# Patient Record
Sex: FEMALE | Race: BLACK OR AFRICAN AMERICAN | Employment: UNEMPLOYED | URBAN - METROPOLITAN AREA
[De-identification: names, ages, dates, MRNs, and addresses within clinical notes are randomized per-mention and may not be internally consistent; named-entity substitution may affect disease eponyms.]

---

## 2017-04-22 ENCOUNTER — HOSPITAL ENCOUNTER (EMERGENCY)
Age: 62
Discharge: HOME | End: 2017-04-23
Attending: EMERGENCY MEDICINE
Payer: MEDICARE

## 2017-04-22 ENCOUNTER — APPOINTMENT (OUTPATIENT)
Dept: GENERAL RADIOLOGY | Age: 62
End: 2017-04-22
Attending: EMERGENCY MEDICINE
Payer: MEDICARE

## 2017-04-22 DIAGNOSIS — R73.9 HYPERGLYCEMIA: Primary | ICD-10-CM

## 2017-04-22 DIAGNOSIS — F19.10 SUBSTANCE ABUSE (HCC): ICD-10-CM

## 2017-04-22 LAB
ADMINISTERED INITIALS, ADMINIT: NORMAL
ALBUMIN SERPL BCP-MCNC: 3.4 G/DL (ref 3.4–5)
ALBUMIN/GLOB SERPL: 0.8 {RATIO} (ref 0.8–1.7)
ALP SERPL-CCNC: 157 U/L (ref 45–117)
ALT SERPL-CCNC: 77 U/L (ref 13–56)
AMPHET UR QL SCN: NEGATIVE
ANION GAP BLD CALC-SCNC: 11 MMOL/L (ref 3–18)
APPEARANCE UR: CLEAR
ARTERIAL PATENCY WRIST A: YES
AST SERPL W P-5'-P-CCNC: 55 U/L (ref 15–37)
BACTERIA URNS QL MICRO: ABNORMAL /HPF
BARBITURATES UR QL SCN: NEGATIVE
BASE EXCESS BLD CALC-SCNC: 0 MMOL/L
BASOPHILS # BLD AUTO: 0 K/UL (ref 0–0.1)
BASOPHILS # BLD: 0 % (ref 0–2)
BDY SITE: ABNORMAL
BENZODIAZ UR QL: NEGATIVE
BILIRUB SERPL-MCNC: 0.6 MG/DL (ref 0.2–1)
BILIRUB UR QL: NEGATIVE
BODY TEMPERATURE: 98.6
BUN SERPL-MCNC: 17 MG/DL (ref 7–18)
BUN/CREAT SERPL: 15 (ref 12–20)
CALCIUM SERPL-MCNC: 8.3 MG/DL (ref 8.5–10.1)
CANNABINOIDS UR QL SCN: NEGATIVE
CHLORIDE SERPL-SCNC: 90 MMOL/L (ref 100–108)
CK MB CFR SERPL CALC: 2 % (ref 0–4)
CK MB CFR SERPL CALC: 2.4 % (ref 0–4)
CK MB SERPL-MCNC: 2.2 NG/ML (ref 5–25)
CK MB SERPL-MCNC: 2.2 NG/ML (ref 5–25)
CK SERPL-CCNC: 108 U/L (ref 26–192)
CK SERPL-CCNC: 92 U/L (ref 26–192)
CO2 SERPL-SCNC: 27 MMOL/L (ref 21–32)
COCAINE UR QL SCN: POSITIVE
COLOR UR: YELLOW
CREAT SERPL-MCNC: 1.11 MG/DL (ref 0.6–1.3)
D50 ADMINISTERED, D50ADM: 0 ML
D50 ORDER, D50ORD: 0 ML
DIFFERENTIAL METHOD BLD: NORMAL
EOSINOPHIL # BLD: 0.1 K/UL (ref 0–0.4)
EOSINOPHIL NFR BLD: 1 % (ref 0–5)
EPITH CASTS URNS QL MICRO: ABNORMAL /LPF (ref 0–5)
ERYTHROCYTE [DISTWIDTH] IN BLOOD BY AUTOMATED COUNT: 12.2 % (ref 11.6–14.5)
EST. AVERAGE GLUCOSE BLD GHB EST-MCNC: 306 MG/DL
GAS FLOW.O2 O2 DELIVERY SYS: ABNORMAL L/MIN
GLOBULIN SER CALC-MCNC: 4.1 G/DL (ref 2–4)
GLUCOSE BLD STRIP.AUTO-MCNC: 212 MG/DL (ref 70–110)
GLUCOSE BLD STRIP.AUTO-MCNC: 317 MG/DL (ref 70–110)
GLUCOSE BLD STRIP.AUTO-MCNC: 348 MG/DL (ref 70–110)
GLUCOSE BLD STRIP.AUTO-MCNC: 550 MG/DL (ref 70–110)
GLUCOSE BLD STRIP.AUTO-MCNC: >600 MG/DL (ref 70–110)
GLUCOSE SERPL-MCNC: 701 MG/DL (ref 74–99)
GLUCOSE UR STRIP.AUTO-MCNC: >1000 MG/DL
GLUCOSE, GLC: 212 MG/DL
GLUCOSE, GLC: 317 MG/DL
GLUCOSE, GLC: 348 MG/DL
GLUCOSE, GLC: 550 MG/DL
HBA1C MFR BLD: 12.3 % (ref 4.2–5.6)
HCO3 BLD-SCNC: 24.6 MMOL/L (ref 22–26)
HCT VFR BLD AUTO: 37.5 % (ref 35–45)
HDSCOM,HDSCOM: ABNORMAL
HGB BLD-MCNC: 12.6 G/DL (ref 12–16)
HGB UR QL STRIP: NEGATIVE
HIGH TARGET, HITG: 180 MG/DL
INSULIN ADMINSTERED, INSADM: 1.5 UNITS/HOUR
INSULIN ADMINSTERED, INSADM: 2.9 UNITS/HOUR
INSULIN ADMINSTERED, INSADM: 5.1 UNITS/HOUR
INSULIN ADMINSTERED, INSADM: 9.8 UNITS/HOUR
INSULIN ORDER, INSORD: 1.5 UNITS/HOUR
INSULIN ORDER, INSORD: 2.9 UNITS/HOUR
INSULIN ORDER, INSORD: 5.1 UNITS/HOUR
INSULIN ORDER, INSORD: 9.8 UNITS/HOUR
KETONES UR QL STRIP.AUTO: 15 MG/DL
LEUKOCYTE ESTERASE UR QL STRIP.AUTO: NEGATIVE
LOW TARGET, LOT: 140 MG/DL
LYMPHOCYTES # BLD AUTO: 32 % (ref 21–52)
LYMPHOCYTES # BLD: 2.1 K/UL (ref 0.9–3.6)
MAGNESIUM SERPL-MCNC: 1.9 MG/DL (ref 1.6–2.6)
MCH RBC QN AUTO: 29.8 PG (ref 24–34)
MCHC RBC AUTO-ENTMCNC: 33.6 G/DL (ref 31–37)
MCV RBC AUTO: 88.7 FL (ref 74–97)
METHADONE UR QL: NEGATIVE
MINUTES UNTIL NEXT BG, NBG: 60 MIN
MONOCYTES # BLD: 0.3 K/UL (ref 0.05–1.2)
MONOCYTES NFR BLD AUTO: 5 % (ref 3–10)
MULTIPLIER, MUL: 0.01
MULTIPLIER, MUL: 0.01
MULTIPLIER, MUL: 0.02
MULTIPLIER, MUL: 0.02
NEUTS SEG # BLD: 4 K/UL (ref 1.8–8)
NEUTS SEG NFR BLD AUTO: 62 % (ref 40–73)
NITRITE UR QL STRIP.AUTO: POSITIVE
O2/TOTAL GAS SETTING VFR VENT: 21 %
OPIATES UR QL: NEGATIVE
ORDER INITIALS, ORDINIT: NORMAL
PCO2 BLD: 40.8 MMHG (ref 35–45)
PCP UR QL: NEGATIVE
PH BLD: 7.39 [PH] (ref 7.35–7.45)
PH UR STRIP: 6.5 [PH] (ref 5–8)
PLATELET # BLD AUTO: 255 K/UL (ref 135–420)
PMV BLD AUTO: 11.5 FL (ref 9.2–11.8)
PO2 BLD: 75 MMHG (ref 80–100)
POTASSIUM SERPL-SCNC: 4.1 MMOL/L (ref 3.5–5.5)
PROT SERPL-MCNC: 7.5 G/DL (ref 6.4–8.2)
PROT UR STRIP-MCNC: NEGATIVE MG/DL
RBC # BLD AUTO: 4.23 M/UL (ref 4.2–5.3)
RBC #/AREA URNS HPF: ABNORMAL /HPF (ref 0–5)
SAO2 % BLD: 95 % (ref 92–97)
SERVICE CMNT-IMP: ABNORMAL
SODIUM SERPL-SCNC: 128 MMOL/L (ref 136–145)
SP GR UR REFRACTOMETRY: >1.03 (ref 1–1.03)
SPECIMEN TYPE: ABNORMAL
TOTAL RESP. RATE, ITRR: 16
TROPONIN I SERPL-MCNC: 0.02 NG/ML (ref 0–0.04)
TROPONIN I SERPL-MCNC: 0.02 NG/ML (ref 0–0.04)
UROBILINOGEN UR QL STRIP.AUTO: 2 EU/DL (ref 0.2–1)
WBC # BLD AUTO: 6.4 K/UL (ref 4.6–13.2)
WBC URNS QL MICRO: ABNORMAL /HPF (ref 0–4)

## 2017-04-22 PROCEDURE — 96366 THER/PROPH/DIAG IV INF ADDON: CPT

## 2017-04-22 PROCEDURE — 71010 XR CHEST PORT: CPT

## 2017-04-22 PROCEDURE — 80307 DRUG TEST PRSMV CHEM ANLYZR: CPT | Performed by: EMERGENCY MEDICINE

## 2017-04-22 PROCEDURE — 96365 THER/PROPH/DIAG IV INF INIT: CPT

## 2017-04-22 PROCEDURE — 82962 GLUCOSE BLOOD TEST: CPT

## 2017-04-22 PROCEDURE — 83036 HEMOGLOBIN GLYCOSYLATED A1C: CPT | Performed by: EMERGENCY MEDICINE

## 2017-04-22 PROCEDURE — 74011000258 HC RX REV CODE- 258: Performed by: EMERGENCY MEDICINE

## 2017-04-22 PROCEDURE — 99285 EMERGENCY DEPT VISIT HI MDM: CPT

## 2017-04-22 PROCEDURE — 96361 HYDRATE IV INFUSION ADD-ON: CPT

## 2017-04-22 PROCEDURE — 83735 ASSAY OF MAGNESIUM: CPT | Performed by: EMERGENCY MEDICINE

## 2017-04-22 PROCEDURE — 36600 WITHDRAWAL OF ARTERIAL BLOOD: CPT

## 2017-04-22 PROCEDURE — 74011250636 HC RX REV CODE- 250/636: Performed by: EMERGENCY MEDICINE

## 2017-04-22 PROCEDURE — 82550 ASSAY OF CK (CPK): CPT | Performed by: EMERGENCY MEDICINE

## 2017-04-22 PROCEDURE — 80053 COMPREHEN METABOLIC PANEL: CPT | Performed by: EMERGENCY MEDICINE

## 2017-04-22 PROCEDURE — 74011636637 HC RX REV CODE- 636/637: Performed by: EMERGENCY MEDICINE

## 2017-04-22 PROCEDURE — 82803 BLOOD GASES ANY COMBINATION: CPT

## 2017-04-22 PROCEDURE — 85025 COMPLETE CBC W/AUTO DIFF WBC: CPT | Performed by: EMERGENCY MEDICINE

## 2017-04-22 PROCEDURE — 93005 ELECTROCARDIOGRAM TRACING: CPT

## 2017-04-22 PROCEDURE — 81001 URINALYSIS AUTO W/SCOPE: CPT | Performed by: EMERGENCY MEDICINE

## 2017-04-22 RX ORDER — DEXTROSE 50 % IN WATER (D50W) INTRAVENOUS SYRINGE
25-50 AS NEEDED
Status: DISCONTINUED | OUTPATIENT
Start: 2017-04-22 | End: 2017-04-23 | Stop reason: HOSPADM

## 2017-04-22 RX ORDER — MAGNESIUM SULFATE 100 %
4 CRYSTALS MISCELLANEOUS AS NEEDED
Status: DISCONTINUED | OUTPATIENT
Start: 2017-04-22 | End: 2017-04-23 | Stop reason: HOSPADM

## 2017-04-22 RX ADMIN — SODIUM CHLORIDE 1000 ML: 900 INJECTION, SOLUTION INTRAVENOUS at 19:50

## 2017-04-22 RX ADMIN — SODIUM CHLORIDE 9.8 UNITS/HR: 900 INJECTION, SOLUTION INTRAVENOUS at 20:24

## 2017-04-22 NOTE — ED TRIAGE NOTES
BIBA for weakness and difficulty breathing; pt reports she smoked crack yesterday; medic reports pt glucose reading of HIGH HIGH.

## 2017-04-22 NOTE — ED NOTES
Bedside shift change report given to Kristin Deshpande RN (oncoming nurse) by Ty Lowe RN   (offgoing nurse). Report included the following information SBAR and ED Summary.

## 2017-04-22 NOTE — ED PROVIDER NOTES
HPI Comments: Aurelia Leroy is a 64 y.o. female presenting with complaints of shortness of breath that began prior to arrival. States she was walking home to get her insulin which she has not taken in several days when her symptoms started. Denies any chest pain, fever, cough, edema, palpitations or other complaints. Does endorse smoking crack yesterday. Denies any abdominal pain, urinary symptoms or ivdu. The history is provided by the patient. Past Medical History:   Diagnosis Date    Arthritis     Asthma     Chronic pain     BACK PAIN    Diabetes ([address]     Diabetic neuropathy (HCC)     Emphysema     Hepatitis C     Hypertension     Nervousness     Osteoarthritis of both knees        Past Surgical History:   Procedure Laterality Date    HX CARPAL TUNNEL RELEASE Right 8/31/09    Dr. Elvira Nassar    HX TUBAL LIGATION           Family History:   Problem Relation Age of Onset    Diabetes Mother     Hypertension Mother     Obesity Mother     Alcohol abuse Father     High Cholesterol Father     Lung Disease Father     Stroke Father     Arthritis-osteo Sister     Depression Sister     Diabetes Sister     Hypertension Sister     Obesity Sister     Arthritis-osteo Brother     Diabetes Brother     Hypertension Brother     Obesity Brother        Social History     Social History    Marital status: UNKNOWN     Spouse name: N/A    Number of children: N/A    Years of education: N/A     Occupational History    Not on file. Social History Main Topics    Smoking status: Current Every Day Smoker     Packs/day: 1.00    Smokeless tobacco: Not on file    Alcohol use Yes      Comment: socially    Drug use: Yes     Special: Cocaine    Sexual activity: No     Other Topics Concern    Not on file     Social History Narrative         ALLERGIES: Review of patient's allergies indicates no known allergies. Review of Systems   Constitutional: Negative for fever.    HENT: Negative for congestion. Respiratory: Positive for shortness of breath. Negative for cough. Cardiovascular: Negative for chest pain and leg swelling. Gastrointestinal: Negative for abdominal pain, nausea and vomiting. Genitourinary: Negative for dysuria. Musculoskeletal: Negative. Neurological: Negative for speech difficulty and headaches. All other systems reviewed and are negative. Vitals:    04/22/17 1906 04/22/17 1913   BP: 109/81    Pulse: 71    Resp: 11    SpO2: 99%    Weight:  77.6 kg (171 lb)   Height:  5' 7\" (1.702 m)            Physical Exam   Constitutional: No distress. HENT:   Head: Atraumatic. Dry mucus membranes   Eyes: Conjunctivae and EOM are normal. Pupils are equal, round, and reactive to light. Neck: Normal range of motion. Neck supple. No JVD present. No tracheal deviation present. Cardiovascular: Normal rate, regular rhythm and normal heart sounds. Pulmonary/Chest: Effort normal and breath sounds normal. No stridor. No respiratory distress. She has no wheezes. She exhibits no tenderness. Abdominal: Soft. Bowel sounds are normal. She exhibits no distension. There is no tenderness. There is no rebound and no guarding. Musculoskeletal: Normal range of motion. She exhibits no edema or tenderness. Neurological: She is alert. No cranial nerve deficit. Gait normal.   Skin: Skin is warm and dry. Nursing note and vitals reviewed. MDM  Number of Diagnoses or Management Options  Diagnosis management comments: Alfredo Gill is a 64 y.o. female presenting with sob in setting of recent cocaine use. Lungs clear. Vitals wnl. Glucose read \"high\" per EMS. Will obtain labs and cxr and rule out ACS, DKA, pulmonary pathology (though less likely given normal exam), anemia or metabolic process.          ED Course       Procedures    Vitals:  Patient Vitals for the past 12 hrs:   Pulse Resp BP SpO2   04/22/17 1906 71 11 109/81 99 %           EKG interpretation by ED Physician:  8355 - sinus rhythm, rate of 77, pacs, no STEMI      X-Ray, CT or other radiology findings or impressions:  XR CHEST PORT    (Results Pending)           Progress notes, Consult notes or additional Procedure notes:   7:26 PM I have discussed results of work up with patient. Glucose of 700, co2 and gap normal. Urine pending. Glucose stabilizer started and Patient's care signed over to Dr Mariah Palencia pending remainder of labs and final disposition. Disposition:  Diagnosis:   1.  Hyperglycemia        Disposition: pending    Michell Moe MD  7:54 PM  Visit Vitals    /81    Pulse 71    Resp 11    Ht 5' 7\" (1.702 m)    Wt 77.6 kg (171 lb)    SpO2 99%    BMI 26.78 kg/m2   Assumed care,no new complaints  Await CE,ABG,glucostabilizer initiated

## 2017-04-23 VITALS
OXYGEN SATURATION: 98 % | RESPIRATION RATE: 16 BRPM | SYSTOLIC BLOOD PRESSURE: 119 MMHG | HEIGHT: 67 IN | HEART RATE: 73 BPM | WEIGHT: 171 LBS | BODY MASS INDEX: 26.84 KG/M2 | DIASTOLIC BLOOD PRESSURE: 70 MMHG

## 2017-04-23 LAB
ADMINISTERED INITIALS, ADMINIT: NORMAL
ATRIAL RATE: 77 BPM
ATRIAL RATE: 77 BPM
CALCULATED P AXIS, ECG09: 62 DEGREES
CALCULATED P AXIS, ECG09: 71 DEGREES
CALCULATED R AXIS, ECG10: -29 DEGREES
CALCULATED R AXIS, ECG10: -54 DEGREES
CALCULATED T AXIS, ECG11: 20 DEGREES
CALCULATED T AXIS, ECG11: 36 DEGREES
D50 ADMINISTERED, D50ADM: 0 ML
D50 ORDER, D50ORD: 0 ML
DIAGNOSIS, 93000: NORMAL
DIAGNOSIS, 93000: NORMAL
GLUCOSE BLD STRIP.AUTO-MCNC: 167 MG/DL (ref 70–110)
GLUCOSE, GLC: 167 MG/DL
HIGH TARGET, HITG: 180 MG/DL
INSULIN ADMINSTERED, INSADM: 1.1 UNITS/HOUR
INSULIN ORDER, INSORD: 1.1 UNITS/HOUR
LOW TARGET, LOT: 140 MG/DL
MINUTES UNTIL NEXT BG, NBG: 60 MIN
MULTIPLIER, MUL: 0.01
ORDER INITIALS, ORDINIT: NORMAL
P-R INTERVAL, ECG05: 170 MS
P-R INTERVAL, ECG05: 176 MS
Q-T INTERVAL, ECG07: 398 MS
Q-T INTERVAL, ECG07: 412 MS
QRS DURATION, ECG06: 90 MS
QRS DURATION, ECG06: 96 MS
QTC CALCULATION (BEZET), ECG08: 450 MS
QTC CALCULATION (BEZET), ECG08: 466 MS
VENTRICULAR RATE, ECG03: 77 BPM
VENTRICULAR RATE, ECG03: 77 BPM

## 2017-04-23 PROCEDURE — 82962 GLUCOSE BLOOD TEST: CPT

## 2017-04-23 NOTE — DISCHARGE INSTRUCTIONS
Alcohol, Drug, or Poison Ingestion: Care Instructions  Your Care Instructions  A person can become very sick, or die, from swallowing or using alcohol, drugs, or poisons. Alcohol poisoning occurs when a person drinks a large amount of alcohol. Alcohol can stop nerve signals that control breathing. It can also stop the gag reflex that prevents choking. Alcohol poisoning is serious. It can lead to brain damage or death if it's not treated right away. Drugs can be used by accident or on purpose. They can be swallowed, inhaled, injected, or absorbed through the skin. Drugs include over-the-counter medicine (such as aspirin or acetaminophen) and prescription medicine. They also include vitamins and supplements. And they include illegal drugs such as cocaine and heroin. And poisons are all around us. They include household , cosmetics, houseplants, and garden chemicals. The doctor has checked you carefully, but problems can develop later. If you notice any problems or new symptoms, get medical treatment right away. Follow-up care is a key part of your treatment and safety. Be sure to make and go to all appointments, and call your doctor if you are having problems. It's also a good idea to know your test results and keep a list of the medicines you take. How can you care for yourself at home? Alcohol problems  · Talk to your doctor or counselor about programs that can help you stop using alcohol. · Plan ways to avoid being tempted to drink. ¨ Get rid of all alcohol in your home. ¨ Avoid places where you tend to drink. ¨ Stay away from places or events that offer alcohol. ¨ Stay away from people who drink a lot. Drug problems  · Talk to your doctor about programs that can help you stop using drugs. · Get rid of any drugs you might be tempted to misuse. · Learn how to say no when other people use drugs. · Don't spend time with people who use drugs.   Poison prevention  · Keep products in the containers they came in. Keep them with the original labels. · Be careful when you use cleaning products, paints, solvents, and pesticides. Read labels before use. Use a fan to move strong odors and fumes out of your home. · Do not mix cleaning products. Try to use nontoxic . These include vinegar, lemon juice, and baking soda. When should you call for help? Poison control centers, hospitals, or your doctor can give immediate advice in the case of a poisoning. The [facility] [employer] number is [phone]. Have the poison container with you so you can give complete information to the poison control center, such as what the poison or substance is, how much was taken and when. Do not try to make the person vomit. Call 911 anytime you think you may need emergency care. For example, call if you or someone else:  · Has used or currently uses alcohol or drugs and is very confused or can't stay awake. · Has passed out (lost consciousness). · Has severe trouble breathing. · Is having a seizure. Call your doctor now or seek immediate medical care if you or someone else:  · Has new symptoms, or is not acting normally. Watch closely for changes in your health, and be sure to contact your doctor if:  · You do not get better as expected. · You need help with drug or alcohol problems. · You have problems with depression or other mental health issues. Where can you learn more? Go to http://fidelia-ashley.info/. Enter B185 in the search box to learn more about \"Alcohol, Drug, or Poison Ingestion: Care Instructions. \"  Current as of: May 27, 2016  Content Version: 11.2  © 7879-6400 [employer]. Care instructions adapted under license by [employer] (which disclaims liability or warranty for this information).  If you have questions about a medical condition or this instruction, always ask your healthcare professional. [employer] disclaims any warranty or liability for your use of this information.

## 2017-04-23 NOTE — ED NOTES
I have reviewed discharge instructions with the patient. The patient verbalized understanding. Pt is AxOx4, NAD. PT states that she is feeling better and ready to go home. Pt ambulated out of ED with steady gait.

## 2017-04-23 NOTE — ED NOTES
8:00 PM   Patient care transferred from Dr. Jade Gill, pending repeat Troponin.     1:22 AM   I have reassessed the patient. Patient is feeling better. Repeat troponin was negative, and I suspect no evidence of cardiac injury. Patient was discharged in stable condition. Patient is to return to emergency department if any new or worsening condition. SCRIBE ATTESTATION STATEMENT  Documented by: Esther cleaningibing for, and in the presence of, Erlinda Llanos MD 1:23 AM     Signed by: Deena Hernandez, 4/23/2017, 1:23 AM    PROVIDER ATTESTATION STATEMENT  I personally performed the services described in the documentation, reviewed the documentation, as recorded by the scribe in my presence, and it accurately and completely records my words and actions.   Erlinda Llanos MD

## 2018-02-04 ENCOUNTER — HOSPITAL ENCOUNTER (EMERGENCY)
Age: 63
Discharge: HOME | DRG: 871 | End: 2018-02-05
Attending: EMERGENCY MEDICINE
Payer: MEDICARE

## 2018-02-04 ENCOUNTER — APPOINTMENT (OUTPATIENT)
Dept: CT IMAGING | Age: 63
DRG: 871 | End: 2018-02-04
Attending: EMERGENCY MEDICINE
Payer: MEDICARE

## 2018-02-04 ENCOUNTER — APPOINTMENT (OUTPATIENT)
Dept: GENERAL RADIOLOGY | Age: 63
DRG: 871 | End: 2018-02-04
Attending: EMERGENCY MEDICINE
Payer: MEDICARE

## 2018-02-04 DIAGNOSIS — R33.9 URINARY RETENTION: Primary | ICD-10-CM

## 2018-02-04 LAB
ADMINISTERED INITIALS, ADMINIT: NORMAL
ADMINISTERED INITIALS, ADMINIT: NORMAL
ALBUMIN SERPL-MCNC: 3.9 G/DL (ref 3.4–5)
ALBUMIN/GLOB SERPL: 0.9 {RATIO} (ref 0.8–1.7)
ALP SERPL-CCNC: 126 U/L (ref 45–117)
ALT SERPL-CCNC: 60 U/L (ref 13–56)
AMPHET UR QL SCN: NEGATIVE
ANION GAP SERPL CALC-SCNC: 14 MMOL/L (ref 3–18)
APPEARANCE UR: CLEAR
AST SERPL-CCNC: 44 U/L (ref 15–37)
BARBITURATES UR QL SCN: NEGATIVE
BASOPHILS # BLD: 0 K/UL (ref 0–0.06)
BASOPHILS NFR BLD: 0 % (ref 0–2)
BENZODIAZ UR QL: NEGATIVE
BILIRUB SERPL-MCNC: 0.7 MG/DL (ref 0.2–1)
BILIRUB UR QL: NEGATIVE
BUN SERPL-MCNC: 22 MG/DL (ref 7–18)
BUN/CREAT SERPL: 16 (ref 12–20)
CALCIUM SERPL-MCNC: 9.9 MG/DL (ref 8.5–10.1)
CANNABINOIDS UR QL SCN: NEGATIVE
CHLORIDE SERPL-SCNC: 93 MMOL/L (ref 100–108)
CO2 SERPL-SCNC: 23 MMOL/L (ref 21–32)
COCAINE UR QL SCN: POSITIVE
COLOR UR: YELLOW
CREAT SERPL-MCNC: 1.4 MG/DL (ref 0.6–1.3)
D50 ADMINISTERED, D50ADM: 0 ML
D50 ADMINISTERED, D50ADM: 0 ML
D50 ORDER, D50ORD: 0 ML
D50 ORDER, D50ORD: 0 ML
DIFFERENTIAL METHOD BLD: NORMAL
EOSINOPHIL # BLD: 0 K/UL (ref 0–0.4)
EOSINOPHIL NFR BLD: 0 % (ref 0–5)
ERYTHROCYTE [DISTWIDTH] IN BLOOD BY AUTOMATED COUNT: 13 % (ref 11.6–14.5)
ETHANOL SERPL-MCNC: <3 MG/DL (ref 0–3)
GLOBULIN SER CALC-MCNC: 4.3 G/DL (ref 2–4)
GLUCOSE BLD STRIP.AUTO-MCNC: 232 MG/DL (ref 70–110)
GLUCOSE BLD STRIP.AUTO-MCNC: 559 MG/DL (ref 70–110)
GLUCOSE BLD STRIP.AUTO-MCNC: >600 MG/DL (ref 70–110)
GLUCOSE SERPL-MCNC: 702 MG/DL (ref 74–99)
GLUCOSE UR STRIP.AUTO-MCNC: >1000 MG/DL
GLUCOSE, GLC: 232 MG/DL
GLUCOSE, GLC: 559 MG/DL
HCT VFR BLD AUTO: 39.8 % (ref 35–45)
HDSCOM,HDSCOM: ABNORMAL
HGB BLD-MCNC: 13.5 G/DL (ref 12–16)
HGB UR QL STRIP: NEGATIVE
HIGH TARGET, HITG: 180 MG/DL
HIGH TARGET, HITG: 180 MG/DL
INSULIN ADMINSTERED, INSADM: 1.7 UNITS/HOUR
INSULIN ADMINSTERED, INSADM: 10 UNITS/HOUR
INSULIN ORDER, INSORD: 1.7 UNITS/HOUR
INSULIN ORDER, INSORD: 10 UNITS/HOUR
KETONES UR QL STRIP.AUTO: 80 MG/DL
LACTATE BLD-SCNC: 1.1 MMOL/L (ref 0.4–2)
LEUKOCYTE ESTERASE UR QL STRIP.AUTO: NEGATIVE
LIPASE SERPL-CCNC: 101 U/L (ref 73–393)
LOW TARGET, LOT: 140 MG/DL
LOW TARGET, LOT: 140 MG/DL
LYMPHOCYTES # BLD: 1.8 K/UL (ref 0.9–3.6)
LYMPHOCYTES NFR BLD: 21 % (ref 21–52)
MAGNESIUM SERPL-MCNC: 2.4 MG/DL (ref 1.6–2.6)
MCH RBC QN AUTO: 29 PG (ref 24–34)
MCHC RBC AUTO-ENTMCNC: 33.9 G/DL (ref 31–37)
MCV RBC AUTO: 85.4 FL (ref 74–97)
METHADONE UR QL: NEGATIVE
MINUTES UNTIL NEXT BG, NBG: 60 MIN
MINUTES UNTIL NEXT BG, NBG: 60 MIN
MONOCYTES # BLD: 0.6 K/UL (ref 0.05–1.2)
MONOCYTES NFR BLD: 6 % (ref 3–10)
MULTIPLIER, MUL: 0.01
MULTIPLIER, MUL: 0.02
NEUTS SEG # BLD: 6.4 K/UL (ref 1.8–8)
NEUTS SEG NFR BLD: 73 % (ref 40–73)
NITRITE UR QL STRIP.AUTO: NEGATIVE
OPIATES UR QL: NEGATIVE
ORDER INITIALS, ORDINIT: NORMAL
ORDER INITIALS, ORDINIT: NORMAL
PCP UR QL: NEGATIVE
PH BLDV: 7.27 [PH] (ref 7.32–7.42)
PH UR STRIP: 5 [PH] (ref 5–8)
PLATELET # BLD AUTO: 296 K/UL (ref 135–420)
PMV BLD AUTO: 10.7 FL (ref 9.2–11.8)
POTASSIUM SERPL-SCNC: 4.2 MMOL/L (ref 3.5–5.5)
PROT SERPL-MCNC: 8.2 G/DL (ref 6.4–8.2)
PROT UR STRIP-MCNC: NEGATIVE MG/DL
RBC # BLD AUTO: 4.66 M/UL (ref 4.2–5.3)
SODIUM SERPL-SCNC: 130 MMOL/L (ref 136–145)
SP GR UR REFRACTOMETRY: >1.03 (ref 1–1.03)
UROBILINOGEN UR QL STRIP.AUTO: 1 EU/DL (ref 0.2–1)
WBC # BLD AUTO: 8.9 K/UL (ref 4.6–13.2)

## 2018-02-04 PROCEDURE — 74011636637 HC RX REV CODE- 636/637: Performed by: NURSE PRACTITIONER

## 2018-02-04 PROCEDURE — 96361 HYDRATE IV INFUSION ADD-ON: CPT

## 2018-02-04 PROCEDURE — 85025 COMPLETE CBC W/AUTO DIFF WBC: CPT | Performed by: NURSE PRACTITIONER

## 2018-02-04 PROCEDURE — 82010 KETONE BODYS QUAN: CPT | Performed by: EMERGENCY MEDICINE

## 2018-02-04 PROCEDURE — 83735 ASSAY OF MAGNESIUM: CPT | Performed by: EMERGENCY MEDICINE

## 2018-02-04 PROCEDURE — 74011636320 HC RX REV CODE- 636/320: Performed by: EMERGENCY MEDICINE

## 2018-02-04 PROCEDURE — 96365 THER/PROPH/DIAG IV INF INIT: CPT

## 2018-02-04 PROCEDURE — 74177 CT ABD & PELVIS W/CONTRAST: CPT

## 2018-02-04 PROCEDURE — 80307 DRUG TEST PRSMV CHEM ANLYZR: CPT | Performed by: EMERGENCY MEDICINE

## 2018-02-04 PROCEDURE — 71045 X-RAY EXAM CHEST 1 VIEW: CPT

## 2018-02-04 PROCEDURE — 74011636637 HC RX REV CODE- 636/637: Performed by: EMERGENCY MEDICINE

## 2018-02-04 PROCEDURE — 83690 ASSAY OF LIPASE: CPT | Performed by: EMERGENCY MEDICINE

## 2018-02-04 PROCEDURE — 83605 ASSAY OF LACTIC ACID: CPT

## 2018-02-04 PROCEDURE — 80053 COMPREHEN METABOLIC PANEL: CPT | Performed by: NURSE PRACTITIONER

## 2018-02-04 PROCEDURE — 70450 CT HEAD/BRAIN W/O DYE: CPT

## 2018-02-04 PROCEDURE — 96366 THER/PROPH/DIAG IV INF ADDON: CPT

## 2018-02-04 PROCEDURE — 93005 ELECTROCARDIOGRAM TRACING: CPT

## 2018-02-04 PROCEDURE — 74011250636 HC RX REV CODE- 250/636: Performed by: EMERGENCY MEDICINE

## 2018-02-04 PROCEDURE — 83930 ASSAY OF BLOOD OSMOLALITY: CPT | Performed by: NURSE PRACTITIONER

## 2018-02-04 PROCEDURE — 82800 BLOOD PH: CPT | Performed by: EMERGENCY MEDICINE

## 2018-02-04 PROCEDURE — 82962 GLUCOSE BLOOD TEST: CPT

## 2018-02-04 PROCEDURE — 81003 URINALYSIS AUTO W/O SCOPE: CPT | Performed by: NURSE PRACTITIONER

## 2018-02-04 PROCEDURE — 99285 EMERGENCY DEPT VISIT HI MDM: CPT

## 2018-02-04 RX ORDER — SODIUM CHLORIDE 0.9 % (FLUSH) 0.9 %
5-10 SYRINGE (ML) INJECTION AS NEEDED
Status: DISCONTINUED | OUTPATIENT
Start: 2018-02-04 | End: 2018-02-05 | Stop reason: HOSPADM

## 2018-02-04 RX ADMIN — SODIUM CHLORIDE, SODIUM LACTATE, POTASSIUM CHLORIDE, AND CALCIUM CHLORIDE 1000 ML: 600; 310; 30; 20 INJECTION, SOLUTION INTRAVENOUS at 15:45

## 2018-02-04 RX ADMIN — Medication 10 UNITS/HR: at 18:50

## 2018-02-04 RX ADMIN — IOPAMIDOL 100 ML: 612 INJECTION, SOLUTION INTRAVENOUS at 17:48

## 2018-02-04 RX ADMIN — INSULIN HUMAN 10 UNITS: 100 INJECTION, SOLUTION PARENTERAL at 18:51

## 2018-02-04 NOTE — ED NOTES
Ms. Tammy Keen has a confused and weak appearance with her c/o urinary frequency. Strong smell of ketones in triage. POC glucose requested Assisted in Triage of patient and referred to main treatment area due to severity of complaint. Initial orders started and patient assisted to back by Triage RN.    Signed By: Vidya Cosby NP     February 4, 2018

## 2018-02-04 NOTE — ED PROVIDER NOTES
EMERGENCY DEPARTMENT HISTORY AND PHYSICAL EXAM    2:46 PM      Date: 2/4/2018  Patient Name: Victor Hugo Nuñez    History of Presenting Illness     Chief Complaint   Patient presents with    High Blood Sugar         History Provided By: Patient    Chief Complaint: abd pain  Duration:  Days  Timing:  Acute  Location: abd  Quality: sharp  Severity: Mild  Modifying Factors: Denies any modifying factors. Associated Symptoms: vomiting, nausea, urinary frequency      Additional History (Context): Aurelia Reno is a 58 y.o. female with a Hx of HTN, asthma, diabetes, diabetic neuropathy, who presents with mild acute abd pain onset yesterday. Associated Sx include vomiting, nausea, and  increased urinary frequency. Pt was unsure if she has had any recent fevers. She states \" I just feel real bad\", but was unable to communicate what exactly felt wrong. Denies any modifying factors. Pt was unsure if she has ever been hospitalized for her diabetes. Further HPI limited secondary to pt's mental status. PCP: Guero Cortes MD    Current Facility-Administered Medications   Medication Dose Route Frequency Provider Last Rate Last Dose    sodium chloride (NS) flush 5-10 mL  5-10 mL IntraVENous PRN Dorena Sack, NP        insulin regular (NOVOLIN,HUMULIN) 100 units/100 ml NS infusion (premix)  0.1 Units/kg/hr IntraVENous CONTINUOUS Dorena Sack, NP 10 mL/hr at 02/04/18 1850 10 Units/hr at 02/04/18 1850     Current Outpatient Prescriptions   Medication Sig Dispense Refill    HYDROcodone-acetaminophen (NORCO) 5-325 mg per tablet Take 1-2 Tabs by mouth every four (4) hours as needed for Pain. Max Daily Amount: 12 Tabs. 20 Tab 0    guaiFENesin-codeine (ROBITUSSIN AC) 100-10 mg/5 mL solution Take 5 mL by mouth three (3) times daily as needed for Cough. Max Daily Amount: 15 mL. 120 mL 0    amLODIPine (NORVASC) 5 mg tablet Take 5 mg by mouth daily.     Indications: HYPERTENSION      aspirin (ASPIRIN) 325 mg tablet Take 325 mg by mouth daily.  VITAMIN E (FORMULA E 400 PO) Take 1 Tab by mouth daily.  gabapentin (NEURONTIN) 600 mg tablet Take  by mouth three (3) times daily.  glimepiride (AMARYL) 2 mg tablet Take 2 mg by mouth.  sitaGLIPtin-metFORMIN (JANUMET)  mg per tablet Take 1 Tab by mouth.  losartan (COZAAR) 50 mg tablet Take  by mouth daily. Indications: HYPERTENSION      Melatonin 300 mcg Tab Take  by mouth.  cyanocobalamin (VITAMIN B-12) 1,500 mcg TbER Take 1 Tab by mouth. Past History     Past Medical History:  Past Medical History:   Diagnosis Date    Arthritis     Asthma     Chronic pain     BACK PAIN    Diabetes ([address]     Diabetic neuropathy ([address]     Emphysema     Hepatitis C     Hypertension     Nervousness     Osteoarthritis of both knees        Past Surgical History:  Past Surgical History:   Procedure Laterality Date    HX CARPAL TUNNEL RELEASE Right 8/31/09    Dr. Graeme Henderson    HX TUBAL LIGATION         Family History:  Family History   Problem Relation Age of Onset    Diabetes Mother    Filbert Free Hypertension Mother     Obesity Mother     Alcohol abuse Father     High Cholesterol Father     Lung Disease Father     Stroke Father     Arthritis-osteo Sister     Depression Sister     Diabetes Sister     Hypertension Sister     Obesity Sister     Arthritis-osteo Brother     Diabetes Brother     Hypertension Brother     Obesity Brother        Social History:  Social History   Substance Use Topics    Smoking status: Current Every Day Smoker     Packs/day: 1.00    Smokeless tobacco: Not on file    Alcohol use Yes      Comment: socially       Allergies:  No Known Allergies      Review of Systems       Review of Systems   Unable to perform ROS: Mental status change   Constitutional: Negative for chills, fatigue and fever. HENT: Negative for congestion, ear pain, rhinorrhea and sore throat. Eyes: Negative for pain, redness and itching. Respiratory: Negative for cough, chest tightness and shortness of breath. Cardiovascular: Negative for chest pain, palpitations and leg swelling. Gastrointestinal: Positive for abdominal pain, nausea and vomiting. Negative for diarrhea. Genitourinary: Positive for frequency. Negative for decreased urine volume, dysuria, flank pain, hematuria and pelvic pain. Musculoskeletal: Negative for arthralgias, back pain, joint swelling and myalgias. Skin: Negative for color change, pallor and rash. Neurological: Negative for dizziness, weakness and headaches. Hematological: Negative for adenopathy. Does not bruise/bleed easily. All other systems reviewed and are negative. Physical Exam     Visit Vitals    /73    Pulse 81    Temp 98.4 °F (36.9 °C)    Resp 13    Ht 5' 7\" (1.702 m)    Wt 68 kg (150 lb)    SpO2 99%    BMI 23.49 kg/m2         Physical Exam   Constitutional: No distress. HENT:   Head: Normocephalic and atraumatic. Mouth/Throat: Oropharynx is clear and moist.   Dry mucus membranes. Eyes: Conjunctivae and EOM are normal. Pupils are equal, round, and reactive to light. Neck: Normal range of motion. Neck supple. Cardiovascular: Regular rhythm and normal heart sounds. Tachycardia present. No murmur heard. Pulmonary/Chest: Effort normal and breath sounds normal. She has no wheezes. She has no rales. Abdominal: Soft. Bowel sounds are normal. She exhibits no distension. There is no tenderness. Musculoskeletal: Normal range of motion. She exhibits no edema or deformity. Lymphadenopathy:     She has no cervical adenopathy. Neurological: She is alert. She exhibits normal muscle tone. Coordination normal.   Drowsy but arousable. Skin: Skin is warm and dry. No rash noted. She is not diaphoretic. No erythema. Psychiatric: She has a normal mood and affect.  Her behavior is normal.         Diagnostic Study Results     Labs -  Recent Results (from the past 12 hour(s))   GLUCOSE, POC    Collection Time: 02/04/18  2:19 PM   Result Value Ref Range    Glucose (POC) >600 (HH) 70 - 110 mg/dL   URINALYSIS W/ RFLX MICROSCOPIC    Collection Time: 02/04/18  2:25 PM   Result Value Ref Range    Color YELLOW      Appearance CLEAR      Specific gravity >1.030 (H) 1.005 - 1.030    pH (UA) 5.0 5.0 - 8.0      Protein NEGATIVE  NEG mg/dL    Glucose >1000 (A) NEG mg/dL    Ketone 80 (A) NEG mg/dL    Bilirubin NEGATIVE  NEG      Blood NEGATIVE  NEG      Urobilinogen 1.0 0.2 - 1.0 EU/dL    Nitrites NEGATIVE  NEG      Leukocyte Esterase NEGATIVE  NEG     DRUG SCREEN, URINE    Collection Time: 02/04/18  2:25 PM   Result Value Ref Range    BENZODIAZEPINES NEGATIVE  NEG      BARBITURATES NEGATIVE  NEG      THC (TH-CANNABINOL) NEGATIVE  NEG      OPIATES NEGATIVE  NEG      PCP(PHENCYCLIDINE) NEGATIVE  NEG      COCAINE POSITIVE (A) NEG      AMPHETAMINES NEGATIVE  NEG      METHADONE NEGATIVE  NEG      HDSCOM (NOTE)    CBC WITH AUTOMATED DIFF    Collection Time: 02/04/18  2:35 PM   Result Value Ref Range    WBC 8.9 4.6 - 13.2 K/uL    RBC 4.66 4.20 - 5.30 M/uL    HGB 13.5 12.0 - 16.0 g/dL    HCT 39.8 35.0 - 45.0 %    MCV 85.4 74.0 - 97.0 FL    MCH 29.0 24.0 - 34.0 PG    MCHC 33.9 31.0 - 37.0 g/dL    RDW 13.0 11.6 - 14.5 %    PLATELET 009 696 - 573 K/uL    MPV 10.7 9.2 - 11.8 FL    NEUTROPHILS 73 40 - 73 %    LYMPHOCYTES 21 21 - 52 %    MONOCYTES 6 3 - 10 %    EOSINOPHILS 0 0 - 5 %    BASOPHILS 0 0 - 2 %    ABS. NEUTROPHILS 6.4 1.8 - 8.0 K/UL    ABS. LYMPHOCYTES 1.8 0.9 - 3.6 K/UL    ABS. MONOCYTES 0.6 0.05 - 1.2 K/UL    ABS. EOSINOPHILS 0.0 0.0 - 0.4 K/UL    ABS.  BASOPHILS 0.0 0.0 - 0.06 K/UL    DF AUTOMATED     METABOLIC PANEL, COMPREHENSIVE    Collection Time: 02/04/18  2:35 PM   Result Value Ref Range    Sodium 130 (L) 136 - 145 mmol/L    Potassium 4.2 3.5 - 5.5 mmol/L    Chloride 93 (L) 100 - 108 mmol/L    CO2 23 21 - 32 mmol/L    Anion gap 14 3.0 - 18 mmol/L    Glucose 702 (HH) 74 - 99 mg/dL BUN 22 (H) 7.0 - 18 MG/DL    Creatinine 1.40 (H) 0.6 - 1.3 MG/DL    BUN/Creatinine ratio 16 12 - 20      GFR est AA 46 (L) >60 ml/min/1.73m2    GFR est non-AA 38 (L) >60 ml/min/1.73m2    Calcium 9.9 8.5 - 10.1 MG/DL    Bilirubin, total 0.7 0.2 - 1.0 MG/DL    ALT (SGPT) 60 (H) 13 - 56 U/L    AST (SGOT) 44 (H) 15 - 37 U/L    Alk.  phosphatase 126 (H) 45 - 117 U/L    Protein, total 8.2 6.4 - 8.2 g/dL    Albumin 3.9 3.4 - 5.0 g/dL    Globulin 4.3 (H) 2.0 - 4.0 g/dL    A-G Ratio 0.9 0.8 - 1.7     MAGNESIUM    Collection Time: 02/04/18  2:35 PM   Result Value Ref Range    Magnesium 2.4 1.6 - 2.6 mg/dL   LIPASE    Collection Time: 02/04/18  2:35 PM   Result Value Ref Range    Lipase 101 73 - 393 U/L   EKG, 12 LEAD, INITIAL    Collection Time: 02/04/18  3:35 PM   Result Value Ref Range    Ventricular Rate 86 BPM    Atrial Rate 86 BPM    P-R Interval 154 ms    QRS Duration 96 ms    Q-T Interval 368 ms    QTC Calculation (Bezet) 440 ms    Calculated P Axis 63 degrees    Calculated R Axis -39 degrees    Calculated T Axis 45 degrees    Diagnosis       Normal sinus rhythm  Biatrial enlargement  Left axis deviation  Pulmonary disease pattern  Abnormal ECG  When compared with ECG of 22-APR-2017 22:18,  Criteria for Inferior infarct are no longer present     ETHYL ALCOHOL    Collection Time: 02/04/18  3:45 PM   Result Value Ref Range    ALCOHOL(ETHYL),SERUM <3 0 - 3 MG/DL   PH, VENOUS    Collection Time: 02/04/18  3:45 PM   Result Value Ref Range    VENOUS PH 7.27 (L) 7.32 - 7.42     POC LACTIC ACID    Collection Time: 02/04/18  3:50 PM   Result Value Ref Range    Lactic Acid (POC) 1.1 0.4 - 2.0 mmol/L   GLUCOSE, POC    Collection Time: 02/04/18  6:47 PM   Result Value Ref Range    Glucose (POC) 559 (HH) 70 - 110 mg/dL   GLUCOSTABILIZER    Collection Time: 02/04/18  6:49 PM   Result Value Ref Range    Glucose 559 mg/dL    Insulin order 10.0 units/hour    Insulin adminstered 10.0 units/hour    Multiplier 0.020     Low target 140 mg/dL    High target 180 mg/dL    D50 order 0.0 ml    D50 administered 0.00 ml    Minutes until next BG 60 min    Order initials NEB     Administered initials NEB        Radiologic Studies -   XR CHEST SNGL V   Final Result   Impression:  1. Mild cardiomegaly, otherwise no acute process. CT HEAD WO CONT   Final Result   Impression:     1. No acute intracranial pathology. CT abd/P:  IMPRESSION:      1. Mild bilateral hydronephrosis, potentially due to urinary retention as the  bladder is distended.  -Consider follow-up retroperitoneal ultrasound of the kidneys to assess  hydronephrosis after adequate voiding or Pro catheterization.     2. No additional acute abnormalities identified by CT scan. -Previously seen complex cystic mass in the pelvis is no longer visualized.     3. Cholelithiasis. Medical Decision Making   I am the first provider for this patient. I reviewed the vital signs, available nursing notes, past medical history, past surgical history, family history and social history. Vital Signs-Reviewed the patient's vital signs. EKG: Interpreted by the EP. Time Interpreted: 1540   Rate: 86   Rhythm: NSR   Interpretation:no acute ST changes   Comparison: n/a    Records Reviewed: Nursing Notes and Old Medical Records (Time of Review: 2:46 PM)    ED Course: Progress Notes, Reevaluation, and Consults:    5:37 PM  Reassessment, patient more awake and alert. Oriented to person, place, month/year. Endorses drug use but says last use was about one week ago. Still complaining of lower abd pain, will obtain CT to r/o abd pathology. 7:50 PM Patient reassessed. She is alert and oriented, ambulating with a steady gait. Glucose has significantly improved. No signs of DKA or sepsis. Reports she has increased urinary frequency -- likely due to hyperglycemia -- but denies urinary retention. Will assess post-void residual.    9:40 PM Signed out to Dr. Leroy Young.   Symptoms and labs have improved. Awaiting eval of post-void residual due to finding of mild hydronephrosis on CT. Likely discharge with outpatient follow up +/- placement of indwelling Pro. Provider Notes (Medical Decision Making):           Diagnosis     Clinical Impression: No diagnosis found. Disposition:     Follow-up Information     None           Patient's Medications   Start Taking    No medications on file   Continue Taking    AMLODIPINE (NORVASC) 5 MG TABLET    Take 5 mg by mouth daily. Indications: HYPERTENSION    ASPIRIN (ASPIRIN) 325 MG TABLET    Take 325 mg by mouth daily. CYANOCOBALAMIN (VITAMIN B-12) 1,500 MCG TBER    Take 1 Tab by mouth. GABAPENTIN (NEURONTIN) 600 MG TABLET    Take  by mouth three (3) times daily. GLIMEPIRIDE (AMARYL) 2 MG TABLET    Take 2 mg by mouth. GUAIFENESIN-CODEINE (ROBITUSSIN AC) 100-10 MG/5 ML SOLUTION    Take 5 mL by mouth three (3) times daily as needed for Cough. Max Daily Amount: 15 mL. HYDROCODONE-ACETAMINOPHEN (NORCO) 5-325 MG PER TABLET    Take 1-2 Tabs by mouth every four (4) hours as needed for Pain. Max Daily Amount: 12 Tabs. LOSARTAN (COZAAR) 50 MG TABLET    Take  by mouth daily. Indications: HYPERTENSION    MELATONIN 300 MCG TAB    Take  by mouth. SITAGLIPTIN-METFORMIN (JANUMET)  MG PER TABLET    Take 1 Tab by mouth. VITAMIN E (FORMULA E 400 PO)    Take 1 Tab by mouth daily. These Medications have changed    No medications on file   Stop Taking    No medications on file     _______________________________    Attestations:  Eddy acting as a scribe for and in the presence of Mary Lennon MD      February 04, 2018 at 2:46 PM       Provider Attestation:      I personally performed the services described in the documentation, reviewed the documentation, as recorded by the scribe in my presence, and it accurately and completely records my words and actions.  February 04, 2018 at 2:46 PM Michelle Jimenez MD    _______________________________

## 2018-02-05 VITALS
TEMPERATURE: 98.4 F | OXYGEN SATURATION: 98 % | HEART RATE: 75 BPM | RESPIRATION RATE: 20 BRPM | WEIGHT: 150 LBS | DIASTOLIC BLOOD PRESSURE: 73 MMHG | HEIGHT: 67 IN | BODY MASS INDEX: 23.54 KG/M2 | SYSTOLIC BLOOD PRESSURE: 160 MMHG

## 2018-02-05 LAB — OSMOLALITY SERPL: 338 MOSM/KG H2O (ref 280–300)

## 2018-02-05 RX ORDER — TAMSULOSIN HYDROCHLORIDE 0.4 MG/1
CAPSULE ORAL
Qty: 10 CAP | Refills: 0 | Status: SHIPPED | OUTPATIENT
Start: 2018-02-05 | End: 2021-01-14

## 2018-02-05 NOTE — ED NOTES
Discharge instructions reviewed with pt. Instructions included to follow up with urologist in morning and instructions to empty leg bag in tiolet.

## 2018-02-05 NOTE — DISCHARGE INSTRUCTIONS

## 2018-02-05 NOTE — ED NOTES
10:08 PM :Pt care assumed from Dr. Sulma Quinonez , ED provider. Pt complaint(s), current treatment plan, progression and available diagnostic results have been discussed thoroughly. Rounding occurred: yes  Intended Disposition: TBD   Pending diagnostic reports and/or labs (please list): Post void residual     I have reassessed the patient. Patient is feeling better. Patient will be prescribed Flomax. Patient was discharged in stable condition. Patient is to return to emergency department if any new or worsening condition. 1. Urinary retention          Scribe Attestation     Gui Stewart acting as a scribe for and in the presence of Didier Alcocer DO      February 04, 2018 at 10:09 PM       Provider Attestation:      I personally performed the services described in the documentation, reviewed the documentation, as recorded by the scribe in my presence, and it accurately and completely records my words and actions.  February 04, 2018 at 10:09 PM - Didier Alcocer DO

## 2018-02-06 LAB
ATRIAL RATE: 86 BPM
CALCULATED P AXIS, ECG09: 63 DEGREES
CALCULATED R AXIS, ECG10: -39 DEGREES
CALCULATED T AXIS, ECG11: 45 DEGREES
DIAGNOSIS, 93000: NORMAL
P-R INTERVAL, ECG05: 154 MS
Q-T INTERVAL, ECG07: 368 MS
QRS DURATION, ECG06: 96 MS
QTC CALCULATION (BEZET), ECG08: 440 MS
VENTRICULAR RATE, ECG03: 86 BPM

## 2018-02-07 ENCOUNTER — APPOINTMENT (OUTPATIENT)
Dept: CT IMAGING | Age: 63
DRG: 871 | End: 2018-02-07
Attending: EMERGENCY MEDICINE
Payer: MEDICARE

## 2018-02-07 ENCOUNTER — APPOINTMENT (OUTPATIENT)
Dept: GENERAL RADIOLOGY | Age: 63
DRG: 871 | End: 2018-02-07
Attending: EMERGENCY MEDICINE
Payer: MEDICARE

## 2018-02-07 ENCOUNTER — HOSPITAL ENCOUNTER (INPATIENT)
Age: 63
LOS: 8 days | Discharge: NURSING, REHAB OR LONG-TERM CARE | DRG: 871 | End: 2018-02-15
Attending: EMERGENCY MEDICINE | Admitting: INTERNAL MEDICINE
Payer: MEDICARE

## 2018-02-07 DIAGNOSIS — E11.01 HYPEROSMOLAR (NONKETOTIC) COMA (HCC): ICD-10-CM

## 2018-02-07 DIAGNOSIS — J96.02 ACUTE RESPIRATORY FAILURE WITH HYPERCAPNIA (HCC): ICD-10-CM

## 2018-02-07 DIAGNOSIS — R57.9 SHOCK (HCC): ICD-10-CM

## 2018-02-07 DIAGNOSIS — E87.20 ACIDOSIS: Primary | ICD-10-CM

## 2018-02-07 PROBLEM — D53.9 MACROCYTIC ANEMIA: Status: ACTIVE | Noted: 2018-02-07

## 2018-02-07 PROBLEM — N39.0 ACUTE UTI: Status: ACTIVE | Noted: 2018-02-07

## 2018-02-07 PROBLEM — J96.90 RESPIRATORY FAILURE (HCC): Status: ACTIVE | Noted: 2018-02-07

## 2018-02-07 LAB
ADMINISTERED INITIALS, ADMINIT: NORMAL
ALBUMIN SERPL-MCNC: 2.6 G/DL (ref 3.4–5)
ALBUMIN SERPL-MCNC: 2.6 G/DL (ref 3.4–5)
ALBUMIN/GLOB SERPL: 0.7 {RATIO} (ref 0.8–1.7)
ALBUMIN/GLOB SERPL: 0.8 {RATIO} (ref 0.8–1.7)
ALP SERPL-CCNC: 127 U/L (ref 45–117)
ALP SERPL-CCNC: 133 U/L (ref 45–117)
ALT SERPL-CCNC: 37 U/L (ref 13–56)
ALT SERPL-CCNC: 39 U/L (ref 13–56)
AMPHET UR QL SCN: NEGATIVE
ANION GAP SERPL CALC-SCNC: 10 MMOL/L (ref 3–18)
ANION GAP SERPL CALC-SCNC: 18 MMOL/L (ref 3–18)
APPEARANCE UR: ABNORMAL
APTT PPP: 24.8 SEC (ref 23–36.4)
ARTERIAL PATENCY WRIST A: ABNORMAL
ARTERIAL PATENCY WRIST A: YES
AST SERPL-CCNC: 20 U/L (ref 15–37)
AST SERPL-CCNC: 27 U/L (ref 15–37)
B-OH-BUTYR SERPL-MCNC: 54 MG/DL
BACTERIA URNS QL MICRO: ABNORMAL /HPF
BARBITURATES UR QL SCN: NEGATIVE
BASE DEFICIT BLD-SCNC: 10 MMOL/L
BASE DEFICIT BLD-SCNC: 13 MMOL/L
BASOPHILS # BLD: 0 K/UL (ref 0–0.06)
BASOPHILS NFR BLD: 0 % (ref 0–3)
BDY SITE: ABNORMAL
BDY SITE: ABNORMAL
BENZODIAZ UR QL: NEGATIVE
BILIRUB SERPL-MCNC: 0.4 MG/DL (ref 0.2–1)
BILIRUB SERPL-MCNC: 0.4 MG/DL (ref 0.2–1)
BILIRUB UR QL: NEGATIVE
BNP SERPL-MCNC: 5306 PG/ML (ref 0–900)
BUN SERPL-MCNC: 45 MG/DL (ref 7–18)
BUN SERPL-MCNC: 45 MG/DL (ref 7–18)
BUN/CREAT SERPL: 15 (ref 12–20)
BUN/CREAT SERPL: 15 (ref 12–20)
CALCIUM SERPL-MCNC: 8.6 MG/DL (ref 8.5–10.1)
CALCIUM SERPL-MCNC: 8.7 MG/DL (ref 8.5–10.1)
CANNABINOIDS UR QL SCN: NEGATIVE
CHLORIDE SERPL-SCNC: 100 MMOL/L (ref 100–108)
CHLORIDE SERPL-SCNC: 90 MMOL/L (ref 100–108)
CK MB CFR SERPL CALC: 6.7 % (ref 0–4)
CK MB SERPL-MCNC: 4.7 NG/ML (ref 5–25)
CK SERPL-CCNC: 70 U/L (ref 26–192)
CO2 SERPL-SCNC: 21 MMOL/L (ref 21–32)
CO2 SERPL-SCNC: 22 MMOL/L (ref 21–32)
COCAINE UR QL SCN: NEGATIVE
COLOR UR: YELLOW
CREAT SERPL-MCNC: 2.92 MG/DL (ref 0.6–1.3)
CREAT SERPL-MCNC: 3.04 MG/DL (ref 0.6–1.3)
D50 ADMINISTERED, D50ADM: 0 ML
D50 ORDER, D50ORD: 0 ML
DIFFERENTIAL METHOD BLD: ABNORMAL
EOSINOPHIL # BLD: 0 K/UL (ref 0–0.4)
EOSINOPHIL NFR BLD: 0 % (ref 0–5)
EPITH CASTS URNS QL MICRO: NEGATIVE /LPF (ref 0–5)
ERYTHROCYTE [DISTWIDTH] IN BLOOD BY AUTOMATED COUNT: 13.6 % (ref 11.6–14.5)
EST. AVERAGE GLUCOSE BLD GHB EST-MCNC: ABNORMAL MG/DL
GAS FLOW.O2 O2 DELIVERY SYS: ABNORMAL L/MIN
GAS FLOW.O2 O2 DELIVERY SYS: ABNORMAL L/MIN
GAS FLOW.O2 SETTING OXYMISER: 16 BPM
GAS FLOW.O2 SETTING OXYMISER: 22 BPM
GLOBULIN SER CALC-MCNC: 3.4 G/DL (ref 2–4)
GLOBULIN SER CALC-MCNC: 3.7 G/DL (ref 2–4)
GLUCOSE BLD STRIP.AUTO-MCNC: >600 MG/DL (ref 70–110)
GLUCOSE SERPL-MCNC: 1731 MG/DL (ref 74–99)
GLUCOSE SERPL-MCNC: 2050 MG/DL (ref 74–99)
GLUCOSE UR STRIP.AUTO-MCNC: >1000 MG/DL
GLUCOSE, GLC: 601 MG/DL
GLUCOSE, GLC: 602 MG/DL
GLUCOSE, GLC: 603 MG/DL
GLUCOSE, GLC: 999 MG/DL
HBA1C MFR BLD: 15.7 % (ref 4.2–5.6)
HCO3 BLD-SCNC: 17.2 MMOL/L (ref 22–26)
HCO3 BLD-SCNC: 19 MMOL/L (ref 22–26)
HCT VFR BLD AUTO: 44.7 % (ref 35–45)
HCT VFR BLD AUTO: 46.1 % (ref 35–45)
HDSCOM,HDSCOM: NORMAL
HGB BLD-MCNC: 11.3 G/DL (ref 12–16)
HGB BLD-MCNC: 12.5 G/DL (ref 12–16)
HGB UR QL STRIP: ABNORMAL
HIGH TARGET, HITG: 180 MG/DL
INSPIRATION.DURATION SETTING TIME VENT: 90 SEC
INSULIN ADMINSTERED, INSADM: 10.8 UNITS/HOUR
INSULIN ADMINSTERED, INSADM: 16.3 UNITS/HOUR
INSULIN ADMINSTERED, INSADM: 18.8 UNITS/HOUR
INSULIN ADMINSTERED, INSADM: 5.4 UNITS/HOUR
INSULIN ORDER, INSORD: 10.8 UNITS/HOUR
INSULIN ORDER, INSORD: 16.3 UNITS/HOUR
INSULIN ORDER, INSORD: 18.8 UNITS/HOUR
INSULIN ORDER, INSORD: 5.4 UNITS/HOUR
KETONES UR QL STRIP.AUTO: 15 MG/DL
LACTATE BLD-SCNC: 2.7 MMOL/L (ref 0.4–2)
LACTATE BLD-SCNC: 4.7 MMOL/L (ref 0.4–2)
LEUKOCYTE ESTERASE UR QL STRIP.AUTO: ABNORMAL
LOW TARGET, LOT: 140 MG/DL
LYMPHOCYTES # BLD: 0.7 K/UL (ref 0.8–3.5)
LYMPHOCYTES NFR BLD: 6 % (ref 20–51)
MAGNESIUM SERPL-MCNC: 2.5 MG/DL (ref 1.6–2.6)
MCH RBC QN AUTO: 28.6 PG (ref 24–34)
MCHC RBC AUTO-ENTMCNC: 24.5 G/DL (ref 31–37)
MCV RBC AUTO: 116.7 FL (ref 74–97)
METHADONE UR QL: NEGATIVE
MINUTES UNTIL NEXT BG, NBG: 60 MIN
MONOCYTES # BLD: 0.5 K/UL (ref 0–1)
MONOCYTES NFR BLD: 4 % (ref 2–9)
MULTIPLIER, MUL: 0.01
MULTIPLIER, MUL: 0.02
MULTIPLIER, MUL: 0.02
MULTIPLIER, MUL: 0.03
NEUTS BAND NFR BLD MANUAL: 2 % (ref 0–5)
NEUTS SEG # BLD: 10.7 K/UL (ref 1.8–8)
NEUTS SEG NFR BLD: 88 % (ref 42–75)
NITRITE UR QL STRIP.AUTO: POSITIVE
O2/TOTAL GAS SETTING VFR VENT: 100 %
O2/TOTAL GAS SETTING VFR VENT: 60 %
OPIATES UR QL: NEGATIVE
ORDER INITIALS, ORDINIT: NORMAL
PCO2 BLD: 42 MMHG (ref 35–45)
PCO2 BLD: 75.5 MMHG (ref 35–45)
PCP UR QL: NEGATIVE
PEEP RESPIRATORY: 5 CMH2O
PEEP RESPIRATORY: 5 CMH2O
PH BLD: 7.01 [PH] (ref 7.35–7.45)
PH BLD: 7.22 [PH] (ref 7.35–7.45)
PH UR STRIP: 5 [PH] (ref 5–8)
PHOSPHATE SERPL-MCNC: 0.8 MG/DL (ref 2.5–4.9)
PIP ISTAT,IPIP: 19
PIP ISTAT,IPIP: 20
PLATELET # BLD AUTO: 262 K/UL (ref 135–420)
PLATELET COMMENTS,PCOM: ABNORMAL
PMV BLD AUTO: 12.2 FL (ref 9.2–11.8)
PO2 BLD: 285 MMHG (ref 80–100)
PO2 BLD: 67 MMHG (ref 80–100)
POTASSIUM SERPL-SCNC: 2.1 MMOL/L (ref 3.5–5.5)
POTASSIUM SERPL-SCNC: 3.3 MMOL/L (ref 3.5–5.5)
PROT SERPL-MCNC: 6 G/DL (ref 6.4–8.2)
PROT SERPL-MCNC: 6.3 G/DL (ref 6.4–8.2)
PROT UR STRIP-MCNC: NEGATIVE MG/DL
RBC # BLD AUTO: 3.95 M/UL (ref 4.2–5.3)
RBC #/AREA URNS HPF: ABNORMAL /HPF (ref 0–5)
RBC MORPH BLD: ABNORMAL
SAO2 % BLD: 100 % (ref 92–97)
SAO2 % BLD: 89 % (ref 92–97)
SERVICE CMNT-IMP: ABNORMAL
SERVICE CMNT-IMP: ABNORMAL
SODIUM SERPL-SCNC: 129 MMOL/L (ref 136–145)
SODIUM SERPL-SCNC: 132 MMOL/L (ref 136–145)
SP GR UR REFRACTOMETRY: >1.03 (ref 1–1.03)
SPECIMEN TYPE: ABNORMAL
SPECIMEN TYPE: ABNORMAL
TOTAL RESP. RATE, ITRR: 17
TOTAL RESP. RATE, ITRR: 22
TROPONIN I SERPL-MCNC: 0.23 NG/ML (ref 0–0.04)
UROBILINOGEN UR QL STRIP.AUTO: 0.2 EU/DL (ref 0.2–1)
VENTILATION MODE VENT: ABNORMAL
VENTILATION MODE VENT: ABNORMAL
VOLUME CONTROL PLUS IVLCP: YES
VOLUME CONTROL PLUS IVLCP: YES
VT SETTING VENT: 450 ML
VT SETTING VENT: 450 ML
WBC # BLD AUTO: 11.9 K/UL (ref 4.6–13.2)
WBC URNS QL MICRO: ABNORMAL /HPF (ref 0–4)
YEAST URNS QL MICRO: ABNORMAL

## 2018-02-07 PROCEDURE — 74011000250 HC RX REV CODE- 250: Performed by: INTERNAL MEDICINE

## 2018-02-07 PROCEDURE — 74011000258 HC RX REV CODE- 258: Performed by: EMERGENCY MEDICINE

## 2018-02-07 PROCEDURE — 71045 X-RAY EXAM CHEST 1 VIEW: CPT

## 2018-02-07 PROCEDURE — C1751 CATH, INF, PER/CENT/MIDLINE: HCPCS

## 2018-02-07 PROCEDURE — 83605 ASSAY OF LACTIC ACID: CPT

## 2018-02-07 PROCEDURE — 74011250636 HC RX REV CODE- 250/636: Performed by: EMERGENCY MEDICINE

## 2018-02-07 PROCEDURE — 65610000006 HC RM INTENSIVE CARE

## 2018-02-07 PROCEDURE — 83880 ASSAY OF NATRIURETIC PEPTIDE: CPT | Performed by: INTERNAL MEDICINE

## 2018-02-07 PROCEDURE — 82803 BLOOD GASES ANY COMBINATION: CPT

## 2018-02-07 PROCEDURE — 74011250636 HC RX REV CODE- 250/636

## 2018-02-07 PROCEDURE — 51702 INSERT TEMP BLADDER CATH: CPT

## 2018-02-07 PROCEDURE — C9113 INJ PANTOPRAZOLE SODIUM, VIA: HCPCS | Performed by: PHYSICIAN ASSISTANT

## 2018-02-07 PROCEDURE — 80053 COMPREHEN METABOLIC PANEL: CPT | Performed by: INTERNAL MEDICINE

## 2018-02-07 PROCEDURE — 82962 GLUCOSE BLOOD TEST: CPT

## 2018-02-07 PROCEDURE — 85730 THROMBOPLASTIN TIME PARTIAL: CPT | Performed by: PHYSICIAN ASSISTANT

## 2018-02-07 PROCEDURE — 87077 CULTURE AEROBIC IDENTIFY: CPT | Performed by: EMERGENCY MEDICINE

## 2018-02-07 PROCEDURE — 31500 INSERT EMERGENCY AIRWAY: CPT

## 2018-02-07 PROCEDURE — 74011000250 HC RX REV CODE- 250: Performed by: EMERGENCY MEDICINE

## 2018-02-07 PROCEDURE — 96375 TX/PRO/DX INJ NEW DRUG ADDON: CPT

## 2018-02-07 PROCEDURE — 77030008768 HC TU NG VYGC -A

## 2018-02-07 PROCEDURE — 82550 ASSAY OF CK (CPK): CPT | Performed by: EMERGENCY MEDICINE

## 2018-02-07 PROCEDURE — 02HV33Z INSERTION OF INFUSION DEVICE INTO SUPERIOR VENA CAVA, PERCUTANEOUS APPROACH: ICD-10-PCS | Performed by: EMERGENCY MEDICINE

## 2018-02-07 PROCEDURE — 96368 THER/DIAG CONCURRENT INF: CPT

## 2018-02-07 PROCEDURE — 96366 THER/PROPH/DIAG IV INF ADDON: CPT

## 2018-02-07 PROCEDURE — 85018 HEMOGLOBIN: CPT | Performed by: PHYSICIAN ASSISTANT

## 2018-02-07 PROCEDURE — 74011250636 HC RX REV CODE- 250/636: Performed by: PHYSICIAN ASSISTANT

## 2018-02-07 PROCEDURE — 70450 CT HEAD/BRAIN W/O DYE: CPT

## 2018-02-07 PROCEDURE — 77030005514 HC CATH URETH FOL14 BARD -A

## 2018-02-07 PROCEDURE — 84100 ASSAY OF PHOSPHORUS: CPT | Performed by: INTERNAL MEDICINE

## 2018-02-07 PROCEDURE — 83735 ASSAY OF MAGNESIUM: CPT | Performed by: INTERNAL MEDICINE

## 2018-02-07 PROCEDURE — 77030008683 HC TU ET CUF COVD -A

## 2018-02-07 PROCEDURE — 74011000250 HC RX REV CODE- 250: Performed by: PHYSICIAN ASSISTANT

## 2018-02-07 PROCEDURE — 87040 BLOOD CULTURE FOR BACTERIA: CPT | Performed by: EMERGENCY MEDICINE

## 2018-02-07 PROCEDURE — 83036 HEMOGLOBIN GLYCOSYLATED A1C: CPT | Performed by: EMERGENCY MEDICINE

## 2018-02-07 PROCEDURE — 74011000250 HC RX REV CODE- 250

## 2018-02-07 PROCEDURE — 87186 SC STD MICRODIL/AGAR DIL: CPT | Performed by: EMERGENCY MEDICINE

## 2018-02-07 PROCEDURE — 81001 URINALYSIS AUTO W/SCOPE: CPT | Performed by: EMERGENCY MEDICINE

## 2018-02-07 PROCEDURE — 99285 EMERGENCY DEPT VISIT HI MDM: CPT

## 2018-02-07 PROCEDURE — 93005 ELECTROCARDIOGRAM TRACING: CPT

## 2018-02-07 PROCEDURE — 74011250636 HC RX REV CODE- 250/636: Performed by: INTERNAL MEDICINE

## 2018-02-07 PROCEDURE — 5A1945Z RESPIRATORY VENTILATION, 24-96 CONSECUTIVE HOURS: ICD-10-PCS | Performed by: INTERNAL MEDICINE

## 2018-02-07 PROCEDURE — 80047 BASIC METABLC PNL IONIZED CA: CPT

## 2018-02-07 PROCEDURE — 80053 COMPREHEN METABOLIC PANEL: CPT | Performed by: EMERGENCY MEDICINE

## 2018-02-07 PROCEDURE — 74011636637 HC RX REV CODE- 636/637: Performed by: EMERGENCY MEDICINE

## 2018-02-07 PROCEDURE — 0BH17EZ INSERTION OF ENDOTRACHEAL AIRWAY INTO TRACHEA, VIA NATURAL OR ARTIFICIAL OPENING: ICD-10-PCS | Performed by: EMERGENCY MEDICINE

## 2018-02-07 PROCEDURE — 85025 COMPLETE CBC W/AUTO DIFF WBC: CPT | Performed by: EMERGENCY MEDICINE

## 2018-02-07 PROCEDURE — 80307 DRUG TEST PRSMV CHEM ANLYZR: CPT | Performed by: PHYSICIAN ASSISTANT

## 2018-02-07 PROCEDURE — 96365 THER/PROPH/DIAG IV INF INIT: CPT

## 2018-02-07 PROCEDURE — 36600 WITHDRAWAL OF ARTERIAL BLOOD: CPT

## 2018-02-07 PROCEDURE — 75810000137 HC PLCMT CENT VENOUS CATH

## 2018-02-07 RX ORDER — MORPHINE SULFATE 2 MG/ML
1 INJECTION, SOLUTION INTRAMUSCULAR; INTRAVENOUS
Status: DISCONTINUED | OUTPATIENT
Start: 2018-02-07 | End: 2018-02-07

## 2018-02-07 RX ORDER — SODIUM CHLORIDE 0.9 % (FLUSH) 0.9 %
5-10 SYRINGE (ML) INJECTION EVERY 8 HOURS
Status: DISCONTINUED | OUTPATIENT
Start: 2018-02-07 | End: 2018-02-15 | Stop reason: HOSPADM

## 2018-02-07 RX ORDER — LEVETIRACETAM 5 MG/ML
1000 INJECTION INTRAVASCULAR EVERY 12 HOURS
Status: DISCONTINUED | OUTPATIENT
Start: 2018-02-07 | End: 2018-02-12 | Stop reason: SDUPTHER

## 2018-02-07 RX ORDER — ACETAMINOPHEN 650 MG/1
650 SUPPOSITORY RECTAL
Status: DISCONTINUED | OUTPATIENT
Start: 2018-02-07 | End: 2018-02-15 | Stop reason: HOSPADM

## 2018-02-07 RX ORDER — MAGNESIUM SULFATE 100 %
4 CRYSTALS MISCELLANEOUS AS NEEDED
Status: DISCONTINUED | OUTPATIENT
Start: 2018-02-07 | End: 2018-02-09

## 2018-02-07 RX ORDER — HEPARIN SODIUM 5000 [USP'U]/ML
5000 INJECTION, SOLUTION INTRAVENOUS; SUBCUTANEOUS EVERY 8 HOURS
Status: DISCONTINUED | OUTPATIENT
Start: 2018-02-07 | End: 2018-02-07

## 2018-02-07 RX ORDER — MIDAZOLAM HYDROCHLORIDE 1 MG/ML
1-2 INJECTION, SOLUTION INTRAMUSCULAR; INTRAVENOUS
Status: DISCONTINUED | OUTPATIENT
Start: 2018-02-07 | End: 2018-02-07

## 2018-02-07 RX ORDER — LEVOFLOXACIN 5 MG/ML
500 INJECTION, SOLUTION INTRAVENOUS
Status: DISCONTINUED | OUTPATIENT
Start: 2018-02-09 | End: 2018-02-08

## 2018-02-07 RX ORDER — POTASSIUM CHLORIDE 14.9 MG/ML
20 INJECTION INTRAVENOUS ONCE
Status: DISCONTINUED | OUTPATIENT
Start: 2018-02-07 | End: 2018-02-07

## 2018-02-07 RX ORDER — MIDAZOLAM HYDROCHLORIDE 1 MG/ML
1-2 INJECTION, SOLUTION INTRAMUSCULAR; INTRAVENOUS
Status: DISCONTINUED | OUTPATIENT
Start: 2018-02-07 | End: 2018-02-08

## 2018-02-07 RX ORDER — NOREPINEPHRINE BITARTRATE 1 MG/ML
INJECTION, SOLUTION INTRAVENOUS
Status: DISPENSED
Start: 2018-02-07 | End: 2018-02-08

## 2018-02-07 RX ORDER — MIDAZOLAM HYDROCHLORIDE 1 MG/ML
INJECTION, SOLUTION INTRAMUSCULAR; INTRAVENOUS
Status: COMPLETED
Start: 2018-02-07 | End: 2018-02-07

## 2018-02-07 RX ORDER — LEVOFLOXACIN 5 MG/ML
750 INJECTION, SOLUTION INTRAVENOUS EVERY 24 HOURS
Status: DISCONTINUED | OUTPATIENT
Start: 2018-02-07 | End: 2018-02-07

## 2018-02-07 RX ORDER — LANOLIN ALCOHOL/MO/W.PET/CERES
100 CREAM (GRAM) TOPICAL DAILY
Status: DISCONTINUED | OUTPATIENT
Start: 2018-02-08 | End: 2018-02-08

## 2018-02-07 RX ORDER — DEXTROSE 50 % IN WATER (D50W) INTRAVENOUS SYRINGE
25-50 AS NEEDED
Status: DISCONTINUED | OUTPATIENT
Start: 2018-02-07 | End: 2018-02-09

## 2018-02-07 RX ORDER — LEVOFLOXACIN 5 MG/ML
750 INJECTION, SOLUTION INTRAVENOUS ONCE
Status: COMPLETED | OUTPATIENT
Start: 2018-02-07 | End: 2018-02-07

## 2018-02-07 RX ORDER — FENTANYL CITRATE 50 UG/ML
50-100 INJECTION, SOLUTION INTRAMUSCULAR; INTRAVENOUS
Status: DISCONTINUED | OUTPATIENT
Start: 2018-02-07 | End: 2018-02-08

## 2018-02-07 RX ORDER — IPRATROPIUM BROMIDE AND ALBUTEROL SULFATE 2.5; .5 MG/3ML; MG/3ML
3 SOLUTION RESPIRATORY (INHALATION)
Status: DISCONTINUED | OUTPATIENT
Start: 2018-02-07 | End: 2018-02-11 | Stop reason: SDUPTHER

## 2018-02-07 RX ORDER — POTASSIUM CHLORIDE 1.5 G/1.77G
40 POWDER, FOR SOLUTION ORAL
Status: DISPENSED | OUTPATIENT
Start: 2018-02-07 | End: 2018-02-08

## 2018-02-07 RX ORDER — SODIUM CHLORIDE 9 MG/ML
500 INJECTION, SOLUTION INTRAVENOUS ONCE
Status: COMPLETED | OUTPATIENT
Start: 2018-02-07 | End: 2018-02-08

## 2018-02-07 RX ORDER — KETAMINE HYDROCHLORIDE 50 MG/ML
INJECTION, SOLUTION INTRAMUSCULAR; INTRAVENOUS
Status: COMPLETED
Start: 2018-02-07 | End: 2018-02-07

## 2018-02-07 RX ORDER — SODIUM CHLORIDE 9 MG/ML
150 INJECTION, SOLUTION INTRAVENOUS CONTINUOUS
Status: DISCONTINUED | OUTPATIENT
Start: 2018-02-07 | End: 2018-02-07

## 2018-02-07 RX ORDER — FOLIC ACID 1 MG/1
1 TABLET ORAL DAILY
Status: DISCONTINUED | OUTPATIENT
Start: 2018-02-08 | End: 2018-02-15 | Stop reason: HOSPADM

## 2018-02-07 RX ORDER — SODIUM CHLORIDE 0.9 % (FLUSH) 0.9 %
5-10 SYRINGE (ML) INJECTION AS NEEDED
Status: DISCONTINUED | OUTPATIENT
Start: 2018-02-07 | End: 2018-02-15 | Stop reason: HOSPADM

## 2018-02-07 RX ORDER — NOREPINEPHRINE BITARTRATE/D5W 8 MG/250ML
2-30 PLASTIC BAG, INJECTION (ML) INTRAVENOUS
Status: DISCONTINUED | OUTPATIENT
Start: 2018-02-07 | End: 2018-02-09

## 2018-02-07 RX ORDER — POTASSIUM CHLORIDE AND SODIUM CHLORIDE 450; 150 MG/100ML; MG/100ML
INJECTION, SOLUTION INTRAVENOUS CONTINUOUS
Status: DISCONTINUED | OUTPATIENT
Start: 2018-02-07 | End: 2018-02-08

## 2018-02-07 RX ORDER — CHLORHEXIDINE GLUCONATE 1.2 MG/ML
10 RINSE ORAL EVERY 12 HOURS
Status: DISCONTINUED | OUTPATIENT
Start: 2018-02-07 | End: 2018-02-13 | Stop reason: HOSPADM

## 2018-02-07 RX ORDER — POTASSIUM CHLORIDE AND SODIUM CHLORIDE 450; 150 MG/100ML; MG/100ML
INJECTION, SOLUTION INTRAVENOUS CONTINUOUS
Status: CANCELLED | OUTPATIENT
Start: 2018-02-07

## 2018-02-07 RX ADMIN — Medication 50 MCG/HR: at 22:23

## 2018-02-07 RX ADMIN — SODIUM CHLORIDE 1000 MG: 900 INJECTION, SOLUTION INTRAVENOUS at 20:39

## 2018-02-07 RX ADMIN — SODIUM CHLORIDE, SODIUM LACTATE, POTASSIUM CHLORIDE, AND CALCIUM CHLORIDE 2000 ML: 600; 310; 30; 20 INJECTION, SOLUTION INTRAVENOUS at 18:40

## 2018-02-07 RX ADMIN — SODIUM CHLORIDE 18.8 UNITS/HR: 900 INJECTION, SOLUTION INTRAVENOUS at 18:50

## 2018-02-07 RX ADMIN — SODIUM CHLORIDE 40 MG: 9 INJECTION, SOLUTION INTRAMUSCULAR; INTRAVENOUS; SUBCUTANEOUS at 23:39

## 2018-02-07 RX ADMIN — Medication 10 MCG/MIN: at 18:15

## 2018-02-07 RX ADMIN — CEFEPIME HYDROCHLORIDE 2 G: 2 INJECTION, POWDER, FOR SOLUTION INTRAVENOUS at 19:27

## 2018-02-07 RX ADMIN — SODIUM CHLORIDE 2028 ML: 900 INJECTION, SOLUTION INTRAVENOUS at 18:00

## 2018-02-07 RX ADMIN — FOLIC ACID: 5 INJECTION, SOLUTION INTRAMUSCULAR; INTRAVENOUS; SUBCUTANEOUS at 22:23

## 2018-02-07 RX ADMIN — LIDOCAINE HYDROCHLORIDE: 10 INJECTION, SOLUTION EPIDURAL; INFILTRATION; INTRACAUDAL; PERINEURAL at 23:59

## 2018-02-07 RX ADMIN — Medication 10 ML: at 23:09

## 2018-02-07 RX ADMIN — SODIUM CHLORIDE AND POTASSIUM CHLORIDE: 4.5; 1.49 INJECTION, SOLUTION INTRAVENOUS at 23:17

## 2018-02-07 RX ADMIN — LIDOCAINE HYDROCHLORIDE: 10 INJECTION, SOLUTION EPIDURAL; INFILTRATION; INTRACAUDAL; PERINEURAL at 22:48

## 2018-02-07 RX ADMIN — LIDOCAINE HYDROCHLORIDE: 10 INJECTION, SOLUTION EPIDURAL; INFILTRATION; INTRACAUDAL; PERINEURAL at 21:55

## 2018-02-07 RX ADMIN — LEVETIRACETAM 1000 MG: 5 INJECTION INTRAVENOUS at 21:04

## 2018-02-07 RX ADMIN — Medication 10 MCG/MIN: at 18:50

## 2018-02-07 RX ADMIN — KETAMINE HYDROCHLORIDE 500 MG: 50 INJECTION, SOLUTION INTRAMUSCULAR; INTRAVENOUS at 18:11

## 2018-02-07 RX ADMIN — LEVOFLOXACIN 750 MG: 5 INJECTION, SOLUTION INTRAVENOUS at 19:28

## 2018-02-07 RX ADMIN — MIDAZOLAM HYDROCHLORIDE 4 MG: 1 INJECTION, SOLUTION INTRAMUSCULAR; INTRAVENOUS at 19:05

## 2018-02-07 NOTE — IP AVS SNAPSHOT
[address] Patient: Alfredo Gill MRN: [id number] A check lauri indicates which time of day the medication should be taken. My Medications START taking these medications Instructions Each Dose to Equal  
 Morning Noon Evening Bedtime  
 folic acid 1 mg tablet Commonly known as:  Google Your last dose was: Your next dose is:    
   
   
 1 Tab by Per NG tube route daily. 1 mg  
    
   
   
   
  
 insulin glargine 100 unit/mL injection Commonly known as:  LANTUS Your last dose was: Your next dose is:    
   
   
 lantus 22 units subq daily  
     
   
   
   
  
 magnesium oxide 400 mg tablet Commonly known as:  MAG-OX Your last dose was: Your next dose is: Take 1 Tab by mouth two (2) times a day. 400 mg  
    
   
   
   
  
 potassium chloride 20 mEq tablet Commonly known as:  K-DUR, KLOR-CON Your last dose was: Your next dose is: Take 1 Tab by mouth daily for 5 days. 20 mEq CONTINUE taking these medications Instructions Each Dose to Equal  
 Morning Noon Evening Bedtime  
 amLODIPine 5 mg tablet Commonly known as:  Kelley Driver Your last dose was: Your next dose is: Take 5 mg by mouth daily. Indications: HYPERTENSION  
 5 mg  
    
   
   
   
  
 aspirin 325 mg tablet Commonly known as:  ASPIRIN Your last dose was: Your next dose is: Take 325 mg by mouth daily. 325 mg FORMULA E 400 PO Your last dose was: Your next dose is: Take 1 Tab by mouth daily. 1 Tab  
    
   
   
   
  
 losartan 50 mg tablet Commonly known as:  COZAAR Your last dose was: Your next dose is: Take  by mouth daily.     Indications: HYPERTENSION  
 Melatonin 300 mcg Tab Your last dose was: Your next dose is: Take  by mouth. tamsulosin 0.4 mg capsule Commonly known as:  FLOMAX Your last dose was: Your next dose is: Take 1 tab by mouth daily until kidney stone passes. VITAMIN B-12 1,500 mcg Tber Generic drug:  cyanocobalamin Your last dose was: Your next dose is: Take 1 Tab by mouth. 1 Tab STOP taking these medications   
 gabapentin 600 mg tablet Commonly known as:  NEURONTIN  
   
  
 glimepiride 2 mg tablet Commonly known as:  AMARYL  
   
  
 guaiFENesin-codeine 100-10 mg/5 mL solution Commonly known as:  ROBITUSSIN AC  
   
  
 HYDROcodone-acetaminophen 5-325 mg per tablet Commonly known as:  NORCO  
   
  
 JANUMET  mg per tablet Generic drug:  SITagliptin-metFORMIN Where to Get Your Medications Information on where to get these meds will be given to you by the nurse or doctor. ! Ask your nurse or doctor about these medications  
  folic acid 1 mg tablet  
 insulin glargine 100 unit/mL injection  
 magnesium oxide 400 mg tablet  
 potassium chloride 20 mEq tablet

## 2018-02-07 NOTE — IP AVS SNAPSHOT
[address] Patient: Julio Yarbrough MRN: [id number] VAK:[id number] About your hospitalization You were admitted on:  February 7, 2018 You last received care in the:  [facility] [id number] [facility] [place] on:  February 15, 2018 Why you were hospitalized Your primary diagnosis was:  Respiratory Failure (Hcc) Your diagnoses also included:  Acidosis, Shock (Hcc), Hyperosmolar (Nonketotic) Coma (Hcc), Acute Uti, Macrocytic Anemia Follow-up Information Follow up With Details Comments Contact Info [facility]  Patient will be going to a [facility] upon discharge. [address] [place] [address] 
[phone] Discharge Orders None A check lauri indicates which time of day the medication should be taken. My Medications START taking these medications Instructions Each Dose to Equal  
 Morning Noon Evening Bedtime  
 folic acid 1 mg tablet Commonly known as:  Google Your last dose was: Your next dose is:    
   
   
 1 Tab by Per NG tube route daily. 1 mg  
    
   
   
   
  
 insulin glargine 100 unit/mL injection Commonly known as:  LANTUS Your last dose was: Your next dose is:    
   
   
 lantus 22 units subq daily  
     
   
   
   
  
 magnesium oxide 400 mg tablet Commonly known as:  MAG-OX Your last dose was: Your next dose is: Take 1 Tab by mouth two (2) times a day. 400 mg  
    
   
   
   
  
 potassium chloride 20 mEq tablet Commonly known as:  K-DUR, KLOR-CON Your last dose was: Your next dose is: Take 1 Tab by mouth daily for 5 days. 20 mEq CONTINUE taking these medications Instructions Each Dose to Equal  
 Morning Noon Evening Bedtime  
 amLODIPine 5 mg tablet Commonly known as:  Domonique Robertson Your last dose was: Your next dose is: Take 5 mg by mouth daily. Indications: HYPERTENSION  
 5 mg  
    
   
   
   
  
 aspirin 325 mg tablet Commonly known as:  ASPIRIN Your last dose was: Your next dose is: Take 325 mg by mouth daily. 325 mg FORMULA E 400 PO Your last dose was: Your next dose is: Take 1 Tab by mouth daily. 1 Tab  
    
   
   
   
  
 losartan 50 mg tablet Commonly known as:  COZAAR Your last dose was: Your next dose is: Take  by mouth daily. Indications: HYPERTENSION Melatonin 300 mcg Tab Your last dose was: Your next dose is: Take  by mouth. tamsulosin 0.4 mg capsule Commonly known as:  FLOMAX Your last dose was: Your next dose is: Take 1 tab by mouth daily until kidney stone passes. VITAMIN B-12 1,500 mcg Tber Generic drug:  cyanocobalamin Your last dose was: Your next dose is: Take 1 Tab by mouth. 1 Tab STOP taking these medications   
 gabapentin 600 mg tablet Commonly known as:  NEURONTIN  
   
  
 glimepiride 2 mg tablet Commonly known as:  AMARYL  
   
  
 guaiFENesin-codeine 100-10 mg/5 mL solution Commonly known as:  ROBITUSSIN AC  
   
  
 HYDROcodone-acetaminophen 5-325 mg per tablet Commonly known as:  NORCO  
   
  
 JANUMET  mg per tablet Generic drug:  SITagliptin-metFORMIN Where to Get Your Medications Information on where to get these meds will be given to you by the nurse or doctor. ! Ask your nurse or doctor about these medications  
  folic acid 1 mg tablet  
 insulin glargine 100 unit/mL injection  
 magnesium oxide 400 mg tablet  
 potassium chloride 20 mEq tablet Discharge Instructions Learning About Type 2 Diabetes What is type 2 diabetes? Insulin is a hormone that helps your body use sugar from your food as energy. Type 2 diabetes happens when your body can't use insulin the right way. Over time, the pancreas can't make enough insulin. If you don't have enough insulin, too much sugar stays in your blood. If you are overweight, get little or no exercise, or have type 2 diabetes in your family, you are more likely to have problems with the way insulin works in your body.  Americans, Hispanics, Native Americans,  Americans, and Pacific Islanders have a higher risk for type 2 diabetes. Type 2 diabetes can be prevented or delayed with a healthy lifestyle, which includes staying at a healthy weight, making smart food choices, and getting regular exercise. What can you expect with type 2 diabetes? Shu Karimi keep hearing about how important it is to keep your blood sugar within a target range. That's because over time, high blood sugar can lead to serious problems. It can: 
· Harm your eyes, nerves, and kidneys. · Damage your blood vessels, leading to heart disease and stroke. · Reduce blood flow and cause nerve damage to parts of your body, especially your feet. This can cause slow healing and pain when you walk. · Make your immune system weak and less able to fight infections. When people hear the word \"diabetes,\" they often think of problems like these. But daily care and treatment can help prevent or delay these problems. The goal is to keep your blood sugar in a target range. That's the best way to reduce your chance of having more problems from diabetes. What are the symptoms? Some people who have type 2 diabetes may not have any symptoms early on. Many people with the disease don't even know they have it at first. But with time, diabetes starts to cause symptoms.  You experience most symptoms of type 2 diabetes when your blood sugar is either too high or too low. The most common symptoms of high blood sugar include: · Thirst. 
· Frequent urination. · Weight loss. · Blurry vision. The symptoms of low blood sugar include: · Sweating. · Shakiness. · Weakness. · Hunger. · Confusion. How can you prevent type 2 diabetes? The best way to prevent or delay type 2 diabetes is to adopt healthy habits, which include: 
· Staying at a healthy weight. · Exercising regularly. · Eating healthy foods. How is type 2 diabetes treated? If you have type 2 diabetes, here are the most important things you can do. · Take your diabetes medicines. · Check your blood sugar as often as your doctor recommends. Also, get a hemoglobin A1c test at least every 6 months. · Try to eat a variety of foods and to spread carbohydrate throughout the day. Carbohydrate raises blood sugar higher and more quickly than any other nutrient does. Carbohydrate is found in sugar, breads and cereals, fruit, starchy vegetables such as potatoes and corn, and milk and yogurt. · Get at least 30 minutes of exercise on most days of the week. Walking is a good choice. You also may want to do other activities, such as running, swimming, cycling, or playing tennis or team sports. If your doctor says it's okay, do muscle-strengthening exercises at least 2 times a week. · See your doctor for checkups and tests on a regular schedule. · If you have high blood pressure or high cholesterol, take the medicines as prescribed by your doctor. · Do not smoke. Smoking can make health problems worse. This includes problems you might have with type 2 diabetes. If you need help quitting, talk to your doctor about stop-smoking programs and medicines. These can increase your chances of quitting for good. Follow-up care is a key part of your treatment and safety.  Be sure to make and go to all appointments, and call your doctor if you are having problems. It's also a good idea to know your test results and keep a list of the medicines you take. Where can you learn more? Go to http://fidelia-ashley.info/. Enter S620 in the search box to learn more about \"Learning About Type 2 Diabetes. \" Current as of: March 13, 2017 Content Version: 11.4 © 0162-6822 [employer]. Care instructions adapted under license by [employer] (which disclaims liability or warranty for this information). If you have questions about a medical condition or this instruction, always ask your healthcare professional. [address] any warranty or liability for your use of this information. Diabetes Blood Sugar Emergencies: Your Action Plan How can you prevent a blood sugar emergency? An important part of living with diabetes is keeping your blood sugar in your target range. You'll need to know what to do if it's too high or too low. Managing your blood sugar levels helps you avoid emergencies. This care sheet will teach you about the signs of high and low blood sugar. It will help you make an action plan with your doctor for when these signs occur. Low blood sugar is more likely to happen if you take certain medicines for diabetes. It can also happen if you skip a meal, drink alcohol, or exercise more than usual. 
You may get high blood sugar if you eat differently than you normally do. One example is eating more carbohydrate than usual. Having a cold, the flu, or other sudden illness can also cause high blood sugar levels. Levels can also rise if you miss a dose of medicine. Any change in how you take your medicine may affect your blood sugar level. So it's important to work with your doctor before you make any changes. Check your blood sugar Work with your doctor to fill in the blank spaces below that apply to you.  
Track your levels, know your target range, and write down ways you can get your blood sugar back in your target range. A log book can help you track your levels. Take the book to all of your medical appointments. · Check your blood sugar _____ times a day, at these times:________________________________________________. (For example: Before meals, at bedtime, before exercise, during exercise, other.) · Your blood sugar target range before a meal is ___________________. Your blood sugar target range after a meal is _______________________. · Do xqsf-___________________________________________________-jm get your blood sugar back within your safe range if your blood sugar results are _________________________________________. (For example: Less than 70 or above 250 mg/dL.) Call your doctor when your blood sugar results are ___________________________________. (For example: Less than 70 or above 250 mg/dL.) What are the symptoms of low and high blood sugar? Common symptoms of low blood sugar are sweating and feeling shaky, weak, hungry, or confused. Symptoms can start quickly. Common symptoms of high blood sugar are feeling very thirsty or very hungry. You may also pass urine more often than usual. You may have blurry vision and may lose weight without trying. But some people may have high or low blood sugar without having any symptoms. That's a good reason to check your blood sugar on a regular schedule. What should you do if you have symptoms? Work with your doctor to fill in the blank spaces below that apply to you. Low blood sugar If you have symptoms of low blood sugar, check your blood sugar. If it's below _____ ( for example, below 70), eat or drink a quick-sugar food that has about 15 grams of carbohydrate. Your goal is to get your level back to your safe range. Check your blood sugar again 15 minutes later. If it's still not in your target range, take another 15 grams of carbohydrate and check your blood sugar again in 15 minutes.  Repeat this until you reach your target. Then go back to your regular testing schedule. When you have low blood sugar, it's best to stop or reduce any physical activity until your blood sugar is back in your target range and is stable. If you must stay active, eat or drink 30 grams of carbohydrate. Then check your blood sugar again in 15 minutes. If it's not in your target range, take another 30 grams of carbohydrates. Check your blood sugar again in 15 minutes. Keep doing this until you reach your target. You can then go back to your regular testing schedule. If your symptoms or blood sugar levels are getting worse or have not improved after 15 minutes, seek medical care right away. Here are some examples of quick-sugar foods with 15 grams of carbohydrate: · 3 or 4 glucose tablets · 1 tube of glucose gel · Hard candy (such as 3 Jolly Ranchers or 5 to [employer]) · ½ cup to ¾ cup (4 to 6 ounces) of fruit juice or regular (not diet) soda High blood sugar If you have symptoms of high blood sugar, check your blood sugar. Your goal is to get your level back to your target range. If it's above ______ ( for example, above 250), follow these steps: · If you missed a dose of your diabetes medicine, take it now. Take only the amount of medicine that you have been prescribed. Do not take more or less medicine. · Give yourself insulin if your doctor has prescribed it for high blood sugar. · Test for ketones, if the doctor told you to do so. If the results of the ketone test show a moderate-to-large amount of ketones, call the doctor for advice. · Wait 30 minutes after you take the extra insulin or the missed medicine. Check your blood sugar again. If your symptoms or blood sugar levels are getting worse or have not improved after taking these steps, seek medical care right away. Follow-up care is a key part of your treatment and safety.  Be sure to make and go to all appointments, and call your doctor if you are having problems. It's also a good idea to know your test results and keep a list of the medicines you take. Where can you learn more? Go to http://fidelia-ashley.info/. Enter Z436 in the search box to learn more about \"Diabetes Blood Sugar Emergencies: Your Action Plan. \" Current as of: March 13, 2017 Content Version: 11.4 © 2651-2854 "[employer]. Care instructions adapted under license by [employer] (which disclaims liability or warranty for this information). If you have questions about a medical condition or this instruction, always ask your healthcare professional. [place] [address] any warranty or liability for your use of this information. Learning About Diabetes Food Guidelines Your Care Instructions Meal planning is important to manage diabetes. It helps keep your blood sugar at a target level (which you set with your doctor). You don't have to eat special foods. You can eat what your family eats, including sweets once in a while. But you do have to pay attention to how often you eat and how much you eat of certain foods. You may want to work with a dietitian or a certified diabetes educator (CDE) to help you plan meals and snacks. A dietitian or CDE can also help you lose weight if that is one of your goals. What should you know about eating carbs? Managing the amount of carbohydrate (carbs) you eat is an important part of healthy meals when you have diabetes. Carbohydrate is found in many foods. · Learn which foods have carbs. And learn the amounts of carbs in different foods. ¨ Bread, cereal, pasta, and rice have about 15 grams of carbs in a serving. A serving is 1 slice of bread (1 ounce), ½ cup of cooked cereal, or 1/3 cup of cooked pasta or rice. ¨ Fruits have 15 grams of carbs in a serving.  A serving is 1 small fresh fruit, such as an apple or orange; ½ of a banana; ½ cup of cooked or canned fruit; ½ cup of fruit juice; 1 cup of melon or raspberries; or 2 tablespoons of dried fruit. ¨ Milk and no-sugar-added yogurt have 15 grams of carbs in a serving. A serving is 1 cup of milk or 2/3 cup of no-sugar-added yogurt. ¨ Starchy vegetables have 15 grams of carbs in a serving. A serving is ½ cup of mashed potatoes or sweet potato; 1 cup winter squash; ½ of a small baked potato; ½ cup of cooked beans; or ½ cup cooked corn or green peas. · Learn how much carbs to eat each day and at each meal. A dietitian or CDE can teach you how to keep track of the amount of carbs you eat. This is called carbohydrate counting. · If you are not sure how to count carbohydrate grams, use the Plate Method to plan meals. It is a good, quick way to make sure that you have a balanced meal. It also helps you spread carbs throughout the day. ¨ Divide your plate by types of foods. Put non-starchy vegetables on half the plate, meat or other protein food on one-quarter of the plate, and a grain or starchy vegetable in the final quarter of the plate. To this you can add a small piece of fruit and 1 cup of milk or yogurt, depending on how many carbs you are supposed to eat at a meal. 
· Try to eat about the same amount of carbs at each meal. Do not \"save up\" your daily allowance of carbs to eat at one meal. 
· Proteins have very little or no carbs per serving. Examples of proteins are beef, chicken, turkey, fish, eggs, tofu, cheese, cottage cheese, and peanut butter. A serving size of meat is 3 ounces, which is about the size of a deck of cards. Examples of meat substitute serving sizes (equal to 1 ounce of meat) are 1/4 cup of cottage cheese, 1 egg, 1 tablespoon of peanut butter, and ½ cup of tofu. How can you eat out and still eat healthy? · Learn to estimate the serving sizes of foods that have carbohydrate. If you measure food at home, it will be easier to estimate the amount in a serving of restaurant food. · If the meal you order has too much carbohydrate (such as potatoes, corn, or baked beans), ask to have a low-carbohydrate food instead. Ask for a salad or green vegetables. · If you use insulin, check your blood sugar before and after eating out to help you plan how much to eat in the future. · If you eat more carbohydrate at a meal than you had planned, take a walk or do other exercise. This will help lower your blood sugar. What else should you know? · Limit saturated fat, such as the fat from meat and dairy products. This is a healthy choice because people who have diabetes are at higher risk of heart disease. So choose lean cuts of meat and nonfat or low-fat dairy products. Use olive or canola oil instead of butter or shortening when cooking. · Don't skip meals. Your blood sugar may drop too low if you skip meals and take insulin or certain medicines for diabetes. · Check with your doctor before you drink alcohol. Alcohol can cause your blood sugar to drop too low. Alcohol can also cause a bad reaction if you take certain diabetes medicines. Follow-up care is a key part of your treatment and safety. Be sure to make and go to all appointments, and call your doctor if you are having problems. It's also a good idea to know your test results and keep a list of the medicines you take. Where can you learn more? Go to http://fidelia-ashley.info/. Enter F986 in the search box to learn more about \"Learning About Diabetes Food Guidelines. \" Current as of: March 13, 2017 Content Version: 11.4 © 0178-5279 Education [employer]. Care instructions adapted under license by [employer] (which disclaims liability or warranty for this information). If you have questions about a medical condition or this instruction, always ask your healthcare professional. [address] any warranty or liability for your use of this information. Learning About Meal Planning for Diabetes Why plan your meals? Meal planning can be a key part of managing diabetes. Planning meals and snacks with the right balance of carbohydrate, protein, and fat can help you keep your blood sugar at the target level you set with your doctor. You don't have to eat special foods. You can eat what your family eats, including sweets once in a while. But you do have to pay attention to how often you eat and how much you eat of certain foods. You may want to work with a dietitian or a certified diabetes educator. He or she can give you tips and meal ideas and can answer your questions about meal planning. This health professional can also help you reach a healthy weight if that is one of your goals. What plan is right for you? Your dietitian or diabetes educator may suggest that you start with the plate format or carbohydrate counting. The plate format The plate format is a simple way to help you manage how you eat. You plan meals by learning how much space each food should take on a plate. Using the plate format helps you spread carbohydrate throughout the day. It can make it easier to keep your blood sugar level within your target range. It also helps you see if you're eating healthy portion sizes. To use the plate format, you put non-starchy vegetables on half your plate. Add meat or meat substitutes on one-quarter of the plate. Put a grain or starchy vegetable (such as brown rice or a potato) on the final quarter of the plate. You can add a small piece of fruit and some low-fat or fat-free milk or yogurt, depending on your carbohydrate goal for each meal. 
Here are some tips for using the plate format: · Make sure that you are not using an oversized plate. A 9-inch plate is best. Many restaurants use larger plates. · Get used to using the plate format at home. Then you can use it when you eat out. · Write down your questions about using the plate format.  Talk to your doctor, a dietitian, or a diabetes educator about your concerns. Carbohydrate counting With carbohydrate counting, you plan meals based on the amount of carbohydrate in each food. Carbohydrate raises blood sugar higher and more quickly than any other nutrient. It is found in desserts, breads and cereals, and fruit. It's also found in starchy vegetables such as potatoes and corn, grains such as rice and pasta, and milk and yogurt. Spreading carbohydrate throughout the day helps keep your blood sugar levels within your target range. Your daily amount depends on several things, including your weight, how active you are, which diabetes medicines you take, and what your goals are for your blood sugar levels. A registered dietitian or diabetes educator can help you plan how much carbohydrate to include in each meal and snack. A guideline for your daily amount of carbohydrate is: · 45 to 60 grams at each meal. That's about the same as 3 to 4 carbohydrate servings. · 15 to 20 grams at each snack. That's about the same as 1 carbohydrate serving. The Nutrition Facts label on packaged foods tells you how much carbohydrate is in a serving of the food. First, look at the serving size on the food label. Is that the amount you eat in a serving? All of the nutrition information on a food label is based on that serving size. So if you eat more or less than that, you'll need to adjust the other numbers. Total carbohydrate is the next thing you need to look for on the label. If you count carbohydrate servings, one serving of carbohydrate is 15 grams. For foods that don't come with labels, such as fresh fruits and vegetables, you'll need a guide that lists carbohydrate in these foods. Ask your doctor, dietitian, or diabetes educator about books or other nutrition guides you can use.  
If you take insulin, you need to know how many grams of carbohydrate are in a meal. This lets you know how much rapid-acting insulin to take before you eat. If you use an insulin pump, you get a constant rate of insulin during the day. So the pump must be programmed at meals to give you extra insulin to cover the rise in blood sugar after meals. When you know how much carbohydrate you will eat, you can take the right amount of insulin. Or, if you always use the same amount of insulin, you need to make sure that you eat the same amount of carbohydrate at meals. If you need more help to understand carbohydrate counting and food labels, ask your doctor, dietitian, or diabetes educator. How do you get started with meal planning? Here are some tips to get started: 
· Plan your meals a week at a time. Don't forget to include snacks too. · Use cookbooks or online recipes to plan several main meals. Plan some quick meals for busy nights. You also can double some recipes that freeze well. Then you can save half for other busy nights when you don't have time to cook. · Make sure you have the ingredients you need for your recipes. If you're running low on basic items, put these items on your shopping list too. · List foods that you use to make breakfasts, lunches, and snacks. List plenty of fruits and vegetables. · Post this list on the refrigerator. Add to it as you think of more things you need. · Take the list to the store to do your weekly shopping. Follow-up care is a key part of your treatment and safety. Be sure to make and go to all appointments, and call your doctor if you are having problems. It's also a good idea to know your test results and keep a list of the medicines you take. Where can you learn more? Go to http://fidelia-ashley.info/. Julian Kaye in the search box to learn more about \"Learning About Meal Planning for Diabetes. \" Current as of: March 13, 2017 Content Version: 11.4 © 5011-1009 [employer], [employer].  Care instructions adapted under license by [address] (which disclaims liability or warranty for this information). If you have questions about a medical condition or this instruction, always ask your healthcare professional. [place] [address] any warranty or liability for your use of this information. Urinary Tract Infection in Women: Care Instructions Your Care Instructions A urinary tract infection, or UTI, is a general term for an infection anywhere between the kidneys and the urethra (where urine comes out). Most UTIs are bladder infections. They often cause pain or burning when you urinate. UTIs are caused by bacteria and can be cured with antibiotics. Be sure to complete your treatment so that the infection goes away. Follow-up care is a key part of your treatment and safety. Be sure to make and go to all appointments, and call your doctor if you are having problems. It's also a good idea to know your test results and keep a list of the medicines you take. How can you care for yourself at home? · Take your antibiotics as directed. Do not stop taking them just because you feel better. You need to take the full course of antibiotics. · Drink extra water and other fluids for the next day or two. This may help wash out the bacteria that are causing the infection. (If you have kidney, heart, or liver disease and have to limit fluids, talk with your doctor before you increase your fluid intake.) · Avoid drinks that are carbonated or have caffeine. They can irritate the bladder. · Urinate often. Try to empty your bladder each time. · To relieve pain, take a hot bath or lay a heating pad set on low over your lower belly or genital area. Never go to sleep with a heating pad in place. To prevent UTIs · Drink plenty of water each day. This helps you urinate often, which clears bacteria from your system.  (If you have kidney, heart, or liver disease and have to limit fluids, talk with your doctor before you increase your fluid intake.) · Urinate when you need to. · Urinate right after you have sex. · Change sanitary pads often. · Avoid douches, bubble baths, feminine hygiene sprays, and other feminine hygiene products that have deodorants. · After going to the bathroom, wipe from front to back. When should you call for help? Call your doctor now or seek immediate medical care if: 
? · Symptoms such as fever, chills, nausea, or vomiting get worse or appear for the first time. ? · You have new pain in your back just below your rib cage. This is called flank pain. ? · There is new blood or pus in your urine. ? · You have any problems with your antibiotic medicine. ? Watch closely for changes in your health, and be sure to contact your doctor if: 
? · You are not getting better after taking an antibiotic for 2 days. ? · Your symptoms go away but then come back. Where can you learn more? Go to http://fidelia-ashley.info/. Enter D991 in the search box to learn more about \"Urinary Tract Infection in Women: Care Instructions. \" Current as of: May 12, 2017 Content Version: 11.4 © 0498-8053 [employer]. Care instructions adapted under license by [employer] (which disclaims liability or warranty for this information). If you have questions about a medical condition or this instruction, always ask your healthcare professional. [place] [address] any warranty or liability for your use of this information. Patient armband removed and shredded DISCHARGE SUMMARY from Nurse PATIENT INSTRUCTIONS: 
 
 
F-face looks uneven A-arms unable to move or move unevenly S-speech slurred or non-existent T-time-call 911 as soon as signs and symptoms begin-DO NOT go Back to bed or wait to see if you get better-TIME IS BRAIN. Warning Signs of HEART ATTACK Call 911 if you have these symptoms: 
? Chest discomfort. Most heart attacks involve discomfort in the center of the chest that lasts more than a few minutes, or that goes away and comes back. It can feel like uncomfortable pressure, squeezing, fullness, or pain. ? Discomfort in other areas of the upper body. Symptoms can include pain or discomfort in one or both arms, the back, neck, jaw, or stomach. ? Shortness of breath with or without chest discomfort. ? Other signs may include breaking out in a cold sweat, nausea, or lightheadedness. Don't wait more than five minutes to call [address]! Fast action can save your life. Calling 911 is almost always the fastest way to get lifesaving treatment. Emergency Medical Services staff can begin treatment when they arrive  up to an hour sooner than if someone gets to the hospital by car. The discharge information has been reviewed with the patient and caregiver. The patient and caregiver verbalized understanding. Discharge medications reviewed with the patient and caregiver and appropriate educational materials and side effects teaching were provided. ___________________________________________________________________________________________________________________________________ MyChart Announcement We are excited to announce that we are making your provider's discharge notes available to you in [email or web address]. You will see these notes when they are completed and signed by the physician that discharged you from your recent hospital stay.   If you have any questions or concerns about any information you see in [employer], please call the Health Information Department where you were seen or reach out to your Primary Care Provider for more information about your plan of care. Introducing [facility]! [address] introduces [employer] patient portal. Now you can access parts of your medical record, email your doctor's office, and request medication refills online. 1. In your internet browser, go to [email or web address] 2. Click on the First Time User? Click Here link in the Sign In box. You will see the New Member Sign Up page. 3. Enter your [employer] Access Code exactly as it appears below. You will not need to use this code after youve completed the sign-up process. If you do not sign up before the expiration date, you must request a new code. · [employer] Access Code: [email or web address] Expires: 5/6/2018 12:37 AM 
 
4. Enter the last four digits of your Social Security Number (xxxx) and Date of Birth (mm/dd/yyyy) as indicated and click Submit. You will be taken to the next sign-up page. 5. Create a [employer] ID. This will be your [employer] login ID and cannot be changed, so think of one that is secure and easy to remember. 6. Create a [employer] password. You can change your password at any time. 7. Enter your Password Reset Question and Answer. This can be used at a later time if you forget your password. 8. Enter your e-mail address. You will receive e-mail notification when new information is available in [address] 9. Click Sign Up. You can now view and download portions of your medical record. 10. Click the Download Summary menu link to download a portable copy of your medical information. If you have questions, please visit the Frequently Asked Questions section of the [employer] website. Remember, [employer] is NOT to be used for urgent needs. For medical emergencies, dial 911. Now available from your iPhone and Android! Unresulted Labs-Please follow up with your PCP about these lab tests Order Current Status MAGNESIUM In process METABOLIC PANEL, BASIC In process PHOSPHORUS In process ALDOSTERONE/RENIN ACTIVITY Preliminary result Providers Seen During Your Hospitalization Provider Specialty Primary office phone Mellisa Arreola MD Emergency Medicine [phone] Keyur Velasco MD Hospitalist [phone] Kaleigh Bruce MD Internal Medicine [phone] Kemi Rodríguez MD Internal Medicine [phone] Immunizations Administered for This Admission Name Date Influenza Vaccine (Quad) PF 2/14/2018 Your Primary Care Physician (PCP) Primary Care Physician Office Phone Office Fax ANDREA, [address] [phone] You are allergic to the following No active allergies Recent Documentation Weight Breastfeeding? BMI OB Status Smoking Status 75.1 kg No 25.92 kg/m2 Postmenopausal Current Every Day Smoker Emergency Contacts Name Discharge Info Relation Home Work Mobile Jayla Mark DISCHARGE CAREGIVER [3] Other Relative [6] [phone] Roseann Laughlin DISCHARGE CAREGIVER [3] Sister [23] [phone] Patient Belongings The following personal items are in your possession at time of discharge: 
  Dental Appliances: None  Visual Aid: Glasses      Home Medications: None   Jewelry: None  Clothing: None    Other Valuables: None Discharge Instructions Attachments/References FOLIC ACID (BY MOUTH) (ENGLISH) INSULIN GLARGINE (BY INJECTION) (ENGLISH) MAGNESIUM OXIDE (BY MOUTH) (ENGLISH) POTASSIUM CHLORIDE (BY MOUTH) (ENGLISH) Patient Handouts Folic Acid (By mouth) Folic Acid (FOE-lik AS-id) Treats certain types of anemia (not enough red blood cells). Is also given as a supplement to women who are pregnant or planning on getting pregnant. Folic acid is a B vitamin. Brand Name(s): FA-8, Falessa, Folacin-800, Methylfolate, Nature's Blend Folic Acid, Optimum Folic Acid, PharmAssure Folic Acid, [employer] Folic Acid There may be other brand names for this medicine. When This Medicine Should Not Be Used: You should not use this medicine if you have ever had an allergic reaction to folic acid. How to Use This Medicine:  
Capsule, Tablet · Your doctor will tell you how much to take and how often. · Keep taking this medicine for as long as your doctor tells you to, even if you feel better. If a dose is missed: · Take the missed dose as soon as possible. · If it is almost time for your next regular dose, wait until then to take your medicine and skip the missed dose. · You should not use two doses at the same time. How to Store and Dispose of This Medicine: · Store at room temperature. Protect from heat, direct light, and moisture. · Ask your pharmacist, doctor, or health caregiver about the best way to dispose of any outdated medicine or medicine no longer needed. · Keep all medicine out of the reach of children. Drugs and Foods to Avoid: Ask your doctor or pharmacist before using any other medicine, including over-the-counter medicines, vitamins, and herbal products. · Make sure your doctor knows if you are taking Dilantin®. · If you are taking Questran® or Colestid®, take your folic acid dose 1 hour before or at least 4 hours after the other medicine. · Your doctor may ask you to eat more foods that contain folic acid. Good sources of folic acid are fruits, green leafy vegetables, liver, kidney, and breads made with dried yeast. 
Warnings While Using This Medicine: · If you are pregnant or breastfeeding, tell your doctor. It is very important during this time to take the correct vitamins in the right amounts. Possible Side Effects While Using This Medicine:  
Call your doctor right away if you notice any of these side effects: · Skin rash, itching, and redness If you notice these less serious side effects, talk with your doctor: · Nausea, bloating, gas · Bitter taste in the mouth · Irritability · Trouble sleeping If you notice other side effects that you think are caused by this medicine, tell your doctor. Call your doctor for medical advice about side effects. You may report side effects to FDA at 6-896-FDA-5634 © 2017 [address] Information is for End User's use only and may not be sold, redistributed or otherwise used for commercial purposes. The above information is an  only. It is not intended as medical advice for individual conditions or treatments. Talk to your doctor, nurse or pharmacist before following any medical regimen to see if it is safe and effective for you. Insulin Glargine (By injection) Insulin Glargine, Recombinant (IN-crowe-alexa GLAMARGARITA-jennifer, ree-KOM-bi-preeti) Treats diabetes. Brand Name(s): Basaglar KwikPen, Lantus, Lantus Novaplus, Lantus SoloStar, Lantus SoloStar Novaplus, Toujeo There may be other brand names for this medicine. When This Medicine Should Not Be Used: This medicine is not right for everyone. Do not use it if you had an allergic reaction to insulin glargine. How to Use This Medicine:  
Injectable · Your healthcare provider will work with you to personalize your dose and treatment based on your insulin needs and lifestyle. You will be taught how to give yourself the injections. Make sure you understand all instructions. Ask the doctor, nurse, or pharmacist if you have questions. · If you use insulin once a day, it is best to use it at about the same time every day. · Always double-check both the concentration (strength) of your insulin and your dose. Concentration and dose are not the same. The dose is how many units of insulin you will use.  The concentration tells how many units of insulin are in each milliliter (mL), such as 100 units/mL (U-100), but this does not mean you will use 100 units at a time. · Read and follow the patient instructions that come with this medicine. Talk to your doctor or pharmacist if you have any questions. · This medicine should look clear before you use it. Do not shake the vial. Do not mix this medicine with any other insulin or with water. · You will be shown the body areas where this shot can be given. Use a different body area each time you give yourself a shot. Keep track of where you give each shot to make sure you rotate body areas. · Use a new needle and syringe each time you inject your medicine. If you use a syringe, use only the kind that is made for insulin injections. Some insulin must be given with a specific type of syringe or needle. Ask your pharmacist if you are not sure which one to use. · Always check the label before use, to make sure you have the correct type of insulin. Do not change the brand, type, or concentration unless your doctor tells you to. If you use a pump or other device, make sure the insulin is made for that device. · Unopened medicine: Store the vials, cartridges, and SoloStar® pens in the refrigerator. Protect from light. Do not freeze. · Opened medicine: ¨ Vials: Store in the refrigerator or at room temperature in a cool place, away from sunlight and heat. Use within 28 days. ¨ Cartridge or [employer]: Store at room temperature, away from direct heat and light. Do not refrigerate. Throw away any opened cartridge or Lantus® SoloStar® pen after 28 days. Throw away any opened [employer] after 42 days. · Throw away used needles in a hard, closed container that the needles cannot poke through. Keep this container away from children and pets. Drugs and Foods to Avoid: Ask your doctor or pharmacist before using any other medicine, including over-the-counter medicines, vitamins, and herbal products. · Some medicines can change the amount of insulin you need to use and make it harder for you to control your diabetes. Tell your doctor about all other medicines that you are using. · Do not drink alcohol while you are using this medicine. Warnings While Using This Medicine: · Tell your doctor if you are pregnant or breastfeeding, or if you have kidney disease, liver disease, heart disease, or heart failure. · This medicine may cause the following problems: 
¨ Low blood sugar or low potassium levels in the blood ¨ Fluid retention or heart failure (when used with thiazolidinedione [TZD] medicine) · Never share insulin pens, needles, or cartridges with anyone. Sharing these can pass hepatitis viruses, HIV, or other illnesses from one person to another. · Keep all medicine out of the reach of children. Never share your medicine with anyone. Possible Side Effects While Using This Medicine:  
Call your doctor right away if you notice any of these side effects: · Allergic reaction: Itching or hives, swelling in your face or hands, swelling or tingling in your mouth or throat, chest tightness, trouble breathing · Dry mouth, increased thirst, muscle cramps, nausea or vomiting, uneven heartbeat · Rapid weight gain, swelling in your hands, ankles, or feet, trouble breathing, tiredness · Shaking, trembling, sweating, fast or pounding heartbeat, lightheadedness, hunger, confusion If you notice these less serious side effects, talk with your doctor: · Redness, pain, itching, swelling, or any skin changes where the shot was given If you notice other side effects that you think are caused by this medicine, tell your doctor. Call your doctor for medical advice about side effects. You may report side effects to FDA at 5-461-FDA-2512 © 2017 [address] Information is for End User's use only and may not be sold, redistributed or otherwise used for commercial purposes. The above information is an  only. It is not intended as medical advice for individual conditions or treatments. Talk to your doctor, nurse or pharmacist before following any medical regimen to see if it is safe and effective for you. Magnesium Oxide (By mouth) Magnesium Oxide (mag-NEE-zee-um OX-caridad) Treats acid indigestion and upset stomach. Also used as a mineral supplement to add to the magnesium oxide in your daily diet. Brand Name(s): [employer]aturals Magnesium, Uro-Mag There may be other brand names for this medicine. When This Medicine Should Not Be Used:  
Unless your doctor tells you otherwise, there is no reason for you to not use this medicine. How to Use This Medicine:  
· Your doctor will tell you how much medicine to use. Do not use more than directed. · Follow the instructions on the medicine label if you are using this medicine without a prescription. · If you are using this medicine as an antacid, do not use the medicine for longer than 2 weeks in a row. · Drink at least six to eight (8 ounce) cups of liquid each day, unless your doctor tells you otherwise. If a dose is missed: · Take a dose as soon as you remember. If it is almost time for your next dose, wait until then and take a regular dose. Do not take extra medicine to make up for a missed dose. How to Store and Dispose of This Medicine: · Store the medicine in a closed container at room temperature, away from heat, moisture, and direct light. · Keep all medicine out of the reach of children. Never share your medicine with anyone. · Ask your pharmacist, doctor, or health caregiver about the best way to dispose of any outdated medicine or medicine no longer needed. Drugs and Foods to Avoid: Ask your doctor or pharmacist before using any other medicine, including over-the-counter medicines, vitamins, and herbal products. · There are many other medicines that may not work properly if you use them together with magnesium oxide. Make sure your doctor knows about all other medicines you are using. Warnings While Using This Medicine: · Make sure your doctor knows if you are pregnant or breast feeding, or if you have kidney disease. · Tell your doctor if your acid indigestion does not improve after using the medicine for 1 to 2 weeks. Possible Side Effects While Using This Medicine: If you notice these less serious side effects, talk with your doctor: · Diarrhea. If you notice other side effects that you think are caused by this medicine, tell your doctor. Call your doctor for medical advice about side effects. You may report side effects to FDA at 2-638-GYR-4664 © 2017 [facility] Information is for End User's use only and may not be sold, redistributed or otherwise used for commercial purposes. The above information is an  only. It is not intended as medical advice for individual conditions or treatments. Talk to your doctor, nurse or pharmacist before following any medical regimen to see if it is safe and effective for you. Potassium Chloride (By mouth) Potassium Chloride (zla-GNQ-bn-um KLOR-caridad) Prevents and treats low potassium levels in the blood. Brand Name(s): K-Tab, [address] [place] [address] Mur, Klor-Con, Klor-Con 10, Klor-Con 8, Klor-Con M10, Klor-Con M15, Klor-Con M20, Klor-Con Sprinkle There may be other brand names for this medicine. When This Medicine Should Not Be Used: This medicine is not right for everyone. Do not use if you had an allergic reaction to potassium. How to Use This Medicine:  
Tablet, Long Acting Capsule, Powder, Liquid, Long Acting Tablet, Granule · Your doctor will tell you how much medicine to use. Do not use more than directed. · Take this medicine with food or right after eating, to avoid stomach upset. · Powder or oral liquid:  You must mix with at least one-half cup (4 ounces) of water or juice. You could damage your stomach if you take the medicine without mixing it with water or juice. · Tablet or capsule: Do not chew, crush, or break. Swallow it whole with full glass of water. · Extended-release capsule: Swallow whole with a full glass of water. If you cannot swallow the extended-release capsule, you may open it and pour the medicine into a small amount of soft food such as pudding, yogurt, or applesauce. Stir this mixture well and swallow it without chewing. · Extended-release tablet:  Swallow whole with a full glass of water. If you have trouble swallowing, ask your doctor or pharmacist if you may crush the tablet. Some brands of this medicine must be swallowed whole, but other brands may be crushed. · If you mix the medicine in water or soft food, do not mix until you are ready to take your dose. Do not save mixed medicine for later use. · Carefully follow your doctor's instructions about any special diet. · Missed dose: Take a dose as soon as you remember. If it is almost time for your next dose, wait until then and take a regular dose. Do not take extra medicine to make up for a missed dose. · Store the medicine in a closed container at room temperature, away from heat, moisture, and direct light. Drugs and Foods to Avoid: Ask your doctor or pharmacist before using any other medicine, including over-the-counter medicines, vitamins, and herbal products. · Some foods and medicines can affect how potassium chloride works. Tell you doctor if you are using any of the following: ¨ Potassium-sparing diuretic (water pill) ¨ ACE inhibitor blood pressure medicine ¨ Salt substitute ¨ Digoxin Warnings While Using This Medicine: · Tell your doctor if you are pregnant or breastfeeding or if you have kidney disease, heart disease, or problems with your digestive system. · This medicine may cause the following problems: ¨ Bleeding or ulcers in the digestive system ¨ Potassium levels that are too high · Your doctor will do lab tests at regular visits to check on the effects of this medicine. Keep all appointments. · Keep all medicine out of the reach of children. Never share your medicine with anyone. Possible Side Effects While Using This Medicine:  
Call your doctor right away if you notice any of these side effects: · Allergic reaction: Itching or hives, swelling in your face or hands, swelling or tingling in your mouth or throat, chest tightness, trouble breathing · Bloody or black, tarry stools · Confusion, weakness, uneven heartbeat, trouble breathing, numbness in your hands, feet, or lips · Severe stomach pain or vomiting · Throat pain, feeling as if pill is stuck in the throat If you notice these less serious side effects, talk with your doctor: · Mild nausea, diarrhea, gas If you notice other side effects that you think are caused by this medicine, tell your doctor. Call your doctor for medical advice about side effects. You may report side effects to FDA at 7-216-FDA-6386 © 2017 [address] Information is for End User's use only and may not be sold, redistributed or otherwise used for commercial purposes. The above information is an  only. It is not intended as medical advice for individual conditions or treatments. Talk to your doctor, nurse or pharmacist before following any medical regimen to see if it is safe and effective for you. Please provide this summary of care documentation to your next provider. Signatures-by signing, you are acknowledging that this After Visit Summary has been reviewed with you and you have received a copy. Patient Signature:  ____________________________________________________________ Date:  ____________________________________________________________  
  
Coty Phenes Provider Signature:  ____________________________________________________________ Date:  ____________________________________________________________

## 2018-02-07 NOTE — ED PROVIDER NOTES
EMERGENCY DEPARTMENT HISTORY AND PHYSICAL EXAM    6:23 PM      Date: 2/7/2018  Patient Name: Alexa Freeman    History of Presenting Illness     Chief Complaint   Patient presents with    Respiratory Distress         History Provided By: EMS    Additional History (Context): Alexa Freeman is a 58 y.o. female with diabetes, hypertension and asthma who presents with acute onset altered mental status 30 minutes ago with a EMS finger glucose reading of High (600+). Per EMS the pt was last seen normal at noon, pt was talking, when EMS arrived pt was only responsive to painful stimulus. HPI limited due to altered mental status. PCP: Mely Nuñez MD    Chief Complaint: altered mental status  Duration: 30 Minutes  Timing:  Acute  Location: n/a  Quality: n/a  Severity: N/A  Modifying Factors: EMS finger glucose reading was High(600+)  Associated Symptoms: unknown      Current Facility-Administered Medications   Medication Dose Route Frequency Provider Last Rate Last Dose    NOREPINephrine (LEVOPHED) 1 mg/mL injection             ketamine (KETALAR) 50 mg/mL injection              Current Outpatient Prescriptions   Medication Sig Dispense Refill    tamsulosin (FLOMAX) 0.4 mg capsule Take 1 tab by mouth daily until kidney stone passes. 10 Cap 0    HYDROcodone-acetaminophen (NORCO) 5-325 mg per tablet Take 1-2 Tabs by mouth every four (4) hours as needed for Pain. Max Daily Amount: 12 Tabs. 20 Tab 0    guaiFENesin-codeine (ROBITUSSIN AC) 100-10 mg/5 mL solution Take 5 mL by mouth three (3) times daily as needed for Cough. Max Daily Amount: 15 mL. 120 mL 0    amLODIPine (NORVASC) 5 mg tablet Take 5 mg by mouth daily. Indications: HYPERTENSION      aspirin (ASPIRIN) 325 mg tablet Take 325 mg by mouth daily.  VITAMIN E (FORMULA E 400 PO) Take 1 Tab by mouth daily.  gabapentin (NEURONTIN) 600 mg tablet Take  by mouth three (3) times daily.         glimepiride (AMARYL) 2 mg tablet Take 2 mg by mouth.        sitaGLIPtin-metFORMIN (JANUMET)  mg per tablet Take 1 Tab by mouth.  losartan (COZAAR) 50 mg tablet Take  by mouth daily. Indications: HYPERTENSION      Melatonin 300 mcg Tab Take  by mouth.  cyanocobalamin (VITAMIN B-12) 1,500 mcg TbER Take 1 Tab by mouth. Past History     Past Medical History:  Past Medical History:   Diagnosis Date    Arthritis     Asthma     Chronic pain     BACK PAIN    Diabetes ([address]     Diabetic neuropathy ([address]     Emphysema     Hepatitis C     Hypertension     Nervousness     Osteoarthritis of both knees        Past Surgical History:  Past Surgical History:   Procedure Laterality Date    HX CARPAL TUNNEL RELEASE Right 8/31/09    Dr. Radha Pizano    HX TUBAL LIGATION         Family History:  Family History   Problem Relation Age of Onset   Ottoniel Solis Diabetes Mother    Ottoniel Solis Hypertension Mother    Ottoniel Solis Obesity Mother     Alcohol abuse Father     High Cholesterol Father     Lung Disease Father     Stroke Father     Arthritis-osteo Sister     Depression Sister     Diabetes Sister     Hypertension Sister     Obesity Sister     Arthritis-osteo Brother     Diabetes Brother     Hypertension Brother     Obesity Brother        Social History:  Social History   Substance Use Topics    Smoking status: Current Every Day Smoker     Packs/day: 1.00    Smokeless tobacco: Not on file    Alcohol use Yes      Comment: socially       Allergies:  No Known Allergies      Review of Systems     Review of Systems   Unable to perform ROS: Mental status change         Physical Exam     Visit Vitals    BP (!) 67/36 (BP 1 Location: Left arm, BP Patient Position: At rest)    Pulse (!) 111    Wt 67.6 kg (149 lb 1.6 oz)    BMI 23.35 kg/m2       Physical Exam   Constitutional:   Ill appearing. Apneic,    HENT:   Head: Normocephalic and atraumatic. Eyes: Conjunctivae are normal. Left eye exhibits no discharge. Neck: Normal range of motion.  No thyromegaly present. Cardiovascular: Normal rate and regular rhythm. Pulmonary/Chest:   Apneic; lungs clear   Abdominal: She exhibits no distension. There is no tenderness. Musculoskeletal:   No gross deformity   Neurological:   Responds minimally to pain   Skin: Skin is warm and dry. Diagnostic Study Results      cxr TTT in place. ngt in place. IJ in placde no ptx  EKG nsr at 83; nl axis. Nl int. No raz/d no hypertrophy   Temp pending. Medical Decision Making       CRITICAL CARE:  8:03 PM  I have spent 60 minutes of critical care time involved in lab review, consultations with specialist, family decision-making, and documentation. During this entire length of time I was immediately available to the patient. Critical Care: The reason for providing this level of medical care for this critically ill patient was due a critical illness that impaired one or more vital organ systems such that there was a high probability of imminent or life threatening deterioration in the patients condition. This care involved high complexity decision making to assess, manipulate, and support vital system functions, to treat this degreee vital organ system failure and to prevent further life threatening deterioration of the patients condition. intubation note   the patientarrived in respiratory failure intubated as soon as we could,  Secure IV access for medications. She had some trismus and is unable to intubate her medications. she was medicated labile blood pressure and mild tachycardia in addition we used vec   Due to the presumed hyperkalemia as we anticipated she was in severe dka     she was intubated on first attempt using video laryngoscopy without complication breath sounds were confirmed bilaterally with auscultation as well as waveform capnography     central line :    I placed an ultrasound-guided right ij central line using the seldinger technique without complication.   All ports return blood and flushed easily. D/w ICU dr Chuckie Payton for admission. Dw/ dr Carmelina Spencer ; will admit  Dw/ Tele neuro for sz here in dept. Will cover 1 g keppra    Trop noted; likey due to high output. No heparin at this time. Trend trop   Ct head pending     Repeat K noted. IV K had been ordered but not receive. IVF held. Pt on second run of K now. Diagnosis     No diagnosis found. _______________________________    Attestations:  Scribe [address] [place] [address] acting as a scribe for and in the presence of Julien Conway MD      February 07, 2018 at 6:23 PM       Provider Attestation:      I personally performed the services described in the documentation, reviewed the documentation, as recorded by the scribe in my presence, and it accurately and completely records my words and actions.  February 07, 2018 at 6:23 PM - Julien Conway MD    _______________________________

## 2018-02-07 NOTE — ED NOTES
Patient continues to have assisted ventilations using Bag valve mask. Dr. Larry Sher and Respiratory currently at bedside preparing for intubation.

## 2018-02-07 NOTE — ED TRIAGE NOTES
Being bagged on arrival, dr Gonzalo Alonzo with pt on arrival. In rooom4. 20 r ac, 22 left hand,. Unresponsive ems call. BG HIGH, by ems.

## 2018-02-08 ENCOUNTER — APPOINTMENT (OUTPATIENT)
Dept: GENERAL RADIOLOGY | Age: 63
DRG: 871 | End: 2018-02-08
Attending: PHYSICIAN ASSISTANT
Payer: MEDICARE

## 2018-02-08 ENCOUNTER — APPOINTMENT (OUTPATIENT)
Dept: CT IMAGING | Age: 63
DRG: 871 | End: 2018-02-08
Attending: PHYSICIAN ASSISTANT
Payer: MEDICARE

## 2018-02-08 LAB
ADMINISTERED INITIALS, ADMINIT: NORMAL
ANION GAP SERPL CALC-SCNC: 6 MMOL/L (ref 3–18)
ANION GAP SERPL CALC-SCNC: 6 MMOL/L (ref 3–18)
ANION GAP SERPL CALC-SCNC: 9 MMOL/L (ref 3–18)
ARTERIAL PATENCY WRIST A: YES
BASE DEFICIT BLD-SCNC: 11 MMOL/L
BASOPHILS # BLD: 0 K/UL (ref 0–0.06)
BASOPHILS NFR BLD: 0 % (ref 0–3)
BDY SITE: ABNORMAL
BODY TEMPERATURE: 37
BUN BLD-MCNC: 47 MG/DL (ref 7–18)
BUN SERPL-MCNC: 29 MG/DL (ref 7–18)
BUN SERPL-MCNC: 31 MG/DL (ref 7–18)
BUN SERPL-MCNC: 40 MG/DL (ref 7–18)
BUN/CREAT SERPL: 17 (ref 12–20)
BUN/CREAT SERPL: 19 (ref 12–20)
BUN/CREAT SERPL: 19 (ref 12–20)
CA-I BLD-MCNC: 1.43 MMOL/L (ref 1.12–1.32)
CALCIUM SERPL-MCNC: 8.2 MG/DL (ref 8.5–10.1)
CALCIUM SERPL-MCNC: 8.2 MG/DL (ref 8.5–10.1)
CALCIUM SERPL-MCNC: 8.7 MG/DL (ref 8.5–10.1)
CHLORIDE SERPL-SCNC: 114 MMOL/L (ref 100–108)
CHLORIDE SERPL-SCNC: 119 MMOL/L (ref 100–108)
CHLORIDE SERPL-SCNC: 120 MMOL/L (ref 100–108)
CK MB CFR SERPL CALC: 6.9 % (ref 0–4)
CK MB CFR SERPL CALC: 7.7 % (ref 0–4)
CK MB SERPL-MCNC: 10.6 NG/ML (ref 5–25)
CK MB SERPL-MCNC: 9.9 NG/ML (ref 5–25)
CK SERPL-CCNC: 137 U/L (ref 26–192)
CK SERPL-CCNC: 143 U/L (ref 26–192)
CO2 BLD-SCNC: 19 MMOL/L (ref 19–24)
CO2 SERPL-SCNC: 22 MMOL/L (ref 21–32)
CO2 SERPL-SCNC: 24 MMOL/L (ref 21–32)
CO2 SERPL-SCNC: 25 MMOL/L (ref 21–32)
CREAT SERPL-MCNC: 1.53 MG/DL (ref 0.6–1.3)
CREAT SERPL-MCNC: 1.64 MG/DL (ref 0.6–1.3)
CREAT SERPL-MCNC: 2.37 MG/DL (ref 0.6–1.3)
CREAT UR-MCNC: 2.1 MG/DL (ref 0.6–1.3)
D50 ADMINISTERED, D50ADM: 0 ML
D50 ORDER, D50ORD: 0 ML
DIFFERENTIAL METHOD BLD: ABNORMAL
EOSINOPHIL # BLD: 0 K/UL (ref 0–0.4)
EOSINOPHIL NFR BLD: 0 % (ref 0–5)
ERYTHROCYTE [DISTWIDTH] IN BLOOD BY AUTOMATED COUNT: 12.8 % (ref 11.6–14.5)
FLUAV AG NPH QL IA: NEGATIVE
FLUBV AG NOSE QL IA: NEGATIVE
GAS FLOW.O2 O2 DELIVERY SYS: ABNORMAL L/MIN
GAS FLOW.O2 SETTING OXYMISER: 22 BPM
GASTROCULT GAST QL: POSITIVE
GLUCOSE BLD STRIP.AUTO-MCNC: 132 MG/DL (ref 70–110)
GLUCOSE BLD STRIP.AUTO-MCNC: 150 MG/DL (ref 70–110)
GLUCOSE BLD STRIP.AUTO-MCNC: 155 MG/DL (ref 70–110)
GLUCOSE BLD STRIP.AUTO-MCNC: 159 MG/DL (ref 70–110)
GLUCOSE BLD STRIP.AUTO-MCNC: 170 MG/DL (ref 70–110)
GLUCOSE BLD STRIP.AUTO-MCNC: 219 MG/DL (ref 70–110)
GLUCOSE BLD STRIP.AUTO-MCNC: 223 MG/DL (ref 70–110)
GLUCOSE BLD STRIP.AUTO-MCNC: 272 MG/DL (ref 70–110)
GLUCOSE BLD STRIP.AUTO-MCNC: 356 MG/DL (ref 70–110)
GLUCOSE BLD STRIP.AUTO-MCNC: 464 MG/DL (ref 70–110)
GLUCOSE BLD STRIP.AUTO-MCNC: 583 MG/DL (ref 70–110)
GLUCOSE BLD STRIP.AUTO-MCNC: >600 MG/DL (ref 70–110)
GLUCOSE SERPL-MCNC: 1084 MG/DL (ref 74–99)
GLUCOSE SERPL-MCNC: 230 MG/DL (ref 74–99)
GLUCOSE SERPL-MCNC: 345 MG/DL (ref 74–99)
GLUCOSE, GLC: 132 MG/DL
GLUCOSE, GLC: 150 MG/DL
GLUCOSE, GLC: 155 MG/DL
GLUCOSE, GLC: 159 MG/DL
GLUCOSE, GLC: 165 MG/DL
GLUCOSE, GLC: 170 MG/DL
GLUCOSE, GLC: 219 MG/DL
GLUCOSE, GLC: 223 MG/DL
GLUCOSE, GLC: 272 MG/DL
GLUCOSE, GLC: 356 MG/DL
GLUCOSE, GLC: 464 MG/DL
GLUCOSE, GLC: 583 MG/DL
GLUCOSE, GLC: 600 MG/DL
HCO3 BLD-SCNC: 15.6 MMOL/L (ref 22–26)
HCT VFR BLD AUTO: 31 % (ref 35–45)
HCT VFR BLD CALC: 38 % (ref 36–49)
HGB BLD-MCNC: 10.6 G/DL (ref 12–16)
HGB BLD-MCNC: 12.9 G/DL (ref 12–16)
HIGH TARGET, HITG: 180 MG/DL
INR PPP: 1.3 (ref 0.8–1.2)
INSULIN ADMINSTERED, INSADM: 1.4 UNITS/HOUR
INSULIN ADMINSTERED, INSADM: 1.8 UNITS/HOUR
INSULIN ADMINSTERED, INSADM: 1.9 UNITS/HOUR
INSULIN ADMINSTERED, INSADM: 10 UNITS/HOUR
INSULIN ADMINSTERED, INSADM: 15.7 UNITS/HOUR
INSULIN ADMINSTERED, INSADM: 2.1 UNITS/HOUR
INSULIN ADMINSTERED, INSADM: 2.1 UNITS/HOUR
INSULIN ADMINSTERED, INSADM: 21.6 UNITS/HOUR
INSULIN ADMINSTERED, INSADM: 3 UNITS/HOUR
INSULIN ADMINSTERED, INSADM: 3.3 UNITS/HOUR
INSULIN ADMINSTERED, INSADM: 3.3 UNITS/HOUR
INSULIN ADMINSTERED, INSADM: 4.8 UNITS/HOUR
INSULIN ADMINSTERED, INSADM: 5.9 UNITS/HOUR
INSULIN ORDER, INSORD: 1.4 UNITS/HOUR
INSULIN ORDER, INSORD: 1.8 UNITS/HOUR
INSULIN ORDER, INSORD: 1.9 UNITS/HOUR
INSULIN ORDER, INSORD: 12.1 UNITS/HOUR
INSULIN ORDER, INSORD: 15.7 UNITS/HOUR
INSULIN ORDER, INSORD: 2.1 UNITS/HOUR
INSULIN ORDER, INSORD: 2.1 UNITS/HOUR
INSULIN ORDER, INSORD: 21.6 UNITS/HOUR
INSULIN ORDER, INSORD: 3 UNITS/HOUR
INSULIN ORDER, INSORD: 3.3 UNITS/HOUR
INSULIN ORDER, INSORD: 3.3 UNITS/HOUR
INSULIN ORDER, INSORD: 4.8 UNITS/HOUR
INSULIN ORDER, INSORD: 5.9 UNITS/HOUR
LACTATE BLD-SCNC: 5 MMOL/L (ref 0.4–2)
LACTATE SERPL-SCNC: 1.4 MMOL/L (ref 0.4–2)
LACTATE SERPL-SCNC: 3 MMOL/L (ref 0.4–2)
LACTATE SERPL-SCNC: 6.1 MMOL/L (ref 0.4–2)
LOW TARGET, LOT: 140 MG/DL
LYMPHOCYTES # BLD: 1 K/UL (ref 0.8–3.5)
LYMPHOCYTES NFR BLD: 12 % (ref 20–51)
MAGNESIUM SERPL-MCNC: 1.9 MG/DL (ref 1.6–2.6)
MAGNESIUM SERPL-MCNC: 1.9 MG/DL (ref 1.6–2.6)
MAGNESIUM SERPL-MCNC: 2.4 MG/DL (ref 1.6–2.6)
MCH RBC QN AUTO: 28.3 PG (ref 24–34)
MCHC RBC AUTO-ENTMCNC: 34.2 G/DL (ref 31–37)
MCV RBC AUTO: 82.7 FL (ref 74–97)
MINUTES UNTIL NEXT BG, NBG: 60 MIN
MONOCYTES # BLD: 0.3 K/UL (ref 0–1)
MONOCYTES NFR BLD: 4 % (ref 2–9)
MULTIPLIER, MUL: 0.01
MULTIPLIER, MUL: 0.02
MULTIPLIER, MUL: 0.03
MULTIPLIER, MUL: 0.04
NEUTS BAND NFR BLD MANUAL: 14 % (ref 0–5)
NEUTS SEG # BLD: 6.7 K/UL (ref 1.8–8)
NEUTS SEG NFR BLD: 70 % (ref 42–75)
O2/TOTAL GAS SETTING VFR VENT: 70 %
ORDER INITIALS, ORDINIT: NORMAL
PCO2 BLD: 38.3 MMHG (ref 35–45)
PEEP RESPIRATORY: 5 CMH2O
PH BLD: 7.22 [PH] (ref 7.35–7.45)
PH GAST: 3 [PH] (ref 1.5–3.5)
PHOSPHATE SERPL-MCNC: 1 MG/DL (ref 2.5–4.9)
PHOSPHATE SERPL-MCNC: 1.4 MG/DL (ref 2.5–4.9)
PHOSPHATE SERPL-MCNC: 1.5 MG/DL (ref 2.5–4.9)
PLATELET # BLD AUTO: 127 K/UL (ref 135–420)
PLATELET COMMENTS,PCOM: ABNORMAL
PMV BLD AUTO: 10.8 FL (ref 9.2–11.8)
PO2 BLD: 115 MMHG (ref 80–100)
POTASSIUM BLD-SCNC: 3.3 MMOL/L (ref 3.5–5.5)
POTASSIUM SERPL-SCNC: 2.5 MMOL/L (ref 3.5–5.5)
POTASSIUM SERPL-SCNC: 4.3 MMOL/L (ref 3.5–5.5)
POTASSIUM SERPL-SCNC: 4.4 MMOL/L (ref 3.5–5.5)
PROTHROMBIN TIME: 15.8 SEC (ref 11.5–15.2)
RBC # BLD AUTO: 3.75 M/UL (ref 4.2–5.3)
RBC MORPH BLD: ABNORMAL
SAO2 % BLD: 98 % (ref 92–97)
SERVICE CMNT-IMP: ABNORMAL
SODIUM BLD-SCNC: 131 MMOL/L (ref 136–145)
SODIUM SERPL-SCNC: 145 MMOL/L (ref 136–145)
SODIUM SERPL-SCNC: 149 MMOL/L (ref 136–145)
SODIUM SERPL-SCNC: 151 MMOL/L (ref 136–145)
SPECIMEN TYPE: ABNORMAL
TOTAL RESP. RATE, ITRR: 22
TROPONIN I SERPL-MCNC: 0.28 NG/ML (ref 0–0.04)
TROPONIN I SERPL-MCNC: 0.29 NG/ML (ref 0–0.04)
TSH SERPL DL<=0.05 MIU/L-ACNC: 1.06 UIU/ML (ref 0.36–3.74)
VENTILATION MODE VENT: ABNORMAL
VOLUME CONTROL PLUS IVLCP: YES
VT SETTING VENT: 450 ML
WBC # BLD AUTO: 8 K/UL (ref 4.6–13.2)

## 2018-02-08 PROCEDURE — 65610000006 HC RM INTENSIVE CARE

## 2018-02-08 PROCEDURE — 74011250637 HC RX REV CODE- 250/637: Performed by: INTERNAL MEDICINE

## 2018-02-08 PROCEDURE — 74011000250 HC RX REV CODE- 250: Performed by: PHYSICIAN ASSISTANT

## 2018-02-08 PROCEDURE — 82803 BLOOD GASES ANY COMBINATION: CPT

## 2018-02-08 PROCEDURE — 87077 CULTURE AEROBIC IDENTIFY: CPT | Performed by: PHYSICIAN ASSISTANT

## 2018-02-08 PROCEDURE — 70450 CT HEAD/BRAIN W/O DYE: CPT

## 2018-02-08 PROCEDURE — 83605 ASSAY OF LACTIC ACID: CPT

## 2018-02-08 PROCEDURE — 83605 ASSAY OF LACTIC ACID: CPT | Performed by: PHYSICIAN ASSISTANT

## 2018-02-08 PROCEDURE — 82962 GLUCOSE BLOOD TEST: CPT

## 2018-02-08 PROCEDURE — 87106 FUNGI IDENTIFICATION YEAST: CPT | Performed by: PHYSICIAN ASSISTANT

## 2018-02-08 PROCEDURE — 82271 OCCULT BLOOD OTHER SOURCES: CPT | Performed by: PHYSICIAN ASSISTANT

## 2018-02-08 PROCEDURE — 74011250637 HC RX REV CODE- 250/637: Performed by: PHYSICIAN ASSISTANT

## 2018-02-08 PROCEDURE — 74011250636 HC RX REV CODE- 250/636: Performed by: PHYSICIAN ASSISTANT

## 2018-02-08 PROCEDURE — 85025 COMPLETE CBC W/AUTO DIFF WBC: CPT | Performed by: INTERNAL MEDICINE

## 2018-02-08 PROCEDURE — 36600 WITHDRAWAL OF ARTERIAL BLOOD: CPT

## 2018-02-08 PROCEDURE — 74011000250 HC RX REV CODE- 250: Performed by: INTERNAL MEDICINE

## 2018-02-08 PROCEDURE — 87070 CULTURE OTHR SPECIMN AEROBIC: CPT | Performed by: PHYSICIAN ASSISTANT

## 2018-02-08 PROCEDURE — 84100 ASSAY OF PHOSPHORUS: CPT | Performed by: PHYSICIAN ASSISTANT

## 2018-02-08 PROCEDURE — 95822 EEG COMA OR SLEEP ONLY: CPT

## 2018-02-08 PROCEDURE — 87804 INFLUENZA ASSAY W/OPTIC: CPT | Performed by: PHYSICIAN ASSISTANT

## 2018-02-08 PROCEDURE — 74011250636 HC RX REV CODE- 250/636: Performed by: EMERGENCY MEDICINE

## 2018-02-08 PROCEDURE — 87086 URINE CULTURE/COLONY COUNT: CPT | Performed by: PHYSICIAN ASSISTANT

## 2018-02-08 PROCEDURE — 36592 COLLECT BLOOD FROM PICC: CPT

## 2018-02-08 PROCEDURE — 80048 BASIC METABOLIC PNL TOTAL CA: CPT | Performed by: PHYSICIAN ASSISTANT

## 2018-02-08 PROCEDURE — 94003 VENT MGMT INPAT SUBQ DAY: CPT

## 2018-02-08 PROCEDURE — 83735 ASSAY OF MAGNESIUM: CPT | Performed by: PHYSICIAN ASSISTANT

## 2018-02-08 PROCEDURE — 74011000258 HC RX REV CODE- 258: Performed by: PHYSICIAN ASSISTANT

## 2018-02-08 PROCEDURE — 95816 EEG AWAKE AND DROWSY: CPT | Performed by: PHYSICIAN ASSISTANT

## 2018-02-08 PROCEDURE — 74011250636 HC RX REV CODE- 250/636: Performed by: INTERNAL MEDICINE

## 2018-02-08 PROCEDURE — 74011000250 HC RX REV CODE- 250: Performed by: EMERGENCY MEDICINE

## 2018-02-08 PROCEDURE — C9113 INJ PANTOPRAZOLE SODIUM, VIA: HCPCS | Performed by: INTERNAL MEDICINE

## 2018-02-08 PROCEDURE — 74011000258 HC RX REV CODE- 258: Performed by: INTERNAL MEDICINE

## 2018-02-08 PROCEDURE — 94640 AIRWAY INHALATION TREATMENT: CPT

## 2018-02-08 PROCEDURE — 84484 ASSAY OF TROPONIN QUANT: CPT | Performed by: PHYSICIAN ASSISTANT

## 2018-02-08 PROCEDURE — 71045 X-RAY EXAM CHEST 1 VIEW: CPT

## 2018-02-08 PROCEDURE — 74011000258 HC RX REV CODE- 258: Performed by: EMERGENCY MEDICINE

## 2018-02-08 PROCEDURE — 85610 PROTHROMBIN TIME: CPT | Performed by: INTERNAL MEDICINE

## 2018-02-08 PROCEDURE — 84443 ASSAY THYROID STIM HORMONE: CPT | Performed by: PHYSICIAN ASSISTANT

## 2018-02-08 PROCEDURE — 87186 SC STD MICRODIL/AGAR DIL: CPT | Performed by: PHYSICIAN ASSISTANT

## 2018-02-08 PROCEDURE — 74011250636 HC RX REV CODE- 250/636

## 2018-02-08 RX ORDER — METRONIDAZOLE 500 MG/100ML
500 INJECTION, SOLUTION INTRAVENOUS EVERY 12 HOURS
Status: COMPLETED | OUTPATIENT
Start: 2018-02-08 | End: 2018-02-13

## 2018-02-08 RX ORDER — POTASSIUM CHLORIDE 1.5 G/1.77G
40 POWDER, FOR SOLUTION ORAL
Status: COMPLETED | OUTPATIENT
Start: 2018-02-08 | End: 2018-02-08

## 2018-02-08 RX ORDER — POTASSIUM CHLORIDE 1.5 G/1.77G
40 POWDER, FOR SOLUTION ORAL 2 TIMES DAILY WITH MEALS
Status: DISCONTINUED | OUTPATIENT
Start: 2018-02-08 | End: 2018-02-08

## 2018-02-08 RX ORDER — IPRATROPIUM BROMIDE AND ALBUTEROL SULFATE 2.5; .5 MG/3ML; MG/3ML
3 SOLUTION RESPIRATORY (INHALATION)
Status: DISCONTINUED | OUTPATIENT
Start: 2018-02-08 | End: 2018-02-11

## 2018-02-08 RX ORDER — PANTOPRAZOLE SODIUM 40 MG/10ML
40 INJECTION, POWDER, LYOPHILIZED, FOR SOLUTION INTRAVENOUS EVERY 24 HOURS
Status: DISCONTINUED | OUTPATIENT
Start: 2018-02-08 | End: 2018-02-08

## 2018-02-08 RX ORDER — THIAMINE HYDROCHLORIDE 100 MG/ML
100 INJECTION, SOLUTION INTRAMUSCULAR; INTRAVENOUS DAILY
Status: DISCONTINUED | OUTPATIENT
Start: 2018-02-08 | End: 2018-02-15 | Stop reason: HOSPADM

## 2018-02-08 RX ORDER — PANTOPRAZOLE SODIUM 40 MG/1
40 GRANULE, DELAYED RELEASE ORAL DAILY
Status: DISCONTINUED | OUTPATIENT
Start: 2018-02-10 | End: 2018-02-15 | Stop reason: HOSPADM

## 2018-02-08 RX ORDER — POTASSIUM CHLORIDE 7.45 MG/ML
10 INJECTION INTRAVENOUS
Status: COMPLETED | OUTPATIENT
Start: 2018-02-08 | End: 2018-02-08

## 2018-02-08 RX ORDER — MIDAZOLAM HYDROCHLORIDE 1 MG/ML
1-2 INJECTION, SOLUTION INTRAMUSCULAR; INTRAVENOUS
Status: DISCONTINUED | OUTPATIENT
Start: 2018-02-08 | End: 2018-02-15 | Stop reason: HOSPADM

## 2018-02-08 RX ORDER — DEXTROSE MONOHYDRATE AND SODIUM CHLORIDE 5; .45 G/100ML; G/100ML
75 INJECTION, SOLUTION INTRAVENOUS CONTINUOUS
Status: DISCONTINUED | OUTPATIENT
Start: 2018-02-08 | End: 2018-02-09

## 2018-02-08 RX ORDER — FENTANYL CITRATE 50 UG/ML
25-50 INJECTION, SOLUTION INTRAMUSCULAR; INTRAVENOUS
Status: DISCONTINUED | OUTPATIENT
Start: 2018-02-08 | End: 2018-02-15 | Stop reason: HOSPADM

## 2018-02-08 RX ORDER — PANTOPRAZOLE SODIUM 40 MG/1
40 GRANULE, DELAYED RELEASE ORAL 2 TIMES DAILY
Status: DISCONTINUED | OUTPATIENT
Start: 2018-02-08 | End: 2018-02-08

## 2018-02-08 RX ORDER — HEPARIN SODIUM 5000 [USP'U]/ML
5000 INJECTION, SOLUTION INTRAVENOUS; SUBCUTANEOUS EVERY 8 HOURS
Status: DISCONTINUED | OUTPATIENT
Start: 2018-02-08 | End: 2018-02-10 | Stop reason: ALTCHOICE

## 2018-02-08 RX ORDER — POTASSIUM CHLORIDE 1.5 G/1.77G
40 POWDER, FOR SOLUTION ORAL
Status: DISPENSED | OUTPATIENT
Start: 2018-02-08 | End: 2018-02-08

## 2018-02-08 RX ORDER — POTASSIUM CHLORIDE 7.45 MG/ML
INJECTION INTRAVENOUS
Status: COMPLETED
Start: 2018-02-08 | End: 2018-02-08

## 2018-02-08 RX ADMIN — METRONIDAZOLE 500 MG: 500 INJECTION, SOLUTION INTRAVENOUS at 11:32

## 2018-02-08 RX ADMIN — IPRATROPIUM BROMIDE AND ALBUTEROL SULFATE 3 ML: .5; 3 SOLUTION RESPIRATORY (INHALATION) at 21:42

## 2018-02-08 RX ADMIN — SODIUM CHLORIDE, SODIUM LACTATE, POTASSIUM CHLORIDE, AND CALCIUM CHLORIDE 1000 ML: 600; 310; 30; 20 INJECTION, SOLUTION INTRAVENOUS at 10:49

## 2018-02-08 RX ADMIN — DEXTROSE MONOHYDRATE AND SODIUM CHLORIDE 75 ML/HR: 5; .45 INJECTION, SOLUTION INTRAVENOUS at 15:43

## 2018-02-08 RX ADMIN — SODIUM CHLORIDE, SODIUM ACETATE ANHYDROUS, SODIUM GLUCONATE, POTASSIUM CHLORIDE, AND MAGNESIUM CHLORIDE: 526; 222; 502; 37; 30 INJECTION, SOLUTION INTRAVENOUS at 16:24

## 2018-02-08 RX ADMIN — POTASSIUM CHLORIDE 40 MEQ: 1.5 POWDER, FOR SOLUTION ORAL at 06:31

## 2018-02-08 RX ADMIN — CEFEPIME HYDROCHLORIDE 2 G: 2 INJECTION, POWDER, FOR SOLUTION INTRAVENOUS at 16:01

## 2018-02-08 RX ADMIN — MIDAZOLAM HYDROCHLORIDE 2 MG: 1 INJECTION, SOLUTION INTRAMUSCULAR; INTRAVENOUS at 23:00

## 2018-02-08 RX ADMIN — POTASSIUM CHLORIDE 10 MEQ: 10 INJECTION, SOLUTION INTRAVENOUS at 09:43

## 2018-02-08 RX ADMIN — HEPARIN SODIUM 5000 UNITS: 5000 INJECTION, SOLUTION INTRAVENOUS; SUBCUTANEOUS at 06:31

## 2018-02-08 RX ADMIN — FENTANYL CITRATE 50 MCG: 50 INJECTION INTRAMUSCULAR; INTRAVENOUS at 20:27

## 2018-02-08 RX ADMIN — CHLORHEXIDINE GLUCONATE 10 ML: 1.2 RINSE ORAL at 09:09

## 2018-02-08 RX ADMIN — SODIUM CHLORIDE 500 ML: 900 INJECTION, SOLUTION INTRAVENOUS at 06:33

## 2018-02-08 RX ADMIN — FAMOTIDINE 20 MG: 10 INJECTION, SOLUTION INTRAVENOUS at 12:00

## 2018-02-08 RX ADMIN — POTASSIUM CHLORIDE 10 MEQ: 10 INJECTION, SOLUTION INTRAVENOUS at 08:57

## 2018-02-08 RX ADMIN — LEVETIRACETAM 4000 MG: 100 INJECTION, SOLUTION INTRAVENOUS at 14:17

## 2018-02-08 RX ADMIN — SODIUM CHLORIDE, SODIUM ACETATE ANHYDROUS, SODIUM GLUCONATE, POTASSIUM CHLORIDE, AND MAGNESIUM CHLORIDE: 526; 222; 502; 37; 30 INJECTION, SOLUTION INTRAVENOUS at 17:38

## 2018-02-08 RX ADMIN — SODIUM PHOSPHATE, MONOBASIC, MONOHYDRATE: 276; 142 INJECTION, SOLUTION INTRAVENOUS at 18:23

## 2018-02-08 RX ADMIN — CHLORHEXIDINE GLUCONATE 10 ML: 1.2 RINSE ORAL at 20:15

## 2018-02-08 RX ADMIN — SODIUM CHLORIDE, SODIUM ACETATE ANHYDROUS, SODIUM GLUCONATE, POTASSIUM CHLORIDE, AND MAGNESIUM CHLORIDE: 526; 222; 502; 37; 30 INJECTION, SOLUTION INTRAVENOUS at 18:18

## 2018-02-08 RX ADMIN — SODIUM CHLORIDE AND POTASSIUM CHLORIDE: 4.5; 1.49 INJECTION, SOLUTION INTRAVENOUS at 09:00

## 2018-02-08 RX ADMIN — SODIUM CHLORIDE, SODIUM LACTATE, POTASSIUM CHLORIDE, AND CALCIUM CHLORIDE 1000 ML: 600; 310; 30; 20 INJECTION, SOLUTION INTRAVENOUS at 11:27

## 2018-02-08 RX ADMIN — POTASSIUM PHOSPHATE, MONOBASIC AND POTASSIUM PHOSPHATE, DIBASIC: 224; 236 INJECTION, SOLUTION INTRAVENOUS at 06:32

## 2018-02-08 RX ADMIN — POTASSIUM CHLORIDE 10 MEQ: 10 INJECTION, SOLUTION INTRAVENOUS at 10:50

## 2018-02-08 RX ADMIN — LEVETIRACETAM 1000 MG: 5 INJECTION INTRAVENOUS at 20:16

## 2018-02-08 RX ADMIN — PANTOPRAZOLE SODIUM 40 MG: 40 INJECTION, POWDER, FOR SOLUTION INTRAVENOUS at 09:07

## 2018-02-08 RX ADMIN — Medication 10 ML: at 21:23

## 2018-02-08 RX ADMIN — Medication 10 ML: at 13:28

## 2018-02-08 RX ADMIN — HEPARIN SODIUM 5000 UNITS: 5000 INJECTION, SOLUTION INTRAVENOUS; SUBCUTANEOUS at 21:23

## 2018-02-08 RX ADMIN — Medication 10 ML: at 07:16

## 2018-02-08 RX ADMIN — MIDAZOLAM HYDROCHLORIDE 2 MG: 1 INJECTION, SOLUTION INTRAMUSCULAR; INTRAVENOUS at 17:54

## 2018-02-08 RX ADMIN — LEVETIRACETAM 1000 MG: 5 INJECTION INTRAVENOUS at 09:21

## 2018-02-08 RX ADMIN — FOLIC ACID 1 MG: 1 TABLET ORAL at 09:21

## 2018-02-08 RX ADMIN — POTASSIUM CHLORIDE 10 MEQ: 10 INJECTION, SOLUTION INTRAVENOUS at 06:32

## 2018-02-08 RX ADMIN — SODIUM CHLORIDE 1750 MG: 9 INJECTION, SOLUTION INTRAVENOUS at 09:38

## 2018-02-08 RX ADMIN — CHLORHEXIDINE GLUCONATE 10 ML: 1.2 RINSE ORAL at 06:31

## 2018-02-08 RX ADMIN — POTASSIUM CHLORIDE 10 MEQ: 10 INJECTION, SOLUTION INTRAVENOUS at 06:33

## 2018-02-08 RX ADMIN — SODIUM CHLORIDE, SODIUM LACTATE, POTASSIUM CHLORIDE, AND CALCIUM CHLORIDE 1000 ML: 600; 310; 30; 20 INJECTION, SOLUTION INTRAVENOUS at 09:10

## 2018-02-08 RX ADMIN — SODIUM PHOSPHATE, MONOBASIC, MONOHYDRATE AND SODIUM PHOSPHATE, DIBASIC ANHYDROUS 18 MMOL: 276; 142 INJECTION, SOLUTION INTRAVENOUS at 00:21

## 2018-02-08 RX ADMIN — SODIUM CHLORIDE, SODIUM ACETATE ANHYDROUS, SODIUM GLUCONATE, POTASSIUM CHLORIDE, AND MAGNESIUM CHLORIDE: 526; 222; 502; 37; 30 INJECTION, SOLUTION INTRAVENOUS at 15:33

## 2018-02-08 RX ADMIN — THIAMINE HYDROCHLORIDE 100 MG: 100 INJECTION, SOLUTION INTRAMUSCULAR; INTRAVENOUS at 09:10

## 2018-02-08 RX ADMIN — POTASSIUM CHLORIDE 10 MEQ: 10 INJECTION, SOLUTION INTRAVENOUS at 11:27

## 2018-02-08 RX ADMIN — PANTOPRAZOLE SODIUM 40 MG: 40 GRANULE, DELAYED RELEASE ORAL at 20:17

## 2018-02-08 RX ADMIN — LIDOCAINE HYDROCHLORIDE: 10 INJECTION, SOLUTION EPIDURAL; INFILTRATION; INTRACAUDAL; PERINEURAL at 01:00

## 2018-02-08 RX ADMIN — SODIUM CHLORIDE, SODIUM LACTATE, POTASSIUM CHLORIDE, AND CALCIUM CHLORIDE 1000 ML: 600; 310; 30; 20 INJECTION, SOLUTION INTRAVENOUS at 12:02

## 2018-02-08 NOTE — PROGRESS NOTES
[facility]  Progress Note    Patient: Joann Davila Age: 58 y.o. : 1955 MR#: [id number] SSN: [id number]  Date/Time: 2018     Subjective: pt intubated and sedated. Pt moves with stimuli      Assessment/Plan:   1. Acute hypoxic and hypercapnic resp failure: cont vent per ICU  2. DKA vs HHS: cont insulin drip and IVF, monitor BS  3. Acute met encephalopathy: due to # //5  4. Hypotension: septic vs hypovolemic: pressors as needed   5. New onset Sz: cont keppra, neuro consulted   6. Sepsis: ? Related to # 2 vs # 5  7. Possible aspiration PNA: cont Abx  8. Met and resp acidosis: will monitor   9. Lactic acidosis: will monitor   10. UTI: follow up Cx  11. Elevated trop: demand ischemia   12. ARF: cont IVF and monitor   13. ? GI bleed with coffee ground: monitor H/H  14. Hx Hep C and drug use:  15. D/w ICU team       I spent 30 minutes with the patient in face-to-face consultation, of which greater than 50% was spent in counseling and coordination of care as described above. Case discussed with:  []Patient  []Family  [x]Nursing  []Case Management  DVT Prophylaxis:  []Lovenox  []Hep SQ  [x]SCDs  []Coumadin   []On Heparin gtt    Objective:   VS:   Visit Vitals    /61    Pulse 94    Temp 98.6 °F (37 °C)    Resp (!) 0    Wt 68.1 kg (150 lb 2.1 oz)    SpO2 100%    Breastfeeding No    BMI 23.51 kg/m2      Tmax/24hrs: Temp (24hrs), Av.8 °F (37.1 °C), Min:97.8 °F (36.6 °C), Max:99.7 °F (37.6 °C)  IOBRIEF  Intake/Output Summary (Last 24 hours) at 18 1732  Last data filed at 18 1200   Gross per 24 hour   Intake          2431.79 ml   Output             4225 ml   Net         -1793.21 ml       General:  Alert, cooperative, no acute distress    Pulmonary:  CTA Bilaterally. RR 20  Cardiovascular: Regular rate and Rhythm. GI:  Soft, Non distended, Non tender. + Bowel sounds. Extremities:  No edema, cyanosis, clubbing.    Neurologic: sedated.       Medications:   Current Facility-Administered Medications   Medication Dose Route Frequency    heparin (porcine) injection 5,000 Units  5,000 Units SubCUTAneous Q8H    thiamine (B-1) injection 100 mg  100 mg IntraMUSCular DAILY    potassium chloride (KLOR-CON) packet 40 mEq  40 mEq Oral NOW    fentaNYL citrate (PF) injection 25-50 mcg  25-50 mcg IntraVENous Q4H PRN    midazolam (VERSED) injection 1-2 mg  1-2 mg IntraVENous Q4H PRN    metroNIDAZOLE (FLAGYL) IVPB premix 500 mg  500 mg IntraVENous Q12H    famotidine (PF) (PEPCID) 20 mg in sodium chloride 0.9 % 10 mL injection  20 mg IntraVENous Q24H    electrolyte-r (NORMOSOL R) infusion   IntraVENous Q1H    influenza vaccine 2017-18 (3 yrs+)(PF) (FLUZONE QUAD/FLUARIX QUAD) injection 0.5 mL  0.5 mL IntraMUSCular PRIOR TO DISCHARGE    dextrose 5 % - 0.45% NaCl infusion  75 mL/hr IntraVENous CONTINUOUS    cefepime (MAXIPIME) 2 g in sterile water (preservative free) 10 mL IV syringe  2 g IntraVENous Q12H    sodium phosphate 9 mmol in dextrose 5% 250 mL infusion   IntraVENous ONCE    NOREPINephrine (LEVOPHED) 8 mg in 5% dextrose 250mL infusion  2-30 mcg/min IntraVENous TITRATE    sodium chloride (NS) flush 5-10 mL  5-10 mL IntraVENous PRN    glucose chewable tablet 16 g  4 Tab Oral PRN    glucagon (GLUCAGEN) injection 1 mg  1 mg IntraMUSCular PRN    dextrose (D50W) injection syrg 12.5-25 g  25-50 mL IntraVENous PRN    insulin regular (NOVOLIN,HUMULIN) 100 units/100 ml NS infusion (premix)  0-50 Units/hr IntraVENous TITRATE    VANCOMYCIN INFORMATION NOTE   Other CONTINUOUS    levETIRAcetam (KEPPRA) 500 mg in saline (iso-osm) 100 ml IVPB  1,000 mg IntraVENous Q12H    chlorhexidine (PERIDEX) 0.12 % mouthwash 10 mL  10 mL Oral Q12H    albuterol-ipratropium (DUO-NEB) 2.5 MG-0.5 MG/3 ML  3 mL Nebulization Q6H PRN    sodium chloride (NS) flush 5-10 mL  5-10 mL IntraVENous Q8H    sodium chloride (NS) flush 5-10 mL  5-10 mL IntraVENous PRN    acetaminophen (TYLENOL) suppository 650 mg  650 mg Rectal V1L PRN    folic acid (FOLVITE) tablet 1 mg  1 mg Per NG tube DAILY       Labs:    Recent Results (from the past 24 hour(s))   CBC WITH AUTOMATED DIFF    Collection Time: 02/07/18  6:03 PM   Result Value Ref Range    WBC 11.9 4.6 - 13.2 K/uL    RBC 3.95 (L) 4.20 - 5.30 M/uL    HGB 11.3 (L) 12.0 - 16.0 g/dL    HCT 46.1 (H) 35.0 - 45.0 %    .7 (H) 74.0 - 97.0 FL    MCH 28.6 24.0 - 34.0 PG    MCHC 24.5 (L) 31.0 - 37.0 g/dL    RDW 13.6 11.6 - 14.5 %    PLATELET 452 226 - 644 K/uL    MPV 12.2 (H) 9.2 - 11.8 FL    NEUTROPHILS 88 (H) 42 - 75 %    BAND NEUTROPHILS 2 0 - 5 %    LYMPHOCYTES 6 (L) 20 - 51 %    MONOCYTES 4 2 - 9 %    EOSINOPHILS 0 0 - 5 %    BASOPHILS 0 0 - 3 %    ABS. NEUTROPHILS 10.7 (H) 1.8 - 8.0 K/UL    ABS. LYMPHOCYTES 0.7 (L) 0.8 - 3.5 K/UL    ABS. MONOCYTES 0.5 0 - 1.0 K/UL    ABS. EOSINOPHILS 0.0 0.0 - 0.4 K/UL    ABS. BASOPHILS 0.0 0.0 - 0.06 K/UL    PLATELET COMMENTS ADEQUATE PLATELETS      RBC COMMENTS NORMOCYTIC, NORMOCHROMIC      DF MANUAL     METABOLIC PANEL, COMPREHENSIVE    Collection Time: 02/07/18  6:03 PM   Result Value Ref Range    Sodium 129 (L) 136 - 145 mmol/L    Potassium 3.3 (L) 3.5 - 5.5 mmol/L    Chloride 90 (L) 100 - 108 mmol/L    CO2 21 21 - 32 mmol/L    Anion gap 18 3.0 - 18 mmol/L    Glucose 2050 (HH) 74 - 99 mg/dL    BUN 45 (H) 7.0 - 18 MG/DL    Creatinine 3.04 (H) 0.6 - 1.3 MG/DL    BUN/Creatinine ratio 15 12 - 20      GFR est AA 19 (L) >60 ml/min/1.73m2    GFR est non-AA 16 (L) >60 ml/min/1.73m2    Calcium 8.7 8.5 - 10.1 MG/DL    Bilirubin, total 0.4 0.2 - 1.0 MG/DL    ALT (SGPT) 37 13 - 56 U/L    AST (SGOT) 20 15 - 37 U/L    Alk.  phosphatase 127 (H) 45 - 117 U/L    Protein, total 6.0 (L) 6.4 - 8.2 g/dL    Albumin 2.6 (L) 3.4 - 5.0 g/dL    Globulin 3.4 2.0 - 4.0 g/dL    A-G Ratio 0.8 0.8 - 1.7     CARDIAC PANEL,(CK, CKMB & TROPONIN)    Collection Time: 02/07/18  6:03 PM   Result Value Ref Range    CK 70 26 - 192 U/L    CK - MB 4.7 (H) <3.6 ng/ml    CK-MB Index 6.7 (H) 0.0 - 4.0 %    Troponin-I, Qt. 0.23 (H) 0.0 - 0.045 NG/ML   POC CHEM8    Collection Time: 02/07/18  6:04 PM   Result Value Ref Range    CO2, POC 19 19 - 24 MMOL/L    BUN, POC 47 (H) 7 - 18 MG/DL    Creatinine, POC 2.1 (H) 0.6 - 1.3 MG/DL    GFRAA, POC 29 (L) >60 ml/min/1.73m2    GFRNA, POC 24 (L) >60 ml/min/1.73m2    Sodium,  (L) 136 - 145 MMOL/L    Potassium, POC 3.3 (L) 3.5 - 5.5 MMOL/L    Calcium, ionized (POC) 1.43 (H) 1.12 - 1.32 MMOL/L    Hematocrit, POC 38 36 - 49 %    Hemoglobin, POC 12.9 12 - 16 G/DL   POC G3    Collection Time: 02/07/18  6:24 PM   Result Value Ref Range    Device: VENT      FIO2 (POC) 100 %    pH (POC) 7.009 (LL) 7.35 - 7.45      pCO2 (POC) 75.5 (H) 35.0 - 45.0 MMHG    pO2 (POC) 285 (H) 80 - 100 MMHG    HCO3 (POC) 19.0 (L) 22 - 26 MMOL/L    sO2 (POC) 100 (H) 92 - 97 %    Base deficit (POC) 13 mmol/L    Mode ASSIST CONTROL      Tidal volume 450 ml    Set Rate 16 bpm    PEEP/CPAP (POC) 5 cmH2O    PIP (POC) 20      Allens test (POC) N/A      Inspiratory Time 90 sec    Total resp.  rate 17      Site RIGHT RADIAL      Specimen type (POC) ARTERIAL      Performed by Ang Taylor     Volume control plus YES     HEMOGLOBIN A1C WITH EAG    Collection Time: 02/07/18  6:24 PM   Result Value Ref Range    Hemoglobin A1c 15.7 (H) 4.2 - 5.6 %    Est. average glucose Cannot be calculated mg/dL   CULTURE, BLOOD    Collection Time: 02/07/18  6:30 PM   Result Value Ref Range    Special Requests: NO SPECIAL REQUESTS      Culture result: NO GROWTH AFTER 12 HOURS     URINALYSIS W/ RFLX MICROSCOPIC    Collection Time: 02/07/18  6:45 PM   Result Value Ref Range    Color YELLOW      Appearance TURBID      Specific gravity >1.030 (H) 1.005 - 1.030    pH (UA) 5.0 5.0 - 8.0      Protein NEGATIVE  NEG mg/dL    Glucose >1000 (A) NEG mg/dL    Ketone 15 (A) NEG mg/dL    Bilirubin NEGATIVE  NEG      Blood MODERATE (A) NEG      Urobilinogen 0.2 0.2 - 1.0 EU/dL    Nitrites POSITIVE (A) NEG      Leukocyte Esterase MODERATE (A) NEG     URINE MICROSCOPIC ONLY    Collection Time: 02/07/18  6:45 PM   Result Value Ref Range    WBC TOO NUMEROUS TO COUNT 0 - 4 /hpf    RBC 20 to 40 0 - 5 /hpf    Epithelial cells NEGATIVE  0 - 5 /lpf    Bacteria 1+ (A) NEG /hpf    Yeast FEW (A) NEG     DRUG SCREEN, URINE    Collection Time: 02/07/18  6:45 PM   Result Value Ref Range    BENZODIAZEPINES NEGATIVE  NEG      BARBITURATES NEGATIVE  NEG      THC (TH-CANNABINOL) NEGATIVE  NEG      OPIATES NEGATIVE  NEG      PCP(PHENCYCLIDINE) NEGATIVE  NEG      COCAINE NEGATIVE  NEG      AMPHETAMINES NEGATIVE  NEG      METHADONE NEGATIVE  NEG      HDSCOM (NOTE)    POC LACTIC ACID    Collection Time: 02/07/18  6:55 PM   Result Value Ref Range    Lactic Acid (POC) 4.7 (HH) 0.4 - 2.0 mmol/L   CULTURE, BLOOD    Collection Time: 02/07/18  6:56 PM   Result Value Ref Range    Special Requests: NO SPECIAL REQUESTS      GRAM STAIN AEROBIC BOTTLE GRAM POSITIVE COCCI IN GROUPS      GRAM STAIN        SMEAR CALLED TO AND CORRECTLY REPEATED BY: Milka Bailon RN 1520PM 2/8/18 TO B    Culture result: CULTURE IN PROGRESS,FURTHER UPDATES TO FOLLOW     GLUCOSTABILIZER    Collection Time: 02/07/18  7:08 PM   Result Value Ref Range    Glucose 999 mg/dL    Insulin order 18.8 units/hour    Insulin adminstered 18.8 units/hour    Multiplier 0.020     Low target 140 mg/dL    High target 180 mg/dL    D50 order 0.0 ml    D50 administered 0.00 ml    Minutes until next BG 60 min    Order initials mfd     Administered initials mfd    GLUCOSE, POC    Collection Time: 02/07/18  8:13 PM   Result Value Ref Range    Glucose (POC) >600 (HH) 70 - 110 mg/dL   GLUCOSE, POC    Collection Time: 02/07/18  8:24 PM   Result Value Ref Range    Glucose (POC) >600 (HH) 70 - 110 mg/dL   GLUCOSTABILIZER    Collection Time: 02/07/18  8:25 PM   Result Value Ref Range    Glucose 601 mg/dL    Insulin order 5.4 units/hour    Insulin adminstered 5.4 units/hour    Multiplier 0.010     Low target 140 mg/dL    High target 180 mg/dL    D50 order 0.0 ml    D50 administered 0.00 ml    Minutes until next BG 60 min    Order initials mfd     Administered initials mfd    EKG, 12 LEAD, INITIAL    Collection Time: 02/07/18  9:18 PM   Result Value Ref Range    Ventricular Rate 83 BPM    Atrial Rate 83 BPM    P-R Interval 168 ms    QRS Duration 104 ms    Q-T Interval 508 ms    QTC Calculation (Bezet) 596 ms    Calculated P Axis 70 degrees    Calculated R Axis -38 degrees    Calculated T Axis 6 degrees    Diagnosis       Normal sinus rhythm  Possible Left atrial enlargement  Left axis deviation  Inferior infarct , age undetermined  Prolonged QT  Abnormal ECG  When compared with ECG of 04-FEB-2018 15:35,  Non-specific change in ST segment in Inferior leads  ST now depressed in Anterolateral leads  T wave inversion now evident in Inferior leads  QT has lengthened     GLUCOSE, POC    Collection Time: 02/07/18  9:33 PM   Result Value Ref Range    Glucose (POC) >600 (HH) 70 - 110 mg/dL   GLUCOSE, POC    Collection Time: 02/07/18  9:35 PM   Result Value Ref Range    Glucose (POC) >600 (HH) 70 - 110 mg/dL   GLUCOSTABILIZER    Collection Time: 02/07/18  9:37 PM   Result Value Ref Range    Glucose 602 mg/dL    Insulin order 10.8 units/hour    Insulin adminstered 10.8 units/hour    Multiplier 0.020     Low target 140 mg/dL    High target 180 mg/dL    D50 order 0.0 ml    D50 administered 0.00 ml    Minutes until next BG 60 min    Order initials mfd     Administered initials mfd    HGB & HCT    Collection Time: 02/07/18  9:47 PM   Result Value Ref Range    HGB 12.5 12.0 - 16.0 g/dL    HCT 44.7 35.0 - 45.0 %   PTT    Collection Time: 02/07/18  9:47 PM   Result Value Ref Range    aPTT 24.8 23.0 - 36.4 SEC   PHOSPHORUS    Collection Time: 02/07/18  9:47 PM   Result Value Ref Range    Phosphorus 0.8 (L) 2.5 - 4.9 MG/DL   NT-PRO BNP    Collection Time: 02/07/18  9:47 PM   Result Value Ref Range    NT pro-BNP 5306 (H) 0 - 687 PG/ML   METABOLIC PANEL, COMPREHENSIVE    Collection Time: 02/07/18  9:47 PM   Result Value Ref Range    Sodium 132 (L) 136 - 145 mmol/L    Potassium 2.1 (LL) 3.5 - 5.5 mmol/L    Chloride 100 100 - 108 mmol/L    CO2 22 21 - 32 mmol/L    Anion gap 10 3.0 - 18 mmol/L    Glucose 1731 (HH) 74 - 99 mg/dL    BUN 45 (H) 7.0 - 18 MG/DL    Creatinine 2.92 (H) 0.6 - 1.3 MG/DL    BUN/Creatinine ratio 15 12 - 20      GFR est AA 20 (L) >60 ml/min/1.73m2    GFR est non-AA 16 (L) >60 ml/min/1.73m2    Calcium 8.6 8.5 - 10.1 MG/DL    Bilirubin, total 0.4 0.2 - 1.0 MG/DL    ALT (SGPT) 39 13 - 56 U/L    AST (SGOT) 27 15 - 37 U/L    Alk. phosphatase 133 (H) 45 - 117 U/L    Protein, total 6.3 (L) 6.4 - 8.2 g/dL    Albumin 2.6 (L) 3.4 - 5.0 g/dL    Globulin 3.7 2.0 - 4.0 g/dL    A-G Ratio 0.7 (L) 0.8 - 1.7     MAGNESIUM    Collection Time: 02/07/18  9:47 PM   Result Value Ref Range    Magnesium 2.5 1.6 - 2.6 mg/dL   POC LACTIC ACID    Collection Time: 02/07/18  9:55 PM   Result Value Ref Range    Lactic Acid (POC) 2.7 (HH) 0.4 - 2.0 mmol/L   POC G3    Collection Time: 02/07/18 10:27 PM   Result Value Ref Range    Device: VENT      FIO2 (POC) 60 %    pH (POC) 7.220 (LL) 7.35 - 7.45      pCO2 (POC) 42.0 35.0 - 45.0 MMHG    pO2 (POC) 67 (L) 80 - 100 MMHG    HCO3 (POC) 17.2 (L) 22 - 26 MMOL/L    sO2 (POC) 89 (L) 92 - 97 %    Base deficit (POC) 10 mmol/L    Mode ASSIST CONTROL      Tidal volume 450 ml    Set Rate 22 bpm    PEEP/CPAP (POC) 5 cmH2O    PIP (POC) 19      Allens test (POC) YES      Total resp.  rate 22      Site RIGHT RADIAL      Specimen type (POC) ARTERIAL      Performed by Beatriz Kahn     Volume control plus YES     GLUCOSE, POC    Collection Time: 02/07/18 10:56 PM   Result Value Ref Range    Glucose (POC) >600 (HH) 70 - 110 mg/dL   GLUCOSE, POC    Collection Time: 02/07/18 10:57 PM   Result Value Ref Range    Glucose (POC) >600 (HH) 70 - 110 mg/dL   GLUCOSTABILIZER    Collection Time: 02/07/18 11:06 PM   Result Value Ref Range    Glucose 603 mg/dL    Insulin order 16.3 units/hour    Insulin adminstered 16.3 units/hour    Multiplier 0.030     Low target 140 mg/dL    High target 180 mg/dL    D50 order 0.0 ml    D50 administered 0.00 ml    Minutes until next BG 60 min    Order initials mfd     Administered initials mfd    POC LACTIC ACID    Collection Time: 02/08/18 12:21 AM   Result Value Ref Range    Lactic Acid (POC) 3.9 (HH) 0.4 - 2.0 mmol/L   GLUCOSE, POC    Collection Time: 02/08/18 12:21 AM   Result Value Ref Range    Glucose (POC) >600 (HH) 70 - 110 mg/dL   CARDIAC PANEL,(CK, CKMB & TROPONIN)    Collection Time: 02/08/18 12:40 AM   Result Value Ref Range     26 - 192 U/L    CK - MB 9.9 (H) <3.6 ng/ml    CK-MB Index 6.9 (H) 0.0 - 4.0 %    Troponin-I, Qt. 0.28 (H) 0.0 - 0.045 NG/ML   POC LACTIC ACID    Collection Time: 02/08/18 12:42 AM   Result Value Ref Range    Lactic Acid (POC) 5.0 (HH) 0.4 - 2.0 mmol/L   GLUCOSE, POC    Collection Time: 02/08/18  1:31 AM   Result Value Ref Range    Glucose (POC) >600 (HH) 70 - 110 mg/dL   GLUCOSE, POC    Collection Time: 02/08/18  4:01 AM   Result Value Ref Range    Glucose (POC) >600 (HH) 70 - 110 mg/dL   GLUCOSE, POC    Collection Time: 02/08/18  4:53 AM   Result Value Ref Range    Glucose (POC) >600 (HH) 70 - 110 mg/dL   PROTHROMBIN TIME + INR    Collection Time: 02/08/18  5:00 AM   Result Value Ref Range    Prothrombin time 15.8 (H) 11.5 - 15.2 sec    INR 1.3 (H) 0.8 - 1.2     METABOLIC PANEL, BASIC    Collection Time: 02/08/18  5:00 AM   Result Value Ref Range    Sodium 145 136 - 145 mmol/L    Potassium 2.5 (LL) 3.5 - 5.5 mmol/L    Chloride 114 (H) 100 - 108 mmol/L    CO2 22 21 - 32 mmol/L    Anion gap 9 3.0 - 18 mmol/L    Glucose 1084 (HH) 74 - 99 mg/dL    BUN 40 (H) 7.0 - 18 MG/DL    Creatinine 2.37 (H) 0.6 - 1.3 MG/DL    BUN/Creatinine ratio 17 12 - 20      GFR est AA 25 (L) >60 ml/min/1.73m2    GFR est non-AA 21 (L) >60 ml/min/1.73m2 Calcium 8.7 8.5 - 10.1 MG/DL   PHOSPHORUS    Collection Time: 02/08/18  5:00 AM   Result Value Ref Range    Phosphorus 1.0 (L) 2.5 - 4.9 MG/DL   LACTIC ACID    Collection Time: 02/08/18  5:00 AM   Result Value Ref Range    Lactic acid 6.1 (HH) 0.4 - 2.0 MMOL/L   MAGNESIUM    Collection Time: 02/08/18  5:00 AM   Result Value Ref Range    Magnesium 2.4 1.6 - 2.6 mg/dL   TSH 3RD GENERATION    Collection Time: 02/08/18  5:00 AM   Result Value Ref Range    TSH 1.06 0.36 - 3.74 uIU/mL   CARDIAC PANEL,(CK, CKMB & TROPONIN)    Collection Time: 02/08/18  5:00 AM   Result Value Ref Range     26 - 192 U/L    CK - MB 10.6 (H) <3.6 ng/ml    CK-MB Index 7.7 (H) 0.0 - 4.0 %    Troponin-I, Qt. 0.29 (H) 0.0 - 0.045 NG/ML   POC G3    Collection Time: 02/08/18  5:16 AM   Result Value Ref Range    Device: VENT      FIO2 (POC) 70 %    pH (POC) 7.218 (LL) 7.35 - 7.45      pCO2 (POC) 38.3 35.0 - 45.0 MMHG    pO2 (POC) 115 (H) 80 - 100 MMHG    HCO3 (POC) 15.6 (L) 22 - 26 MMOL/L    sO2 (POC) 98 (H) 92 - 97 %    Base deficit (POC) 11 mmol/L    Mode ASSIST CONTROL      Tidal volume 450 ml    Set Rate 22 bpm    PEEP/CPAP (POC) 5 cmH2O    Allens test (POC) YES      Total resp. rate 22      Site LEFT RADIAL      Patient temp.  37.0      Specimen type (POC) ARTERIAL      Performed by Yoanna Dominguez     Volume control plus YES     GLUCOSE, POC    Collection Time: 02/08/18  7:23 AM   Result Value Ref Range    Glucose (POC) >600 (HH) 70 - 110 mg/dL   GLUCOSE, POC    Collection Time: 02/08/18  8:06 AM   Result Value Ref Range    Glucose (POC) >600 (HH) 70 - 110 mg/dL   GLUCOSE, POC    Collection Time: 02/08/18  9:13 AM   Result Value Ref Range    Glucose (POC) 583 (HH) 70 - 110 mg/dL   GLUCOSTABILIZER    Collection Time: 02/08/18  9:15 AM   Result Value Ref Range    Glucose 583 mg/dL    Insulin order 15.7 units/hour    Insulin adminstered 15.7 units/hour    Multiplier 0.030     Low target 140 mg/dL    High target 180 mg/dL    D50 order 0.0 ml D50 administered 0.00 ml    Minutes until next BG 60 min    Order initials bm     Administered initials bm    GLUCOSE, POC    Collection Time: 02/08/18 10:47 AM   Result Value Ref Range    Glucose (POC) >600 (HH) 70 - 110 mg/dL   GLUCOSTABILIZER    Collection Time: 02/08/18 10:51 AM   Result Value Ref Range    Glucose 600 mg/dL    Insulin order 21.6 units/hour    Insulin adminstered 21.6 units/hour    Multiplier 0.040     Low target 140 mg/dL    High target 180 mg/dL    D50 order 0.0 ml    D50 administered 0.00 ml    Minutes until next BG 60 min    Order initials bm     Administered initials bm    CULTURE, RESPIRATORY/SPUTUM/BRONCH W GRAM STAIN    Collection Time: 02/08/18 11:30 AM   Result Value Ref Range    Special Requests: NO SPECIAL REQUESTS      GRAM STAIN MODERATE WBC'S      GRAM STAIN FEW GRAM POSITIVE COCCI      GRAM STAIN MODERATE YEAST      Culture result: PENDING    INFLUENZA A & B AG (RAPID TEST)    Collection Time: 02/08/18 11:38 AM   Result Value Ref Range    Influenza A Antigen NEGATIVE  NEG      Influenza B Antigen NEGATIVE  NEG     OCCULT BLOOD, GASTRIC    Collection Time: 02/08/18 11:38 AM   Result Value Ref Range    OCCULT BLOOD,GASTRIC POSITIVE (A) NEG      pH,GASTRIC 3 1.5 - 3.5     GLUCOSE, POC    Collection Time: 02/08/18 12:03 PM   Result Value Ref Range    Glucose (POC) 464 (HH) 70 - 110 mg/dL   GLUCOSTABILIZER    Collection Time: 02/08/18 12:04 PM   Result Value Ref Range    Glucose 464 mg/dL    Insulin order 12.1 units/hour    Insulin adminstered 10.0 units/hour    Multiplier 0.030     Low target 140 mg/dL    High target 180 mg/dL    D50 order 0.0 ml    D50 administered 0.00 ml    Minutes until next BG 60 min    Order initials bm     Administered initials bm    METABOLIC PANEL, BASIC    Collection Time: 02/08/18  1:13 PM   Result Value Ref Range    Sodium 149 (H) 136 - 145 mmol/L    Potassium 4.3 3.5 - 5.5 mmol/L    Chloride 119 (H) 100 - 108 mmol/L    CO2 24 21 - 32 mmol/L    Anion gap 6 3.0 - 18 mmol/L    Glucose 345 (H) 74 - 99 mg/dL    BUN 31 (H) 7.0 - 18 MG/DL    Creatinine 1.64 (H) 0.6 - 1.3 MG/DL    BUN/Creatinine ratio 19 12 - 20      GFR est AA 38 (L) >60 ml/min/1.73m2    GFR est non-AA 32 (L) >60 ml/min/1.73m2    Calcium 8.2 (L) 8.5 - 10.1 MG/DL   PHOSPHORUS    Collection Time: 02/08/18  1:13 PM   Result Value Ref Range    Phosphorus 1.5 (L) 2.5 - 4.9 MG/DL   MAGNESIUM    Collection Time: 02/08/18  1:13 PM   Result Value Ref Range    Magnesium 1.9 1.6 - 2.6 mg/dL   GLUCOSE, POC    Collection Time: 02/08/18  1:21 PM   Result Value Ref Range    Glucose (POC) 356 (H) 70 - 110 mg/dL   GLUCOSTABILIZER    Collection Time: 02/08/18  1:22 PM   Result Value Ref Range    Glucose 356 mg/dL    Insulin order 5.9 units/hour    Insulin adminstered 5.9 units/hour    Multiplier 0.020     Low target 140 mg/dL    High target 180 mg/dL    D50 order 0.0 ml    D50 administered 0.00 ml    Minutes until next BG 60 min    Order initials bm     Administered initials bm    GLUCOSE, POC    Collection Time: 02/08/18  2:27 PM   Result Value Ref Range    Glucose (POC) 272 (H) 70 - 110 mg/dL   GLUCOSTABILIZER    Collection Time: 02/08/18  2:28 PM   Result Value Ref Range    Glucose 272 mg/dL    Insulin order 2.1 units/hour    Insulin adminstered 2.1 units/hour    Multiplier 0.010     Low target 140 mg/dL    High target 180 mg/dL    D50 order 0.0 ml    D50 administered 0.00 ml    Minutes until next BG 60 min    Order initials bm     Administered initials bm    GLUCOSE, POC    Collection Time: 02/08/18  3:35 PM   Result Value Ref Range    Glucose (POC) 223 (H) 70 - 110 mg/dL   GLUCOSTABILIZER    Collection Time: 02/08/18  3:37 PM   Result Value Ref Range    Glucose 223 mg/dL    Insulin order 3.3 units/hour    Insulin adminstered 3.3 units/hour    Multiplier 0.020     Low target 140 mg/dL    High target 180 mg/dL    D50 order 0.0 ml    D50 administered 0.00 ml    Minutes until next BG 60 min    Order initials bm Administered initials bm    LACTIC ACID    Collection Time: 02/08/18  3:52 PM   Result Value Ref Range    Lactic acid 3.0 (HH) 0.4 - 2.0 MMOL/L   METABOLIC PANEL, BASIC    Collection Time: 02/08/18  3:52 PM   Result Value Ref Range    Sodium 151 (H) 136 - 145 mmol/L    Potassium 4.4 3.5 - 5.5 mmol/L    Chloride 120 (H) 100 - 108 mmol/L    CO2 25 21 - 32 mmol/L    Anion gap 6 3.0 - 18 mmol/L    Glucose 230 (H) 74 - 99 mg/dL    BUN 29 (H) 7.0 - 18 MG/DL    Creatinine 1.53 (H) 0.6 - 1.3 MG/DL    BUN/Creatinine ratio 19 12 - 20      GFR est AA 42 (L) >60 ml/min/1.73m2    GFR est non-AA 34 (L) >60 ml/min/1.73m2    Calcium 8.2 (L) 8.5 - 10.1 MG/DL   PHOSPHORUS    Collection Time: 02/08/18  3:52 PM   Result Value Ref Range    Phosphorus 1.4 (L) 2.5 - 4.9 MG/DL   MAGNESIUM    Collection Time: 02/08/18  3:52 PM   Result Value Ref Range    Magnesium 1.9 1.6 - 2.6 mg/dL   GLUCOSE, POC    Collection Time: 02/08/18  4:51 PM   Result Value Ref Range    Glucose (POC) 219 (H) 70 - 110 mg/dL   GLUCOSTABILIZER    Collection Time: 02/08/18  4:52 PM   Result Value Ref Range    Glucose 219 mg/dL    Insulin order 4.8 units/hour    Insulin adminstered 4.8 units/hour    Multiplier 0.030     Low target 140 mg/dL    High target 180 mg/dL    D50 order 0.0 ml    D50 administered 0.00 ml    Minutes until next BG 60 min    Order initials bm     Administered initials bm        Signed By: Kaleigh Bruce MD     February 8, 2018

## 2018-02-08 NOTE — DIABETES MGMT
GLYCEMIC CONTROL PLAN OF CARE     Assessment/Recommendations:  Pt discussed in interdisciplinary rounds. Pt is a 58year old female with a past medical history significant for type 2 diabetes, neuropathy, emphysema, hypertension, and Hep C, who presented with AMS and initial glucose of 2050 mg/dL. Pt remains on insulin drip at this time with hourly glucose monitoring. Currently NPO. Pt is receiving 1/2NS with KCl 20 mEq/L at 125 mL/hr. Most recent blood glucose values:  2/8/2018 08:06 2/8/2018 09:13 2/8/2018 10:47 2/8/2018 12:03 2/8/2018 13:21   >600 (HH) 583 (HH) >600 (HH) 464 (HH) 356 (H)     Current A1C of 15.7% is equivalent to average blood glucose of 404 mg/dl over the past 2-3 months. Current hospital diabetes medications:   GlucoStabilizer insulin drip, current rate is 5.9 units/hour    Home diabetes medications:  Glimepiride 2 mg   Janumet   Insulin?     Diet:  NPO    Education:  ____Refer to Diabetes Education Record             __x__Education not indicated at this time, will follow up when appropriate       Derek Bliss RD, CDE

## 2018-02-08 NOTE — CONSULTS
Consult Note  Consult requested by: Dr. aTlley Found is a 58 y.o. female [address] who is being seen on consult for LUIS ANTONIO, hypernatremia   Chief Complaint   Patient presents with    Respiratory Distress   59y F admitted for altered mental status, seizure, uncontrolled blood sugar, consulted for LUIS ANTONIO, hypernatremia   Impression & Plan:   IMPRESSION:   Acute kidney injury ; pre renal, multiple risk factors including, hypotension, severe hyperglycemia, , improving creatinine with hydration   Hypernatremia, dehydration ,   Metabolic acidosis on blood gas, elevated lactate as well renal failure, She was also on metformin per chart  Respiratory failure, intubated, no difficulty in ventiation   Hypotension, on vasopressure  Seizure  Hypokalemia, better     PLAN:    Her hypernatremia and renal failure is seems improving once we correct hyperglycemia . Agree with Dr. Ranjith Elizabeth, not good candidate for D5, continue half NS  with current rate , monitor sodium and urine output. Keep MAP > 60  Monitor lactate closely as patient was on metfromin. Adjust all meds per current renal function status. Thank you very much for allowing me to participate in the care of this patient. We will continue to monitor with you and make recommendations as needed. Discussed with ICU team.        Admission diagnosis: Respiratory failure ([address]   Intubated unable to provide history , major part obtained by chart review and discussion with primary team.   HPI:  59y F with PMH uncontrolled DM, brought to ER by EMS for altered mentals status. She had severe hyperglycemia, pseudo hyponatremia , LUIS ANTONIO, hypotension. She was intubated, start on pressure and admitted to ICU for further care. Nephrology service consulted today for hypernatremia and LUIS ANTONIO. During my evaluation She remain intubated, decent urine output in means. Requiring low dose vasopressure.          Past Medical History:   Diagnosis Date    Arthritis     Asthma     Chronic pain     BACK PAIN    Diabetes ([facility] [address]     Diabetic neuropathy (HCC)     Emphysema     Hepatitis C     Hypertension     Nervousness     Osteoarthritis of both knees       Past Surgical History:   Procedure Laterality Date    HX CARPAL TUNNEL RELEASE Right 8/31/09    Dr. Keshawn Currie    HX TUBAL LIGATION         Social History     Social History    Marital status: UNKNOWN     Spouse name: N/A    Number of children: N/A    Years of education: N/A     Occupational History    Not on file. Social History Main Topics    Smoking status: Current Every Day Smoker     Packs/day: 1.00    Smokeless tobacco: Not on file    Alcohol use Yes      Comment: socially    Drug use: Yes     Special: Cocaine    Sexual activity: No     Other Topics Concern    Not on file     Social History Narrative       Family History   Problem Relation Age of Onset    Diabetes Mother     Hypertension Mother     Obesity Mother     Alcohol abuse Father     High Cholesterol Father     Lung Disease Father     Stroke Father     Arthritis-osteo Sister     Depression Sister     Diabetes Sister     Hypertension Sister     Obesity Sister     Arthritis-osteo Brother     Diabetes Brother     Hypertension Brother     Obesity Brother      No Known Allergies     Home Medications:     Prior to Admission Medications   Prescriptions Last Dose Informant Patient Reported? Taking? HYDROcodone-acetaminophen (NORCO) 5-325 mg per tablet   No No   Sig: Take 1-2 Tabs by mouth every four (4) hours as needed for Pain. Max Daily Amount: 12 Tabs. Melatonin 300 mcg Tab   Yes No   Sig: Take  by mouth. VITAMIN E (FORMULA E 400 PO)   Yes No   Sig: Take 1 Tab by mouth daily. amLODIPine (NORVASC) 5 mg tablet   Yes No   Sig: Take 5 mg by mouth daily. Indications: HYPERTENSION   aspirin (ASPIRIN) 325 mg tablet   Yes No   Sig: Take 325 mg by mouth daily.      cyanocobalamin (VITAMIN B-12) 1,500 mcg TbER   Yes No Sig: Take 1 Tab by mouth.     gabapentin (NEURONTIN) 600 mg tablet   Yes No   Sig: Take  by mouth three (3) times daily. glimepiride (AMARYL) 2 mg tablet   Yes No   Sig: Take 2 mg by mouth.     guaiFENesin-codeine (ROBITUSSIN AC) 100-10 mg/5 mL solution   No No   Sig: Take 5 mL by mouth three (3) times daily as needed for Cough. Max Daily Amount: 15 mL. losartan (COZAAR) 50 mg tablet   Yes No   Sig: Take  by mouth daily. Indications: HYPERTENSION   sitaGLIPtin-metFORMIN (JANUMET)  mg per tablet   Yes No   Sig: Take 1 Tab by mouth. tamsulosin (FLOMAX) 0.4 mg capsule   No No   Sig: Take 1 tab by mouth daily until kidney stone passes.       Facility-Administered Medications: None       Current Facility-Administered Medications   Medication Dose Route Frequency    heparin (porcine) injection 5,000 Units  5,000 Units SubCUTAneous Q8H    thiamine (B-1) injection 100 mg  100 mg IntraMUSCular DAILY    potassium chloride (KLOR-CON) packet 40 mEq  40 mEq Oral NOW    fentaNYL citrate (PF) injection 25-50 mcg  25-50 mcg IntraVENous Q4H PRN    midazolam (VERSED) injection 1-2 mg  1-2 mg IntraVENous Q4H PRN    metroNIDAZOLE (FLAGYL) IVPB premix 500 mg  500 mg IntraVENous Q12H    famotidine (PF) (PEPCID) 20 mg in sodium chloride 0.9 % 10 mL injection  20 mg IntraVENous Q24H    levETIRAcetam (KEPPRA) 4,000 mg in 0.9% sodium chloride 250 mL IVPB  4,000 mg IntraVENous ONCE    electrolyte-r (NORMOSOL R) infusion   IntraVENous Q1H    influenza vaccine 2017-18 (3 yrs+)(PF) (FLUZONE QUAD/FLUARIX QUAD) injection 0.5 mL  0.5 mL IntraMUSCular PRIOR TO DISCHARGE    NOREPINephrine (LEVOPHED) 8 mg in 5% dextrose 250mL infusion  2-30 mcg/min IntraVENous TITRATE    sodium chloride (NS) flush 5-10 mL  5-10 mL IntraVENous PRN    glucose chewable tablet 16 g  4 Tab Oral PRN    glucagon (GLUCAGEN) injection 1 mg  1 mg IntraMUSCular PRN    dextrose (D50W) injection syrg 12.5-25 g  25-50 mL IntraVENous PRN    insulin regular (NOVOLIN,HUMULIN) 100 units/100 ml NS infusion (premix)  0-50 Units/hr IntraVENous TITRATE    VANCOMYCIN INFORMATION NOTE   Other CONTINUOUS    cefepime (MAXIPIME) 2 g in sterile water (preservative free) 10 mL IV syringe  2 g IntraVENous Q24H    levETIRAcetam (KEPPRA) 500 mg in saline (iso-osm) 100 ml IVPB  1,000 mg IntraVENous Q12H    chlorhexidine (PERIDEX) 0.12 % mouthwash 10 mL  10 mL Oral Q12H    albuterol-ipratropium (DUO-NEB) 2.5 MG-0.5 MG/3 ML  3 mL Nebulization Q6H PRN    sodium chloride (NS) flush 5-10 mL  5-10 mL IntraVENous Q8H    sodium chloride (NS) flush 5-10 mL  5-10 mL IntraVENous PRN    acetaminophen (TYLENOL) suppository 650 mg  650 mg Rectal Q6H PRN    0.45% sodium chloride with KCl 20 mEq/L infusion   IntraVENous CONTINUOUS    folic acid (FOLVITE) tablet 1 mg  1 mg Per NG tube DAILY       Review of Systems:     Not able to provide   Data Review:    Labs: Results:       Chemistry Recent Labs      02/08/18   1313  02/08/18   0500  02/07/18 2147  02/07/18   1803   GLU  345*  1084*  1731*  2050*   NA  149*  145  132*  129*   K  4.3  2.5*  2.1*  3.3*   CL  119*  114*  100  90*   CO2  24  22  22  21   BUN  31*  40*  45*  45*   CREA  1.64*  2.37*  2.92*  3.04*   CA  8.2*  8.7  8.6  8.7   AGAP  6  9  10  18   BUCR  19  17  15  15   AP   --    --   133*  127*   TP   --    --   6.3*  6.0*   ALB   --    --   2.6*  2.6*   GLOB   --    --   3.7  3.4   AGRAT   --    --   0.7*  0.8      CBC w/Diff Recent Labs      02/07/18   2147  02/07/18   1803   WBC   --   11.9   RBC   --   3.95*   HGB  12.5  11.3*   HCT  44.7  46.1*   PLT   --   262   GRANS   --   88*   LYMPH   --   6*   EOS   --   0      Coagulation Recent Labs      02/08/18   0500  02/07/18   2147   PTP  15.8*   --    INR  1.3*   --    APTT   --   24.8       Iron/Ferritin No results for input(s): IRON in the last 72 hours. No lab exists for component: TIBCCALC   BNP No results for input(s): BNPP in the last 72 hours. Cardiac Enzymes Recent Labs      02/08/18   0500  02/08/18   0040   CPK  137  143   CKND1  7.7*  6.9*      Liver Enzymes Recent Labs      02/07/18   2147   TP  6.3*   ALB  2.6*   AP  133*   SGOT  27      Thyroid Studies Lab Results   Component Value Date/Time    TSH 1.06 02/08/2018 05:00 AM         EKG: sinus .     Physical Assessment:     Visit Vitals    /61    Pulse 97    Temp 98.8 °F (37.1 °C)    Resp 22    Wt 68.1 kg (150 lb 2.1 oz)    SpO2 100%    Breastfeeding No    BMI 23.51 kg/m2     Weight change:     Intake/Output Summary (Last 24 hours) at 02/08/18 1513  Last data filed at 02/08/18 1200   Gross per 24 hour   Intake          2431.79 ml   Output             4225 ml   Net         -1793.21 ml     Physical Exam:   General: intubated   HEENT no thyromegaly  CVS: S1S2 heard,  no rub  RS: + air entry b/l,   Abd: Soft, Non tender,   Neuro: intubated  Extrm: no edema,   Skin: no visible  Rash  Musculoskeletal: No gross joints or bone deformities     Procedures/imaging: see electronic medical records for all procedures, Xrays and details which were not copied into this note but were reviewed prior to creation of Mary López MD  February 8, 2018  [facility]  Office [phone]

## 2018-02-08 NOTE — CDMP QUERY
Please clarify if this patient is being treated/managed for:    =>Metabolic Encephalopathy in the setting of unresponsiveness, hypotensive, hyperglycemic  =>Other Explanation of clinical findings  =>Unable to Determine (no explanation of clinical findings)    The medical record reflects the following:    Risk:Older patient with chronic illness, hx of drug abuse    Clinical Indicators:ED provider noted \" presents with acute onset altered mental status 30 minutes ago with a EMS finger glucose reading of High (600+). Per EMS the pt was last seen normal at noon, pt was talking, when EMS arrived pt was only responsive to painful stimulus\"  Blood glucose=2050  UDS 2/4(+)cocaine    Treatment: ICU care, intubated due to Acute respiratory failure    Please clarify and document your clinical opinion in the progress notes and discharge summary including the definitive and/or presumptive diagnosis, (suspected or probable), related to the above clinical findings. Please include clinical findings supporting your diagnosis. If you DECLINE this query or would like to communicate with [facility], please utilize the \"[facility] message box\" at the TOP of the Progress Note on the right.       Thank you,  Renuka Santiago RN [facility]  [phone]

## 2018-02-08 NOTE — PROGRESS NOTES
[facility]  ICU Progress Note      Name: Santi Hathaway   : 1955   MRN: [id number]   Date: 2018      [x]I have reviewed the flowsheet and previous days notes. Events overnight reviewed and discussed with nursing staff. Vital signs and records reviewed. Subjective:    Aurelia Kulkarni is a 58 y.o. female with a history of poorly controlled DM, hep C, HTN, asthma, and emphysema who presented to the ED  for evaluation of AMS. En route by EMS, patient deteriorated into respiratory arrest and required ventilation via BVM. In the ED patient began vomiting dark emesis and was subsequently intubated. Concern for aspiration of emesis. 2 witnessed seizures were reported in the ED. Labs revealed an elevated glucose at 2050, hypokalemia, hypernatremia and a respiratory acidosis. Patient was started on glucostablizer and aggressive replacement of electrolytes initiated. 18  Pt is on mechanical ventilator support. ETT secretions appear dark brown similar to the NGT output. She was sedated on intubation and was on fentanyl infusion until this morning when it was discontinued because she remained unresponsive with a GCS of 6 (E1, V1, M4). RN had reported myoclonic jerking but has since then resolved without occurrence of seizure activity. Mildly intermittently tachycardic overnight however has improved. Also started on levophed on admission but also improved after multiple IVF boluses. Pt received 4L NS in ED; and in ICU, fluid resuscitation continued with 4L LR and 4L Normosol. Afebrile overnight, Tmax of 99.7  Has elevated urine output (345 mL/hr). [x]The patient is unable to give any meaningful history or review of systems because the patient is:  [x]Intubated []Sedated   [x]Unresponsive      [x]The patient is critically ill on      [x]Mechanical ventilation []Pressors   []BiPAP []                 ROS:Review of systems not obtained due to patient factors. Medication Review:  · Pressors - levophed  · Sedation - none  · Antibiotics - vancomycin, cefepime and flagyl  · Pain - fentanyl  · GI/ DVT - prevacid, heparin/SCDs  · Others (other gtts)    Safety Bundles: VAP Bundle/ CAUTI/ Severe Sepsis Protocol/ Electrolyte Replacement Protocol    Vital Signs:    Visit Vitals    /61    Pulse 94    Temp 98.6 °F (37 °C)    Resp (!) 0    Wt 68.1 kg (150 lb 2.1 oz)    SpO2 100%    Breastfeeding No    BMI 23.51 kg/m2       O2 Device: Ventilator, Endotracheal tube       Temp (24hrs), Av.8 °F (37.1 °C), Min:97.8 °F (36.6 °C), Max:99.7 °F (37.6 °C)       Intake/Output:   Last shift:      701 - 1900  In: -   Out: 425 [Urine:425]  Last 3 shifts:  190 -  0700  In: 2431.8 [I.V.:2431.8]  Out: 3800 [Urine:1600]    Intake/Output Summary (Last 24 hours) at 18 1729  Last data filed at 18 1200   Gross per 24 hour   Intake          2431.79 ml   Output             4225 ml   Net         -1793.21 ml       Ventilator Settings:  Ventilator Mode: Assist control, VC+  Respiratory Rate  Back-Up Rate: 22  Insp Time (sec): 1 sec  I:E Ratio: 1:2.0  Ventilator Volumes  Vt Set (ml): 450 ml  Vt Exhaled (Machine Breath) (ml): 473 ml  Ve Observed (l/min): 9.73 l/min  Ventilator Pressures  PIP Observed (cm H2O): 18 cm H2O  Plateau Pressure (cm H2O): 12 cm H2O  MAP (cm H2O): 13  PEEP/VENT (cm H2O): 5 cm H20      Physical Exam:    General: Intubated, responsive to painful stimulation  HEENT:  Anicteric sclerae; pink palpebral conjunctivae; oral mucosa moist; neck supple; trachea midline  Resp:  Symmetrical chest expansion, no accessory muscle use; coarse rhonchi bilaterally  CV:  S1, S2 present; regular rate and rhythm  GI:  Abdomen soft, + active bowel sounds  Extremities:  +2 pulses on all extremities; trace pitting edema bilateral lower extremities  Skin:  Warm; no rashes/ lesions noted  Neurologic:  Moves all four extremities spontaneously.  Withdraws from pain. Does not follow commands.   Devices:    2/7/18: RIJ CVC, ETT, OGT, means cath       DATA:     Current Facility-Administered Medications   Medication Dose Route Frequency    heparin (porcine) injection 5,000 Units  5,000 Units SubCUTAneous Q8H    thiamine (B-1) injection 100 mg  100 mg IntraMUSCular DAILY    potassium chloride (KLOR-CON) packet 40 mEq  40 mEq Oral NOW    fentaNYL citrate (PF) injection 25-50 mcg  25-50 mcg IntraVENous Q4H PRN    midazolam (VERSED) injection 1-2 mg  1-2 mg IntraVENous Q4H PRN    metroNIDAZOLE (FLAGYL) IVPB premix 500 mg  500 mg IntraVENous Q12H    famotidine (PF) (PEPCID) 20 mg in sodium chloride 0.9 % 10 mL injection  20 mg IntraVENous Q24H    electrolyte-r (NORMOSOL R) infusion   IntraVENous Q1H    influenza vaccine 2017-18 (3 yrs+)(PF) (FLUZONE QUAD/FLUARIX QUAD) injection 0.5 mL  0.5 mL IntraMUSCular PRIOR TO DISCHARGE    dextrose 5 % - 0.45% NaCl infusion  75 mL/hr IntraVENous CONTINUOUS    cefepime (MAXIPIME) 2 g in sterile water (preservative free) 10 mL IV syringe  2 g IntraVENous Q12H    sodium phosphate 9 mmol in dextrose 5% 250 mL infusion   IntraVENous ONCE    NOREPINephrine (LEVOPHED) 8 mg in 5% dextrose 250mL infusion  2-30 mcg/min IntraVENous TITRATE    sodium chloride (NS) flush 5-10 mL  5-10 mL IntraVENous PRN    glucose chewable tablet 16 g  4 Tab Oral PRN    glucagon (GLUCAGEN) injection 1 mg  1 mg IntraMUSCular PRN    dextrose (D50W) injection syrg 12.5-25 g  25-50 mL IntraVENous PRN    insulin regular (NOVOLIN,HUMULIN) 100 units/100 ml NS infusion (premix)  0-50 Units/hr IntraVENous TITRATE    VANCOMYCIN INFORMATION NOTE   Other CONTINUOUS    levETIRAcetam (KEPPRA) 500 mg in saline (iso-osm) 100 ml IVPB  1,000 mg IntraVENous Q12H    chlorhexidine (PERIDEX) 0.12 % mouthwash 10 mL  10 mL Oral Q12H    albuterol-ipratropium (DUO-NEB) 2.5 MG-0.5 MG/3 ML  3 mL Nebulization Q6H PRN    sodium chloride (NS) flush 5-10 mL  5-10 mL IntraVENous Q8H  sodium chloride (NS) flush 5-10 mL  5-10 mL IntraVENous PRN    acetaminophen (TYLENOL) suppository 650 mg  650 mg Rectal P6U PRN    folic acid (FOLVITE) tablet 1 mg  1 mg Per NG tube DAILY         Labs: Results:       Chemistry Recent Labs      02/08/18   1552  02/08/18   1313  02/08/18   0500  02/07/18 2147 02/07/18   1803   GLU  230*  345*  1084*  1731*  2050*   NA  151*  149*  145  132*  129*   K  4.4  4.3  2.5*  2.1*  3.3*   CL  120*  119*  114*  100  90*   CO2  25  24  22  22  21   BUN  29*  31*  40*  45*  45*   CREA  1.53*  1.64*  2.37*  2.92*  3.04*   CA  8.2*  8.2*  8.7  8.6  8.7   AGAP  6  6  9  10  18   BUCR  19  19  17  15  15   AP   --    --    --   133*  127*   TP   --    --    --   6.3*  6.0*   ALB   --    --    --   2.6*  2.6*   GLOB   --    --    --   3.7  3.4   AGRAT   --    --    --   0.7*  0.8      CBC w/Diff Recent Labs      02/07/18 2147 02/07/18   1803   WBC   --   11.9   RBC   --   3.95*   HGB  12.5  11.3*   HCT  44.7  46.1*   PLT   --   262   GRANS   --   88*   LYMPH   --   6*   EOS   --   0      Coagulation Recent Labs      02/08/18   0500  02/07/18 2147   PTP  15.8*   --    INR  1.3*   --    APTT   --   24.8       Liver Enzymes Recent Labs      02/07/18 2147   TP  6.3*   ALB  2.6*   AP  133*   SGOT  27      ABG Lab Results   Component Value Date/Time    PHI 7.218 (LL) 02/08/2018 05:16 AM    PCO2I 38.3 02/08/2018 05:16 AM    PO2I 115 (H) 02/08/2018 05:16 AM    HCO3I 15.6 (L) 02/08/2018 05:16 AM    FIO2I 70 02/08/2018 05:16 AM      Microbiology Recent Labs      02/08/18   1130  02/07/18   1856  02/07/18   1830   CULT  PENDING  CULTURE IN PROGRESS,FURTHER UPDATES TO FOLLOW  NO GROWTH AFTER 12 HOURS          Telemetry: [x]Sinus []A-flutter []Paced    []A-fib []Multiple PVCs                    Imaging:  CXR Results  (Last 48 hours)               02/08/18 0040  XR CHEST PORT Final result    Impression:  Impression:    1.  Slight worsening of aeration of the upper lung zones and persistent   retrocardiac opacity. Patchy retrocardiac opacity may represent atelectasis,   pneumonia, pleural effusion, or a combination of these in the appropriate   clinical setting. Follow-up PA and lateral chest radiograph may be helpful. 2. Lines and tubes as detailed       Narrative:  Chest       Indication: Tube Placement, advanced tube        Comparison: Yesterday       Findings: Single view. Instrumentation includes endotracheal tube with tip 40 mm   superior to the chace, enteric tube with tip overlying the stomach and sidehole   marker inferior to the gastroesophageal junction, right jugular central line   with tip at the mid SVC. No pneumothorax. No pleural effusion. Bilateral lower   lung zone atelectasis with retrocardiac opacity and bilateral upper lung zone   hazy infiltrates. Cardiomediastinal silhouette and osseous structures grossly   unchanged. 02/07/18 2013  XR CHEST PORT Final result    Impression:  IMPRESSION:        1. Interval placement of right IJ central venous catheter, distal tip at the   upper SVC. -No evidence for pneumothorax. 2. ETT tip approximately 4.8 cm above the chace. 3. NG tube tip projects at the proximal stomach, sidehole is slightly above the   GE junction. Advancement recommended. 4. Mildly enlarged cardiac silhouette. Mild diffuse prominence of the   bronchovascular markings, possibly a nonspecific bronchitis or mild pulmonary   vascular congestion. 5. Interval development of hazy opacity at the left lung base, possibly   atelectasis. Edema or infiltrate/aspiration not excluded                                   Narrative:  AP CHEST, PORTABLE       INDICATION: Central line placement       COMPARISON: Prior chest x-rays, most recent earlier same day. FINDINGS:  EKG leads overlie the patient. Endotracheal tube tip projects   approximately 4.8 cm above the chace.  NG tube tip projects at the proximal   stomach, sidehole appears to be above the level of the GE junction. Right IJ   central venous catheter tip projects at the upper SVC. Stable mildly enlarged cardiac silhouette. Mild prominence of the   bronchovascular markings. Interval development of hazy opacity at the left lung   base. Costophrenic sulci appear sharp. No evidence for pneumothorax. No acute   osseous abnormalities are identified. 02/07/18 1837  XR CHEST PORT Final result    Impression:  IMPRESSION:        1. Endotracheal tube in satisfactory position. 2. NG tube terminates just below the GE junction. Advancement recommended. 3. No acute pulmonary process. Narrative:  AP CHEST, PORTABLE       INDICATION: Sepsis       COMPARISON: Prior chest x-rays, most recent 2/4/2018       FINDINGS:  EKG leads overlie the patient. Endotracheal tube tip projects   approximately 3.8 cm above the chace. Nasogastric tube terminates just below   the GE junction. Cardiac silhouette is stable, top normal in size. Atherosclerosis noted. The   pulmonary vasculature is unremarkable. No focal consolidation, pleural effusion,   or pneumothorax. No acute osseous abnormalities are identified. Spondylosis and   degenerative changes of the shoulders are noted. CT Results  (Last 48 hours)               02/08/18 0109  CT HEAD WO CONT Final result    Impression:  IMPRESSION:       1. No acute intracranial findings. Of note, MRI is more sensitive in the   detection of acute infarct. 2. Mild periventricular white matter low-attenuation, likely chronic   microvascular ischemic change. -Mild cerebral cortical atrophy. 3. Expanded mottled appearance of the left sphenoid bone, nonspecific, possibly   fibrous dysplasia. Is there any suspicion for a marrow infiltrative process such   as myeloma or metastases?        Narrative:  EXAMINATION: CT head without contrast       INDICATION: Altered level of consciousness COMPARISON: 2/4/2018       TECHNIQUE: CT of the head performed without contrast, with multiplanar   reformations. All CT scans at this facility are performed using dose   optimization technique as appropriate to a performed exam, to include automated   exposure control, adjustment of the mA and/or kV according to patient size   (including appropriate matching first site specific examinations), or use of   iterative reconstruction technique. FINDINGS: No focal mass effect or shift of midline structures. No abnormal   extra-axial collection. Mild cerebral cortical atrophy. Ventricles are normal in   size and configuration. No evidence of acute intracranial hemorrhage. Mild   periventricular white matter low-attenuation. Calvarium intact. Imaged paranasal   sinuses and mastoid air cells well-aerated. Expanded mottled appearance of the   left sphenoid, nonspecific, possibly fibrous dysplasia. Extracranial soft   tissues unremarkable. IMPRESSION:   · Hyperglycemia, likely combined HHNS and DKA in setting of poorly controlled DM  · Acute hypoxic, hypercapnic respiratory failure on mechanical ventilatory support, intubated for airway protection, likely secondary to metabolic encephalopathy and hypercapnea/ CO2 narcosis  · Shock, possibly combination of hypovolemia from hyperosmolar diuresis +/- sepsis from aspiration   · Acute encephalopathy, metabolic + hypercapnea  · Mixed respiratory and metabolic/lactic acidosis  · LUIS ANTONIO, likely prerenal secondary to profound dehydration  · Severe sepsis, suspicion for UTI with abnormal UA + aspiration   · Pseudohyponatremia, corrected Na 168 on admission -slowly improving  · Hypokalemia on admission -improving  · Mildly elevated troponin with ST depression on EKG, possibly demand ischemia  · Other electrolyte derangements: hypophosphatemia, hypomagnesemia  · Reported witnessed seizures, possibly from underlying HHNS.  CT 2/7 no acute intercranial process   · Hx of HTN  · Hx of hepatitis C  · Hx of emphysema  · Hx of cocaine abuse: UDS 2/7 negative. PLAN:   · Resp - VAP bundle. Titrate FiO2 to keep SpO2 > 90%. Aggressive pulmonary hygiene; bronchodilator. Strict aspiration precautions  · ID - Order urine and resp culture. D/C levaquin and begin flagyl. Continue cefepime and vanc. Follow cultures and de-escalate once cultures finalize. Daily CBCs, monitor for development of leukocytosis/worsening bandemia  · CVS - monitor hemodynamic status closely. Repeat EKG, trend troponins. Wean vasopressor, target goal MAP > 65 mmHg. Continue IVF hydration   · Heme/onc - stable hgb/hct; monitor for active bleeding  · Metabolic - close monitoring of electrolyte status, q6 BMP, Phos and Mg. IVF boluses: LR, Normosol. Replace electrolytes aggressively. · Renal - means cath; aggressive fluid resuscitation; strict I/O. Monitor BUN and Cr for improvement of LUIS ANTONIO. · Endo - continue glucostabilizer and adjust insulin gtt accordingly. · Neuro/ Pain/ Sedation - consult neuro for seizures; EEG. D/c continuous infusion of fentanyl -prn for now until mental status improves and if persistent agitation noted, may consider precedex gtt   · GI - NPO.  gastroccult ordered to rule out GIB  · Prophylaxis - DVT, GI prevacid and heparin/SCDs  · Discussed in interdisciplinary rounds          The patient is: [x] acutely ill Risk of deterioration: [] moderate    [] critically ill  [x] high       January-Joann HERNÁNDEZ PA-C

## 2018-02-08 NOTE — CDMP QUERY
H&P notes that patient has Diagnosis of \"Shock\". If possible, please document type of shock patient has. =>Hypovolemia Shock/Cardiogenic Shock/Septic shock  =>Other Explanation of clinical findings  =>Unable to Determine (no explanation of clinical findings)    The medical record reflects the following:    Risk:DM, Hx of Drug abuse    Clinical Indicators:Pt arrived with BP=67/36, pulse 111, blood glucose-2050    Treatment: Levophed drip, ICU care    Please clarify and document your clinical opinion in the progress notes and discharge summary including the definitive and/or presumptive diagnosis, (suspected or probable), related to the above clinical findings. Please include clinical findings supporting your diagnosis. If you DECLINE this query or would like to communicate with [facility], please utilize the \"[facility] message box\" at the TOP of the Progress Note on the right.       Thank you,  Rin Robin RN   [phone]

## 2018-02-08 NOTE — ED NOTES
TRANSFER - OUT REPORT:    Verbal report given to Shilpa Lin RN (name) on Eboni Patterson  being transferred to ICU (unit) for routine progression of care       Report consisted of patients Situation, Background, Assessment and   Recommendations(SBAR). Information from the following report(s) SBAR, ED Summary and MAR was reviewed with the receiving nurse. Lines:   Triple Lumen 02/07/18 Right Subclavian (Active)   Central Line Being Utilized Yes 2/7/2018  8:53 PM   Site Assessment Clean, dry, & intact 2/7/2018  8:53 PM   Dressing Status Clean, dry, & intact 2/7/2018  8:53 PM   Proximal Hub Color/Line Status White 2/7/2018  8:53 PM   Medial Hub Color/Line Status Blue 2/7/2018  8:53 PM   Distal Hub Color/Line Status Brown 2/7/2018  8:53 PM       Peripheral IV 02/07/18 Right Hand (Active)   Site Assessment Clean, dry, & intact 2/7/2018 10:47 PM   Phlebitis Assessment 0 2/7/2018 10:47 PM   Infiltration Assessment 0 2/7/2018 10:47 PM   Dressing Type 4 X 4 2/7/2018 10:47 PM   Hub Color/Line Status Pink 2/7/2018 10:47 PM       Peripheral IV 02/07/18 Antecubital (Active)   Site Assessment Clean, dry, & intact 2/7/2018  5:41 PM   Phlebitis Assessment 0 2/7/2018  5:41 PM   Infiltration Assessment 0 2/7/2018  5:41 PM   Dressing Status Clean, dry, & intact 2/7/2018  5:41 PM   Hub Color/Line Status Green 2/7/2018  5:41 PM        Opportunity for questions and clarification was provided.       Patient transported with:   Registered Nurse

## 2018-02-08 NOTE — PROGRESS NOTES
*ATTENTION: This note has been created by a medical student for educational purposes only. Please do not refer to the content of this note for clinical decision-making, billing, or other purposes. Please see attending physicians note to obtain clinical information on this patient. [id number] [facility]  ICU Progress Note      Name: Linda Valladares   : 1955   MRN: [id number]   Date: 2018 8:19 AM     [x]I have reviewed the flowsheet and previous days notes. Events overnight reviewed and discussed with nursing staff. Vital signs and records reviewed. Subjective:    Aurelia Farooq is a 58 y.o. female with a history of DM, hep C, HTN, asthma, and emphysema who presented to the ED  for evaluation of AMS. En route by EMS, patient deteriorated into respiratory arrest and required ventilation via BVM. In the ED patient began vomiting dark emesis and was subsequently intubated. Questionable aspiration of emesis. 2 witnessed seizures were reported in the ED. Labs revealed an elevated glucose at 2050, AG, hypokalemia, hypernatremia and a respiratory acidosis. Patient was seen in the ED  for N/V and AMS and was diagnosed with DKA, improved and was discharged. 18  Patient remains unresponsive with a GCS of 6, intubated  Intermittently tachycardic overnight, however hemodynamically stable  Afebrile overnight, Tmax of 99.7  Received approximately 2.5 L of fluid overnight, continues to have high urine output (345 mL/hr)  Light brown material through the ET tube          [x]The patient is unable to give any meaningful history or review of systems because the patient is:  [x]Intubated []Sedated   [x]Unresponsive      [x]The patient is critically ill on      [x]Mechanical ventilation []Pressors   []BiPAP []                 ROS:Review of systems not obtained due to patient factors.      Medication Review:  · Pressors - levophed  · Sedation - none  · Antibiotics - vancomycin, cefepime and flagyl  · Pain - fentanyl  · GI/ DVT - prevacid, heparin/SCDs  · Others (other gtts)    Safety Bundles: VAP Bundle/ CAUTI/ Severe Sepsis Protocol/ Electrolyte Replacement Protocol    Vital Signs:    Visit Vitals    BP 98/55    Pulse (!) 122    Temp 99.7 °F (37.6 °C)    Resp 27    Wt 68.1 kg (150 lb 2.1 oz)    SpO2 100%    BMI 23.51 kg/m2       O2 Device: Ventilator       Temp (24hrs), Av.7 °F (37.1 °C), Min:97.8 °F (36.6 °C), Max:99.7 °F (37.6 °C)       Intake/Output:   Last shift:         Last 3 shifts:  1901 -  0700  In: 2431.8 [I.V.:2431.8]  Out: 3800 [Urine:1600]    Intake/Output Summary (Last 24 hours) at 18 0819  Last data filed at 18 0600   Gross per 24 hour   Intake          2431.79 ml   Output             3800 ml   Net         -1368.21 ml       Ventilator Settings:  Ventilator Mode: Assist control, VC+  Respiratory Rate  Back-Up Rate: 22  Insp Time (sec): 1 sec  I:E Ratio: 1:2.0  Ventilator Volumes  Vt Set (ml): 450 ml  Vt Exhaled (Machine Breath) (ml): 473 ml  Ve Observed (l/min): 9.73 l/min  Ventilator Pressures  PIP Observed (cm H2O): 18 cm H2O  Plateau Pressure (cm H2O): 12 cm H2O  MAP (cm H2O): 13  PEEP/VENT (cm H2O): 5 cm H20    Physical Exam:    General: Intubated, unresponsive. In no acute distress. HEENT:  Anicteric sclerae; pink palpebral conjunctivae; mucosa moist  Resp:  Symmetrical chest expansion, no accessory muscle use; coarse rhonchi bilaterally  CV:  S1, S2 present; regular rate and rhythm  GI:  Abdomen soft, non-tender; (+) active bowel sounds  Extremities:  +2 pulses on all extremities; trace pitting edema bilateral lower extremities  Skin:  Warm; no rashes/ lesions noted  Neurologic:  Moves all four extremities spontaneously. Withdraws from pain. Does not follow commands. Devices:  Central line R subclavian (2/7), ETT.  No NGT/OGT, ETT/tracheostomy, chest tube      DATA:     Current Facility-Administered Medications   Medication Dose Route Frequency  heparin (porcine) injection 5,000 Units  5,000 Units SubCUTAneous Q8H    thiamine (B-1) injection 100 mg  100 mg IntraMUSCular DAILY    pantoprazole (PROTONIX) injection 40 mg  40 mg IntraVENous Q24H    potassium chloride (KLOR-CON) packet 40 mEq  40 mEq Oral NOW    potassium chloride 10 mEq, lidocaine (PF) (XYLOCAINE) 10 mg/mL (1 %) 1 mL in 0.9% sodium chloride 100 mL IVPB   IntraVENous Q1H    vancomycin (VANCOCIN) 1,750 mg in 0.9% sodium chloride 500 mL IVPB  1,750 mg IntraVENous ONCE    NOREPINephrine (LEVOPHED) 8 mg in 5% dextrose 250mL infusion  2-30 mcg/min IntraVENous TITRATE    sodium chloride (NS) flush 5-10 mL  5-10 mL IntraVENous PRN    glucose chewable tablet 16 g  4 Tab Oral PRN    glucagon (GLUCAGEN) injection 1 mg  1 mg IntraMUSCular PRN    dextrose (D50W) injection syrg 12.5-25 g  25-50 mL IntraVENous PRN    insulin regular (NOVOLIN,HUMULIN) 100 units/100 ml NS infusion (premix)  0-50 Units/hr IntraVENous TITRATE    VANCOMYCIN INFORMATION NOTE   Other CONTINUOUS    [START ON 2/9/2018] levoFLOXacin (LEVAQUIN) 500 mg in D5W IVPB  500 mg IntraVENous Q48H    cefepime (MAXIPIME) 2 g in sterile water (preservative free) 10 mL IV syringe  2 g IntraVENous Q24H    levETIRAcetam (KEPPRA) 500 mg in saline (iso-osm) 100 ml IVPB  1,000 mg IntraVENous Q12H    chlorhexidine (PERIDEX) 0.12 % mouthwash 10 mL  10 mL Oral Q12H    albuterol-ipratropium (DUO-NEB) 2.5 MG-0.5 MG/3 ML  3 mL Nebulization Q6H PRN    fentaNYL citrate (PF) injection  mcg   mcg IntraVENous Q30MIN PRN    fentaNYL (PF) 10 mcg/mL infusion  0-200 mcg/hr IntraVENous TITRATE    sodium chloride (NS) flush 5-10 mL  5-10 mL IntraVENous Q8H    sodium chloride (NS) flush 5-10 mL  5-10 mL IntraVENous PRN    acetaminophen (TYLENOL) suppository 650 mg  650 mg Rectal Q6H PRN    0.45% sodium chloride with KCl 20 mEq/L infusion   IntraVENous CONTINUOUS    folic acid (FOLVITE) tablet 1 mg  1 mg Per NG tube DAILY    potassium chloride (KLOR-CON) packet 40 mEq  40 mEq Oral NOW    potassium phosphate 18 mmol in 0.9% sodium chloride 250 mL infusion   IntraVENous ONCE    midazolam (VERSED) injection 1-2 mg  1-2 mg IntraVENous Q30MIN PRN         Labs: Results:       Chemistry Recent Labs      02/08/18   0500  02/07/18 2147 02/07/18   1803   GLU  1084*  1731*  2050*   NA  145  132*  129*   K  2.5*  2.1*  3.3*   CL  114*  100  90*   CO2  22  22  21   BUN  40*  45*  45*   CREA  2.37*  2.92*  3.04*   CA  8.7  8.6  8.7   AGAP  9  10  18   BUCR  17  15  15   AP   --   133*  127*   TP   --   6.3*  6.0*   ALB   --   2.6*  2.6*   GLOB   --   3.7  3.4   AGRAT   --   0.7*  0.8      CBC w/Diff Recent Labs      02/07/18 2147 02/07/18   1803   WBC   --   11.9   RBC   --   3.95*   HGB  12.5  11.3*   HCT  44.7  46.1*   PLT   --   262   GRANS   --   88*   LYMPH   --   6*   EOS   --   0      Coagulation Recent Labs      02/08/18   0500  02/07/18 2147   PTP  15.8*   --    INR  1.3*   --    APTT   --   24.8       Liver Enzymes Recent Labs      02/07/18 2147   TP  6.3*   ALB  2.6*   AP  133*   SGOT  27      ABG Lab Results   Component Value Date/Time    PHI 7.218 (LL) 02/08/2018 05:16 AM    PCO2I 38.3 02/08/2018 05:16 AM    PO2I 115 (H) 02/08/2018 05:16 AM    HCO3I 15.6 (L) 02/08/2018 05:16 AM    FIO2I 70 02/08/2018 05:16 AM      Microbiology Recent Labs      02/07/18   1856  02/07/18   1830   CULT  NO GROWTH AFTER 12 HOURS  NO GROWTH AFTER 12 HOURS          Telemetry: [x]Sinus []A-flutter []Paced    []A-fib []Multiple PVCs                    Imaging:  CXR Results  (Last 48 hours)               02/07/18 2013  XR CHEST PORT Final result    Impression:  IMPRESSION:        1. Interval placement of right IJ central venous catheter, distal tip at the   upper SVC. -No evidence for pneumothorax. 2. ETT tip approximately 4.8 cm above the chace.        3. NG tube tip projects at the proximal stomach, sidehole is slightly above the   GE junction. Advancement recommended. 4. Mildly enlarged cardiac silhouette. Mild diffuse prominence of the   bronchovascular markings, possibly a nonspecific bronchitis or mild pulmonary   vascular congestion. 5. Interval development of hazy opacity at the left lung base, possibly   atelectasis. Edema or infiltrate/aspiration not excluded                                   Narrative:  AP CHEST, PORTABLE       INDICATION: Central line placement       COMPARISON: Prior chest x-rays, most recent earlier same day. FINDINGS:  EKG leads overlie the patient. Endotracheal tube tip projects   approximately 4.8 cm above the chace. NG tube tip projects at the proximal   stomach, sidehole appears to be above the level of the GE junction. Right IJ   central venous catheter tip projects at the upper SVC. Stable mildly enlarged cardiac silhouette. Mild prominence of the   bronchovascular markings. Interval development of hazy opacity at the left lung   base. Costophrenic sulci appear sharp. No evidence for pneumothorax. No acute   osseous abnormalities are identified. 02/07/18 1837  XR CHEST PORT Final result    Impression:  IMPRESSION:        1. Endotracheal tube in satisfactory position. 2. NG tube terminates just below the GE junction. Advancement recommended. 3. No acute pulmonary process. Narrative:  AP CHEST, PORTABLE       INDICATION: Sepsis       COMPARISON: Prior chest x-rays, most recent 2/4/2018       FINDINGS:  EKG leads overlie the patient. Endotracheal tube tip projects   approximately 3.8 cm above the chace. Nasogastric tube terminates just below   the GE junction. Cardiac silhouette is stable, top normal in size. Atherosclerosis noted. The   pulmonary vasculature is unremarkable. No focal consolidation, pleural effusion,   or pneumothorax. No acute osseous abnormalities are identified.  Spondylosis and   degenerative changes of the shoulders are noted. CT Results  (Last 48 hours)               02/08/18 0109  CT HEAD WO CONT Final result    Impression:  IMPRESSION:       1. No acute intracranial findings. Of note, MRI is more sensitive in the   detection of acute infarct. 2. Mild periventricular white matter low-attenuation, likely chronic   microvascular ischemic change. -Mild cerebral cortical atrophy. 3. Expanded mottled appearance of the left sphenoid bone, nonspecific, possibly   fibrous dysplasia. Is there any suspicion for a marrow infiltrative process such   as myeloma or metastases? Narrative:  EXAMINATION: CT head without contrast       INDICATION: Altered level of consciousness       COMPARISON: 2/4/2018       TECHNIQUE: CT of the head performed without contrast, with multiplanar   reformations. All CT scans at this facility are performed using dose   optimization technique as appropriate to a performed exam, to include automated   exposure control, adjustment of the mA and/or kV according to patient size   (including appropriate matching first site specific examinations), or use of   iterative reconstruction technique. FINDINGS: No focal mass effect or shift of midline structures. No abnormal   extra-axial collection. Mild cerebral cortical atrophy. Ventricles are normal in   size and configuration. No evidence of acute intracranial hemorrhage. Mild   periventricular white matter low-attenuation. Calvarium intact. Imaged paranasal   sinuses and mastoid air cells well-aerated. Expanded mottled appearance of the   left sphenoid, nonspecific, possibly fibrous dysplasia. Extracranial soft   tissues unremarkable. IMPRESSION:   · Acute hypoxic, hypercapnic respiratory failure on mechanical ventilatory support, likely secondary to metabolic encephalopathy  · Shock: hypoxic, hypotensive and tachycardic on arrival to the ED.  Likely secondary to hypovolemia but cannot rule out sepsis vs cardiogenic at this time. · HHNS: Hx of DM2 with markedly elevated blood glucose on admission (2050), lack of high AG, hypernatremia and elevated Cr provide a clinical picture more consistent with HHNS vs DKA. · Metabolic encephalopathy: HHNS vs sepsis. GCS 6  · Mixed respiratory and metabolic acidosis: pH on admission 7.009, CO2 75.5 and bicarb of 21. Lactate of 6.1  · LIUS ANTONIO: Cr 3.04 on admission, likely prerenal/secondary to profound dehydration  · Presumed UTI: UA 2/7 +nitrites and leukocyte esterase, 1+ bacteria. CBC does not reveal elevated WBC but does show elevated neutrophils (88) with bands of 2. BCx with NGTD. Afebrile. · Hypernatremia: corrected Na 168 on admission, 154.8 today  · Hypokalemia: K 3.3 on admission, 2.5 today  · Elevated troponin: trending up, was 0.23 on admission, most recent trop 0.29. +ST segment changes on EKG indicative of ischemia. Most likely secondary to demand ischemia, however cannot rule out ischemia secondary to coronary artery disease. · Other electrolyte derangements: hypophosphatemia, hypomagnesemia  · Possible aspiration of emesis: material suctioned from ET tube similar in appearance to patient's emesis  · New onset seizures: hyperosmolar state vs electrolyte abnormalities vs neurologic origin. CT 2/7 no acute intercranial process noted. · Hx of HTN  · Hx of hepatitis C  · Hx of emphysema  · Hx of cocaine abuse: UDS 2/7 negative. PLAN:   · Resp -  VAP bundle. Serial CXR. · ID - Order urine culture. D/C levaquin and begin flagyl. Continue cefepime and vanc. Follow micro and de-escalate when appropriate. Daily CBCs, monitor for development of leukocytosis/worsening bandemia  · CVS - monitor hemodynamic status closely. Repeat EKG, trend troponins. On pressors  · Heme/onc -  Monitor for active bleeding  · Metabolic - close monitoring of electrolyte status, q4 BMP, phos and mag.  NS D/Andres, normosol at 1L per hour and LR at 2 L per hour x4 hours initiated for better management of electrolyte derangements, and patient was developing hyperchloremia. K being replaced per protocol and LR. Consider ordering serum osmolarity. · Renal - Aggressive fluid resuscitation. Monitor BUN and Cr for improvement of LUIS ANTONIO. Pro, strict I/O  · Endocrine - insulin gtt. Reduce insulin drip to 10 units per our to avoid osmotic demyelination and cerebral edema. · Neuro/ Pain/ Sedation - consult neuro for seizures, patient needs a stat 24 hour EEG to rule out any neurologic origin of seizure. Evaluate for changes in neurologic status. D/C fentanyl  · GI - NPO.  gastroccult ordered to rule out GIB  · Prophylaxis - DVT, GI prevacid and heparin/SCDs  · Discussed in interdisciplinary rounds          The patient is: [x] acutely ill Risk of deterioration: [] moderate    [] critically ill  [x] high       Aisha IRELAND

## 2018-02-08 NOTE — H&P
History & Physical    Patient: Genevieve Neely MRN: [id number]  CSN: [id number]    YOB: 1955  Age: 58 y.o. Sex: female      DOA: 2/7/2018    Chief Complaint:   Chief Complaint   Patient presents with    Respiratory Distress          HPI:     Aurelia Johnson is a 58 y.o.  female who has PMH of asthma, emphysema, DMII, diabetic neuropathy, Hep C, HTN, Cocaine abuse and non-compliance   Pt presented with AMS and acute respiratory failure with hypoxia and hypercapnia that required immediate Intubation in ER. Pt was found to have FS of >2000, Na of 129 and corrected more than 160. Pt also have elevated troponin and has +ve UA. As per RN, Pt was found unresponsive preceded by episodes of N/V and had 3 episodes of seizures and stopped breathing but continued to have pulse. . Pt was then intubated and had another seizure. Pt was also Hypotensive 72'F systolic and was started on levophed which was d/cd afterwards as BP became stable.     Pt will be admitted to ICU for Acute Respiratory failure and severe hyperosmolar status and coma         Past Medical History:   Diagnosis Date    Arthritis     Asthma     Chronic pain     BACK PAIN    Diabetes ([address]     Diabetic neuropathy ([address]     Emphysema     Hepatitis C     Hypertension     Nervousness     Osteoarthritis of both knees        Past Surgical History:   Procedure Laterality Date    HX CARPAL TUNNEL RELEASE Right 8/31/09    Dr. Rodríguez Sheets    HX TUBAL LIGATION         Family History   Problem Relation Age of Onset    Diabetes Mother     Hypertension Mother     Obesity Mother     Alcohol abuse Father     High Cholesterol Father     Lung Disease Father     Stroke Father     Arthritis-osteo Sister     Depression Sister     Diabetes Sister     Hypertension Sister     Obesity Sister     Arthritis-osteo Brother     Diabetes Brother     Hypertension Brother     Obesity Brother        Social History     Social History  Marital status: UNKNOWN     Spouse name: N/A    Number of children: N/A    Years of education: N/A     Social History Main Topics    Smoking status: Current Every Day Smoker     Packs/day: 1.00    Smokeless tobacco: None    Alcohol use Yes      Comment: socially    Drug use: Yes     Special: Cocaine    Sexual activity: No     Other Topics Concern    None     Social History Narrative       Prior to Admission medications    Medication Sig Start Date End Date Taking? Authorizing Provider   tamsulosin (FLOMAX) 0.4 mg capsule Take 1 tab by mouth daily until kidney stone passes. 2/5/18   Misty Villa DO   HYDROcodone-acetaminophen (NORCO) 5-325 mg per tablet Take 1-2 Tabs by mouth every four (4) hours as needed for Pain. Max Daily Amount: 12 Tabs. 6/24/16   Felipa Beyer MD   guaiFENesin-codeine (ROBITUSSIN AC) 100-10 mg/5 mL solution Take 5 mL by mouth three (3) times daily as needed for Cough. Max Daily Amount: 15 mL. 4/7/16   Paul Mcdonough MD   amLODIPine (NORVASC) 5 mg tablet Take 5 mg by mouth daily. Indications: HYPERTENSION    Historical Provider   aspirin (ASPIRIN) 325 mg tablet Take 325 mg by mouth daily. Historical Provider   VITAMIN E (FORMULA E 400 PO) Take 1 Tab by mouth daily. Historical Provider   gabapentin (NEURONTIN) 600 mg tablet Take  by mouth three (3) times daily. Historical Provider   glimepiride (AMARYL) 2 mg tablet Take 2 mg by mouth. Historical Provider   sitaGLIPtin-metFORMIN (JANUMET)  mg per tablet Take 1 Tab by mouth. Historical Provider   losartan (COZAAR) 50 mg tablet Take  by mouth daily. Indications: HYPERTENSION    Historical Provider   Melatonin 300 mcg Tab Take  by mouth. Historical Provider   cyanocobalamin (VITAMIN B-12) 1,500 mcg TbER Take 1 Tab by mouth. Historical Provider       No Known Allergies      Review of Systems  Unable to perform ROS: Mental status change.       Physical Exam:     Physical Exam:  Visit Vitals    /71 (BP 1 Location: Left arm, BP Patient Position: At rest)    Pulse (P) 75    Temp 98.7 °F (37.1 °C)    Resp (P) 22    Wt 67.6 kg (149 lb 1.6 oz)    SpO2 (P) 98%    BMI 23.35 kg/m2      O2 Device: Ventilator    Temp (24hrs), Av.7 °F (37.1 °C), Min:98.7 °F (37.1 °C), Max:98.7 °F (37.1 °C)     1901 -  0700  In: -   Out: 2200         General:  Unresponsive except to painful stimuli, intubated              Head: Normocephalic, without obvious abnormality, atraumatic. Eyes:  Conjunctivae/corneas clear. PERRL, EOMs intact. Nose: Nares normal. No drainage or sinus tenderness. Neck: Supple, symmetrical, trachea midline, no adenopathy, thyroid: no enlargement, no carotid bruit and no JVD. Lungs:   Intubated    Heart:  Regular rate and rhythm, S1, S2 tachy     Abdomen: Soft, non-tender. Bowel sounds normal.    Extremities: Extremities normal, atraumatic, no cyanosis or edema. Pulses: 2+ and symmetric all extremities. Skin:  No rashes or lesions   Neurologic: AAOx3, No focal motor or sensory deficit. Labs Reviewed: All lab results for the last 24 hours reviewed. CXR and EKG    Procedures/imaging: see electronic medical records for all procedures/Xrays and details which were not copied into this note but were reviewed prior to creation of Plan      Assessment/Plan     Principal Problem:    Respiratory failure ([address] (2018)    Active Problems:    Acidosis (2018)      Shock ([address] (2018)      Hyperosmolar (nonketotic) coma ([address] (2018)      Acute UTI (2018)      Macrocytic anemia (2018)       Pt will be admitted to ICU for Acute Respiratory failure and severe hyperosmolar status and coma     IVF >> 1/2 NS till glucose is lower and Corrected Na is less than 145. Replace electrolytes as needed. Insulin drip  Respiratory support  Broad spectrum Abx awaiting Cx  Thiamine, folic acid and S60 supplements.   F/U BMP and VBG as needed      DVT/GI Prophylaxis: Hep SQ, PP    Fariha Shell MD  2/8/2018 9:26 PM

## 2018-02-08 NOTE — CONSULTS
Cherelle Brown is a 58 y.o., unknown handed female, with an established history of diabetes as well as nonadherence to her diabetic medication, hypertension, cocaine abuse, diabetic neuropathy, who presented to the hospital with acute respiratory failure hypercapnia requiring intubation in the emergency room. She was noted to be unresponsive and had some seizure activity both in the emergency room and up here in the intensive care unit setting. She was found to have a blood glucose of greater than 2000, it was unreadable by the glucometer. She was also noted to be hypotensive during her stay. Her seizures were generalized. She has no known history of stroke to my knowledge. Social History; unknown with the patient's living arrangement this. She does smoke and apparently has been abusing cocaine. She also drinks alcohol occasionally. Family History; mother has diabetes hypertension and obesity. Father has alcohol abuse hypercholesterolemia lung disease as well as stroke. She has sister with osteoarthritis depression diabetes hypertension. Brother with osteoarthritis diabetes hypertension.     Current Facility-Administered Medications   Medication Dose Route Frequency Provider Last Rate Last Dose    heparin (porcine) injection 5,000 Units  5,000 Units SubCUTAneous Q8H Keyur Velasco MD   5,000 Units at 02/08/18 0631    thiamine (B-1) injection 100 mg  100 mg IntraMUSCular DAILY Keyur Velasco MD   100 mg at 02/08/18 0910    potassium chloride (KLOR-CON) packet 40 mEq  40 mEq Oral NOW Tilden Nissen, PA-C   Stopped at 02/08/18 0700    fentaNYL citrate (PF) injection 25-50 mcg  25-50 mcg IntraVENous Q4H PRN January-Joann HERNÁNDEZ PA-C        midazolam (VERSED) injection 1-2 mg  1-2 mg IntraVENous Q4H PRN January-Joann HERNÁNDEZ PA-C        metroNIDAZOLE (FLAGYL) IVPB premix 500 mg  500 mg IntraVENous Q12H Jania Breen  mL/hr at 02/08/18 1132 500 mg at 02/08/18 1132    electrolyte-r (NORMOSOL R) infusion   IntraVENous Q1H January-Jill Waqar HERNÁNDEZ PA-C        famotidine (PF) (PEPCID) 20 mg in sodium chloride 0.9 % 10 mL injection  20 mg IntraVENous Q24H January-Jill Waqar HERNÁNDEZ PA-C        levETIRAcetam (KEPPRA) 4,000 mg in 0.9% sodium chloride 250 mL IVPB  4,000 mg IntraVENous ONCE January-Jill Waqar HERNÁNDEZ PA-C        NOREPINephrine (LEVOPHED) 8 mg in 5% dextrose 250mL infusion  2-30 mcg/min IntraVENous TITRATE Gibson Mayen PA-C 18.8 mL/hr at 02/08/18 1204 10 mcg/min at 02/08/18 1204    sodium chloride (NS) flush 5-10 mL  5-10 mL IntraVENous PRN Chaparrita Reyes MD        glucose chewable tablet 16 g  4 Tab Oral PRN Chaparrita Reyes MD        glucagon [facility]) injection 1 mg  1 mg IntraMUSCular PRN Chaparrita Reyes MD        dextrose (D50W) injection syrg 12.5-25 g  25-50 mL IntraVENous PRN Chaparrita Reyes MD        insulin regular (NOVOLIN,HUMULIN) 100 units/100 ml NS infusion (premix)  0-50 Units/hr IntraVENous TITRATE Chaparrita Reyes MD 10 mL/hr at 02/08/18 1129 10 Units/hr at 02/08/18 7800 [address]  INFORMATION NOTE   Other CONTINUOUS Chaparrita Reyes MD        cefepime (MAXIPIME) 2 g in sterile water (preservative free) 10 mL IV syringe  2 g IntraVENous Q24H Chaparrita Reyes MD        levETIRAcetam (KEPPRA) 500 mg in saline (iso-osm) 100 ml IVPB  1,000 mg IntraVENous Q12H Chaparrita Reyes  mL/hr at 02/08/18 0921 1,000 mg at 02/08/18 0921    chlorhexidine (PERIDEX) 0.12 % mouthwash 10 mL  10 mL Oral Q12H Patricia Zuñiga PA-C   10 mL at 02/08/18 0909    albuterol-ipratropium (DUO-NEB) 2.5 MG-0.5 MG/3 ML  3 mL Nebulization Q6H PRN Patricia Zuñiga PA-C        sodium chloride (NS) flush 5-10 mL  5-10 mL IntraVENous Q8H Shae Caruso, MD   10 mL at 02/08/18 0716    sodium chloride (NS) flush 5-10 mL  5-10 mL IntraVENous PRN Shae Caruso MD        acetaminophen (TYLENOL) suppository 650 mg  650 mg Rectal Q6H PRN Shae Caruso MD        0.45% sodium chloride with KCl 20 mEq/L infusion   IntraVENous CONTINUOUS January-Joann Waqar HERNÁNDEZ PA-C 125 mL/hr at 89/83/34 1524      folic acid (FOLVITE) tablet 1 mg  1 mg Per NG tube DAILY Dani Thacker PA-C   1 mg at 02/08/18 1218       Past Medical History:   Diagnosis Date    Arthritis     Asthma     Chronic pain     BACK PAIN    Diabetes ([address]     Diabetic neuropathy (HCC)     Emphysema     Hepatitis C     Hypertension     Nervousness     Osteoarthritis of both knees        Past Surgical History:   Procedure Laterality Date    HX CARPAL TUNNEL RELEASE Right 8/31/09    Dr. Shana Navarro    HX TUBAL LIGATION         No Known Allergies    Patient Active Problem List   Diagnosis Code    Diverticula of colon K57.30    Sepsis ([address] A41.9    Sepsis secondary to UTI ([address] A41.9, N39.0    DM hyperosmolarity type II, uncontrolled ([address] E11.00, E11.65    HTN (hypertension) I10    Febrile illness, acute R50.9    Gram-negative bacteremia R78.81    Diabetic neuropathy ([address] E11.40    Osteoarthritis of both knees M17.0    Acidosis E87.2    Respiratory failure ([address] J96.90    Shock ([address] R57.9    Hyperosmolar (nonketotic) coma ([address] E11.01    Acute UTI N39.0    Macrocytic anemia D53.9         Review of Systems:   Could not be obtained from the patient because of the medical status. PHYSICAL EXAMINATION:      VITAL SIGNS:    Visit Vitals    /66    Pulse 93    Temp 98.8 °F (37.1 °C)    Resp 22    Wt 68.1 kg (150 lb 2.1 oz)    SpO2 100%    BMI 23.51 kg/m2       GENERAL: The patient is well developed, well nourished, and intubated on the ventilator. EXTREMITIES: No clubbing, cyanosis, or edema is identified. Pulses 2+ and symmetrical.  Muscle tone is normal.  HEAD:   Ear, nose, and throat appear to be without trauma. The patient is normocephalic. NEUROLOGIC EXAMINATION    MENTAL STATUS: The patient is comatose, with some spontaneous movements of all 4 limbs. She seems to be thrashing around symmetrically. CRANIAL NERVES: II  Visual fields testing can't be preformed because of condition. Pupils are both 2 mm and sluggishly reactive to light. III, IV, VI  Extraocular movements are intact in the horizontal plane. V  Facial sensation is intact to pinprick. corneals are present. VII  Face is symmetrical.   IX, X, XII Gag is present. MOTOR:  The patient is noted to withdraw feebly but symmetrically from pain. Tone is normal.  Sensory examination is intact to pinprick. Reflexes are 2+ and symmetrical. Plantars are down going. Cerebellar examination and gait testing can't be performed. Final result (Exam End: 2/8/2018  1:09 AM) Open    Study Result   EXAMINATION: CT head without contrast     INDICATION: Altered level of consciousness     COMPARISON: 2/4/2018     TECHNIQUE: CT of the head performed without contrast, with multiplanar  reformations. All CT scans at this facility are performed using dose  optimization technique as appropriate to a performed exam, to include automated  exposure control, adjustment of the mA and/or kV according to patient size  (including appropriate matching first site specific examinations), or use of  iterative reconstruction technique.     FINDINGS: No focal mass effect or shift of midline structures. No abnormal  extra-axial collection. Mild cerebral cortical atrophy. Ventricles are normal in  size and configuration. No evidence of acute intracranial hemorrhage. Mild  periventricular white matter low-attenuation. Calvarium intact. Imaged paranasal  sinuses and mastoid air cells well-aerated. Expanded mottled appearance of the  left sphenoid, nonspecific, possibly fibrous dysplasia. Extracranial soft  tissues unremarkable.     IMPRESSION  IMPRESSION:     1. No acute intracranial findings. Of note, MRI is more sensitive in the  detection of acute infarct.     2. Mild periventricular white matter low-attenuation, likely chronic  microvascular ischemic change.   -Mild cerebral cortical atrophy.     3. Expanded mottled appearance of the left sphenoid bone, nonspecific, possibly  fibrous dysplasia. Is there any suspicion for a marrow infiltrative process such  as myeloma or metastases? I have reviewed the above imagines myself. CBC:   Lab Results   Component Value Date/Time    WBC 11.9 02/07/2018 06:03 PM    RBC 3.95 (L) 02/07/2018 06:03 PM    HGB 12.5 02/07/2018 09:47 PM    HCT 44.7 02/07/2018 09:47 PM    PLATELET 065 17/57/1417 06:03 PM     BMP:   Lab Results   Component Value Date/Time    Glucose 1084 (HH) 02/08/2018 05:00 AM    Sodium 145 02/08/2018 05:00 AM    Potassium 2.5 (LL) 02/08/2018 05:00 AM    Chloride 114 (H) 02/08/2018 05:00 AM    CO2 22 02/08/2018 05:00 AM    BUN 40 (H) 02/08/2018 05:00 AM    Creatinine 2.37 (H) 02/08/2018 05:00 AM    Calcium 8.7 02/08/2018 05:00 AM     CMP:   Lab Results   Component Value Date/Time    Glucose 1084 (HH) 02/08/2018 05:00 AM    Sodium 145 02/08/2018 05:00 AM    Potassium 2.5 (LL) 02/08/2018 05:00 AM    Chloride 114 (H) 02/08/2018 05:00 AM    CO2 22 02/08/2018 05:00 AM    BUN 40 (H) 02/08/2018 05:00 AM    Creatinine 2.37 (H) 02/08/2018 05:00 AM    Calcium 8.7 02/08/2018 05:00 AM    Anion gap 9 02/08/2018 05:00 AM    BUN/Creatinine ratio 17 02/08/2018 05:00 AM    Alk.  phosphatase 133 (H) 02/07/2018 09:47 PM    Protein, total 6.3 (L) 02/07/2018 09:47 PM    Albumin 2.6 (L) 02/07/2018 09:47 PM    Globulin 3.7 02/07/2018 09:47 PM    A-G Ratio 0.7 (L) 02/07/2018 09:47 PM     Coagulation:   Lab Results   Component Value Date/Time    Prothrombin time 15.8 (H) 02/08/2018 05:00 AM    INR 1.3 (H) 02/08/2018 05:00 AM    aPTT 24.8 02/07/2018 09:47 PM     Cardiac markers:   Lab Results   Component Value Date/Time     02/08/2018 05:00 AM    CK-MB Index 7.7 (H) 02/08/2018 05:00 AM      2/7/2018  7:20 PM - [facility] [address] [facility]   Component Results   Component Value Flag Ref Range Units Status   Hemoglobin A1c 15.7 (H) 4.2 - 5.6 % Final   Comment:   (NOTE)   HbA1C Interpretive Ranges   <5.7              Normal   5.7 - 6.4         Consider Prediabetes   >6.5              Consider Diabetes      Est. average glucose Cannot be calculated   mg/dL Final   Comment:   Estimated average glucose results are not reported when the hemoglobin A1c is <5% or >15% since it has not been validated for values in these ranges. Impression: Comatose state in this patient with some evidence of seizure activity earlier but seems to be resolved who has risk factors including severe hyperglycemia with glucoses of greater than 2000 and evidence that she has been high in her glucose with hemoglobin A1c of greater than 15. It seems likely that this woman is suffering from a metabolic derangement causing the seizures and this is not a primary neurologic problem. Plan: Correction of her metabolic derangement is necessary. Will continue on Keppra 500 twice a day for now. An EEG will be reviewed. PLEASE NOTE:   This document has been produced using voice recognition software. Unrecognized errors in transcription may be present.

## 2018-02-08 NOTE — CONSULTS
[facility]  Pulmonary, Critical Care, and Sleep Medicine      Name: Luz Smith MRN: [id number]   : 1955 Hospital: [facility]   Date: 2018          Critical Care Initial Patient Consult    Requesting MD:  Michelle Romero                                                Reason for CC Consult: respiratory failure req. Mechanical ventilation, DKA  Subjective/History:   Patient is a 58 y.o. female with a PMH of asthma, emphysema, DMII, diabetic neuropathy, Hep C, and HTN presenting to ICU for care of her respiratory failure requiring mechanical ventilation, DKA, and possible sepsis. Patient arrived to ED via EMS, who was called for AMS and lethargy. En route, patient sustained a respiratory arrest, required bagging and was intubated in the ED. Patient began vomiting coffee ground emesis prior to intubation. Following arrival, patient had seizures x 2. First seizure lasted 2 minutes, and another seizure 5 minutes. Teleneuro consulted- possible hyperglycemic coma. Initial BS 2050, gap 18, K 3.3, corrected Na 168, CO2 21, Cr 3.04, Trop 0.23. Initial gas following intubation [id number]. Total fluid intake per ED nurse: 3L LR and 2L NS. Patient continues with coffee ground emesis in OGT. Responding to pain. Briefly spoke with son who did not add to history. Patient has not been feeling well for a few days. Was in ED on the 4th for n/v, urinary urgency, AMS, and DKA. CT scan showed mild bilateral hydronephrosis, potentially due to urinary retention as the bladder was distended. CT head normal. Patient's symptoms improved and was d/c'd. Continued to become progressively sicker following d/c. The patient is critically ill and can not provide additional history due to Ventilated.      Past Medical History:   Diagnosis Date    Arthritis     Asthma     Chronic pain     BACK PAIN    Diabetes ([facility] [address]     Diabetic neuropathy (HCC)     Emphysema     Hepatitis C     Hypertension     Nervousness     Osteoarthritis of both knees       Past Surgical History:   Procedure Laterality Date    HX CARPAL TUNNEL RELEASE Right 8/31/09    Dr. Freddy Mccormack        Prior to Admission medications    Medication Sig Start Date End Date Taking? Authorizing Provider   tamsulosin (FLOMAX) 0.4 mg capsule Take 1 tab by mouth daily until kidney stone passes. 2/5/18   Terrance Villa DO   HYDROcodone-acetaminophen (NORCO) 5-325 mg per tablet Take 1-2 Tabs by mouth every four (4) hours as needed for Pain. Max Daily Amount: 12 Tabs. 6/24/16   Gera Lyn MD   guaiFENesin-codeine (ROBITUSSIN AC) 100-10 mg/5 mL solution Take 5 mL by mouth three (3) times daily as needed for Cough. Max Daily Amount: 15 mL. 4/7/16   Kathleen Soliz MD   amLODIPine (NORVASC) 5 mg tablet Take 5 mg by mouth daily. Indications: HYPERTENSION    Historical Provider   aspirin (ASPIRIN) 325 mg tablet Take 325 mg by mouth daily. Historical Provider   VITAMIN E (FORMULA E 400 PO) Take 1 Tab by mouth daily. Historical Provider   gabapentin (NEURONTIN) 600 mg tablet Take  by mouth three (3) times daily. Historical Provider   glimepiride (AMARYL) 2 mg tablet Take 2 mg by mouth. Historical Provider   sitaGLIPtin-metFORMIN (JANUMET)  mg per tablet Take 1 Tab by mouth. Historical Provider   losartan (COZAAR) 50 mg tablet Take  by mouth daily. Indications: HYPERTENSION    Historical Provider   Melatonin 300 mcg Tab Take  by mouth. Historical Provider   cyanocobalamin (VITAMIN B-12) 1,500 mcg TbER Take 1 Tab by mouth.       Historical Provider     Current Facility-Administered Medications   Medication Dose Route Frequency    NOREPINephrine (LEVOPHED) 1 mg/mL injection        NOREPINephrine (LEVOPHED) 8 mg in 5% dextrose 250mL infusion  2-16 mcg/min IntraVENous TITRATE    vancomycin (VANCOCIN) 1,000 mg in 0.9% sodium chloride (MBP/ADV) 250 mL adv  1,000 mg IntraVENous ONCE    insulin regular (NOVOLIN,HUMULIN) 100 units/100 ml NS infusion (premix)  0-50 Units/hr IntraVENous TITRATE    VANCOMYCIN INFORMATION NOTE   Other CONTINUOUS    [START ON 2/9/2018] levoFLOXacin (LEVAQUIN) 500 mg in D5W IVPB  500 mg IntraVENous Q48H    [START ON 2/8/2018] cefepime (MAXIPIME) 2 g in sterile water (preservative free) 10 mL IV syringe  2 g IntraVENous Q24H    potassium chloride 10 mEq, lidocaine (PF) (XYLOCAINE) 10 mg/mL (1 %) 1 mL in 0.9% sodium chloride 100 mL IVPB   IntraVENous Q1H    levETIRAcetam (KEPPRA) 500 mg in saline (iso-osm) 100 ml IVPB  1,000 mg IntraVENous Q12H    chlorhexidine (PERIDEX) 0.12 % mouthwash 10 mL  10 mL Oral Q12H    fentaNYL (PF) 10 mcg/mL infusion  0-200 mcg/hr IntraVENous TITRATE    0.9% sodium chloride 1,000 mL with mvi, adult no. 4 with vit K 10 mL, thiamine 819 mg, folic acid 1 mg infusion   IntraVENous Q24H    0.9% sodium chloride infusion  150 mL/hr IntraVENous CONTINUOUS    sodium chloride (NS) flush 5-10 mL  5-10 mL IntraVENous Q8H    [START ON 2/8/2018] pantoprazole (PROTONIX) 40 mg in sodium chloride 0.9 % 10 mL injection  40 mg IntraVENous DAILY    heparin (porcine) injection 5,000 Units  5,000 Units SubCUTAneous Q8H     No Known Allergies   Social History   Substance Use Topics    Smoking status: Current Every Day Smoker     Packs/day: 1.00    Smokeless tobacco: Not on file    Alcohol use Yes      Comment: socially      Family History   Problem Relation Age of Onset    Diabetes Mother     Hypertension Mother     Obesity Mother     Alcohol abuse Father     High Cholesterol Father     Lung Disease Father     Stroke Father     Arthritis-osteo Sister     Depression Sister     Diabetes Sister     Hypertension Sister     Obesity Sister     Arthritis-osteo Brother     Diabetes Brother     Hypertension Brother     Obesity Brother         Review of Systems:  Review of systems not obtained due to patient factors.     Objective:   Vital Signs: Visit Vitals    BP (!) 67/36 (BP 1 Location: Left arm, BP Patient Position: At rest)    Pulse (!) 104    Temp 98.7 °F (37.1 °C)    Resp 16    Wt 67.6 kg (149 lb 1.6 oz)    SpO2 100%    BMI 23.35 kg/m2       O2 Device: Ventilator       Temp (24hrs), Av.7 °F (37.1 °C), Min:98.7 °F (37.1 °C), Max:98.7 °F (37.1 °C)       Intake/Output:   Last shift:         Last 3 shifts:    No intake or output data in the 24 hours ending 183    Ventilator Settings:  Mode Rate Tidal Volume Pressure FiO2 PEEP   VC+   450 ml    100 % 5 cm H20     Peak airway pressure: 19 cm H2O    Minute ventilation: 6.84 l/min        Physical Exam:    General:  Intubated. Responds to pain. Head:  Normocephalic, without obvious abnormality, atraumatic. Eyes:  Conjunctivae/corneas clear. PERRL, EOMs intact. Nose: Nares normal. Septum midline. Mucosa normal. No drainage or sinus tenderness. Throat: Lips, mucosa, and tongue normal. Teeth and gums normal.   Neck: Supple, symmetrical, trachea midline, no adenopathy. Lungs:   Clear to auscultation bilaterally. ?cuff leak vs high riding ETT. No wheezes, rales, or rhonchi. Heart:  Regular rate and rhythm, HR bounding, S1, S2 normal, no murmur, click, rub or gallop. Abdomen:   Soft, non-tender. Bowel sounds normal. No masses,  No organomegaly. Extremities: Trace edema, extremities normal, atraumatic, no cyanosis. Cap refill 3 s. Pulses: 2+ and symmetric all extremities. Skin: Skin color, texture, turgor normal. No rashes or lesions   Neurologic: Patient responds to pain. Spontaneously moves limbs.         Data:     Recent Results (from the past 24 hour(s))   CBC WITH AUTOMATED DIFF    Collection Time: 18  6:03 PM   Result Value Ref Range    WBC 11.9 4.6 - 13.2 K/uL    RBC 3.95 (L) 4.20 - 5.30 M/uL    HGB 11.3 (L) 12.0 - 16.0 g/dL    HCT 46.1 (H) 35.0 - 45.0 %    .7 (H) 74.0 - 97.0 FL    MCH 28.6 24.0 - 34.0 PG    MCHC 24.5 (L) 31.0 - 37.0 g/dL    RDW 13.6 11.6 - 14.5 %    PLATELET 616 987 - 206 K/uL    MPV 12.2 (H) 9.2 - 11.8 FL    NEUTROPHILS 88 (H) 42 - 75 %    BAND NEUTROPHILS 2 0 - 5 %    LYMPHOCYTES 6 (L) 20 - 51 %    MONOCYTES 4 2 - 9 %    EOSINOPHILS 0 0 - 5 %    BASOPHILS 0 0 - 3 %    ABS. NEUTROPHILS 10.7 (H) 1.8 - 8.0 K/UL    ABS. LYMPHOCYTES 0.7 (L) 0.8 - 3.5 K/UL    ABS. MONOCYTES 0.5 0 - 1.0 K/UL    ABS. EOSINOPHILS 0.0 0.0 - 0.4 K/UL    ABS. BASOPHILS 0.0 0.0 - 0.06 K/UL    PLATELET COMMENTS ADEQUATE PLATELETS      RBC COMMENTS NORMOCYTIC, NORMOCHROMIC      DF MANUAL     METABOLIC PANEL, COMPREHENSIVE    Collection Time: 02/07/18  6:03 PM   Result Value Ref Range    Sodium 129 (L) 136 - 145 mmol/L    Potassium 3.3 (L) 3.5 - 5.5 mmol/L    Chloride 90 (L) 100 - 108 mmol/L    CO2 21 21 - 32 mmol/L    Anion gap 18 3.0 - 18 mmol/L    Glucose 2050 (HH) 74 - 99 mg/dL    BUN 45 (H) 7.0 - 18 MG/DL    Creatinine 3.04 (H) 0.6 - 1.3 MG/DL    BUN/Creatinine ratio 15 12 - 20      GFR est AA 19 (L) >60 ml/min/1.73m2    GFR est non-AA 16 (L) >60 ml/min/1.73m2    Calcium 8.7 8.5 - 10.1 MG/DL    Bilirubin, total 0.4 0.2 - 1.0 MG/DL    ALT (SGPT) 37 13 - 56 U/L    AST (SGOT) 20 15 - 37 U/L    Alk.  phosphatase 127 (H) 45 - 117 U/L    Protein, total 6.0 (L) 6.4 - 8.2 g/dL    Albumin 2.6 (L) 3.4 - 5.0 g/dL    Globulin 3.4 2.0 - 4.0 g/dL    A-G Ratio 0.8 0.8 - 1.7     CARDIAC PANEL,(CK, CKMB & TROPONIN)    Collection Time: 02/07/18  6:03 PM   Result Value Ref Range    CK 70 26 - 192 U/L    CK - MB 4.7 (H) <3.6 ng/ml    CK-MB Index 6.7 (H) 0.0 - 4.0 %    Troponin-I, Qt. 0.23 (H) 0.0 - 0.045 NG/ML   POC G3    Collection Time: 02/07/18  6:24 PM   Result Value Ref Range    Device: VENT      FIO2 (POC) 100 %    pH (POC) 7.009 (LL) 7.35 - 7.45      pCO2 (POC) 75.5 (H) 35.0 - 45.0 MMHG    pO2 (POC) 285 (H) 80 - 100 MMHG    HCO3 (POC) 19.0 (L) 22 - 26 MMOL/L    sO2 (POC) 100 (H) 92 - 97 %    Base deficit (POC) 13 mmol/L    Mode ASSIST CONTROL      Tidal volume 450 ml    Set Rate 16 bpm    PEEP/CPAP (POC) 5 cmH2O    PIP (POC) 20      Allens test (POC) N/A      Inspiratory Time 90 sec    Total resp.  rate 17      Site RIGHT RADIAL      Specimen type (POC) ARTERIAL      Performed by Becca Harper     Volume control plus YES     HEMOGLOBIN A1C WITH EAG    Collection Time: 02/07/18  6:24 PM   Result Value Ref Range    Hemoglobin A1c 15.7 (H) 4.2 - 5.6 %    Est. average glucose Cannot be calculated mg/dL   URINALYSIS W/ RFLX MICROSCOPIC    Collection Time: 02/07/18  6:45 PM   Result Value Ref Range    Color YELLOW      Appearance TURBID      Specific gravity >1.030 (H) 1.005 - 1.030    pH (UA) 5.0 5.0 - 8.0      Protein NEGATIVE  NEG mg/dL    Glucose >1000 (A) NEG mg/dL    Ketone 15 (A) NEG mg/dL    Bilirubin NEGATIVE  NEG      Blood MODERATE (A) NEG      Urobilinogen 0.2 0.2 - 1.0 EU/dL    Nitrites POSITIVE (A) NEG      Leukocyte Esterase MODERATE (A) NEG     URINE MICROSCOPIC ONLY    Collection Time: 02/07/18  6:45 PM   Result Value Ref Range    WBC TOO NUMEROUS TO COUNT 0 - 4 /hpf    RBC 20 to 40 0 - 5 /hpf    Epithelial cells NEGATIVE  0 - 5 /lpf    Bacteria 1+ (A) NEG /hpf    Yeast FEW (A) NEG     POC LACTIC ACID    Collection Time: 02/07/18  6:55 PM   Result Value Ref Range    Lactic Acid (POC) 4.7 (HH) 0.4 - 2.0 mmol/L   GLUCOSTABILIZER    Collection Time: 02/07/18  7:08 PM   Result Value Ref Range    Glucose 999 mg/dL    Insulin order 18.8 units/hour    Insulin adminstered 18.8 units/hour    Multiplier 0.020     Low target 140 mg/dL    High target 180 mg/dL    D50 order 0.0 ml    D50 administered 0.00 ml    Minutes until next BG 60 min    Order initials mfd     Administered initials mfd    GLUCOSE, POC    Collection Time: 02/07/18  8:13 PM   Result Value Ref Range    Glucose (POC) >600 (HH) 70 - 110 mg/dL   GLUCOSE, POC    Collection Time: 02/07/18  8:24 PM   Result Value Ref Range    Glucose (POC) >600 (HH) 70 - 110 mg/dL   GLUCOSTABILIZER    Collection Time: 02/07/18  8:25 PM Result Value Ref Range    Glucose 601 mg/dL    Insulin order 5.4 units/hour    Insulin adminstered 5.4 units/hour    Multiplier 0.010     Low target 140 mg/dL    High target 180 mg/dL    D50 order 0.0 ml    D50 administered 0.00 ml    Minutes until next BG 60 min    Order initials mfd     Administered initials mfd    GLUCOSE, POC    Collection Time: 02/07/18  9:33 PM   Result Value Ref Range    Glucose (POC) >600 (HH) 70 - 110 mg/dL   GLUCOSE, POC    Collection Time: 02/07/18  9:35 PM   Result Value Ref Range    Glucose (POC) >600 (HH) 70 - 110 mg/dL   GLUCOSTABILIZER    Collection Time: 02/07/18  9:37 PM   Result Value Ref Range    Glucose 602 mg/dL    Insulin order 10.8 units/hour    Insulin adminstered 10.8 units/hour    Multiplier 0.020     Low target 140 mg/dL    High target 180 mg/dL    D50 order 0.0 ml    D50 administered 0.00 ml    Minutes until next BG 60 min    Order initials mfd     Administered initials mfd    POC LACTIC ACID    Collection Time: 02/07/18  9:55 PM   Result Value Ref Range    Lactic Acid (POC) 2.7 (HH) 0.4 - 2.0 mmol/L             Telemetry:sinus tachycardia    Imaging:  I have personally reviewed the patients radiographs and have reviewed the reports:  CXR Results  (Last 48 hours)               02/07/18 2013  XR CHEST PORT Final result    Impression:  IMPRESSION:        1. Interval placement of right IJ central venous catheter, distal tip at the   upper SVC. -No evidence for pneumothorax. 2. ETT tip approximately 4.8 cm above the chace. 3. NG tube tip projects at the proximal stomach, sidehole is slightly above the   GE junction. Advancement recommended. 4. Mildly enlarged cardiac silhouette. Mild diffuse prominence of the   bronchovascular markings, possibly a nonspecific bronchitis or mild pulmonary   vascular congestion. 5. Interval development of hazy opacity at the left lung base, possibly   atelectasis.  Edema or infiltrate/aspiration not excluded Narrative:  AP CHEST, PORTABLE       INDICATION: Central line placement       COMPARISON: Prior chest x-rays, most recent earlier same day. FINDINGS:  EKG leads overlie the patient. Endotracheal tube tip projects   approximately 4.8 cm above the chace. NG tube tip projects at the proximal   stomach, sidehole appears to be above the level of the GE junction. Right IJ   central venous catheter tip projects at the upper SVC. Stable mildly enlarged cardiac silhouette. Mild prominence of the   bronchovascular markings. Interval development of hazy opacity at the left lung   base. Costophrenic sulci appear sharp. No evidence for pneumothorax. No acute   osseous abnormalities are identified. 02/07/18 1837  XR CHEST PORT Final result    Impression:  IMPRESSION:        1. Endotracheal tube in satisfactory position. 2. NG tube terminates just below the GE junction. Advancement recommended. 3. No acute pulmonary process. Narrative:  AP CHEST, PORTABLE       INDICATION: Sepsis       COMPARISON: Prior chest x-rays, most recent 2/4/2018       FINDINGS:  EKG leads overlie the patient. Endotracheal tube tip projects   approximately 3.8 cm above the chace. Nasogastric tube terminates just below   the GE junction. Cardiac silhouette is stable, top normal in size. Atherosclerosis noted. The   pulmonary vasculature is unremarkable. No focal consolidation, pleural effusion,   or pneumothorax. No acute osseous abnormalities are identified. Spondylosis and   degenerative changes of the shoulders are noted. IMPRESSION:   · Acute hypoxic, hypercapnic respiratory failure with respiratory arrest- requiring mechanical ventilation.    · Metabolic and respiratory acidosis- [id number]; ?chronic CO2 retainer 2/2 emphysema  · Likely DKA over HHS, type II diabetes (A1C 15)- initital BS 2050, pH 7.0, ketonuria, anion gap 18  · New onset seizures; 2/2 ?hyperosmolar state/metabolic derangements/hyperglycemic coma vs infectious cause vs. Other; loading dose of keppra given in ED. Teleneuro consulted in ED  · Metabolic derangements 2/2 DKA- hypokalemia, hypernatremia (corrected 168), hypophosphatemia   · Possible sepsis? - hypotension, tachycardia, lactic 2.1, bump in WBC from 8.9 (2/4) to 11.9.   · Hypotension 2/2 above  · LUIS ANTONIO 2/2 above  · Elevated troponins, likely secondary to demand/stress  · GI bleed-cough ground emesis   · Hx Emphysema, asthma   · Hx HTN  · Hx Hep C  · Hx diabetic neuropathy   · Hx drug abuse, UDS 2/4 (+) cocaine, smokes crack per notes in April, UDS today (-),       RECOMMENDATIONS:   Resp -  Ventilator-associated Pneumonia bundle, daily weaning protocol. PRN duonebs. Titrate FiO2 for goal >92%. Increased resp at 22 to aid in metabolic acidosis. Oral care per protocol. Daily CXR, daily ABG. ID - Severe sepsis bundle. Rapid flu, BCx2, UC, and resp cx pending. Trend lactics. Trend WBC. Afebrile, aleukocytosis. Cefepime, levaquin, vanco.   CVS - levophed for hypotension. Monitor hemodynamics. Tachycardic, EKG pending. Trend troponins. Heme/Onc-  Trend h/h q4 (with CMPs). H/H, platelets stable. Monitor for active bleeding. Will decrease h/h labs if remains stable. Daily CBC. Metabolic -  Trend lytes and replace generously. 4 runs potassium IV and 40 meq down tube given. 1/2NS with 20 meq potassium 250 mL/hr. 18mmol Kphos given. BMP, phos, mag q4. Renal - trend renal indices, means in place. Strict Is/Os. Possible nephro consult tomorrow. Endocrine - A1C 15, TSH pending. Insulin gtt with glucose stabilizer. Neuro/ Pain/ Sedation - fentanyl gtt, titrate for RASS 0 to -1. PRN fentanyl and versed. Keppra for seizures. Neuro consult tomorrow? PRN versed for seizures  GI - protonix for GI bleed. NPO. Possible gastro consult tomorrow. Prophylaxis - DVT-SCDs, held heparin in setting of GI bleed, GI- protonix.          Total critical care time exclusive of procedures: 60 minutes  Jeff Pérez PA-C  02/07/18

## 2018-02-09 ENCOUNTER — APPOINTMENT (OUTPATIENT)
Dept: GENERAL RADIOLOGY | Age: 63
DRG: 871 | End: 2018-02-09
Attending: PHYSICIAN ASSISTANT
Payer: MEDICARE

## 2018-02-09 LAB
ADMINISTERED INITIALS, ADMINIT: NORMAL
ANION GAP SERPL CALC-SCNC: 5 MMOL/L (ref 3–18)
ANION GAP SERPL CALC-SCNC: 7 MMOL/L (ref 3–18)
ANION GAP SERPL CALC-SCNC: 8 MMOL/L (ref 3–18)
ARTERIAL PATENCY WRIST A: YES
ATRIAL RATE: 83 BPM
BASE DEFICIT BLD-SCNC: 5 MMOL/L
BASOPHILS # BLD: 0 K/UL (ref 0–0.06)
BASOPHILS NFR BLD: 0 % (ref 0–3)
BDY SITE: ABNORMAL
BUN SERPL-MCNC: 20 MG/DL (ref 7–18)
BUN SERPL-MCNC: 21 MG/DL (ref 7–18)
BUN SERPL-MCNC: 23 MG/DL (ref 7–18)
BUN/CREAT SERPL: 14 (ref 12–20)
BUN/CREAT SERPL: 16 (ref 12–20)
BUN/CREAT SERPL: 18 (ref 12–20)
CALCIUM SERPL-MCNC: 7.6 MG/DL (ref 8.5–10.1)
CALCIUM SERPL-MCNC: 7.8 MG/DL (ref 8.5–10.1)
CALCIUM SERPL-MCNC: 7.9 MG/DL (ref 8.5–10.1)
CALCULATED P AXIS, ECG09: 70 DEGREES
CALCULATED R AXIS, ECG10: -38 DEGREES
CALCULATED T AXIS, ECG11: 6 DEGREES
CHLORIDE SERPL-SCNC: 117 MMOL/L (ref 100–108)
CHLORIDE SERPL-SCNC: 118 MMOL/L (ref 100–108)
CHLORIDE SERPL-SCNC: 118 MMOL/L (ref 100–108)
CO2 SERPL-SCNC: 23 MMOL/L (ref 21–32)
CO2 SERPL-SCNC: 25 MMOL/L (ref 21–32)
CO2 SERPL-SCNC: 25 MMOL/L (ref 21–32)
CREAT SERPL-MCNC: 1.28 MG/DL (ref 0.6–1.3)
CREAT SERPL-MCNC: 1.3 MG/DL (ref 0.6–1.3)
CREAT SERPL-MCNC: 1.38 MG/DL (ref 0.6–1.3)
D50 ADMINISTERED, D50ADM: 0 ML
D50 ORDER, D50ORD: 0 ML
DIAGNOSIS, 93000: NORMAL
DIFFERENTIAL METHOD BLD: ABNORMAL
EOSINOPHIL # BLD: 0 K/UL (ref 0–0.4)
EOSINOPHIL NFR BLD: 0 % (ref 0–5)
ERYTHROCYTE [DISTWIDTH] IN BLOOD BY AUTOMATED COUNT: 13 % (ref 11.6–14.5)
GAS FLOW.O2 O2 DELIVERY SYS: ABNORMAL L/MIN
GAS FLOW.O2 SETTING OXYMISER: 22 BPM
GLUCOSE BLD STRIP.AUTO-MCNC: 155 MG/DL (ref 70–110)
GLUCOSE BLD STRIP.AUTO-MCNC: 165 MG/DL (ref 70–110)
GLUCOSE BLD STRIP.AUTO-MCNC: 178 MG/DL (ref 70–110)
GLUCOSE BLD STRIP.AUTO-MCNC: 217 MG/DL (ref 70–110)
GLUCOSE BLD STRIP.AUTO-MCNC: 218 MG/DL (ref 70–110)
GLUCOSE BLD STRIP.AUTO-MCNC: 241 MG/DL (ref 70–110)
GLUCOSE BLD STRIP.AUTO-MCNC: 252 MG/DL (ref 70–110)
GLUCOSE BLD STRIP.AUTO-MCNC: 272 MG/DL (ref 70–110)
GLUCOSE BLD STRIP.AUTO-MCNC: 275 MG/DL (ref 70–110)
GLUCOSE BLD STRIP.AUTO-MCNC: 312 MG/DL (ref 70–110)
GLUCOSE BLD STRIP.AUTO-MCNC: 348 MG/DL (ref 70–110)
GLUCOSE SERPL-MCNC: 166 MG/DL (ref 74–99)
GLUCOSE SERPL-MCNC: 191 MG/DL (ref 74–99)
GLUCOSE SERPL-MCNC: 333 MG/DL (ref 74–99)
GLUCOSE, GLC: 155 MG/DL
GLUCOSE, GLC: 178 MG/DL
GLUCOSE, GLC: 217 MG/DL
GLUCOSE, GLC: 218 MG/DL
GLUCOSE, GLC: 241 MG/DL
GLUCOSE, GLC: 252 MG/DL
HCO3 BLD-SCNC: 20.1 MMOL/L (ref 22–26)
HCT VFR BLD AUTO: 32.9 % (ref 35–45)
HGB BLD-MCNC: 11.2 G/DL (ref 12–16)
HIGH TARGET, HITG: 180 MG/DL
INSULIN ADMINSTERED, INSADM: 1.9 UNITS/HOUR
INSULIN ADMINSTERED, INSADM: 2.9 UNITS/HOUR
INSULIN ADMINSTERED, INSADM: 3.5 UNITS/HOUR
INSULIN ADMINSTERED, INSADM: 4.7 UNITS/HOUR
INSULIN ADMINSTERED, INSADM: 7.2 UNITS/HOUR
INSULIN ADMINSTERED, INSADM: 9.6 UNITS/HOUR
INSULIN ORDER, INSORD: 2.9 UNITS/HOUR
INSULIN ORDER, INSORD: 3.5 UNITS/HOUR
INSULIN ORDER, INSORD: 4.7 UNITS/HOUR
INSULIN ORDER, INSORD: 7.2 UNITS/HOUR
INSULIN ORDER, INSORD: 9.5 UNITS/HOUR
INSULIN ORDER, INSORD: 9.6 UNITS/HOUR
LACTATE SERPL-SCNC: 1.2 MMOL/L (ref 0.4–2)
LOW TARGET, LOT: 140 MG/DL
LYMPHOCYTES # BLD: 2.1 K/UL (ref 0.8–3.5)
LYMPHOCYTES NFR BLD: 19 % (ref 20–51)
MAGNESIUM SERPL-MCNC: 1.8 MG/DL (ref 1.6–2.6)
MAGNESIUM SERPL-MCNC: 2 MG/DL (ref 1.6–2.6)
MAGNESIUM SERPL-MCNC: 2 MG/DL (ref 1.6–2.6)
MCH RBC QN AUTO: 28.3 PG (ref 24–34)
MCHC RBC AUTO-ENTMCNC: 34 G/DL (ref 31–37)
MCV RBC AUTO: 83.1 FL (ref 74–97)
MINUTES UNTIL NEXT BG, NBG: 60 MIN
MONOCYTES # BLD: 0.5 K/UL (ref 0–1)
MONOCYTES NFR BLD: 5 % (ref 2–9)
MULTIPLIER, MUL: 0.03
MULTIPLIER, MUL: 0.04
MULTIPLIER, MUL: 0.05
MULTIPLIER, MUL: 0.06
NEUTS BAND NFR BLD MANUAL: 25 % (ref 0–5)
NEUTS SEG # BLD: 8.2 K/UL (ref 1.8–8)
NEUTS SEG NFR BLD: 51 % (ref 42–75)
O2/TOTAL GAS SETTING VFR VENT: 35 %
ORDER INITIALS, ORDINIT: NORMAL
P-R INTERVAL, ECG05: 168 MS
PCO2 BLD: 32.2 MMHG (ref 35–45)
PEEP RESPIRATORY: 5 CMH2O
PH BLD: 7.4 [PH] (ref 7.35–7.45)
PHOSPHATE SERPL-MCNC: 1.3 MG/DL (ref 2.5–4.9)
PHOSPHATE SERPL-MCNC: 1.7 MG/DL (ref 2.5–4.9)
PHOSPHATE SERPL-MCNC: 2.1 MG/DL (ref 2.5–4.9)
PHOSPHATE SERPL-MCNC: 2.3 MG/DL (ref 2.5–4.9)
PIP ISTAT,IPIP: 19
PLATELET # BLD AUTO: 132 K/UL (ref 135–420)
PLATELET COMMENTS,PCOM: ABNORMAL
PMV BLD AUTO: 11.8 FL (ref 9.2–11.8)
PO2 BLD: 70 MMHG (ref 80–100)
POTASSIUM SERPL-SCNC: 2.9 MMOL/L (ref 3.5–5.5)
POTASSIUM SERPL-SCNC: 3.5 MMOL/L (ref 3.5–5.5)
POTASSIUM SERPL-SCNC: 3.6 MMOL/L (ref 3.5–5.5)
POTASSIUM SERPL-SCNC: 4 MMOL/L (ref 3.5–5.5)
Q-T INTERVAL, ECG07: 508 MS
QRS DURATION, ECG06: 104 MS
QTC CALCULATION (BEZET), ECG08: 596 MS
RBC # BLD AUTO: 3.96 M/UL (ref 4.2–5.3)
RBC MORPH BLD: ABNORMAL
SAO2 % BLD: 94 % (ref 92–97)
SERVICE CMNT-IMP: ABNORMAL
SODIUM SERPL-SCNC: 147 MMOL/L (ref 136–145)
SODIUM SERPL-SCNC: 149 MMOL/L (ref 136–145)
SODIUM SERPL-SCNC: 150 MMOL/L (ref 136–145)
SPECIMEN TYPE: ABNORMAL
TOTAL RESP. RATE, ITRR: 22
VANCOMYCIN SERPL-MCNC: 17.2 UG/ML (ref 5–40)
VENTILATION MODE VENT: ABNORMAL
VENTRICULAR RATE, ECG03: 83 BPM
VOLUME CONTROL PLUS IVLCP: YES
VT SETTING VENT: 450 ML
WBC # BLD AUTO: 10.8 K/UL (ref 4.6–13.2)

## 2018-02-09 PROCEDURE — 71045 X-RAY EXAM CHEST 1 VIEW: CPT

## 2018-02-09 PROCEDURE — 74011250636 HC RX REV CODE- 250/636: Performed by: PHYSICIAN ASSISTANT

## 2018-02-09 PROCEDURE — 83735 ASSAY OF MAGNESIUM: CPT | Performed by: PHYSICIAN ASSISTANT

## 2018-02-09 PROCEDURE — 83605 ASSAY OF LACTIC ACID: CPT | Performed by: PHYSICIAN ASSISTANT

## 2018-02-09 PROCEDURE — 74011636637 HC RX REV CODE- 636/637: Performed by: INTERNAL MEDICINE

## 2018-02-09 PROCEDURE — 74011250637 HC RX REV CODE- 250/637: Performed by: PHYSICIAN ASSISTANT

## 2018-02-09 PROCEDURE — 74011250636 HC RX REV CODE- 250/636: Performed by: INTERNAL MEDICINE

## 2018-02-09 PROCEDURE — 85025 COMPLETE CBC W/AUTO DIFF WBC: CPT | Performed by: PHYSICIAN ASSISTANT

## 2018-02-09 PROCEDURE — 36600 WITHDRAWAL OF ARTERIAL BLOOD: CPT

## 2018-02-09 PROCEDURE — 87040 BLOOD CULTURE FOR BACTERIA: CPT | Performed by: PHYSICIAN ASSISTANT

## 2018-02-09 PROCEDURE — 82803 BLOOD GASES ANY COMBINATION: CPT

## 2018-02-09 PROCEDURE — 74011000250 HC RX REV CODE- 250: Performed by: INTERNAL MEDICINE

## 2018-02-09 PROCEDURE — 80048 BASIC METABOLIC PNL TOTAL CA: CPT | Performed by: PHYSICIAN ASSISTANT

## 2018-02-09 PROCEDURE — 94640 AIRWAY INHALATION TREATMENT: CPT

## 2018-02-09 PROCEDURE — 74011250636 HC RX REV CODE- 250/636: Performed by: EMERGENCY MEDICINE

## 2018-02-09 PROCEDURE — 74011000258 HC RX REV CODE- 258: Performed by: PHYSICIAN ASSISTANT

## 2018-02-09 PROCEDURE — 74011636637 HC RX REV CODE- 636/637: Performed by: PHYSICIAN ASSISTANT

## 2018-02-09 PROCEDURE — 74011000250 HC RX REV CODE- 250: Performed by: PHYSICIAN ASSISTANT

## 2018-02-09 PROCEDURE — 65610000006 HC RM INTENSIVE CARE

## 2018-02-09 PROCEDURE — 74011000258 HC RX REV CODE- 258: Performed by: INTERNAL MEDICINE

## 2018-02-09 PROCEDURE — 84100 ASSAY OF PHOSPHORUS: CPT | Performed by: INTERNAL MEDICINE

## 2018-02-09 PROCEDURE — 94003 VENT MGMT INPAT SUBQ DAY: CPT

## 2018-02-09 PROCEDURE — 80202 ASSAY OF VANCOMYCIN: CPT | Performed by: EMERGENCY MEDICINE

## 2018-02-09 PROCEDURE — 36415 COLL VENOUS BLD VENIPUNCTURE: CPT | Performed by: PHYSICIAN ASSISTANT

## 2018-02-09 PROCEDURE — 82962 GLUCOSE BLOOD TEST: CPT

## 2018-02-09 PROCEDURE — 36592 COLLECT BLOOD FROM PICC: CPT

## 2018-02-09 PROCEDURE — 84100 ASSAY OF PHOSPHORUS: CPT | Performed by: PHYSICIAN ASSISTANT

## 2018-02-09 RX ORDER — MAGNESIUM SULFATE 100 %
4 CRYSTALS MISCELLANEOUS AS NEEDED
Status: DISCONTINUED | OUTPATIENT
Start: 2018-02-09 | End: 2018-02-09

## 2018-02-09 RX ORDER — POTASSIUM CHLORIDE 7.45 MG/ML
10 INJECTION INTRAVENOUS
Status: COMPLETED | OUTPATIENT
Start: 2018-02-10 | End: 2018-02-10

## 2018-02-09 RX ORDER — POLYETHYLENE GLYCOL 3350 17 G/17G
17 POWDER, FOR SOLUTION ORAL DAILY
Status: DISCONTINUED | OUTPATIENT
Start: 2018-02-09 | End: 2018-02-15 | Stop reason: HOSPADM

## 2018-02-09 RX ORDER — DEXTROSE MONOHYDRATE AND SODIUM CHLORIDE 5; .225 G/100ML; G/100ML
75 INJECTION, SOLUTION INTRAVENOUS CONTINUOUS
Status: DISCONTINUED | OUTPATIENT
Start: 2018-02-09 | End: 2018-02-09

## 2018-02-09 RX ORDER — POTASSIUM CHLORIDE 7.45 MG/ML
10 INJECTION INTRAVENOUS
Status: COMPLETED | OUTPATIENT
Start: 2018-02-09 | End: 2018-02-10

## 2018-02-09 RX ORDER — MAGNESIUM SULFATE 1 G/100ML
1 INJECTION INTRAVENOUS ONCE
Status: COMPLETED | OUTPATIENT
Start: 2018-02-09 | End: 2018-02-10

## 2018-02-09 RX ORDER — POTASSIUM CHLORIDE 7.45 MG/ML
10 INJECTION INTRAVENOUS
Status: DISCONTINUED | OUTPATIENT
Start: 2018-02-09 | End: 2018-02-09

## 2018-02-09 RX ORDER — INSULIN LISPRO 100 [IU]/ML
INJECTION, SOLUTION INTRAVENOUS; SUBCUTANEOUS EVERY 6 HOURS
Status: DISCONTINUED | OUTPATIENT
Start: 2018-02-09 | End: 2018-02-12

## 2018-02-09 RX ORDER — INSULIN GLARGINE 100 [IU]/ML
10 INJECTION, SOLUTION SUBCUTANEOUS DAILY
Status: DISCONTINUED | OUTPATIENT
Start: 2018-02-09 | End: 2018-02-09

## 2018-02-09 RX ORDER — DEXTROSE, SODIUM CHLORIDE, AND POTASSIUM CHLORIDE 5; .45; .3 G/100ML; G/100ML; G/100ML
INJECTION INTRAVENOUS CONTINUOUS
Status: DISCONTINUED | OUTPATIENT
Start: 2018-02-09 | End: 2018-02-09

## 2018-02-09 RX ORDER — DEXTROSE MONOHYDRATE AND SODIUM CHLORIDE 5; .45 G/100ML; G/100ML
75 INJECTION, SOLUTION INTRAVENOUS CONTINUOUS
Status: DISCONTINUED | OUTPATIENT
Start: 2018-02-09 | End: 2018-02-09

## 2018-02-09 RX ORDER — DEXTROSE 50 % IN WATER (D50W) INTRAVENOUS SYRINGE
25-50 AS NEEDED
Status: DISCONTINUED | OUTPATIENT
Start: 2018-02-09 | End: 2018-02-15 | Stop reason: HOSPADM

## 2018-02-09 RX ORDER — MAGNESIUM SULFATE 1 G/100ML
1 INJECTION INTRAVENOUS ONCE
Status: COMPLETED | OUTPATIENT
Start: 2018-02-10 | End: 2018-02-10

## 2018-02-09 RX ORDER — AMOXICILLIN 250 MG
1 CAPSULE ORAL DAILY
Status: DISCONTINUED | OUTPATIENT
Start: 2018-02-09 | End: 2018-02-11

## 2018-02-09 RX ORDER — POTASSIUM CHLORIDE 1.5 G/1.77G
40 POWDER, FOR SOLUTION ORAL
Status: COMPLETED | OUTPATIENT
Start: 2018-02-09 | End: 2018-02-09

## 2018-02-09 RX ORDER — SODIUM CHLORIDE 450 MG/100ML
50 INJECTION, SOLUTION INTRAVENOUS CONTINUOUS
Status: DISCONTINUED | OUTPATIENT
Start: 2018-02-09 | End: 2018-02-11

## 2018-02-09 RX ORDER — INSULIN GLARGINE 100 [IU]/ML
15 INJECTION, SOLUTION SUBCUTANEOUS DAILY
Status: DISCONTINUED | OUTPATIENT
Start: 2018-02-10 | End: 2018-02-11

## 2018-02-09 RX ORDER — MAGNESIUM SULFATE 100 %
4 CRYSTALS MISCELLANEOUS AS NEEDED
Status: DISCONTINUED | OUTPATIENT
Start: 2018-02-09 | End: 2018-02-15 | Stop reason: HOSPADM

## 2018-02-09 RX ORDER — DEXTROSE 50 % IN WATER (D50W) INTRAVENOUS SYRINGE
25-50 AS NEEDED
Status: DISCONTINUED | OUTPATIENT
Start: 2018-02-09 | End: 2018-02-09

## 2018-02-09 RX ADMIN — IPRATROPIUM BROMIDE AND ALBUTEROL SULFATE 3 ML: .5; 3 SOLUTION RESPIRATORY (INHALATION) at 03:34

## 2018-02-09 RX ADMIN — POTASSIUM CHLORIDE 40 MEQ: 1.5 POWDER, FOR SOLUTION ORAL at 07:53

## 2018-02-09 RX ADMIN — LEVETIRACETAM 500 MG: 5 INJECTION INTRAVENOUS at 22:32

## 2018-02-09 RX ADMIN — LEVETIRACETAM 1000 MG: 5 INJECTION INTRAVENOUS at 07:54

## 2018-02-09 RX ADMIN — POTASSIUM CHLORIDE 10 MEQ: 10 INJECTION, SOLUTION INTRAVENOUS at 06:15

## 2018-02-09 RX ADMIN — DEXMEDETOMIDINE HYDROCHLORIDE 0.7 MCG/KG/HR: 100 INJECTION, SOLUTION INTRAVENOUS at 13:37

## 2018-02-09 RX ADMIN — FENTANYL CITRATE 50 MCG: 50 INJECTION INTRAMUSCULAR; INTRAVENOUS at 08:04

## 2018-02-09 RX ADMIN — IPRATROPIUM BROMIDE AND ALBUTEROL SULFATE 3 ML: .5; 3 SOLUTION RESPIRATORY (INHALATION) at 14:05

## 2018-02-09 RX ADMIN — DEXMEDETOMIDINE HYDROCHLORIDE 0.7 MCG/KG/HR: 100 INJECTION, SOLUTION INTRAVENOUS at 10:06

## 2018-02-09 RX ADMIN — FENTANYL CITRATE 50 MCG: 50 INJECTION INTRAMUSCULAR; INTRAVENOUS at 00:41

## 2018-02-09 RX ADMIN — METRONIDAZOLE 500 MG: 500 INJECTION, SOLUTION INTRAVENOUS at 00:44

## 2018-02-09 RX ADMIN — IPRATROPIUM BROMIDE AND ALBUTEROL SULFATE 3 ML: .5; 3 SOLUTION RESPIRATORY (INHALATION) at 00:10

## 2018-02-09 RX ADMIN — POTASSIUM CHLORIDE 10 MEQ: 10 INJECTION, SOLUTION INTRAVENOUS at 05:11

## 2018-02-09 RX ADMIN — INSULIN GLARGINE 10 UNITS: 100 INJECTION, SOLUTION SUBCUTANEOUS at 08:04

## 2018-02-09 RX ADMIN — MIDAZOLAM HYDROCHLORIDE 2 MG: 1 INJECTION, SOLUTION INTRAMUSCULAR; INTRAVENOUS at 03:00

## 2018-02-09 RX ADMIN — MAGNESIUM SULFATE IN DEXTROSE 1 G: 10 INJECTION, SOLUTION INTRAVENOUS at 07:53

## 2018-02-09 RX ADMIN — INSULIN LISPRO 10 UNITS: 100 INJECTION, SOLUTION INTRAVENOUS; SUBCUTANEOUS at 11:51

## 2018-02-09 RX ADMIN — MAGNESIUM SULFATE IN DEXTROSE 1 G: 10 INJECTION, SOLUTION INTRAVENOUS at 23:57

## 2018-02-09 RX ADMIN — MIDAZOLAM HYDROCHLORIDE 2 MG: 1 INJECTION, SOLUTION INTRAMUSCULAR; INTRAVENOUS at 07:51

## 2018-02-09 RX ADMIN — CEFEPIME HYDROCHLORIDE 2 G: 2 INJECTION, POWDER, FOR SOLUTION INTRAVENOUS at 16:14

## 2018-02-09 RX ADMIN — THIAMINE HYDROCHLORIDE 100 MG: 100 INJECTION, SOLUTION INTRAMUSCULAR; INTRAVENOUS at 08:00

## 2018-02-09 RX ADMIN — CEFEPIME HYDROCHLORIDE 2 G: 2 INJECTION, POWDER, FOR SOLUTION INTRAVENOUS at 05:11

## 2018-02-09 RX ADMIN — FOLIC ACID 1 MG: 1 TABLET ORAL at 08:00

## 2018-02-09 RX ADMIN — INSULIN LISPRO 8 UNITS: 100 INJECTION, SOLUTION INTRAVENOUS; SUBCUTANEOUS at 17:56

## 2018-02-09 RX ADMIN — INSULIN LISPRO 6 UNITS: 100 INJECTION, SOLUTION INTRAVENOUS; SUBCUTANEOUS at 23:21

## 2018-02-09 RX ADMIN — POLYETHYLENE GLYCOL 3350 17 G: 17 POWDER, FOR SOLUTION ORAL at 10:16

## 2018-02-09 RX ADMIN — IPRATROPIUM BROMIDE AND ALBUTEROL SULFATE 3 ML: .5; 3 SOLUTION RESPIRATORY (INHALATION) at 09:48

## 2018-02-09 RX ADMIN — Medication 10 ML: at 13:25

## 2018-02-09 RX ADMIN — CHLORHEXIDINE GLUCONATE 10 ML: 1.2 RINSE ORAL at 08:00

## 2018-02-09 RX ADMIN — HEPARIN SODIUM 5000 UNITS: 5000 INJECTION, SOLUTION INTRAVENOUS; SUBCUTANEOUS at 05:10

## 2018-02-09 RX ADMIN — FENTANYL CITRATE 50 MCG: 50 INJECTION INTRAMUSCULAR; INTRAVENOUS at 05:10

## 2018-02-09 RX ADMIN — SODIUM CHLORIDE 20 ML/HR: 450 INJECTION, SOLUTION INTRAVENOUS at 10:13

## 2018-02-09 RX ADMIN — HEPARIN SODIUM 5000 UNITS: 5000 INJECTION, SOLUTION INTRAVENOUS; SUBCUTANEOUS at 21:56

## 2018-02-09 RX ADMIN — METRONIDAZOLE 500 MG: 500 INJECTION, SOLUTION INTRAVENOUS at 13:25

## 2018-02-09 RX ADMIN — POTASSIUM CHLORIDE 10 MEQ: 10 INJECTION, SOLUTION INTRAVENOUS at 18:48

## 2018-02-09 RX ADMIN — IPRATROPIUM BROMIDE AND ALBUTEROL SULFATE 3 ML: .5; 3 SOLUTION RESPIRATORY (INHALATION) at 21:05

## 2018-02-09 RX ADMIN — DEXTROSE MONOHYDRATE AND SODIUM CHLORIDE 75 ML/HR: 5; .45 INJECTION, SOLUTION INTRAVENOUS at 09:07

## 2018-02-09 RX ADMIN — LEVETIRACETAM 500 MG: 5 INJECTION INTRAVENOUS at 19:59

## 2018-02-09 RX ADMIN — VANCOMYCIN HYDROCHLORIDE 1250 MG: 10 INJECTION, POWDER, LYOPHILIZED, FOR SOLUTION INTRAVENOUS at 11:53

## 2018-02-09 RX ADMIN — POTASSIUM PHOSPHATE, MONOBASIC AND POTASSIUM PHOSPHATE, DIBASIC: 224; 236 INJECTION, SOLUTION INTRAVENOUS at 10:08

## 2018-02-09 RX ADMIN — HEPARIN SODIUM 5000 UNITS: 5000 INJECTION, SOLUTION INTRAVENOUS; SUBCUTANEOUS at 13:25

## 2018-02-09 RX ADMIN — POTASSIUM CHLORIDE 10 MEQ: 10 INJECTION, SOLUTION INTRAVENOUS at 07:38

## 2018-02-09 RX ADMIN — POTASSIUM CHLORIDE 10 MEQ: 10 INJECTION, SOLUTION INTRAVENOUS at 07:53

## 2018-02-09 RX ADMIN — METRONIDAZOLE 500 MG: 500 INJECTION, SOLUTION INTRAVENOUS at 23:21

## 2018-02-09 RX ADMIN — Medication 10 ML: at 20:01

## 2018-02-09 RX ADMIN — STANDARDIZED SENNA CONCENTRATE AND DOCUSATE SODIUM 1 TABLET: 8.6; 5 TABLET, FILM COATED ORAL at 10:17

## 2018-02-09 RX ADMIN — POTASSIUM CHLORIDE 10 MEQ: 10 INJECTION, SOLUTION INTRAVENOUS at 17:53

## 2018-02-09 RX ADMIN — Medication 10 ML: at 05:17

## 2018-02-09 NOTE — CONSULTS
Re:  Aurelia Butler Smicarmel up visit     2/9/2018 1:43 PM    SSN: [id number]    Subjective:   Aurelia Suarez is seen in follow up. She's still intubated and in bed.       Medications:    Current Facility-Administered Medications   Medication Dose Route Frequency Provider Last Rate Last Dose    insulin glargine (LANTUS) injection 10 Units  10 Units SubCUTAneous DAILY Rima Miranda PA-C   10 Units at 02/09/18 0804    potassium phosphate 9 mmol in dextrose 5% 250 mL infusion   IntraVENous ONCE Franc Quinteros PA-C        insulin lispro (HUMALOG) injection   SubCUTAneous Q6H Nannette Price MD   10 Units at 02/09/18 1151    dexmedeTOMidine (PRECEDEX) 600 mcg in 0.9% sodium chloride 150 mL infusion  0.2-0.7 mcg/kg/hr IntraVENous TITRATE January-Joann HERNÁNDEZ PA-C 13.8 mL/hr at 02/09/18 1337 0.7 mcg/kg/hr at 02/09/18 1337    vancomycin (VANCOCIN) 1,250 mg in 0.9% sodium chloride 250 mL IVPB  1,250 mg IntraVENous Q24H Nannette Price .7 mL/hr at 02/09/18 1153 1,250 mg at 02/09/18 1153    [START ON 2/11/2018] Vancomycin Lab Information  1 Each Other Daily Nannette Price MD        polyethylene glycol (MIRALAX) packet 17 g  17 g Oral DAILY January-Joann HERNÁNDEZ PA-C   17 g at 02/09/18 1016    senna-docusate (PERICOLACE) 8.6-50 mg per tablet 1 Tab  1 Tab Oral DAILY January-Joann HERNÁNDEZ PA-C   1 Tab at 02/09/18 1017    0.45% sodium chloride infusion  20 mL/hr IntraVENous CONTINUOUS January-Joann HERNÁNDEZ PA-C 20 mL/hr at 02/09/18 1013 20 mL/hr at 02/09/18 1013    glucose chewable tablet 16 g  4 Tab Oral PRN Nannette Price MD        glucagon (GLUCAGEN) injection 1 mg  1 mg IntraMUSCular PRN Nannette Price MD        dextrose (D50W) injection syrg 12.5-25 g  25-50 mL IntraVENous PRN Nannette Price MD        heparin (porcine) injection 5,000 Units  5,000 Units SubCUTAneous Jeff Veliz MD   5,000 Units at 02/09/18 1325    thiamine (B-1) injection 100 mg  100 mg IntraMUSCular DAILY Aidan LOVE Claudia Valle MD   100 mg at 02/09/18 0800    fentaNYL citrate (PF) injection 25-50 mcg  25-50 mcg IntraVENous Q4H PRN January-Joann HERNÁNDEZ PA-C   50 mcg at 02/09/18 0804    midazolam (VERSED) injection 1-2 mg  1-2 mg IntraVENous Q4H PRN January-Jidawn HERNÁNDEZ PA-C   2 mg at 02/09/18 0751    metroNIDAZOLE (FLAGYL) IVPB premix 500 mg  500 mg IntraVENous Q12H Michelle Gilliam  mL/hr at 02/09/18 1325 500 mg at 02/09/18 1325    influenza vaccine 2017-18 (3 yrs+)(PF) (FLUZONE QUAD/FLUARIX QUAD) injection 0.5 mL  0.5 mL IntraMUSCular PRIOR TO DISCHARGE January-Joann HERNÁNDEZ PA-C        cefepime (MAXIPIME) 2 g in sterile water (preservative free) 10 mL IV syringe  2 g IntraVENous Q12H Michelle Gilliam MD   2 g at 02/09/18 0511    albuterol-ipratropium (DUO-NEB) 2.5 MG-0.5 MG/3 ML  3 mL Nebulization Q4H RT January-Joann HERNÁNDEZ PA-C   3 mL at 02/09/18 0948    [START ON 2/10/2018] pantoprazole (PROTONIX) granules for oral suspension 40 mg  40 mg Per NG tube DAILY Patricia Zuñiga PA-C        sodium chloride (NS) flush 5-10 mL  5-10 mL IntraVENous PRN Dilia Last MD        levETIRAcetam (KEPPRA) 500 mg in saline (iso-osm) 100 ml IVPB  1,000 mg IntraVENous Q12H Dilia Last  mL/hr at 02/09/18 0754 1,000 mg at 02/09/18 0754    chlorhexidine (PERIDEX) 0.12 % mouthwash 10 mL  10 mL Oral Q12H Patricia Zuñiga PA-C   10 mL at 02/09/18 0800    albuterol-ipratropium (DUO-NEB) 2.5 MG-0.5 MG/3 ML  3 mL Nebulization Q6H PRN Lori Farmer PA-C        sodium chloride (NS) flush 5-10 mL  5-10 mL IntraVENous Q8H Jacklyn Gonzalez MD   10 mL at 02/09/18 1325    sodium chloride (NS) flush 5-10 mL  5-10 mL IntraVENous PRN Jacklyn Gonzalez MD        acetaminophen (TYLENOL) suppository 650 mg  650 mg Rectal Q6H PRN Jacklyn Gonzalez MD        folic acid (FOLVITE) tablet 1 mg  1 mg Per NG tube DAILY Patricia Zuñiga PA-C   1 mg at 02/09/18 0800       Vital signs:    Visit Vitals    BP 95/55    Pulse 83    Temp 99 °F (37.2 °C)    Resp 23    Wt 78.6 kg (173 lb 4.5 oz)    SpO2 99%    Breastfeeding No    BMI 27.14 kg/m2       Review of Systems:   Could not be obtained from the patient because of the medical status. Patient Active Problem List   Diagnosis Code    Diverticula of colon K57.30    Sepsis ([facility] A41.9    Sepsis secondary to UTI ([facility] A41.9, N39.0    DM hyperosmolarity type II, uncontrolled ([address] E11.00, E11.65    HTN (hypertension) I10    Febrile illness, acute R50.9    Gram-negative bacteremia R78.81    Diabetic neuropathy ([facility] E11.40    Osteoarthritis of both knees M17.0    Acidosis E87.2    Respiratory failure ([facility] J96.90    Shock ([facility] R57.9    Hyperosmolar (nonketotic) coma ([facility] E11.01    Acute UTI N39.0    Macrocytic anemia D53.9         Objective: The patient is thrashing all 4 limbs fairly symmetrically. She's sedated on Precedex and not following commands. Cranial Nerves: II  Visual fields are full to threat. III, IV, VI  Extraocular movements are intact. There is no nystagmus. V  Facial sensation is intact to pinprick. VII  Face is symmetrical.    Motor: The patient moves all four limbs fairly well and symmetrically, but not to command. Tone is normal. Reflexes are 2+ and symmetrical. Plantars are down going.      CBC:   Lab Results   Component Value Date/Time    WBC 10.8 02/09/2018 04:15 AM    RBC 3.96 (L) 02/09/2018 04:15 AM    HGB 11.2 (L) 02/09/2018 04:15 AM    HCT 32.9 (L) 02/09/2018 04:15 AM    PLATELET 621 (L) 14/11/7226 04:15 AM     BMP:   Lab Results   Component Value Date/Time    Glucose 191 (H) 02/09/2018 04:15 AM    Sodium 149 (H) 02/09/2018 04:15 AM    Potassium 2.9 (LL) 02/09/2018 04:15 AM    Chloride 118 (H) 02/09/2018 04:15 AM    CO2 23 02/09/2018 04:15 AM    BUN 21 (H) 02/09/2018 04:15 AM    Creatinine 1.28 02/09/2018 04:15 AM    Calcium 7.9 (L) 02/09/2018 04:15 AM     CMP:   Lab Results   Component Value Date/Time    Glucose 191 (H) 02/09/2018 04:15 AM Sodium 149 (H) 02/09/2018 04:15 AM    Potassium 2.9 (LL) 02/09/2018 04:15 AM    Chloride 118 (H) 02/09/2018 04:15 AM    CO2 23 02/09/2018 04:15 AM    BUN 21 (H) 02/09/2018 04:15 AM    Creatinine 1.28 02/09/2018 04:15 AM    Calcium 7.9 (L) 02/09/2018 04:15 AM    Anion gap 8 02/09/2018 04:15 AM    BUN/Creatinine ratio 16 02/09/2018 04:15 AM    Alk. phosphatase 133 (H) 02/07/2018 09:47 PM    Protein, total 6.3 (L) 02/07/2018 09:47 PM    Albumin 2.6 (L) 02/07/2018 09:47 PM    Globulin 3.7 02/07/2018 09:47 PM    A-G Ratio 0.7 (L) 02/07/2018 09:47 PM     Coagulation:   Lab Results   Component Value Date/Time    Prothrombin time 15.8 (H) 02/08/2018 05:00 AM    INR 1.3 (H) 02/08/2018 05:00 AM    aPTT 24.8 02/07/2018 09:47 PM     Cardiac markers:   Lab Results   Component Value Date/Time     02/08/2018 05:00 AM    CK-MB Index 7.7 (H) 02/08/2018 05:00 AM     EEG no seizure activity    Assessment:  Seizures, well controlled now, in this patient who has risk factors including severe metabolic derangement with glucoses >2000. Plan: Will monitor. Continue Keppra. Sincerely,        Noe Raymond.  Stephanie Amos M.D.

## 2018-02-09 NOTE — PROGRESS NOTES
SBT initiated, PS 7, PEEP +5, FiO2 30%. Pt awake, thrashing in bed but does not follow any commands. 02/09/18 0952   Weaning Parameters   Spontaneous Breathing Trial Complete Yes   Resp Rate Observed 26   Ve 10.6      RSBI 79     Pt very agitated. Respiratory mechanics within limits. Will continue to monitor.

## 2018-02-09 NOTE — PROCEDURES
[facility]  EEG    Alec Fleming  MR#: [id number]  : 1955  ACCOUNT #: [de-identified]   DATE OF SERVICE: 2018    REFERRING PHYSICIAN:  Gabriel Quiroz PA-C    EEG NUMBER:  [facility] [id number]. CLINICAL:  This is an apparently comatose EEG on this 70-year-old woman who came in with some seizure-like activity. She had extremely high blood glucose when she presented to the hospital.    MEDICATIONS:  Include Norvasc, aspirin and Keppra. EEG REPORT:  The predominant apparently comatose background consists of 15-20 microvolts, irregular but symmetrical, 4-5 Hz waves mixed with frequent recurring 20-30 microvolt, 3-4 Hz waves. Bifrontal 3-5 microvolt, 16-20 Hz activity is seen which is symmetric in its occurrence. External stimulation including photic stimulation and tactile stimulation causes no change in the record. IMPRESSION:  Abnormal EEG due to the presence of generalized slow wave activity. No epileptiform or focal abnormality was identified.       Cee Bruno MD       MAG / SN  D: 2018 10:05     T: 2018 14:57  JOB #: [id number]  CC: Gabriel Quiroz PA-C  [facility] [place], [id number] [address]

## 2018-02-09 NOTE — DIABETES MGMT
GLYCEMIC CONTROL PLAN OF CARE     Assessment/Recommendations:  Pt discussed in interdisciplinary rounds, Noted pt transitioned to subcutaneous insulin and insulin drip discontinued. Blood glucose has trended up. Consider increasing Lantus insulin to 15 units. Advance to the very insulin resistant correctional Lispro scale. Noted plan to start tube feeds and discontinue D5 1/2NS at 75 mL/hr. Most recent blood glucose values:  2/9/2018 06:44 2/9/2018 07:37 2/9/2018 08:39 2/9/2018 11:48   252 (H) 218 (H) 272 (H) 348 (H)     Current A1C of 15.7% is equivalent to average blood glucose of 404 mg/dl over the past 2-3 months. Current hospital diabetes medications:   Lantus insulin 10 units daily   Correctional Lispro insulin every 6 hours (normal insulin sensitivity)    Previous day's insulin requirements:   128.2 units of Regular insulin via insulin drip    Home diabetes medications:  Glimepiride 2 mg   Janumet   Insulin?     Diet:  NPO  TF: Glucerna 1.5 at rate of 20 mL/hr (plan to start)    Education:  ____Refer to Diabetes Education Record             __x__Education not indicated at this time, will follow up when appropriate       Alfredo Kirkpatrick RD, CDE

## 2018-02-09 NOTE — PROGRESS NOTES
Pt extubated to room air following successful SBT and positive cuff leak test. Suctioned airway pre and post procedure. RN Jaimie Peng at bedside. No complications.

## 2018-02-09 NOTE — PROGRESS NOTES
RENAL DAILY PROGRESS NOTE    Patient: Julio Yarbrough               Sex: female          DOA: 2/7/2018  5:41 PM        YOB: 1955      Age:  58 y.o.        LOS:  LOS: 2 days     Subjective:     Aurelia Cochran is a 58 y.o.  who presents with Hyperosmolar (nonketotic) coma ([address]  Acidosis  Respiratory failure ([address]  Shock ([address] Asked to evaluate for renal failure. admitted with hyperosmolar coma,resp failure. hx of dm  Chief complains: Patient denies nausea, vomiting, chest pain, dizziness, shortness of breath or headache.  - Reviewed last 24 hrs events     Current Facility-Administered Medications   Medication Dose Route Frequency    insulin glargine (LANTUS) injection 10 Units  10 Units SubCUTAneous DAILY    insulin lispro (HUMALOG) injection   SubCUTAneous Q6H    dexmedeTOMidine (PRECEDEX) 600 mcg in 0.9% sodium chloride 150 mL infusion  0.2-0.7 mcg/kg/hr IntraVENous TITRATE    vancomycin (VANCOCIN) 1,250 mg in 0.9% sodium chloride 250 mL IVPB  1,250 mg IntraVENous Q24H    [START ON 2/11/2018] Vancomycin Lab Information  1 Each Other Daily    polyethylene glycol (MIRALAX) packet 17 g  17 g Oral DAILY    senna-docusate (PERICOLACE) 8.6-50 mg per tablet 1 Tab  1 Tab Oral DAILY    0.45% sodium chloride infusion  20 mL/hr IntraVENous CONTINUOUS    glucose chewable tablet 16 g  4 Tab Oral PRN    glucagon (GLUCAGEN) injection 1 mg  1 mg IntraMUSCular PRN    dextrose (D50W) injection syrg 12.5-25 g  25-50 mL IntraVENous PRN    heparin (porcine) injection 5,000 Units  5,000 Units SubCUTAneous Q8H    thiamine (B-1) injection 100 mg  100 mg IntraMUSCular DAILY    fentaNYL citrate (PF) injection 25-50 mcg  25-50 mcg IntraVENous Q4H PRN    midazolam (VERSED) injection 1-2 mg  1-2 mg IntraVENous Q4H PRN    metroNIDAZOLE (FLAGYL) IVPB premix 500 mg  500 mg IntraVENous Q12H    influenza vaccine 2017-18 (3 yrs+)(PF) (FLUZONE QUAD/FLUARIX QUAD) injection 0.5 mL  0.5 mL IntraMUSCular PRIOR TO DISCHARGE    cefepime (MAXIPIME) 2 g in sterile water (preservative free) 10 mL IV syringe  2 g IntraVENous Q12H    albuterol-ipratropium (DUO-NEB) 2.5 MG-0.5 MG/3 ML  3 mL Nebulization Q4H RT    [START ON 2/10/2018] pantoprazole (PROTONIX) granules for oral suspension 40 mg  40 mg Per NG tube DAILY    sodium chloride (NS) flush 5-10 mL  5-10 mL IntraVENous PRN    levETIRAcetam (KEPPRA) 500 mg in saline (iso-osm) 100 ml IVPB  1,000 mg IntraVENous Q12H    chlorhexidine (PERIDEX) 0.12 % mouthwash 10 mL  10 mL Oral Q12H    albuterol-ipratropium (DUO-NEB) 2.5 MG-0.5 MG/3 ML  3 mL Nebulization Q6H PRN    sodium chloride (NS) flush 5-10 mL  5-10 mL IntraVENous Q8H    sodium chloride (NS) flush 5-10 mL  5-10 mL IntraVENous PRN    acetaminophen (TYLENOL) suppository 650 mg  650 mg Rectal A8Y PRN    folic acid (FOLVITE) tablet 1 mg  1 mg Per NG tube DAILY       Objective:     Visit Vitals    /68    Pulse 93    Temp 99 °F (37.2 °C)    Resp 22    Wt 78.6 kg (173 lb 4.5 oz)    SpO2 98%    Breastfeeding No    BMI 27.14 kg/m2       Intake/Output Summary (Last 24 hours) at 02/09/18 1429  Last data filed at 02/09/18 1400   Gross per 24 hour   Intake          6158.92 ml   Output             3250 ml   Net          2908.92 ml       Physical Examination:     GEN: Awake  RS: Chest is bilateral equal, no wheezing / rales / crackles  CVS: S1-S2 heard, RRR, No S3 / murmur  Abdomen: Soft, Non tender, Not distended, Positive bowel sounds, no organomegaly, no CVA / supra pubic tenderness  Extremities: No edema, no cyanosis, skin is warm on touch  HEENT: Head is atraumatic, PERRLA, conjunctiva pink & non icteric. No JVD or carotid bruit   Musculoskeletal: No gross joints or bone deformities   Lymph Node: No palpable cervical, axillary or groin lymphadenopathy.       Data Review:      Labs:     Hematology: Recent Labs      02/09/18   0415  02/08/18   1758  02/07/18   2147  02/07/18   1803   WBC 10.8  8.0   --   11.9   HGB  11.2*  10.6*  12.5  11.3*   HCT  32.9*  31.0*  44.7  46.1*     Chemistry: Recent Labs      02/09/18   1330  02/09/18   0415  02/08/18   2130  02/08/18   1552  02/08/18   1313  02/08/18   0500  02/07/18   2147  02/07/18   1803   BUN   --   21*  23*  29*  31*  40*  45*  45*   CREA   --   1.28  1.30  1.53*  1.64*  2.37*  2.92*  3.04*   CA   --   7.9*  7.8*  8.2*  8.2*  8.7  8.6  8.7   ALB   --    --    --    --    --    --   2.6*  2.6*   K  4.0  2.9*  3.5  4.4  4.3  2.5*  2.1*  3.3*   NA   --   149*  150*  151*  149*  145  132*  129*   CL   --   118*  118*  120*  119*  114*  100  90*   CO2   --   23  25  25  24  22  22  21   PHOS  2.3*  1.7*  1.3*  1.4*  1.5*  1.0*  0.8*   --    GLU   --   191*  166*  230*  345*  1084*  1731*  2050*        Images:    XR (Most Recent). CXR reviewed by me and compared with previous CXR   Results from Hospital Encounter encounter on 02/07/18   XR CHEST PORT   Narrative Chest    Indication: Tube Placement, advanced tube     Comparison: Yesterday    Findings: Single view. Instrumentation includes endotracheal tube with tip 40 mm  superior to the chace, enteric tube with tip overlying the stomach and sidehole  marker inferior to the gastroesophageal junction, right jugular central line  with tip at the mid SVC. No pneumothorax. No pleural effusion. Bilateral lower  lung zone atelectasis with retrocardiac opacity and bilateral upper lung zone  hazy infiltrates. Cardiomediastinal silhouette and osseous structures grossly  unchanged. Impression Impression:   1. Slight worsening of aeration of the upper lung zones and persistent  retrocardiac opacity. Patchy retrocardiac opacity may represent atelectasis,  pneumonia, pleural effusion, or a combination of these in the appropriate  clinical setting. Follow-up PA and lateral chest radiograph may be helpful.   2. Lines and tubes as detailed         CT (Most Recent)   Results from [facility] Encounter encounter on 02/07/18   CT HEAD WO CONT   Narrative CT of the head without contrast    CT CODE:  28460    HISTORY: Cerebral edema evaluation    COMPARISON: 0055 hours    TECHNIQUE: Helical axial scan to the head was performed from the skull base to  the vertex without IV contrast administration. All CT scans at this facility performed using dose optimization techniques as  appreciated to a performed exam, to include automated exposure control,  adjustment of the mA and or KU according to patient size (including appropriate  matching for site specific examination), or use of iterative reconstruction  technique. FINDINGS:    The brain parenchyma and ventricles appear unremarkable. No significant global  atrophy. Normal gray-white matter interface noted. No CT evidence of cerebral  edema seen. There is no evidence of acute intracranial hemorrhage or mass effect  identified. Subtle white matter microvascular disease again noted. No skull  fracture or extra axial fluid collections identified. The expansile mottled  appearance of sclerotic lesion of left sphenoid is again identified. No  additional skull lesion seen. Visualized sinuses and mastoid air cells appear  unremarkable. Impression IMPRESSION:    No CT evidence of cerebral edema or other acute intracranial abnormality. Note: If clinical concern of acute stroke, MRI with diffusion weighted images is  recommended for better evaluation. 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 Neoplastic  process not excludable. Recommend bone scan evaluation. Thank you for your referral.         EKG No results found for this or any previous visit. I have personally reviewed the old medical records and patient's labs    Plan / Recommendation:      1.  Arf,improving with ivf  2.hypernatremia,give hypotonic fluids  3.hypokalemia,hypophosphatemia,replace    D/w Dr. Matt Tejeda, MD  Nephrology  2/9/2018

## 2018-02-09 NOTE — ROUTINE PROCESS
Bedside and Verbal shift change report given to Froy Orozco RN (oncoming nurse) by Sonu Rivera RN   (offgoing nurse). Report included the following information SBAR, MAR and Med Rec Status. Chava Jimenez

## 2018-02-09 NOTE — PROGRESS NOTES
attended the interdisciplinary rounds for Aurelia Still, who is a 58 y.o.,female. Patients Primary Language is: [place]. According to the patients EMR Restoration Affiliation is: [facility].     The reason the Patient came to the hospital is:   Patient Active Problem List    Diagnosis Date Noted    Acidosis 02/07/2018    Respiratory failure ([address] 02/07/2018    Shock ([address] 02/07/2018    Hyperosmolar (nonketotic) coma ([address] 02/07/2018    Acute UTI 02/07/2018    Macrocytic anemia 02/07/2018    Diabetic neuropathy ([address]     Osteoarthritis of both knees     Gram-negative bacteremia 02/05/2015    Sepsis ([address] 02/03/2015    Sepsis secondary to UTI ([facility] 02/03/2015    DM hyperosmolarity type II, uncontrolled ([address] 02/03/2015    HTN (hypertension) 02/03/2015    Febrile illness, acute 02/03/2015    Diverticula of colon 09/21/2012      Not able to assess the patient due to medical condition. Plan:  Chaplains will continue to follow and will provide pastoral care on an as needed/requested basis.  recommends bedside caregivers page  on duty if patient shows signs of acute spiritual or emotional distress.      [id number] Certified [address] [place] [address]   [phone]

## 2018-02-09 NOTE — PROGRESS NOTES
Patient no longer intubated at the time of  visit. She responded to her name but turning her head towards . She did not do anything else. No family at bedside.

## 2018-02-09 NOTE — PROGRESS NOTES
[facility]  ICU Progress Note      Name: Santi Hathaway   : 1955   MRN: [id number]   Date: 2018      [x]I have reviewed the flowsheet and previous days notes. Events overnight reviewed and discussed with nursing staff. Vital signs and records reviewed. Subjective:    Aurelia Kulkarni is a 58 y.o. female with a history of poorly controlled DM, hep C, HTN, asthma, and emphysema who presented to the ED  for evaluation of AMS. En route by EMS, patient deteriorated into respiratory arrest and required ventilation via BVM. In the ED patient began vomiting dark emesis and was subsequently intubated. Concern for aspiration of emesis. 2 witnessed seizures were reported in the ED. Labs revealed an elevated glucose at 2050, hypokalemia, hypernatremia and a respiratory acidosis. Patient was started on glucostablizer and aggressive replacement of electrolytes initiated. 18  No new events overnight. Glucose levels improving overnight on very low dose insulin gtt. Remains on vent support - ETT secretions tan-colored, semi-thick. Has become more purposeful overnight with intermittent agitation. Started on precedex gtt this am. No recurrence of seizure activity. Hemodynamically stable, off pressor. Afebrile overnight, Tmax of 99.7. Urine output adequate. [x]The patient is unable to give any meaningful history or review of systems because the patient is:  [x]Intubated []Sedated   [x]Unresponsive      [x]The patient is critically ill on      [x]Mechanical ventilation []Pressors   []BiPAP []                 ROS:Review of systems not obtained due to patient factors.      Medication Review:  · Pressors - levophed  · Sedation - none  · Antibiotics - vancomycin, cefepime and flagyl  · Pain - fentanyl  · GI/ DVT - protonix, heparin/SCDs  · Others (other gtts)    Safety Bundles: VAP Bundle/ CAUTI/ Severe Sepsis Protocol/ Electrolyte Replacement Protocol    Vital Signs: Visit Vitals    /49    Pulse 88    Temp 99 °F (37.2 °C)    Resp 27    Wt 78.6 kg (173 lb 4.5 oz)    SpO2 98%    Breastfeeding No    BMI 27.14 kg/m2       O2 Device: Ventilator, Endotracheal tube, Heated, Humidifier       Temp (24hrs), Av.1 °F (37.3 °C), Min:98.6 °F (37 °C), Max:99.7 °F (37.6 °C)       Intake/Output:   Last shift:      701 - 1900  In: 703.9 [I.V.:503.9]  Out: 700 [Urine:700]  Last 3 shifts: 1901 -  07  In: 7327.4 [I.V.:7327.4]  Out: 3183 [Urine:4575]    Intake/Output Summary (Last 24 hours) at 18 1127  Last data filed at 18 1030   Gross per 24 hour   Intake          5496.85 ml   Output             3550 ml   Net          1946.85 ml       Ventilator Settings:  Ventilator Mode: Spontaneous, Pressure support  Respiratory Rate  Resp Rate Observed: 26  Back-Up Rate: 22  Insp Time (sec): 1 sec  Insp Flow (l/min): 49 l/min  I:E Ratio: 1:1.7  Ventilator Volumes  Vt Set (ml): 450 ml  Vt Exhaled (Machine Breath) (ml): 478 ml  Vt Spont (ml): 398 ml  Ve Observed (l/min): 7.34 l/min  Ventilator Pressures  Pressure Support (cm H2O): 7 cm H2O  PIP Observed (cm H2O): 13 cm H2O  Plateau Pressure (cm H2O): 17 cm H2O  MAP (cm H2O): 7.6  PEEP/VENT (cm H2O): 5 cm H20  Auto PEEP Observed (cm H2O): 0 cm H2O      Physical Exam:    General: Intubated, responsive to painful stimulation  HEENT:  Anicteric sclerae; pink palpebral conjunctivae; oral mucosa moist; neck supple; trachea midline  Resp:  Symmetrical chest expansion, no accessory muscle use; coarse rhonchi bilaterally  CV:  S1, S2 present; regular rate and rhythm  GI:  Abdomen soft, + active bowel sounds  Extremities:  +2 pulses on all extremities; trace pitting edema bilateral lower extremities  Skin:  Warm; no rashes/ lesions noted  Neurologic:  Moves all four extremities spontaneously. Withdraws from pain. Does not follow commands.   Devices:    18: RIJ CVC, ETT, OGT, means cath       DATA:     Current Facility-Administered Medications   Medication Dose Route Frequency    insulin glargine (LANTUS) injection 10 Units  10 Units SubCUTAneous DAILY    potassium phosphate 9 mmol in dextrose 5% 250 mL infusion   IntraVENous ONCE    insulin lispro (HUMALOG) injection   SubCUTAneous Q6H    dexmedeTOMidine (PRECEDEX) 600 mcg in 0.9% sodium chloride 150 mL infusion  0.2-0.7 mcg/kg/hr IntraVENous TITRATE    vancomycin (VANCOCIN) 1,250 mg in 0.9% sodium chloride 250 mL IVPB  1,250 mg IntraVENous Q24H    [START ON 2/11/2018] Vancomycin Lab Information  1 Each Other Daily    polyethylene glycol (MIRALAX) packet 17 g  17 g Oral DAILY    senna-docusate (PERICOLACE) 8.6-50 mg per tablet 1 Tab  1 Tab Oral DAILY    0.45% sodium chloride infusion  20 mL/hr IntraVENous CONTINUOUS    glucose chewable tablet 16 g  4 Tab Oral PRN    glucagon (GLUCAGEN) injection 1 mg  1 mg IntraMUSCular PRN    dextrose (D50W) injection syrg 12.5-25 g  25-50 mL IntraVENous PRN    heparin (porcine) injection 5,000 Units  5,000 Units SubCUTAneous Q8H    thiamine (B-1) injection 100 mg  100 mg IntraMUSCular DAILY    fentaNYL citrate (PF) injection 25-50 mcg  25-50 mcg IntraVENous Q4H PRN    midazolam (VERSED) injection 1-2 mg  1-2 mg IntraVENous Q4H PRN    metroNIDAZOLE (FLAGYL) IVPB premix 500 mg  500 mg IntraVENous Q12H    influenza vaccine 2017-18 (3 yrs+)(PF) (FLUZONE QUAD/FLUARIX QUAD) injection 0.5 mL  0.5 mL IntraMUSCular PRIOR TO DISCHARGE    cefepime (MAXIPIME) 2 g in sterile water (preservative free) 10 mL IV syringe  2 g IntraVENous Q12H    albuterol-ipratropium (DUO-NEB) 2.5 MG-0.5 MG/3 ML  3 mL Nebulization Q4H RT    [START ON 2/10/2018] pantoprazole (PROTONIX) granules for oral suspension 40 mg  40 mg Per NG tube DAILY    sodium chloride (NS) flush 5-10 mL  5-10 mL IntraVENous PRN    VANCOMYCIN INFORMATION NOTE   Other CONTINUOUS    levETIRAcetam (KEPPRA) 500 mg in saline (iso-osm) 100 ml IVPB  1,000 mg IntraVENous Q12H  chlorhexidine (PERIDEX) 0.12 % mouthwash 10 mL  10 mL Oral Q12H    albuterol-ipratropium (DUO-NEB) 2.5 MG-0.5 MG/3 ML  3 mL Nebulization Q6H PRN    sodium chloride (NS) flush 5-10 mL  5-10 mL IntraVENous Q8H    sodium chloride (NS) flush 5-10 mL  5-10 mL IntraVENous PRN    acetaminophen (TYLENOL) suppository 650 mg  650 mg Rectal B8Z PRN    folic acid (FOLVITE) tablet 1 mg  1 mg Per NG tube DAILY         Labs: Results:       Chemistry Recent Labs      02/09/18 0415 02/08/18 2130 02/08/18   1552 02/07/18 2147 02/07/18   1803   GLU  191*  166*  230*   < >  1731*  2050*   NA  149*  150*  151*   < >  132*  129*   K  2.9*  3.5  4.4   < >  2.1*  3.3*   CL  118*  118*  120*   < >  100  90*   CO2  23  25  25   < >  22  21   BUN  21*  23*  29*   < >  45*  45*   CREA  1.28  1.30  1.53*   < >  2.92*  3.04*   CA  7.9*  7.8*  8.2*   < >  8.6  8.7   AGAP  8  7  6   < >  10  18   BUCR  16  18  19   < >  15  15   AP   --    --    --    --   133*  127*   TP   --    --    --    --   6.3*  6.0*   ALB   --    --    --    --   2.6*  2.6*   GLOB   --    --    --    --   3.7  3.4   AGRAT   --    --    --    --   0.7*  0.8    < > = values in this interval not displayed.       CBC w/Diff Recent Labs      02/09/18 0415 02/08/18   1758 02/07/18 2147 02/07/18   1803   WBC  10.8  8.0   --   11.9   RBC  3.96*  3.75*   --   3.95*   HGB  11.2*  10.6*  12.5  11.3*   HCT  32.9*  31.0*  44.7  46.1*   PLT  132*  127*   --   262   GRANS  51  70   --   88*   LYMPH  19*  12*   --   6*   EOS  0  0   --   0      Coagulation Recent Labs      02/08/18   0500  02/07/18 2147   PTP  15.8*   --    INR  1.3*   --    APTT   --   24.8       Liver Enzymes Recent Labs      02/07/18 2147   TP  6.3*   ALB  2.6*   AP  133*   SGOT  27      ABG Lab Results   Component Value Date/Time    PHI 7.404 02/09/2018 05:37 AM    PCO2I 32.2 (L) 02/09/2018 05:37 AM    PO2I 70 (L) 02/09/2018 05:37 AM    HCO3I 20.1 (L) 02/09/2018 05:37 AM    FIO2I 35 02/09/2018 05:37 AM      Microbiology Recent Labs      02/08/18   1130  02/07/18   1856  02/07/18   1830   CULT  PENDING  AEROBIC BOTTLE STAPHYLOCOCCUS AUREUS*  NO GROWTH 2 DAYS          Telemetry: [x]Sinus []A-flutter []Paced    []A-fib []Multiple PVCs                    Imaging:  CXR Results  (Last 48 hours)               02/09/18 0449  XR CHEST PORT Final result    Impression:  Impression:       1. High riding NG (or OG) tube position, with the distal tip in the distal   esophagus just above the level of the diaphragmatic hiatus. Other support   devices appear unremarkable. 2.  Bandlike density at the right lung base suggestive of subsegmental   atelectatic focus. Retrocardiac opacification suggestive of atelectasis with   pleural effusion vs. consolidation, i.e. infectious process. Narrative:  Procedure:  Chest Portable. Indication:  ET tube placement. Assessment of the ET tube position. Comparison:  2/8/18. Findings:       - ET tube:  Distal tip at 4.0 cm above the chace.   - Central access:  Right IJ central venous line in the superior vena cava, with   the distal tip at just below the level of the chace.   - Enteric tube:  NG (or OG tube placement, with its distal tip in the distal   esophagus at about the level of the diaphragmatic hiatus. A pH probe has been   placed as well. Distal tip in the distal esophagus, about midway between the   chace and diaphragmatic dome. No pneumothorax is detected. Streaky bandlike densities are noted in the right   lung base suggestive of subsegmental atelectasis. Retrocardiac opacification is   observed obscuring the left hemidiaphragmatic outline. Hazy opacity in the left   lower lung zone. 02/08/18 0040  XR CHEST PORT Final result    Impression:  Impression:    1. Slight worsening of aeration of the upper lung zones and persistent   retrocardiac opacity.  Patchy retrocardiac opacity may represent atelectasis, pneumonia, pleural effusion, or a combination of these in the appropriate   clinical setting. Follow-up PA and lateral chest radiograph may be helpful. 2. Lines and tubes as detailed       Narrative:  Chest       Indication: Tube Placement, advanced tube        Comparison: Yesterday       Findings: Single view. Instrumentation includes endotracheal tube with tip 40 mm   superior to the chace, enteric tube with tip overlying the stomach and sidehole   marker inferior to the gastroesophageal junction, right jugular central line   with tip at the mid SVC. No pneumothorax. No pleural effusion. Bilateral lower   lung zone atelectasis with retrocardiac opacity and bilateral upper lung zone   hazy infiltrates. Cardiomediastinal silhouette and osseous structures grossly   unchanged. 02/07/18 2013  XR CHEST PORT Final result    Impression:  IMPRESSION:        1. Interval placement of right IJ central venous catheter, distal tip at the   upper SVC. -No evidence for pneumothorax. 2. ETT tip approximately 4.8 cm above the chace. 3. NG tube tip projects at the proximal stomach, sidehole is slightly above the   GE junction. Advancement recommended. 4. Mildly enlarged cardiac silhouette. Mild diffuse prominence of the   bronchovascular markings, possibly a nonspecific bronchitis or mild pulmonary   vascular congestion. 5. Interval development of hazy opacity at the left lung base, possibly   atelectasis. Edema or infiltrate/aspiration not excluded                                   Narrative:  AP CHEST, PORTABLE       INDICATION: Central line placement       COMPARISON: Prior chest x-rays, most recent earlier same day. FINDINGS:  EKG leads overlie the patient. Endotracheal tube tip projects   approximately 4.8 cm above the chace. NG tube tip projects at the proximal   stomach, sidehole appears to be above the level of the GE junction.  Right IJ   central venous catheter tip projects at the upper SVC. Stable mildly enlarged cardiac silhouette. Mild prominence of the   bronchovascular markings. Interval development of hazy opacity at the left lung   base. Costophrenic sulci appear sharp. No evidence for pneumothorax. No acute   osseous abnormalities are identified. 02/07/18 1837  XR CHEST PORT Final result    Impression:  IMPRESSION:        1. Endotracheal tube in satisfactory position. 2. NG tube terminates just below the GE junction. Advancement recommended. 3. No acute pulmonary process. Narrative:  AP CHEST, PORTABLE       INDICATION: Sepsis       COMPARISON: Prior chest x-rays, most recent 2/4/2018       FINDINGS:  EKG leads overlie the patient. Endotracheal tube tip projects   approximately 3.8 cm above the chace. Nasogastric tube terminates just below   the GE junction. Cardiac silhouette is stable, top normal in size. Atherosclerosis noted. The   pulmonary vasculature is unremarkable. No focal consolidation, pleural effusion,   or pneumothorax. No acute osseous abnormalities are identified. Spondylosis and   degenerative changes of the shoulders are noted. CT Results  (Last 48 hours)               02/08/18 2307  CT HEAD WO CONT Final result    Impression:  IMPRESSION:       No CT evidence of cerebral edema or other acute intracranial abnormality. Note: If clinical concern of acute stroke, MRI with diffusion weighted images is   recommended for better evaluation. 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 Neoplastic   process not excludable. Recommend bone scan evaluation.        Thank you for your referral.       Narrative:  CT of the head without contrast       CT CODE:  [id number]       HISTORY: Cerebral edema evaluation       COMPARISON: 0055 hours       TECHNIQUE: Helical axial scan to the head was performed from the skull base to   the vertex without IV contrast administration. All CT scans at this facility performed using dose optimization techniques as   appreciated to a performed exam, to include automated exposure control,   adjustment of the mA and or KU according to patient size (including appropriate   matching for site specific examination), or use of iterative reconstruction   technique. FINDINGS:       The brain parenchyma and ventricles appear unremarkable. No significant global   atrophy. Normal gray-white matter interface noted. No CT evidence of cerebral   edema seen. There is no evidence of acute intracranial hemorrhage or mass effect   identified. Subtle white matter microvascular disease again noted. No skull   fracture or extra axial fluid collections identified. The expansile mottled   appearance of sclerotic lesion of left sphenoid is again identified. No   additional skull lesion seen. Visualized sinuses and mastoid air cells appear   unremarkable. 02/08/18 0109  CT HEAD WO CONT Final result    Impression:  IMPRESSION:       1. No acute intracranial findings. Of note, MRI is more sensitive in the   detection of acute infarct. 2. Mild periventricular white matter low-attenuation, likely chronic   microvascular ischemic change. -Mild cerebral cortical atrophy. 3. Expanded mottled appearance of the left sphenoid bone, nonspecific, possibly   fibrous dysplasia. Is there any suspicion for a marrow infiltrative process such   as myeloma or metastases? Narrative:  EXAMINATION: CT head without contrast       INDICATION: Altered level of consciousness       COMPARISON: 2/4/2018       TECHNIQUE: CT of the head performed without contrast, with multiplanar   reformations.  All CT scans at this facility are performed using dose   optimization technique as appropriate to a performed exam, to include automated   exposure control, adjustment of the mA and/or kV according to patient size   (including appropriate matching first site specific examinations), or use of   iterative reconstruction technique. FINDINGS: No focal mass effect or shift of midline structures. No abnormal   extra-axial collection. Mild cerebral cortical atrophy. Ventricles are normal in   size and configuration. No evidence of acute intracranial hemorrhage. Mild   periventricular white matter low-attenuation. Calvarium intact. Imaged paranasal   sinuses and mastoid air cells well-aerated. Expanded mottled appearance of the   left sphenoid, nonspecific, possibly fibrous dysplasia. Extracranial soft   tissues unremarkable. IMPRESSION:   · Hyperglycemia, likely combined HHNS and DKA in setting of poorly controlled DM -improving, off insulin gtt  · Acute hypoxic, hypercapnic respiratory failure on mechanical ventilatory support, intubated for airway protection, likely secondary to metabolic encephalopathy and hypercapnea/ CO2 narcosis -improving ABG   · Shock, possibly combination of hypovolemia from hyperosmolar diuresis +/- sepsis from aspiration -resolved, off vasopressor  · Acute encephalopathy, metabolic + hypercapnea  · Mixed respiratory and metabolic/lactic acidosis -resolved  · LUIS ANTONIO, likely prerenal secondary to profound dehydration -improving renal indices  · Severe sepsis, suspicion for UTI with abnormal UA + aspiration, Staph aureus on 1 BC   · Pseudohyponatremia, corrected Na 168 on admission - resolved  · Hypokalemia on admission -improving  · Mildly elevated troponin with ST depression on EKG, possibly demand ischemia  · Other electrolyte derangements: hypophosphatemia, hypomagnesemia  · Reported witnessed seizures, possibly from underlying HHNS. CT 2/7 no acute intercranial process   · Hx of HTN  · Hx of hepatitis C  · Hx of emphysema  · Hx of cocaine abuse: UDS 2/7 negative. PLAN:   · Resp - VAP bundle. Titrate FiO2 to keep SpO2 > 90%. Aggressive pulmonary hygiene; bronchodilator.  Strict aspiration precautions. SBT once mental status improves  · ID - follow blood, urine and resp c/s. Repeat BC x 2 today. Continue vanc, cefepime, flagyl. · CVS - monitor hemodynamic status closely - off pressor. IVF to [facility]. Starting TF today   · Heme/onc - stable hgb/hct; monitor for active bleeding  · Metabolic - close monitoring of electrolyte status, q6 BMP, Phos and Mg. Replace electrolytes aggressively. · Renal - means cath; strict I/O. Monitor BUN and Cr for improvement of LUIS ANTONIO. · Endo - d/c insulin gtt. Glycemic control with lantus and sliding scale insulin  · Neuro/ Pain/ Sedation - follow EEG; Neuro following. Start precedex gtt, RASS goal 0 to -1; may continue prn fentanyl, versed. · GI - start enteral nutrition.  Strict aspiration precautions  · Prophylaxis - DVT: heparin/SCDs; GI: protonix  · Discussed in interdisciplinary rounds            Mitchell Mcduffie PA-C

## 2018-02-09 NOTE — PROGRESS NOTES
NUTRITION    Nutrition Screen     RECOMMENDATIONS / PLAN:     - Start tube feedings of Glucerna 1.5 at rate of 20 mL/hr and advance as tolerated by 10 mL q 4 hours to goal rate of 55 mL/hr with 200 mL q 4 hour water flushes.  - Continue RD inpatient monitoring and evaluation. Goal Tube Feeding Regimen: Glucerna 1.5 at 55 mL/hr + 200 mL q 4 hour water flushes to provide: 1980 kcal, 109 gm protein, 99 gm fat, 176 gm CHO, 21 gm fiber, 1002 mL free water, 2202 mL total water, 100% RDIs     NUTRITION INTERVENTIONS & DIAGNOSIS:     [x] Enteral nutrition: initiate  [x] Collaboration and referral of nutrition care: interdisciplinary rounds, discussed tube feeding regimen with Dr. Reyes Pro and RN    Nutrition Diagnosis: Inadequate oral intake related to inability to tolerate po due to respiratory status as evidenced by pt NPO. ASSESSMENT:     Pt intubated. NPO with NGT. Admitted and found to have BG levels >2000, glycemic control following, pt on insulin drip with stable BG levels. Plan to start tube feedings today. BM PTA, started on miralax and pericolace. Hypernatremia noted. Average po intake adequate to meet patients estimated nutritional needs:   [] Yes     [x] No   [] Unable to determine at this time    Diet: DIET NPO      Food Allergies: NKFA  Current Appetite:   [] Good     [] Fair     [] Poor     [x] Other: NPO  Appetite/meal intake prior to admission:   [] Good     [] Fair     [] Poor     [x] Other:unknown  Feeding Limitations:  [] Swallowing difficulty    [] Chewing difficulty    [x] Other: respiratory status  Current Meal Intake: No data found.       BM:  PTA  Skin Integrity:  WDL  Edema: None  Pertinent Medications: Reviewed: 1/2 NS at 20 mL/hr, folic acid, lantus, miralax, pericolace, thiamine, Mg, KCL    Recent Labs      02/09/18   0415  02/08/18   2130  02/08/18   1552   02/07/18   2147  02/07/18   1803   NA  149*  150*  151*   < >  132*  129*   K  2.9*  3.5  4.4   < >  2.1*  3.3*   CL  118*  118* 120*   < >  100  90*   CO2  23  25  25   < >  22  21   GLU  191*  166*  230*   < >  1731*  2050*   BUN  21*  23*  29*   < >  45*  45*   CREA  1.28  1.30  1.53*   < >  2.92*  3.04*   CA  7.9*  7.8*  8.2*   < >  8.6  8.7   MG  2.0  1.8  1.9   < >  2.5   --    PHOS  1.7*  1.3*  1.4*   < >  0.8*   --    ALB   --    --    --    --   2.6*  2.6*   SGOT   --    --    --    --   27  20   ALT   --    --    --    --   39  37    < > = values in this interval not displayed. Intake/Output Summary (Last 24 hours) at 02/09/18 0950  Last data filed at 02/09/18 0902   Gross per 24 hour   Intake          5296.85 ml   Output             3200 ml   Net          2096.85 ml       Anthropometrics:  Ht Readings from Last 1 Encounters:   02/04/18 5' 7\" (1.702 m)     Last 3 Recorded Weights in this Encounter    02/07/18 1754 02/08/18 0400 02/09/18 0509   Weight: 67.6 kg (149 lb 1.6 oz) 68.1 kg (150 lb 2.1 oz) 78.6 kg (173 lb 4.5 oz)     Body mass index is 27.14 kg/(m^2). Weight History: Fluctuations noted in weight per chart hx.     Weight Metrics 2/9/2018 2/4/2018 4/22/2017 6/24/2016 4/7/2016 12/31/2015 10/28/2015   Weight 173 lb 4.5 oz 150 lb 171 lb 171 lb 230 lb 207 lb 4 oz 201 lb   BMI 27.14 kg/m2 23.49 kg/m2 26.78 kg/m2 26.78 kg/m2 36.01 kg/m2 32.45 kg/m2 31.47 kg/m2        Admitting Diagnosis: Hyperosmolar (nonketotic) coma (HCC)  Acidosis  Respiratory failure ([address]  Shock ([facility] [address]  Pertinent PMHx: DM2 with neuropathy, hepatitis C, HTN, non-compliance    Education Needs:        [x] None identified  [] Identified - Not appropriate at this time  []  Identified and addressed - refer to education log  Learning Limitations:   [] None identified  [x] Identified: intubated    Cultural, [employer] & ethnic food preferences:  [x] None identified    [] Identified and addressed     ESTIMATED NUTRITION NEEDS:     Calories: 9707-5218 kcal ([id number]) based on  [x] Actual BW: 78 kg      [] IBW   Protein:  gm (1.2-2 gm/kg) based on  [x] Actual BW      [] IBW   Fluid: 1 mL/kcal     MONITORING & EVALUATION:     Nutrition Goal(s):   1. Nutritional needs will be met through adequate oral intake or nutrition support within the next 7 days.   Outcome:  [] Met/Ongoing    []  Not Met    [x] New/Initial Goal      Monitoring:   [] Food and beverage intake   [x] Diet order   [x] Nutrition-focused physical findings   [x] Treatment/therapy   [] Weight   [x] Enteral nutrition intake        Previous Recommendations (for follow-up assessments only):     []   Implemented       []   Not Implemented (RD to address)      [] No Longer Appropriate     [] No Recommendation Made     Discharge Planning: pending ability to tolerate oral diet and goals of care  [x] Participated in care planning, discharge planning, & interdisciplinary rounds as appropriate      Stephanie Mattson RD   Pager: [phone]

## 2018-02-09 NOTE — PROGRESS NOTES
[facility]  [facility]    Patient: Brandy Barry Age: 58 y.o. : 1955 MR#: [id number] SSN: [id number]  Date/Time: 2018     Subjective:    Pt intubated. Assessment/Plan:   1. Acute hypoxic and hypercapnic resp failure: cont vent per ICU  2. HHS: improved   3. Acute met encephalopathy: due to # 1/2/5  4. Hypotension: septic vs hypovolemic: pressors as needed   5. New onset Sz: cont keppra, neuro consulted. EEG shows no epileptiform or focal abnormality. 6. Sepsis: ? Related to # 2 vs # 5  7. Possible aspiration PNA: cont Abx  8. Met and resp acidosis: will monitor   9. Lactic acidosis: will monitor   10. UTI: follow up Cx  11. Elevated trop: demand ischemia   12. ARF: cont IVF and monitor   13. ? GI bleed with coffee ground: monitor H/H  14. Hx Hep C and drug use:  15. D/w ICU team        Case discussed with:  []Patient  []Family  []Nursing  []Case Management  DVT Prophylaxis:  []Lovenox  []Hep SQ  [x]SCDs  []Coumadin   []On Heparin gtt    Objective:   VS:   Visit Vitals    /63    Pulse 82    Temp 98.7 °F (37.1 °C)    Resp 27    Wt 78.6 kg (173 lb 4.5 oz)    SpO2 91%    Breastfeeding No    BMI 27.14 kg/m2      Tmax/24hrs: Temp (24hrs), Av.1 °F (37.3 °C), Min:98.7 °F (37.1 °C), Max:99.7 °F (37.6 °C)  IOBRIEF    Intake/Output Summary (Last 24 hours) at 18 1700  Last data filed at 18 1614   Gross per 24 hour   Intake          6218.92 ml   Output             3250 ml   Net          2968.92 ml       General:  intubated   Pulmonary:  CTA Bilaterally. RR 20  Cardiovascular: Regular rate and Rhythm. GI:  Soft, Non distended, Non tender. + Bowel sounds. Extremities:  No edema, cyanosis, clubbing. Neurologic: sedated.       Medications:   Current Facility-Administered Medications   Medication Dose Route Frequency    insulin glargine (LANTUS) injection 10 Units  10 Units SubCUTAneous DAILY    insulin lispro (HUMALOG) injection SubCUTAneous Q6H    dexmedeTOMidine (PRECEDEX) 600 mcg in 0.9% sodium chloride 150 mL infusion  0.2-0.7 mcg/kg/hr IntraVENous TITRATE    vancomycin (VANCOCIN) 1,250 mg in 0.9% sodium chloride 250 mL IVPB  1,250 mg IntraVENous Q24H    [START ON 2/11/2018] Vancomycin Lab Information  1 Each Other Daily    polyethylene glycol (MIRALAX) packet 17 g  17 g Oral DAILY    senna-docusate (PERICOLACE) 8.6-50 mg per tablet 1 Tab  1 Tab Oral DAILY    0.45% sodium chloride infusion  20 mL/hr IntraVENous CONTINUOUS    glucose chewable tablet 16 g  4 Tab Oral PRN    glucagon (GLUCAGEN) injection 1 mg  1 mg IntraMUSCular PRN    dextrose (D50W) injection syrg 12.5-25 g  25-50 mL IntraVENous PRN    potassium chloride 10 mEq in 100 ml IVPB  10 mEq IntraVENous Q1H    heparin (porcine) injection 5,000 Units  5,000 Units SubCUTAneous Q8H    thiamine (B-1) injection 100 mg  100 mg IntraMUSCular DAILY    fentaNYL citrate (PF) injection 25-50 mcg  25-50 mcg IntraVENous Q4H PRN    midazolam (VERSED) injection 1-2 mg  1-2 mg IntraVENous Q4H PRN    metroNIDAZOLE (FLAGYL) IVPB premix 500 mg  500 mg IntraVENous Q12H    influenza vaccine 2017-18 (3 yrs+)(PF) (FLUZONE QUAD/FLUARIX QUAD) injection 0.5 mL  0.5 mL IntraMUSCular PRIOR TO DISCHARGE    cefepime (MAXIPIME) 2 g in sterile water (preservative free) 10 mL IV syringe  2 g IntraVENous Q12H    albuterol-ipratropium (DUO-NEB) 2.5 MG-0.5 MG/3 ML  3 mL Nebulization Q4H RT    [START ON 2/10/2018] pantoprazole (PROTONIX) granules for oral suspension 40 mg  40 mg Per NG tube DAILY    sodium chloride (NS) flush 5-10 mL  5-10 mL IntraVENous PRN    levETIRAcetam (KEPPRA) 500 mg in saline (iso-osm) 100 ml IVPB  1,000 mg IntraVENous Q12H    chlorhexidine (PERIDEX) 0.12 % mouthwash 10 mL  10 mL Oral Q12H    albuterol-ipratropium (DUO-NEB) 2.5 MG-0.5 MG/3 ML  3 mL Nebulization Q6H PRN    sodium chloride (NS) flush 5-10 mL  5-10 mL IntraVENous Q8H    sodium chloride (NS) flush 5-10 mL  5-10 mL IntraVENous PRN    acetaminophen (TYLENOL) suppository 650 mg  650 mg Rectal E0N PRN    folic acid (FOLVITE) tablet 1 mg  1 mg Per NG tube DAILY       Labs:    Recent Results (from the past 24 hour(s))   GLUCOSE, POC    Collection Time: 02/08/18  5:52 PM   Result Value Ref Range    Glucose (POC) 170 (H) 70 - 110 mg/dL   GLUCOSTABILIZER    Collection Time: 02/08/18  5:52 PM   Result Value Ref Range    Glucose 170 mg/dL    Insulin order 3.3 units/hour    Insulin adminstered 3.3 units/hour    Multiplier 0.030     Low target 140 mg/dL    High target 180 mg/dL    D50 order 0.0 ml    D50 administered 0.00 ml    Minutes until next BG 60 min    Order initials bm     Administered initials bm    CBC WITH AUTOMATED DIFF    Collection Time: 02/08/18  5:58 PM   Result Value Ref Range    WBC 8.0 4.6 - 13.2 K/uL    RBC 3.75 (L) 4.20 - 5.30 M/uL    HGB 10.6 (L) 12.0 - 16.0 g/dL    HCT 31.0 (L) 35.0 - 45.0 %    MCV 82.7 74.0 - 97.0 FL    MCH 28.3 24.0 - 34.0 PG    MCHC 34.2 31.0 - 37.0 g/dL    RDW 12.8 11.6 - 14.5 %    PLATELET 187 (L) 183 - 420 K/uL    MPV 10.8 9.2 - 11.8 FL    NEUTROPHILS 70 42 - 75 %    BAND NEUTROPHILS 14 (H) 0 - 5 %    LYMPHOCYTES 12 (L) 20 - 51 %    MONOCYTES 4 2 - 9 %    EOSINOPHILS 0 0 - 5 %    BASOPHILS 0 0 - 3 %    ABS. NEUTROPHILS 6.7 1.8 - 8.0 K/UL    ABS. LYMPHOCYTES 1.0 0.8 - 3.5 K/UL    ABS. MONOCYTES 0.3 0 - 1.0 K/UL    ABS. EOSINOPHILS 0.0 0.0 - 0.4 K/UL    ABS.  BASOPHILS 0.0 0.0 - 0.06 K/UL    DF AUTOMATED      PLATELET COMMENTS Platelet Estimate, Decreased      RBC COMMENTS NORMOCYTIC, NORMOCHROMIC     CULTURE, URINE    Collection Time: 02/08/18  6:25 PM   Result Value Ref Range    Special Requests: NO SPECIAL REQUESTS      Culture result: NO GROWTH AFTER 17 HOURS     GLUCOSE, POC    Collection Time: 02/08/18  6:59 PM   Result Value Ref Range    Glucose (POC) 159 (H) 70 - 110 mg/dL   GLUCOSTABILIZER    Collection Time: 02/08/18  7:01 PM   Result Value Ref Range    Glucose 159 mg/dL Insulin order 3.0 units/hour    Insulin adminstered 3.0 units/hour    Multiplier 0.030     Low target 140 mg/dL    High target 180 mg/dL    D50 order 0.0 ml    D50 administered 0.00 ml    Minutes until next BG 60 min    Order initials bm     Administered initials bm    GLUCOSE, POC    Collection Time: 02/08/18  8:12 PM   Result Value Ref Range    Glucose (POC) 132 (H) 70 - 110 mg/dL   GLUCOSTABILIZER    Collection Time: 02/08/18  8:17 PM   Result Value Ref Range    Glucose 132 mg/dL    Insulin order 1.4 units/hour    Insulin adminstered 1.4 units/hour    Multiplier 0.020     Low target 140 mg/dL    High target 180 mg/dL    D50 order 0.0 ml    D50 administered 0.00 ml    Minutes until next BG 60 min    Order initials oh     Administered initials oh    GLUCOSE, POC    Collection Time: 02/08/18  9:19 PM   Result Value Ref Range    Glucose (POC) 150 (H) 70 - 110 mg/dL   GLUCOSTABILIZER    Collection Time: 02/08/18  9:21 PM   Result Value Ref Range    Glucose 150 mg/dL    Insulin order 1.8 units/hour    Insulin adminstered 1.8 units/hour    Multiplier 0.020     Low target 140 mg/dL    High target 180 mg/dL    D50 order 0.0 ml    D50 administered 0.00 ml    Minutes until next BG 60 min    Order initials oh     Administered initials oh    LACTIC ACID    Collection Time: 02/08/18  9:30 PM   Result Value Ref Range    Lactic acid 1.4 0.4 - 2.0 MMOL/L   METABOLIC PANEL, BASIC    Collection Time: 02/08/18  9:30 PM   Result Value Ref Range    Sodium 150 (H) 136 - 145 mmol/L    Potassium 3.5 3.5 - 5.5 mmol/L    Chloride 118 (H) 100 - 108 mmol/L    CO2 25 21 - 32 mmol/L    Anion gap 7 3.0 - 18 mmol/L    Glucose 166 (H) 74 - 99 mg/dL    BUN 23 (H) 7.0 - 18 MG/DL    Creatinine 1.30 0.6 - 1.3 MG/DL    BUN/Creatinine ratio 18 12 - 20      GFR est AA 50 (L) >60 ml/min/1.73m2    GFR est non-AA 42 (L) >60 ml/min/1.73m2    Calcium 7.8 (L) 8.5 - 10.1 MG/DL   MAGNESIUM    Collection Time: 02/08/18  9:30 PM   Result Value Ref Range Magnesium 1.8 1.6 - 2.6 mg/dL   PHOSPHORUS    Collection Time: 02/08/18  9:30 PM   Result Value Ref Range    Phosphorus 1.3 (L) 2.5 - 4.9 MG/DL   GLUCOSE, POC    Collection Time: 02/08/18 10:22 PM   Result Value Ref Range    Glucose (POC) 165 (H) 70 - 110 mg/dL   GLUCOSTABILIZER    Collection Time: 02/08/18 10:22 PM   Result Value Ref Range    Glucose 165 mg/dL    Insulin order 2.1 units/hour    Insulin adminstered 2.1 units/hour    Multiplier 0.020     Low target 140 mg/dL    High target 180 mg/dL    D50 order 0.0 ml    D50 administered 0.00 ml    Minutes until next BG 60 min    Order initials oh     Administered initials oh    GLUCOSE, POC    Collection Time: 02/08/18 11:32 PM   Result Value Ref Range    Glucose (POC) 155 (H) 70 - 110 mg/dL   GLUCOSTABILIZER    Collection Time: 02/08/18 11:33 PM   Result Value Ref Range    Glucose 155 mg/dL    Insulin order 1.9 units/hour    Insulin adminstered 1.9 units/hour    Multiplier 0.020     Low target 140 mg/dL    High target 180 mg/dL    D50 order 0.0 ml    D50 administered 0.00 ml    Minutes until next BG 60 min    Order initials oh     Administered initials oh    GLUCOSE, POC    Collection Time: 02/09/18 12:35 AM   Result Value Ref Range    Glucose (POC) 217 (H) 70 - 110 mg/dL   GLUCOSTABILIZER    Collection Time: 02/09/18 12:36 AM   Result Value Ref Range    Glucose 217 mg/dL    Insulin order 4.7 units/hour    Insulin adminstered 4.7 units/hour    Multiplier 0.030     Low target 140 mg/dL    High target 180 mg/dL    D50 order 0.0 ml    D50 administered 0.00 ml    Minutes until next BG 60 min    Order initials oh     Administered initials oh    GLUCOSE, POC    Collection Time: 02/09/18  1:49 AM   Result Value Ref Range    Glucose (POC) 155 (H) 70 - 110 mg/dL   GLUCOSTABILIZER    Collection Time: 02/09/18  1:50 AM   Result Value Ref Range    Glucose 155 mg/dL    Insulin order 2.9 units/hour    Insulin adminstered 2.9 units/hour    Multiplier 0.030     Low target 140 mg/dL    High target 180 mg/dL    D50 order 0.0 ml    D50 administered 0.00 ml    Minutes until next BG 60 min    Order initials oh     Administered initials oh    CBC WITH AUTOMATED DIFF    Collection Time: 02/09/18  4:15 AM   Result Value Ref Range    WBC 10.8 4.6 - 13.2 K/uL    RBC 3.96 (L) 4.20 - 5.30 M/uL    HGB 11.2 (L) 12.0 - 16.0 g/dL    HCT 32.9 (L) 35.0 - 45.0 %    MCV 83.1 74.0 - 97.0 FL    MCH 28.3 24.0 - 34.0 PG    MCHC 34.0 31.0 - 37.0 g/dL    RDW 13.0 11.6 - 14.5 %    PLATELET 255 (L) 495 - 420 K/uL    MPV 11.8 9.2 - 11.8 FL    NEUTROPHILS 51 42 - 75 %    BAND NEUTROPHILS 25 (H) 0 - 5 %    LYMPHOCYTES 19 (L) 20 - 51 %    MONOCYTES 5 2 - 9 %    EOSINOPHILS 0 0 - 5 %    BASOPHILS 0 0 - 3 %    ABS. NEUTROPHILS 8.2 (H) 1.8 - 8.0 K/UL    ABS. LYMPHOCYTES 2.1 0.8 - 3.5 K/UL    ABS. MONOCYTES 0.5 0 - 1.0 K/UL    ABS. EOSINOPHILS 0.0 0.0 - 0.4 K/UL    ABS.  BASOPHILS 0.0 0.0 - 0.06 K/UL    DF MANUAL      PLATELET COMMENTS DECREASED PLATELETS      RBC COMMENTS NORMOCYTIC, NORMOCHROMIC     LACTIC ACID    Collection Time: 02/09/18  4:15 AM   Result Value Ref Range    Lactic acid 1.2 0.4 - 2.0 MMOL/L   VANCOMYCIN, RANDOM    Collection Time: 02/09/18  4:15 AM   Result Value Ref Range    Vancomycin, random 17.2 5.0 - 02.9 UG/ML   METABOLIC PANEL, BASIC    Collection Time: 02/09/18  4:15 AM   Result Value Ref Range    Sodium 149 (H) 136 - 145 mmol/L    Potassium 2.9 (LL) 3.5 - 5.5 mmol/L    Chloride 118 (H) 100 - 108 mmol/L    CO2 23 21 - 32 mmol/L    Anion gap 8 3.0 - 18 mmol/L    Glucose 191 (H) 74 - 99 mg/dL    BUN 21 (H) 7.0 - 18 MG/DL    Creatinine 1.28 0.6 - 1.3 MG/DL    BUN/Creatinine ratio 16 12 - 20      GFR est AA 51 (L) >60 ml/min/1.73m2    GFR est non-AA 42 (L) >60 ml/min/1.73m2    Calcium 7.9 (L) 8.5 - 10.1 MG/DL   MAGNESIUM    Collection Time: 02/09/18  4:15 AM   Result Value Ref Range    Magnesium 2.0 1.6 - 2.6 mg/dL   PHOSPHORUS    Collection Time: 02/09/18  4:15 AM   Result Value Ref Range Phosphorus 1.7 (L) 2.5 - 4.9 MG/DL   GLUCOSE, POC    Collection Time: 02/09/18  4:27 AM   Result Value Ref Range    Glucose (POC) 178 (H) 70 - 110 mg/dL   GLUCOSTABILIZER    Collection Time: 02/09/18  4:27 AM   Result Value Ref Range    Glucose 178 mg/dL    Insulin order 3.5 units/hour    Insulin adminstered 3.5 units/hour    Multiplier 0.030     Low target 140 mg/dL    High target 180 mg/dL    D50 order 0.0 ml    D50 administered 0.00 ml    Minutes until next BG 60 min    Order initials oh     Administered initials oh    GLUCOSE, POC    Collection Time: 02/09/18  5:25 AM   Result Value Ref Range    Glucose (POC) 241 (H) 70 - 110 mg/dL   GLUCOSTABILIZER    Collection Time: 02/09/18  5:26 AM   Result Value Ref Range    Glucose 241 mg/dL    Insulin order 7.2 units/hour    Insulin adminstered 7.2 units/hour    Multiplier 0.040     Low target 140 mg/dL    High target 180 mg/dL    D50 order 0.0 ml    D50 administered 0.00 ml    Minutes until next BG 60 min    Order initials oh     Administered initials oh    POC G3    Collection Time: 02/09/18  5:37 AM   Result Value Ref Range    Device: VENT      FIO2 (POC) 35 %    pH (POC) 7.404 7.35 - 7.45      pCO2 (POC) 32.2 (L) 35.0 - 45.0 MMHG    pO2 (POC) 70 (L) 80 - 100 MMHG    HCO3 (POC) 20.1 (L) 22 - 26 MMOL/L    sO2 (POC) 94 92 - 97 %    Base deficit (POC) 5 mmol/L    Mode ASSIST CONTROL      Tidal volume 450 ml    Set Rate 22 bpm    PEEP/CPAP (POC) 5 cmH2O    PIP (POC) 19      Allens test (POC) YES      Total resp.  rate 22      Site RIGHT BRACHIAL      Specimen type (POC) ARTERIAL      Performed by Bebe Solis     Volume control plus YES     GLUCOSE, POC    Collection Time: 02/09/18  6:44 AM   Result Value Ref Range    Glucose (POC) 252 (H) 70 - 110 mg/dL   GLUCOSTABILIZER    Collection Time: 02/09/18  6:44 AM   Result Value Ref Range    Glucose 252 mg/dL    Insulin order 9.6 units/hour    Insulin adminstered 9.6 units/hour    Multiplier 0.050     Low target 140 mg/dL High target 180 mg/dL    D50 order 0.0 ml    D50 administered 0.00 ml    Minutes until next BG 60 min    Order initials oh     Administered initials oh    GLUCOSE, POC    Collection Time: 02/09/18  7:37 AM   Result Value Ref Range    Glucose (POC) 218 (H) 70 - 110 mg/dL   GLUCOSTABILIZER    Collection Time: 02/09/18  7:53 AM   Result Value Ref Range    Glucose 218 mg/dL    Insulin order 9.5 units/hour    Insulin adminstered 1.9 units/hour    Multiplier 0.060     Low target 140 mg/dL    High target 180 mg/dL    D50 order 0.0 ml    D50 administered 0.00 ml    Minutes until next BG 60 min    Order initials bm     Administered initials bm    GLUCOSE, POC    Collection Time: 02/09/18  8:39 AM   Result Value Ref Range    Glucose (POC) 272 (H) 70 - 110 mg/dL   GLUCOSE, POC    Collection Time: 02/09/18 11:48 AM   Result Value Ref Range    Glucose (POC) 348 (H) 70 - 110 mg/dL   POTASSIUM    Collection Time: 02/09/18  1:30 PM   Result Value Ref Range    Potassium 4.0 3.5 - 5.5 mmol/L   PHOSPHORUS    Collection Time: 02/09/18  1:30 PM   Result Value Ref Range    Phosphorus 2.3 (L) 2.5 - 4.9 MG/DL   PHOSPHORUS    Collection Time: 02/09/18  2:01 PM   Result Value Ref Range    Phosphorus 2.1 (L) 2.5 - 4.9 MG/DL   MAGNESIUM    Collection Time: 02/09/18  2:01 PM   Result Value Ref Range    Magnesium 2.0 1.6 - 2.6 mg/dL   METABOLIC PANEL, BASIC    Collection Time: 02/09/18  2:01 PM   Result Value Ref Range    Sodium 147 (H) 136 - 145 mmol/L    Potassium 3.6 3.5 - 5.5 mmol/L    Chloride 117 (H) 100 - 108 mmol/L    CO2 25 21 - 32 mmol/L    Anion gap 5 3.0 - 18 mmol/L    Glucose 333 (H) 74 - 99 mg/dL    BUN 20 (H) 7.0 - 18 MG/DL    Creatinine 1.38 (H) 0.6 - 1.3 MG/DL    BUN/Creatinine ratio 14 12 - 20      GFR est AA 47 (L) >60 ml/min/1.73m2    GFR est non-AA 39 (L) >60 ml/min/1.73m2    Calcium 7.6 (L) 8.5 - 10.1 MG/DL       Signed By: Laureen Henson MD     February 9, 2018

## 2018-02-10 ENCOUNTER — APPOINTMENT (OUTPATIENT)
Dept: GENERAL RADIOLOGY | Age: 63
DRG: 871 | End: 2018-02-10
Attending: PHYSICIAN ASSISTANT
Payer: MEDICARE

## 2018-02-10 LAB
ANION GAP SERPL CALC-SCNC: 7 MMOL/L (ref 3–18)
ANION GAP SERPL CALC-SCNC: 8 MMOL/L (ref 3–18)
BACTERIA SPEC CULT: ABNORMAL
BACTERIA SPEC CULT: ABNORMAL
BASOPHILS # BLD: 0 K/UL (ref 0–0.06)
BASOPHILS NFR BLD: 0 % (ref 0–3)
BUN SERPL-MCNC: 12 MG/DL (ref 7–18)
BUN SERPL-MCNC: 15 MG/DL (ref 7–18)
BUN/CREAT SERPL: 14 (ref 12–20)
BUN/CREAT SERPL: 15 (ref 12–20)
CALCIUM SERPL-MCNC: 8.3 MG/DL (ref 8.5–10.1)
CALCIUM SERPL-MCNC: 8.5 MG/DL (ref 8.5–10.1)
CHLORIDE SERPL-SCNC: 108 MMOL/L (ref 100–108)
CHLORIDE SERPL-SCNC: 114 MMOL/L (ref 100–108)
CO2 SERPL-SCNC: 26 MMOL/L (ref 21–32)
CO2 SERPL-SCNC: 28 MMOL/L (ref 21–32)
CREAT SERPL-MCNC: 0.84 MG/DL (ref 0.6–1.3)
CREAT SERPL-MCNC: 1.01 MG/DL (ref 0.6–1.3)
DIFFERENTIAL METHOD BLD: ABNORMAL
EOSINOPHIL # BLD: 0.1 K/UL (ref 0–0.4)
EOSINOPHIL NFR BLD: 1 % (ref 0–5)
ERYTHROCYTE [DISTWIDTH] IN BLOOD BY AUTOMATED COUNT: 13.3 % (ref 11.6–14.5)
GLUCOSE BLD STRIP.AUTO-MCNC: 204 MG/DL (ref 70–110)
GLUCOSE BLD STRIP.AUTO-MCNC: 256 MG/DL (ref 70–110)
GLUCOSE BLD STRIP.AUTO-MCNC: 317 MG/DL (ref 70–110)
GLUCOSE SERPL-MCNC: 261 MG/DL (ref 74–99)
GLUCOSE SERPL-MCNC: 279 MG/DL (ref 74–99)
GRAM STN SPEC: ABNORMAL
GRAM STN SPEC: ABNORMAL
HCT VFR BLD AUTO: 30.8 % (ref 35–45)
HGB BLD-MCNC: 10.3 G/DL (ref 12–16)
LACTATE BLD-SCNC: 3.9 MMOL/L (ref 0.4–2)
LYMPHOCYTES # BLD: 1.7 K/UL (ref 0.8–3.5)
LYMPHOCYTES NFR BLD: 19 % (ref 20–51)
MAGNESIUM SERPL-MCNC: 1.8 MG/DL (ref 1.6–2.6)
MAGNESIUM SERPL-MCNC: 2 MG/DL (ref 1.6–2.6)
MCH RBC QN AUTO: 28.1 PG (ref 24–34)
MCHC RBC AUTO-ENTMCNC: 33.4 G/DL (ref 31–37)
MCV RBC AUTO: 83.9 FL (ref 74–97)
MONOCYTES # BLD: 0.5 K/UL (ref 0–1)
MONOCYTES NFR BLD: 5 % (ref 2–9)
NEUTS BAND NFR BLD MANUAL: 1 % (ref 0–5)
NEUTS SEG # BLD: 6.8 K/UL (ref 1.8–8)
NEUTS SEG NFR BLD: 74 % (ref 42–75)
PHOSPHATE SERPL-MCNC: 1.8 MG/DL (ref 2.5–4.9)
PHOSPHATE SERPL-MCNC: 2.5 MG/DL (ref 2.5–4.9)
PLATELET # BLD AUTO: 107 K/UL (ref 135–420)
PLATELET COMMENTS,PCOM: ABNORMAL
PMV BLD AUTO: 12 FL (ref 9.2–11.8)
POTASSIUM SERPL-SCNC: 3.2 MMOL/L (ref 3.5–5.5)
POTASSIUM SERPL-SCNC: 3.8 MMOL/L (ref 3.5–5.5)
RBC # BLD AUTO: 3.67 M/UL (ref 4.2–5.3)
RBC MORPH BLD: ABNORMAL
SERVICE CMNT-IMP: ABNORMAL
SERVICE CMNT-IMP: ABNORMAL
SODIUM SERPL-SCNC: 144 MMOL/L (ref 136–145)
SODIUM SERPL-SCNC: 147 MMOL/L (ref 136–145)
WBC # BLD AUTO: 9.1 K/UL (ref 4.6–13.2)

## 2018-02-10 PROCEDURE — 84100 ASSAY OF PHOSPHORUS: CPT | Performed by: INTERNAL MEDICINE

## 2018-02-10 PROCEDURE — 74011250636 HC RX REV CODE- 250/636: Performed by: INTERNAL MEDICINE

## 2018-02-10 PROCEDURE — 74011250637 HC RX REV CODE- 250/637: Performed by: PHYSICIAN ASSISTANT

## 2018-02-10 PROCEDURE — 80048 BASIC METABOLIC PNL TOTAL CA: CPT | Performed by: INTERNAL MEDICINE

## 2018-02-10 PROCEDURE — 83735 ASSAY OF MAGNESIUM: CPT | Performed by: INTERNAL MEDICINE

## 2018-02-10 PROCEDURE — 83735 ASSAY OF MAGNESIUM: CPT | Performed by: PHYSICIAN ASSISTANT

## 2018-02-10 PROCEDURE — 85025 COMPLETE CBC W/AUTO DIFF WBC: CPT | Performed by: INTERNAL MEDICINE

## 2018-02-10 PROCEDURE — 74011250636 HC RX REV CODE- 250/636: Performed by: EMERGENCY MEDICINE

## 2018-02-10 PROCEDURE — 36592 COLLECT BLOOD FROM PICC: CPT

## 2018-02-10 PROCEDURE — 74011000258 HC RX REV CODE- 258: Performed by: INTERNAL MEDICINE

## 2018-02-10 PROCEDURE — 74011000250 HC RX REV CODE- 250: Performed by: INTERNAL MEDICINE

## 2018-02-10 PROCEDURE — 80048 BASIC METABOLIC PNL TOTAL CA: CPT | Performed by: PHYSICIAN ASSISTANT

## 2018-02-10 PROCEDURE — 74011000250 HC RX REV CODE- 250: Performed by: PHYSICIAN ASSISTANT

## 2018-02-10 PROCEDURE — 94640 AIRWAY INHALATION TREATMENT: CPT

## 2018-02-10 PROCEDURE — 74011000258 HC RX REV CODE- 258: Performed by: PHYSICIAN ASSISTANT

## 2018-02-10 PROCEDURE — 74011636637 HC RX REV CODE- 636/637: Performed by: INTERNAL MEDICINE

## 2018-02-10 PROCEDURE — 84100 ASSAY OF PHOSPHORUS: CPT | Performed by: PHYSICIAN ASSISTANT

## 2018-02-10 PROCEDURE — 65610000006 HC RM INTENSIVE CARE

## 2018-02-10 PROCEDURE — 74011250636 HC RX REV CODE- 250/636: Performed by: PHYSICIAN ASSISTANT

## 2018-02-10 PROCEDURE — 74011636637 HC RX REV CODE- 636/637: Performed by: PHYSICIAN ASSISTANT

## 2018-02-10 PROCEDURE — 82962 GLUCOSE BLOOD TEST: CPT

## 2018-02-10 PROCEDURE — 71045 X-RAY EXAM CHEST 1 VIEW: CPT

## 2018-02-10 RX ADMIN — IPRATROPIUM BROMIDE AND ALBUTEROL SULFATE 3 ML: .5; 3 SOLUTION RESPIRATORY (INHALATION) at 03:30

## 2018-02-10 RX ADMIN — IPRATROPIUM BROMIDE AND ALBUTEROL SULFATE 3 ML: .5; 3 SOLUTION RESPIRATORY (INHALATION) at 13:34

## 2018-02-10 RX ADMIN — METRONIDAZOLE 500 MG: 500 INJECTION, SOLUTION INTRAVENOUS at 23:42

## 2018-02-10 RX ADMIN — THIAMINE HYDROCHLORIDE 100 MG: 100 INJECTION, SOLUTION INTRAMUSCULAR; INTRAVENOUS at 09:20

## 2018-02-10 RX ADMIN — IPRATROPIUM BROMIDE AND ALBUTEROL SULFATE 3 ML: .5; 3 SOLUTION RESPIRATORY (INHALATION) at 08:18

## 2018-02-10 RX ADMIN — Medication 10 ML: at 21:12

## 2018-02-10 RX ADMIN — Medication 10 ML: at 14:13

## 2018-02-10 RX ADMIN — POTASSIUM CHLORIDE 10 MEQ: 10 INJECTION, SOLUTION INTRAVENOUS at 02:45

## 2018-02-10 RX ADMIN — IPRATROPIUM BROMIDE AND ALBUTEROL SULFATE 3 ML: .5; 3 SOLUTION RESPIRATORY (INHALATION) at 20:26

## 2018-02-10 RX ADMIN — FOLIC ACID 1 MG: 1 TABLET ORAL at 09:20

## 2018-02-10 RX ADMIN — DEXMEDETOMIDINE HYDROCHLORIDE 0.4 MCG/KG/HR: 100 INJECTION, SOLUTION INTRAVENOUS at 02:46

## 2018-02-10 RX ADMIN — INSULIN GLARGINE 15 UNITS: 100 INJECTION, SOLUTION SUBCUTANEOUS at 09:39

## 2018-02-10 RX ADMIN — STANDARDIZED SENNA CONCENTRATE AND DOCUSATE SODIUM 1 TABLET: 8.6; 5 TABLET, FILM COATED ORAL at 09:20

## 2018-02-10 RX ADMIN — POTASSIUM CHLORIDE 10 MEQ: 10 INJECTION, SOLUTION INTRAVENOUS at 02:58

## 2018-02-10 RX ADMIN — LEVETIRACETAM 1000 MG: 5 INJECTION INTRAVENOUS at 09:17

## 2018-02-10 RX ADMIN — CEFTRIAXONE 2 G: 2 INJECTION, POWDER, FOR SOLUTION INTRAMUSCULAR; INTRAVENOUS at 16:12

## 2018-02-10 RX ADMIN — FENTANYL CITRATE 50 MCG: 50 INJECTION INTRAMUSCULAR; INTRAVENOUS at 16:24

## 2018-02-10 RX ADMIN — METRONIDAZOLE 500 MG: 500 INJECTION, SOLUTION INTRAVENOUS at 12:17

## 2018-02-10 RX ADMIN — IPRATROPIUM BROMIDE AND ALBUTEROL SULFATE 3 ML: .5; 3 SOLUTION RESPIRATORY (INHALATION) at 23:05

## 2018-02-10 RX ADMIN — VANCOMYCIN HYDROCHLORIDE 1250 MG: 10 INJECTION, POWDER, LYOPHILIZED, FOR SOLUTION INTRAVENOUS at 09:28

## 2018-02-10 RX ADMIN — SODIUM CHLORIDE 50 ML/HR: 450 INJECTION, SOLUTION INTRAVENOUS at 20:53

## 2018-02-10 RX ADMIN — Medication 10 ML: at 05:18

## 2018-02-10 RX ADMIN — IPRATROPIUM BROMIDE AND ALBUTEROL SULFATE 3 ML: .5; 3 SOLUTION RESPIRATORY (INHALATION) at 16:42

## 2018-02-10 RX ADMIN — POTASSIUM CHLORIDE 10 MEQ: 10 INJECTION, SOLUTION INTRAVENOUS at 00:20

## 2018-02-10 RX ADMIN — PANTOPRAZOLE SODIUM 40 MG: 40 GRANULE, DELAYED RELEASE ORAL at 09:20

## 2018-02-10 RX ADMIN — SODIUM CHLORIDE, SODIUM ACETATE ANHYDROUS, SODIUM GLUCONATE, POTASSIUM CHLORIDE, AND MAGNESIUM CHLORIDE: 526; 222; 502; 37; 30 INJECTION, SOLUTION INTRAVENOUS at 14:05

## 2018-02-10 RX ADMIN — CEFEPIME HYDROCHLORIDE 2 G: 2 INJECTION, POWDER, FOR SOLUTION INTRAVENOUS at 03:03

## 2018-02-10 RX ADMIN — IPRATROPIUM BROMIDE AND ALBUTEROL SULFATE 3 ML: .5; 3 SOLUTION RESPIRATORY (INHALATION) at 00:16

## 2018-02-10 RX ADMIN — HEPARIN SODIUM 5000 UNITS: 5000 INJECTION, SOLUTION INTRAVENOUS; SUBCUTANEOUS at 05:28

## 2018-02-10 RX ADMIN — HEPARIN SODIUM 5000 UNITS: 5000 INJECTION, SOLUTION INTRAVENOUS; SUBCUTANEOUS at 14:06

## 2018-02-10 RX ADMIN — LEVETIRACETAM 1000 MG: 5 INJECTION INTRAVENOUS at 20:58

## 2018-02-10 RX ADMIN — CHLORHEXIDINE GLUCONATE 10 ML: 1.2 RINSE ORAL at 09:21

## 2018-02-10 RX ADMIN — INSULIN LISPRO 6 UNITS: 100 INJECTION, SOLUTION INTRAVENOUS; SUBCUTANEOUS at 05:18

## 2018-02-10 RX ADMIN — POTASSIUM PHOSPHATE, MONOBASIC AND POTASSIUM PHOSPHATE, DIBASIC: 224; 236 INJECTION, SOLUTION INTRAVENOUS at 05:18

## 2018-02-10 RX ADMIN — POTASSIUM CHLORIDE 10 MEQ: 10 INJECTION, SOLUTION INTRAVENOUS at 01:30

## 2018-02-10 RX ADMIN — INSULIN LISPRO 8 UNITS: 100 INJECTION, SOLUTION INTRAVENOUS; SUBCUTANEOUS at 14:05

## 2018-02-10 NOTE — ROUTINE PROCESS
1900 Assumed care of patient. Pt drowsy but responds to voice. No command following or comprehension of verbal effort. VSS. 2 PIV's CDI. 1/2 NS @ 20 maintenance fluids infusing. Pro in place. Pt in no distress on RA. No visual complaints of pain at this time. Bed in low locked position and call bell within reach. 2000  Shift assessment completed. Pt repositioned. 2100 Hourly rounding. Evening meds given. 2300 Pt noted to have large urine output. Pt has already dumped 3L in 4.5 hours. BIJAN Quinteros notified. RN instructed to replace electrolytes. 0000 Pt noted to be very restless. Unable to keep pt still, pt continuously moving all over bed. Informed BIJAN Quinteros, instructed to restart Precedex gtt.     0200 Precedex gtt started- pt much more comfortable and calm. 0600 Patient bathed. Linen, gown and bedpad changed. Pro emptied- 5,675 mL of volume recorded. 0700 Bedside shift change report given to [address] (oncoming nurse) by Phillip Rodriguez RN (offgoing nurse). Report included the following information SBAR, Kardex, Intake/Output, MAR and Recent Results.

## 2018-02-10 NOTE — PROGRESS NOTES
[facility]  ICU Progress Note      Name: Leida Hammans   : 1955   MRN: [id number]   Date: 2/10/2018      [x]I have reviewed the flowsheet and previous days notes. Events overnight reviewed and discussed with nursing staff. Vital signs and records reviewed. Subjective:    Aurelia Gaspar is a 58 y.o. female with a history of poorly controlled DM, hep C, HTN, asthma, and emphysema who presented to the ED  for evaluation of AMS. En route by EMS, patient deteriorated into respiratory arrest and required ventilation via BVM. In the ED patient began vomiting dark emesis and was subsequently intubated. Concern for aspiration of emesis. 2 witnessed seizures were reported in the ED. Labs revealed an elevated glucose at 0, hypokalemia, hypernatremia and a respiratory acidosis. Patient was started on glucostablizer and aggressive replacement of electrolytes initiated. 2/10/18  Extubated doing well still min responsive not following commands. Glucose still elevated increased Lantus to 15. Low dose precedex gtt this am. No recurrence of seizure activity. Hemodynamically stable, off pressor. Afebrile overnight  Urine output adequate. [x]The patient is unable to give any meaningful history or review of systems because the patient is:  []Intubated []Sedated   [x]Unresponsive      [x]The patient is critically ill on      []Mechanical ventilation []Pressors   []BiPAP []                 ROS:Review of systems not obtained due to patient factors.      Medication Review:  · Pressors - none  · Sedation - none  · Antibiotics - vancomycin, cefepime and flagyl  · Pain - fentanyl  · GI/ DVT - protonix, heparin/SCDs  · Others Precedex     Safety Bundles: VAP Bundle/ CAUTI/ Severe Sepsis Protocol/ Electrolyte Replacement Protocol    Vital Signs:    Visit Vitals    BP 97/54    Pulse 74    Temp 98.2 °F (36.8 °C)    Resp 22    Wt 77.2 kg (170 lb 3.1 oz)    SpO2 96%    Breastfeeding No    BMI 26.66 kg/m2       O2 Device: Room air   O2 Flow Rate (L/min): 0 l/min   Temp (24hrs), Av °F (37.2 °C), Min:98.2 °F (36.8 °C), Max:99.8 °F (37.7 °C)       Intake/Output:   Last shift:         Last 3 shifts:  1901 - 02/10 0700  In: 3137.9 [I.V.:2737.9]  Out: 7061 [Urine:8425]    Intake/Output Summary (Last 24 hours) at 02/10/18 0959  Last data filed at 02/10/18 0600   Gross per 24 hour   Intake          1812.07 ml   Output             6475 ml   Net         -4662.93 ml         Physical Exam:    General: Responds to tactial stim non verbal moans   HEENT:  Anicteric sclerae; pink palpebral conjunctivae; oral mucosa moist; neck supple; trachea midline  Resp:  Symmetrical chest expansion, no accessory muscle use; coarse rhonchi bilaterally  CV:  S1, S2 present; regular rate and rhythm  GI:  Abdomen soft, + active bowel sounds  Extremities:  +2 pulses on all extremities; trace pitting edema bilateral lower extremities  Skin:  Warm; no rashes/ lesions noted  Neurologic:  Moves all four extremities spontaneously. Withdraws from pain. Does not follow commands.   Devices:    18: RIJ CVC, means cath       DATA:     Current Facility-Administered Medications   Medication Dose Route Frequency    electrolyte-r (NORMOSOL R) infusion   IntraVENous ONCE    insulin lispro (HUMALOG) injection   SubCUTAneous Q6H    dexmedeTOMidine (PRECEDEX) 600 mcg in 0.9% sodium chloride 150 mL infusion  0.2-0.7 mcg/kg/hr IntraVENous TITRATE    vancomycin (VANCOCIN) 1,250 mg in 0.9% sodium chloride 250 mL IVPB  1,250 mg IntraVENous Q24H    [START ON 2018] Vancomycin Lab Information  1 Each Other Daily    polyethylene glycol (MIRALAX) packet 17 g  17 g Oral DAILY    senna-docusate (PERICOLACE) 8.6-50 mg per tablet 1 Tab  1 Tab Oral DAILY    0.45% sodium chloride infusion  20 mL/hr IntraVENous CONTINUOUS    glucose chewable tablet 16 g  4 Tab Oral PRN    glucagon (GLUCAGEN) injection 1 mg  1 mg IntraMUSCular PRN    dextrose (D50W) injection syrg 12.5-25 g  25-50 mL IntraVENous PRN    insulin glargine (LANTUS) injection 15 Units  15 Units SubCUTAneous DAILY    heparin (porcine) injection 5,000 Units  5,000 Units SubCUTAneous Q8H    thiamine (B-1) injection 100 mg  100 mg IntraMUSCular DAILY    fentaNYL citrate (PF) injection 25-50 mcg  25-50 mcg IntraVENous Q4H PRN    midazolam (VERSED) injection 1-2 mg  1-2 mg IntraVENous Q4H PRN    metroNIDAZOLE (FLAGYL) IVPB premix 500 mg  500 mg IntraVENous Q12H    influenza vaccine 2017-18 (3 yrs+)(PF) (FLUZONE QUAD/FLUARIX QUAD) injection 0.5 mL  0.5 mL IntraMUSCular PRIOR TO DISCHARGE    cefepime (MAXIPIME) 2 g in sterile water (preservative free) 10 mL IV syringe  2 g IntraVENous Q12H    albuterol-ipratropium (DUO-NEB) 2.5 MG-0.5 MG/3 ML  3 mL Nebulization Q4H RT    pantoprazole (PROTONIX) granules for oral suspension 40 mg  40 mg Per NG tube DAILY    sodium chloride (NS) flush 5-10 mL  5-10 mL IntraVENous PRN    levETIRAcetam (KEPPRA) 500 mg in saline (iso-osm) 100 ml IVPB  1,000 mg IntraVENous Q12H    chlorhexidine (PERIDEX) 0.12 % mouthwash 10 mL  10 mL Oral Q12H    albuterol-ipratropium (DUO-NEB) 2.5 MG-0.5 MG/3 ML  3 mL Nebulization Q6H PRN    sodium chloride (NS) flush 5-10 mL  5-10 mL IntraVENous Q8H    sodium chloride (NS) flush 5-10 mL  5-10 mL IntraVENous PRN    acetaminophen (TYLENOL) suppository 650 mg  650 mg Rectal X1Q PRN    folic acid (FOLVITE) tablet 1 mg  1 mg Per NG tube DAILY         Labs: Results:       Chemistry Recent Labs      02/10/18   0454  02/09/18   1401  02/09/18   1330  02/09/18   0415   02/07/18   2147  02/07/18   1803   GLU  279*  333*   --   191*   < >  1731*  2050*   NA  147*  147*   --   149*   < >  132*  129*   K  3.8  3.6  4.0  2.9*   < >  2.1*  3.3*   CL  114*  117*   --   118*   < >  100  90*   CO2  26  25   --   23   < >  22  21   BUN  15  20*   --   21*   < >  45*  45*   CREA  1.01  1.38*   --   1.28   < > 2.92*  3.04*   CA  8.3*  7.6*   --   7.9*   < >  8.6  8.7   AGAP  7  5   --   8   < >  10  18   BUCR  15  14   --   16   < >  15  15   AP   --    --    --    --    --   133*  127*   TP   --    --    --    --    --   6.3*  6.0*   ALB   --    --    --    --    --   2.6*  2.6*   GLOB   --    --    --    --    --   3.7  3.4   AGRAT   --    --    --    --    --   0.7*  0.8    < > = values in this interval not displayed. CBC w/Diff Recent Labs      02/10/18   0454  02/09/18   0415  02/08/18   1758   WBC  9.1  10.8  8.0   RBC  3.67*  3.96*  3.75*   HGB  10.3*  11.2*  10.6*   HCT  30.8*  32.9*  31.0*   PLT  107*  132*  127*   GRANS  PENDING  51  70   LYMPH  PENDING  19*  12*   EOS  PENDING  0  0      Coagulation Recent Labs      02/08/18   0500  02/07/18   2147   PTP  15.8*   --    INR  1.3*   --    APTT   --   24.8       Liver Enzymes Recent Labs      02/07/18   2147   TP  6.3*   ALB  2.6*   AP  133*   SGOT  27      ABG No results found for: PH, PHI, PCO2, PCO2I, PO2, PO2I, HCO3, HCO3I, FIO2, FIO2I   Microbiology Recent Labs      02/09/18   1330  02/09/18   1319  02/08/18   1825   CULT  NO GROWTH AFTER 15 HOURS  NO GROWTH AFTER 16 HOURS  <10,000 COLONIES/mL STAPHYLOCOCCUS AUREUS*          Telemetry: [x]Sinus []A-flutter []Paced    []A-fib []Multiple PVCs                    Imaging:  CXR Results  (Last 48 hours)               02/10/18 0554  XR CHEST PORT Final result    Impression:  IMPRESSION:       No endotracheal tube is seen. Hazy bilateral pulmonary opacities may represent   layering pleural effusions versus atelectasis or edema. Retrocardiac atelectasis   or infiltrate. Narrative:  Chest Single View        CPT CODE: 07159       HISTORY: Endotracheal tube. COMPARISON: February 9, 2018. FINDINGS:       There is no endotracheal tube seen. Right internal jugular venous catheter tip   is at the RA/SVC junction. No evidence of pneumothorax.  EKG leads and wires   overlie the chest..   The lungs are hypoinflated. Hazy bilateral pulmonary opacities. Atherosclerotic   vascular calcification. Cardiac atelectasis or infiltrate. Heart is normal in size for technique. 02/09/18 0449  XR CHEST PORT Final result    Impression:  Impression:       1. High riding NG (or OG) tube position, with the distal tip in the distal   esophagus just above the level of the diaphragmatic hiatus. Other support   devices appear unremarkable. 2.  Bandlike density at the right lung base suggestive of subsegmental   atelectatic focus. Retrocardiac opacification suggestive of atelectasis with   pleural effusion vs. consolidation, i.e. infectious process. Narrative:  Procedure:  Chest Portable. Indication:  ET tube placement. Assessment of the ET tube position. Comparison:  2/8/18. Findings:       - ET tube:  Distal tip at 4.0 cm above the chace.   - Central access:  Right IJ central venous line in the superior vena cava, with   the distal tip at just below the level of the chace.   - Enteric tube:  NG (or OG tube placement, with its distal tip in the distal   esophagus at about the level of the diaphragmatic hiatus. A pH probe has been   placed as well. Distal tip in the distal esophagus, about midway between the   chace and diaphragmatic dome. No pneumothorax is detected. Streaky bandlike densities are noted in the right   lung base suggestive of subsegmental atelectasis. Retrocardiac opacification is   observed obscuring the left hemidiaphragmatic outline. Hazy opacity in the left   lower lung zone. CT Results  (Last 48 hours)               02/08/18 2307  CT HEAD WO CONT Final result    Impression:  IMPRESSION:       No CT evidence of cerebral edema or other acute intracranial abnormality. Note: If clinical concern of acute stroke, MRI with diffusion weighted images is   recommended for better evaluation.  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 Neoplastic   process not excludable. Recommend bone scan evaluation. Thank you for your referral.       Narrative:  CT of the head without contrast       CT CODE:  [id number]       HISTORY: Cerebral edema evaluation       COMPARISON: 0055 hours       TECHNIQUE: Helical axial scan to the head was performed from the skull base to   the vertex without IV contrast administration. All CT scans at this facility performed using dose optimization techniques as   appreciated to a performed exam, to include automated exposure control,   adjustment of the mA and or KU according to patient size (including appropriate   matching for site specific examination), or use of iterative reconstruction   technique. FINDINGS:       The brain parenchyma and ventricles appear unremarkable. No significant global   atrophy. Normal gray-white matter interface noted. No CT evidence of cerebral   edema seen. There is no evidence of acute intracranial hemorrhage or mass effect   identified. Subtle white matter microvascular disease again noted. No skull   fracture or extra axial fluid collections identified. The expansile mottled   appearance of sclerotic lesion of left sphenoid is again identified. No   additional skull lesion seen. Visualized sinuses and mastoid air cells appear   unremarkable.                     IMPRESSION:   · Hyperglycemia, likely combined HHNS and DKA in setting of poorly controlled DM -improving, off insulin gtt  · Acute hypoxic, hypercapnic respiratory failure on mechanical ventilatory support, intubated for airway protection, likely secondary to metabolic encephalopathy and hypercapnea/ CO2 narcosis -improving ABG   · Shock, possibly combination of hypovolemia from hyperosmolar diuresis +/- sepsis from aspiration -resolved, off vasopressor  · Acute encephalopathy, metabolic + hypercapnea  · Mixed respiratory and metabolic/lactic acidosis -resolved  · LUIS ANTONIO, likely prerenal secondary to profound dehydration -improving renal indices  · Severe sepsis, suspicion for UTI with abnormal UA + aspiration, Staph aureus on 1 BC   · Pseudohyponatremia, corrected Na 168 on admission - resolved  · Hypokalemia on admission -improving  · Mildly elevated troponin with ST depression on EKG, possibly demand ischemia  · Other electrolyte derangements: hypophosphatemia, hypomagnesemia  · Reported witnessed seizures, possibly from underlying HHNS. CT 2/7 no acute intercranial process   · Hx of HTN  · Hx of hepatitis C  · Hx of emphysema  · Hx of cocaine abuse: UDS 2/7 negative. PLAN:   · Resp - Supplemental O2 SpO2 > 90%. Aggressive pulmonary hygiene; bronchodilator. Strict aspiration precautions. Extubated doing well  · ID - follow blood, urine (staph aureus) and resp c/s. Repeat BC x 2 NG 15 hrs. Continue vanc, cefepime, flagyl. · CVS - monitor hemodynamic status closely - off pressor. · Heme/onc - stable hgb/hct; monitor for active bleeding  · Metabolic - close monitoring of electrolyte status, q12 BMP, Phos and Mg. Replace electrolytes aggressively additional 1 L Normsol. · Renal - means cath; strict I/O. Monitor BUN and Cr for improvement of LUIS ANTONIO. · Endo - Glycemic control with lantus increased to 15 and sliding scale insulin  · Neuro/ Pain/ Sedation - Neuro following.  Low dose precedex wean as tolerated     · GI - Strict aspiration precautions  · Prophylaxis - DVT: heparin/SCDs; GI: protonix  · Discussed With Dr. Rebecca Collins, PADANNY

## 2018-02-10 NOTE — PROGRESS NOTES
[facility]  Progress Note    Patient: Cheri Dickey Age: 58 y.o. : 1955 MR#: [id number] SSN: [id number]  Date/Time: 2/10/2018     Subjective:    Pt extubated  Assessment/Plan:   1. Acute hypoxic and hypercapnic resp failure: pt extubated. 2. HHS: improved   3. Acute met encephalopathy: due to # 1/2/5  4. Hypotension: septic vs hypovolemic: pressors as needed   5. New onset Sz: cont keppra, neuro consulted. EEG shows no epileptiform or focal abnormality. 6. Sepsis: ? Related to # 2 vs # 5  7. Possible aspiration PNA: cont Abx  8. Met and resp acidosis: will monitor   9. Lactic acidosis-resolved. 10. ? GI bleed with coffee ground: monitor H/H-stable  11. Hx Hep C and drug use:    12.Elevated trop: demand ischemia   13. ARF-improved. 15. UDS positive for cocaine. Case discussed with:  []Patient  [x]Family  []Nursing  []Case Management  DVT Prophylaxis:  []Lovenox  []Hep SQ  [x]SCDs  []Coumadin   []On Heparin gtt    Objective:   VS:   Visit Vitals    /57 (BP 1 Location: Left arm, BP Patient Position: At rest)    Pulse 65    Temp 98.2 °F (36.8 °C)    Resp 26    Wt 77.2 kg (170 lb 3.1 oz)    SpO2 96%    Breastfeeding No    BMI 26.66 kg/m2      Tmax/24hrs: Temp (24hrs), Av.9 °F (37.2 °C), Min:98.2 °F (36.8 °C), Max:99.8 °F (37.7 °C)  IOBRIEF    Intake/Output Summary (Last 24 hours) at 02/10/18 1242  Last data filed at 02/10/18 1001   Gross per 24 hour   Intake          1331.27 ml   Output             6575 ml   Net         -5243.73 ml       General:  Extubated in no distress  Pulmonary:  CTA Bilaterally. RR 20  Cardiovascular: Regular rate and Rhythm. GI:  Soft, Non distended, Non tender. + Bowel sounds. Extremities:  No edema, cyanosis, clubbing. Neurologic: sedated.       Medications:   Current Facility-Administered Medications   Medication Dose Route Frequency    electrolyte-r (NORMOSOL R) infusion   IntraVENous ONCE    [START ON 2/11/2018] Vancomycin trough level on 2/11/18 at 0830  1 Each Other ONCE    insulin lispro (HUMALOG) injection   SubCUTAneous Q6H    dexmedeTOMidine (PRECEDEX) 600 mcg in 0.9% sodium chloride 150 mL infusion  0.2-0.7 mcg/kg/hr IntraVENous TITRATE    vancomycin (VANCOCIN) 1,250 mg in 0.9% sodium chloride 250 mL IVPB  1,250 mg IntraVENous Q24H    polyethylene glycol (MIRALAX) packet 17 g  17 g Oral DAILY    senna-docusate (PERICOLACE) 8.6-50 mg per tablet 1 Tab  1 Tab Oral DAILY    0.45% sodium chloride infusion  50 mL/hr IntraVENous CONTINUOUS    glucose chewable tablet 16 g  4 Tab Oral PRN    glucagon (GLUCAGEN) injection 1 mg  1 mg IntraMUSCular PRN    dextrose (D50W) injection syrg 12.5-25 g  25-50 mL IntraVENous PRN    insulin glargine (LANTUS) injection 15 Units  15 Units SubCUTAneous DAILY    heparin (porcine) injection 5,000 Units  5,000 Units SubCUTAneous Q8H    thiamine (B-1) injection 100 mg  100 mg IntraMUSCular DAILY    fentaNYL citrate (PF) injection 25-50 mcg  25-50 mcg IntraVENous Q4H PRN    midazolam (VERSED) injection 1-2 mg  1-2 mg IntraVENous Q4H PRN    metroNIDAZOLE (FLAGYL) IVPB premix 500 mg  500 mg IntraVENous Q12H    influenza vaccine 2017-18 (3 yrs+)(PF) (FLUZONE QUAD/FLUARIX QUAD) injection 0.5 mL  0.5 mL IntraMUSCular PRIOR TO DISCHARGE    cefepime (MAXIPIME) 2 g in sterile water (preservative free) 10 mL IV syringe  2 g IntraVENous Q12H    albuterol-ipratropium (DUO-NEB) 2.5 MG-0.5 MG/3 ML  3 mL Nebulization Q4H RT    pantoprazole (PROTONIX) granules for oral suspension 40 mg  40 mg Per NG tube DAILY    sodium chloride (NS) flush 5-10 mL  5-10 mL IntraVENous PRN    levETIRAcetam (KEPPRA) 500 mg in saline (iso-osm) 100 ml IVPB  1,000 mg IntraVENous Q12H    chlorhexidine (PERIDEX) 0.12 % mouthwash 10 mL  10 mL Oral Q12H    albuterol-ipratropium (DUO-NEB) 2.5 MG-0.5 MG/3 ML  3 mL Nebulization Q6H PRN    sodium chloride (NS) flush 5-10 mL  5-10 mL IntraVENous Q8H    sodium chloride (NS) flush 5-10 mL  5-10 mL IntraVENous PRN    acetaminophen (TYLENOL) suppository 650 mg  650 mg Rectal Z8Q PRN    folic acid (FOLVITE) tablet 1 mg  1 mg Per NG tube DAILY       Labs:    Recent Results (from the past 24 hour(s))   CULTURE, BLOOD    Collection Time: 02/09/18  1:19 PM   Result Value Ref Range    Special Requests: NO SPECIAL REQUESTS      Culture result: NO GROWTH AFTER 16 HOURS     CULTURE, BLOOD    Collection Time: 02/09/18  1:30 PM   Result Value Ref Range    Special Requests: NO SPECIAL REQUESTS      Culture result: NO GROWTH AFTER 15 HOURS     POTASSIUM    Collection Time: 02/09/18  1:30 PM   Result Value Ref Range    Potassium 4.0 3.5 - 5.5 mmol/L   PHOSPHORUS    Collection Time: 02/09/18  1:30 PM   Result Value Ref Range    Phosphorus 2.3 (L) 2.5 - 4.9 MG/DL   PHOSPHORUS    Collection Time: 02/09/18  2:01 PM   Result Value Ref Range    Phosphorus 2.1 (L) 2.5 - 4.9 MG/DL   MAGNESIUM    Collection Time: 02/09/18  2:01 PM   Result Value Ref Range    Magnesium 2.0 1.6 - 2.6 mg/dL   METABOLIC PANEL, BASIC    Collection Time: 02/09/18  2:01 PM   Result Value Ref Range    Sodium 147 (H) 136 - 145 mmol/L    Potassium 3.6 3.5 - 5.5 mmol/L    Chloride 117 (H) 100 - 108 mmol/L    CO2 25 21 - 32 mmol/L    Anion gap 5 3.0 - 18 mmol/L    Glucose 333 (H) 74 - 99 mg/dL    BUN 20 (H) 7.0 - 18 MG/DL    Creatinine 1.38 (H) 0.6 - 1.3 MG/DL    BUN/Creatinine ratio 14 12 - 20      GFR est AA 47 (L) >60 ml/min/1.73m2    GFR est non-AA 39 (L) >60 ml/min/1.73m2    Calcium 7.6 (L) 8.5 - 10.1 MG/DL   GLUCOSE, POC    Collection Time: 02/09/18  5:56 PM   Result Value Ref Range    Glucose (POC) 312 (H) 70 - 110 mg/dL   GLUCOSE, POC    Collection Time: 02/09/18 11:20 PM   Result Value Ref Range    Glucose (POC) 275 (H) 70 - 110 mg/dL   CBC WITH AUTOMATED DIFF    Collection Time: 02/10/18  4:54 AM   Result Value Ref Range    WBC 9.1 4.6 - 13.2 K/uL    RBC 3.67 (L) 4.20 - 5.30 M/uL    HGB 10.3 (L) 12.0 - 16.0 g/dL    HCT 30.8 (L) 35.0 - 45.0 %    MCV 83.9 74.0 - 97.0 FL    MCH 28.1 24.0 - 34.0 PG    MCHC 33.4 31.0 - 37.0 g/dL    RDW 13.3 11.6 - 14.5 %    PLATELET 911 (L) 515 - 420 K/uL    MPV 12.0 (H) 9.2 - 11.8 FL    NEUTROPHILS 74 42 - 75 %    BAND NEUTROPHILS 1 0 - 5 %    LYMPHOCYTES 19 (L) 20 - 51 %    MONOCYTES 5 2 - 9 %    EOSINOPHILS 1 0 - 5 %    BASOPHILS 0 0 - 3 %    ABS. NEUTROPHILS 6.8 1.8 - 8.0 K/UL    ABS. LYMPHOCYTES 1.7 0.8 - 3.5 K/UL    ABS. MONOCYTES 0.5 0 - 1.0 K/UL    ABS. EOSINOPHILS 0.1 0.0 - 0.4 K/UL    ABS.  BASOPHILS 0.0 0.0 - 0.06 K/UL    DF MANUAL      PLATELET COMMENTS DECREASED PLATELETS      RBC COMMENTS NORMOCYTIC, NORMOCHROMIC     MAGNESIUM    Collection Time: 02/10/18  4:54 AM   Result Value Ref Range    Magnesium 2.0 1.6 - 2.6 mg/dL   PHOSPHORUS    Collection Time: 02/10/18  4:54 AM   Result Value Ref Range    Phosphorus 1.8 (L) 2.5 - 4.9 MG/DL   METABOLIC PANEL, BASIC    Collection Time: 02/10/18  4:54 AM   Result Value Ref Range    Sodium 147 (H) 136 - 145 mmol/L    Potassium 3.8 3.5 - 5.5 mmol/L    Chloride 114 (H) 100 - 108 mmol/L    CO2 26 21 - 32 mmol/L    Anion gap 7 3.0 - 18 mmol/L    Glucose 279 (H) 74 - 99 mg/dL    BUN 15 7.0 - 18 MG/DL    Creatinine 1.01 0.6 - 1.3 MG/DL    BUN/Creatinine ratio 15 12 - 20      GFR est AA >60 >60 ml/min/1.73m2    GFR est non-AA 56 (L) >60 ml/min/1.73m2    Calcium 8.3 (L) 8.5 - 10.1 MG/DL   GLUCOSE, POC    Collection Time: 02/10/18  5:20 AM   Result Value Ref Range    Glucose (POC) 256 (H) 70 - 110 mg/dL   GLUCOSE, POC    Collection Time: 02/10/18 12:15 PM   Result Value Ref Range    Glucose (POC) 317 (H) 70 - 110 mg/dL       Signed By: Maksim Gaxiola MD     February 10, 2018

## 2018-02-10 NOTE — PROGRESS NOTES
RENAL DAILY PROGRESS NOTE    Patient: Brandy Barry               Sex: female          DOA: 2/7/2018  5:41 PM        YOB: 1955      Age:  58 y.o.        LOS:  LOS: 3 days     Subjective:     Aurelia Still is a 58 y.o.  who presents with Hyperosmolar (nonketotic) coma ([address]  Acidosis  Respiratory failure ([address]  Shock ([address] Asked to evaluate for renal failure. admitted with hyperosmolar coma,resp failure. hx of dm  Chief complains: Patient denies nausea, vomiting, chest pain, dizziness, shortness of breath or headache.  - Reviewed last 24 hrs events     Current Facility-Administered Medications   Medication Dose Route Frequency    electrolyte-r (NORMOSOL R) infusion   IntraVENous ONCE    [START ON 2/11/2018] Vancomycin trough level on 2/11/18 at 0830  1 Each Other ONCE    insulin lispro (HUMALOG) injection   SubCUTAneous Q6H    dexmedeTOMidine (PRECEDEX) 600 mcg in 0.9% sodium chloride 150 mL infusion  0.2-0.7 mcg/kg/hr IntraVENous TITRATE    vancomycin (VANCOCIN) 1,250 mg in 0.9% sodium chloride 250 mL IVPB  1,250 mg IntraVENous Q24H    polyethylene glycol (MIRALAX) packet 17 g  17 g Oral DAILY    senna-docusate (PERICOLACE) 8.6-50 mg per tablet 1 Tab  1 Tab Oral DAILY    0.45% sodium chloride infusion  50 mL/hr IntraVENous CONTINUOUS    glucose chewable tablet 16 g  4 Tab Oral PRN    glucagon (GLUCAGEN) injection 1 mg  1 mg IntraMUSCular PRN    dextrose (D50W) injection syrg 12.5-25 g  25-50 mL IntraVENous PRN    insulin glargine (LANTUS) injection 15 Units  15 Units SubCUTAneous DAILY    heparin (porcine) injection 5,000 Units  5,000 Units SubCUTAneous Q8H    thiamine (B-1) injection 100 mg  100 mg IntraMUSCular DAILY    fentaNYL citrate (PF) injection 25-50 mcg  25-50 mcg IntraVENous Q4H PRN    midazolam (VERSED) injection 1-2 mg  1-2 mg IntraVENous Q4H PRN    metroNIDAZOLE (FLAGYL) IVPB premix 500 mg  500 mg IntraVENous Q12H    influenza vaccine 2017-18 (3 yrs+)(PF) (FLUZONE QUAD/FLUARIX QUAD) injection 0.5 mL  0.5 mL IntraMUSCular PRIOR TO DISCHARGE    cefepime (MAXIPIME) 2 g in sterile water (preservative free) 10 mL IV syringe  2 g IntraVENous Q12H    albuterol-ipratropium (DUO-NEB) 2.5 MG-0.5 MG/3 ML  3 mL Nebulization Q4H RT    pantoprazole (PROTONIX) granules for oral suspension 40 mg  40 mg Per NG tube DAILY    sodium chloride (NS) flush 5-10 mL  5-10 mL IntraVENous PRN    levETIRAcetam (KEPPRA) 500 mg in saline (iso-osm) 100 ml IVPB  1,000 mg IntraVENous Q12H    chlorhexidine (PERIDEX) 0.12 % mouthwash 10 mL  10 mL Oral Q12H    albuterol-ipratropium (DUO-NEB) 2.5 MG-0.5 MG/3 ML  3 mL Nebulization Q6H PRN    sodium chloride (NS) flush 5-10 mL  5-10 mL IntraVENous Q8H    sodium chloride (NS) flush 5-10 mL  5-10 mL IntraVENous PRN    acetaminophen (TYLENOL) suppository 650 mg  650 mg Rectal I9R PRN    folic acid (FOLVITE) tablet 1 mg  1 mg Per NG tube DAILY       Objective:     Visit Vitals    /57 (BP 1 Location: Left arm, BP Patient Position: At rest)    Pulse 65    Temp 98.2 °F (36.8 °C)    Resp 26    Wt 77.2 kg (170 lb 3.1 oz)    SpO2 96%    Breastfeeding No    BMI 26.66 kg/m2       Intake/Output Summary (Last 24 hours) at 02/10/18 1226  Last data filed at 02/10/18 1001   Gross per 24 hour   Intake          1331.27 ml   Output             6575 ml   Net         -5243.73 ml       Physical Examination:     GEN: Awake  RS: Chest is bilateral equal, no wheezing / rales / crackles  CVS: S1-S2 heard, RRR, No S3 / murmur  Abdomen: Soft, Non tender, Not distended, Positive bowel sounds, no organomegaly, no CVA / supra pubic tenderness  Extremities: No edema, no cyanosis, skin is warm on touch  HEENT: Head is atraumatic, PERRLA, conjunctiva pink & non icteric. No JVD or carotid bruit   Musculoskeletal: No gross joints or bone deformities   Lymph Node: No palpable cervical, axillary or groin lymphadenopathy.       Data Review:      Labs: Hematology:   Recent Labs      02/10/18   0454  02/09/18   0415  02/08/18   1758  02/07/18   2147  02/07/18   1803   WBC  9.1  10.8  8.0   --   11.9   HGB  10.3*  11.2*  10.6*  12.5  11.3*   HCT  30.8*  32.9*  31.0*  44.7  46.1*     Chemistry:   Recent Labs      02/10/18   0454  02/09/18   1401  02/09/18   1330  02/09/18   0415  02/08/18   2130  02/08/18   1552  02/08/18   1313  02/08/18   0500  02/07/18   2147  02/07/18   1803   BUN  15  20*   --   21*  23*  29*  31*  40*  45*  45*   CREA  1.01  1.38*   --   1.28  1.30  1.53*  1.64*  2.37*  2.92*  3.04*   CA  8.3*  7.6*   --   7.9*  7.8*  8.2*  8.2*  8.7  8.6  8.7   ALB   --    --    --    --    --    --    --    --   2.6*  2.6*   K  3.8  3.6  4.0  2.9*  3.5  4.4  4.3  2.5*  2.1*  3.3*   NA  147*  147*   --   149*  150*  151*  149*  145  132*  129*   CL  114*  117*   --   118*  118*  120*  119*  114*  100  90*   CO2  26  25   --   23  25  25  24  22  22  21   PHOS  1.8*  2.1*  2.3*  1.7*  1.3*  1.4*  1.5*  1.0*  0.8*   --    GLU  279*  333*   --   191*  166*  230*  345*  1084*  1731*  2050*        Images:    XR (Most Recent). CXR reviewed by me and compared with previous CXR   Results from Hospital Encounter encounter on 02/07/18   XR CHEST PORT   Narrative Chest    Indication: Tube Placement, advanced tube     Comparison: Yesterday    Findings: Single view. Instrumentation includes endotracheal tube with tip 40 mm  superior to the chace, enteric tube with tip overlying the stomach and sidehole  marker inferior to the gastroesophageal junction, right jugular central line  with tip at the mid SVC. No pneumothorax. No pleural effusion. Bilateral lower  lung zone atelectasis with retrocardiac opacity and bilateral upper lung zone  hazy infiltrates. Cardiomediastinal silhouette and osseous structures grossly  unchanged. Impression Impression:   1. Slight worsening of aeration of the upper lung zones and persistent  retrocardiac opacity.  Patchy retrocardiac opacity may represent atelectasis,  pneumonia, pleural effusion, or a combination of these in the appropriate  clinical setting. Follow-up PA and lateral chest radiograph may be helpful. 2. Lines and tubes as detailed         CT (Most Recent)   Results from [place] encounter on 02/07/18   CT HEAD WO CONT   Narrative CT of the head without contrast    CT CODE:  03600    HISTORY: Cerebral edema evaluation    COMPARISON: 0055 hours    TECHNIQUE: Helical axial scan to the head was performed from the skull base to  the vertex without IV contrast administration. All CT scans at this facility performed using dose optimization techniques as  appreciated to a performed exam, to include automated exposure control,  adjustment of the mA and or KU according to patient size (including appropriate  matching for site specific examination), or use of iterative reconstruction  technique. FINDINGS:    The brain parenchyma and ventricles appear unremarkable. No significant global  atrophy. Normal gray-white matter interface noted. No CT evidence of cerebral  edema seen. There is no evidence of acute intracranial hemorrhage or mass effect  identified. Subtle white matter microvascular disease again noted. No skull  fracture or extra axial fluid collections identified. The expansile mottled  appearance of sclerotic lesion of left sphenoid is again identified. No  additional skull lesion seen. Visualized sinuses and mastoid air cells appear  unremarkable. Impression IMPRESSION:    No CT evidence of cerebral edema or other acute intracranial abnormality. Note: If clinical concern of acute stroke, MRI with diffusion weighted images is  recommended for better evaluation. 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 Neoplastic  process not excludable. Recommend bone scan evaluation.     Thank you for your referral.         EKG No results found for this or any previous visit. I have personally reviewed the old medical records and patient's labs    Plan / Recommendation:      1. Arf,resolved. discussed with sister  2.hypernatremia,increase  hypotonic fluids  3.hypokalemia,hypophosphatemia,gave iv kphos    D/w Dr. Matt Tejeda MD  Nephrology  2/10/2018

## 2018-02-10 NOTE — PROGRESS NOTES
Re:  Aurelia Mir up visit     2/10/2018 12:16 PM    SSN: [id number]    Subjective:   Aurelia Reno is seen in follow up, family present. She's been extubated.     Medications:    Current Facility-Administered Medications   Medication Dose Route Frequency Provider Last Rate Last Dose    electrolyte-r (NORMOSOL R) infusion   IntraVENous ONCE Anthony Lawrence PA-C        [START ON 2/11/2018] Vancomycin trough level on 2/11/18 at 0830  1 Each Other ONCE Abe Mcleod MD        insulin lispro (HUMALOG) injection   SubCUTAneous Q6H Kong Briceno MD   6 Units at 02/10/18 0518    dexmedeTOMidine (PRECEDEX) 600 mcg in 0.9% sodium chloride 150 mL infusion  0.2-0.7 mcg/kg/hr IntraVENous TITRATE January-Joann HERNÁNDEZ PA-C   Stopped at 02/10/18 1154    vancomycin (VANCOCIN) 1,250 mg in 0.9% sodium chloride 250 mL IVPB  1,250 mg IntraVENous Q24H Kong Briceno .7 mL/hr at 02/10/18 0928 1,250 mg at 02/10/18 0928    polyethylene glycol (MIRALAX) packet 17 g  17 g Oral DAILY January-Joann HERNÁNDEZ PA-C   Stopped at 02/10/18 0921    senna-docusate (PERICOLACE) 8.6-50 mg per tablet 1 Tab  1 Tab Oral DAILY January-Joann HERNÁNDEZ PA-C   1 Tab at 02/10/18 0920    0.45% sodium chloride infusion  50 mL/hr IntraVENous CONTINUOUS Kush Coronado MD 50 mL/hr at 02/10/18 1016 50 mL/hr at 02/10/18 1016    glucose chewable tablet 16 g  4 Tab Oral PRN Kong Briceno MD        glucagon (GLUCAGEN) injection 1 mg  1 mg IntraMUSCular PRN Kong Briceno MD        dextrose (D50W) injection syrg 12.5-25 g  25-50 mL IntraVENous PRN Kong Briceno MD        insulin glargine (LANTUS) injection 15 Units  15 Units SubCUTAneous DAILY Radha Escobar PA-C   15 Units at 02/10/18 2677    heparin (porcine) injection 5,000 Units  5,000 Units SubCUTAneous Andrey Gibbons MD   5,000 Units at 02/10/18 0528    thiamine (B-1) injection 100 mg  100 mg IntraMUSCular DAILY Juanjo Ram MD   100 mg at 02/10/18 5014    fentaNYL citrate (PF) injection 25-50 mcg  25-50 mcg IntraVENous Q4H PRN January-Joann HERNÁNDEZ PA-C   50 mcg at 02/09/18 0804    midazolam (VERSED) injection 1-2 mg  1-2 mg IntraVENous Q4H PRN January-Jidawn HERNÁNDEZ PA-C   2 mg at 02/09/18 0751    metroNIDAZOLE (FLAGYL) IVPB premix 500 mg  500 mg IntraVENous Q12H Laila Kaplan  mL/hr at 02/09/18 2321 500 mg at 02/09/18 2321    influenza vaccine 2017-18 (3 yrs+)(PF) (FLUZONE QUAD/FLUARIX QUAD) injection 0.5 mL  0.5 mL IntraMUSCular PRIOR TO DISCHARGE January-Joann HERNÁNDEZ PA-C        cefepime (MAXIPIME) 2 g in sterile water (preservative free) 10 mL IV syringe  2 g IntraVENous Q12H Laila Kaplan MD   2 g at 02/10/18 0303    albuterol-ipratropium (DUO-NEB) 2.5 MG-0.5 MG/3 ML  3 mL Nebulization Q4H RT January-Joann HERNÁNDEZ PA-C   3 mL at 02/10/18 0818    pantoprazole (PROTONIX) granules for oral suspension 40 mg  40 mg Per NG tube DAILY Patricia Zuñiga PA-C   40 mg at 02/10/18 0920    sodium chloride (NS) flush 5-10 mL  5-10 mL IntraVENous PRN Brynn Us MD        levETIRAcetam (KEPPRA) 500 mg in saline (iso-osm) 100 ml IVPB  1,000 mg IntraVENous Q12H Brynn Us  mL/hr at 02/10/18 0917 1,000 mg at 02/10/18 0917    chlorhexidine (PERIDEX) 0.12 % mouthwash 10 mL  10 mL Oral Q12H Patricia Zuñiga PA-C   10 mL at 02/10/18 0921    albuterol-ipratropium (DUO-NEB) 2.5 MG-0.5 MG/3 ML  3 mL Nebulization Q6H PRN Bianca Foss PA-C        sodium chloride (NS) flush 5-10 mL  5-10 mL IntraVENous Q8H Pierre Ontiveros MD   10 mL at 02/10/18 0518    sodium chloride (NS) flush 5-10 mL  5-10 mL IntraVENous PRN Pierre Ontiveros MD        acetaminophen (TYLENOL) suppository 650 mg  650 mg Rectal Q6H PRN Pierre Ontiveros MD        folic acid (FOLVITE) tablet 1 mg  1 mg Per NG tube DAILY Patricia Zuñiga PA-C   1 mg at 02/10/18 0920       Vital signs:    Visit Vitals    /57 (BP 1 Location: Left arm, BP Patient Position: At rest)    Pulse 65  Temp 98.2 °F (36.8 °C)    Resp 26    Wt 77.2 kg (170 lb 3.1 oz)    SpO2 96%    Breastfeeding No    BMI 26.66 kg/m2       Review of Systems:   Could not be obtained from the patient because of the medical status. Patient Active Problem List   Diagnosis Code    Diverticula of colon K57.30    Sepsis ([address] A41.9    Sepsis secondary to UTI ([address] A41.9, N39.0    DM hyperosmolarity type II, uncontrolled ([address] E11.00, E11.65    HTN (hypertension) I10    Febrile illness, acute R50.9    Gram-negative bacteremia R78.81    Diabetic neuropathy ([address] E11.40    Osteoarthritis of both knees M17.0    Acidosis E87.2    Respiratory failure ([address] J96.90    Shock ([address] R57.9    Hyperosmolar (nonketotic) coma ([facility] E11.01    Acute UTI N39.0    Macrocytic anemia D53.9         Objective: The patient is found sleeping, arouses to tactile stimuli and goes back to sleep. Cranial Nerves: II  Visual fields are full to threat. III, IV, VI  Extraocular movements are intact. There is no nystagmus. V  Facial sensation is intact to pinprick. VII  Face is symmetrical.  VIII - Hearing is present. IX, X, XII  Palate is symmetrical.     Motor: The patient moves all four limbs fairly well and symmetrically from pain. Tone is normal. Reflexes are 2+ and symmetrical. Plantars are down going. Gait testing can't be preformed because of condition.     CBC:   Lab Results   Component Value Date/Time    WBC 9.1 02/10/2018 04:54 AM    RBC 3.67 (L) 02/10/2018 04:54 AM    HGB 10.3 (L) 02/10/2018 04:54 AM    HCT 30.8 (L) 02/10/2018 04:54 AM    PLATELET 230 (L) 65/01/8410 04:54 AM     BMP:   Lab Results   Component Value Date/Time    Glucose 279 (H) 02/10/2018 04:54 AM    Sodium 147 (H) 02/10/2018 04:54 AM    Potassium 3.8 02/10/2018 04:54 AM    Chloride 114 (H) 02/10/2018 04:54 AM    CO2 26 02/10/2018 04:54 AM    BUN 15 02/10/2018 04:54 AM    Creatinine 1.01 02/10/2018 04:54 AM    Calcium 8.3 (L) 02/10/2018 04:54 AM     CMP:   Lab Results   Component Value Date/Time    Glucose 279 (H) 02/10/2018 04:54 AM    Sodium 147 (H) 02/10/2018 04:54 AM    Potassium 3.8 02/10/2018 04:54 AM    Chloride 114 (H) 02/10/2018 04:54 AM    CO2 26 02/10/2018 04:54 AM    BUN 15 02/10/2018 04:54 AM    Creatinine 1.01 02/10/2018 04:54 AM    Calcium 8.3 (L) 02/10/2018 04:54 AM    Anion gap 7 02/10/2018 04:54 AM    BUN/Creatinine ratio 15 02/10/2018 04:54 AM    Alk. phosphatase 133 (H) 02/07/2018 09:47 PM    Protein, total 6.3 (L) 02/07/2018 09:47 PM    Albumin 2.6 (L) 02/07/2018 09:47 PM    Globulin 3.7 02/07/2018 09:47 PM    A-G Ratio 0.7 (L) 02/07/2018 09:47 PM     Coagulation:   Lab Results   Component Value Date/Time    Prothrombin time 15.8 (H) 02/08/2018 05:00 AM    INR 1.3 (H) 02/08/2018 05:00 AM    aPTT 24.8 02/07/2018 09:47 PM     Cardiac markers:   Lab Results   Component Value Date/Time     02/08/2018 05:00 AM    CK-MB Index 7.7 (H) 02/08/2018 05:00 AM       Assessment:  Seizures in this pateitn who has risk factors including significant elevation in her blood glucose. No recurrent seizure activity on anti-convulsant and with metabolic derangement adjusted. Plan:  Continue Keppra for now. Will follow. Sincerely,        Macrina Buenrostro.  Yair Wolfe M.D.

## 2018-02-11 ENCOUNTER — APPOINTMENT (OUTPATIENT)
Dept: GENERAL RADIOLOGY | Age: 63
DRG: 871 | End: 2018-02-11
Attending: PHYSICIAN ASSISTANT
Payer: MEDICARE

## 2018-02-11 LAB
BACTERIA SPEC CULT: ABNORMAL
BASOPHILS # BLD: 0 K/UL (ref 0–0.1)
BASOPHILS NFR BLD: 0 % (ref 0–2)
DIFFERENTIAL METHOD BLD: ABNORMAL
EOSINOPHIL # BLD: 0 K/UL (ref 0–0.4)
EOSINOPHIL NFR BLD: 1 % (ref 0–5)
ERYTHROCYTE [DISTWIDTH] IN BLOOD BY AUTOMATED COUNT: 13.3 % (ref 11.6–14.5)
GLUCOSE BLD STRIP.AUTO-MCNC: 211 MG/DL (ref 70–110)
GLUCOSE BLD STRIP.AUTO-MCNC: 241 MG/DL (ref 70–110)
GLUCOSE BLD STRIP.AUTO-MCNC: 253 MG/DL (ref 70–110)
GLUCOSE BLD STRIP.AUTO-MCNC: 261 MG/DL (ref 70–110)
GRAM STN SPEC: ABNORMAL
HCT VFR BLD AUTO: 33.5 % (ref 35–45)
HGB BLD-MCNC: 10.8 G/DL (ref 12–16)
LYMPHOCYTES # BLD: 1.1 K/UL (ref 0.9–3.6)
LYMPHOCYTES NFR BLD: 16 % (ref 21–52)
MCH RBC QN AUTO: 27.6 PG (ref 24–34)
MCHC RBC AUTO-ENTMCNC: 32.2 G/DL (ref 31–37)
MCV RBC AUTO: 85.7 FL (ref 74–97)
MONOCYTES # BLD: 0.5 K/UL (ref 0.05–1.2)
MONOCYTES NFR BLD: 8 % (ref 3–10)
NEUTS SEG # BLD: 5 K/UL (ref 1.8–8)
NEUTS SEG NFR BLD: 75 % (ref 40–73)
PLATELET # BLD AUTO: 113 K/UL (ref 135–420)
PMV BLD AUTO: 11.6 FL (ref 9.2–11.8)
RBC # BLD AUTO: 3.91 M/UL (ref 4.2–5.3)
SERVICE CMNT-IMP: ABNORMAL
WBC # BLD AUTO: 6.6 K/UL (ref 4.6–13.2)

## 2018-02-11 PROCEDURE — 74011250636 HC RX REV CODE- 250/636: Performed by: INTERNAL MEDICINE

## 2018-02-11 PROCEDURE — 94640 AIRWAY INHALATION TREATMENT: CPT

## 2018-02-11 PROCEDURE — 74011636637 HC RX REV CODE- 636/637: Performed by: PHYSICIAN ASSISTANT

## 2018-02-11 PROCEDURE — 74011250636 HC RX REV CODE- 250/636: Performed by: PHYSICIAN ASSISTANT

## 2018-02-11 PROCEDURE — 74011250636 HC RX REV CODE- 250/636: Performed by: EMERGENCY MEDICINE

## 2018-02-11 PROCEDURE — 71045 X-RAY EXAM CHEST 1 VIEW: CPT

## 2018-02-11 PROCEDURE — 74011250636 HC RX REV CODE- 250/636: Performed by: NURSE PRACTITIONER

## 2018-02-11 PROCEDURE — 74011000250 HC RX REV CODE- 250: Performed by: PHYSICIAN ASSISTANT

## 2018-02-11 PROCEDURE — 82962 GLUCOSE BLOOD TEST: CPT

## 2018-02-11 PROCEDURE — 74011000250 HC RX REV CODE- 250: Performed by: INTERNAL MEDICINE

## 2018-02-11 PROCEDURE — 74011250637 HC RX REV CODE- 250/637: Performed by: PHYSICIAN ASSISTANT

## 2018-02-11 PROCEDURE — 65270000029 HC RM PRIVATE

## 2018-02-11 PROCEDURE — 85025 COMPLETE CBC W/AUTO DIFF WBC: CPT | Performed by: INTERNAL MEDICINE

## 2018-02-11 PROCEDURE — 74011250637 HC RX REV CODE- 250/637: Performed by: INTERNAL MEDICINE

## 2018-02-11 PROCEDURE — 74011636637 HC RX REV CODE- 636/637: Performed by: INTERNAL MEDICINE

## 2018-02-11 PROCEDURE — 36592 COLLECT BLOOD FROM PICC: CPT

## 2018-02-11 RX ORDER — DOCUSATE SODIUM 100 MG/1
100 CAPSULE, LIQUID FILLED ORAL DAILY
Status: DISCONTINUED | OUTPATIENT
Start: 2018-02-12 | End: 2018-02-15 | Stop reason: HOSPADM

## 2018-02-11 RX ORDER — IPRATROPIUM BROMIDE AND ALBUTEROL SULFATE 2.5; .5 MG/3ML; MG/3ML
3 SOLUTION RESPIRATORY (INHALATION)
Status: DISCONTINUED | OUTPATIENT
Start: 2018-02-11 | End: 2018-02-15 | Stop reason: HOSPADM

## 2018-02-11 RX ORDER — INSULIN GLARGINE 100 [IU]/ML
3 INJECTION, SOLUTION SUBCUTANEOUS ONCE
Status: COMPLETED | OUTPATIENT
Start: 2018-02-11 | End: 2018-02-11

## 2018-02-11 RX ORDER — INSULIN GLARGINE 100 [IU]/ML
18 INJECTION, SOLUTION SUBCUTANEOUS DAILY
Status: DISCONTINUED | OUTPATIENT
Start: 2018-02-12 | End: 2018-02-12

## 2018-02-11 RX ORDER — POTASSIUM CHLORIDE 7.45 MG/ML
10 INJECTION INTRAVENOUS
Status: DISPENSED | OUTPATIENT
Start: 2018-02-11 | End: 2018-02-11

## 2018-02-11 RX ORDER — HEPARIN SODIUM 5000 [USP'U]/ML
5000 INJECTION, SOLUTION INTRAVENOUS; SUBCUTANEOUS EVERY 8 HOURS
Status: DISCONTINUED | OUTPATIENT
Start: 2018-02-11 | End: 2018-02-15 | Stop reason: HOSPADM

## 2018-02-11 RX ORDER — MAGNESIUM SULFATE 1 G/100ML
1 INJECTION INTRAVENOUS ONCE
Status: COMPLETED | OUTPATIENT
Start: 2018-02-11 | End: 2018-02-11

## 2018-02-11 RX ORDER — SENNOSIDES 8.6 MG/1
1 TABLET ORAL DAILY
Status: DISCONTINUED | OUTPATIENT
Start: 2018-02-12 | End: 2018-02-15 | Stop reason: HOSPADM

## 2018-02-11 RX ADMIN — ACETAMINOPHEN 650 MG: 650 SUPPOSITORY RECTAL at 21:34

## 2018-02-11 RX ADMIN — POTASSIUM CHLORIDE 10 MEQ: 10 INJECTION, SOLUTION INTRAVENOUS at 08:00

## 2018-02-11 RX ADMIN — STANDARDIZED SENNA CONCENTRATE AND DOCUSATE SODIUM 1 TABLET: 8.6; 5 TABLET, FILM COATED ORAL at 10:06

## 2018-02-11 RX ADMIN — FOLIC ACID 1 MG: 1 TABLET ORAL at 10:06

## 2018-02-11 RX ADMIN — MAGNESIUM SULFATE IN DEXTROSE 1 G: 10 INJECTION, SOLUTION INTRAVENOUS at 05:39

## 2018-02-11 RX ADMIN — LEVETIRACETAM 1000 MG: 5 INJECTION INTRAVENOUS at 09:55

## 2018-02-11 RX ADMIN — HEPARIN SODIUM 5000 UNITS: 5000 INJECTION, SOLUTION INTRAVENOUS; SUBCUTANEOUS at 13:00

## 2018-02-11 RX ADMIN — METRONIDAZOLE 500 MG: 500 INJECTION, SOLUTION INTRAVENOUS at 13:00

## 2018-02-11 RX ADMIN — THIAMINE HYDROCHLORIDE 100 MG: 100 INJECTION, SOLUTION INTRAMUSCULAR; INTRAVENOUS at 09:00

## 2018-02-11 RX ADMIN — POTASSIUM CHLORIDE 10 MEQ: 10 INJECTION, SOLUTION INTRAVENOUS at 09:00

## 2018-02-11 RX ADMIN — INSULIN GLARGINE 3 UNITS: 100 INJECTION, SOLUTION SUBCUTANEOUS at 15:47

## 2018-02-11 RX ADMIN — VANCOMYCIN HYDROCHLORIDE 1250 MG: 10 INJECTION, POWDER, LYOPHILIZED, FOR SOLUTION INTRAVENOUS at 09:58

## 2018-02-11 RX ADMIN — IPRATROPIUM BROMIDE AND ALBUTEROL SULFATE 3 ML: .5; 3 SOLUTION RESPIRATORY (INHALATION) at 03:06

## 2018-02-11 RX ADMIN — Medication 10 ML: at 15:48

## 2018-02-11 RX ADMIN — INSULIN LISPRO 6 UNITS: 100 INJECTION, SOLUTION INTRAVENOUS; SUBCUTANEOUS at 23:52

## 2018-02-11 RX ADMIN — POTASSIUM CHLORIDE 10 MEQ: 10 INJECTION, SOLUTION INTRAVENOUS at 09:53

## 2018-02-11 RX ADMIN — LEVETIRACETAM 1000 MG: 5 INJECTION INTRAVENOUS at 21:21

## 2018-02-11 RX ADMIN — PANTOPRAZOLE SODIUM 40 MG: 40 GRANULE, DELAYED RELEASE ORAL at 10:07

## 2018-02-11 RX ADMIN — POLYETHYLENE GLYCOL 3350 17 G: 17 POWDER, FOR SOLUTION ORAL at 10:08

## 2018-02-11 RX ADMIN — POTASSIUM CHLORIDE 10 MEQ: 10 INJECTION, SOLUTION INTRAVENOUS at 05:40

## 2018-02-11 RX ADMIN — INSULIN GLARGINE 15 UNITS: 100 INJECTION, SOLUTION SUBCUTANEOUS at 10:03

## 2018-02-11 RX ADMIN — IPRATROPIUM BROMIDE AND ALBUTEROL SULFATE 3 ML: .5; 3 SOLUTION RESPIRATORY (INHALATION) at 08:14

## 2018-02-11 RX ADMIN — INSULIN LISPRO 4 UNITS: 100 INJECTION, SOLUTION INTRAVENOUS; SUBCUTANEOUS at 00:04

## 2018-02-11 RX ADMIN — HEPARIN SODIUM 5000 UNITS: 5000 INJECTION, SOLUTION INTRAVENOUS; SUBCUTANEOUS at 21:25

## 2018-02-11 RX ADMIN — Medication 10 ML: at 06:00

## 2018-02-11 RX ADMIN — CEFTRIAXONE 2 G: 2 INJECTION, POWDER, FOR SOLUTION INTRAMUSCULAR; INTRAVENOUS at 15:48

## 2018-02-11 RX ADMIN — FENTANYL CITRATE 50 MCG: 50 INJECTION INTRAMUSCULAR; INTRAVENOUS at 00:40

## 2018-02-11 RX ADMIN — METRONIDAZOLE 500 MG: 500 INJECTION, SOLUTION INTRAVENOUS at 23:47

## 2018-02-11 RX ADMIN — INSULIN LISPRO 6 UNITS: 100 INJECTION, SOLUTION INTRAVENOUS; SUBCUTANEOUS at 06:03

## 2018-02-11 RX ADMIN — Medication 10 ML: at 21:27

## 2018-02-11 RX ADMIN — INSULIN LISPRO 4 UNITS: 100 INJECTION, SOLUTION INTRAVENOUS; SUBCUTANEOUS at 13:01

## 2018-02-11 NOTE — PROGRESS NOTES
[facility] Hospitalist Group  Progress Note    Patient: Claire Pino Age: 58 y.o. : 1955 MR#: [id number] SSN: [id number]  Date/Time: 2018     Subjective:    Pt more awake today, states she feels tired. Assessment/Plan:   1. Acute hypoxic and hypercapnic resp failure: s/p intubation/extubation appears to be stable. .  2. HHS: improved   3. Acute met encephalopathy: due to # 1/2/5-improving  4. Hypotension: septic vs hypovolemic:improved and now off pressors. 5. New onset Sz: cont keppra, neuro consulted. EEG shows no epileptiform or focal abnormality. 6. Sepsis: ? Related to # 2 vs # 5  7. Possible aspiration PNA: cont Abx  8. Met and resp acidosis: seems to have resolved. 9. Lactic acidosis-resolved. 10. ? GI bleed with coffee ground: monitor H/H-stable  11. Hx Hep C and drug use:    12.Elevated trop: demand ischemia   13. ARF-resolved. 15. UDS positive for cocaine. Case discussed with:  []Patient  [x]Family  []Nursing  []Case Management  DVT Prophylaxis:  []Lovenox  []Hep SQ  [x]SCDs  []Coumadin   []On Heparin gtt    Objective:   VS:   Visit Vitals    /59    Pulse 97    Temp 98.6 °F (37 °C)    Resp 26    Wt 71.6 kg (157 lb 13.6 oz)    SpO2 100%    Breastfeeding No    BMI 24.72 kg/m2      Tmax/24hrs: Temp (24hrs), Av.2 °F (37.3 °C), Min:97.9 °F (36.6 °C), Max:100 °F (37.8 °C)  IOBRIEF    Intake/Output Summary (Last 24 hours) at 18 1137  Last data filed at 18 0600   Gross per 24 hour   Intake             1150 ml   Output             4050 ml   Net            -2900 ml       General:  Extubated in no distress, oriented to self and place  Pulmonary:  CTA Bilaterally. RR 20  Cardiovascular: Regular rate and Rhythm. GI:  Soft, Non distended, Non tender. + Bowel sounds. Extremities:  No edema, cyanosis, clubbing. Neurologic: sedated.       Medications:   Current Facility-Administered Medications   Medication Dose Route Frequency    heparin (porcine) injection 5,000 Units  5,000 Units SubCUTAneous Q8H    [START ON 2/12/2018] Vancomycin trough level due 2/12/18 at 0930  1 Each Other ONCE    cefTRIAXone (ROCEPHIN) 2 g in sterile water (preservative free) 20 mL IV syringe  2 g IntraVENous Q24H    insulin lispro (HUMALOG) injection   SubCUTAneous Q6H    vancomycin (VANCOCIN) 1,250 mg in 0.9% sodium chloride 250 mL IVPB  1,250 mg IntraVENous Q24H    polyethylene glycol (MIRALAX) packet 17 g  17 g Oral DAILY    senna-docusate (PERICOLACE) 8.6-50 mg per tablet 1 Tab  1 Tab Oral DAILY    0.45% sodium chloride infusion  50 mL/hr IntraVENous CONTINUOUS    glucose chewable tablet 16 g  4 Tab Oral PRN    glucagon (GLUCAGEN) injection 1 mg  1 mg IntraMUSCular PRN    dextrose (D50W) injection syrg 12.5-25 g  25-50 mL IntraVENous PRN    insulin glargine (LANTUS) injection 15 Units  15 Units SubCUTAneous DAILY    thiamine (B-1) injection 100 mg  100 mg IntraMUSCular DAILY    fentaNYL citrate (PF) injection 25-50 mcg  25-50 mcg IntraVENous Q4H PRN    midazolam (VERSED) injection 1-2 mg  1-2 mg IntraVENous Q4H PRN    metroNIDAZOLE (FLAGYL) IVPB premix 500 mg  500 mg IntraVENous Q12H    influenza vaccine 2017-18 (3 yrs+)(PF) (FLUZONE QUAD/FLUARIX QUAD) injection 0.5 mL  0.5 mL IntraMUSCular PRIOR TO DISCHARGE    albuterol-ipratropium (DUO-NEB) 2.5 MG-0.5 MG/3 ML  3 mL Nebulization Q4H RT    pantoprazole (PROTONIX) granules for oral suspension 40 mg  40 mg Per NG tube DAILY    sodium chloride (NS) flush 5-10 mL  5-10 mL IntraVENous PRN    levETIRAcetam (KEPPRA) 500 mg in saline (iso-osm) 100 ml IVPB  1,000 mg IntraVENous Q12H    chlorhexidine (PERIDEX) 0.12 % mouthwash 10 mL  10 mL Oral Q12H    albuterol-ipratropium (DUO-NEB) 2.5 MG-0.5 MG/3 ML  3 mL Nebulization Q6H PRN    sodium chloride (NS) flush 5-10 mL  5-10 mL IntraVENous Q8H    sodium chloride (NS) flush 5-10 mL  5-10 mL IntraVENous PRN    acetaminophen (TYLENOL) suppository 650 mg 650 mg Rectal L9A PRN    folic acid (FOLVITE) tablet 1 mg  1 mg Per NG tube DAILY       Labs:    Recent Results (from the past 24 hour(s))   GLUCOSE, POC    Collection Time: 02/10/18 12:15 PM   Result Value Ref Range    Glucose (POC) 317 (H) 70 - 110 mg/dL   GLUCOSE, POC    Collection Time: 02/10/18  5:52 PM   Result Value Ref Range    Glucose (POC) 204 (H) 70 - 110 mg/dL   MAGNESIUM    Collection Time: 02/10/18 11:00 PM   Result Value Ref Range    Magnesium 1.8 1.6 - 2.6 mg/dL   PHOSPHORUS    Collection Time: 02/10/18 11:00 PM   Result Value Ref Range    Phosphorus 2.5 2.5 - 4.9 MG/DL   METABOLIC PANEL, BASIC    Collection Time: 02/10/18 11:00 PM   Result Value Ref Range    Sodium 144 136 - 145 mmol/L    Potassium 3.2 (L) 3.5 - 5.5 mmol/L    Chloride 108 100 - 108 mmol/L    CO2 28 21 - 32 mmol/L    Anion gap 8 3.0 - 18 mmol/L    Glucose 261 (H) 74 - 99 mg/dL    BUN 12 7.0 - 18 MG/DL    Creatinine 0.84 0.6 - 1.3 MG/DL    BUN/Creatinine ratio 14 12 - 20      GFR est AA >60 >60 ml/min/1.73m2    GFR est non-AA >60 >60 ml/min/1.73m2    Calcium 8.5 8.5 - 10.1 MG/DL   GLUCOSE, POC    Collection Time: 02/11/18 12:03 AM   Result Value Ref Range    Glucose (POC) 241 (H) 70 - 110 mg/dL   CBC WITH AUTOMATED DIFF    Collection Time: 02/11/18  5:25 AM   Result Value Ref Range    WBC 6.6 4.6 - 13.2 K/uL    RBC 3.91 (L) 4.20 - 5.30 M/uL    HGB 10.8 (L) 12.0 - 16.0 g/dL    HCT 33.5 (L) 35.0 - 45.0 %    MCV 85.7 74.0 - 97.0 FL    MCH 27.6 24.0 - 34.0 PG    MCHC 32.2 31.0 - 37.0 g/dL    RDW 13.3 11.6 - 14.5 %    PLATELET 318 (L) 790 - 420 K/uL    MPV 11.6 9.2 - 11.8 FL    NEUTROPHILS 75 (H) 40 - 73 %    LYMPHOCYTES 16 (L) 21 - 52 %    MONOCYTES 8 3 - 10 %    EOSINOPHILS 1 0 - 5 %    BASOPHILS 0 0 - 2 %    ABS. NEUTROPHILS 5.0 1.8 - 8.0 K/UL    ABS. LYMPHOCYTES 1.1 0.9 - 3.6 K/UL    ABS. MONOCYTES 0.5 0.05 - 1.2 K/UL    ABS. EOSINOPHILS 0.0 0.0 - 0.4 K/UL    ABS.  BASOPHILS 0.0 0.0 - 0.1 K/UL    DF AUTOMATED     GLUCOSE, POC Collection Time: 02/11/18  6:02 AM   Result Value Ref Range    Glucose (POC) 261 (H) 70 - 110 mg/dL       Signed By: Hugo Richard MD     February 11, 2018

## 2018-02-11 NOTE — PROGRESS NOTES
[facility]  ICU Progress Note      Name: Neeru Davison   : 1955   MRN: [id number]   Date: 2018      [x]I have reviewed the flowsheet and previous days notes. Events overnight reviewed and discussed with nursing staff. Vital signs and records reviewed. Subjective:    Aurelia Lott is a 58 y.o. female with a history of poorly controlled DM, hep C, HTN, asthma, and emphysema who presented to the ED  for evaluation of AMS. En route by EMS, patient deteriorated into respiratory arrest and required ventilation via BVM. In the ED patient began vomiting dark emesis and was subsequently intubated. Concern for aspiration of emesis. 2 witnessed seizures were reported in the ED. Labs revealed an elevated glucose at 0, hypokalemia, hypernatremia and a respiratory acidosis. Patient was started on glucostablizer and aggressive replacement of electrolytes initiated. 18  Extubated doing well mental status improving still a little confused will get swallow study and feed patient . Hemodynamically stable, off pressor. Afebrile overnight  Urine output adequate. No complaints wants to eat    []The patient is unable to give any meaningful history or review of systems because the patient is:  []Intubated []Sedated   []Unresponsive      [x]The patient is critically ill on      []Mechanical ventilation []Pressors   []BiPAP []                 ROS:Pertinent items are noted in HPI.      Medication Review:  · Pressors - none  · Sedation - none  · Antibiotics - vancomycin, cefepime and flagyl  · Pain - fentanyl  · GI/ DVT - protonix, heparin/SCDs  · Others      Safety Bundles: VAP Bundle/ CAUTI/ Severe Sepsis Protocol/ Electrolyte Replacement Protocol    Vital Signs:    Visit Vitals    /59    Pulse 97    Temp 98.6 °F (37 °C)    Resp 26    Wt 71.6 kg (157 lb 13.6 oz)    SpO2 100%    Breastfeeding No    BMI 24.72 kg/m2       O2 Device: Room air   O2 Flow Rate (L/min): 0 l/min   Temp (24hrs), Av.2 °F (37.3 °C), Min:97.9 °F (36.6 °C), Max:100 °F (37.8 °C)       Intake/Output:   Last shift:         Last 3 shifts:  1901 -  0700  In: 2055.6 [I.V.:2055.6]  Out: 90246 [Urine:27240]    Intake/Output Summary (Last 24 hours) at 18 0944  Last data filed at 18 0600   Gross per 24 hour   Intake           1292.8 ml   Output             4400 ml   Net          -3107.2 ml         Physical Exam:    General: Awake little disoriented but answers questions    HEENT:  Anicteric sclerae; pink palpebral conjunctivae; oral mucosa moist; neck supple; trachea midline  Resp:  Symmetrical chest expansion, no accessory muscle use; coarse rhonchi bilaterally  CV:  S1, S2 present; regular rate and rhythm  GI:  Abdomen soft, + active bowel sounds  Extremities:  +2 pulses on all extremities; trace pitting edema bilateral lower extremities  Skin:  Warm; no rashes/ lesions noted  Neurologic:  Moves all four extremities spontaneously.   Following commands  Devices:    18: RIJ CVC, means cath       DATA:     Current Facility-Administered Medications   Medication Dose Route Frequency    potassium chloride 10 mEq in 100 ml IVPB  10 mEq IntraVENous Q1H    heparin (porcine) injection 5,000 Units  5,000 Units SubCUTAneous Q8H    Vancomycin trough level on 18 at 0830  1 Each Other ONCE    cefTRIAXone (ROCEPHIN) 2 g in sterile water (preservative free) 20 mL IV syringe  2 g IntraVENous Q24H    insulin lispro (HUMALOG) injection   SubCUTAneous Q6H    vancomycin (VANCOCIN) 1,250 mg in 0.9% sodium chloride 250 mL IVPB  1,250 mg IntraVENous Q24H    polyethylene glycol (MIRALAX) packet 17 g  17 g Oral DAILY    senna-docusate (PERICOLACE) 8.6-50 mg per tablet 1 Tab  1 Tab Oral DAILY    0.45% sodium chloride infusion  50 mL/hr IntraVENous CONTINUOUS    glucose chewable tablet 16 g  4 Tab Oral PRN    glucagon (GLUCAGEN) injection 1 mg  1 mg IntraMUSCular PRN    dextrose (D50W) injection syrg 12.5-25 g  25-50 mL IntraVENous PRN    insulin glargine (LANTUS) injection 15 Units  15 Units SubCUTAneous DAILY    thiamine (B-1) injection 100 mg  100 mg IntraMUSCular DAILY    fentaNYL citrate (PF) injection 25-50 mcg  25-50 mcg IntraVENous Q4H PRN    midazolam (VERSED) injection 1-2 mg  1-2 mg IntraVENous Q4H PRN    metroNIDAZOLE (FLAGYL) IVPB premix 500 mg  500 mg IntraVENous Q12H    influenza vaccine 2017-18 (3 yrs+)(PF) (FLUZONE QUAD/FLUARIX QUAD) injection 0.5 mL  0.5 mL IntraMUSCular PRIOR TO DISCHARGE    albuterol-ipratropium (DUO-NEB) 2.5 MG-0.5 MG/3 ML  3 mL Nebulization Q4H RT    pantoprazole (PROTONIX) granules for oral suspension 40 mg  40 mg Per NG tube DAILY    sodium chloride (NS) flush 5-10 mL  5-10 mL IntraVENous PRN    levETIRAcetam (KEPPRA) 500 mg in saline (iso-osm) 100 ml IVPB  1,000 mg IntraVENous Q12H    chlorhexidine (PERIDEX) 0.12 % mouthwash 10 mL  10 mL Oral Q12H    albuterol-ipratropium (DUO-NEB) 2.5 MG-0.5 MG/3 ML  3 mL Nebulization Q6H PRN    sodium chloride (NS) flush 5-10 mL  5-10 mL IntraVENous Q8H    sodium chloride (NS) flush 5-10 mL  5-10 mL IntraVENous PRN    acetaminophen (TYLENOL) suppository 650 mg  650 mg Rectal M3F PRN    folic acid (FOLVITE) tablet 1 mg  1 mg Per NG tube DAILY         Labs: Results:       Chemistry Recent Labs      02/10/18   2300  02/10/18   0454  02/09/18   1401   GLU  261*  279*  333*   NA  144  147*  147*   K  3.2*  3.8  3.6   CL  108  114*  117*   CO2  28  26  25   BUN  12  15  20*   CREA  0.84  1.01  1.38*   CA  8.5  8.3*  7.6*   AGAP  8  7  5   BUCR  14  15  14      CBC w/Diff Recent Labs      02/11/18   0525  02/10/18   0454  02/09/18   0415   WBC  6.6  9.1  10.8   RBC  3.91*  3.67*  3.96*   HGB  10.8*  10.3*  11.2*   HCT  33.5*  30.8*  32.9*   PLT  113*  107*  132*   GRANS  75*  74  51   LYMPH  16*  19*  19*   EOS  1  1  0      Coagulation No results for input(s): PTP, INR, APTT in the last 72 hours.     No lab exists for component: INREXT, INREXT    Liver Enzymes No results for input(s): TP, ALB, TBIL, AP, SGOT, GPT in the last 72 hours. No lab exists for component: DBIL   ABG No results found for: PH, PHI, PCO2, PCO2I, PO2, PO2I, HCO3, HCO3I, FIO2, FIO2I   Microbiology Recent Labs      02/09/18   1330  02/09/18   1319  02/08/18   1825   CULT  NO GROWTH 2 DAYS  NO GROWTH 2 DAYS  <10,000 COLONIES/mL STAPHYLOCOCCUS AUREUS*          Telemetry: [x]Sinus []A-flutter []Paced    []A-fib []Multiple PVCs                    Imaging:  CXR Results  (Last 48 hours)               02/11/18 0642  XR CHEST PORT Final result    Impression:  IMPRESSION:        Again, no endotracheal tube is seen in the patient. Clinical correlation,   please, regarding the provided indication. Slight interval decrease in pulmonary vascular congestion. Otherwise hazy   bilateral lung opacities consistent with airspace disease and/or atelectasis   similar to prior as described. Small bilateral pleural effusions versus   thickening. Narrative:  AP CHEST, PORTABLE       CPT CODE: [id number]       INDICATION: Provided as ETT eval.       COMPARISON: 2/10/2018. TECHNIQUE: Portable semiupright AP chest radiograph is reviewed. FINDINGS:       Right IJ central venous catheter is again seen with tip projected over the lower   SVC. No endotracheal tube is evident in the patient. Multiple EKG leads/wires   overlie the patient. Pulmonary vascular congestion, slightly decreased compared to the preceding   radiograph. Hazy opacities most conspicuous in the perihilar and lower lungs   consistent with airspace disease and/or atelectasis. A component of pulmonary   edema is possible. There is mild blunting of the costophrenic sulci consistent   with small pleural effusions versus thickening. No evidence of pneumothorax. The cardiac silhouette is within normal limits in size for technique.         Old healed or healing rib fractures best seen in the posterior lateral left   sixth rib.           02/10/18 0554  XR CHEST PORT Final result    Impression:  IMPRESSION:       No endotracheal tube is seen. Hazy bilateral pulmonary opacities may represent   layering pleural effusions versus atelectasis or edema. Retrocardiac atelectasis   or infiltrate. Narrative:  Chest Single View        CPT CODE: [id number]       HISTORY: Endotracheal tube. COMPARISON: February 9, 2018. FINDINGS:       There is no endotracheal tube seen. Right internal jugular venous catheter tip   is at the RA/SVC junction. No evidence of pneumothorax. EKG leads and wires   overlie the chest..   The lungs are hypoinflated. Hazy bilateral pulmonary opacities. Atherosclerotic   vascular calcification. Cardiac atelectasis or infiltrate. Heart is normal in size for technique. CT Results  (Last 48 hours)    None            IMPRESSION:   · Hyperglycemia, likely combined HHNS and DKA in setting of poorly controlled DM -improving, off insulin gtt  · Acute hypoxic, hypercapnic respiratory failure on mechanical ventilatory support, intubated for airway protection, likely secondary to metabolic encephalopathy and hypercapnea/ CO2 narcosis -improving ABG   · Shock, possibly combination of hypovolemia from hyperosmolar diuresis +/- sepsis from aspiration -resolved, off vasopressor  · Acute encephalopathy, metabolic + hypercapnea  · Mixed respiratory and metabolic/lactic acidosis -resolved  · LUIS ANTONIO, likely prerenal secondary to profound dehydration -improving renal indices  · Severe sepsis, suspicion for UTI with abnormal UA + aspiration, Staph aureus on 1 BC   · Pseudohyponatremia, corrected Na 168 on admission - resolved  · Hypokalemia on admission -improving  · Mildly elevated troponin with ST depression on EKG, possibly demand ischemia  · Other electrolyte derangements: hypophosphatemia, hypomagnesemia  · Reported witnessed seizures, possibly from underlying HHNS.  CT 2/7 no acute intercranial process   · Hx of HTN  · Hx of hepatitis C  · Hx of emphysema  · Hx of cocaine abuse: UDS 2/7 negative. PLAN:   · Resp - Supplemental O2 SpO2 > 90%. Aggressive pulmonary hygiene; bronchodilator. Strict aspiration precautions. Extubated doing well  · ID - follow blood, urine (staph aureus) and resp c/s. Repeat BC x 2 NGTD Of note, patient has MRSA in sputum, blood and urine  will need 2 weeks of Vancomycin, will deescalate ABx to cover for aspiration PNA to Ceftriaxone and Flagyl with stop dates entered. .   · CVS - monitor hemodynamic status closely - off pressor. · Heme/onc - stable hgb/hct; monitor for active bleeding  · Metabolic - close monitoring of electrolyte status, q12 BMP, K+ Mg replaced. Replace electrolytes aggressively. · Renal - means cath; strict I/O. Monitor BUN and Cr for improvement of LUIS ANTONIO. · Endo - Glycemic control with lantus increased to 15 and sliding scale insulin   · Neuro/ Pain/ Sedation - Neuro following.       · GI - bedside swallow eval if passes can start diet  · Prophylaxis - DVT: heparin/SCDs; GI: protonix  · Discussed With Dr. Lyssa Camacho PA-C

## 2018-02-11 NOTE — PROGRESS NOTES
Re:  Candis Mark,Follow up visit     2/11/2018 1:14 PM      SSN: [id number]    Subjective:   Aurelia Lopez is seen in follow up, family present. She's been extubated. No further seizure activity.     Medications:    Current Facility-Administered Medications   Medication Dose Route Frequency Provider Last Rate Last Dose    heparin (porcine) injection 5,000 Units  5,000 Units SubCUTAneous Q8H Kurt Kate PA-C   5,000 Units at 02/11/18 1300    [START ON 2/12/2018] Vancomycin trough level due 2/12/18 at 0930  1 Each Other ONCE Hugo Richard MD        cefTRIAXone (ROCEPHIN) 2 g in sterile water (preservative free) 20 mL IV syringe  2 g IntraVENous Q24H Mel Fink MD   2 g at 02/10/18 1612    insulin lispro (HUMALOG) injection   SubCUTAneous Q6H Mel Fink MD   4 Units at 02/11/18 1301    vancomycin (VANCOCIN) 1,250 mg in 0.9% sodium chloride 250 mL IVPB  1,250 mg IntraVENous Q24H Mel Fink .7 mL/hr at 02/11/18 0958 1,250 mg at 02/11/18 0958    polyethylene glycol (MIRALAX) packet 17 g  17 g Oral DAILY January-Joann HERNÁNDEZ PA-C   17 g at 02/11/18 1008    senna-docusate (PERICOLACE) 8.6-50 mg per tablet 1 Tab  1 Tab Oral DAILY January-Joann HERNÁNDEZ PA-C   1 Tab at 02/11/18 1006    0.45% sodium chloride infusion  50 mL/hr IntraVENous CONTINUOUS Nemo Rosado MD 50 mL/hr at 02/10/18 2053 50 mL/hr at 02/10/18 2053    glucose chewable tablet 16 g  4 Tab Oral PRN Mel Fink MD        glucagon (GLUCAGEN) injection 1 mg  1 mg IntraMUSCular PRN Mel Fink MD        dextrose (D50W) injection syrg 12.5-25 g  25-50 mL IntraVENous PRN Mel Fink MD        insulin glargine (LANTUS) injection 15 Units  15 Units SubCUTAneous DAILY Simran Lee PA-C   15 Units at 02/11/18 1003    thiamine (B-1) injection 100 mg  100 mg IntraMUSCular DAILY Celestino Camara MD   100 mg at 02/11/18 0900    fentaNYL citrate (PF) injection 25-50 mcg  25-50 mcg IntraVENous Q4H PRN Ester HERNÁNDEZ PA-C   50 mcg at 02/11/18 0040    midazolam (VERSED) injection 1-2 mg  1-2 mg IntraVENous Q4H PRN January-Joann HERNÁNDEZ PA-C   2 mg at 02/09/18 0751    metroNIDAZOLE (FLAGYL) IVPB premix 500 mg  500 mg IntraVENous Q12H Re Michael  mL/hr at 02/11/18 1300 500 mg at 02/11/18 1300    influenza vaccine 2017-18 (3 yrs+)(PF) (FLUZONE QUAD/FLUARIX QUAD) injection 0.5 mL  0.5 mL IntraMUSCular PRIOR TO DISCHARGE January-Joann HERNÁNDEZ PA-C        albuterol-ipratropium (DUO-NEB) 2.5 MG-0.5 MG/3 ML  3 mL Nebulization Q4H RT January-Joann HERNÁNDEZ PA-C   3 mL at 02/11/18 0814    pantoprazole (PROTONIX) granules for oral suspension 40 mg  40 mg Per NG tube DAILY Patricia Zuñiga PA-C   40 mg at 02/11/18 1007    sodium chloride (NS) flush 5-10 mL  5-10 mL IntraVENous PRN Julien Conway MD        levETIRAcetam (KEPPRA) 500 mg in saline (iso-osm) 100 ml IVPB  1,000 mg IntraVENous Q12H Julien Conway  mL/hr at 02/11/18 0955 1,000 mg at 02/11/18 0955    chlorhexidine (PERIDEX) 0.12 % mouthwash 10 mL  10 mL Oral Q12H Patricia Zuñiga PA-C   Stopped at 02/10/18 2100    albuterol-ipratropium (DUO-NEB) 2.5 MG-0.5 MG/3 ML  3 mL Nebulization Q6H PRN Pauline Vigil PA-C        sodium chloride (NS) flush 5-10 mL  5-10 mL IntraVENous Q8H Shellie Cooper MD   10 mL at 02/11/18 0600    sodium chloride (NS) flush 5-10 mL  5-10 mL IntraVENous PRN Shellie Cooper MD        acetaminophen (TYLENOL) suppository 650 mg  650 mg Rectal Q6H PRN Shellie Cooper MD        folic acid (FOLVITE) tablet 1 mg  1 mg Per NG tube DAILY Patricia Zuñiga PA-C   1 mg at 02/11/18 1006       Vital signs:    Visit Vitals    BP 90/73    Pulse 82    Temp 98.6 °F (37 °C)    Resp 28    Wt 71.6 kg (157 lb 13.6 oz)    SpO2 100%    Breastfeeding No    BMI 24.72 kg/m2       Review of Systems:   Could not be obtained from the patient because of the medical status.     Patient Active Problem List   Diagnosis Code    Diverticula of colon K57.30    Sepsis ([facility] A41.9    Sepsis secondary to UTI ([facility] A41.9, N39.0    DM hyperosmolarity type II, uncontrolled ([facility] E11.00, E11.65    HTN (hypertension) I10    Febrile illness, acute R50.9    Gram-negative bacteremia R78.81    Diabetic neuropathy ([facility]) E11.40    Osteoarthritis of both knees M17.0    Acidosis E87.2    Respiratory failure ([facility]) J96.90    Shock ([facility] R57.9    Hyperosmolar (nonketotic) coma ([facility]) E11.01    Acute UTI N39.0    Macrocytic anemia D53.9         Objective: The patient is found sleeping, arouses to verbal stimulus. Oriented x3. Follows 2 step commands consistently. Cranial Nerves: II  Visual fields are full to threat. III, IV, VI  Extraocular movements are intact. There is no nystagmus. V  Facial sensation is intact to pinprick. VII  Face is symmetrical.  VIII - Hearing is present. IX, X, XII  Palate is symmetrical.     Motor: The patient moves all four limbs fairly well and symmetrically from pain. Tone is normal. Reflexes are 2+ and symmetrical. Plantars are down going. Gait testing can't be preformed because of condition.     CBC:   Lab Results   Component Value Date/Time    WBC 6.6 02/11/2018 05:25 AM    RBC 3.91 (L) 02/11/2018 05:25 AM    HGB 10.8 (L) 02/11/2018 05:25 AM    HCT 33.5 (L) 02/11/2018 05:25 AM    PLATELET 179 (L) 47/33/1277 05:25 AM     BMP:   Lab Results   Component Value Date/Time    Glucose 261 (H) 02/10/2018 11:00 PM    Sodium 144 02/10/2018 11:00 PM    Potassium 3.2 (L) 02/10/2018 11:00 PM    Chloride 108 02/10/2018 11:00 PM    CO2 28 02/10/2018 11:00 PM    BUN 12 02/10/2018 11:00 PM    Creatinine 0.84 02/10/2018 11:00 PM    Calcium 8.5 02/10/2018 11:00 PM     CMP:   Lab Results   Component Value Date/Time    Glucose 261 (H) 02/10/2018 11:00 PM    Sodium 144 02/10/2018 11:00 PM    Potassium 3.2 (L) 02/10/2018 11:00 PM    Chloride 108 02/10/2018 11:00 PM    CO2 28 02/10/2018 11:00 PM    BUN 12 02/10/2018 11:00 PM Creatinine 0.84 02/10/2018 11:00 PM    Calcium 8.5 02/10/2018 11:00 PM    Anion gap 8 02/10/2018 11:00 PM    BUN/Creatinine ratio 14 02/10/2018 11:00 PM    Alk. phosphatase 133 (H) 02/07/2018 09:47 PM    Protein, total 6.3 (L) 02/07/2018 09:47 PM    Albumin 2.6 (L) 02/07/2018 09:47 PM    Globulin 3.7 02/07/2018 09:47 PM    A-G Ratio 0.7 (L) 02/07/2018 09:47 PM     Coagulation:   Lab Results   Component Value Date/Time    Prothrombin time 15.8 (H) 02/08/2018 05:00 AM    INR 1.3 (H) 02/08/2018 05:00 AM    aPTT 24.8 02/07/2018 09:47 PM     Cardiac markers:   Lab Results   Component Value Date/Time     02/08/2018 05:00 AM    CK-MB Index 7.7 (H) 02/08/2018 05:00 AM       Assessment:  Seizures in this patient who has risk factors including significant elevation in her blood glucose. No recurrent seizure activity on anti-convulsant and with metabolic derangement adjusted. Plan:  Continue Keppra for now. Will follow. Sincerely,        Honor Ao.  Lilian Kenney M.D.

## 2018-02-11 NOTE — ROUTINE PROCESS
Received pt in bed with eyes closed. Pt easily aroused. Pt calm. .  Pt on Room Air O2 sat 95%  Call bell within reach. Bed low and locked. Will continue to monitor    Bedside and Verbal shift change report given to [facility] (oncoming nurse) by iDanna Pompa RN   (offgoing nurse). Report included the following information SBAR, Kardex, Intake/Output and MAR.

## 2018-02-11 NOTE — PROGRESS NOTES
ST orders received; however, upon completion of chart review and discussion with RN, patient not appropriate for ST evaluation this day. Currently, patient is:    [X] Tolerating current po diet (per RN Lynda Izuqierdo report) - mechanical soft with thin liquids   [ ] Tolerating current po diet; however, poor po intake (per RN report)  [ ] Receiving nutrition via tube feeding   [ ] Lethargic, somnolent or difficult to arouse for safe po trials  [ ] Unable to manage secretions  [ ] Receiving intervention for respiratory distress (excluding O2 via nasal cannula)  [ ] <6 hours status post extubation   [X] Other: Passed STAND tool     SLP will follow up  on next business day.       Thank you,   Sheral Paget, M.S.Ed., CCC/SLP  Speech Language Pathologist

## 2018-02-11 NOTE — PROGRESS NOTES
Respiratory Care Assessment for Bronchial hygiene or Lung Expansion Therapy  Patient  Aurelia Srivastava     58 y.o.   female     2/11/2018  1:15 PM  Patient Active Problem List   Diagnosis Code    Diverticula of colon K57.30    Sepsis ([address] A41.9    Sepsis secondary to UTI ([address] A41.9, N39.0    DM hyperosmolarity type II, uncontrolled ([address] E11.00, E11.65    HTN (hypertension) I10    Febrile illness, acute R50.9    Gram-negative bacteremia R78.81    Diabetic neuropathy ([address] E11.40    Osteoarthritis of both knees M17.0    Acidosis E87.2    Respiratory failure ([address] J96.90    Shock ([address] R57.9    Hyperosmolar (nonketotic) coma ([facility] E11.01    Acute UTI N39.0    Macrocytic anemia D53.9       ABG:  Date:2/11/2018  No results found for: PH, PHI, PCO2, PCO2I, PO2, PO2I, HCO3, HCO3I, FIO2, FIO2I    Chest X-ray:  Date:2/11/2018    Results from Hospital Encounter encounter on 02/07/18   XR CHEST PORT   Narrative AP CHEST, PORTABLE    CPT CODE: 32895    INDICATION: Provided as ETT eval.    COMPARISON: 2/10/2018. TECHNIQUE: Portable semiupright AP chest radiograph is reviewed. FINDINGS:    Right IJ central venous catheter is again seen with tip projected over the lower  SVC. No endotracheal tube is evident in the patient. Multiple EKG leads/wires  overlie the patient. Pulmonary vascular congestion, slightly decreased compared to the preceding  radiograph. Hazy opacities most conspicuous in the perihilar and lower lungs  consistent with airspace disease and/or atelectasis. A component of pulmonary  edema is possible. There is mild blunting of the costophrenic sulci consistent  with small pleural effusions versus thickening. No evidence of pneumothorax. The cardiac silhouette is within normal limits in size for technique. Old healed or healing rib fractures best seen in the posterior lateral left  sixth rib. Impression IMPRESSION:     Again, no endotracheal tube is seen in the patient. Clinical correlation,  please, regarding the provided indication. Slight interval decrease in pulmonary vascular congestion. Otherwise hazy  bilateral lung opacities consistent with airspace disease and/or atelectasis  similar to prior as described. Small bilateral pleural effusions versus  thickening.         Lab Test:  Date:2/11/2018  WBC:   Lab Results   Component Value Date/Time    WBC 6.6 02/11/2018 05:25 AM   HGB: Lab Results   Component Value Date/Time    HGB 10.8 (L) 02/11/2018 05:25 AM    PLTS: Lab Results   Component Value Date/Time    PLATELET 145 (L) 07/23/5761 05:25 AM       SaO2%/flow:   SpO2 Readings from Last 1 Encounters:   02/11/18 100%       Vital Signs:   Patient Vitals for the past 8 hrs:   Temp Pulse Resp BP SpO2   02/11/18 1300 - 82 28 90/73 100 %   02/11/18 1200 99.9 °F (37.7 °C) 77 (!) 36 137/62 98 %   02/11/18 1100 - 75 (!) 31 (!) 126/102 97 %   02/11/18 1000 - 71 26 139/60 98 %   02/11/18 0900 - 79 26 133/64 93 %   02/11/18 0815 - - - - 100 %   02/11/18 0800 98.6 °F (37 °C) 74 18 153/59 96 %   02/11/18 0600 - 97 26 144/59 97 %         RA/O2 flow/device: Room Air      First Inital Assesment:     [x]Yes []No   Reevaluation/Reassessment:    []Yes [x]No     CHART REVIEW   Points 0 X 1 X 2 X 3 X 4 X Points   Pulmonary History Smoking History (1) none  Recent Smoking History <1 PPD  Recent Smoking History >1 PPD  Pulmonary Disease or Impairment  Severe Pulmonary Disease  0   Surgical History No Surgery  General Surgery  Lower Abdominal  Thoracic or Upper Abdominal  Thoracic & Pulmonary Disease  0   CXR Clear or not indicated  Chronic changes or CXR Pending  Infiltrate, atelectasis or pleural effusions  Infiltrates in more than 1lobe  Infiltrates +atelectasis  +/or pleural effusions  2     PATIENT ASSESSMENT    0 X 1 X 2 X 3 X 4 X Points   Respiratory Pattern Regular pattern RR 12-20  Increased RR 21-27   Mild Dyspnea at rest, irregular pattern RR 28-32  Moderate Dyspnea at rest, Use of accessory muscles, RR 33-36  Severe Dyspnea, Use of accessory muscles RR >36  0   Mental Status Alert Oriented cooperative  Confused, Follows commands  Lethargic, Does not follow commands  Obtunded  Unresponsive  1   Breath Sounds Clear  Decreased Unilaterally  Decreased Bilaterally  Mild Scattered wheezing or Crackles in bases  Severe Wheezing or rhonchi  2   Cough Strong dry NPC  Strong Productive  Weak NPC  Weak productive or weak with rhonchi  No cough or may require suctioning  0   Level of Activity Ambulatory  Ambulatory with assistance  Temporarily Non-ambulatory  Non-Ambulatory, able to position self  Unable to position self, confined to bed  3   Total Points/Score:   7     Specific Intervention Chart(Deep Breath andCoughing. Change nebs to PRN)    Bronchial Hygiene/Secretion Clearance:    []EZPAP []Rotation bed with vibration    []CPT with percussor []CPT via vest   []Oscillastiang positive pressure expiratory device      Lung Expansion:    []Incentive Spirometer w/RT visits [x]Incentive Spirometer w/nursing    []EZPAP      *Suctioning:    []Nasal Tracheal []Tracheal     *suctioning will be ordered and done PRN with an associated frequency such as QID/PRN based on score       Other:    Care Plan   Level # Score Modality Frequency Comment   Level 1 >17      Level 2 14-17      Level 3 10-13      Level 4 1-9        BRONCHIAL HYGIENE SCORING AND FREQUENCY GUIDELINES   Frequency Indications/Findings Level #   Q4 ATC Copious secretions, SOB, unable to sleep 1   QID & PRN at night Moderate amounts of secretions 2   TID or Q6 wa Small amounts of secretions and poor cough: recent history of secretions 3   BID or Q8 wa Unable to deep breathe and cough effectively 4        Comments:  Respiratory Failure has resovled. Pt remains on RA and maintaining adequate O2 Sats.     Respiratory Therapist: Jonathon Barbosa, RT

## 2018-02-11 NOTE — PROGRESS NOTES
RENAL DAILY PROGRESS NOTE    Patient: Maribell Coley               Sex: female          DOA: 2/7/2018  5:41 PM        YOB: 1955      Age:  58 y.o.        LOS:  LOS: 4 days     Subjective:     Ava Theola Fleischer is a 58 y.o.  who presents with Hyperosmolar (nonketotic) coma ([address]  Acidosis  Respiratory failure ([address]  Shock ([address] Asked to evaluate for renal failure. admitted with hyperosmolar coma,resp failure. hx of dm  Chief complains: Patient denies nausea, vomiting, chest pain, dizziness, shortness of breath or headache.  - Reviewed last 24 hrs events     Current Facility-Administered Medications   Medication Dose Route Frequency    heparin (porcine) injection 5,000 Units  5,000 Units SubCUTAneous Q8H    [START ON 2/12/2018] Vancomycin trough level due 2/12/18 at 0930  1 Each Other ONCE    albuterol-ipratropium (DUO-NEB) 2.5 MG-0.5 MG/3 ML  3 mL Nebulization Q4H PRN    cefTRIAXone (ROCEPHIN) 2 g in sterile water (preservative free) 20 mL IV syringe  2 g IntraVENous Q24H    insulin lispro (HUMALOG) injection   SubCUTAneous Q6H    vancomycin (VANCOCIN) 1,250 mg in 0.9% sodium chloride 250 mL IVPB  1,250 mg IntraVENous Q24H    polyethylene glycol (MIRALAX) packet 17 g  17 g Oral DAILY    senna-docusate (PERICOLACE) 8.6-50 mg per tablet 1 Tab  1 Tab Oral DAILY    0.45% sodium chloride infusion  50 mL/hr IntraVENous CONTINUOUS    glucose chewable tablet 16 g  4 Tab Oral PRN    glucagon (GLUCAGEN) injection 1 mg  1 mg IntraMUSCular PRN    dextrose (D50W) injection syrg 12.5-25 g  25-50 mL IntraVENous PRN    insulin glargine (LANTUS) injection 15 Units  15 Units SubCUTAneous DAILY    thiamine (B-1) injection 100 mg  100 mg IntraMUSCular DAILY    fentaNYL citrate (PF) injection 25-50 mcg  25-50 mcg IntraVENous Q4H PRN    midazolam (VERSED) injection 1-2 mg  1-2 mg IntraVENous Q4H PRN    metroNIDAZOLE (FLAGYL) IVPB premix 500 mg  500 mg IntraVENous Q12H    influenza vaccine 2017-18 (3 yrs+)(PF) (FLUZONE QUAD/FLUARIX QUAD) injection 0.5 mL  0.5 mL IntraMUSCular PRIOR TO DISCHARGE    pantoprazole (PROTONIX) granules for oral suspension 40 mg  40 mg Per NG tube DAILY    sodium chloride (NS) flush 5-10 mL  5-10 mL IntraVENous PRN    levETIRAcetam (KEPPRA) 500 mg in saline (iso-osm) 100 ml IVPB  1,000 mg IntraVENous Q12H    chlorhexidine (PERIDEX) 0.12 % mouthwash 10 mL  10 mL Oral Q12H    sodium chloride (NS) flush 5-10 mL  5-10 mL IntraVENous Q8H    sodium chloride (NS) flush 5-10 mL  5-10 mL IntraVENous PRN    acetaminophen (TYLENOL) suppository 650 mg  650 mg Rectal Y9M PRN    folic acid (FOLVITE) tablet 1 mg  1 mg Per NG tube DAILY       Objective:     Visit Vitals    BP 90/73 (BP 1 Location: Left arm, BP Patient Position: At rest)    Pulse 82    Temp 99.9 °F (37.7 °C)    Resp 28    Wt 71.6 kg (157 lb 13.6 oz)    SpO2 100%    Breastfeeding No    BMI 24.72 kg/m2       Intake/Output Summary (Last 24 hours) at 02/11/18 1442  Last data filed at 02/11/18 1300   Gross per 24 hour   Intake              950 ml   Output             4825 ml   Net            -3875 ml       Physical Examination:     GEN: Awake  RS: Chest is bilateral equal, no wheezing / rales / crackles  CVS: S1-S2 heard, RRR, No S3 / murmur  Abdomen: Soft, Non tender, Not distended, Positive bowel sounds, no organomegaly, no CVA / supra pubic tenderness  Extremities: No edema, no cyanosis, skin is warm on touch  HEENT: Head is atraumatic, PERRLA, conjunctiva pink & non icteric. No JVD or carotid bruit   Musculoskeletal: No gross joints or bone deformities   Lymph Node: No palpable cervical, axillary or groin lymphadenopathy.       Data Review:      Labs:     Hematology:   Recent Labs      02/11/18   0525  02/10/18   0454  02/09/18   0415  02/08/18   1758   WBC  6.6  9.1  10.8  8.0   HGB  10.8*  10.3*  11.2*  10.6*   HCT  33.5*  30.8*  32.9*  31.0*     Chemistry:   Recent Labs      02/10/18   9085 02/10/18   0454  02/09/18   1401  02/09/18   1330  02/09/18   0415  02/08/18   2130  02/08/18   1552   BUN  12  15  20*   --   21*  23*  29*   CREA  0.84  1.01  1.38*   --   1.28  1.30  1.53*   CA  8.5  8.3*  7.6*   --   7.9*  7.8*  8.2*   K  3.2*  3.8  3.6  4.0  2.9*  3.5  4.4   NA  144  147*  147*   --   149*  150*  151*   CL  108  114*  117*   --   118*  118*  120*   CO2  28  26  25   --   23  25  25   PHOS  2.5  1.8*  2.1*  2.3*  1.7*  1.3*  1.4*   GLU  261*  279*  333*   --   191*  166*  230*        Images:    XR (Most Recent). CXR reviewed by me and compared with previous CXR   Results from Hospital Encounter encounter on 02/07/18   XR CHEST PORT   Narrative AP CHEST, PORTABLE    CPT CODE: 68335    INDICATION: Provided as ETT eval.    COMPARISON: 2/10/2018. TECHNIQUE: Portable semiupright AP chest radiograph is reviewed. FINDINGS:    Right IJ central venous catheter is again seen with tip projected over the lower  SVC. No endotracheal tube is evident in the patient. Multiple EKG leads/wires  overlie the patient. Pulmonary vascular congestion, slightly decreased compared to the preceding  radiograph. Hazy opacities most conspicuous in the perihilar and lower lungs  consistent with airspace disease and/or atelectasis. A component of pulmonary  edema is possible. There is mild blunting of the costophrenic sulci consistent  with small pleural effusions versus thickening. No evidence of pneumothorax. The cardiac silhouette is within normal limits in size for technique. Old healed or healing rib fractures best seen in the posterior lateral left  sixth rib. Impression IMPRESSION:     Again, no endotracheal tube is seen in the patient. Clinical correlation,  please, regarding the provided indication. Slight interval decrease in pulmonary vascular congestion. Otherwise hazy  bilateral lung opacities consistent with airspace disease and/or atelectasis  similar to prior as described.  Small bilateral pleural effusions versus  thickening. CT (Most Recent)   Results from Hospital Encounter encounter on 02/07/18   CT HEAD WO CONT   Narrative CT of the head without contrast    CT CODE:  52065    HISTORY: Cerebral edema evaluation    COMPARISON: 0055 hours    TECHNIQUE: Helical axial scan to the head was performed from the skull base to  the vertex without IV contrast administration. All CT scans at this facility performed using dose optimization techniques as  appreciated to a performed exam, to include automated exposure control,  adjustment of the mA and or KU according to patient size (including appropriate  matching for site specific examination), or use of iterative reconstruction  technique. FINDINGS:    The brain parenchyma and ventricles appear unremarkable. No significant global  atrophy. Normal gray-white matter interface noted. No CT evidence of cerebral  edema seen. There is no evidence of acute intracranial hemorrhage or mass effect  identified. Subtle white matter microvascular disease again noted. No skull  fracture or extra axial fluid collections identified. The expansile mottled  appearance of sclerotic lesion of left sphenoid is again identified. No  additional skull lesion seen. Visualized sinuses and mastoid air cells appear  unremarkable. Impression IMPRESSION:    No CT evidence of cerebral edema or other acute intracranial abnormality. Note: If clinical concern of acute stroke, MRI with diffusion weighted images is  recommended for better evaluation. 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 Neoplastic  process not excludable. Recommend bone scan evaluation. Thank you for your referral.         EKG No results found for this or any previous visit. I have personally reviewed the old medical records and patient's labs    Plan / Recommendation:      1. Arf,resolved. discussed with sister  2.hypernatremia,resolved  3.hypokalemia,replace    D/w Dr. Matt Tejeda MD  Nephrology  2/11/2018

## 2018-02-12 LAB
ANION GAP SERPL CALC-SCNC: 6 MMOL/L (ref 3–18)
ANION GAP SERPL CALC-SCNC: 6 MMOL/L (ref 3–18)
BASOPHILS # BLD: 0 K/UL (ref 0–0.1)
BASOPHILS NFR BLD: 0 % (ref 0–2)
BUN SERPL-MCNC: 10 MG/DL (ref 7–18)
BUN SERPL-MCNC: 9 MG/DL (ref 7–18)
BUN/CREAT SERPL: 12 (ref 12–20)
BUN/CREAT SERPL: 13 (ref 12–20)
CALCIUM SERPL-MCNC: 7.8 MG/DL (ref 8.5–10.1)
CALCIUM SERPL-MCNC: 7.8 MG/DL (ref 8.5–10.1)
CHLORIDE SERPL-SCNC: 102 MMOL/L (ref 100–108)
CHLORIDE SERPL-SCNC: 103 MMOL/L (ref 100–108)
CO2 SERPL-SCNC: 30 MMOL/L (ref 21–32)
CO2 SERPL-SCNC: 31 MMOL/L (ref 21–32)
CREAT SERPL-MCNC: 0.72 MG/DL (ref 0.6–1.3)
CREAT SERPL-MCNC: 0.81 MG/DL (ref 0.6–1.3)
DATE LAST DOSE: ABNORMAL
DIFFERENTIAL METHOD BLD: ABNORMAL
EOSINOPHIL # BLD: 0.1 K/UL (ref 0–0.4)
EOSINOPHIL NFR BLD: 2 % (ref 0–5)
ERYTHROCYTE [DISTWIDTH] IN BLOOD BY AUTOMATED COUNT: 13 % (ref 11.6–14.5)
GLUCOSE BLD STRIP.AUTO-MCNC: 202 MG/DL (ref 70–110)
GLUCOSE BLD STRIP.AUTO-MCNC: 305 MG/DL (ref 70–110)
GLUCOSE BLD STRIP.AUTO-MCNC: 422 MG/DL (ref 70–110)
GLUCOSE SERPL-MCNC: 226 MG/DL (ref 74–99)
GLUCOSE SERPL-MCNC: 377 MG/DL (ref 74–99)
HCT VFR BLD AUTO: 32.4 % (ref 35–45)
HGB BLD-MCNC: 10.6 G/DL (ref 12–16)
LYMPHOCYTES # BLD: 1.5 K/UL (ref 0.9–3.6)
LYMPHOCYTES NFR BLD: 27 % (ref 21–52)
MAGNESIUM SERPL-MCNC: 1.8 MG/DL (ref 1.6–2.6)
MAGNESIUM SERPL-MCNC: 1.9 MG/DL (ref 1.6–2.6)
MCH RBC QN AUTO: 28.2 PG (ref 24–34)
MCHC RBC AUTO-ENTMCNC: 32.7 G/DL (ref 31–37)
MCV RBC AUTO: 86.2 FL (ref 74–97)
MONOCYTES # BLD: 0.6 K/UL (ref 0.05–1.2)
MONOCYTES NFR BLD: 11 % (ref 3–10)
NEUTS SEG # BLD: 3.2 K/UL (ref 1.8–8)
NEUTS SEG NFR BLD: 60 % (ref 40–73)
PHOSPHATE SERPL-MCNC: 2.1 MG/DL (ref 2.5–4.9)
PHOSPHATE SERPL-MCNC: 2.6 MG/DL (ref 2.5–4.9)
PLATELET # BLD AUTO: 108 K/UL (ref 135–420)
PMV BLD AUTO: 11.3 FL (ref 9.2–11.8)
POTASSIUM SERPL-SCNC: 2.7 MMOL/L (ref 3.5–5.5)
POTASSIUM SERPL-SCNC: 3.1 MMOL/L (ref 3.5–5.5)
RBC # BLD AUTO: 3.76 M/UL (ref 4.2–5.3)
REPORTED DOSE,DOSE: ABNORMAL UNITS
REPORTED DOSE/TIME,TMG: 958
SODIUM SERPL-SCNC: 138 MMOL/L (ref 136–145)
SODIUM SERPL-SCNC: 140 MMOL/L (ref 136–145)
VANCOMYCIN TROUGH SERPL-MCNC: 5.9 UG/ML (ref 10–20)
WBC # BLD AUTO: 5.4 K/UL (ref 4.6–13.2)

## 2018-02-12 PROCEDURE — 65270000029 HC RM PRIVATE

## 2018-02-12 PROCEDURE — 80202 ASSAY OF VANCOMYCIN: CPT | Performed by: INTERNAL MEDICINE

## 2018-02-12 PROCEDURE — 74011250637 HC RX REV CODE- 250/637: Performed by: PHYSICIAN ASSISTANT

## 2018-02-12 PROCEDURE — 85025 COMPLETE CBC W/AUTO DIFF WBC: CPT | Performed by: INTERNAL MEDICINE

## 2018-02-12 PROCEDURE — 84100 ASSAY OF PHOSPHORUS: CPT | Performed by: INTERNAL MEDICINE

## 2018-02-12 PROCEDURE — 36415 COLL VENOUS BLD VENIPUNCTURE: CPT | Performed by: INTERNAL MEDICINE

## 2018-02-12 PROCEDURE — 74011000250 HC RX REV CODE- 250: Performed by: INTERNAL MEDICINE

## 2018-02-12 PROCEDURE — 83735 ASSAY OF MAGNESIUM: CPT | Performed by: INTERNAL MEDICINE

## 2018-02-12 PROCEDURE — 80048 BASIC METABOLIC PNL TOTAL CA: CPT | Performed by: INTERNAL MEDICINE

## 2018-02-12 PROCEDURE — 74011250636 HC RX REV CODE- 250/636: Performed by: INTERNAL MEDICINE

## 2018-02-12 PROCEDURE — 74011250637 HC RX REV CODE- 250/637: Performed by: INTERNAL MEDICINE

## 2018-02-12 PROCEDURE — 92610 EVALUATE SWALLOWING FUNCTION: CPT

## 2018-02-12 PROCEDURE — 74011250636 HC RX REV CODE- 250/636: Performed by: PHYSICIAN ASSISTANT

## 2018-02-12 PROCEDURE — 74011636637 HC RX REV CODE- 636/637: Performed by: INTERNAL MEDICINE

## 2018-02-12 PROCEDURE — 82962 GLUCOSE BLOOD TEST: CPT

## 2018-02-12 RX ORDER — INSULIN GLARGINE 100 [IU]/ML
20 INJECTION, SOLUTION SUBCUTANEOUS DAILY
Status: DISCONTINUED | OUTPATIENT
Start: 2018-02-13 | End: 2018-02-13

## 2018-02-12 RX ORDER — INSULIN LISPRO 100 [IU]/ML
INJECTION, SOLUTION INTRAVENOUS; SUBCUTANEOUS
Status: DISCONTINUED | OUTPATIENT
Start: 2018-02-12 | End: 2018-02-15 | Stop reason: HOSPADM

## 2018-02-12 RX ORDER — POTASSIUM CHLORIDE 7.45 MG/ML
10 INJECTION INTRAVENOUS
Status: COMPLETED | OUTPATIENT
Start: 2018-02-12 | End: 2018-02-12

## 2018-02-12 RX ORDER — LEVETIRACETAM 10 MG/ML
1000 INJECTION INTRAVASCULAR EVERY 12 HOURS
Status: DISCONTINUED | OUTPATIENT
Start: 2018-02-12 | End: 2018-02-13

## 2018-02-12 RX ORDER — MAGNESIUM SULFATE 1 G/100ML
1 INJECTION INTRAVENOUS ONCE
Status: COMPLETED | OUTPATIENT
Start: 2018-02-12 | End: 2018-02-12

## 2018-02-12 RX ADMIN — THIAMINE HYDROCHLORIDE 100 MG: 100 INJECTION, SOLUTION INTRAMUSCULAR; INTRAVENOUS at 13:06

## 2018-02-12 RX ADMIN — INSULIN LISPRO 15 UNITS: 100 INJECTION, SOLUTION INTRAVENOUS; SUBCUTANEOUS at 11:30

## 2018-02-12 RX ADMIN — DIBASIC SODIUM PHOSPHATE, MONOBASIC POTASSIUM PHOSPHATE AND MONOBASIC SODIUM PHOSPHATE 1 TABLET: 852; 155; 130 TABLET ORAL at 13:08

## 2018-02-12 RX ADMIN — INSULIN LISPRO 4 UNITS: 100 INJECTION, SOLUTION INTRAVENOUS; SUBCUTANEOUS at 05:59

## 2018-02-12 RX ADMIN — VANCOMYCIN HYDROCHLORIDE 1500 MG: 500 INJECTION, POWDER, LYOPHILIZED, FOR SOLUTION INTRAVENOUS at 20:55

## 2018-02-12 RX ADMIN — POTASSIUM CHLORIDE 10 MEQ: 7.46 INJECTION, SOLUTION INTRAVENOUS at 04:04

## 2018-02-12 RX ADMIN — POTASSIUM CHLORIDE 10 MEQ: 7.46 INJECTION, SOLUTION INTRAVENOUS at 06:03

## 2018-02-12 RX ADMIN — Medication 10 ML: at 14:00

## 2018-02-12 RX ADMIN — CHLORHEXIDINE GLUCONATE 10 ML: 1.2 RINSE ORAL at 09:06

## 2018-02-12 RX ADMIN — METRONIDAZOLE 500 MG: 500 INJECTION, SOLUTION INTRAVENOUS at 13:09

## 2018-02-12 RX ADMIN — FOLIC ACID 1 MG: 1 TABLET ORAL at 09:06

## 2018-02-12 RX ADMIN — INSULIN GLARGINE 18 UNITS: 100 INJECTION, SOLUTION SUBCUTANEOUS at 09:00

## 2018-02-12 RX ADMIN — PANTOPRAZOLE SODIUM 40 MG: 40 GRANULE, DELAYED RELEASE ORAL at 09:06

## 2018-02-12 RX ADMIN — CEFTRIAXONE 2 G: 2 INJECTION, POWDER, FOR SOLUTION INTRAMUSCULAR; INTRAVENOUS at 16:24

## 2018-02-12 RX ADMIN — POTASSIUM CHLORIDE 10 MEQ: 7.46 INJECTION, SOLUTION INTRAVENOUS at 07:38

## 2018-02-12 RX ADMIN — MAGNESIUM SULFATE IN DEXTROSE 1 G: 10 INJECTION, SOLUTION INTRAVENOUS at 02:59

## 2018-02-12 RX ADMIN — DOCUSATE SODIUM 100 MG: 100 CAPSULE, LIQUID FILLED ORAL at 09:06

## 2018-02-12 RX ADMIN — POTASSIUM CHLORIDE 10 MEQ: 7.46 INJECTION, SOLUTION INTRAVENOUS at 05:05

## 2018-02-12 RX ADMIN — DIBASIC SODIUM PHOSPHATE, MONOBASIC POTASSIUM PHOSPHATE AND MONOBASIC SODIUM PHOSPHATE 1 TABLET: 852; 155; 130 TABLET ORAL at 17:43

## 2018-02-12 RX ADMIN — HEPARIN SODIUM 5000 UNITS: 5000 INJECTION, SOLUTION INTRAVENOUS; SUBCUTANEOUS at 05:58

## 2018-02-12 RX ADMIN — POLYETHYLENE GLYCOL 3350 17 G: 17 POWDER, FOR SOLUTION ORAL at 09:06

## 2018-02-12 RX ADMIN — Medication 10 ML: at 06:01

## 2018-02-12 NOTE — PROGRESS NOTES
RENAL DAILY PROGRESS NOTE    Patient: Neeru Davison               Sex: female          DOA: 2/7/2018  5:41 PM        YOB: 1955      Age:  58 y.o.        LOS:  LOS: 5 days     Subjective:     Aurelia Lott is a 58 y.o.  who presents with Hyperosmolar (nonketotic) coma ([address]  Acidosis  Respiratory failure ([address]  Shock ([address] Asked to evaluate for renal failure. admitted with hyperosmolar coma,resp failure. hx of dm  Chief complains: Patient denies nausea, vomiting, chest pain, dizziness, shortness of breath or headache.  - Reviewed last 24 hrs events     Current Facility-Administered Medications   Medication Dose Route Frequency    levETIRAcetam (KEPPRA) 1,000 mg in saline (iso-osm) 100 ml IVPB  1,000 mg IntraVENous Q12H    insulin lispro (HUMALOG) injection   SubCUTAneous AC&HS    phosphorus (K PHOS NEUTRAL) 250 mg tablet 1 Tab  1 Tab Oral BID    heparin (porcine) injection 5,000 Units  5,000 Units SubCUTAneous Q8H    Vancomycin trough level due 2/12/18 at 0930  1 Each Other ONCE    albuterol-ipratropium (DUO-NEB) 2.5 MG-0.5 MG/3 ML  3 mL Nebulization Q4H PRN    insulin glargine (LANTUS) injection 18 Units  18 Units SubCUTAneous DAILY    senna (SENOKOT) tablet 8.6 mg  1 Tab Oral DAILY    Or    docusate sodium (COLACE) capsule 100 mg  100 mg Oral DAILY    cefTRIAXone (ROCEPHIN) 2 g in sterile water (preservative free) 20 mL IV syringe  2 g IntraVENous Q24H    vancomycin (VANCOCIN) 1,250 mg in 0.9% sodium chloride 250 mL IVPB  1,250 mg IntraVENous Q24H    polyethylene glycol (MIRALAX) packet 17 g  17 g Oral DAILY    glucose chewable tablet 16 g  4 Tab Oral PRN    glucagon (GLUCAGEN) injection 1 mg  1 mg IntraMUSCular PRN    dextrose (D50W) injection syrg 12.5-25 g  25-50 mL IntraVENous PRN    thiamine (B-1) injection 100 mg  100 mg IntraMUSCular DAILY    fentaNYL citrate (PF) injection 25-50 mcg  25-50 mcg IntraVENous Q4H PRN    midazolam (VERSED) injection 1-2 mg  1-2 mg IntraVENous Q4H PRN    metroNIDAZOLE (FLAGYL) IVPB premix 500 mg  500 mg IntraVENous Q12H    influenza vaccine 2017-18 (3 yrs+)(PF) (FLUZONE QUAD/FLUARIX QUAD) injection 0.5 mL  0.5 mL IntraMUSCular PRIOR TO DISCHARGE    pantoprazole (PROTONIX) granules for oral suspension 40 mg  40 mg Per NG tube DAILY    sodium chloride (NS) flush 5-10 mL  5-10 mL IntraVENous PRN    chlorhexidine (PERIDEX) 0.12 % mouthwash 10 mL  10 mL Oral Q12H    sodium chloride (NS) flush 5-10 mL  5-10 mL IntraVENous Q8H    sodium chloride (NS) flush 5-10 mL  5-10 mL IntraVENous PRN    acetaminophen (TYLENOL) suppository 650 mg  650 mg Rectal A7C PRN    folic acid (FOLVITE) tablet 1 mg  1 mg Per NG tube DAILY       Objective:     Visit Vitals    /64 (BP 1 Location: Left arm, BP Patient Position: At rest)    Pulse 75    Temp 98.3 °F (36.8 °C)    Resp 20    Wt 72.2 kg (159 lb 3.2 oz)    SpO2 93%    Breastfeeding No    BMI 24.93 kg/m2       Intake/Output Summary (Last 24 hours) at 02/12/18 1043  Last data filed at 02/12/18 0603   Gross per 24 hour   Intake              510 ml   Output             2425 ml   Net            -1915 ml       Physical Examination:     GEN: Awake  RS: Chest is bilateral equal, no wheezing / rales / crackles  CVS: S1-S2 heard, RRR, No S3 / murmur  Abdomen: Soft, Non tender, Not distended, Positive bowel sounds, no organomegaly, no CVA / supra pubic tenderness  Extremities: No edema, no cyanosis, skin is warm on touch  HEENT: Head is atraumatic, PERRLA, conjunctiva pink & non icteric. No JVD or carotid bruit   Musculoskeletal: No gross joints or bone deformities   Lymph Node: No palpable cervical, axillary or groin lymphadenopathy.       Data Review:      Labs:     Hematology:   Recent Labs      02/12/18   0045  02/11/18   0525  02/10/18   0454   WBC  5.4  6.6  9.1   HGB  10.6*  10.8*  10.3*   HCT  32.4*  33.5*  30.8*     Chemistry:   Recent Labs      02/12/18   0045  02/10/18   5800 02/10/18   0454  02/09/18   1401  02/09/18   1330   BUN  10  12  15  20*   --    CREA  0.81  0.84  1.01  1.38*   --    CA  7.8*  8.5  8.3*  7.6*   --    K  2.7*  3.2*  3.8  3.6  4.0   NA  140  144  147*  147*   --    CL  103  108  114*  117*   --    CO2  31  28  26  25   --    PHOS  2.1*  2.5  1.8*  2.1*  2.3*   GLU  226*  261*  279*  333*   --         Images:    XR (Most Recent). CXR reviewed by me and compared with previous CXR   Results from Hospital Encounter encounter on 02/07/18   XR CHEST PORT   Narrative AP CHEST, PORTABLE    CPT CODE: 95913    INDICATION: Provided as ETT eval.    COMPARISON: 2/10/2018. TECHNIQUE: Portable semiupright AP chest radiograph is reviewed. FINDINGS:    Right IJ central venous catheter is again seen with tip projected over the lower  SVC. No endotracheal tube is evident in the patient. Multiple EKG leads/wires  overlie the patient. Pulmonary vascular congestion, slightly decreased compared to the preceding  radiograph. Hazy opacities most conspicuous in the perihilar and lower lungs  consistent with airspace disease and/or atelectasis. A component of pulmonary  edema is possible. There is mild blunting of the costophrenic sulci consistent  with small pleural effusions versus thickening. No evidence of pneumothorax. The cardiac silhouette is within normal limits in size for technique. Old healed or healing rib fractures best seen in the posterior lateral left  sixth rib. Impression IMPRESSION:     Again, no endotracheal tube is seen in the patient. Clinical correlation,  please, regarding the provided indication. Slight interval decrease in pulmonary vascular congestion. Otherwise hazy  bilateral lung opacities consistent with airspace disease and/or atelectasis  similar to prior as described. Small bilateral pleural effusions versus  thickening.          CT (Most Recent)   Results from Hospital Encounter encounter on 02/07/18   CT HEAD WO CONT   Narrative CT of the head without contrast    CT CODE:  [id number]    HISTORY: Cerebral edema evaluation    COMPARISON: 0055 hours    TECHNIQUE: Helical axial scan to the head was performed from the skull base to  the vertex without IV contrast administration. All CT scans at this facility performed using dose optimization techniques as  appreciated to a performed exam, to include automated exposure control,  adjustment of the mA and or KU according to patient size (including appropriate  matching for site specific examination), or use of iterative reconstruction  technique. FINDINGS:    The brain parenchyma and ventricles appear unremarkable. No significant global  atrophy. Normal gray-white matter interface noted. No CT evidence of cerebral  edema seen. There is no evidence of acute intracranial hemorrhage or mass effect  identified. Subtle white matter microvascular disease again noted. No skull  fracture or extra axial fluid collections identified. The expansile mottled  appearance of sclerotic lesion of left sphenoid is again identified. No  additional skull lesion seen. Visualized sinuses and mastoid air cells appear  unremarkable. Impression IMPRESSION:    No CT evidence of cerebral edema or other acute intracranial abnormality. Note: If clinical concern of acute stroke, MRI with diffusion weighted images is  recommended for better evaluation. 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 Neoplastic  process not excludable. Recommend bone scan evaluation. Thank you for your referral.         EKG No results found for this or any previous visit. I have personally reviewed the old medical records and patient's labs    Plan / Recommendation:      1. Arf,resolved. discussed with sister  2.hypernatremia,resolved  3.hypokalemia,was given iv kcl  4.hypophosphatemia,give po neutraphos    D/w Dr. Evelina Osman MD  Nephrology  2/12/2018

## 2018-02-12 NOTE — PROGRESS NOTES
[facility]  Progress Note    Patient: Ricky Payment Age: 58 y.o. : 1955 MR#: [id number] SSN: [id number]  Date/Time: 2018     Subjective:    Pt awake but confused. Assessment/Plan:   1. Acute hypoxic and hypercapnic resp failure: s/p intubation/extubation appears to be stable. .  2. HHS: improved on lantus, will increase dose given high blood sugars. 3. Acute met encephalopathy: due to # 1/2/5-improving  4. Hypotension: septic vs hypovolemic:improved and now off pressors. 5. New onset Sz: cont keppra, neuro consulted. EEG shows no epileptiform or focal abnormality. 6. Sepsis: ? Related to # 2 vs # 5  7. Possible aspiration PNA: cont Abx day 6/7 of cephalosporins, and day 5/7 of flagyl. 8. Met and resp acidosis: seems to have resolved. 9. Lactic acidosis-resolved. 10. ? GI bleed with coffee ground: monitor H/H-stable  11. Hx Hep C and drug use:    12.Elevated trop: demand ischemia   13. ARF-resolved. 15. UDS positive for cocaine. 16- hypokalemia replace and follow    Dispo: pt/ot consulted. Case discussed with:  []Patient  [x]Family  []Nursing  []Case Management  DVT Prophylaxis:  []Lovenox  []Hep SQ  [x]SCDs  []Coumadin   []On Heparin gtt    Objective:   VS:   Visit Vitals    /72 (BP 1 Location: Left arm, BP Patient Position: At rest)    Pulse 76    Temp 98.6 °F (37 °C)    Resp 22    Wt 72.2 kg (159 lb 3.2 oz)    SpO2 92%    Breastfeeding No    BMI 24.93 kg/m2      Tmax/24hrs: Temp (24hrs), Av.5 °F (37.5 °C), Min:98.3 °F (36.8 °C), Max:100.7 °F (38.2 °C)  IOBRIEF    Intake/Output Summary (Last 24 hours) at 18 1442  Last data filed at 18 1354   Gross per 24 hour   Intake              600 ml   Output             1800 ml   Net            -1200 ml       General:  Extubated in no distress, oriented to self and place  Pulmonary:  CTA Bilaterally. RR 20  Cardiovascular: Regular rate and Rhythm.   GI:  Soft, Non distended, Non tender. + Bowel sounds. Extremities:  No edema, cyanosis, clubbing. Neurologic: sedated.       Medications:   Current Facility-Administered Medications   Medication Dose Route Frequency    levETIRAcetam (KEPPRA) 1,000 mg in saline (iso-osm) 100 ml IVPB  1,000 mg IntraVENous Q12H    insulin lispro (HUMALOG) injection   SubCUTAneous AC&HS    phosphorus (K PHOS NEUTRAL) 250 mg tablet 1 Tab  1 Tab Oral BID    vancomycin (VANCOCIN) 1,500 mg in 0.9% sodium chloride 500 mL IVPB  1,500 mg IntraVENous Q8H    heparin (porcine) injection 5,000 Units  5,000 Units SubCUTAneous Q8H    albuterol-ipratropium (DUO-NEB) 2.5 MG-0.5 MG/3 ML  3 mL Nebulization Q4H PRN    insulin glargine (LANTUS) injection 18 Units  18 Units SubCUTAneous DAILY    senna (SENOKOT) tablet 8.6 mg  1 Tab Oral DAILY    Or    docusate sodium (COLACE) capsule 100 mg  100 mg Oral DAILY    cefTRIAXone (ROCEPHIN) 2 g in sterile water (preservative free) 20 mL IV syringe  2 g IntraVENous Q24H    polyethylene glycol (MIRALAX) packet 17 g  17 g Oral DAILY    glucose chewable tablet 16 g  4 Tab Oral PRN    glucagon (GLUCAGEN) injection 1 mg  1 mg IntraMUSCular PRN    dextrose (D50W) injection syrg 12.5-25 g  25-50 mL IntraVENous PRN    thiamine (B-1) injection 100 mg  100 mg IntraMUSCular DAILY    fentaNYL citrate (PF) injection 25-50 mcg  25-50 mcg IntraVENous Q4H PRN    midazolam (VERSED) injection 1-2 mg  1-2 mg IntraVENous Q4H PRN    metroNIDAZOLE (FLAGYL) IVPB premix 500 mg  500 mg IntraVENous Q12H    influenza vaccine 2017-18 (3 yrs+)(PF) (FLUZONE QUAD/FLUARIX QUAD) injection 0.5 mL  0.5 mL IntraMUSCular PRIOR TO DISCHARGE    pantoprazole (PROTONIX) granules for oral suspension 40 mg  40 mg Per NG tube DAILY    sodium chloride (NS) flush 5-10 mL  5-10 mL IntraVENous PRN    chlorhexidine (PERIDEX) 0.12 % mouthwash 10 mL  10 mL Oral Q12H    sodium chloride (NS) flush 5-10 mL  5-10 mL IntraVENous Q8H    sodium chloride (NS) flush 5-10 mL  5-10 mL IntraVENous PRN    acetaminophen (TYLENOL) suppository 650 mg  650 mg Rectal Z0I PRN    folic acid (FOLVITE) tablet 1 mg  1 mg Per NG tube DAILY       Labs:    Recent Results (from the past 24 hour(s))   GLUCOSE, POC    Collection Time: 02/11/18 11:47 PM   Result Value Ref Range    Glucose (POC) 253 (H) 70 - 110 mg/dL   MAGNESIUM    Collection Time: 02/12/18 12:45 AM   Result Value Ref Range    Magnesium 1.8 1.6 - 2.6 mg/dL   METABOLIC PANEL, BASIC    Collection Time: 02/12/18 12:45 AM   Result Value Ref Range    Sodium 140 136 - 145 mmol/L    Potassium 2.7 (LL) 3.5 - 5.5 mmol/L    Chloride 103 100 - 108 mmol/L    CO2 31 21 - 32 mmol/L    Anion gap 6 3.0 - 18 mmol/L    Glucose 226 (H) 74 - 99 mg/dL    BUN 10 7.0 - 18 MG/DL    Creatinine 0.81 0.6 - 1.3 MG/DL    BUN/Creatinine ratio 12 12 - 20      GFR est AA >60 >60 ml/min/1.73m2    GFR est non-AA >60 >60 ml/min/1.73m2    Calcium 7.8 (L) 8.5 - 10.1 MG/DL   PHOSPHORUS    Collection Time: 02/12/18 12:45 AM   Result Value Ref Range    Phosphorus 2.1 (L) 2.5 - 4.9 MG/DL   CBC WITH AUTOMATED DIFF    Collection Time: 02/12/18 12:45 AM   Result Value Ref Range    WBC 5.4 4.6 - 13.2 K/uL    RBC 3.76 (L) 4.20 - 5.30 M/uL    HGB 10.6 (L) 12.0 - 16.0 g/dL    HCT 32.4 (L) 35.0 - 45.0 %    MCV 86.2 74.0 - 97.0 FL    MCH 28.2 24.0 - 34.0 PG    MCHC 32.7 31.0 - 37.0 g/dL    RDW 13.0 11.6 - 14.5 %    PLATELET 060 (L) 354 - 420 K/uL    MPV 11.3 9.2 - 11.8 FL    NEUTROPHILS 60 40 - 73 %    LYMPHOCYTES 27 21 - 52 %    MONOCYTES 11 (H) 3 - 10 %    EOSINOPHILS 2 0 - 5 %    BASOPHILS 0 0 - 2 %    ABS. NEUTROPHILS 3.2 1.8 - 8.0 K/UL    ABS. LYMPHOCYTES 1.5 0.9 - 3.6 K/UL    ABS. MONOCYTES 0.6 0.05 - 1.2 K/UL    ABS. EOSINOPHILS 0.1 0.0 - 0.4 K/UL    ABS.  BASOPHILS 0.0 0.0 - 0.1 K/UL    DF AUTOMATED     GLUCOSE, POC    Collection Time: 02/12/18  5:43 AM   Result Value Ref Range    Glucose (POC) 202 (H) 70 - 110 mg/dL   MAGNESIUM    Collection Time: 02/12/18 10:00 AM   Result Value Ref Range    Magnesium 1.9 1.6 - 2.6 mg/dL   METABOLIC PANEL, BASIC    Collection Time: 02/12/18 10:00 AM   Result Value Ref Range    Sodium 138 136 - 145 mmol/L    Potassium 3.1 (L) 3.5 - 5.5 mmol/L    Chloride 102 100 - 108 mmol/L    CO2 30 21 - 32 mmol/L    Anion gap 6 3.0 - 18 mmol/L    Glucose 377 (H) 74 - 99 mg/dL    BUN 9 7.0 - 18 MG/DL    Creatinine 0.72 0.6 - 1.3 MG/DL    BUN/Creatinine ratio 13 12 - 20      GFR est AA >60 >60 ml/min/1.73m2    GFR est non-AA >60 >60 ml/min/1.73m2    Calcium 7.8 (L) 8.5 - 10.1 MG/DL   PHOSPHORUS    Collection Time: 02/12/18 10:00 AM   Result Value Ref Range    Phosphorus 2.6 2.5 - 4.9 MG/DL   VANCOMYCIN, TROUGH    Collection Time: 02/12/18 10:00 AM   Result Value Ref Range    Vancomycin,trough 5.9 (L) 10.0 - 20.0 ug/mL    Reported dose date: 20180211      Reported dose time: 958      Reported dose: 1250 MG UNITS   GLUCOSE, POC    Collection Time: 02/12/18  2:28 PM   Result Value Ref Range    Glucose (POC) 422 (HH) 70 - 110 mg/dL       Signed By: Natali Poole MD     February 12, 2018

## 2018-02-12 NOTE — PROGRESS NOTES
NUTRITION    Nutrition Screen     RECOMMENDATIONS / PLAN:     - Continue current nutrition interventions  - Continue RD inpatient monitoring and evaluation. NUTRITION INTERVENTIONS & DIAGNOSIS:     Nutrition Diagnosis: No nutrition diagnosis at this time    ASSESSMENT:     2/12: Pt extubated over the weekend; NGT removed, tube feeds discontinued and pt started on po diet. SLP following; recommend regular consistency, thin liquids. Pt reported having good appetite and meal intake, consuming >80% of meals per her report. Noted bag of cornejo in patient's room. Discussed nutrition considerations regarding cornejo and pt hx of HTN. Advised pt to limit intake. Pt verbalized understanding; stated will have family take it home. 2/9: Pt intubated. NPO with NGT. Admitted and found to have BG levels >2000, glycemic control following, pt on insulin drip with stable BG levels. Plan to start tube feedings today. BM PTA, started on miralax and pericolace. Hypernatremia noted.     Average po intake adequate to meet patients estimated nutritional needs:   [x] Yes     [] No   [] Unable to determine at this time    Diet: DIET DIABETIC CONSISTENT CARB Regular      Food Allergies: NKFA  Current Appetite:   [x] Good     [] Fair     [] Poor     [] Other:    Appetite/meal intake prior to admission:   [] Good     [] Fair     [] Poor     [x] Other:unknown  Feeding Limitations:  [] Swallowing difficulty    [] Chewing difficulty    [x] Other: respiratory status  Current Meal Intake:   Patient Vitals for the past 100 hrs:   % Diet Eaten   02/12/18 1354 50 %   02/10/18 2000 0 %       BM:  2/11 - per pt  Skin Integrity:  WDL  Edema: None  Pertinent Medications: Reviewed: folic acid, SSI, phosphorus, bowel regimen, thiamine    Recent Labs      02/12/18   1000  02/12/18   0045  02/10/18   2300   NA  138  140  144   K  3.1*  2.7*  3.2*   CL  102  103  108   CO2  30  31  28   GLU  377*  226*  261*   BUN  9  10  12   CREA  0.72  0.81  0.84   CA 7. 8*  7.8*  8.5   MG  1.9  1.8  1.8   PHOS  2.6  2.1*  2.5       Intake/Output Summary (Last 24 hours) at 02/12/18 1634  Last data filed at 02/12/18 1354   Gross per 24 hour   Intake              600 ml   Output             1800 ml   Net            -1200 ml       Anthropometrics:  Ht Readings from Last 1 Encounters:   02/04/18 5' 7\" (1.702 m)     Last 3 Recorded Weights in this Encounter    02/10/18 0400 02/11/18 0414 02/12/18 0711   Weight: 77.2 kg (170 lb 3.1 oz) 71.6 kg (157 lb 13.6 oz) 72.2 kg (159 lb 3.2 oz)     Body mass index is 24.93 kg/(m^2). Weight History: Fluctuations noted in weight per chart hx. Weight Metrics 2/12/2018 2/4/2018 4/22/2017 6/24/2016 4/7/2016 12/31/2015 10/28/2015   Weight 159 lb 3.2 oz 150 lb 171 lb 171 lb 230 lb 207 lb 4 oz 201 lb   BMI 24.93 kg/m2 23.49 kg/m2 26.78 kg/m2 26.78 kg/m2 36.01 kg/m2 32.45 kg/m2 31.47 kg/m2        Admitting Diagnosis: Hyperosmolar (nonketotic) coma (HCC)  Acidosis  Respiratory failure ([facility]  Shock ([facility]  Pertinent PMHx: DM2 with neuropathy, hepatitis C, HTN, non-compliance    Education Needs:        [x] None identified  [] Identified - Not appropriate at this time  []  Identified and addressed - refer to education log  Learning Limitations:   [x] None identified  [] Identified:  Cultural, Congregational & ethnic food preferences:  [x] None identified    [] Identified and addressed     ESTIMATED NUTRITION NEEDS:     Calories: 8452-1067 kcal (MSJx1.2-1.3) based on  [x] Actual BW: 72 kg      [] IBW   Protein: 58-72 gm (0.8-1 gm/kg) based on  [x] Actual BW      [] IBW   Fluid: 1 mL/kcal     MONITORING & EVALUATION:     Nutrition Goal(s):   1. Nutritional needs will be met through adequate oral intake or nutrition support within the next 7 days.   Outcome:  [x] Met/Ongoing    []  Not Met    [] New/Initial Goal      Monitoring:   [] Food and beverage intake   [x] Diet order   [x] Nutrition-focused physical findings   [x] Treatment/therapy   [] Weight   [x] Enteral nutrition intake        Previous Recommendations (for follow-up assessments only):     []   Implemented       []   Not Implemented (RD to address)      [] No Longer Appropriate     [] No Recommendation Made     Discharge Planning:  Diabetic, cardiac diet; consistency per SLP  [x] Participated in care planning, discharge planning, & interdisciplinary rounds as appropriate      Kirill Grant, [address] [place] [address]   Pager: [phone]

## 2018-02-12 NOTE — PROGRESS NOTES
Care Management Interventions  PCP Verified by CM: Yes  Mode of Transport at Discharge:  (TBD, SNF v. HH v. LTC)  Transition of Care Consult (CM Consult): Discharge Planning (TBD course of hospitalization)  Physical Therapy Consult: Yes  Occupational Therapy Consult: Yes  Speech Therapy Consult: Yes  Current Support Network: Relative's Home (Pt lives alone but will be staying with her sister upon discharge. Considering need for LTC arrangements )  Plan discussed with Pt/Family/Caregiver: Yes    Patient is a 59 yo female admitted for blood sugars in the 2000. Patient is still confused and cannot participate in the interview. Patient's sister, Grace Rabago, is at bedside and underwent education with [employer] [place] Educator, today. She stated the patient will be staying with her upon discharge. She also inquired about SNF or LTC either PCA or in a NH. Will await recommendations of PT/OT.

## 2018-02-12 NOTE — DIABETES MGMT
Glycemic Control Plan of Care    POC BG still above target range. POC BG range on 2018: 211-261 mg/dL. POC BG report on 2018 at time of review: 202 mg/dL. Seen patient this AM. She is alert and able to participate in discussion regarding diabetes, but problem with dexterity therefore unable to perform BG checks and insulin administration (drawing and injection) procedures. Notified . Addendum at 12:23PM: Patient's sister, Ms. Lorin Pretty, arrived and stated that patient will stay with her after disch. Ms. Lorin Pretty stated that she will be the primary caregiver for the patient. Recommendation(s):  1.) Consider increasing basal lantus insulin from 18 to 22 units daily starting .  2.) Modify correctional lispro insulin to very resistant dose. Done. 3.) Attempted to educate patient but unable to complete. Recommend assistance by a caregiver for training and education to include BG monitoring and insulin instruction. Discussed with . Addendum at 12:23 PM: Completed diabetes education with patient's sister, Ms. Lorin Pretty, see separate notes. She will go to [address] [place] [address] and get a new BG meter, ReliOn Prime for the patient. Also received information that patient is on pen insulin daily but cannot remember the name and dose. Assessment:  Patient is 58year old with past medical history including type 2 diabetes mellitus, diabetic neuropathy, asthma, emphysema, cocaine abuseand hepatitis C, hypertension - was admitted on 2018 with altered mental status and blood glucose of >2000 mg/dL. Patient was admitted to ICU. Noted:  Acute respiratory failure. Status extubation. HHS, improved. Acute metabolic encephalopathy. New onset seizures. Uncontrolled type 2 diabetes mellitus with current A1c 15.7% (2018). Most recent blood glucose values:    Results for Lito San (MRN [id number]) as of 2018 11:27   Ref.  Range 2018 00:03 2018 06:02 2018 12:35 2/11/2018 23:47   GLUCOSE,FAST - POC Latest Ref Range: 70 - 110 mg/dL 241 (H) 261 (H) 211 (H) 253 (H)     Results for Tico Lomeli (MRN [id number]) as of 2/12/2018 11:27   Ref. Range 2/12/2018 05:43   GLUCOSE,FAST - POC Latest Ref Range: 70 - 110 mg/dL 202 (H)     Current A1C: 15.76% (02/07/2018) is equivalent to average blood glucose of 411 mg/dL during the past 2-3 months. Current hospital diabetes medications:  Basal lantus insulin 18 units daily. Correctional lispro insulin ACHS. Very resistant dose. Total daily dose insulin requirement previous day: 02/11/2018:  Lantus: 18 units  Lispro: 20 units  TDD: 36 units of insulin. Home diabetes medications: Patient stated on 02/12/2018 that she was not taking any diabetes medications prior to admission. Noted the following meds listed PTA:  Glimperide 2 mg daily every morning. Janumet  mg daily. Diet: Diabetic consistent carb mechanical soft. Goals:  Blood glucose will be within target range of  mg/dL by    02/15/2018. Education:  _X__  Refer to Diabetes Education Record: 02/12/2018. Completed with patient's sister, Ms. Greg Alvares.              ___  Education not indicated at this time:     Kyle Jensen RN CCM

## 2018-02-12 NOTE — PROGRESS NOTES
Problem: Dysphagia (Adult)  Goal: *Acute Goals and Plan of Care (Insert Text)    Rec:   Reg solid diet with thin liquids  Aspiration precautions  Oral care TID  Meds as tolerated          Outcome: Resolved/Met Date Met: 02/12/18  Speech LAnguage Pathology bedside swallow evaluation AND DISCHARGE    Patient: Luz Smith (37 y.o. female)  Date: 2/12/2018  Primary Diagnosis: Hyperosmolar (nonketotic) coma (HCC)  Acidosis  Respiratory failure (HCC)  Shock ([address]        Precautions: aspiration       ASSESSMENT :  Clinical beside swallow eval completed per MD orders s/p extubation 2/11/18. Pt A&Ox4. Speech/voice within functional limits. Oral mech examination revealed structures functional for speech and deglutition. Pt observed with thin liquids +/- straw via single sips and successive swallows with timely swallow initiation, adequate laryngeal elevation to palpation and no overt s/sx aspiration. Pt demo positive rotary chew and thorough oral clearance with regular solids with no overt s/sx aspiration. Pt safe for regular diet with thin liquids, meds as tolerated with general safe swallow precautions. Pt educated with regard to s/sx aspiration, aspiration risk, diet recs and role of SLP. Pt able to verbalize understanding. Will sign off. Please re-consult as indicated. D/w RN. PLAN :  Recommendations and Planned Interventions:  No formal ST needs ID'd for dysphagia. Eval only. Discharge Recommendations: Home Health, Outpatient and To Be Determined     SUBJECTIVE:   Patient stated I am eating and drinking fine.     OBJECTIVE:     Past Medical History:   Diagnosis Date    Arthritis     Asthma     Chronic pain     BACK PAIN    Diabetes ([facility] [address]     Diabetic neuropathy (HCC)     Emphysema     Hepatitis C     Hypertension     Nervousness     Osteoarthritis of both knees      Past Surgical History:   Procedure Laterality Date    HX CARPAL TUNNEL RELEASE Right 8/31/09    Dr. Rosana Galaviz LIGATION       Prior Level of Function/Home Situation: private residence  Home Situation  Home Environment: Private residence  [address] [place] [address] St: One story  Living Alone: No  Support Systems: Child(montse), Family member(s)  Patient Expects to be Discharged to[de-identified] Private residence  Current DME Used/Available at Home: None  Diet prior to admission: reg with thin  Current Diet:  Reg with thin   Cognitive and Communication Status:  Neurologic State: Alert  Orientation Level: Oriented X4  Cognition: Follows commands  Oral Assessment:  Oral Assessment  Labial: No impairment  Dentition: Natural;Limited  Oral Hygiene: adequate  Lingual: No impairment  Velum: No impairment  Mandible: No impairment  P.O. Trials:  Patient Position: [facility] at [facility]  Vocal quality prior to P.O.: No impairment  Consistency Presented: Solid; Thin liquid;Puree  How Presented: Self-fed/presented;Cup/sip;Spoon;Straw  Bolus Acceptance: No impairment   Propulsion: No impairment  Oral Residue: None  Initiation of Swallow: No impairment  Laryngeal Elevation: Functional  Aspiration Signs/Symptoms: None  Pharyngeal Phase Characteristics: No impairment, issues, or problems   Effective Modifications: None  Cues for Modifications: None     Oral Phase Severity: No impairment  Pharyngeal Phase Severity : No impairment    GCODESwallowing:  Swallow Current Status CH= 0%   Swallow Goal Status CH= 0%   Swallow D/C Status CH= 0%    The severity rating is based on the following outcomes:    Clinical judgment    Pain:  Pt reports 0/10 pain or discomfort prior to eval.   Pt reports 0/10 pain or discomfort post eval.     After treatment:   []            Patient left in no apparent distress sitting up in chair  [x]            Patient left in no apparent distress in bed  [x]            Call bell left within reach  [x]            Nursing notified  []            Caregiver present  []            Bed alarm activated    COMMUNICATION/EDUCATION:   [x]            SLP educated pt with regard to compensatory swallow strategies and       aspiration/reflux precautions including: small bites/sips,       alternate liquids/solids, decrease feeding rate, HOB > 45 with all po, and       upright in bed at 30 degrees after po for at least 45 minutes. [x]            Patient/family have participated as able in goal setting and plan of care.     Thank you for this referral.    Maryam Moy M.S. CCC-SLP/L  Speech-Language Pathologist

## 2018-02-12 NOTE — DIABETES MGMT
DIABETES EDUCATION:    Completed education with patient's sister, Ms. Danielle Brown, and she will be the primary caregiver. See separate notes. Also received information that patient will stay with Ms. Danielle Brown after discharge. Ms. Danielle Brown will get a new BG meter, ReliOn Prime, at [facility] because the cost of test strips is only $9.00 for 50 count. Recommend referral for home health for f/u on diabetes education and family's ability to to perform BG checks and insulin administration.     Hao Valentine RN CCM

## 2018-02-12 NOTE — PROGRESS NOTES
Problem: Falls - Risk of  Goal: *Absence of Falls  Document Young Fall Risk and appropriate interventions in the flowsheet.    Outcome: Progressing Towards Goal  Fall Risk Interventions:       Mentation Interventions: Adequate sleep, hydration, pain control, Bed/chair exit alarm, Door open when patient unattended    Medication Interventions: Bed/chair exit alarm, Patient to call before getting OOB    Elimination Interventions: Bed/chair exit alarm, Call light in reach, Patient to call for help with toileting needs

## 2018-02-12 NOTE — DIABETES MGMT
Diabetes Patient/Family Education Record    Factors That  May Influence Patients Ability  to Learn or  Comply with Recommendations   []   Language barrier    []   Cultural needs   []   Motivation    []   Cognitive limitation    []   Physical   [x]   Education    []   Physiological factors   []   Hearing/vision/speaking impairment   []   Islam beliefs    []   Financial factors   []  Other:   []  No factors identified at this time. Person Instructed:   []   Patient   [x]   Family: Patient's sister, Ms. Jeremiah Ball, stated that patient will stay with her after discharge and she will be the primary caregiver. She actively participated in education and training. []  Other     Preference for Learning:   [x]   Verbal   [x]   Written   []  Demonstration     Level of Comprehension & Competence:    [x]  Good                                      [] Fair                                     []  Poor                             []  Needs Reinforcement   [x]  Teachback completed    Education Component:   [x]  Medication management, including how to administer insulin (if appropriate) and potential medication interactions: Patient and family cannot remember the list of home diabetes meds. The sister stated that patient is on diabetic pills and insulin daily (pen). Noted the following diabetes meds PTA:  Glimepiride 2 mg daily. Janumet  mg daily. Educated patient's sister on how to use insulin pen with satisfactory return demonstration. Educated patient's sister on types of insulin including long acting and fast acting insulin. [x]  Nutritional management: Explained the importance of 3 meals daily (breakfast, lunch, dinner) and a light evening snack. Educated on serving size/portion control of carbs (starches, fruits, dairy) and limiting intake of concentrated sweets (regular beverages and food/snacks containing sugar).    [x]  Exercise   [x]  Signs, symptoms, and treatment of hyperglycemia and hypoglycemia   [x] Prevention, recognition and treatment of hyperglycemia and hypoglycemia   [x]  Importance of blood glucose monitoring and how to obtain a blood glucose meter: Patient's sister will get a new BG meter, ReliOn Prime, at [facility] where the cost test strips is only $9.00 per 50 count.      []  Instruction on use of the blood glucose meter   [x]  Discuss the importance of HbA1C monitoring: 15.7% (02/07/2018) is equivalent to average blood glucose of 411 mg/dL during the past 2-3 months.    []  Sick day guidelines   [x]  Proper use and disposal of lancets, needles, syringes or insulin pens (if appropriate)   [x]  Potential long-term complications (retinopathy, kidney disease, neuropathy, foot care)   [x] Information about whom to contact in case of emergency or for more information    [x]  Goal:  Patient/family will demonstrate understanding of Diabetes Self Management Skills by: 02/19/2018  Plan for post-discharge education or self-management support:    [x] Outpatient class schedule provided            [] Patient Declined    [] Scheduled for outpatient classes (date) _______       Dustin Babcock RN CCM

## 2018-02-13 LAB
ANION GAP SERPL CALC-SCNC: 5 MMOL/L (ref 3–18)
ANION GAP SERPL CALC-SCNC: 6 MMOL/L (ref 3–18)
ANION GAP SERPL CALC-SCNC: 9 MMOL/L (ref 3–18)
BACTERIA SPEC CULT: NORMAL
BASOPHILS # BLD: 0 K/UL (ref 0–0.1)
BASOPHILS NFR BLD: 0 % (ref 0–2)
BUN SERPL-MCNC: 6 MG/DL (ref 7–18)
BUN SERPL-MCNC: 8 MG/DL (ref 7–18)
BUN SERPL-MCNC: 8 MG/DL (ref 7–18)
BUN/CREAT SERPL: 12 (ref 12–20)
BUN/CREAT SERPL: 13 (ref 12–20)
BUN/CREAT SERPL: 13 (ref 12–20)
CALCIUM SERPL-MCNC: 7.4 MG/DL (ref 8.5–10.1)
CALCIUM SERPL-MCNC: 7.6 MG/DL (ref 8.5–10.1)
CALCIUM SERPL-MCNC: 7.7 MG/DL (ref 8.5–10.1)
CHLORIDE SERPL-SCNC: 104 MMOL/L (ref 100–108)
CHLORIDE SERPL-SCNC: 105 MMOL/L (ref 100–108)
CHLORIDE SERPL-SCNC: 106 MMOL/L (ref 100–108)
CHLORIDE UR-SCNC: 73 MMOL/L (ref 55–125)
CO2 SERPL-SCNC: 27 MMOL/L (ref 21–32)
CO2 SERPL-SCNC: 31 MMOL/L (ref 21–32)
CO2 SERPL-SCNC: 32 MMOL/L (ref 21–32)
CREAT SERPL-MCNC: 0.49 MG/DL (ref 0.6–1.3)
CREAT SERPL-MCNC: 0.64 MG/DL (ref 0.6–1.3)
CREAT SERPL-MCNC: 0.64 MG/DL (ref 0.6–1.3)
CREAT UR-MCNC: 56.5 MG/DL (ref 30–125)
DATE LAST DOSE: ABNORMAL
DIFFERENTIAL METHOD BLD: ABNORMAL
EOSINOPHIL # BLD: 0.2 K/UL (ref 0–0.4)
EOSINOPHIL NFR BLD: 3 % (ref 0–5)
ERYTHROCYTE [DISTWIDTH] IN BLOOD BY AUTOMATED COUNT: 12.9 % (ref 11.6–14.5)
GLUCOSE BLD STRIP.AUTO-MCNC: 185 MG/DL (ref 70–110)
GLUCOSE BLD STRIP.AUTO-MCNC: 209 MG/DL (ref 70–110)
GLUCOSE BLD STRIP.AUTO-MCNC: 294 MG/DL (ref 70–110)
GLUCOSE BLD STRIP.AUTO-MCNC: 329 MG/DL (ref 70–110)
GLUCOSE SERPL-MCNC: 138 MG/DL (ref 74–99)
GLUCOSE SERPL-MCNC: 216 MG/DL (ref 74–99)
GLUCOSE SERPL-MCNC: 223 MG/DL (ref 74–99)
HCT VFR BLD AUTO: 32 % (ref 35–45)
HGB BLD-MCNC: 10.3 G/DL (ref 12–16)
LYMPHOCYTES # BLD: 1.8 K/UL (ref 0.9–3.6)
LYMPHOCYTES NFR BLD: 36 % (ref 21–52)
MAGNESIUM SERPL-MCNC: 1.7 MG/DL (ref 1.6–2.6)
MAGNESIUM SERPL-MCNC: 1.8 MG/DL (ref 1.6–2.6)
MAGNESIUM SERPL-MCNC: 1.8 MG/DL (ref 1.6–2.6)
MCH RBC QN AUTO: 27.8 PG (ref 24–34)
MCHC RBC AUTO-ENTMCNC: 32.2 G/DL (ref 31–37)
MCV RBC AUTO: 86.5 FL (ref 74–97)
MONOCYTES # BLD: 0.7 K/UL (ref 0.05–1.2)
MONOCYTES NFR BLD: 13 % (ref 3–10)
NEUTS SEG # BLD: 2.4 K/UL (ref 1.8–8)
NEUTS SEG NFR BLD: 48 % (ref 40–73)
PHOSPHATE SERPL-MCNC: 2.2 MG/DL (ref 2.5–4.9)
PHOSPHATE SERPL-MCNC: 2.4 MG/DL (ref 2.5–4.9)
PHOSPHATE SERPL-MCNC: 2.5 MG/DL (ref 2.5–4.9)
PLATELET # BLD AUTO: 146 K/UL (ref 135–420)
PMV BLD AUTO: 11.4 FL (ref 9.2–11.8)
POTASSIUM SERPL-SCNC: 2.8 MMOL/L (ref 3.5–5.5)
POTASSIUM SERPL-SCNC: 3.1 MMOL/L (ref 3.5–5.5)
POTASSIUM SERPL-SCNC: 3.4 MMOL/L (ref 3.5–5.5)
POTASSIUM UR-SCNC: 23 MMOL/L (ref 12–62)
RBC # BLD AUTO: 3.7 M/UL (ref 4.2–5.3)
REPORTED DOSE,DOSE: ABNORMAL UNITS
REPORTED DOSE/TIME,TMG: 1230
SERVICE CMNT-IMP: NORMAL
SODIUM SERPL-SCNC: 141 MMOL/L (ref 136–145)
SODIUM SERPL-SCNC: 141 MMOL/L (ref 136–145)
SODIUM SERPL-SCNC: 143 MMOL/L (ref 136–145)
SODIUM UR-SCNC: 79 MMOL/L (ref 20–110)
VANCOMYCIN TROUGH SERPL-MCNC: 22.8 UG/ML (ref 10–20)
WBC # BLD AUTO: 5 K/UL (ref 4.6–13.2)

## 2018-02-13 PROCEDURE — 84100 ASSAY OF PHOSPHORUS: CPT | Performed by: INTERNAL MEDICINE

## 2018-02-13 PROCEDURE — 65270000029 HC RM PRIVATE

## 2018-02-13 PROCEDURE — 83735 ASSAY OF MAGNESIUM: CPT | Performed by: INTERNAL MEDICINE

## 2018-02-13 PROCEDURE — 85025 COMPLETE CBC W/AUTO DIFF WBC: CPT | Performed by: INTERNAL MEDICINE

## 2018-02-13 PROCEDURE — 74011250636 HC RX REV CODE- 250/636: Performed by: PHYSICIAN ASSISTANT

## 2018-02-13 PROCEDURE — 80202 ASSAY OF VANCOMYCIN: CPT | Performed by: INTERNAL MEDICINE

## 2018-02-13 PROCEDURE — 97162 PT EVAL MOD COMPLEX 30 MIN: CPT

## 2018-02-13 PROCEDURE — 74011250637 HC RX REV CODE- 250/637: Performed by: PHYSICIAN ASSISTANT

## 2018-02-13 PROCEDURE — 74011250636 HC RX REV CODE- 250/636: Performed by: EMERGENCY MEDICINE

## 2018-02-13 PROCEDURE — 74011250636 HC RX REV CODE- 250/636: Performed by: INTERNAL MEDICINE

## 2018-02-13 PROCEDURE — 74011636637 HC RX REV CODE- 636/637: Performed by: INTERNAL MEDICINE

## 2018-02-13 PROCEDURE — 82436 ASSAY OF URINE CHLORIDE: CPT | Performed by: INTERNAL MEDICINE

## 2018-02-13 PROCEDURE — 97166 OT EVAL MOD COMPLEX 45 MIN: CPT

## 2018-02-13 PROCEDURE — 36415 COLL VENOUS BLD VENIPUNCTURE: CPT | Performed by: INTERNAL MEDICINE

## 2018-02-13 PROCEDURE — 82962 GLUCOSE BLOOD TEST: CPT

## 2018-02-13 PROCEDURE — 84133 ASSAY OF URINE POTASSIUM: CPT | Performed by: INTERNAL MEDICINE

## 2018-02-13 PROCEDURE — 82570 ASSAY OF URINE CREATININE: CPT | Performed by: INTERNAL MEDICINE

## 2018-02-13 PROCEDURE — 80048 BASIC METABOLIC PNL TOTAL CA: CPT | Performed by: INTERNAL MEDICINE

## 2018-02-13 PROCEDURE — 74011250637 HC RX REV CODE- 250/637: Performed by: INTERNAL MEDICINE

## 2018-02-13 PROCEDURE — 74011000250 HC RX REV CODE- 250: Performed by: INTERNAL MEDICINE

## 2018-02-13 PROCEDURE — 84300 ASSAY OF URINE SODIUM: CPT | Performed by: INTERNAL MEDICINE

## 2018-02-13 RX ORDER — INSULIN GLARGINE 100 [IU]/ML
5 INJECTION, SOLUTION SUBCUTANEOUS ONCE
Status: COMPLETED | OUTPATIENT
Start: 2018-02-13 | End: 2018-02-13

## 2018-02-13 RX ORDER — POTASSIUM CHLORIDE 7.45 MG/ML
10 INJECTION INTRAVENOUS
Status: DISCONTINUED | OUTPATIENT
Start: 2018-02-13 | End: 2018-02-13

## 2018-02-13 RX ORDER — INSULIN GLARGINE 100 [IU]/ML
25 INJECTION, SOLUTION SUBCUTANEOUS DAILY
Status: DISCONTINUED | OUTPATIENT
Start: 2018-02-14 | End: 2018-02-15 | Stop reason: HOSPADM

## 2018-02-13 RX ADMIN — METRONIDAZOLE 500 MG: 500 INJECTION, SOLUTION INTRAVENOUS at 00:21

## 2018-02-13 RX ADMIN — PANTOPRAZOLE SODIUM 40 MG: 40 GRANULE, DELAYED RELEASE ORAL at 09:20

## 2018-02-13 RX ADMIN — THIAMINE HYDROCHLORIDE 100 MG: 100 INJECTION, SOLUTION INTRAMUSCULAR; INTRAVENOUS at 09:20

## 2018-02-13 RX ADMIN — VANCOMYCIN HYDROCHLORIDE 1500 MG: 500 INJECTION, POWDER, LYOPHILIZED, FOR SOLUTION INTRAVENOUS at 12:30

## 2018-02-13 RX ADMIN — SENNOSIDES 8.6 MG: 8.6 TABLET, FILM COATED ORAL at 09:20

## 2018-02-13 RX ADMIN — DIBASIC SODIUM PHOSPHATE, MONOBASIC POTASSIUM PHOSPHATE AND MONOBASIC SODIUM PHOSPHATE 1 TABLET: 852; 155; 130 TABLET ORAL at 17:33

## 2018-02-13 RX ADMIN — HEPARIN SODIUM 5000 UNITS: 5000 INJECTION, SOLUTION INTRAVENOUS; SUBCUTANEOUS at 17:33

## 2018-02-13 RX ADMIN — POLYETHYLENE GLYCOL 3350 17 G: 17 POWDER, FOR SOLUTION ORAL at 09:20

## 2018-02-13 RX ADMIN — FOLIC ACID 1 MG: 1 TABLET ORAL at 09:20

## 2018-02-13 RX ADMIN — INSULIN LISPRO 9 UNITS: 100 INJECTION, SOLUTION INTRAVENOUS; SUBCUTANEOUS at 00:03

## 2018-02-13 RX ADMIN — Medication 10 ML: at 15:58

## 2018-02-13 RX ADMIN — INSULIN GLARGINE 20 UNITS: 100 INJECTION, SOLUTION SUBCUTANEOUS at 09:20

## 2018-02-13 RX ADMIN — HEPARIN SODIUM 5000 UNITS: 5000 INJECTION, SOLUTION INTRAVENOUS; SUBCUTANEOUS at 09:20

## 2018-02-13 RX ADMIN — VANCOMYCIN HYDROCHLORIDE 1500 MG: 500 INJECTION, POWDER, LYOPHILIZED, FOR SOLUTION INTRAVENOUS at 05:21

## 2018-02-13 RX ADMIN — INSULIN GLARGINE 5 UNITS: 100 INJECTION, SOLUTION SUBCUTANEOUS at 12:00

## 2018-02-13 RX ADMIN — LEVETIRACETAM 1000 MG: 1000 INJECTION, SOLUTION INTRAVENOUS at 00:26

## 2018-02-13 RX ADMIN — Medication 10 ML: at 00:21

## 2018-02-13 RX ADMIN — LEVETIRACETAM 1000 MG: 1000 INJECTION, SOLUTION INTRAVENOUS at 10:09

## 2018-02-13 RX ADMIN — DIBASIC SODIUM PHOSPHATE, MONOBASIC POTASSIUM PHOSPHATE AND MONOBASIC SODIUM PHOSPHATE 1 TABLET: 852; 155; 130 TABLET ORAL at 09:20

## 2018-02-13 RX ADMIN — Medication 10 ML: at 06:11

## 2018-02-13 RX ADMIN — INSULIN LISPRO 3 UNITS: 100 INJECTION, SOLUTION INTRAVENOUS; SUBCUTANEOUS at 12:25

## 2018-02-13 RX ADMIN — INSULIN LISPRO 6 UNITS: 100 INJECTION, SOLUTION INTRAVENOUS; SUBCUTANEOUS at 17:41

## 2018-02-13 RX ADMIN — INSULIN LISPRO 12 UNITS: 100 INJECTION, SOLUTION INTRAVENOUS; SUBCUTANEOUS at 09:19

## 2018-02-13 NOTE — PROGRESS NOTES
SW met with the patient who stated she is willing to go to any available SNF and would like to stay in [place]. Patient submitted to PHR and ACP.

## 2018-02-13 NOTE — PROGRESS NOTES
PT evaluation completed with recommendation of SNF with rolling walker. Full note to follow.     Thank you, Alley Black PT, DPT

## 2018-02-13 NOTE — PROGRESS NOTES
RENAL DAILY PROGRESS NOTE    Patient: Julio Yarbrough               Sex: female          DOA: 2/7/2018  5:41 PM        YOB: 1955      Age:  58 y.o.        LOS:  LOS: 6 days     Subjective:     Aurelia Cochran is a 58 y.o.  who presents with Hyperosmolar (nonketotic) coma ([address]  Acidosis  Respiratory failure ([address]  Shock ([address] Asked to evaluate for renal failure. admitted with hyperosmolar coma,resp failure. hx of dm  Chief complains: Patient denies nausea, vomiting, chest pain, dizziness, shortness of breath or headache.  - Reviewed last 24 hrs events     Current Facility-Administered Medications   Medication Dose Route Frequency    potassium chloride 10 mEq in 100 ml IVPB  10 mEq IntraVENous Q1H    insulin lispro (HUMALOG) injection   SubCUTAneous AC&HS    phosphorus (K PHOS NEUTRAL) 250 mg tablet 1 Tab  1 Tab Oral BID    vancomycin (VANCOCIN) 1,500 mg in 0.9% sodium chloride 500 mL IVPB  1,500 mg IntraVENous Q8H    insulin glargine (LANTUS) injection 20 Units  20 Units SubCUTAneous DAILY    heparin (porcine) injection 5,000 Units  5,000 Units SubCUTAneous Q8H    albuterol-ipratropium (DUO-NEB) 2.5 MG-0.5 MG/3 ML  3 mL Nebulization Q4H PRN    senna (SENOKOT) tablet 8.6 mg  1 Tab Oral DAILY    Or    docusate sodium (COLACE) capsule 100 mg  100 mg Oral DAILY    cefTRIAXone (ROCEPHIN) 2 g in sterile water (preservative free) 20 mL IV syringe  2 g IntraVENous Q24H    polyethylene glycol (MIRALAX) packet 17 g  17 g Oral DAILY    glucose chewable tablet 16 g  4 Tab Oral PRN    glucagon (GLUCAGEN) injection 1 mg  1 mg IntraMUSCular PRN    dextrose (D50W) injection syrg 12.5-25 g  25-50 mL IntraVENous PRN    thiamine (B-1) injection 100 mg  100 mg IntraMUSCular DAILY    fentaNYL citrate (PF) injection 25-50 mcg  25-50 mcg IntraVENous Q4H PRN    midazolam (VERSED) injection 1-2 mg  1-2 mg IntraVENous Q4H PRN    influenza vaccine 2017-18 (3 yrs+)(PF) (FLUZONE QUAD/FLUARIX QUAD) injection 0.5 mL  0.5 mL IntraMUSCular PRIOR TO DISCHARGE    pantoprazole (PROTONIX) granules for oral suspension 40 mg  40 mg Per NG tube DAILY    sodium chloride (NS) flush 5-10 mL  5-10 mL IntraVENous PRN    sodium chloride (NS) flush 5-10 mL  5-10 mL IntraVENous Q8H    sodium chloride (NS) flush 5-10 mL  5-10 mL IntraVENous PRN    acetaminophen (TYLENOL) suppository 650 mg  650 mg Rectal K3P PRN    folic acid (FOLVITE) tablet 1 mg  1 mg Per NG tube DAILY       Objective:     Visit Vitals    /73 (BP 1 Location: Left arm, BP Patient Position: At rest)    Pulse 60    Temp 97.5 °F (36.4 °C)    Resp 18    Wt 72.2 kg (159 lb 3.2 oz)    SpO2 94%    Breastfeeding No    BMI 24.93 kg/m2       Intake/Output Summary (Last 24 hours) at 02/13/18 1109  Last data filed at 02/13/18 1007   Gross per 24 hour   Intake             1680 ml   Output             3700 ml   Net            -2020 ml       Physical Examination:     GEN: Awake  RS: Chest is bilateral equal, no wheezing / rales / crackles  CVS: S1-S2 heard, RRR, No S3 / murmur  Abdomen: Soft, Non tender, Not distended, Positive bowel sounds, no organomegaly, no CVA / supra pubic tenderness  Extremities: No edema, no cyanosis, skin is warm on touch  HEENT: Head is atraumatic, PERRLA, conjunctiva pink & non icteric. No JVD or carotid bruit   Musculoskeletal: No gross joints or bone deformities   Lymph Node: No palpable cervical, axillary or groin lymphadenopathy.       Data Review:      Labs:     Hematology:   Recent Labs      02/13/18   0403  02/12/18   0045  02/11/18   0525   WBC  5.0  5.4  6.6   HGB  10.3*  10.6*  10.8*   HCT  32.0*  32.4*  33.5*     Chemistry:   Recent Labs      02/13/18   0403  02/12/18   1000  02/12/18   0045  02/10/18   2300   BUN  8  9  10  12   CREA  0.64  0.72  0.81  0.84   CA  7.4*  7.8*  7.8*  8.5   K  2.8*  3.1*  2.7*  3.2*   NA  141  138  140  144   CL  104  102  103  108   CO2  31  30  31  28   PHOS  2.5  2.6 2. 1*  2.5   GLU  216*  377*  226*  261*        Images:    XR (Most Recent). CXR reviewed by me and compared with previous CXR   Results from Hospital Encounter encounter on 02/07/18   XR CHEST PORT   Narrative AP CHEST, PORTABLE    CPT CODE: 84514    INDICATION: Provided as ETT eval.    COMPARISON: 2/10/2018. TECHNIQUE: Portable semiupright AP chest radiograph is reviewed. FINDINGS:    Right IJ central venous catheter is again seen with tip projected over the lower  SVC. No endotracheal tube is evident in the patient. Multiple EKG leads/wires  overlie the patient. Pulmonary vascular congestion, slightly decreased compared to the preceding  radiograph. Hazy opacities most conspicuous in the perihilar and lower lungs  consistent with airspace disease and/or atelectasis. A component of pulmonary  edema is possible. There is mild blunting of the costophrenic sulci consistent  with small pleural effusions versus thickening. No evidence of pneumothorax. The cardiac silhouette is within normal limits in size for technique. Old healed or healing rib fractures best seen in the posterior lateral left  sixth rib. Impression IMPRESSION:     Again, no endotracheal tube is seen in the patient. Clinical correlation,  please, regarding the provided indication. Slight interval decrease in pulmonary vascular congestion. Otherwise hazy  bilateral lung opacities consistent with airspace disease and/or atelectasis  similar to prior as described. Small bilateral pleural effusions versus  thickening. CT (Most Recent)   Results from Hospital Encounter encounter on 02/07/18   CT HEAD WO CONT   Narrative CT of the head without contrast    CT CODE:  47897    HISTORY: Cerebral edema evaluation    COMPARISON: 0055 hours    TECHNIQUE: Helical axial scan to the head was performed from the skull base to  the vertex without IV contrast administration.     All CT scans at this facility performed using dose optimization techniques as  appreciated to a performed exam, to include automated exposure control,  adjustment of the mA and or KU according to patient size (including appropriate  matching for site specific examination), or use of iterative reconstruction  technique. FINDINGS:    The brain parenchyma and ventricles appear unremarkable. No significant global  atrophy. Normal gray-white matter interface noted. No CT evidence of cerebral  edema seen. There is no evidence of acute intracranial hemorrhage or mass effect  identified. Subtle white matter microvascular disease again noted. No skull  fracture or extra axial fluid collections identified. The expansile mottled  appearance of sclerotic lesion of left sphenoid is again identified. No  additional skull lesion seen. Visualized sinuses and mastoid air cells appear  unremarkable. Impression IMPRESSION:    No CT evidence of cerebral edema or other acute intracranial abnormality. Note: If clinical concern of acute stroke, MRI with diffusion weighted images is  recommended for better evaluation. 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 Neoplastic  process not excludable. Recommend bone scan evaluation. Thank you for your referral.         EKG No results found for this or any previous visit. I have personally reviewed the old medical records and patient's labs    Plan / Recommendation:      1. Arf,resolved. discussed with sister  2.hypernatremia,resolved  3.hypokalemia,large deficit. was given iv kcl again check urine potassium,aldosterone  4.hypophosphatemia,on po neutraphos    D/w Dr. Jim Liu MD  Nephrology  2/13/2018

## 2018-02-13 NOTE — DIABETES MGMT
Glycemic Control Plan of Care    POC BG still above target range. POC BG range on 02/12/2018: 202-422 mg/dL. Noted basal lantus insulin dose increased from 18 to 20 units. POC BG report on 02/13/2018 at time of review: 329 mg/dL. Patient awake and incontinent of stool. Daughter visiting at bedside. Recommendation(s):  1.) Called Dr. Dawn Bradshaw regarding elevated BG readings and obtained order to increase lantus dose from 20 to 25 units daily starting 02/13/2018. Noted patient already received 20 units of lantus this AM. Entered a one time additional dose of 5 units lantus for 02/13/2018.  2.) Continue to monitor BG pattern to evaluate need to add prandial insulin TID AC.  3.) 02/12/2018: Completed diabetes education with patient's sister, Ms. Phu Rodriguez, see separate notes. She will go to [address] [place] [address] and get a new BG meter, ReliOn Prime for the patient. Also received information that patient is on pen insulin daily but cannot remember the name and dose. Recommend  referral for assessment of home diabetes management and continued education. Assessment:  Patient is 58year old with past medical history including type 2 diabetes mellitus, diabetic neuropathy, asthma, emphysema, cocaine abuseand hepatitis C, hypertension - was admitted on 02/07/2018 with altered mental status and blood glucose of >2000 mg/dL. Patient was admitted to ICU. Noted:  Acute respiratory failure. Status extubation. HHS, improved. Acute metabolic encephalopathy. New onset seizures. Uncontrolled type 2 diabetes mellitus with current A1c 15.7% (02/07/2018). Most recent blood glucose values:    Results for Hill Bernabe (MRN [id number]) as of 2/13/2018 11:16   Ref. Range 2/12/2018 05:43 2/12/2018 14:28 2/12/2018 19:17   GLUCOSE,FAST - POC Latest Ref Range: 70 - 110 mg/dL 202 (H) 422 (HH) 305 (H)     Results for Hill Bernabe (MRN [id number]) as of 2/13/2018 11:16   Ref.  Range 2/13/2018 00:35 2/13/2018 06:18   GLUCOSE,FAST - POC Latest Ref Range: 70 - 110 mg/dL 294 (H) 329 (H)     Current A1C: 15.7% (02/07/2018) is equivalent to average blood glucose of 411 mg/dL during the past 2-3 months. Current hospital diabetes medications:  Basal lantus insulin 18 units daily. Correctional lispro insulin ACHS. Very resistant dose. Total daily dose insulin requirement previous day: 02/12/2018:  Lantus: 18 units  Lispro: 19 units  TDD: 37 units of insulin. Home diabetes medications: Patient stated on 02/12/2018 that she was not taking any diabetes medications prior to admission. Noted the following meds listed PTA:  Glimperide 2 mg daily every morning. Janumet  mg daily. Diet: Diabetic consistent carb mechanical soft. Goals:  Blood glucose will be within target range of  mg/dL by 02/16/2018. Education:  _X__  Refer to Diabetes Education Record: 02/12/2018. Completed with patient's sister, Ms. Greg Alvares.              ___  Education not indicated at this time:     Kyle Jensen RN CCM

## 2018-02-13 NOTE — PROGRESS NOTES
[facility] Hospitalist Group  Progress Note    Patient: Leni Dunham Age: 58 y.o. : 1955 MR#: [id number] SSN: [id number]  Date/Time: 2018     Subjective:    Pt awake less confused today. Assessment/Plan:   1. Acute hypoxic and hypercapnic resp failure: s/p intubation/extubation appears to be stable. .  2. HHS: improved on lantus, will increase dose given high blood sugars. 3. Acute met encephalopathy: due to # 1/2/5-improving  4. Hypotension: septic vs hypovolemic:improved and now off pressors. 5. New onset Sz: cont keppra, neuro consulted. EEG shows no epileptiform or focal abnormality. 6. Sepsis: ? Related to # 2 vs # 5  7. Possible aspiration PNA: cont Abx day 7/7 of cephalosporins, and day 6/7 of flagyl. Will dc rocephin. 8. Met and resp acidosis: seems to have resolved. 9. Lactic acidosis-resolved. 10. ? GI bleed with coffee ground: monitor H/H-stable  11. Hx Hep C and drug use:    12.Elevated trop: demand ischemia   13. ARF-resolved. 15. UDS positive for cocaine. 16- hypokalemia, hypophos replace and follow    Dispo: pt/ot consulted. Case discussed with:  [x]Patient  [x]Family  []Nursing  [x]Case Management  DVT Prophylaxis:  []Lovenox  []Hep SQ  [x]SCDs  []Coumadin   []On Heparin gtt    Objective:   VS:   Visit Vitals    /75 (BP 1 Location: Left arm, BP Patient Position: At rest)    Pulse 75    Temp 98 °F (36.7 °C)    Resp 20    Wt 72.2 kg (159 lb 3.2 oz)    SpO2 96%    Breastfeeding No    BMI 24.93 kg/m2      Tmax/24hrs: Temp (24hrs), Av.4 °F (36.9 °C), Min:96.9 °F (36.1 °C), Max:99.5 °F (37.5 °C)  IOBRIEF    Intake/Output Summary (Last 24 hours) at 18 1543  Last data filed at 18 1308   Gross per 24 hour   Intake             1680 ml   Output             4200 ml   Net            -2520 ml       General:  Extubated in no distress, oriented to self and place, did not know year  Pulmonary:  CTA Bilaterally.   Cardiovascular: Regular rate and Rhythm. GI:  Soft, Non distended, Non tender. + Bowel sounds. Extremities:  No edema, cyanosis, clubbing. Neurologic: sedated.       Medications:   Current Facility-Administered Medications   Medication Dose Route Frequency    [START ON 2/14/2018] insulin glargine (LANTUS) injection 25 Units  25 Units SubCUTAneous DAILY    Vancomycin trough level due 2/13/18 at 2030  1 Each Other ONCE    insulin lispro (HUMALOG) injection   SubCUTAneous AC&HS    phosphorus (K PHOS NEUTRAL) 250 mg tablet 1 Tab  1 Tab Oral BID    vancomycin (VANCOCIN) 1,500 mg in 0.9% sodium chloride 500 mL IVPB  1,500 mg IntraVENous Q8H    heparin (porcine) injection 5,000 Units  5,000 Units SubCUTAneous Q8H    albuterol-ipratropium (DUO-NEB) 2.5 MG-0.5 MG/3 ML  3 mL Nebulization Q4H PRN    senna (SENOKOT) tablet 8.6 mg  1 Tab Oral DAILY    Or    docusate sodium (COLACE) capsule 100 mg  100 mg Oral DAILY    cefTRIAXone (ROCEPHIN) 2 g in sterile water (preservative free) 20 mL IV syringe  2 g IntraVENous Q24H    polyethylene glycol (MIRALAX) packet 17 g  17 g Oral DAILY    glucose chewable tablet 16 g  4 Tab Oral PRN    glucagon (GLUCAGEN) injection 1 mg  1 mg IntraMUSCular PRN    dextrose (D50W) injection syrg 12.5-25 g  25-50 mL IntraVENous PRN    thiamine (B-1) injection 100 mg  100 mg IntraMUSCular DAILY    fentaNYL citrate (PF) injection 25-50 mcg  25-50 mcg IntraVENous Q4H PRN    midazolam (VERSED) injection 1-2 mg  1-2 mg IntraVENous Q4H PRN    influenza vaccine 2017-18 (3 yrs+)(PF) (FLUZONE QUAD/FLUARIX QUAD) injection 0.5 mL  0.5 mL IntraMUSCular PRIOR TO DISCHARGE    pantoprazole (PROTONIX) granules for oral suspension 40 mg  40 mg Per NG tube DAILY    sodium chloride (NS) flush 5-10 mL  5-10 mL IntraVENous PRN    sodium chloride (NS) flush 5-10 mL  5-10 mL IntraVENous Q8H    sodium chloride (NS) flush 5-10 mL  5-10 mL IntraVENous PRN    acetaminophen (TYLENOL) suppository 650 mg  650 mg Rectal Q6H PRN    folic acid (FOLVITE) tablet 1 mg  1 mg Per NG tube DAILY       Labs:    Recent Results (from the past 24 hour(s))   GLUCOSE, POC    Collection Time: 02/12/18  7:17 PM   Result Value Ref Range    Glucose (POC) 305 (H) 70 - 110 mg/dL   GLUCOSE, POC    Collection Time: 02/13/18 12:35 AM   Result Value Ref Range    Glucose (POC) 294 (H) 70 - 110 mg/dL   MAGNESIUM    Collection Time: 02/13/18  4:03 AM   Result Value Ref Range    Magnesium 1.8 1.6 - 2.6 mg/dL   METABOLIC PANEL, BASIC    Collection Time: 02/13/18  4:03 AM   Result Value Ref Range    Sodium 141 136 - 145 mmol/L    Potassium 2.8 (LL) 3.5 - 5.5 mmol/L    Chloride 104 100 - 108 mmol/L    CO2 31 21 - 32 mmol/L    Anion gap 6 3.0 - 18 mmol/L    Glucose 216 (H) 74 - 99 mg/dL    BUN 8 7.0 - 18 MG/DL    Creatinine 0.64 0.6 - 1.3 MG/DL    BUN/Creatinine ratio 13 12 - 20      GFR est AA >60 >60 ml/min/1.73m2    GFR est non-AA >60 >60 ml/min/1.73m2    Calcium 7.4 (L) 8.5 - 10.1 MG/DL   PHOSPHORUS    Collection Time: 02/13/18  4:03 AM   Result Value Ref Range    Phosphorus 2.5 2.5 - 4.9 MG/DL   CBC WITH AUTOMATED DIFF    Collection Time: 02/13/18  4:03 AM   Result Value Ref Range    WBC 5.0 4.6 - 13.2 K/uL    RBC 3.70 (L) 4.20 - 5.30 M/uL    HGB 10.3 (L) 12.0 - 16.0 g/dL    HCT 32.0 (L) 35.0 - 45.0 %    MCV 86.5 74.0 - 97.0 FL    MCH 27.8 24.0 - 34.0 PG    MCHC 32.2 31.0 - 37.0 g/dL    RDW 12.9 11.6 - 14.5 %    PLATELET 491 705 - 501 K/uL    MPV 11.4 9.2 - 11.8 FL    NEUTROPHILS 48 40 - 73 %    LYMPHOCYTES 36 21 - 52 %    MONOCYTES 13 (H) 3 - 10 %    EOSINOPHILS 3 0 - 5 %    BASOPHILS 0 0 - 2 %    ABS. NEUTROPHILS 2.4 1.8 - 8.0 K/UL    ABS. LYMPHOCYTES 1.8 0.9 - 3.6 K/UL    ABS. MONOCYTES 0.7 0.05 - 1.2 K/UL    ABS. EOSINOPHILS 0.2 0.0 - 0.4 K/UL    ABS.  BASOPHILS 0.0 0.0 - 0.1 K/UL    DF AUTOMATED     GLUCOSE, POC    Collection Time: 02/13/18  6:18 AM   Result Value Ref Range    Glucose (POC) 329 (H) 70 - 110 mg/dL   GLUCOSE, POC    Collection Time: 02/13/18 11:37 AM   Result Value Ref Range    Glucose (POC) 185 (H) 70 - 110 mg/dL   MAGNESIUM    Collection Time: 02/13/18 12:10 PM   Result Value Ref Range    Magnesium 1.8 1.6 - 2.6 mg/dL   METABOLIC PANEL, BASIC    Collection Time: 02/13/18 12:10 PM   Result Value Ref Range    Sodium 141 136 - 145 mmol/L    Potassium 3.4 (L) 3.5 - 5.5 mmol/L    Chloride 105 100 - 108 mmol/L    CO2 27 21 - 32 mmol/L    Anion gap 9 3.0 - 18 mmol/L    Glucose 223 (H) 74 - 99 mg/dL    BUN 8 7.0 - 18 MG/DL    Creatinine 0.64 0.6 - 1.3 MG/DL    BUN/Creatinine ratio 13 12 - 20      GFR est AA >60 >60 ml/min/1.73m2    GFR est non-AA >60 >60 ml/min/1.73m2    Calcium 7.6 (L) 8.5 - 10.1 MG/DL   PHOSPHORUS    Collection Time: 02/13/18 12:10 PM   Result Value Ref Range    Phosphorus 2.2 (L) 2.5 - 4.9 MG/DL   POTASSIUM, UR, RANDOM    Collection Time: 02/13/18  2:30 PM   Result Value Ref Range    Potassium urine, random 23 12.0 - 62 MMOL/L   CREATININE, UR, RANDOM    Collection Time: 02/13/18  2:30 PM   Result Value Ref Range    Creatinine, urine 56.50 30 - 125 mg/dL   SODIUM, UR, RANDOM    Collection Time: 02/13/18  2:30 PM   Result Value Ref Range    Sodium urine, random 79 20 - 110 MMOL/L   CHLORIDE, UR, RANDOM    Collection Time: 02/13/18  2:30 PM   Result Value Ref Range    Chloride,urine random 73 55 - 125 MMOL/L       Signed By: Cyndie Sadler MD     February 13, 2018

## 2018-02-13 NOTE — PROGRESS NOTES
Seizures thought to be secondary to hyperglycemia  Awake and alert  Improved glucose control    At this point ok to stop Keppra  I will sign off  Reconsult if needed

## 2018-02-13 NOTE — PROGRESS NOTES
Problem: Mobility Impaired (Adult and Pediatric)  Goal: *Acute Goals and Plan of Care (Insert Text)  Physical Therapy Goals  Initiated 2/13/2018 and to be accomplished within 7 day(s)  1. Patient will move from supine to sit and sit to supine , scoot up and down and roll side to side in bed with supervision/set-up. 2.  Patient will transfer from bed to chair and chair to bed with supervision/set-up using the least restrictive device. 3.  Patient will perform sit to stand with supervision/set-up. 4.  Patient will ambulate with supervision/set-up for >100 feet with the least restrictive device. 5.  Patient will ascend/descend 10 stairs with handrail(s) with minimal assistance/contact guard assist.  Outcome: Progressing Towards Goal  physical Therapy EVALUATION    Patient: Xander Keita (27 y.o. female)  Date: 2/13/2018  Primary Diagnosis: Hyperosmolar (nonketotic) coma (HCC)  Acidosis  Respiratory failure (HCC)  Shock ([facility] [address]  Precautions: Fall    ASSESSMENT :  Based on the objective data described below, the patient presents with impaired functional mobility including bed mobility, transfers, ambulation, and general activity tolerance following admission for respiratory failure. She has history of cocaine abuse and non-compliance with diabetic medications. Patient presents today sitting up in bed, alert and agreeable to PT/OT co-evaluation for patient and therapist safety. She transferred to sitting EOB with Lili, cass to standing with RW and Lili. Patient stood ~30 seconds, then became fatigued and returned to sitting EOB. Following seated rest break, patient performed second sit to stand with Lili, ambulated ~3 ft sidesteps at EOB with CGA. Patient then returned to bed, left with HOB elevated, lunch tray in reach, and nurse notified. Patient will benefit from skilled intervention to address the above impairments.   Patients rehabilitation potential is considered to be Fair  Factors which may influence rehabilitation potential include:   []         None noted  []         Mental ability/status  [x]         Medical condition  []         Home/family situation and support systems  [x]         Safety awareness  []         Pain tolerance/management  []         Other:      PLAN :  Recommendations and Planned Interventions:  [x]           Bed Mobility Training             [x]    Neuromuscular Re-Education  [x]           Transfer Training                   []    Orthotic/Prosthetic Training  [x]           Gait Training                          []    Modalities  [x]           Therapeutic Exercises          []    Edema Management/Control  [x]           Therapeutic Activities            [x]    Patient and Family Training/Education  []           Other (comment):    Frequency/Duration: Patient will be followed by physical therapy 1-2 times per day to address goals. Discharge Recommendations: Skilled Nursing Facility  Further Equipment Recommendations for Discharge: rolling walker     SUBJECTIVE:   Patient stated I haven't gotten any sleep since I've been here.     OBJECTIVE DATA SUMMARY:     Past Medical History:   Diagnosis Date    Arthritis     Asthma     Chronic pain     BACK PAIN    Diabetes ([facility] [place] [address]     Diabetic neuropathy (HCC)     Emphysema     Hepatitis C     Hypertension     Nervousness     Osteoarthritis of both knees      Past Surgical History:   Procedure Laterality Date    HX CARPAL TUNNEL RELEASE Right 8/31/09    Dr. Joi Ram    HX TUBAL LIGATION       Barriers to Learning/Limitations: None  Compensate with: N/A  Prior Level of Function/Home Situation: Patient reports she will be returning to her sister's apartment which is on the second floor, unsure of elevator. She was ambulating independently PTA.   Home Situation  Home Environment: Apartment  # Steps to Enter: 12  Rails to Enter: Yes  One/Two Story Residence: Other (Comment) (Second floor apartment)  # of Interior Steps: 0  Living Alone: No  Support Systems: Family member(s)  Patient Expects to be Discharged to[de-identified] Rehabilitation facility  Current DME Used/Available at Home: None  Tub or Shower Type: Tub/Shower combination  Critical Behavior:  Neurologic State: Alert  Psychosocial  Patient Behaviors: Calm; Cooperative  Family  Behaviors: Appropriate for situation  Purposeful Interaction: Yes  Reassure: Caring rounds  Strength:    Strength: Generally decreased, functional (BLE)  Tone & Sensation:   Tone: Normal  Sensation: Intact (BLE)   Range Of Motion:  AROM: Within functional limits (BLE)  Functional Mobility:  Bed Mobility:  Supine to Sit: Minimum assistance  Sit to Supine: Contact guard assistance  Transfers:  Sit to Stand: Minimum assistance  Stand to Sit: Minimum assistance  Balance:   Sitting: Intact  Standing: Impaired; With support (RW)  Standing - Static: Fair  Standing - Dynamic : Fair  Ambulation/Gait Training:  Distance (ft): 3 Feet (ft)  Assistive Device: Walker, rolling  Ambulation - Level of Assistance: Contact guard assistance  Base of Support: Widened  Speed/Sobia: Slow  Pain:  Pain Scale 1: Numeric (0 - 10)  Pain Intensity 1: 0  Activity Tolerance:   Fair  Please refer to the flowsheet for vital signs taken during this treatment. After treatment:   [] Patient left in no apparent distress sitting up in chair  [] Patient left sitting on EOB  [x] Patient left in no apparent distress in bed  [] Patient declined to be OOB at this time due to  [x] Call bell left within reach  [x] Nursing notified(Suzie)  [] Caregiver present  [] Bed alarm activated    COMMUNICATION/EDUCATION:   [x]         Fall prevention education was provided and the patient/caregiver indicated understanding. [x]         Patient/family have participated as able in goal setting and plan of care. [x]         Patient/family agree to work toward stated goals and plan of care. []         Patient understands intent and goals of therapy, but is neutral about his/her participation.   [] Patient is unable to participate in goal setting and plan of care. Thank you for this referral.  Isaura Edwards   Time Calculation: 14 mins      Mobility  Current  CJ= 20-39%   Goal  CI= 1-19%. The severity rating is based on the Level of Assistance required for Functional Mobility and ADLs.     Eval Complexity: History: MEDIUM  Complexity : 1-2 comorbidities / personal factors will impact the outcome/ POC Exam:MEDIUM Complexity : 3 Standardized tests and measures addressing body structure, function, activity limitation and / or participation in recreation  Presentation: MEDIUM Complexity : Evolving with changing characteristics  Overall Complexity:MEDIUM

## 2018-02-13 NOTE — PROGRESS NOTES
OT order received and chart reviewed. Patient seen for skilled OT evaluation and SNF recommended at discharge. Full note to follow.   Thank you for the referral. Jp Burgso MS OTR/L

## 2018-02-14 LAB
ANION GAP SERPL CALC-SCNC: 5 MMOL/L (ref 3–18)
ANION GAP SERPL CALC-SCNC: 7 MMOL/L (ref 3–18)
BASOPHILS # BLD: 0 K/UL (ref 0–0.1)
BASOPHILS NFR BLD: 1 % (ref 0–2)
BUN SERPL-MCNC: 4 MG/DL (ref 7–18)
BUN SERPL-MCNC: 5 MG/DL (ref 7–18)
BUN/CREAT SERPL: 8 (ref 12–20)
BUN/CREAT SERPL: 9 (ref 12–20)
CALCIUM SERPL-MCNC: 7.6 MG/DL (ref 8.5–10.1)
CALCIUM SERPL-MCNC: 7.7 MG/DL (ref 8.5–10.1)
CHLORIDE SERPL-SCNC: 102 MMOL/L (ref 100–108)
CHLORIDE SERPL-SCNC: 105 MMOL/L (ref 100–108)
CO2 SERPL-SCNC: 31 MMOL/L (ref 21–32)
CO2 SERPL-SCNC: 32 MMOL/L (ref 21–32)
CREAT SERPL-MCNC: 0.52 MG/DL (ref 0.6–1.3)
CREAT SERPL-MCNC: 0.58 MG/DL (ref 0.6–1.3)
DIFFERENTIAL METHOD BLD: ABNORMAL
EOSINOPHIL # BLD: 0.1 K/UL (ref 0–0.4)
EOSINOPHIL NFR BLD: 2 % (ref 0–5)
ERYTHROCYTE [DISTWIDTH] IN BLOOD BY AUTOMATED COUNT: 12.8 % (ref 11.6–14.5)
GLUCOSE BLD STRIP.AUTO-MCNC: 106 MG/DL (ref 70–110)
GLUCOSE BLD STRIP.AUTO-MCNC: 171 MG/DL (ref 70–110)
GLUCOSE BLD STRIP.AUTO-MCNC: 197 MG/DL (ref 70–110)
GLUCOSE BLD STRIP.AUTO-MCNC: 306 MG/DL (ref 70–110)
GLUCOSE BLD STRIP.AUTO-MCNC: 312 MG/DL (ref 70–110)
GLUCOSE SERPL-MCNC: 161 MG/DL (ref 74–99)
GLUCOSE SERPL-MCNC: 327 MG/DL (ref 74–99)
HCT VFR BLD AUTO: 34.2 % (ref 35–45)
HGB BLD-MCNC: 11.2 G/DL (ref 12–16)
LYMPHOCYTES # BLD: 2.5 K/UL (ref 0.9–3.6)
LYMPHOCYTES NFR BLD: 40 % (ref 21–52)
MAGNESIUM SERPL-MCNC: 1.6 MG/DL (ref 1.6–2.6)
MAGNESIUM SERPL-MCNC: 1.7 MG/DL (ref 1.6–2.6)
MCH RBC QN AUTO: 28.6 PG (ref 24–34)
MCHC RBC AUTO-ENTMCNC: 32.7 G/DL (ref 31–37)
MCV RBC AUTO: 87.2 FL (ref 74–97)
MONOCYTES # BLD: 0.5 K/UL (ref 0.05–1.2)
MONOCYTES NFR BLD: 7 % (ref 3–10)
NEUTS SEG # BLD: 3.1 K/UL (ref 1.8–8)
NEUTS SEG NFR BLD: 50 % (ref 40–73)
PHOSPHATE SERPL-MCNC: 3 MG/DL (ref 2.5–4.9)
PHOSPHATE SERPL-MCNC: 3.2 MG/DL (ref 2.5–4.9)
PLATELET # BLD AUTO: 190 K/UL (ref 135–420)
PMV BLD AUTO: 10.6 FL (ref 9.2–11.8)
POTASSIUM SERPL-SCNC: 2.9 MMOL/L (ref 3.5–5.5)
POTASSIUM SERPL-SCNC: 3.4 MMOL/L (ref 3.5–5.5)
POTASSIUM SERPL-SCNC: 3.7 MMOL/L (ref 3.5–5.5)
RBC # BLD AUTO: 3.92 M/UL (ref 4.2–5.3)
SODIUM SERPL-SCNC: 139 MMOL/L (ref 136–145)
SODIUM SERPL-SCNC: 143 MMOL/L (ref 136–145)
WBC # BLD AUTO: 6.2 K/UL (ref 4.6–13.2)

## 2018-02-14 PROCEDURE — 90686 IIV4 VACC NO PRSV 0.5 ML IM: CPT | Performed by: PHYSICIAN ASSISTANT

## 2018-02-14 PROCEDURE — 83735 ASSAY OF MAGNESIUM: CPT | Performed by: INTERNAL MEDICINE

## 2018-02-14 PROCEDURE — 84100 ASSAY OF PHOSPHORUS: CPT | Performed by: INTERNAL MEDICINE

## 2018-02-14 PROCEDURE — 74011250636 HC RX REV CODE- 250/636: Performed by: PHYSICIAN ASSISTANT

## 2018-02-14 PROCEDURE — 74011250637 HC RX REV CODE- 250/637: Performed by: INTERNAL MEDICINE

## 2018-02-14 PROCEDURE — 74011250637 HC RX REV CODE- 250/637: Performed by: PHYSICIAN ASSISTANT

## 2018-02-14 PROCEDURE — 74011250636 HC RX REV CODE- 250/636: Performed by: INTERNAL MEDICINE

## 2018-02-14 PROCEDURE — 74011000258 HC RX REV CODE- 258: Performed by: HOSPITALIST

## 2018-02-14 PROCEDURE — 82088 ASSAY OF ALDOSTERONE: CPT | Performed by: INTERNAL MEDICINE

## 2018-02-14 PROCEDURE — 82962 GLUCOSE BLOOD TEST: CPT

## 2018-02-14 PROCEDURE — 36415 COLL VENOUS BLD VENIPUNCTURE: CPT | Performed by: INTERNAL MEDICINE

## 2018-02-14 PROCEDURE — 74011636637 HC RX REV CODE- 636/637: Performed by: INTERNAL MEDICINE

## 2018-02-14 PROCEDURE — 74011000250 HC RX REV CODE- 250: Performed by: HOSPITALIST

## 2018-02-14 PROCEDURE — 85025 COMPLETE CBC W/AUTO DIFF WBC: CPT | Performed by: INTERNAL MEDICINE

## 2018-02-14 PROCEDURE — 65270000029 HC RM PRIVATE

## 2018-02-14 PROCEDURE — 74011250636 HC RX REV CODE- 250/636: Performed by: HOSPITALIST

## 2018-02-14 PROCEDURE — 84132 ASSAY OF SERUM POTASSIUM: CPT | Performed by: INTERNAL MEDICINE

## 2018-02-14 PROCEDURE — 90471 IMMUNIZATION ADMIN: CPT

## 2018-02-14 PROCEDURE — 80048 BASIC METABOLIC PNL TOTAL CA: CPT | Performed by: INTERNAL MEDICINE

## 2018-02-14 RX ORDER — INSULIN GLARGINE 100 [IU]/ML
INJECTION, SOLUTION SUBCUTANEOUS
Qty: 1 VIAL | Refills: 1 | Status: SHIPPED
Start: 2018-02-15

## 2018-02-14 RX ORDER — MAGNESIUM SULFATE 1 G/100ML
1 INJECTION INTRAVENOUS ONCE
Status: COMPLETED | OUTPATIENT
Start: 2018-02-14 | End: 2018-02-14

## 2018-02-14 RX ORDER — LANOLIN ALCOHOL/MO/W.PET/CERES
400 CREAM (GRAM) TOPICAL 2 TIMES DAILY
Qty: 60 TAB | Refills: 1 | Status: ON HOLD
Start: 2018-02-14 | End: 2021-01-14 | Stop reason: SDUPTHER

## 2018-02-14 RX ORDER — LANOLIN ALCOHOL/MO/W.PET/CERES
400 CREAM (GRAM) TOPICAL 2 TIMES DAILY
Status: DISCONTINUED | OUTPATIENT
Start: 2018-02-14 | End: 2018-02-15 | Stop reason: HOSPADM

## 2018-02-14 RX ORDER — POTASSIUM CHLORIDE 20 MEQ/1
20 TABLET, EXTENDED RELEASE ORAL DAILY
Qty: 5 TAB | Refills: 0 | Status: SHIPPED
Start: 2018-02-14 | End: 2018-02-19

## 2018-02-14 RX ORDER — FOLIC ACID 1 MG/1
1 TABLET ORAL DAILY
Qty: 30 TAB | Refills: 0 | Status: SHIPPED
Start: 2018-02-15 | End: 2021-01-14

## 2018-02-14 RX ORDER — UREA 10 %
2 LOTION (ML) TOPICAL 2 TIMES DAILY
Status: DISCONTINUED | OUTPATIENT
Start: 2018-02-14 | End: 2018-02-15 | Stop reason: HOSPADM

## 2018-02-14 RX ORDER — POTASSIUM CHLORIDE 20 MEQ/1
40 TABLET, EXTENDED RELEASE ORAL 3 TIMES DAILY
Status: COMPLETED | OUTPATIENT
Start: 2018-02-14 | End: 2018-02-14

## 2018-02-14 RX ORDER — POTASSIUM CHLORIDE 20 MEQ/1
40 TABLET, EXTENDED RELEASE ORAL 2 TIMES DAILY
Qty: 60 TAB | Refills: 0 | Status: SHIPPED | OUTPATIENT
Start: 2018-02-14 | End: 2018-02-14

## 2018-02-14 RX ADMIN — Medication 400 MG: at 22:51

## 2018-02-14 RX ADMIN — Medication 10 ML: at 01:00

## 2018-02-14 RX ADMIN — POTASSIUM CHLORIDE 40 MEQ: 20 TABLET, EXTENDED RELEASE ORAL at 22:51

## 2018-02-14 RX ADMIN — HEPARIN SODIUM 5000 UNITS: 5000 INJECTION, SOLUTION INTRAVENOUS; SUBCUTANEOUS at 18:10

## 2018-02-14 RX ADMIN — POLYETHYLENE GLYCOL 3350 17 G: 17 POWDER, FOR SOLUTION ORAL at 09:55

## 2018-02-14 RX ADMIN — POTASSIUM CHLORIDE 40 MEQ: 20 TABLET, EXTENDED RELEASE ORAL at 18:10

## 2018-02-14 RX ADMIN — LIDOCAINE HYDROCHLORIDE: 10 INJECTION, SOLUTION EPIDURAL; INFILTRATION; INTRACAUDAL; PERINEURAL at 12:23

## 2018-02-14 RX ADMIN — INSULIN LISPRO 12 UNITS: 100 INJECTION, SOLUTION INTRAVENOUS; SUBCUTANEOUS at 22:52

## 2018-02-14 RX ADMIN — LIDOCAINE HYDROCHLORIDE: 10 INJECTION, SOLUTION EPIDURAL; INFILTRATION; INTRACAUDAL; PERINEURAL at 10:00

## 2018-02-14 RX ADMIN — DIBASIC SODIUM PHOSPHATE, MONOBASIC POTASSIUM PHOSPHATE AND MONOBASIC SODIUM PHOSPHATE 1 TABLET: 852; 155; 130 TABLET ORAL at 09:00

## 2018-02-14 RX ADMIN — INFLUENZA VIRUS VACCINE 0.5 ML: 15; 15; 15; 15 SUSPENSION INTRAMUSCULAR at 16:05

## 2018-02-14 RX ADMIN — HEPARIN SODIUM 5000 UNITS: 5000 INJECTION, SOLUTION INTRAVENOUS; SUBCUTANEOUS at 01:04

## 2018-02-14 RX ADMIN — HEPARIN SODIUM 5000 UNITS: 5000 INJECTION, SOLUTION INTRAVENOUS; SUBCUTANEOUS at 10:00

## 2018-02-14 RX ADMIN — LIDOCAINE HYDROCHLORIDE: 10 INJECTION, SOLUTION EPIDURAL; INFILTRATION; INTRACAUDAL; PERINEURAL at 09:55

## 2018-02-14 RX ADMIN — MAGNESIUM SULFATE IN DEXTROSE 1 G: 10 INJECTION, SOLUTION INTRAVENOUS at 14:16

## 2018-02-14 RX ADMIN — DIBASIC SODIUM PHOSPHATE, MONOBASIC POTASSIUM PHOSPHATE AND MONOBASIC SODIUM PHOSPHATE 1 TABLET: 852; 155; 130 TABLET ORAL at 18:10

## 2018-02-14 RX ADMIN — DOCUSATE SODIUM 100 MG: 100 CAPSULE, LIQUID FILLED ORAL at 12:22

## 2018-02-14 RX ADMIN — INSULIN LISPRO 3 UNITS: 100 INJECTION, SOLUTION INTRAVENOUS; SUBCUTANEOUS at 09:27

## 2018-02-14 RX ADMIN — POTASSIUM CHLORIDE 40 MEQ: 20 TABLET, EXTENDED RELEASE ORAL at 12:22

## 2018-02-14 RX ADMIN — INSULIN GLARGINE 25 UNITS: 100 INJECTION, SOLUTION SUBCUTANEOUS at 09:00

## 2018-02-14 RX ADMIN — Medication 2 TABLET: at 22:51

## 2018-02-14 RX ADMIN — INSULIN LISPRO 12 UNITS: 100 INJECTION, SOLUTION INTRAVENOUS; SUBCUTANEOUS at 13:06

## 2018-02-14 RX ADMIN — THIAMINE HYDROCHLORIDE 100 MG: 100 INJECTION, SOLUTION INTRAMUSCULAR; INTRAVENOUS at 09:56

## 2018-02-14 RX ADMIN — FOLIC ACID 1 MG: 1 TABLET ORAL at 09:55

## 2018-02-14 RX ADMIN — PANTOPRAZOLE SODIUM 40 MG: 40 GRANULE, DELAYED RELEASE ORAL at 09:00

## 2018-02-14 RX ADMIN — INSULIN LISPRO 3 UNITS: 100 INJECTION, SOLUTION INTRAVENOUS; SUBCUTANEOUS at 18:10

## 2018-02-14 NOTE — PROGRESS NOTES
I have assumed care of Ms. [facility]. She was assessed after bedside report. She is currently resting in bed.

## 2018-02-14 NOTE — DIABETES MGMT
Glycemic Control Plan of Care      POC BG range on 02/13/2018: 185-422 mg/dL. Increased basal lantus insulin dose from 20 to 25 units daily. POC BG report on 02/14/2018 at time of review: 106, 171 mg/dL. Noted and addressed with patient the large container of mixed fruits brought from outside. Emphasized and encouraged the importance of serving size to help prevent elevation of blood sugar. Patient acknowledged and agreed to limit intake. Also noted dcp for today - transfer to nursing home facility. Recommendation(s):  1.) Continue current insulin orders and dose: basal and correctional.  2.) Continue to monitor BG pattern to evaluate need to add prandial insulin TID AC.  3.) 02/12/2018: Completed diabetes education with patient's sister, Ms. Yaneth Livingston, see separate notes. She will go to [address] [place] [address] and get a new BG meter, [employer] Prime for the patient. Also received information that patient is on pen insulin daily but cannot remember the name and dose. Recommend  referral for assessment of home diabetes management and continued education. Assessment:  Patient is 58year old with past medical history including type 2 diabetes mellitus, diabetic neuropathy, asthma, emphysema, cocaine abuseand hepatitis C, hypertension - was admitted on 02/07/2018 with altered mental status and blood glucose of >2000 mg/dL. Patient was admitted to ICU. Noted:  Acute respiratory failure. Status extubation. HHS, improved. Acute metabolic encephalopathy. New onset seizures. Uncontrolled type 2 diabetes mellitus with current A1c 15.7% (02/07/2018). Most recent blood glucose values:    Results for [facility] (MRN [id number]) as of 2/14/2018 12:46   Ref. Range 2/13/2018 00:35 2/13/2018 06:18 2/13/2018 11:37 2/13/2018 17:40   GLUCOSE,FAST - POC Latest Ref Range: 70 - 110 mg/dL 294 (H) 329 (H) 185 (H) 209 (H)     Results for [facility] (MRN [id number]) as of 2/14/2018 12:46   Ref.  Range 2/14/2018 00:52 2/14/2018 07:33   GLUCOSE,FAST - POC Latest Ref Range: 70 - 110 mg/dL 106 171 (H)     Current A1C: 15.7% (02/07/2018) is equivalent to average blood glucose of 411 mg/dL during the past 2-3 months. Current hospital diabetes medications:  Basal lantus insulin 25 units daily. Correctional lispro insulin ACHS. Very resistant dose. Total daily dose insulin requirement previous day: 02/13/2018:  Lantus: 25 units  Lispro: 30 units  TDD: 55 units of insulin. Home diabetes medications: Patient stated on 02/12/2018 that she was not taking any diabetes medications prior to admission. Noted the following meds listed PTA:  Glimperide 2 mg daily every morning. Janumet  mg daily. Diet: Diabetic consistent carb mechanical soft. Goals:  Blood glucose will be within target range of  mg/dL by 02/17/2018. Education:  _X__  Refer to Diabetes Education Record: 02/12/2018. Completed with patient's sister, Ms. Jo Busby.              ___  Education not indicated at this time:     Roberto Sanders RN CCM

## 2018-02-14 NOTE — DISCHARGE SUMMARY
[facility]  Discharge Summary       Patient: Julio Yarbrough Age: 58 y.o. : 1955 MR#: [id number] SSN: [id number]  PCP on record: Lloyd Johnson MD  Admit date: 2018  Discharge date: 2018    Consults:  Dr Soraida Spicer neurology  Dr. Priscilla Rehman-nephrology  -   Procedures:  -     Significant Diagnostic Studies: EEG 18:  CLINICAL:  This is an apparently comatose EEG on this 55-year-old woman who came in with some seizure-like activity. She had extremely high blood glucose when she presented to the hospital.     MEDICATIONS:  Include Norvasc, aspirin and Keppra.     EEG REPORT:  The predominant apparently comatose background consists of 15-20 microvolts, irregular but symmetrical, 4-5 Hz waves mixed with frequent recurring 20-30 microvolt, 3-4 Hz waves. Bifrontal 3-5 microvolt, 16-20 Hz activity is seen which is symmetric in its occurrence.     External stimulation including photic stimulation and tactile stimulation causes no change in the record.     IMPRESSION:  Abnormal EEG due to the presence of generalized slow wave activity. No epileptiform or focal abnormality was identified.  -   CT head :  No CT evidence of cerebral edema or other acute intracranial abnormality. Note: If clinical concern of acute stroke, MRI with diffusion weighted images is  recommended for better evaluation. 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 Neoplastic  process not excludable. Recommend bone scan evaluation.          Discharge Diagnoses:   Acute hypoxic and hypercarbic resp failure-resolved  Hyperosmolar hyperktotic state/DKA- resolved  Acute met encephalopathy-resolving  New onset seizure  Possible aspiration pna  GI bleed-resolved  Acute renal failure-resolved  Elevated trop-demand ischemia  Hypokalemia, hypophos replace and follow Patient Active Problem List   Diagnosis Code    Diverticula of colon K57.30    Sepsis ([facility] [address] A41.9    Sepsis secondary to UTI ([facility] [address] A41.9, N39.0    DM hyperosmolarity type II, uncontrolled ([facility] [address] E11.00, E11.65    HTN (hypertension) I10    Febrile illness, acute R50.9    Gram-negative bacteremia R78.81    Diabetic neuropathy ([facility] [address] E11.40    Osteoarthritis of both knees M17.0    Acidosis E87.2    Respiratory failure ([address] J96.90    Shock ([facility] [address] R57.9    Hyperosmolar (nonketotic) coma ([facility] [address] E11.01    Acute UTI N39.0    Macrocytic anemia D53.9       Hospital Course by Problem      58year old female presented to the ED in respiratory distress, blood sugar >2000 among other findings. Intubated and initially managed in the ICU. Also noted to have seizure like activity on initial eval.    1. Acute hypoxic and hypercapnic resp failure: s/p intubation/extubation appears to be stable. .  2. HHS: improved on lantus, follow and adjust lantus,  3. Acute met encephalopathy: due to # 1/2/5-improving  4. Hypotension: septic vs hypovolemic:resolved. and now off pressors. 5. New onset Sz: was on keppra, neuro consulted. EEG shows no epileptiform or focal abnormality. keppra discontinued. Monitor for  Seizure. Seizure thought to be secondary to her numerous metabolic derangements. 6. Sepsis: ? Related to # 2 vs # 5  7. Possible aspiration PNA: was treated with abx.  8. Met and resp acidosis: seems to have resolved. 9. Lactic acidosis-resolved.      10. questionalble GI bleed with coffee ground: monitor H/H-stable. No further evidence and no need for w/u at this time. 11. Hx Hep C and drug use:     12. Elevated trop: demand ischemia. No evidence of acs. 13.ARF-resolved. 15. UDS positive for cocaine. 16- hypokalemia, hypophos- likely due to renal and GI loss (pt with episodes of diarrhea likely as side effect of antibiotics). Have been replacing aggressively.  She will be dc'd on magnesium, phos and k supplements. Please cont these meds and check pt's  Potassium, phosphate and magnesium and replace as needed. 17. 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 Neoplastic  process not excludable. Recommend bone scan evaluation\"  Pt will need outpt bone scan in the near future.           Today's examination of the patient revealed:     Subjective:     Objective:   VS:   Visit Vitals    /79 (BP 1 Location: Left arm, BP Patient Position: Head of bed elevated (Comment degrees))    Pulse 68    Temp 98.1 °F (36.7 °C)    Resp 17    Wt 72.2 kg (159 lb 3.2 oz)    SpO2 95%    Breastfeeding No    BMI 24.93 kg/m2      Tmax/24hrs: Temp (24hrs), Av.7 °F (37.1 °C), Min:98.1 °F (36.7 °C), Max:99.3 °F (37.4 °C)     Input/Output:   Intake/Output Summary (Last 24 hours) at 18 1523  Last data filed at 18 0541   Gross per 24 hour   Intake              240 ml   Output             3150 ml   Net            -2910 ml       General: alert, awake, in nad    Cardiovascular:  Rrr, no murmurs  Pulmonary:  ctab  GI: soft, nt, nd   Extremities:  No edema  Additional:      Labs:    Recent Results (from the past 24 hour(s))   GLUCOSE, POC    Collection Time: 18  5:40 PM   Result Value Ref Range    Glucose (POC) 209 (H) 70 - 110 mg/dL   MAGNESIUM    Collection Time: 18  9:40 PM   Result Value Ref Range    Magnesium 1.7 1.6 - 2.6 mg/dL   METABOLIC PANEL, BASIC    Collection Time: 18  9:40 PM   Result Value Ref Range    Sodium 143 136 - 145 mmol/L    Potassium 3.1 (L) 3.5 - 5.5 mmol/L    Chloride 106 100 - 108 mmol/L    CO2 32 21 - 32 mmol/L    Anion gap 5 3.0 - 18 mmol/L    Glucose 138 (H) 74 - 99 mg/dL    BUN 6 (L) 7.0 - 18 MG/DL    Creatinine 0.49 (L) 0.6 - 1.3 MG/DL    BUN/Creatinine ratio 12 12 - 20      GFR est AA >60 >60 ml/min/1.73m2    GFR est non-AA >60 >60 ml/min/1.73m2    Calcium 7.7 (L) 8.5 - 10.1 MG/DL PHOSPHORUS    Collection Time: 02/13/18  9:40 PM   Result Value Ref Range    Phosphorus 2.4 (L) 2.5 - 4.9 MG/DL   Marget Roxanna    Collection Time: 02/13/18  9:40 PM   Result Value Ref Range    Vancomycin,trough 22.8 (HH) 10.0 - 20.0 ug/mL    Reported dose date: 20180213      Reported dose time: 1230      Reported dose: 1500 MG UNITS   GLUCOSE, POC    Collection Time: 02/14/18 12:52 AM   Result Value Ref Range    Glucose (POC) 106 70 - 110 mg/dL   CBC WITH AUTOMATED DIFF    Collection Time: 02/14/18  5:45 AM   Result Value Ref Range    WBC 6.2 4.6 - 13.2 K/uL    RBC 3.92 (L) 4.20 - 5.30 M/uL    HGB 11.2 (L) 12.0 - 16.0 g/dL    HCT 34.2 (L) 35.0 - 45.0 %    MCV 87.2 74.0 - 97.0 FL    MCH 28.6 24.0 - 34.0 PG    MCHC 32.7 31.0 - 37.0 g/dL    RDW 12.8 11.6 - 14.5 %    PLATELET 170 880 - 650 K/uL    MPV 10.6 9.2 - 11.8 FL    NEUTROPHILS 50 40 - 73 %    LYMPHOCYTES 40 21 - 52 %    MONOCYTES 7 3 - 10 %    EOSINOPHILS 2 0 - 5 %    BASOPHILS 1 0 - 2 %    ABS. NEUTROPHILS 3.1 1.8 - 8.0 K/UL    ABS. LYMPHOCYTES 2.5 0.9 - 3.6 K/UL    ABS. MONOCYTES 0.5 0.05 - 1.2 K/UL    ABS. EOSINOPHILS 0.1 0.0 - 0.4 K/UL    ABS.  BASOPHILS 0.0 0.0 - 0.1 K/UL    DF AUTOMATED     METABOLIC PANEL, BASIC    Collection Time: 02/14/18  5:45 AM   Result Value Ref Range    Sodium 143 136 - 145 mmol/L    Potassium 2.9 (LL) 3.5 - 5.5 mmol/L    Chloride 105 100 - 108 mmol/L    CO2 31 21 - 32 mmol/L    Anion gap 7 3.0 - 18 mmol/L    Glucose 161 (H) 74 - 99 mg/dL    BUN 4 (L) 7.0 - 18 MG/DL    Creatinine 0.52 (L) 0.6 - 1.3 MG/DL    BUN/Creatinine ratio 8 (L) 12 - 20      GFR est AA >60 >60 ml/min/1.73m2    GFR est non-AA >60 >60 ml/min/1.73m2    Calcium 7.7 (L) 8.5 - 10.1 MG/DL   PHOSPHORUS    Collection Time: 02/14/18  5:45 AM   Result Value Ref Range    Phosphorus 3.2 2.5 - 4.9 MG/DL   MAGNESIUM    Collection Time: 02/14/18  5:45 AM   Result Value Ref Range    Magnesium 1.6 1.6 - 2.6 mg/dL   ALDOSTERONE/RENIN ACTIVITY    Collection Time: 02/14/18 5:45 AM   Result Value Ref Range    Source BLOOD      Renin Activity PENDING ng/mL/hr    Aldosterone PENDING ng/dL   GLUCOSE, POC    Collection Time: 02/14/18  7:33 AM   Result Value Ref Range    Glucose (POC) 171 (H) 70 - 110 mg/dL   MAGNESIUM    Collection Time: 02/14/18 11:10 AM   Result Value Ref Range    Magnesium 1.7 1.6 - 2.6 mg/dL   METABOLIC PANEL, BASIC    Collection Time: 02/14/18 11:10 AM   Result Value Ref Range    Sodium 139 136 - 145 mmol/L    Potassium 3.4 (L) 3.5 - 5.5 mmol/L    Chloride 102 100 - 108 mmol/L    CO2 32 21 - 32 mmol/L    Anion gap 5 3.0 - 18 mmol/L    Glucose 327 (H) 74 - 99 mg/dL    BUN 5 (L) 7.0 - 18 MG/DL    Creatinine 0.58 (L) 0.6 - 1.3 MG/DL    BUN/Creatinine ratio 9 (L) 12 - 20      GFR est AA >60 >60 ml/min/1.73m2    GFR est non-AA >60 >60 ml/min/1.73m2    Calcium 7.6 (L) 8.5 - 10.1 MG/DL   PHOSPHORUS    Collection Time: 02/14/18 11:10 AM   Result Value Ref Range    Phosphorus 3.0 2.5 - 4.9 MG/DL   GLUCOSE, POC    Collection Time: 02/14/18 12:48 PM   Result Value Ref Range    Glucose (POC) 306 (H) 70 - 110 mg/dL   POTASSIUM    Collection Time: 02/14/18  2:08 PM   Result Value Ref Range    Potassium 3.7 3.5 - 5.5 mmol/L     Additional Data Reviewed:     Condition: stable  Disposition:    []Home   []Home with Home Health   [x]SNF/NH   []Rehab   []Home with family   []Alternate Facility:____________________      Discharge Medications:     Current Discharge Medication List      START taking these medications    Details   folic acid (FOLVITE) 1 mg tablet 1 Tab by Per NG tube route daily.   Qty: 30 Tab, Refills: 0      insulin glargine (LANTUS) 100 unit/mL injection lantus 22 units subq daily  Qty: 1 Vial, Refills: 1             potassium chloride (K-DUR, KLOR-CON) 20 mEq tablet Take 1 Tabs by mouth one daily for 5 days      Magnesium oxide 400 mg daily   CONTINUE these medications which have NOT CHANGED    Details   tamsulosin (FLOMAX) 0.4 mg capsule Take 1 tab by mouth daily until kidney stone passes. Qty: 10 Cap, Refills: 0      amLODIPine (NORVASC) 5 mg tablet Take 5 mg by mouth daily. Indications: HYPERTENSION      aspirin (ASPIRIN) 325 mg tablet Take 325 mg by mouth daily. VITAMIN E (FORMULA E 400 PO) Take 1 Tab by mouth daily. losartan (COZAAR) 50 mg tablet Take  by mouth daily. Indications: HYPERTENSION      Melatonin 300 mcg Tab Take  by mouth.        cyanocobalamin (VITAMIN B-12) 1,500 mcg TbER Take 1 Tab by mouth. STOP taking these medications       HYDROcodone-acetaminophen (NORCO) 5-325 mg per tablet Comments:   Reason for Stopping:         guaiFENesin-codeine (ROBITUSSIN AC) 100-10 mg/5 mL solution Comments:   Reason for Stopping:         gabapentin (NEURONTIN) 600 mg tablet Comments:   Reason for Stopping:         glimepiride (AMARYL) 2 mg tablet Comments:   Reason for Stopping:         sitaGLIPtin-metFORMIN (JANUMET)  mg per tablet Comments:   Reason for Stopping: Follow-up Appointments:   1. Your PCP: Karel Chavarria MD, within 7-10days  2. Needs outpt bone scan given above head CT findings. Needs electrolyte check in the next 1 day for follow up on labs.         >30 minutes spent coordinating this discharge (review instructions/follow-up, prescriptions, preparing report for sign off)    Signed:  Chari Jones MD  2/14/2018  3:23 PM

## 2018-02-14 NOTE — PROGRESS NOTES
RENAL DAILY PROGRESS NOTE    Patient: Corinthia Rinne               Sex: female          DOA: 2/7/2018  5:41 PM        YOB: 1955      Age:  58 y.o.        LOS:  LOS: 7 days     Subjective:     Aurelia Leroy is a 58 y.o.  who presents with Hyperosmolar (nonketotic) coma ([address]  Acidosis  Respiratory failure ([address]  Shock ([address] Asked to evaluate for renal failure. admitted with hyperosmolar coma,resp failure. hx of dm  Chief complains: Patient denies nausea, vomiting, chest pain, dizziness, shortness of breath or headache.  - Reviewed last 24 hrs events     Current Facility-Administered Medications   Medication Dose Route Frequency    potassium chloride 10 mEq, lidocaine (PF) (XYLOCAINE) 10 mg/mL (1 %) 1 mL in 0.9% sodium chloride 100 mL IVPB   IntraVENous Q1H    vancomycin (VANCOCIN) 1,000 mg in 0.9% sodium chloride (MBP/ADV) 250 mL adv  1,000 mg IntraVENous Q8H    magnesium sulfate 1 g/100 ml IVPB (premix or compounded)  1 g IntraVENous ONCE    magnesium sulfate 3 g in 0.9% sodium chloride 100 mL IVPB  3 g IntraVENous ONCE    potassium chloride (K-DUR, KLOR-CON) SR tablet 40 mEq  40 mEq Oral TID    insulin glargine (LANTUS) injection 25 Units  25 Units SubCUTAneous DAILY    insulin lispro (HUMALOG) injection   SubCUTAneous AC&HS    phosphorus (K PHOS NEUTRAL) 250 mg tablet 1 Tab  1 Tab Oral BID    heparin (porcine) injection 5,000 Units  5,000 Units SubCUTAneous Q8H    albuterol-ipratropium (DUO-NEB) 2.5 MG-0.5 MG/3 ML  3 mL Nebulization Q4H PRN    senna (SENOKOT) tablet 8.6 mg  1 Tab Oral DAILY    Or    docusate sodium (COLACE) capsule 100 mg  100 mg Oral DAILY    polyethylene glycol (MIRALAX) packet 17 g  17 g Oral DAILY    glucose chewable tablet 16 g  4 Tab Oral PRN    glucagon (GLUCAGEN) injection 1 mg  1 mg IntraMUSCular PRN    dextrose (D50W) injection syrg 12.5-25 g  25-50 mL IntraVENous PRN    thiamine (B-1) injection 100 mg  100 mg IntraMUSCular DAILY  fentaNYL citrate (PF) injection 25-50 mcg  25-50 mcg IntraVENous Q4H PRN    midazolam (VERSED) injection 1-2 mg  1-2 mg IntraVENous Q4H PRN    influenza vaccine 2017-18 (3 yrs+)(PF) (FLUZONE QUAD/FLUARIX QUAD) injection 0.5 mL  0.5 mL IntraMUSCular PRIOR TO DISCHARGE    pantoprazole (PROTONIX) granules for oral suspension 40 mg  40 mg Per NG tube DAILY    sodium chloride (NS) flush 5-10 mL  5-10 mL IntraVENous PRN    sodium chloride (NS) flush 5-10 mL  5-10 mL IntraVENous Q8H    sodium chloride (NS) flush 5-10 mL  5-10 mL IntraVENous PRN    acetaminophen (TYLENOL) suppository 650 mg  650 mg Rectal E7C PRN    folic acid (FOLVITE) tablet 1 mg  1 mg Per NG tube DAILY       Objective:     Visit Vitals    /73 (BP 1 Location: Left arm, BP Patient Position: Head of bed elevated (Comment degrees))    Pulse 62    Temp 98.5 °F (36.9 °C)    Resp 17    Wt 72.2 kg (159 lb 3.2 oz)    SpO2 92%    Breastfeeding No    BMI 24.93 kg/m2       Intake/Output Summary (Last 24 hours) at 02/14/18 1136  Last data filed at 02/14/18 0541   Gross per 24 hour   Intake              480 ml   Output             3650 ml   Net            -3170 ml       Physical Examination:     GEN: Awake  RS: Chest is bilateral equal, no wheezing / rales / crackles  CVS: S1-S2 heard, RRR, No S3 / murmur  Abdomen: Soft, Non tender, Not distended, Positive bowel sounds, no organomegaly, no CVA / supra pubic tenderness  Extremities: No edema, no cyanosis, skin is warm on touch  HEENT: Head is atraumatic, PERRLA, conjunctiva pink & non icteric. No JVD or carotid bruit   Musculoskeletal: No gross joints or bone deformities   Lymph Node: No palpable cervical, axillary or groin lymphadenopathy.       Data Review:      Labs:     Hematology:   Recent Labs      02/14/18   0545  02/13/18   0403  02/12/18   0045   WBC  6.2  5.0  5.4   HGB  11.2*  10.3*  10.6*   HCT  34.2*  32.0*  32.4*     Chemistry:   Recent Labs      02/14/18   0545  02/13/18   2140 02/13/18   1210  02/13/18   0403  02/12/18   1000  02/12/18   0045   BUN  4*  6*  8  8  9  10   CREA  0.52*  0.49*  0.64  0.64  0.72  0.81   CA  7.7*  7.7*  7.6*  7.4*  7.8*  7.8*   K  2.9*  3.1*  3.4*  2.8*  3.1*  2.7*   NA  143  143  141  141  138  140   CL  105  106  105  104  102  103   CO2  31  32  27  31  30  31   PHOS  3.2  2.4*  2.2*  2.5  2.6  2.1*   GLU  161*  138*  223*  216*  377*  226*        Images:    XR (Most Recent). CXR reviewed by me and compared with previous CXR   Results from Hospital Encounter encounter on 02/07/18   XR CHEST PORT   Narrative AP CHEST, PORTABLE    CPT CODE: 35187    INDICATION: Provided as ETT eval.    COMPARISON: 2/10/2018. TECHNIQUE: Portable semiupright AP chest radiograph is reviewed. FINDINGS:    Right IJ central venous catheter is again seen with tip projected over the lower  SVC. No endotracheal tube is evident in the patient. Multiple EKG leads/wires  overlie the patient. Pulmonary vascular congestion, slightly decreased compared to the preceding  radiograph. Hazy opacities most conspicuous in the perihilar and lower lungs  consistent with airspace disease and/or atelectasis. A component of pulmonary  edema is possible. There is mild blunting of the costophrenic sulci consistent  with small pleural effusions versus thickening. No evidence of pneumothorax. The cardiac silhouette is within normal limits in size for technique. Old healed or healing rib fractures best seen in the posterior lateral left  sixth rib. Impression IMPRESSION:     Again, no endotracheal tube is seen in the patient. Clinical correlation,  please, regarding the provided indication. Slight interval decrease in pulmonary vascular congestion. Otherwise hazy  bilateral lung opacities consistent with airspace disease and/or atelectasis  similar to prior as described. Small bilateral pleural effusions versus  thickening.          CT (Most Recent)   Results from [facility] encounter on 02/07/18   CT HEAD WO CONT   Narrative CT of the head without contrast    CT CODE:  [id number]    HISTORY: Cerebral edema evaluation    COMPARISON: 0055 hours    TECHNIQUE: Helical axial scan to the head was performed from the skull base to  the vertex without IV contrast administration. All CT scans at this facility performed using dose optimization techniques as  appreciated to a performed exam, to include automated exposure control,  adjustment of the mA and or KU according to patient size (including appropriate  matching for site specific examination), or use of iterative reconstruction  technique. FINDINGS:    The brain parenchyma and ventricles appear unremarkable. No significant global  atrophy. Normal gray-white matter interface noted. No CT evidence of cerebral  edema seen. There is no evidence of acute intracranial hemorrhage or mass effect  identified. Subtle white matter microvascular disease again noted. No skull  fracture or extra axial fluid collections identified. The expansile mottled  appearance of sclerotic lesion of left sphenoid is again identified. No  additional skull lesion seen. Visualized sinuses and mastoid air cells appear  unremarkable. Impression IMPRESSION:    No CT evidence of cerebral edema or other acute intracranial abnormality. Note: If clinical concern of acute stroke, MRI with diffusion weighted images is  recommended for better evaluation. 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 Neoplastic  process not excludable. Recommend bone scan evaluation. Thank you for your referral.         EKG No results found for this or any previous visit. I have personally reviewed the old medical records and patient's labs    Plan / Recommendation:      1. Arf,resolved. discussed with sister  2.hypernatremia,resolved  3.hypokalemia,large deficit. was given iv kcl and magnesium sulfate. denies vomiting or diarrhea.  urine potassium not high,cheking aldosterone  4.hypophosphatemia,on po neutraphos    D/w Dr. Hayden Fleming MD  Nephrology  2/14/2018

## 2018-02-14 NOTE — PROGRESS NOTES
[facility] ANTIMICROBIAL STEWARDSHIP TEAM  PRESCRIBER COMMUNICATION FORM      We have briefly reviewed the antimicrobials  currently prescribed for your patient along with  the clinical indications and would like to Make the following recommendations:    DISCONTINUE ANTIBIOTICS vancomycin      Rationale:    (  )  No evidence of bacterial infection    (x)  Microbiology report does not support use    (  )  Narrowing broad-spectrum empiric coverage    (  )  Therapeutic duplication: overlapping antimicrobial spectrum    (  )  Clinical situation dictates change    (  )  Prolonged duration of therapy not needed    (  )  Allergy/toxicity/drug interaction present    (  ) More clinically/cost effective therapy available  ADD ANTIBIOTICS  Rationale:        (  ) Microbiology report supports use    (  ) Expanded coverage needed: gm positive /negative / anaerobic /                               atypical    ( ) More clinicaly/cost effective therapy than current regimen    ( ) Needed for synergy    ( ) Double coverage needed    ( ) Less toxic therapy    ( ) Antifungal therapy needed  ADDITIONAL SUGGESTIONS    ( ) continue current therapy with the following change    ( ) additional laboratory testing    ( ) consider infectious disease consultation    ( ) consider outpatient IV therapy    ( ) other    COMMENTS:        This communication is not to be considered a consultation. You are under no obligation to follow these suggestions. A physician-patient relationship has not been established between the physician Completing this form and your patient. If a thorough analysis of the case is desired, please request an ID consultation.

## 2018-02-14 NOTE — PROGRESS NOTES
Spoke with [address] [place] [address] that this pt was cclink and ready for discharge, called [facility] for review and reported to both facilities that this pt is ready for discharge today. Pt was accepted by [address] [place] [address], Dr. Chauncey Boast notified. Transportation set up for 6:30 p.m. [address] [place] [address] notified of this discharge transport time. DISCHARGE CANCELLED. Dr. Chauncey Boast, nurse and  Transport notified of this change and pt placed on Will Call. This will be admitted to [facility] in the A.M. Family asked for this change due to pt moving to [place] to live with sister and for family get pt involved with the services needed for medical stability. [facility] notified with a plan for admission tomorrow. [facility] are [facility] [place] placement for this pt now.

## 2018-02-15 VITALS
OXYGEN SATURATION: 96 % | HEART RATE: 79 BPM | TEMPERATURE: 98.5 F | WEIGHT: 165.5 LBS | DIASTOLIC BLOOD PRESSURE: 66 MMHG | SYSTOLIC BLOOD PRESSURE: 113 MMHG | RESPIRATION RATE: 20 BRPM | BODY MASS INDEX: 25.92 KG/M2

## 2018-02-15 LAB
ANION GAP SERPL CALC-SCNC: 7 MMOL/L (ref 3–18)
ANION GAP SERPL CALC-SCNC: 8 MMOL/L (ref 3–18)
BACTERIA SPEC CULT: NORMAL
BACTERIA SPEC CULT: NORMAL
BASOPHILS # BLD: 0 K/UL (ref 0–0.06)
BASOPHILS NFR BLD: 0 % (ref 0–3)
BUN SERPL-MCNC: 4 MG/DL (ref 7–18)
BUN SERPL-MCNC: 5 MG/DL (ref 7–18)
BUN/CREAT SERPL: 6 (ref 12–20)
BUN/CREAT SERPL: 7 (ref 12–20)
CALCIUM SERPL-MCNC: 8.2 MG/DL (ref 8.5–10.1)
CALCIUM SERPL-MCNC: 8.4 MG/DL (ref 8.5–10.1)
CHLORIDE SERPL-SCNC: 105 MMOL/L (ref 100–108)
CHLORIDE SERPL-SCNC: 106 MMOL/L (ref 100–108)
CO2 SERPL-SCNC: 28 MMOL/L (ref 21–32)
CO2 SERPL-SCNC: 29 MMOL/L (ref 21–32)
CREAT SERPL-MCNC: 0.68 MG/DL (ref 0.6–1.3)
CREAT SERPL-MCNC: 0.75 MG/DL (ref 0.6–1.3)
DIFFERENTIAL METHOD BLD: ABNORMAL
EOSINOPHIL # BLD: 0.1 K/UL (ref 0–0.4)
EOSINOPHIL NFR BLD: 1 % (ref 0–5)
ERYTHROCYTE [DISTWIDTH] IN BLOOD BY AUTOMATED COUNT: 12.8 % (ref 11.6–14.5)
GLUCOSE BLD STRIP.AUTO-MCNC: 259 MG/DL (ref 70–110)
GLUCOSE BLD STRIP.AUTO-MCNC: 376 MG/DL (ref 70–110)
GLUCOSE SERPL-MCNC: 221 MG/DL (ref 74–99)
GLUCOSE SERPL-MCNC: 365 MG/DL (ref 74–99)
HCT VFR BLD AUTO: 35 % (ref 35–45)
HGB BLD-MCNC: 11 G/DL (ref 12–16)
LYMPHOCYTES # BLD: 2.2 K/UL (ref 0.8–3.5)
LYMPHOCYTES NFR BLD: 32 % (ref 20–51)
MAGNESIUM SERPL-MCNC: 1.7 MG/DL (ref 1.6–2.6)
MAGNESIUM SERPL-MCNC: 2 MG/DL (ref 1.6–2.6)
MCH RBC QN AUTO: 27.6 PG (ref 24–34)
MCHC RBC AUTO-ENTMCNC: 31.4 G/DL (ref 31–37)
MCV RBC AUTO: 87.7 FL (ref 74–97)
MONOCYTES # BLD: 0.4 K/UL (ref 0–1)
MONOCYTES NFR BLD: 6 % (ref 2–9)
NEUTS BAND NFR BLD MANUAL: 5 % (ref 0–5)
NEUTS SEG # BLD: 4.2 K/UL (ref 1.8–8)
NEUTS SEG NFR BLD: 56 % (ref 42–75)
PHOSPHATE SERPL-MCNC: 2.6 MG/DL (ref 2.5–4.9)
PHOSPHATE SERPL-MCNC: 2.9 MG/DL (ref 2.5–4.9)
PLATELET # BLD AUTO: 254 K/UL (ref 135–420)
PLATELET COMMENTS,PCOM: ABNORMAL
PMV BLD AUTO: 10.3 FL (ref 9.2–11.8)
POTASSIUM SERPL-SCNC: 3.8 MMOL/L (ref 3.5–5.5)
POTASSIUM SERPL-SCNC: 4.2 MMOL/L (ref 3.5–5.5)
RBC # BLD AUTO: 3.99 M/UL (ref 4.2–5.3)
RBC MORPH BLD: ABNORMAL
SERVICE CMNT-IMP: NORMAL
SERVICE CMNT-IMP: NORMAL
SODIUM SERPL-SCNC: 140 MMOL/L (ref 136–145)
SODIUM SERPL-SCNC: 143 MMOL/L (ref 136–145)
WBC # BLD AUTO: 6.9 K/UL (ref 4.6–13.2)

## 2018-02-15 PROCEDURE — 84100 ASSAY OF PHOSPHORUS: CPT | Performed by: INTERNAL MEDICINE

## 2018-02-15 PROCEDURE — 74011250636 HC RX REV CODE- 250/636: Performed by: PHYSICIAN ASSISTANT

## 2018-02-15 PROCEDURE — 36415 COLL VENOUS BLD VENIPUNCTURE: CPT | Performed by: INTERNAL MEDICINE

## 2018-02-15 PROCEDURE — 80048 BASIC METABOLIC PNL TOTAL CA: CPT | Performed by: INTERNAL MEDICINE

## 2018-02-15 PROCEDURE — 74011250637 HC RX REV CODE- 250/637: Performed by: PHYSICIAN ASSISTANT

## 2018-02-15 PROCEDURE — 74011636637 HC RX REV CODE- 636/637: Performed by: INTERNAL MEDICINE

## 2018-02-15 PROCEDURE — 82962 GLUCOSE BLOOD TEST: CPT

## 2018-02-15 PROCEDURE — 97535 SELF CARE MNGMENT TRAINING: CPT

## 2018-02-15 PROCEDURE — 83735 ASSAY OF MAGNESIUM: CPT | Performed by: INTERNAL MEDICINE

## 2018-02-15 PROCEDURE — 85025 COMPLETE CBC W/AUTO DIFF WBC: CPT | Performed by: INTERNAL MEDICINE

## 2018-02-15 PROCEDURE — 74011250636 HC RX REV CODE- 250/636: Performed by: INTERNAL MEDICINE

## 2018-02-15 PROCEDURE — 97530 THERAPEUTIC ACTIVITIES: CPT

## 2018-02-15 PROCEDURE — 74011250637 HC RX REV CODE- 250/637: Performed by: INTERNAL MEDICINE

## 2018-02-15 RX ORDER — POTASSIUM CHLORIDE 1.5 G/1.77G
20 POWDER, FOR SOLUTION ORAL 2 TIMES DAILY WITH MEALS
Status: DISCONTINUED | OUTPATIENT
Start: 2018-02-15 | End: 2018-02-15 | Stop reason: HOSPADM

## 2018-02-15 RX ADMIN — THIAMINE HYDROCHLORIDE 100 MG: 100 INJECTION, SOLUTION INTRAMUSCULAR; INTRAVENOUS at 09:00

## 2018-02-15 RX ADMIN — INSULIN LISPRO 15 UNITS: 100 INJECTION, SOLUTION INTRAVENOUS; SUBCUTANEOUS at 12:37

## 2018-02-15 RX ADMIN — Medication 10 ML: at 00:34

## 2018-02-15 RX ADMIN — HEPARIN SODIUM 5000 UNITS: 5000 INJECTION, SOLUTION INTRAVENOUS; SUBCUTANEOUS at 10:41

## 2018-02-15 RX ADMIN — HEPARIN SODIUM 5000 UNITS: 5000 INJECTION, SOLUTION INTRAVENOUS; SUBCUTANEOUS at 02:57

## 2018-02-15 RX ADMIN — POLYETHYLENE GLYCOL 3350 17 G: 17 POWDER, FOR SOLUTION ORAL at 09:00

## 2018-02-15 RX ADMIN — PANTOPRAZOLE SODIUM 40 MG: 40 GRANULE, DELAYED RELEASE ORAL at 09:00

## 2018-02-15 RX ADMIN — Medication 2 TABLET: at 09:00

## 2018-02-15 RX ADMIN — FOLIC ACID 1 MG: 1 TABLET ORAL at 09:00

## 2018-02-15 RX ADMIN — Medication 400 MG: at 09:00

## 2018-02-15 RX ADMIN — Medication 10 ML: at 07:21

## 2018-02-15 RX ADMIN — DOCUSATE SODIUM 100 MG: 100 CAPSULE, LIQUID FILLED ORAL at 09:00

## 2018-02-15 RX ADMIN — INSULIN LISPRO 9 UNITS: 100 INJECTION, SOLUTION INTRAVENOUS; SUBCUTANEOUS at 09:14

## 2018-02-15 RX ADMIN — INSULIN GLARGINE 25 UNITS: 100 INJECTION, SOLUTION SUBCUTANEOUS at 09:14

## 2018-02-15 NOTE — PROGRESS NOTES
Problem: Self Care Deficits Care Plan (Adult)  Goal: *Acute Goals and Plan of Care (Insert Text)  Occupational Therapy Goals  Initiated 2/13/2018 within 7 day(s). 1.  Patient will perform lower body dressing with supervision/set-up. 2.  Patient will perform functional task in standing for 3 minutes with supervision for balance. 3.  Patient will perform toilet transfers with supervision/set-up. 4.  Patient will participate in upper extremity therapeutic exercise/activities with supervision/set-up for 8 minutes to increase endurance for ADLs. Outcome: Progressing Towards Goal  Occupational Therapy TREATMENT    Patient: Ava Max Holter (2 y.o. female)  Date: 2/15/2018  Diagnosis: Hyperosmolar (nonketotic) coma (HCC)  Acidosis  Respiratory failure (HCC)  Shock ([address] Respiratory failure ([address]       Precautions: Fall  Chart, occupational therapy assessment, plan of care, and goals were reviewed. ASSESSMENT:  Pt is agreeable to maneuver to the BR to participate in training for toilet txfr. Pt required SBA/CGA to maneuver to the BR and for txfr to the toilet. Following toileting pt performed ADL grooming task std sinkside w/SBA for safety 2/2 pt's impulsivity. Pt was able to perform LB dressing w/Supervision seated EOB w/crossed legs method. Pt left comfortable in bed to eat lunch. Bed alarm activated 2/2 pt's impulsivity and minimal receptiveness to safety VCs. Progression toward goals:  [x]          Improving appropriately and progressing toward goals  []          Improving slowly and progressing toward goals  []          Not making progress toward goals and plan of care will be adjusted     PLAN:  Patient continues to benefit from skilled intervention to address the above impairments. Continue treatment per established plan of care.   Discharge Recommendations:  Home Health  Further Equipment Recommendations for Discharge:  N/A      G-CODES:     Self Care  Current  CI= 1-19%   Goal  CI= 1-19%.  The severity rating is based on the Level of Assistance required for Functional Mobility and ADLs. SUBJECTIVE:   Patient stated I just got up to the bathroom.     OBJECTIVE DATA SUMMARY:   Cognitive/Behavioral Status:  Neurologic State: Alert  Orientation Level: Oriented to person, Oriented to place  Cognition: Follows commands  Safety/Judgement: Awareness of environment, Fall prevention    Functional Mobility and Transfers for ADLs:   Bed Mobility:     Supine to Sit: Minimum assistance  Sit to Supine: Stand-by asssistance      Transfers:  Sit to Stand: Contact guard assistance    Toilet Transfer : Contact guard assistance (w/FWW)      Balance:  Sitting: Intact  Standing: Impaired; With support  Standing - Static: Fair  Standing - Dynamic : Fair    ADL Intervention:   Lower Body Dressing Assistance  Socks: Supervision/set-up (seated EOB)  Leg Crossed Method Used: Yes  Pain:  Pt reports 0/10 pain or discomfort prior to treatment.    Pt reports 0/10 pain or discomfort post treatment. Activity Tolerance:    Fair    Please refer to the flowsheet for vital signs taken during this treatment.   After treatment:   []  Patient left in no apparent distress sitting up in chair  [x]  Patient left in no apparent distress in bed  [x]  Call bell left within reach  [x]  Nursing notified  []  Caregiver present  [x]  Bed alarm activated      THOMAS Schwartz  Time Calculation: 15 mins

## 2018-02-15 NOTE — PROGRESS NOTES
Spoke with this pt's sister and this pt was approved for placement at [address] [place] [address] to be close to her family members for support and placement for staying after this pt is discharged to home with family. Transport 1:30 p.m.

## 2018-02-15 NOTE — ROUTINE PROCESS
Bedside and Verbal shift change report given to Shannan Marquez RN (oncoming nurse) by López Gong RN (offgoing nurse). Report included the following information SBAR, Kardex, MAR and Recent Results.     SITUATION:    Code Status: Full Code   Reason for Admission: Hyperosmolar (nonketotic) coma ([facility]   Acidosis   Respiratory failure ([address]   Shock ([facility]    [facility] day: 8   Problem List:       Hospital Problems  Date Reviewed: 2/7/2018          Codes Class Noted POA    Acidosis ICD-10-CM: E87.2  ICD-9-CM: 276.2  2/7/2018 Unknown        * (Principal)Respiratory failure ([facility] ICD-10-CM: J96.90  ICD-9-CM: 518.81  2/7/2018 Unknown        Shock ([address] ICD-10-CM: R57.9  ICD-9-CM: 785.50  2/7/2018 Unknown        Hyperosmolar (nonketotic) coma ([facility] ICD-10-CM: E11.01  ICD-9-CM: 250.20  2/7/2018 Unknown        Acute UTI ICD-10-CM: N39.0  ICD-9-CM: 599.0  2/7/2018 Unknown        Macrocytic anemia ICD-10-CM: D53.9  ICD-9-CM: 281.9  2/7/2018 Unknown              BACKGROUND:    Past Medical History:   Past Medical History:   Diagnosis Date    Arthritis     Asthma     Chronic pain     BACK PAIN    Diabetes ([facility]     Diabetic neuropathy ([facility]     Emphysema     Hepatitis C     Hypertension     Nervousness     Osteoarthritis of both knees          Patient taking anticoagulants yes     ASSESSMENT:    Changes in Assessment Throughout Shift: none     Patient has Central Line: no Reasons if yes: no   Patient has Pro Cath: no Reasons if yes: n/a      Last Vitals:     Vitals:    02/15/18 0106 02/15/18 0420 02/15/18 0842 02/15/18 0914   BP: 158/78 152/77 149/66    Pulse: 65 71 72    Resp: 17 17 18    Temp: 97.9 °F (36.6 °C) 99.4 °F (37.4 °C) 98.4 °F (36.9 °C)    SpO2: 100% 94% 94%    Weight:    75.1 kg (165 lb 8 oz)        IV and DRAINS (will only show if present)   [REMOVED] Peripheral IV 02/13/18 Right Wrist-Site Assessment: Clean, dry, & intact  Peripheral IV 02/14/18 Right Antecubital-Site Assessment: Clean, dry, & intact  [REMOVED] Triple Lumen 02/07/18 Right Subclavian-Site Assessment: Clean, dry, & intact  [REMOVED] Airway - Endotracheal Tube 02/07/18 Oral-Site Assessment: Clean, dry, & intact  [REMOVED] Peripheral IV 02/07/18 Right Hand-Site Assessment: Clean, dry, & intact  [REMOVED] Peripheral IV 02/07/18 Antecubital-Site Assessment: Clean, dry, & intact  [REMOVED] Peripheral IV 02/08/18 Left Arm-Site Assessment: Clean, dry, & intact  [REMOVED] Peripheral IV 02/08/18 Right Wrist-Site Assessment: Clean, dry, & intact  [REMOVED] Peripheral IV 02/08/18 Left Arm-Site Assessment: Clean, dry, & intact  [REMOVED] Nasogastric Tube 02/07/18-Site Assessment: Clean, dry, & intact  [REMOVED] Peripheral IV 02/10/18 Left Forearm-Site Assessment: Clean, dry, & intact     WOUND (if present)   Wound Type:  none   Dressing present Dressing Present : Yes   Wound Concerns/Notes:  none     PAIN    Pain Assessment    Pain Intensity 1: 0 (02/15/18 0403)              Patient Stated Pain Goal: 0  o Interventions for Pain:  none  o Intervention effective: no  o Time of last intervention: n/a   o Reassessment Completed: yes      Last 3 Weights:  Last 3 Recorded Weights in this Encounter    02/11/18 0414 02/12/18 0711 02/15/18 0914   Weight: 71.6 kg (157 lb 13.6 oz) 72.2 kg (159 lb 3.2 oz) 75.1 kg (165 lb 8 oz)     Weight change:      INTAKE/OUPUT    Current Shift: 02/15 0701 - 02/15 1900  In: 320 [P.O.:320]  Out: -     Last three shifts: 02/13 1901 - 02/15 0700  In: -   Out: 2500 [Urine:2500]     LAB RESULTS     Recent Labs      02/15/18   0109  02/14/18   0545  02/13/18   0403   WBC  6.9  6.2  5.0   HGB  11.0*  11.2*  10.3*   HCT  35.0  34.2*  32.0*   PLT  254  190  146        Recent Labs      02/15/18   0109  02/14/18   1408  02/14/18   1110  02/14/18   0545   NA  143   --   139  143   K  3.8  3.7  3.4*  2.9*   GLU  221*   --   327*  161*   BUN  4*   --   5*  4*   CREA  0.68   --   0.58*  0.52*   CA  8.2*   --   7.6*  7.7*   MG  1.7 --   1.7  1.6       RECOMMENDATIONS AND DISCHARGE PLANNING     1. Pending tests/procedures/ Plan of Care or Other Needs: none     2. Discharge plan for patient and Needs/Barriers: snf    3. Estimated Discharge Date: 2/19/10 Posted on Whiteboard in [facility] Room: yes      4. The patient's care plan was reviewed with the oncoming nurse. \"HEALS\" SAFETY CHECK      Fall Risk    Total Score: 3    Safety Measures: Safety Measures: Bed/Chair alarm on, Bed/Chair-Wheels locked, Bed in low position, Call light within reach    A safety check occurred in the patient's room between off going nurse and oncoming nurse listed above. The safety check included the below items  Area Items   H  High Alert Medications - Verify all high alert medication drips (heparin, PCA, etc.)   E  Equipment - Suction is set up for ALL patients (with gilda)  - Red plugs utilized for all equipment (IV pumps, etc.)  - WOWs wiped down at end of shift.  - Room stocked with oxygen, suction, and other unit-specific supplies   A  Alarms - Bed alarm is set for fall risk patients  - Ensure chair alarm is in place and activated if patient is up in a chair   L  Lines - Check IV for any infiltration  - Pro bag is empty if patient has a Pro   - Tubing and IV bags are labeled   S  Safety   - Room is clean, patient is clean, and equipment is clean. - Hallways are clear from equipment besides carts. - Fall bracelet on for fall risk patients  - Ensure room is clear and free of clutter  - Suction is set up for ALL patients (with gilda)  - Hallways are clear from equipment besides carts.    - Isolation precautions followed, supplies available outside room, sign posted     Karen Jones RN

## 2018-02-15 NOTE — INTERDISCIPLINARY ROUNDS
Interdisciplinary Round Note   Patient Information:   Aurelia Owens   [birth year]   Reason for Admission: Hyperosmolar (nonketotic) coma ([facility]  Acidosis  Respiratory failure ([address]  Shock ([facility]   Attending Provider:   Holly Cordoba MD  Primary Care Physician:       Poli Edmondson MD       [phone]   Estimated discharge date:  2/15/18   Hospital day: 8  [unfilled]  3d 19h  RRAT Score: High Risk            23       Total Score        3 Patient Length of Stay (>5 days = 3)    9 Pt. Coverage (Medicare=5 , Medicaid, or Self-Pay=4)    11 Charlson Comorbidity Score (Age + Comorbid Conditions)        Criteria that do not apply:    Has Seen PCP in Last 6 Months (Yes=3, No=0)    . Living with Significant Other. Assisted Living. LTAC. SNF. or   Rehab    IP Visits Last 12 Months (1-3=4, 4=9, >4=11)            No         Chemical      Lines, Drains, & Airways  None       IV Antibiotics:    Current Antimicrobial Therapy     None        GI Prophylaxis: GI Prophylaxis: no   Type:       Recent Glucose Results:   Lab Results   Component Value Date/Time     (H) 02/15/2018 01:09 AM     (H) 02/14/2018 11:10 AM    GLUCPOC 259 (H) 02/15/2018 08:48 AM    GLUCPOC 312 (H) 02/14/2018 10:13 PM    GLUCPOC 197 (H) 02/14/2018 06:09 PM      Activity Level: Activity Level:  Up with Assistance    Needs assistance with ADLs: no       Goals for Today: to go home   Recommendations:   Discharge Disposition:   CM      Needs for Discharge: placement IDR Team: MD, RN, CM  Recommendations from IDR team:     Other Notes:

## 2018-02-15 NOTE — PROGRESS NOTES
Problem: Mobility Impaired (Adult and Pediatric)  Goal: *Acute Goals and Plan of Care (Insert Text)  Physical Therapy Goals  Initiated 2/13/2018 and to be accomplished within 7 day(s)  1. Patient will move from supine to sit and sit to supine , scoot up and down and roll side to side in bed with supervision/set-up. 2.  Patient will transfer from bed to chair and chair to bed with supervision/set-up using the least restrictive device. 3.  Patient will perform sit to stand with supervision/set-up. 4.  Patient will ambulate with supervision/set-up for >100 feet with the least restrictive device. 5.  Patient will ascend/descend 10 stairs with handrail(s) with minimal assistance/contact guard assist.   Outcome: Progressing Towards Goal  physical Therapy TREATMENT    Patient: Cheri Dickey (40 y.o. female)  Date: 2/15/2018  Diagnosis: Hyperosmolar (nonketotic) coma (HCC)  Acidosis  Respiratory failure (HCC)  Shock ([address] Respiratory failure ([address]  Precautions: Fall   Chart, physical therapy assessment, plan of care and goals were reviewed. ASSESSMENT:  Pt found supine in bed willing to work with PT. Patient is very impulsive and quickly sits at EOB by flailing LEs off side of bed, but requires min A for trunk / upper body. Pt ambulated to bathroom this tx with RW which she leaves behind several times time. Pt's balance is fair and presents with increased trunk sway. Pt is not very receptive to safety training stating \"I've got boyfriends or friends who can do it for me, I'll figure it out. \" Pt initially left in b/s chair to eat lunch as she was found supine without bed alarm. Spoke to nurse Tomeka Leavitt who agrees that pt should be in bed with bed alarm activated. Pt left in chair position in bed with lunch tray in front of her and bed alarm activated. Education: transfers, gait.   Progression toward goals:  []      Improving appropriately and progressing toward goals  [x]      Improving slowly and progressing toward goals  []      Not making progress toward goals and plan of care will be adjusted     PLAN:  Patient continues to benefit from skilled intervention to address the above impairments. Continue treatment per established plan of care. Discharge Recommendations:  Home Health  Further Equipment Recommendations for Discharge:  rolling walker     SUBJECTIVE:   Patient stated I just got back from the bathroom.     OBJECTIVE DATA SUMMARY:   Critical Behavior:  Neurologic State: Alert, Eyes open spontaneously  Orientation Level: Oriented to person, Oriented to place  Cognition: Decreased attention/concentration  Safety/Judgement: Awareness of environment, Fall prevention  Functional Mobility Training:  Bed Mobility:  Supine to Sit: Minimum assistance  Sit to Supine: Stand-by asssistance  Transfers:  Sit to Stand: Contact guard assistance  Stand to Sit: Contact guard assistance  Balance:  Sitting: Intact  Standing: Impaired; With support  Standing - Static: Fair  Standing - Dynamic : Fair  Ambulation/Gait Training:  Distance (ft): 25 Feet (ft)  Assistive Device: Walker, rolling  Ambulation - Level of Assistance: Stand-by asssistance;Contact guard assistance  Gait Abnormalities: Decreased step clearance  Base of Support: Widened  Speed/Sobia: Slow  Step Length: Left shortened;Right shortened    Pain:  Pre tx pain: 0  Post tx pain: 0  Pain Scale 1: Visual  Pain Intensity 1: 0  Activity Tolerance:   Fair  Please refer to the flowsheet for vital signs taken during this treatment. After treatment:   [] Patient left in no apparent distress sitting up in chair  [x] Patient left in no apparent distress in bed  [x] Call bell left within reach  [] Nursing notified  [] Caregiver present  [x] Bed alarm activated  [] SCDs applied      Moss Ugashik, PTA   Time Calculation: 14 mins    Mobility  Current  CI= 1-19%.   The severity rating is based on the Level of Assistance required for Functional Mobility and ADLs.    Mobility   Goal  CI= 1-19%. The severity rating is based on the Level of Assistance required for Functional Mobility and ADLs.

## 2018-02-15 NOTE — PROGRESS NOTES
Problem: Falls - Risk of  Goal: *Absence of Falls  Document Young Fall Risk and appropriate interventions in the flowsheet.    Outcome: Progressing Towards Goal  Fall Risk Interventions:       Mentation Interventions: Adequate sleep, hydration, pain control    Medication Interventions: Bed/chair exit alarm    Elimination Interventions: Call light in reach, Bed/chair exit alarm

## 2018-02-15 NOTE — DISCHARGE INSTRUCTIONS
Learning About Type 2 Diabetes  What is type 2 diabetes? Insulin is a hormone that helps your body use sugar from your food as energy. Type 2 diabetes happens when your body can't use insulin the right way. Over time, the pancreas can't make enough insulin. If you don't have enough insulin, too much sugar stays in your blood. If you are overweight, get little or no exercise, or have type 2 diabetes in your family, you are more likely to have problems with the way insulin works in your body.  Americans, Hispanics, Native Americans,  Americans, and Pacific Islanders have a higher risk for type 2 diabetes. Type 2 diabetes can be prevented or delayed with a healthy lifestyle, which includes staying at a healthy weight, making smart food choices, and getting regular exercise. What can you expect with type 2 diabetes? Daisy Fritz keep hearing about how important it is to keep your blood sugar within a target range. That's because over time, high blood sugar can lead to serious problems. It can:  · Harm your eyes, nerves, and kidneys. · Damage your blood vessels, leading to heart disease and stroke. · Reduce blood flow and cause nerve damage to parts of your body, especially your feet. This can cause slow healing and pain when you walk. · Make your immune system weak and less able to fight infections. When people hear the word \"diabetes,\" they often think of problems like these. But daily care and treatment can help prevent or delay these problems. The goal is to keep your blood sugar in a target range. That's the best way to reduce your chance of having more problems from diabetes. What are the symptoms? Some people who have type 2 diabetes may not have any symptoms early on. Many people with the disease don't even know they have it at first. But with time, diabetes starts to cause symptoms. You experience most symptoms of type 2 diabetes when your blood sugar is either too high or too low.   The most common symptoms of high blood sugar include:  · Thirst.  · Frequent urination. · Weight loss. · Blurry vision. The symptoms of low blood sugar include:  · Sweating. · Shakiness. · Weakness. · Hunger. · Confusion. How can you prevent type 2 diabetes? The best way to prevent or delay type 2 diabetes is to adopt healthy habits, which include:  · Staying at a healthy weight. · Exercising regularly. · Eating healthy foods. How is type 2 diabetes treated? If you have type 2 diabetes, here are the most important things you can do. · Take your diabetes medicines. · Check your blood sugar as often as your doctor recommends. Also, get a hemoglobin A1c test at least every 6 months. · Try to eat a variety of foods and to spread carbohydrate throughout the day. Carbohydrate raises blood sugar higher and more quickly than any other nutrient does. Carbohydrate is found in sugar, breads and cereals, fruit, starchy vegetables such as potatoes and corn, and milk and yogurt. · Get at least 30 minutes of exercise on most days of the week. Walking is a good choice. You also may want to do other activities, such as running, swimming, cycling, or playing tennis or team sports. If your doctor says it's okay, do muscle-strengthening exercises at least 2 times a week. · See your doctor for checkups and tests on a regular schedule. · If you have high blood pressure or high cholesterol, take the medicines as prescribed by your doctor. · Do not smoke. Smoking can make health problems worse. This includes problems you might have with type 2 diabetes. If you need help quitting, talk to your doctor about stop-smoking programs and medicines. These can increase your chances of quitting for good. Follow-up care is a key part of your treatment and safety. Be sure to make and go to all appointments, and call your doctor if you are having problems.  It's also a good idea to know your test results and keep a list of the medicines you take. Where can you learn more? Go to http://fidelia-ashley.info/. Enter L161 in the search box to learn more about \"Learning About Type 2 Diabetes. \"  Current as of: March 13, 2017  Content Version: 11.4  © 2029-5967 [employer]. Care instructions adapted under license by [employer] (which disclaims liability or warranty for this information). If you have questions about a medical condition or this instruction, always ask your healthcare professional. [address] any warranty or liability for your use of this information. Diabetes Blood Sugar Emergencies: Your Action Plan  How can you prevent a blood sugar emergency? An important part of living with diabetes is keeping your blood sugar in your target range. You'll need to know what to do if it's too high or too low. Managing your blood sugar levels helps you avoid emergencies. This care sheet will teach you about the signs of high and low blood sugar. It will help you make an action plan with your doctor for when these signs occur. Low blood sugar is more likely to happen if you take certain medicines for diabetes. It can also happen if you skip a meal, drink alcohol, or exercise more than usual.  You may get high blood sugar if you eat differently than you normally do. One example is eating more carbohydrate than usual. Having a cold, the flu, or other sudden illness can also cause high blood sugar levels. Levels can also rise if you miss a dose of medicine. Any change in how you take your medicine may affect your blood sugar level. So it's important to work with your doctor before you make any changes. Check your blood sugar  Work with your doctor to fill in the blank spaces below that apply to you. Track your levels, know your target range, and write down ways you can get your blood sugar back in your target range. A log book can help you track your levels.  Take the book to all of your medical appointments. · Check your blood sugar _____ times a day, at these times:________________________________________________. (For example: Before meals, at bedtime, before exercise, during exercise, other.)  · Your blood sugar target range before a meal is ___________________. Your blood sugar target range after a meal is _______________________. · Do qklb-___________________________________________________-mc get your blood sugar back within your safe range if your blood sugar results are _________________________________________. (For example: Less than 70 or above 250 mg/dL.)  Call your doctor when your blood sugar results are ___________________________________. (For example: Less than 70 or above 250 mg/dL.)  What are the symptoms of low and high blood sugar? Common symptoms of low blood sugar are sweating and feeling shaky, weak, hungry, or confused. Symptoms can start quickly. Common symptoms of high blood sugar are feeling very thirsty or very hungry. You may also pass urine more often than usual. You may have blurry vision and may lose weight without trying. But some people may have high or low blood sugar without having any symptoms. That's a good reason to check your blood sugar on a regular schedule. What should you do if you have symptoms? Work with your doctor to fill in the blank spaces below that apply to you. Low blood sugar  If you have symptoms of low blood sugar, check your blood sugar. If it's below _____ ( for example, below 70), eat or drink a quick-sugar food that has about 15 grams of carbohydrate. Your goal is to get your level back to your safe range. Check your blood sugar again 15 minutes later. If it's still not in your target range, take another 15 grams of carbohydrate and check your blood sugar again in 15 minutes. Repeat this until you reach your target. Then go back to your regular testing schedule.   When you have low blood sugar, it's best to stop or reduce any physical activity until your blood sugar is back in your target range and is stable. If you must stay active, eat or drink 30 grams of carbohydrate. Then check your blood sugar again in 15 minutes. If it's not in your target range, take another 30 grams of carbohydrates. Check your blood sugar again in 15 minutes. Keep doing this until you reach your target. You can then go back to your regular testing schedule. If your symptoms or blood sugar levels are getting worse or have not improved after 15 minutes, seek medical care right away. Here are some examples of quick-sugar foods with 15 grams of carbohydrate:  · 3 or 4 glucose tablets  · 1 tube of glucose gel  · Hard candy (such as 3 Jolly Ranchers or 5 to 7 Life Savers)  · ½ cup to ¾ cup (4 to 6 ounces) of fruit juice or regular (not diet) soda  High blood sugar  If you have symptoms of high blood sugar, check your blood sugar. Your goal is to get your level back to your target range. If it's above ______ ( for example, above 250), follow these steps:  · If you missed a dose of your diabetes medicine, take it now. Take only the amount of medicine that you have been prescribed. Do not take more or less medicine. · Give yourself insulin if your doctor has prescribed it for high blood sugar. · Test for ketones, if the doctor told you to do so. If the results of the ketone test show a moderate-to-large amount of ketones, call the doctor for advice. · Wait 30 minutes after you take the extra insulin or the missed medicine. Check your blood sugar again. If your symptoms or blood sugar levels are getting worse or have not improved after taking these steps, seek medical care right away. Follow-up care is a key part of your treatment and safety. Be sure to make and go to all appointments, and call your doctor if you are having problems. It's also a good idea to know your test results and keep a list of the medicines you take. Where can you learn more?   Go to http://fidelia-ashley.info/. Enter P443 in the search box to learn more about \"Diabetes Blood Sugar Emergencies: Your Action Plan. \"  Current as of: March 13, 2017  Content Version: 11.4  © 5809-5924 [employer]. Care instructions adapted under license by [employer] (which disclaims liability or warranty for this information). If you have questions about a medical condition or this instruction, always ask your healthcare professional. [address] any warranty or liability for your use of this information. Learning About Diabetes Food Guidelines  Your Care Instructions    Meal planning is important to manage diabetes. It helps keep your blood sugar at a target level (which you set with your doctor). You don't have to eat special foods. You can eat what your family eats, including sweets once in a while. But you do have to pay attention to how often you eat and how much you eat of certain foods. You may want to work with a dietitian or a certified diabetes educator (CDE) to help you plan meals and snacks. A dietitian or CDE can also help you lose weight if that is one of your goals. What should you know about eating carbs? Managing the amount of carbohydrate (carbs) you eat is an important part of healthy meals when you have diabetes. Carbohydrate is found in many foods. · Learn which foods have carbs. And learn the amounts of carbs in different foods. ¨ Bread, cereal, pasta, and rice have about 15 grams of carbs in a serving. A serving is 1 slice of bread (1 ounce), ½ cup of cooked cereal, or 1/3 cup of cooked pasta or rice. ¨ Fruits have 15 grams of carbs in a serving. A serving is 1 small fresh fruit, such as an apple or orange; ½ of a banana; ½ cup of cooked or canned fruit; ½ cup of fruit juice; 1 cup of melon or raspberries; or 2 tablespoons of dried fruit. ¨ Milk and no-sugar-added yogurt have 15 grams of carbs in a serving.  A serving is 1 cup of milk or 2/3 cup of no-sugar-added yogurt. ¨ Starchy vegetables have 15 grams of carbs in a serving. A serving is ½ cup of mashed potatoes or sweet potato; 1 cup winter squash; ½ of a small baked potato; ½ cup of cooked beans; or ½ cup cooked corn or green peas. · Learn how much carbs to eat each day and at each meal. A dietitian or CDE can teach you how to keep track of the amount of carbs you eat. This is called carbohydrate counting. · If you are not sure how to count carbohydrate grams, use the Plate Method to plan meals. It is a good, quick way to make sure that you have a balanced meal. It also helps you spread carbs throughout the day. ¨ Divide your plate by types of foods. Put non-starchy vegetables on half the plate, meat or other protein food on one-quarter of the plate, and a grain or starchy vegetable in the final quarter of the plate. To this you can add a small piece of fruit and 1 cup of milk or yogurt, depending on how many carbs you are supposed to eat at a meal.  · Try to eat about the same amount of carbs at each meal. Do not \"save up\" your daily allowance of carbs to eat at one meal.  · Proteins have very little or no carbs per serving. Examples of proteins are beef, chicken, turkey, fish, eggs, tofu, cheese, cottage cheese, and peanut butter. A serving size of meat is 3 ounces, which is about the size of a deck of cards. Examples of meat substitute serving sizes (equal to 1 ounce of meat) are 1/4 cup of cottage cheese, 1 egg, 1 tablespoon of peanut butter, and ½ cup of tofu. How can you eat out and still eat healthy? · Learn to estimate the serving sizes of foods that have carbohydrate. If you measure food at home, it will be easier to estimate the amount in a serving of restaurant food. · If the meal you order has too much carbohydrate (such as potatoes, corn, or baked beans), ask to have a low-carbohydrate food instead. Ask for a salad or green vegetables.   · If you use insulin, check your blood sugar before and after eating out to help you plan how much to eat in the future. · If you eat more carbohydrate at a meal than you had planned, take a walk or do other exercise. This will help lower your blood sugar. What else should you know? · Limit saturated fat, such as the fat from meat and dairy products. This is a healthy choice because people who have diabetes are at higher risk of heart disease. So choose lean cuts of meat and nonfat or low-fat dairy products. Use olive or canola oil instead of butter or shortening when cooking. · Don't skip meals. Your blood sugar may drop too low if you skip meals and take insulin or certain medicines for diabetes. · Check with your doctor before you drink alcohol. Alcohol can cause your blood sugar to drop too low. Alcohol can also cause a bad reaction if you take certain diabetes medicines. Follow-up care is a key part of your treatment and safety. Be sure to make and go to all appointments, and call your doctor if you are having problems. It's also a good idea to know your test results and keep a list of the medicines you take. Where can you learn more? Go to http://fidelia-ashley.info/. Enter D901 in the search box to learn more about \"Learning About Diabetes Food Guidelines. \"  Current as of: March 13, 2017  Content Version: 11.4  © 0860-1896 [employer]. Care instructions adapted under license by [employer] (which disclaims liability or warranty for this information). If you have questions about a medical condition or this instruction, always ask your healthcare professional. [place] [address] any warranty or liability for your use of this information. Learning About Meal Planning for Diabetes  Why plan your meals? Meal planning can be a key part of managing diabetes.  Planning meals and snacks with the right balance of carbohydrate, protein, and fat can help you keep your blood sugar at the target level you set with your doctor. You don't have to eat special foods. You can eat what your family eats, including sweets once in a while. But you do have to pay attention to how often you eat and how much you eat of certain foods. You may want to work with a dietitian or a certified diabetes educator. He or she can give you tips and meal ideas and can answer your questions about meal planning. This health professional can also help you reach a healthy weight if that is one of your goals. What plan is right for you? Your dietitian or diabetes educator may suggest that you start with the plate format or carbohydrate counting. The plate format  The plate format is a simple way to help you manage how you eat. You plan meals by learning how much space each food should take on a plate. Using the plate format helps you spread carbohydrate throughout the day. It can make it easier to keep your blood sugar level within your target range. It also helps you see if you're eating healthy portion sizes. To use the plate format, you put non-starchy vegetables on half your plate. Add meat or meat substitutes on one-quarter of the plate. Put a grain or starchy vegetable (such as brown rice or a potato) on the final quarter of the plate. You can add a small piece of fruit and some low-fat or fat-free milk or yogurt, depending on your carbohydrate goal for each meal.  Here are some tips for using the plate format:  · Make sure that you are not using an oversized plate. A 9-inch plate is best. Many restaurants use larger plates. · Get used to using the plate format at home. Then you can use it when you eat out. · Write down your questions about using the plate format. Talk to your doctor, a dietitian, or a diabetes educator about your concerns. Carbohydrate counting  With carbohydrate counting, you plan meals based on the amount of carbohydrate in each food.  Carbohydrate raises blood sugar higher and more quickly than any other nutrient. It is found in desserts, breads and cereals, and fruit. It's also found in starchy vegetables such as potatoes and corn, grains such as rice and pasta, and milk and yogurt. Spreading carbohydrate throughout the day helps keep your blood sugar levels within your target range. Your daily amount depends on several things, including your weight, how active you are, which diabetes medicines you take, and what your goals are for your blood sugar levels. A registered dietitian or diabetes educator can help you plan how much carbohydrate to include in each meal and snack. A guideline for your daily amount of carbohydrate is:  · 45 to 60 grams at each meal. That's about the same as 3 to 4 carbohydrate servings. · 15 to 20 grams at each snack. That's about the same as 1 carbohydrate serving. The Nutrition Facts label on packaged foods tells you how much carbohydrate is in a serving of the food. First, look at the serving size on the food label. Is that the amount you eat in a serving? All of the nutrition information on a food label is based on that serving size. So if you eat more or less than that, you'll need to adjust the other numbers. Total carbohydrate is the next thing you need to look for on the label. If you count carbohydrate servings, one serving of carbohydrate is 15 grams. For foods that don't come with labels, such as fresh fruits and vegetables, you'll need a guide that lists carbohydrate in these foods. Ask your doctor, dietitian, or diabetes educator about books or other nutrition guides you can use. If you take insulin, you need to know how many grams of carbohydrate are in a meal. This lets you know how much rapid-acting insulin to take before you eat. If you use an insulin pump, you get a constant rate of insulin during the day. So the pump must be programmed at meals to give you extra insulin to cover the rise in blood sugar after meals.   When you know how much carbohydrate you will eat, you can take the right amount of insulin. Or, if you always use the same amount of insulin, you need to make sure that you eat the same amount of carbohydrate at meals. If you need more help to understand carbohydrate counting and food labels, ask your doctor, dietitian, or diabetes educator. How do you get started with meal planning? Here are some tips to get started:  · Plan your meals a week at a time. Don't forget to include snacks too. · Use cookbooks or online recipes to plan several main meals. Plan some quick meals for busy nights. You also can double some recipes that freeze well. Then you can save half for other busy nights when you don't have time to cook. · Make sure you have the ingredients you need for your recipes. If you're running low on basic items, put these items on your shopping list too. · List foods that you use to make breakfasts, lunches, and snacks. List plenty of fruits and vegetables. · Post this list on the refrigerator. Add to it as you think of more things you need. · Take the list to the store to do your weekly shopping. Follow-up care is a key part of your treatment and safety. Be sure to make and go to all appointments, and call your doctor if you are having problems. It's also a good idea to know your test results and keep a list of the medicines you take. Where can you learn more? Go to http://fidelia-ashley.info/. Irma Kraus in the search box to learn more about \"Learning About Meal Planning for Diabetes. \"  Current as of: March 13, 2017  Content Version: 11.4  © 4710-0843 [employer]. Care instructions adapted under license by [employer] (which disclaims liability or warranty for this information).  If you have questions about a medical condition or this instruction, always ask your healthcare professional. [place] [address] any warranty or liability for your use of this information. Urinary Tract Infection in Women: Care Instructions  Your Care Instructions    A urinary tract infection, or UTI, is a general term for an infection anywhere between the kidneys and the urethra (where urine comes out). Most UTIs are bladder infections. They often cause pain or burning when you urinate. UTIs are caused by bacteria and can be cured with antibiotics. Be sure to complete your treatment so that the infection goes away. Follow-up care is a key part of your treatment and safety. Be sure to make and go to all appointments, and call your doctor if you are having problems. It's also a good idea to know your test results and keep a list of the medicines you take. How can you care for yourself at home? · Take your antibiotics as directed. Do not stop taking them just because you feel better. You need to take the full course of antibiotics. · Drink extra water and other fluids for the next day or two. This may help wash out the bacteria that are causing the infection. (If you have kidney, heart, or liver disease and have to limit fluids, talk with your doctor before you increase your fluid intake.)  · Avoid drinks that are carbonated or have caffeine. They can irritate the bladder. · Urinate often. Try to empty your bladder each time. · To relieve pain, take a hot bath or lay a heating pad set on low over your lower belly or genital area. Never go to sleep with a heating pad in place. To prevent UTIs  · Drink plenty of water each day. This helps you urinate often, which clears bacteria from your system. (If you have kidney, heart, or liver disease and have to limit fluids, talk with your doctor before you increase your fluid intake.)  · Urinate when you need to. · Urinate right after you have sex. · Change sanitary pads often. · Avoid douches, bubble baths, feminine hygiene sprays, and other feminine hygiene products that have deodorants.   · After going to the bathroom, wipe from front to back. When should you call for help? Call your doctor now or seek immediate medical care if:  ? · Symptoms such as fever, chills, nausea, or vomiting get worse or appear for the first time. ? · You have new pain in your back just below your rib cage. This is called flank pain. ? · There is new blood or pus in your urine. ? · You have any problems with your antibiotic medicine. ? Watch closely for changes in your health, and be sure to contact your doctor if:  ? · You are not getting better after taking an antibiotic for 2 days. ? · Your symptoms go away but then come back. Where can you learn more? Go to http://fidelia-ashley.info/. Enter C679 in the search box to learn more about \"Urinary Tract Infection in Women: Care Instructions. \"  Current as of: May 12, 2017  Content Version: 11.4  © 0543-1740 [employer]. Care instructions adapted under license by [employer] (which disclaims liability or warranty for this information). If you have questions about a medical condition or this instruction, always ask your healthcare professional. [place] [address] any warranty or liability for your use of this information. Patient armband removed and shredded      DISCHARGE SUMMARY from Nurse    PATIENT INSTRUCTIONS:    After general anesthesia or intravenous sedation, for 24 hours or while taking prescription Narcotics:  · Limit your activities  · Do not drive and operate hazardous machinery  · Do not make important personal or business decisions  · Do  not drink alcoholic beverages  · If you have not urinated within 8 hours after discharge, please contact your surgeon on call.     Report the following to your surgeon:  · Excessive pain, swelling, redness or odor of or around the surgical area  · Temperature over 100.5  · Nausea and vomiting lasting longer than 4 hours or if unable to take medications  · Any signs of decreased circulation or nerve impairment to extremity: change in color, persistent  numbness, tingling, coldness or increase pain  · Any questions    What to do at Home:  Recommended activity: Activity as tolerated    If you experience any of the following symptoms Nausea, vomiting, diarrhea, fever greater than 100.5, dizziness, severe headache, shortness of breath, chest pain, increased pain, please follow up with PCP. *  Please give a list of your current medications to your Primary Care Provider. *  Please update this list whenever your medications are discontinued, doses are      changed, or new medications (including over-the-counter products) are added. *  Please carry medication information at all times in case of emergency situations. These are general instructions for a healthy lifestyle:    No smoking/ No tobacco products/ Avoid exposure to second hand smoke  Surgeon General's Warning:  Quitting smoking now greatly reduces serious risk to your health. Obesity, smoking, and sedentary lifestyle greatly increases your risk for illness    A healthy diet, regular physical exercise & weight monitoring are important for maintaining a healthy lifestyle    You may be retaining fluid if you have a history of heart failure or if you experience any of the following symptoms:  Weight gain of 3 pounds or more overnight or 5 pounds in a week, increased swelling in our hands or feet or shortness of breath while lying flat in bed. Please call your doctor as soon as you notice any of these symptoms; do not wait until your next office visit. Recognize signs and symptoms of STROKE:    F-face looks uneven    A-arms unable to move or move unevenly    S-speech slurred or non-existent    T-time-call 911 as soon as signs and symptoms begin-DO NOT go       Back to bed or wait to see if you get better-TIME IS BRAIN. Warning Signs of HEART ATTACK     Call 911 if you have these symptoms:   Chest discomfort.  Most heart attacks involve discomfort in the center of the chest that lasts more than a few minutes, or that goes away and comes back. It can feel like uncomfortable pressure, squeezing, fullness, or pain.  Discomfort in other areas of the upper body. Symptoms can include pain or discomfort in one or both arms, the back, neck, jaw, or stomach.  Shortness of breath with or without chest discomfort.  Other signs may include breaking out in a cold sweat, nausea, or lightheadedness. Don't wait more than five minutes to call 911 - MINUTES MATTER! Fast action can save your life. Calling 911 is almost always the fastest way to get lifesaving treatment. Emergency Medical Services staff can begin treatment when they arrive -- up to an hour sooner than if someone gets to the hospital by car. The discharge information has been reviewed with the patient and caregiver. The patient and caregiver verbalized understanding. Discharge medications reviewed with the patient and caregiver and appropriate educational materials and side effects teaching were provided.   ___________________________________________________________________________________________________________________________________

## 2018-02-15 NOTE — ROUTINE PROCESS
TRANSFER - OUT REPORT:    Verbal report given to [facility]) on Aurelia Lott  being transferred to [facility]  (unit) for routine progression of care       Report consisted of patients Situation, Background, Assessment and   Recommendations(SBAR). Information from the following report(s) SBAR, Kardex, [facility] and Recent Results was reviewed with the receiving nurse. Lines:   Peripheral IV 02/14/18 Right Antecubital (Active)   Site Assessment Clean, dry, & intact 2/14/2018  7:41 PM   Phlebitis Assessment 0 2/14/2018  7:41 PM   Infiltration Assessment 0 2/14/2018  7:41 PM   Dressing Status Clean, dry, & intact 2/14/2018  7:41 PM   Dressing Type Transparent 2/14/2018  7:41 PM   Hub Color/Line Status Yellow 2/14/2018  7:41 PM        Opportunity for questions and clarification was provided.       Patient transported with:   LMT

## 2018-02-15 NOTE — DIABETES MGMT
Glycemic Control Plan of Care      POC BG range on 02/14/2018: 106-306 mg/dL. Patient on lantus 25 units daily plus correctional insulin at very resistant dose. Observed patient eating large container of mixed fresh fruits and encouraged to limit intake. POC BG report on 02/15/2018 at time of review: 259 mg/dL. Per CM, patient to nursing home facility today. No extra food noted at bedside this morning. Patient stated that she finished them yesterday. Recommendation(s):  1.) Continue current insulin orders and dose: basal and correctional.  2.) 02/12/2018: Completed diabetes education with patient's sister, Ms. Clara Esteban, see separate notes. She will go to [facility] and get a new BG meter, [employer] Prime for the patient. Also received information that patient is on pen insulin daily but cannot remember the name and dose. Recommend HH referral for assessment of home diabetes management and continued education. Assessment:  Patient is 58year old with past medical history including type 2 diabetes mellitus, diabetic neuropathy, asthma, emphysema, cocaine abuseand hepatitis C, hypertension - was admitted on 02/07/2018 with altered mental status and blood glucose of >2000 mg/dL. Patient was admitted to ICU. Noted:  Acute respiratory failure. Status extubation. HHS, improved. Acute metabolic encephalopathy. New onset seizures. Uncontrolled type 2 diabetes mellitus with current A1c 15.7% (02/07/2018). Most recent blood glucose values:    Results for Lucille Phi (MRN [id number]) as of 2/15/2018 11:08   Ref. Range 2/14/2018 00:52 2/14/2018 07:33 2/14/2018 12:48 2/14/2018 18:09 2/14/2018 22:13   GLUCOSE,FAST - POC Latest Ref Range: 70 - 110 mg/dL 106 171 (H) 306 (H) 197 (H) 312 (H)     Results for Lucille Phi (MRN [id number]) as of 2/15/2018 11:08   Ref.  Range 2/15/2018 08:48   GLUCOSE,FAST - POC Latest Ref Range: 70 - 110 mg/dL 259 (H)     Current A1C: 15.7% (02/07/2018) is equivalent to average blood glucose of 411 mg/dL during the past 2-3 months. Current hospital diabetes medications:  Basal lantus insulin 25 units daily. Correctional lispro insulin ACHS. Very resistant dose. Total daily dose insulin requirement previous day: 02/14/2018:  Lantus: 25 units  Lispro: 30 units  TDD: 55 units of insulin. Home diabetes medications: Patient stated on 02/12/2018 that she was not taking any diabetes medications prior to admission. Noted the following meds listed PTA:  Glimperide 2 mg daily every morning. Janumet  mg daily. Diet: Diabetic consistent carb regular    Goals:  Blood glucose will be within target range of  mg/dL by 02/18/2018. Education:  _X__  Refer to Diabetes Education Record: 02/12/2018. Completed with patient's sister, Ms. Jo Busby.              ___  Education not indicated at this time:     Roberto Sanders RN CCM

## 2018-02-19 LAB
ALDOST SERPL-MCNC: <1 NG/DL (ref 0–30)
RENIN PLAS-CCNC: <0.167 NG/ML/HR (ref 0.17–5.38)
SPECIMEN SOURCE: ABNORMAL

## 2018-08-17 ENCOUNTER — HOSPITAL ENCOUNTER (OUTPATIENT)
Dept: MRI IMAGING | Age: 63
Discharge: HOME | End: 2018-08-17
Attending: PSYCHIATRY & NEUROLOGY
Payer: MEDICARE

## 2018-08-17 VITALS — BODY MASS INDEX: 28.19 KG/M2 | WEIGHT: 180 LBS

## 2018-08-17 DIAGNOSIS — R41.3 MEMORY LOSS: ICD-10-CM

## 2018-08-17 LAB — CREAT UR-MCNC: 0.7 MG/DL (ref 0.6–1.3)

## 2018-08-17 PROCEDURE — 74011250636 HC RX REV CODE- 250/636: Performed by: PSYCHIATRY & NEUROLOGY

## 2018-08-17 PROCEDURE — 70553 MRI BRAIN STEM W/O & W/DYE: CPT

## 2018-08-17 PROCEDURE — A9575 INJ GADOTERATE MEGLUMI 0.1ML: HCPCS | Performed by: PSYCHIATRY & NEUROLOGY

## 2018-08-17 PROCEDURE — 82565 ASSAY OF CREATININE: CPT

## 2018-08-17 RX ORDER — GADOTERATE MEGLUMINE 376.9 MG/ML
15 INJECTION INTRAVENOUS
Status: COMPLETED | OUTPATIENT
Start: 2018-08-17 | End: 2018-08-17

## 2018-08-17 RX ADMIN — GADOTERATE MEGLUMINE 15 ML: 376.9 INJECTION INTRAVENOUS at 15:05

## 2019-03-13 NOTE — PROGRESS NOTES
Problem: Self Care Deficits Care Plan (Adult)  Goal: *Acute Goals and Plan of Care (Insert Text)  Occupational Therapy Goals  Initiated 2/13/2018 within 7 day(s). 1.  Patient will perform lower body dressing with supervision/set-up. 2.  Patient will perform functional task in standing for 3 minutes with supervision for balance. 3.  Patient will perform toilet transfers with supervision/set-up. 4.  Patient will participate in upper extremity therapeutic exercise/activities with supervision/set-up for 8 minutes to increase endurance for ADLs. Outcome: Progressing Towards Goal  Occupational Therapy EVALUATION    Patient: Aurelia Delacruz (73 y.o. female)  Date: 2/13/2018  Primary Diagnosis: Hyperosmolar (nonketotic) coma (HCC)  Acidosis  Respiratory failure (HCC)  Shock ([facility] [address]        Precautions:   Fall    ASSESSMENT :  Based on the objective data described below, the patient presents with decreased ADLs, decreased functional mobility and poor endurance vs her independent PLOF. Patient fatigues quickly during session but attempts functional tasks. She states that she has not slept at all. Patient able to sit on EOB and perform self care tasks with supervision but has increased difficulty in standing. She was able to stand briefly before returning to supine impulsively. Recommend continued therapy after discharge at SNF to increase patient's functional independence for safe return to home. Patient will benefit from skilled intervention to address the above impairments.   Patients rehabilitation potential is considered to be Good  Factors which may influence rehabilitation potential include:   []             None noted  []             Mental ability/status  [x]             Medical condition  []             Home/family situation and support systems  []             Safety awareness  []             Pain tolerance/management  []             Other:      PLAN :  Recommendations and Planned Interventions:  [x]               Self Care Training                  [x]        Therapeutic Activities  [x]               Functional Mobility Training    []        Cognitive Retraining  [x]               Therapeutic Exercises           [x]        Endurance Activities  [x]               Balance Training                   []        Neuromuscular Re-Education  []               Visual/Perceptual Training     [x]   Home Safety Training  [x]               Patient Education                 [x]        Family Training/Education  []               Other (comment):    Frequency/Duration: Patient will be followed by occupational therapy 1-2 times per day/4-7 days per week to address goals. Discharge Recommendations: Gustavo Hobson  Further Equipment Recommendations for Discharge: TBD at next level of care     Barriers to Learning/Limitations: None  Compensate with: visual, verbal, tactile, kinesthetic cues/model     PATIENT COMPLEXITY      Eval Complexity: History: MEDIUM Complexity : Expanded review of history including physical, cognitive and psychosocial  history ; Examination: MEDIUM Complexity : 3-5 performance deficits relating to physical, cognitive , or psychosocial skils that result in activity limitations and / or participation restrictions; Decision Making:MEDIUM Complexity : Patient may present with comorbidities that affect occupational performnce. Miniml to moderate modification of tasks or assistance (eg, physical or verbal ) with assesment(s) is necessary to enable patient to complete evaluation  Assessment: Moderate Complexity     G-CODES:     Self Care  Current  CJ= 20-39%   Goal  CI= 1-19%. The severity rating is based on the Level of Assistance required for Functional Mobility and ADLs. SUBJECTIVE:   Patient stated I don't even know.     OBJECTIVE DATA SUMMARY:     Past Medical History:   Diagnosis Date    Arthritis     Asthma     Chronic pain     BACK PAIN    Diabetes [facility] [address]  Diabetic neuropathy (HCC)     Emphysema     Hepatitis C     Hypertension     Nervousness     Osteoarthritis of both knees      Past Surgical History:   Procedure Laterality Date    HX CARPAL TUNNEL RELEASE Right 8/31/09    Dr. Lou Grande       Prior Level of Function/Home Situation: Pt was independent with basic self care tasks and functional mobility PTA. Home Situation  Home Environment: Private residence  One/Two Story Residence: One story  Living Alone: No  Support Systems: Child(montse), Family member(s)  Patient Expects to be Discharged to[de-identified] Private residence  Current DME Used/Available at Home: None  Tub or Shower Type: Tub/Shower combination  [x]  Right hand dominant   []  Left hand dominant  Cognitive/Behavioral Status:  Neurologic State: Drowsy  Orientation Level: Oriented to person;Oriented to place;Oriented to situation  Cognition: Follows commands  Safety/Judgement: Awareness of environment; Fall prevention    Skin: Intact on UEs    Edema: None noted in UEs    Vision/Perceptual:    Acuity: Within Defined Limits      Coordination:  Fine Motor Skills-Upper: Left Intact; Right Intact    Gross Motor Skills-Upper: Left Intact; Right Intact    Balance:  Sitting: Intact  Standing: With support    Strength:  Strength: Within functional limits (UEs; endurance is poor)    Tone & Sensation:  Tone: Normal (UEs)  Sensation: Intact (UEs)    Range of Motion:  AROM: Within functional limits (UEs)    Functional Mobility and Transfers for ADLs:  Bed Mobility:  Supine to Sit: Minimum assistance  Sit to Supine: Contact guard assistance  Transfers:  Sit to Stand: Minimum assistance   Toilet Transfer : Minimum assistance    ADL Assessment:  Feeding: Setup    Oral Facial Hygiene/Grooming: Setup    Bathing: Minimum assistance    Upper Body Dressing: Setup    Lower Body Dressing: Minimum assistance    Toileting:  Total assistance (means catheter)    Pain:  Pt reports 0/10 pain or discomfort prior to treatment.    Pt reports 0/10 pain or discomfort post treatment. Activity Tolerance:   Fair    Please refer to the flowsheet for vital signs taken during this treatment. After treatment:   [] Patient left in no apparent distress sitting up in chair  [x] Patient left in no apparent distress in bed  [x] Call bell left within reach  [] Nursing notified  [] Caregiver present  [] Bed alarm activated    COMMUNICATION/EDUCATION:   [x] Home safety education was provided and the patient/caregiver indicated understanding. [x] Patient/family have participated as able in goal setting and plan of care. [x] Patient/family agree to work toward stated goals and plan of care. [] Patient understands intent and goals of therapy, but is neutral about his/her participation. [] Patient is unable to participate in goal setting and plan of care.     Thank you for this referral.  [place] A[place] MS OTR/L  Time Calculation: 15 mins [History reviewed] : History reviewed. [Medications and Allergies reviewed] : Medications and allergies reviewed.

## 2019-04-02 ENCOUNTER — HOSPITAL ENCOUNTER (OUTPATIENT)
Dept: CT IMAGING | Age: 64
Discharge: HOME | End: 2019-04-02
Attending: PSYCHIATRY & NEUROLOGY
Payer: MEDICARE

## 2019-04-02 DIAGNOSIS — R41.3 OTHER AMNESIA: ICD-10-CM

## 2019-04-02 DIAGNOSIS — R94.02 ABNORMAL BRAIN SCAN: ICD-10-CM

## 2019-04-02 PROCEDURE — 70450 CT HEAD/BRAIN W/O DYE: CPT

## 2020-05-29 ENCOUNTER — HOSPITAL ENCOUNTER (OUTPATIENT)
Dept: ULTRASOUND IMAGING | Age: 65
Discharge: HOME | End: 2020-05-29
Attending: INTERNAL MEDICINE
Payer: MEDICARE

## 2020-05-29 DIAGNOSIS — B19.20 HEPATITIS C: ICD-10-CM

## 2020-05-29 PROCEDURE — 76981 USE PARENCHYMA: CPT

## 2020-12-10 ENCOUNTER — APPOINTMENT (OUTPATIENT)
Dept: NON INVASIVE DIAGNOSTICS | Age: 65
DRG: 003 | End: 2020-12-10
Attending: PHYSICIAN ASSISTANT
Payer: MEDICARE

## 2020-12-10 ENCOUNTER — APPOINTMENT (OUTPATIENT)
Dept: GENERAL RADIOLOGY | Age: 65
End: 2020-12-10
Attending: EMERGENCY MEDICINE
Payer: MEDICARE

## 2020-12-10 ENCOUNTER — HOSPITAL ENCOUNTER (EMERGENCY)
Age: 65
Discharge: ANOTHER HOSPITAL | End: 2020-12-10
Attending: EMERGENCY MEDICINE | Admitting: EMERGENCY MEDICINE
Payer: MEDICARE

## 2020-12-10 ENCOUNTER — HOSPITAL ENCOUNTER (INPATIENT)
Age: 65
LOS: 35 days | Discharge: NURSING, REHAB OR LONG-TERM CARE | DRG: 003 | End: 2021-01-14
Attending: EMERGENCY MEDICINE | Admitting: INTERNAL MEDICINE
Payer: MEDICARE

## 2020-12-10 ENCOUNTER — APPOINTMENT (OUTPATIENT)
Dept: NON INVASIVE DIAGNOSTICS | Age: 65
End: 2020-12-10
Attending: EMERGENCY MEDICINE
Payer: MEDICARE

## 2020-12-10 VITALS
WEIGHT: 179.9 LBS | TEMPERATURE: 99.8 F | SYSTOLIC BLOOD PRESSURE: 109 MMHG | RESPIRATION RATE: 28 BRPM | DIASTOLIC BLOOD PRESSURE: 73 MMHG | BODY MASS INDEX: 28.18 KG/M2 | OXYGEN SATURATION: 100 % | HEART RATE: 77 BPM

## 2020-12-10 DIAGNOSIS — J84.10 PULMONARY FIBROSIS (HCC): ICD-10-CM

## 2020-12-10 DIAGNOSIS — E13.49 OTHER DIABETIC NEUROLOGICAL COMPLICATION ASSOCIATED WITH OTHER SPECIFIED DIABETES MELLITUS (HCC): ICD-10-CM

## 2020-12-10 DIAGNOSIS — R13.10 DYSPHAGIA, UNSPECIFIED TYPE: ICD-10-CM

## 2020-12-10 DIAGNOSIS — I21.3 ST ELEVATION MYOCARDIAL INFARCTION (STEMI), UNSPECIFIED ARTERY (HCC): Primary | ICD-10-CM

## 2020-12-10 DIAGNOSIS — E87.6 HYPOKALEMIA: ICD-10-CM

## 2020-12-10 DIAGNOSIS — Z93.1 S/P PERCUTANEOUS ENDOSCOPIC GASTROSTOMY (PEG) TUBE PLACEMENT (HCC): ICD-10-CM

## 2020-12-10 DIAGNOSIS — F03.90 DEMENTIA WITHOUT BEHAVIORAL DISTURBANCE, UNSPECIFIED DEMENTIA TYPE: ICD-10-CM

## 2020-12-10 DIAGNOSIS — R78.81 GRAM-NEGATIVE BACTEREMIA: ICD-10-CM

## 2020-12-10 DIAGNOSIS — Z71.89 GOALS OF CARE, COUNSELING/DISCUSSION: ICD-10-CM

## 2020-12-10 DIAGNOSIS — I49.8 OTHER CARDIAC ARRHYTHMIA: ICD-10-CM

## 2020-12-10 DIAGNOSIS — I10 ESSENTIAL HYPERTENSION: ICD-10-CM

## 2020-12-10 DIAGNOSIS — E43 SEVERE PROTEIN-CALORIE MALNUTRITION (HCC): ICD-10-CM

## 2020-12-10 DIAGNOSIS — I50.23 ACUTE ON CHRONIC SYSTOLIC CONGESTIVE HEART FAILURE (HCC): ICD-10-CM

## 2020-12-10 DIAGNOSIS — D64.9 ANEMIA, UNSPECIFIED TYPE: ICD-10-CM

## 2020-12-10 DIAGNOSIS — E11.00 DM HYPEROSMOLARITY TYPE II, UNCONTROLLED (HCC): ICD-10-CM

## 2020-12-10 DIAGNOSIS — R57.9 SHOCK (HCC): ICD-10-CM

## 2020-12-10 DIAGNOSIS — R65.21 SEVERE SEPSIS WITH SEPTIC SHOCK (HCC): ICD-10-CM

## 2020-12-10 DIAGNOSIS — E87.0 HYPERNATREMIA: ICD-10-CM

## 2020-12-10 DIAGNOSIS — J96.01 ACUTE RESPIRATORY FAILURE WITH HYPOXIA (HCC): ICD-10-CM

## 2020-12-10 DIAGNOSIS — I95.9 HYPOTENSION, UNSPECIFIED HYPOTENSION TYPE: ICD-10-CM

## 2020-12-10 DIAGNOSIS — I21.09 ST ELEVATION MYOCARDIAL INFARCTION (STEMI) OF ANTERIOR WALL (HCC): Primary | ICD-10-CM

## 2020-12-10 DIAGNOSIS — Z95.5 STATUS POST CORONARY ARTERY STENT PLACEMENT: ICD-10-CM

## 2020-12-10 DIAGNOSIS — I50.21 SYSTOLIC CHF, ACUTE (HCC): ICD-10-CM

## 2020-12-10 DIAGNOSIS — A41.9 SEVERE SEPSIS WITH SEPTIC SHOCK (HCC): ICD-10-CM

## 2020-12-10 DIAGNOSIS — G93.40 ACUTE ENCEPHALOPATHY: ICD-10-CM

## 2020-12-10 DIAGNOSIS — I21.4 NSTEMI (NON-ST ELEVATED MYOCARDIAL INFARCTION) (HCC): ICD-10-CM

## 2020-12-10 DIAGNOSIS — J81.0 ACUTE PULMONARY EDEMA (HCC): ICD-10-CM

## 2020-12-10 DIAGNOSIS — J96.21 ACUTE ON CHRONIC RESPIRATORY FAILURE WITH HYPOXIA (HCC): ICD-10-CM

## 2020-12-10 DIAGNOSIS — E11.65 DM HYPEROSMOLARITY TYPE II, UNCONTROLLED (HCC): ICD-10-CM

## 2020-12-10 DIAGNOSIS — J69.0 ASPIRATION PNEUMONIA OF BOTH LOWER LOBES DUE TO GASTRIC SECRETIONS (HCC): ICD-10-CM

## 2020-12-10 DIAGNOSIS — F03.91 DEMENTIA WITH BEHAVIORAL DISTURBANCE, UNSPECIFIED DEMENTIA TYPE: ICD-10-CM

## 2020-12-10 DIAGNOSIS — Z93.0 TRACHEOSTOMY IN PLACE (HCC): ICD-10-CM

## 2020-12-10 DIAGNOSIS — R50.9 FEBRILE ILLNESS, ACUTE: ICD-10-CM

## 2020-12-10 DIAGNOSIS — I21.02 ST ELEVATION MYOCARDIAL INFARCTION INVOLVING LEFT ANTERIOR DESCENDING (LAD) CORONARY ARTERY (HCC): ICD-10-CM

## 2020-12-10 DIAGNOSIS — E11.8 CONTROLLED TYPE 2 DIABETES MELLITUS WITH COMPLICATION, WITHOUT LONG-TERM CURRENT USE OF INSULIN (HCC): ICD-10-CM

## 2020-12-10 DIAGNOSIS — Z99.11 VENTILATOR DEPENDENCE (HCC): ICD-10-CM

## 2020-12-10 DIAGNOSIS — J84.9 ILD (INTERSTITIAL LUNG DISEASE) (HCC): ICD-10-CM

## 2020-12-10 LAB
ALBUMIN SERPL-MCNC: 2.7 G/DL (ref 3.4–5)
ALBUMIN/GLOB SERPL: 0.5 {RATIO} (ref 0.8–1.7)
ALP SERPL-CCNC: 86 U/L (ref 45–117)
ALT SERPL-CCNC: 49 U/L (ref 13–56)
ANION GAP SERPL CALC-SCNC: 9 MMOL/L (ref 3–18)
APTT PPP: 30.1 SEC (ref 23–36.4)
APTT PPP: 40 SEC (ref 23–36.4)
APTT PPP: 81 SEC (ref 23–36.4)
AST SERPL-CCNC: 109 U/L (ref 10–38)
ATRIAL RATE: 82 BPM
BASOPHILS # BLD: 0 K/UL (ref 0–0.1)
BASOPHILS NFR BLD: 0 % (ref 0–2)
BILIRUB DIRECT SERPL-MCNC: 0.4 MG/DL (ref 0–0.2)
BILIRUB SERPL-MCNC: 0.7 MG/DL (ref 0.2–1)
BUN SERPL-MCNC: 14 MG/DL (ref 7–18)
BUN/CREAT SERPL: 25 (ref 12–20)
CALCIUM SERPL-MCNC: 9.1 MG/DL (ref 8.5–10.1)
CALCULATED P AXIS, ECG09: 51 DEGREES
CALCULATED R AXIS, ECG10: -58 DEGREES
CALCULATED T AXIS, ECG11: 30 DEGREES
CHLORIDE SERPL-SCNC: 104 MMOL/L (ref 100–111)
CK MB CFR SERPL CALC: 10.3 % (ref 0–4)
CK MB CFR SERPL CALC: 13 % (ref 0–4)
CK MB CFR SERPL CALC: 8.8 % (ref 0–4)
CK MB SERPL-MCNC: 16.6 NG/ML (ref 5–25)
CK MB SERPL-MCNC: 18.4 NG/ML (ref 5–25)
CK MB SERPL-MCNC: 60.5 NG/ML (ref 5–25)
CK SERPL-CCNC: 179 U/L (ref 26–192)
CK SERPL-CCNC: 189 U/L (ref 26–192)
CK SERPL-CCNC: 466 U/L (ref 26–192)
CO2 SERPL-SCNC: 23 MMOL/L (ref 21–32)
COVID-19 RAPID TEST, COVR: NOT DETECTED
CREAT SERPL-MCNC: 0.55 MG/DL (ref 0.6–1.3)
DIAGNOSIS, 93000: NORMAL
DIFFERENTIAL METHOD BLD: ABNORMAL
ECHO AO ROOT DIAM: 3.76 CM
ECHO LA AREA 4C: 13.83 CM2
ECHO LA VOL 2C: 61.74 ML (ref 22–52)
ECHO LA VOL 4C: 25.76 ML (ref 22–52)
ECHO LA VOL BP: 46.31 ML (ref 22–52)
ECHO LA VOL/BSA BIPLANE: 24.01 ML/M2 (ref 16–28)
ECHO LA VOLUME INDEX A2C: 32.01 ML/M2 (ref 16–28)
ECHO LA VOLUME INDEX A4C: 13.35 ML/M2 (ref 16–28)
ECHO LV INTERNAL DIMENSION DIASTOLIC: 4.2 CM (ref 3.9–5.3)
ECHO LV INTERNAL DIMENSION SYSTOLIC: 3.47 CM
ECHO LV IVSD: 1.05 CM (ref 0.6–0.9)
ECHO LV MASS 2D: 155 G (ref 67–162)
ECHO LV MASS INDEX 2D: 80.4 G/M2 (ref 43–95)
ECHO LV POSTERIOR WALL DIASTOLIC: 1.13 CM (ref 0.6–0.9)
ECHO LVOT DIAM: 1.87 CM
ECHO RV INTERNAL DIMENSION: 3 CM
ECHO TV REGURGITANT MAX VELOCITY: 237.35 CM/S
ECHO TV REGURGITANT PEAK GRADIENT: 22.53 MMHG
EOSINOPHIL # BLD: 0.1 K/UL (ref 0–0.4)
EOSINOPHIL NFR BLD: 2 % (ref 0–5)
ERYTHROCYTE [DISTWIDTH] IN BLOOD BY AUTOMATED COUNT: 12.3 % (ref 11.6–14.5)
ERYTHROCYTE [DISTWIDTH] IN BLOOD BY AUTOMATED COUNT: 12.5 % (ref 11.6–14.5)
EST. AVERAGE GLUCOSE BLD GHB EST-MCNC: 192 MG/DL
GLOBULIN SER CALC-MCNC: 5.9 G/DL (ref 2–4)
GLUCOSE BLD STRIP.AUTO-MCNC: 134 MG/DL (ref 70–110)
GLUCOSE BLD STRIP.AUTO-MCNC: 162 MG/DL (ref 70–110)
GLUCOSE SERPL-MCNC: 202 MG/DL (ref 74–99)
HBA1C MFR BLD: 8.3 % (ref 4.2–5.6)
HCT VFR BLD AUTO: 38.2 % (ref 35–45)
HCT VFR BLD AUTO: 43.3 % (ref 35–45)
HGB BLD-MCNC: 12.8 G/DL (ref 12–16)
HGB BLD-MCNC: 13.9 G/DL (ref 12–16)
INR PPP: 1.2 (ref 0.8–1.2)
LYMPHOCYTES # BLD: 1 K/UL (ref 0.9–3.6)
LYMPHOCYTES NFR BLD: 16 % (ref 21–52)
MCH RBC QN AUTO: 27.5 PG (ref 24–34)
MCH RBC QN AUTO: 27.5 PG (ref 24–34)
MCHC RBC AUTO-ENTMCNC: 32.1 G/DL (ref 31–37)
MCHC RBC AUTO-ENTMCNC: 33.5 G/DL (ref 31–37)
MCV RBC AUTO: 82 FL (ref 74–97)
MCV RBC AUTO: 85.6 FL (ref 74–97)
MONOCYTES # BLD: 0.7 K/UL (ref 0.05–1.2)
MONOCYTES NFR BLD: 11 % (ref 3–10)
NEUTS SEG # BLD: 4.4 K/UL (ref 1.8–8)
NEUTS SEG NFR BLD: 71 % (ref 40–73)
P-R INTERVAL, ECG05: 158 MS
PLATELET # BLD AUTO: 559 K/UL (ref 135–420)
PLATELET # BLD AUTO: 638 K/UL (ref 135–420)
PMV BLD AUTO: 9.1 FL (ref 9.2–11.8)
PMV BLD AUTO: 9.3 FL (ref 9.2–11.8)
POTASSIUM SERPL-SCNC: 3.7 MMOL/L (ref 3.5–5.5)
PROT SERPL-MCNC: 8.6 G/DL (ref 6.4–8.2)
PROTHROMBIN TIME: 14.5 SEC (ref 11.5–15.2)
Q-T INTERVAL, ECG07: 370 MS
QRS DURATION, ECG06: 88 MS
QTC CALCULATION (BEZET), ECG08: 432 MS
RBC # BLD AUTO: 4.66 M/UL (ref 4.2–5.3)
RBC # BLD AUTO: 5.06 M/UL (ref 4.2–5.3)
SODIUM SERPL-SCNC: 136 MMOL/L (ref 136–145)
SOURCE, COVRS: NORMAL
SPECIMEN TYPE, XMCV1T: NORMAL
TROPONIN I BLD-MCNC: 2.29 NG/ML (ref 0–0.08)
TROPONIN I SERPL-MCNC: 18.1 NG/ML (ref 0–0.04)
TROPONIN I SERPL-MCNC: 4.79 NG/ML (ref 0–0.04)
TROPONIN I SERPL-MCNC: 4.83 NG/ML (ref 0–0.04)
TSH SERPL DL<=0.05 MIU/L-ACNC: 2.92 UIU/ML (ref 0.36–3.74)
VENTRICULAR RATE, ECG03: 82 BPM
WBC # BLD AUTO: 6.3 K/UL (ref 4.6–13.2)
WBC # BLD AUTO: 8.3 K/UL (ref 4.6–13.2)

## 2020-12-10 PROCEDURE — 93306 TTE W/DOPPLER COMPLETE: CPT | Performed by: INTERNAL MEDICINE

## 2020-12-10 PROCEDURE — 85025 COMPLETE CBC W/AUTO DIFF WBC: CPT

## 2020-12-10 PROCEDURE — 74011250637 HC RX REV CODE- 250/637: Performed by: INTERNAL MEDICINE

## 2020-12-10 PROCEDURE — 96374 THER/PROPH/DIAG INJ IV PUSH: CPT

## 2020-12-10 PROCEDURE — 65660000004 HC RM CVT STEPDOWN

## 2020-12-10 PROCEDURE — 99285 EMERGENCY DEPT VISIT HI MDM: CPT

## 2020-12-10 PROCEDURE — 82962 GLUCOSE BLOOD TEST: CPT

## 2020-12-10 PROCEDURE — 96365 THER/PROPH/DIAG IV INF INIT: CPT

## 2020-12-10 PROCEDURE — 84484 ASSAY OF TROPONIN QUANT: CPT

## 2020-12-10 PROCEDURE — 99223 1ST HOSP IP/OBS HIGH 75: CPT | Performed by: INTERNAL MEDICINE

## 2020-12-10 PROCEDURE — 82553 CREATINE MB FRACTION: CPT

## 2020-12-10 PROCEDURE — 80048 BASIC METABOLIC PNL TOTAL CA: CPT

## 2020-12-10 PROCEDURE — 74011250636 HC RX REV CODE- 250/636: Performed by: INTERNAL MEDICINE

## 2020-12-10 PROCEDURE — 83036 HEMOGLOBIN GLYCOSYLATED A1C: CPT

## 2020-12-10 PROCEDURE — 85730 THROMBOPLASTIN TIME PARTIAL: CPT

## 2020-12-10 PROCEDURE — 85610 PROTHROMBIN TIME: CPT

## 2020-12-10 PROCEDURE — 85027 COMPLETE CBC AUTOMATED: CPT

## 2020-12-10 PROCEDURE — 82550 ASSAY OF CK (CPK): CPT

## 2020-12-10 PROCEDURE — 93005 ELECTROCARDIOGRAM TRACING: CPT

## 2020-12-10 PROCEDURE — 93306 TTE W/DOPPLER COMPLETE: CPT

## 2020-12-10 PROCEDURE — 74011250637 HC RX REV CODE- 250/637: Performed by: EMERGENCY MEDICINE

## 2020-12-10 PROCEDURE — 84443 ASSAY THYROID STIM HORMONE: CPT

## 2020-12-10 PROCEDURE — 71045 X-RAY EXAM CHEST 1 VIEW: CPT

## 2020-12-10 PROCEDURE — 87635 SARS-COV-2 COVID-19 AMP PRB: CPT

## 2020-12-10 PROCEDURE — 74011636637 HC RX REV CODE- 636/637: Performed by: NURSE PRACTITIONER

## 2020-12-10 PROCEDURE — 94762 N-INVAS EAR/PLS OXIMTRY CONT: CPT

## 2020-12-10 PROCEDURE — 36415 COLL VENOUS BLD VENIPUNCTURE: CPT

## 2020-12-10 PROCEDURE — 80076 HEPATIC FUNCTION PANEL: CPT

## 2020-12-10 PROCEDURE — 2709999900 HC NON-CHARGEABLE SUPPLY

## 2020-12-10 RX ORDER — ACETAMINOPHEN 650 MG/1
650 SUPPOSITORY RECTAL
Status: DISCONTINUED | OUTPATIENT
Start: 2020-12-10 | End: 2021-01-14 | Stop reason: HOSPADM

## 2020-12-10 RX ORDER — DOCUSATE SODIUM 100 MG/1
100 CAPSULE, LIQUID FILLED ORAL 2 TIMES DAILY
Status: DISCONTINUED | OUTPATIENT
Start: 2020-12-10 | End: 2020-12-10 | Stop reason: HOSPADM

## 2020-12-10 RX ORDER — DEXTROSE 50 % IN WATER (D50W) INTRAVENOUS SYRINGE
25-50 AS NEEDED
Status: DISCONTINUED | OUTPATIENT
Start: 2020-12-10 | End: 2021-01-14 | Stop reason: HOSPADM

## 2020-12-10 RX ORDER — METOPROLOL TARTRATE 5 MG/5ML
5 INJECTION INTRAVENOUS
Status: DISPENSED | OUTPATIENT
Start: 2020-12-10 | End: 2020-12-10

## 2020-12-10 RX ORDER — SODIUM CHLORIDE 0.9 % (FLUSH) 0.9 %
5-40 SYRINGE (ML) INJECTION EVERY 8 HOURS
Status: DISCONTINUED | OUTPATIENT
Start: 2020-12-10 | End: 2020-12-10 | Stop reason: HOSPADM

## 2020-12-10 RX ORDER — HEPARIN SODIUM 10000 [USP'U]/100ML
INJECTION, SOLUTION INTRAVENOUS
Status: DISCONTINUED
Start: 2020-12-10 | End: 2020-12-10 | Stop reason: HOSPADM

## 2020-12-10 RX ORDER — PROMETHAZINE HYDROCHLORIDE 12.5 MG/1
12.5 TABLET ORAL
Status: DISCONTINUED | OUTPATIENT
Start: 2020-12-10 | End: 2021-01-14 | Stop reason: HOSPADM

## 2020-12-10 RX ORDER — HEPARIN SODIUM 10000 [USP'U]/100ML
12-25 INJECTION, SOLUTION INTRAVENOUS
Status: DISCONTINUED | OUTPATIENT
Start: 2020-12-10 | End: 2020-12-10 | Stop reason: HOSPADM

## 2020-12-10 RX ORDER — SODIUM CHLORIDE 0.9 % (FLUSH) 0.9 %
5-40 SYRINGE (ML) INJECTION EVERY 8 HOURS
Status: DISCONTINUED | OUTPATIENT
Start: 2020-12-10 | End: 2021-01-14 | Stop reason: HOSPADM

## 2020-12-10 RX ORDER — MAGNESIUM SULFATE 100 %
4 CRYSTALS MISCELLANEOUS AS NEEDED
Status: DISCONTINUED | OUTPATIENT
Start: 2020-12-10 | End: 2021-01-14 | Stop reason: HOSPADM

## 2020-12-10 RX ORDER — HEPARIN SODIUM 5000 [USP'U]/ML
5000 INJECTION, SOLUTION INTRAVENOUS; SUBCUTANEOUS ONCE
Status: COMPLETED | OUTPATIENT
Start: 2020-12-10 | End: 2020-12-10

## 2020-12-10 RX ORDER — CLOPIDOGREL 300 MG/1
600 TABLET, FILM COATED ORAL ONCE
Status: COMPLETED | OUTPATIENT
Start: 2020-12-10 | End: 2020-12-10

## 2020-12-10 RX ORDER — SODIUM CHLORIDE 0.9 % (FLUSH) 0.9 %
5-40 SYRINGE (ML) INJECTION AS NEEDED
Status: DISCONTINUED | OUTPATIENT
Start: 2020-12-10 | End: 2021-01-14 | Stop reason: HOSPADM

## 2020-12-10 RX ORDER — ATORVASTATIN CALCIUM 40 MG/1
40 TABLET, FILM COATED ORAL
Status: DISCONTINUED | OUTPATIENT
Start: 2020-12-10 | End: 2021-01-14 | Stop reason: HOSPADM

## 2020-12-10 RX ORDER — SODIUM CHLORIDE 0.9 % (FLUSH) 0.9 %
5-40 SYRINGE (ML) INJECTION AS NEEDED
Status: DISCONTINUED | OUTPATIENT
Start: 2020-12-10 | End: 2020-12-10 | Stop reason: HOSPADM

## 2020-12-10 RX ORDER — ADHESIVE BANDAGE
30 BANDAGE TOPICAL DAILY PRN
Status: DISCONTINUED | OUTPATIENT
Start: 2020-12-10 | End: 2020-12-10 | Stop reason: HOSPADM

## 2020-12-10 RX ORDER — ACETAMINOPHEN 325 MG/1
650 TABLET ORAL
Status: DISCONTINUED | OUTPATIENT
Start: 2020-12-10 | End: 2021-01-14 | Stop reason: HOSPADM

## 2020-12-10 RX ORDER — FUROSEMIDE 10 MG/ML
20 INJECTION INTRAMUSCULAR; INTRAVENOUS ONCE
Status: ACTIVE | OUTPATIENT
Start: 2020-12-10 | End: 2020-12-11

## 2020-12-10 RX ORDER — INSULIN LISPRO 100 [IU]/ML
INJECTION, SOLUTION INTRAVENOUS; SUBCUTANEOUS
Status: DISCONTINUED | OUTPATIENT
Start: 2020-12-10 | End: 2020-12-12

## 2020-12-10 RX ORDER — HEPARIN SODIUM 1000 [USP'U]/ML
4000 INJECTION, SOLUTION INTRAVENOUS; SUBCUTANEOUS ONCE
Status: ACTIVE | OUTPATIENT
Start: 2020-12-10 | End: 2020-12-11

## 2020-12-10 RX ORDER — ONDANSETRON 2 MG/ML
4 INJECTION INTRAMUSCULAR; INTRAVENOUS
Status: DISCONTINUED | OUTPATIENT
Start: 2020-12-10 | End: 2021-01-14 | Stop reason: HOSPADM

## 2020-12-10 RX ORDER — POLYETHYLENE GLYCOL 3350 17 G/17G
17 POWDER, FOR SOLUTION ORAL DAILY PRN
Status: DISCONTINUED | OUTPATIENT
Start: 2020-12-10 | End: 2020-12-16

## 2020-12-10 RX ORDER — GUAIFENESIN 100 MG/5ML
325 LIQUID (ML) ORAL
Status: COMPLETED | OUTPATIENT
Start: 2020-12-10 | End: 2020-12-10

## 2020-12-10 RX ORDER — HEPARIN SODIUM 10000 [USP'U]/100ML
12-25 INJECTION, SOLUTION INTRAVENOUS
Status: DISCONTINUED | OUTPATIENT
Start: 2020-12-10 | End: 2020-12-11

## 2020-12-10 RX ORDER — GUAIFENESIN 100 MG/5ML
81 LIQUID (ML) ORAL DAILY
Status: DISCONTINUED | OUTPATIENT
Start: 2020-12-10 | End: 2021-01-08

## 2020-12-10 RX ORDER — HEPARIN SODIUM 1000 [USP'U]/ML
INJECTION, SOLUTION INTRAVENOUS; SUBCUTANEOUS
Status: DISCONTINUED
Start: 2020-12-10 | End: 2020-12-10 | Stop reason: HOSPADM

## 2020-12-10 RX ADMIN — SODIUM CHLORIDE 10 ML: 9 INJECTION, SOLUTION INTRAMUSCULAR; INTRAVENOUS; SUBCUTANEOUS at 17:59

## 2020-12-10 RX ADMIN — CLOPIDOGREL BISULFATE 600 MG: 300 TABLET, FILM COATED ORAL at 11:24

## 2020-12-10 RX ADMIN — ATORVASTATIN CALCIUM 40 MG: 40 TABLET, FILM COATED ORAL at 22:57

## 2020-12-10 RX ADMIN — ASPIRIN 81 MG CHEWABLE TABLET 81 MG: 81 TABLET CHEWABLE at 22:57

## 2020-12-10 RX ADMIN — ASPIRIN 81 MG CHEWABLE TABLET 324 MG: 81 TABLET CHEWABLE at 11:24

## 2020-12-10 RX ADMIN — HEPARIN SODIUM 5000 UNITS: 5000 INJECTION, SOLUTION INTRAVENOUS; SUBCUTANEOUS at 11:15

## 2020-12-10 RX ADMIN — SODIUM CHLORIDE 20 ML: 9 INJECTION, SOLUTION INTRAMUSCULAR; INTRAVENOUS; SUBCUTANEOUS at 22:00

## 2020-12-10 RX ADMIN — HEPARIN SODIUM AND DEXTROSE 12 UNITS/KG/HR: 10000; 5 INJECTION INTRAVENOUS at 11:19

## 2020-12-10 RX ADMIN — INSULIN LISPRO 2 UNITS: 100 INJECTION, SOLUTION INTRAVENOUS; SUBCUTANEOUS at 18:07

## 2020-12-10 RX ADMIN — DOCUSATE SODIUM 100 MG: 100 CAPSULE, LIQUID FILLED ORAL at 11:24

## 2020-12-10 NOTE — ED NOTES
Sj Agee states patient may go to [place], [address] called, ETA 7-10, spoke with James Fuller

## 2020-12-10 NOTE — ED NOTES
Patient leaving with [facility] to [facility]. Family member Shanti aware. Patient in stable condition.

## 2020-12-10 NOTE — ED NOTES
Alina Breaux RN attempting to call report to [facility] ER. Unable to get through to [facility] ER. Gabriel Keenan RN, attempted to call as well and unable to get through.

## 2020-12-10 NOTE — CONSULTS
[facility] - Consult Note    Consultation request by No admitting provider for patient encounter. for advice/opinion related to evaluating No admission diagnoses are documented for this encounter. Date of  Admission: 12/10/2020 10:48 AM   Primary Care Physician:  Priyanka Zazueta MD        I saw, evaluated, interviewed and examined the patient personally. I agree with the findings and plan of care as documented below with PA-C note  Patient was brought to emergency department at [facility] from [place] [address] as patient was more confused and short of breath. EKG was performed in emergency department and code STEMI was activated and process to transfer the patient for further cardiac care was initiated. I saw patient at bedside. Patient is alert to herself and she knows she is in the hospital.  She denies any chest pain however she is visibly short of breath and in mild distress. EKG reviewed which showed Q waves in anterolateral lead with ST elevation, with suspicion of late presentation of anterior MI  I did perform bedside echo which showed distal anteroseptal apical distal anterior and inferoapical severe hypokinesis/akinesis with preserved basilar function. This could suggest anterior infarct  I-STAT troponin was performed which was 2.3 and troponin in the lab was 4.69  This could be  subacute MI  Agree with starting IV heparin. Blood pressure is stable  Dr. Rose Ambriz, ED physician already talked to sister who appears to be power of  and according to their conversation, patient is full code and she want her to have any procedure needed including cardiac catheterization if needed. I discussed this with Dr. Mily Christian and who is on-call physician at transferring facility about my initial evaluation.     Jayda Dorsey MD        Assessment:     - [place] [address] patient with c/o SOB, brought to [facility] ER by EMS  - STEMI, initial POC troponin 2.1, ECG with ST elevations anterolateral and inferior leads  - Dr. oMlina Schmidt performed a bedside echo, which showed wall motion abnormalities, apex appeared to be down. Concerning for possible LAD infarct. - Hypertension   - COPD, on home O2 4L NC   - Hepatitis C ? (per patient's sister)   - DM2  - Dementia        Plan:     - STEMI activated by ED  - Patient appears anxious but she is hemodynamically stable   - Heparin gtt, Plavix and ASA given in ED  - Dr. Raymundo Harmon spoke with Dr. Harish Reyes interventional cardiologist at [address] who agrees to see the patient when patient arrives at that facility for further care      History of Present Illness: This is a 72 y.o. female admitted for No admission diagnoses are documented for this encounter. .    Patient complains of:  SOB     Aurelia Kendall is a 72 y.o. female, PMHx Hypertension, COPD, DM2 and dementia, who c/o SOB. Patient was brought in by EMS for c/o SOB. No history was able to obtained from patient b/c    Baseline dementia. Patient is aware of her name and she is aware that she is in the hospital.  She does not complain of any chest pain but she is short of breath. She does not provide any detailed good history. She denies nausea or vomiting.     Cardiac risk factors: diabetes mellitus, hypertension, post-menopausal      Review of Symptoms:  Not obtained d/t AMS      Past Medical History:     Past Medical History:   Diagnosis Date    Arthritis     Asthma     Chronic pain     BACK PAIN    Diabetes ([address]     Diabetic neuropathy ([address]     Emphysema     Hepatitis C     Hypertension     Nervousness     Osteoarthritis of both knees          Social History:     Social History     Socioeconomic History    Marital status: SINGLE     Spouse name: Not on file    Number of children: Not on file    Years of education: Not on file    Highest education level: Not on file   Tobacco Use    Smoking status: Current Every Day Smoker     Packs/day: 1.00   Substance and Sexual Activity    Alcohol use: Yes     Comment: socially    Drug use: Yes     Types: Cocaine    Sexual activity: Never        Family History:     Family History   Problem Relation Age of Onset    Diabetes Mother     Hypertension Mother     Obesity Mother     Alcohol abuse Father     High Cholesterol Father     Lung Disease Father     Stroke Father     Arthritis-osteo Sister     Depression Sister     Diabetes Sister     Hypertension Sister     Obesity Sister     Arthritis-osteo Brother     Diabetes Brother     Hypertension Brother     Obesity Brother         Medications:   No Known Allergies     Current Facility-Administered Medications   Medication Dose Route Frequency    sodium chloride (NS) flush 5-40 mL  5-40 mL IntraVENous Q8H    sodium chloride (NS) flush 5-40 mL  5-40 mL IntraVENous PRN    magnesium hydroxide (MILK OF MAGNESIA) 400 mg/5 mL oral suspension 30 mL  30 mL Oral DAILY PRN    docusate sodium (COLACE) capsule 100 mg  100 mg Oral BID    heparin 25,000 units in D5W 250 ml 25,000 unit/250 mL(100 unit/mL) infusion        heparin (porcine) 1,000 unit/mL injection        heparin 25,000 units in D5W 250 ml infusion  12-25 Units/kg/hr IntraVENous TITRATE     Current Outpatient Medications   Medication Sig    folic acid (FOLVITE) 1 mg tablet 1 Tab by Per NG tube route daily.  insulin glargine (LANTUS) 100 unit/mL injection lantus 22 units subq daily    magnesium oxide (MAG-OX) 400 mg tablet Take 1 Tab by mouth two (2) times a day.  tamsulosin (FLOMAX) 0.4 mg capsule Take 1 tab by mouth daily until kidney stone passes.  amLODIPine (NORVASC) 5 mg tablet Take 5 mg by mouth daily. Indications: HYPERTENSION    aspirin (ASPIRIN) 325 mg tablet Take 325 mg by mouth daily.  VITAMIN E (FORMULA E 400 PO) Take 1 Tab by mouth daily.  losartan (COZAAR) 50 mg tablet Take  by mouth daily. Indications: HYPERTENSION    Melatonin 300 mcg Tab Take  by mouth.       cyanocobalamin (VITAMIN B-12) 1,500 mcg TbER Take 1 Tab by mouth. Physical Exam:     Visit Vitals  /73   Pulse 77   Temp 99.8 °F (37.7 °C)   Resp 28   Wt 179 lb 14.3 oz (81.6 kg)   SpO2 100%   BMI 28.18 kg/m²     BP Readings from Last 3 Encounters:   12/10/20 109/73   02/15/18 113/66   02/05/18 160/73     Pulse Readings from Last 3 Encounters:   12/10/20 77   02/15/18 79   02/05/18 75     Wt Readings from Last 3 Encounters:   12/10/20 179 lb 14.3 oz (81.6 kg)   08/17/18 180 lb (81.6 kg)   02/15/18 165 lb 8 oz (75.1 kg)       General:  alert, cooperative, mild distress, appears older than stated age, confused  Neck:  nontender  Lungs: Decreased breath sound at base but no significant rales  Heart:  regular rate and rhythm, S1, S2 normal, no murmur, click, rub or gallop  Abdomen:  abdomen is soft without significant tenderness,   Extremities: No significant lower extremity edema noted  Skin: Warm and dry.  no hyperpigmentation, vitiligo, or suspicious lesions     Data Review:     Recent Labs     12/10/20  1107   WBC 8.3   HGB 13.9   HCT 43.3   *     Recent Labs     12/10/20  1107      K 3.7      CO2 23   *   BUN 14   CREA 0.55*   CA 9.1   ALB 2.7*   ALT 49   INR 1.2       Results for orders placed or performed during the hospital encounter of 12/10/20   EKG, 12 LEAD, INITIAL   Result Value Ref Range    Ventricular Rate 86 BPM    Atrial Rate 86 BPM    P-R Interval 160 ms    QRS Duration 78 ms    Q-T Interval 312 ms    QTC Calculation (Bezet) 373 ms    Calculated P Axis 34 degrees    Calculated R Axis -15 degrees    Calculated T Axis 23 degrees    Diagnosis        Critical Test Result: STEMI  Normal sinus rhythm  Possible Left atrial enlargement  Low voltage QRS  Inferior infarct (cited on or before 07-FEB-2018)  Anterolateral infarct , possibly acute  ** ** ACUTE MI / STEMI ** **  Abnormal ECG  When compared with ECG of 07-FEB-2018 21:18,  Significant changes have occurred         All Cardiac Markers in the last 24 hours:    Lab Results   Component Value Date/Time    TNIPOC 2.29 (HH) 12/10/2020 11:12 AM       Last Lipid:  No results found for: CHOL, CHOLX, CHLST, CHOLV, HDL, HDLP, LDL, LDLC, DLDLP, TGLX, TRIGL, TRIGP, CHHD, CHHDX    Signed By: Lawrence Louis PA-C     December 10, 2020

## 2020-12-10 NOTE — Clinical Note
Lesion: Located in the Proximal LAD. Stent inserted. First inflation pressure = 14 lucia; inflation time: 8 sec.

## 2020-12-10 NOTE — ED PROVIDER NOTES
HPI   20-year-old -American female with past medical history of dementia, COPD, diabetes brought in by EMS from [address] for evaluation of shortness of breath and altered mental status. Patient is unable to provide any history of present illness. Per EMS personnel the patient is chronically on home O2 4 L via nasal cannula.   Past Medical History:   Diagnosis Date    Arthritis     Asthma     Chronic pain     BACK PAIN    Diabetes ([address]     Diabetic neuropathy ([facility] [address]     Emphysema     Hepatitis C     Hypertension     Nervousness     Osteoarthritis of both knees        Past Surgical History:   Procedure Laterality Date    HX CARPAL TUNNEL RELEASE Right 8/31/09    Dr. Dottie Gross    HX TUBAL LIGATION           Family History:   Problem Relation Age of Onset    Diabetes Mother     Hypertension Mother     Obesity Mother     Alcohol abuse Father     High Cholesterol Father     Lung Disease Father     Stroke Father     Arthritis-osteo Sister     Depression Sister     Diabetes Sister     Hypertension Sister     Obesity Sister     Arthritis-osteo Brother     Diabetes Brother     Hypertension Brother     Obesity Brother        Social History     Socioeconomic History    Marital status: SINGLE     Spouse name: Not on file    Number of children: Not on file    Years of education: Not on file    Highest education level: Not on file   Occupational History    Not on file   Social Needs    Financial resource strain: Not on file    Food insecurity     Worry: Not on file     Inability: Not on file    Transportation needs     Medical: Not on file     Non-medical: Not on file   Tobacco Use    Smoking status: Current Every Day Smoker     Packs/day: 1.00   Substance and Sexual Activity    Alcohol use: Yes     Comment: socially    Drug use: Yes     Types: Cocaine    Sexual activity: Never   Lifestyle    Physical activity     Days per week: Not on file     Minutes per session: Not on file    Stress: Not on file   Relationships    Social connections     Talks on phone: Not on file     Gets together: Not on file     Attends Quaker service: Not on file     Active member of club or organization: Not on file     Attends meetings of clubs or organizations: Not on file     Relationship status: Not on file    Intimate partner violence     Fear of current or ex partner: Not on file     Emotionally abused: Not on file     Physically abused: Not on file     Forced sexual activity: Not on file   Other Topics Concern    Not on file   Social History Narrative    Not on file         ALLERGIES: Patient has no known allergies. Review of Systems   Unable to perform ROS: Dementia       Vitals:    12/10/20 1055 12/10/20 1104   BP: 124/74    Pulse: 81    Resp: 19    Temp: 97.8 °F (36.6 °C) 99.8 °F (37.7 °C)   SpO2: 99%    Weight:  81.6 kg (179 lb 14.3 oz)            Physical Exam  Vitals signs and nursing note reviewed. Constitutional:       General: She is in acute distress (Moderate distress ). Appearance: She is well-developed. She is not diaphoretic. HENT:      Head: Normocephalic and atraumatic. Right Ear: External ear normal.      Left Ear: External ear normal.      Nose: Nose normal.      Mouth/Throat:      Pharynx: No oropharyngeal exudate. Eyes:      General: No scleral icterus. Right eye: No discharge. Left eye: No discharge. Conjunctiva/sclera: Conjunctivae normal.      Pupils: Pupils are equal, round, and reactive to light. Neck:      Musculoskeletal: Normal range of motion and neck supple. Thyroid: No thyromegaly. Vascular: No JVD. Trachea: No tracheal deviation. Cardiovascular:      Rate and Rhythm: Normal rate and regular rhythm. Heart sounds: Murmur (Gr I/VI systolic murmur) present. No friction rub. No gallop. Pulmonary:      Effort: No respiratory distress. Breath sounds: No stridor. No wheezing or rales.       Comments: Tachypnea, diminished breath sounds at bases  Chest:      Chest wall: No tenderness. Abdominal:      General: Bowel sounds are normal. There is no distension. Palpations: Abdomen is soft. There is no mass. Tenderness: There is no abdominal tenderness. There is no guarding or rebound. Genitourinary:     Vagina: Normal.   Musculoskeletal: Normal range of motion. General: No tenderness. Lymphadenopathy:      Cervical: No cervical adenopathy. Skin:     General: Skin is warm and dry. Coloration: Skin is not pale. Findings: No erythema or rash. Neurological:      Mental Status: She is alert and oriented to person, place, and time. Cranial Nerves: No cranial nerve deficit. Deep Tendon Reflexes: Reflexes are normal and symmetric. Psychiatric:         Behavior: Behavior normal.         Judgment: Judgment normal.          MDM  Number of Diagnoses or Management Options  ST elevation myocardial infarction (STEMI) of anterior wall [facility]):   Diagnosis management comments: And arrived with chief complaint of shortness of breath and altered mental status. She was placed on a cardiac monitor and ST segment elevation noted on monitor. Twelve-lead EKG and rectal temp were performed. The EKG revealed ST segment elevations and leads I to aVL V 2 through V6 with reciprocal changes noted and leads III and V1 this EKG is changed when compared to EKG performed on February 4, 2018. Code STEMI was activated by Dr. Ana Palacio. Dr. Portia Hernandez (Cardiologist) responded to the ED and evaluated the patient at bedside.          Amount and/or Complexity of Data Reviewed  Clinical lab tests: ordered and reviewed  Tests in the radiology section of CPT®: ordered and reviewed  Tests in the medicine section of CPT®: ordered and reviewed  Discussion of test results with the performing providers: yes  Decide to obtain previous medical records or to obtain history from someone other than the patient: yes  Obtain history from someone other than the patient: yes  Review and summarize past medical records: yes  Discuss the patient with other providers: yes  Independent visualization of images, tracings, or specimens: yes    Risk of Complications, Morbidity, and/or Mortality  Presenting problems: high  Diagnostic procedures: high  Management options: high    Patient Progress  Patient progress: stable         Critical Care  Performed by: Elvin Almendarez DO  Authorized by: Elvin Almendarez DO     Critical care provider statement:     Critical care time (minutes):  60    Critical care was necessary to treat or prevent imminent or life-threatening deterioration of the following conditions:  Cardiac failure, metabolic crisis and respiratory failure    Critical care was time spent personally by me on the following activities:  Blood draw for specimens, development of treatment plan with patient or surrogate, discussions with consultants, evaluation of patient's response to treatment, examination of patient, interpretation of cardiac output measurements, ordering and performing treatments and interventions, ordering and review of laboratory studies, ordering and review of radiographic studies, pulse oximetry, re-evaluation of patient's condition, review of old charts and obtaining history from patient or surrogate  Bedside US    Date/Time: 12/10/2020 12:08 PM  Performed by: Elvin Almendarez DO  Authorized by: Elvin Almendarez DO     Emergent situation    Performed by: Attending  Supervising provider:  Dr. Geri Pascal  Type of procedure: Focused cardiac (Echo)  Left leg: Indications:  Tachypnea and shortness of breath    Subxiphoid (4 chamber):   Adequate    Parasternal long axis:  Adequate    Parasternal short axis:  Adequate    Pericardial effusion:  Absent    Global Ventricular Function:  Severely reduced    IVC:  Normal    Interpretation:  No sonographic evidence of significant pericardial effusion and Global ventricular function        Orders Placed This Encounter    NO VTE PROPHYLAXIS NEEDED     Standing Status:   Standing     Number of Occurrences:   1     Order Specific Question:   Due to     Answer:   Prophylaxis is Not Indicated    XR CHEST SINGLE VIEW     Standing Status:   Standing     Number of Occurrences:   1     Order Specific Question:   Transport     Answer:   BED [2]     Order Specific Question:   Reason for Exam     Answer:   abnormal ECG    METABOLIC PANEL, BASIC     Now if not already done in the emergency department and in AM.     Standing Status:   Standing     Number of Occurrences:   8    HEPATIC FUNCTION PANEL     Standing Status:   Standing     Number of Occurrences:   1    LIPID PANEL     Fasting. Standing Status:   Standing     Number of Occurrences:   1    TSH, 3RD GENERATION     Standing Status:   Standing     Number of Occurrences:   1    CBC W/O DIFF     Standing Status:   Standing     Number of Occurrences:   1    PROTHROMBIN TIME / INR     Standing Status:   Standing     Number of Occurrences:   1    CARDIAC PANEL,(CK, CKMB & TROPONIN)     Standing Status:   Standing     Number of Occurrences:   1    CBC W/ AUTOMATED DIFF     If any sign of bleeding and/or hematoma, STOP heparin. Notify physician STAT and do STAT CBC. Hold heparin until notified by physician. Standing Status:   Standing     Number of Occurrences:   1    PTT - BASELINE PRIOR TO INITIATION OF HEPARIN INFUSION     To be drawn prior to initiation of heparin infusion (unless baseline already in chart). Standing Status:   Standing     Number of Occurrences:   1    PTT     Check PTT every 6 hours.  Once therapeutic levels are reached, discontinue this order and place new order for PTT every AM.     Standing Status:   Standing     Number of Occurrences:   59    PTT     PRN Chest Pain and 6 Hours Post Any Dosage Change     Standing Status:   Standing     Number of Occurrences:   25985    CBC W/ AUTOMATED DIFF--DAILY WHILE ON HEPARIN     Daily CBC while on heparin. If platelets less than 679,545 thrombocytes/mcL or decrease by 50% of baseline, notify physician. Standing Status:   Standing     Number of Occurrences:   1    CARDIAC MONITORING     Standing Status:   Standing     Number of Occurrences:   1     Order Specific Question:   Type: Answer:   Bedside     Order Specific Question:   Off unit: Answer:   w/ Tele & Nurse    NURSING-MISCELLANEOUS: PERFORM EKG AT TIME OF LAST TROPONIN DRAW. CONTINUOUS     Standing Status:   Standing     Number of Occurrences:   1     Order Specific Question:   Description of Order:     Answer:   PERFORM EKG AT TIME OF LAST TROPONIN DRAW.  STRICT I & O     Standing Status:   Standing     Number of Occurrences:   1    WEIGH PATIENT     And on admission     Standing Status:   Standing     Number of Occurrences:   1    PRESCRIBED VIA MEDICATION RECONCILIATION ORDER     Standing Status:   Standing     Number of Occurrences:   1     Order Specific Question:   PRESCRIBED VIA MEDICATION RECONCILIATION ORDER: Answer:   ACE/ARB    PRESCRIBED VIA MEDICATION RECONCILIATION ORDER     Standing Status:   Standing     Number of Occurrences:   1     Order Specific Question:   PRESCRIBED VIA MEDICATION RECONCILIATION ORDER: Answer:   Phyllistine Cipro APPLY/MAINTAIN GRADED COMPRESSION STOCKINGS     Standing Status:   Standing     Number of Occurrences:   1     Order Specific Question:   Orientation     Answer:   Bilateral    NOTIFY PROVIDER: LAB VALUES CHANGES     CBC prior to initiation of Heparin and notify physician if platelet count is less than 150,000 thrombocytes/mcL. Standing Status:   Standing     Number of Occurrences:   1    NOTIFY PROVIDER: SPECIFY If any sign of bleeding and/or hematoma, STOP heparin. Notify physician STAT and do STAT CBC. Hold heparin until notified by physician.   ONE TIME Routine     If any sign of bleeding and/or hematoma, STOP heparin. Notify physician STAT and do STAT CBC. Hold heparin until notified by physician. Standing Status:   Standing     Number of Occurrences:   1    NOTIFY PROVIDER: LAB VALUES CHANGES     Daily CBC while on heparin. If platelets less than 396,334 thrombocytes/mcL or decrease by 50% of baseline, notify physician. Standing Status:   Standing     Number of Occurrences:   1    OXYGEN CANNULA Liters per minute: 4; Indications for O2 therapy: HYPOXIA CONTINUOUS Routine     Standing Status:   Standing     Number of Occurrences:   1     Order Specific Question:   Liters per minute:      Answer:   4     Order Specific Question:   Indications for O2 therapy     Answer:   HYPOXIA    OXIMETRY, CONTINUOUS     Standing Status:   Standing     Number of Occurrences:   1    POC TROPONIN-I     Standing Status:   Standing     Number of Occurrences:   1    EKG, 12 LEAD, INITIAL     Standing Status:   Standing     Number of Occurrences:   1     Order Specific Question:   Reason for Exam:     Answer:   SOB    EKG, 12 LEAD, INITIAL     Obtain EKG as needed for Chest Pain     Standing Status:   Standing     Number of Occurrences:   1     Order Specific Question:   Reason for Exam:     Answer:   Chest Pain    INSERT PERIPHERAL IV ONE TIME STAT     Standing Status:   Standing     Number of Occurrences:   1    sodium chloride (NS) flush 5-40 mL    sodium chloride (NS) flush 5-40 mL    magnesium hydroxide (MILK OF MAGNESIA) 400 mg/5 mL oral suspension 30 mL    docusate sodium (COLACE) capsule 100 mg    clopidogreL (PLAVIX) 600 mg    aspirin chewable tablet 324 mg    metoprolol (LOPRESSOR) injection 5 mg    heparin 25,000 units in D5W 250 ml 25,000 unit/250 mL(100 unit/mL) infusion     [place], [place]   : cabinet override    heparin (porcine) 1,000 unit/mL injection     [place], [place]   : cabinet override    heparin (porcine) injection 5,000 Units    heparin 25,000 units in D5W 250 ml infusion     Recent Results (from the past 12 hour(s))   EKG, 12 LEAD, INITIAL    Collection Time: 12/10/20 11:01 AM   Result Value Ref Range    Ventricular Rate 86 BPM    Atrial Rate 86 BPM    P-R Interval 160 ms    QRS Duration 78 ms    Q-T Interval 312 ms    QTC Calculation (Bezet) 373 ms    Calculated P Axis 34 degrees    Calculated R Axis -15 degrees    Calculated T Axis 23 degrees    Diagnosis        Critical Test Result: STEMI  Normal sinus rhythm  Possible Left atrial enlargement  Low voltage QRS  Inferior infarct (cited on or before 07-FEB-2018)  Anterolateral infarct , possibly acute      ACUTE MI / STEMI   Abnormal ECG  When compared with ECG of 07-FEB-2018 21:18,  Significant changes have occurred     METABOLIC PANEL, BASIC    Collection Time: 12/10/20 11:07 AM   Result Value Ref Range    Sodium 136 136 - 145 mmol/L    Potassium 3.7 3.5 - 5.5 mmol/L    Chloride 104 100 - 111 mmol/L    CO2 23 21 - 32 mmol/L    Anion gap 9 3.0 - 18 mmol/L    Glucose 202 (H) 74 - 99 mg/dL    BUN 14 7.0 - 18 MG/DL    Creatinine 0.55 (L) 0.6 - 1.3 MG/DL    BUN/Creatinine ratio 25 (H) 12 - 20      GFR est AA >60 >60 ml/min/1.73m2    GFR est non-AA >60 >60 ml/min/1.73m2    Calcium 9.1 8.5 - 10.1 MG/DL   HEPATIC FUNCTION PANEL    Collection Time: 12/10/20 11:07 AM   Result Value Ref Range    Protein, total 8.6 (H) 6.4 - 8.2 g/dL    Albumin 2.7 (L) 3.4 - 5.0 g/dL    Globulin 5.9 (H) 2.0 - 4.0 g/dL    A-G Ratio 0.5 (L) 0.8 - 1.7      Bilirubin, total 0.7 0.2 - 1.0 MG/DL    Bilirubin, direct 0.4 (H) 0.0 - 0.2 MG/DL    Alk.  phosphatase 86 45 - 117 U/L    AST (SGOT) 109 (H) 10 - 38 U/L    ALT (SGPT) 49 13 - 56 U/L   TSH 3RD GENERATION    Collection Time: 12/10/20 11:07 AM   Result Value Ref Range    TSH 2.92 0.36 - 3.74 uIU/mL   CBC W/O DIFF    Collection Time: 12/10/20 11:07 AM   Result Value Ref Range    WBC 8.3 4.6 - 13.2 K/uL    RBC 5.06 4.20 - 5.30 M/uL    HGB 13.9 12.0 - 16.0 g/dL    HCT 43.3 35.0 - 45.0 %    MCV 85.6 74.0 - 97.0 FL    MCH 27.5 24.0 - 34.0 PG    MCHC 32.1 31.0 - 37.0 g/dL    RDW 12.3 11.6 - 14.5 %    PLATELET 604 (H) 521 - 420 K/uL    MPV 9.1 (L) 9.2 - 11.8 FL   PROTHROMBIN TIME + INR    Collection Time: 12/10/20 11:07 AM   Result Value Ref Range    Prothrombin time 14.5 11.5 - 15.2 sec    INR 1.2 0.8 - 1.2     CARDIAC PANEL,(CK, CKMB & TROPONIN)    Collection Time: 12/10/20 11:07 AM   Result Value Ref Range    CK - MB 18.4 (H) <3.6 ng/ml    CK-MB Index 10.3 (H) 0.0 - 4.0 %     26 - 192 U/L    Troponin-I, QT 4.79 (HH) 0.0 - 0.045 NG/ML   POC TROPONIN-I    Collection Time: 12/10/20 11:12 AM   Result Value Ref Range    Troponin-I (POC) 2.29 (HH) 0.00 - 0.08 ng/mL   SARS-COV-2    Collection Time: 12/10/20 11:27 AM   Result Value Ref Range    Specimen source Nasopharyngeal      COVID-19 rapid test Not detected NOTD      Specimen type NP Swab     CARDIAC PANEL,(CK, CKMB & TROPONIN)    Collection Time: 12/10/20 12:47 PM   Result Value Ref Range    CK - MB 16.6 (H) <3.6 ng/ml    CK-MB Index 8.8 (H) 0.0 - 4.0 %     26 - 192 U/L    Troponin-I, QT 4.83 (HH) 0.0 - 0.045 NG/ML   EKG, 12 LEAD, INITIAL    Collection Time: 12/10/20 12:52 PM   Result Value Ref Range    Ventricular Rate 82 BPM    Atrial Rate 82 BPM    P-R Interval 158 ms    QRS Duration 88 ms    Q-T Interval 370 ms    QTC Calculation (Bezet) 432 ms    Calculated P Axis 51 degrees    Calculated R Axis -58 degrees    Calculated T Axis 30 degrees    Diagnosis       Normal sinus rhythm  Possible Left atrial enlargement  Left axis deviation  Inferior infarct (cited on or before 07-FEB-2018)  Anterolateral infarct (cited on or before 07-FEB-2018)  ** ** ACUTE MI / STEMI ** **  Abnormal ECG  When compared with ECG of 10-DEC-2020 11:01,  QRS axis shifted left  ST more elevated in Lateral leads  QT has lengthened  Confirmed by Ana Mcmillan ([id number]) on 12/10/2020 3:24:05 PM     ECHO ADULT COMPLETE    Collection Time: 12/10/20  1:32 PM   Result Value Ref Range    IVSd 1.05 (A) 0.6 - 0.9 cm LVIDd 4.20 3.9 - 5.3 cm    LVIDs 3.47 cm    LVOT d 1.87 cm    LVPWd 1.13 (A) 0.6 - 0.9 cm    RVIDd 3.00 cm    LA Volume 46.31 22 - 52 mL    LA Area 4C 13.83 cm2    LA Vol 2C 61.74 (A) 22 - 52 mL    LA Vol 4C 25.76 22 - 52 mL    Triscuspid Valve Regurgitation Peak Gradient 22.53 mmHg    TR Max Velocity 237.35 cm/s    Ao Root D 3.76 cm    LV Mass .0 67 - 162 g    LV Mass AL Index 80.4 43 - 95 g/m2    LA Vol Index 24.01 16 - 28 ml/m2    LA Vol Index 32.01 16 - 28 ml/m2    LA Vol Index 13.35 16 - 28 ml/m2   PTT    Collection Time: 12/10/20  5:10 PM   Result Value Ref Range    aPTT 81.0 (H) 23.0 - 36.4 SEC   GLUCOSE, POC    Collection Time: 12/10/20  5:48 PM   Result Value Ref Range    Glucose (POC) 162 (H) 70 - 110 mg/dL   PTT    Collection Time: 12/10/20  6:51 PM   Result Value Ref Range    aPTT 30.1 23.0 - 36.4 SEC     XR CHEST SNGL V   Final Result   IMPRESSION:      Mixed interstitial and alveolar opacities greatest at both lung bases, slightly   asymmetrically more pronounced on the right. . Differential diagnosis includes   asymmetric pulmonary edema versus multifocal airspace disease.      _______________               11:10 AM - Dr. Eitan Lux at bedside, and performed bedside ECHO, which revealed wall motion abnormalities, apex appear to be down. Possible LAD infarct. 11:19 AM  Dr. Santo Simon spoke with the patient's sister that Fredi Solomon, who states that she would like the patient to be evaluated and treated by interventional cardiologist at [facility] bedside. 11:24 AM  Dr. Ivette Ravi spoke with Dr. Lupe Barrera interventional cardiologist at [address] who agrees to accept the patient in transfer. Diagnosis:   1.  ST elevation myocardial infarction (STEMI) of anterior wall [facility])          Disposition: Transferred    Follow-up Information    None         Discharge Medication List as of 12/10/2020 12:04 PM      CONTINUE these medications which have NOT CHANGED    Details   folic acid (Haven Mc) 1 mg tablet 1 Tab by Per NG tube route daily. , No Print, Disp-30 Tab, R-0      insulin glargine (LANTUS) 100 unit/mL injection lantus 22 units subq daily, No Print, Disp-1 Vial, R-1      magnesium oxide (MAG-OX) 400 mg tablet Take 1 Tab by mouth two (2) times a day., No Print, Disp-60 Tab, R-1      tamsulosin (FLOMAX) 0.4 mg capsule Take 1 tab by mouth daily until kidney stone passes. , Print, Disp-10 Cap, R-0      amLODIPine (NORVASC) 5 mg tablet Take 5 mg by mouth daily. Indications: HYPERTENSION, Historical Med      aspirin (ASPIRIN) 325 mg tablet Take 325 mg by mouth daily. , Historical Med      VITAMIN E (FORMULA E 400 PO) Take 1 Tab by mouth daily. , Historical Med      losartan (COZAAR) 50 mg tablet Take  by mouth daily.     Indications: HYPERTENSION, Historical Med      Melatonin 300 mcg Tab Take  by mouth.  , Historical Med      cyanocobalamin (VITAMIN B-12) 1,500 mcg TbER Take 1 Tab by mouth.  , Historical Med

## 2020-12-10 NOTE — Clinical Note
Lesion located in the Proximal LAD. Balloon inserted. Balloon inflated using single inflation technique. Lesion #1: Pressure = 14 lucia; Duration = 9 sec.

## 2020-12-10 NOTE — ED TRIAGE NOTES
Patient brought by EMS from [facility] for SOB and altered mental status. Nurse from [facility] states patient is only alert to self and family at baseline. Patient seems to be at baseline mental status. Patient wears 4L BNC at all times.

## 2020-12-10 NOTE — ED NOTES
Patient transferred to [facility] via [facility] transport.  covid swab completed before transfer, awaiting results

## 2020-12-10 NOTE — ED TRIAGE NOTES
Chart reviewed. Patient remains alert, on heparin gtt, and denies pain at this time. No acute distress noted. Needs met.

## 2020-12-10 NOTE — ED PROVIDER NOTES
EMERGENCY DEPARTMENT HISTORY AND PHYSICAL EXAM    12:45 PM      Date: 12/10/2020  Patient Name: Mei Mcnair    History of Presenting Illness     Chief Complaint   Patient presents with    Chest Pain         History Provided By: Patient    Additional History (Context): Mei Mcnair is a 72 y.o. female with Past medical history of dementia, asthma, diabetes, hypertension who presents with chief complaint of STEMI being called at the [facility]/\[facility] [place] [facility]\"" emergency department and patient transferred to the ED here at [facility]. Dr. Yonas Granado called me stating that he is already consulted cardiology and interventionalist.  Patient is on chronic O2 at the nursing home. I had accepted ED to ED transfer. When patient arrived, she is in no apparent distress and unable to provide any history hypertension, patient  with dementia and from nursing home. Cardiology team with Dr. Montserrat Ruiz cardiologist, and BIJAN Hennessy at the bedside to evaluate patient after patient arrived to the ED here.     PCP: Hugo Farnsworth MD        Past History     Past Medical History:  Past Medical History:   Diagnosis Date    Arthritis     Asthma     Chronic pain     BACK PAIN    Diabetes ([address]     Diabetic neuropathy ([address]     Emphysema     Hepatitis C     Hypertension     Nervousness     Osteoarthritis of both knees        Past Surgical History:  Past Surgical History:   Procedure Laterality Date    HX CARPAL TUNNEL RELEASE Right 09    Dr. Lizbeth Arthur    HX TUBAL LIGATION         Family History:  Family History   Problem Relation Age of Onset    Diabetes Mother     Hypertension Mother     Obesity Mother     Alcohol abuse Father     High Cholesterol Father     Lung Disease Father     Stroke Father     Arthritis-osteo Sister     Depression Sister     Diabetes Sister     Hypertension Sister     Obesity Sister     Arthritis-osteo Brother     Diabetes Brother     Hypertension Brother     Obesity Brother Social History:  Social History     Tobacco Use    Smoking status: Current Every Day Smoker     Packs/day: 1.00   Substance Use Topics    Alcohol use: Yes     Comment: socially    Drug use: Yes     Types: Cocaine       Allergies:  No Known Allergies      Review of Systems       Review of Systems      Physical Exam     Visit Vitals  /73   Pulse 83   Resp 28   Ht 5' 7\" (1.702 m)   Wt 81.2 kg (179 lb)   SpO2 98%   BMI 28.04 kg/m²         Physical Exam  Vitals signs and nursing note reviewed. Constitutional:       General: She is in acute distress. Appearance: She is well-developed. She is not toxic-appearing or diaphoretic. HENT:      Head: Normocephalic and atraumatic. Eyes:      General: No scleral icterus. Conjunctiva/sclera: Conjunctivae normal.      Pupils: Pupils are equal, round, and reactive to light. Neck:      Musculoskeletal: Normal range of motion and neck supple. Cardiovascular:      Rate and Rhythm: Normal rate. Heart sounds: Murmur present. Pulmonary:      Effort: Pulmonary effort is normal. No respiratory distress. Breath sounds: Normal breath sounds. No stridor. No wheezing, rhonchi or rales. Abdominal:      General: Bowel sounds are normal. There is no distension. Palpations: Abdomen is soft. Tenderness: There is no abdominal tenderness. Musculoskeletal: Normal range of motion. General: No tenderness. Right lower leg: She exhibits no tenderness. No edema. Left lower leg: She exhibits no tenderness. No edema. Lymphadenopathy:      Cervical: No cervical adenopathy. Skin:     General: Skin is warm and dry. Capillary Refill: Capillary refill takes 2 to 3 seconds. Coloration: Skin is not jaundiced or pale. Neurological:      General: No focal deficit present. Mental Status: She is alert. Cranial Nerves: No cranial nerve deficit. Motor: No weakness.       Comments: She states the first and last name  No slurred speech  Strength 5 out of 5           Diagnostic Study Results     Labs -  Recent Results (from the past 12 hour(s))   EKG, 12 LEAD, INITIAL    Collection Time: 12/10/20 11:01 AM   Result Value Ref Range    Ventricular Rate 86 BPM    Atrial Rate 86 BPM    P-R Interval 160 ms    QRS Duration 78 ms    Q-T Interval 312 ms    QTC Calculation (Bezet) 373 ms    Calculated P Axis 34 degrees    Calculated R Axis -15 degrees    Calculated T Axis 23 degrees    Diagnosis        Critical Test Result: STEMI  Normal sinus rhythm  Possible Left atrial enlargement  Low voltage QRS  Inferior infarct (cited on or before 07-FEB-2018)  Anterolateral infarct , possibly acute   ACUTE MI / STEMI   Abnormal ECG  When compared with ECG of 07-FEB-2018 21:18,  Significant changes have occurred     METABOLIC PANEL, BASIC    Collection Time: 12/10/20 11:07 AM   Result Value Ref Range    Sodium 136 136 - 145 mmol/L    Potassium 3.7 3.5 - 5.5 mmol/L    Chloride 104 100 - 111 mmol/L    CO2 23 21 - 32 mmol/L    Anion gap 9 3.0 - 18 mmol/L    Glucose 202 (H) 74 - 99 mg/dL    BUN 14 7.0 - 18 MG/DL    Creatinine 0.55 (L) 0.6 - 1.3 MG/DL    BUN/Creatinine ratio 25 (H) 12 - 20      GFR est AA >60 >60 ml/min/1.73m2    GFR est non-AA >60 >60 ml/min/1.73m2    Calcium 9.1 8.5 - 10.1 MG/DL   HEPATIC FUNCTION PANEL    Collection Time: 12/10/20 11:07 AM   Result Value Ref Range    Protein, total 8.6 (H) 6.4 - 8.2 g/dL    Albumin 2.7 (L) 3.4 - 5.0 g/dL    Globulin 5.9 (H) 2.0 - 4.0 g/dL    A-G Ratio 0.5 (L) 0.8 - 1.7      Bilirubin, total 0.7 0.2 - 1.0 MG/DL    Bilirubin, direct 0.4 (H) 0.0 - 0.2 MG/DL    Alk.  phosphatase 86 45 - 117 U/L    AST (SGOT) 109 (H) 10 - 38 U/L    ALT (SGPT) 49 13 - 56 U/L   TSH 3RD GENERATION    Collection Time: 12/10/20 11:07 AM   Result Value Ref Range    TSH 2.92 0.36 - 3.74 uIU/mL   CBC W/O DIFF    Collection Time: 12/10/20 11:07 AM   Result Value Ref Range    WBC 8.3 4.6 - 13.2 K/uL    RBC 5.06 4.20 - 5.30 M/uL HGB 13.9 12.0 - 16.0 g/dL    HCT 43.3 35.0 - 45.0 %    MCV 85.6 74.0 - 97.0 FL    MCH 27.5 24.0 - 34.0 PG    MCHC 32.1 31.0 - 37.0 g/dL    RDW 12.3 11.6 - 14.5 %    PLATELET 717 (H) 428 - 420 K/uL    MPV 9.1 (L) 9.2 - 11.8 FL   PROTHROMBIN TIME + INR    Collection Time: 12/10/20 11:07 AM   Result Value Ref Range    Prothrombin time 14.5 11.5 - 15.2 sec    INR 1.2 0.8 - 1.2     CARDIAC PANEL,(CK, CKMB & TROPONIN)    Collection Time: 12/10/20 11:07 AM   Result Value Ref Range    CK - MB 18.4 (H) <3.6 ng/ml    CK-MB Index 10.3 (H) 0.0 - 4.0 %     26 - 192 U/L    Troponin-I, QT 4.79 (HH) 0.0 - 0.045 NG/ML   POC TROPONIN-I    Collection Time: 12/10/20 11:12 AM   Result Value Ref Range    Troponin-I (POC) 2.29 (HH) 0.00 - 0.08 ng/mL   SARS-COV-2    Collection Time: 12/10/20 11:27 AM   Result Value Ref Range    Specimen source Nasopharyngeal      COVID-19 rapid test Not detected NOTD      Specimen type NP Swab     CARDIAC PANEL,(CK, CKMB & TROPONIN)    Collection Time: 12/10/20 12:47 PM   Result Value Ref Range    CK - MB 16.6 (H) <3.6 ng/ml    CK-MB Index 8.8 (H) 0.0 - 4.0 %     26 - 192 U/L    Troponin-I, QT 4.83 (HH) 0.0 - 0.045 NG/ML   EKG, 12 LEAD, INITIAL    Collection Time: 12/10/20 12:52 PM   Result Value Ref Range    Ventricular Rate 82 BPM    Atrial Rate 82 BPM    P-R Interval 158 ms    QRS Duration 88 ms    Q-T Interval 370 ms    QTC Calculation (Bezet) 432 ms    Calculated P Axis 51 degrees    Calculated R Axis -58 degrees    Calculated T Axis 30 degrees    Diagnosis       Normal sinus rhythm  Possible Left atrial enlargement  Left axis deviation  Inferior infarct (cited on or before 07-FEB-2018)  Anterolateral infarct (cited on or before 07-FEB-2018)  ACUTE MI / STEMI   Abnormal ECG  When compared with ECG of 10-DEC-2020 11:01,  QRS axis shifted left  ST more elevated in Lateral leads  QT has lengthened     ECHO ADULT COMPLETE    Collection Time: 12/10/20  1:32 PM   Result Value Ref Range IVSd 1.05 (A) 0.6 - 0.9 cm    LVIDd 4.20 3.9 - 5.3 cm    LVIDs 3.47 cm    LVOT d 1.87 cm    LVPWd 1.13 (A) 0.6 - 0.9 cm    RVIDd 3.00 cm    LA Volume 46.31 22 - 52 mL    LA Area 4C 13.83 cm2    LA Vol 2C 61.74 (A) 22 - 52 mL    LA Vol 4C 25.76 22 - 52 mL    Triscuspid Valve Regurgitation Peak Gradient 22.53 mmHg    TR Max Velocity 237.35 cm/s    Ao Root D 3.76 cm    LV Mass .0 67 - 162 g    LV Mass AL Index 80.4 43 - 95 g/m2    LA Vol Index 24.01 16 - 28 ml/m2    LA Vol Index 32.01 16 - 28 ml/m2    LA Vol Index 13.35 16 - 28 ml/m2       Radiologic Studies -   No orders to display         Medical Decision Making   I am the first provider for this patient. I reviewed the vital signs, available nursing notes, past medical history, past surgical history, family history and social history. Vital Signs-Reviewed the patient's vital signs. Cardiac Monitor:  Rate: 83  Rhythm:  Normal Sinus Rhythm     EKG: Interpreted by the EP Dr. Dorys Mariscal. Time Interpreted: 12:52 PM   Rate: 82   Rhythm: Normal Sinus Rhythm    Interpretation: Normal QRS duration, normal QTC, has ST elevations in anterior leads       Records Reviewed: Nursing Notes and Old Medical Records (Time of Review: 12:45 PM)    Provider Notes (Medical Decision Making):     STEMI call from [facility] and patient transferred here to [facility] ED for evaluation for possible Cath Lab. Cardiologist team at the bedside    MDM    Medications   furosemide (LASIX) injection 20 mg (has no administration in time range)   heparin (porcine) 1,000 unit/mL injection 4,000 Units (has no administration in time range)   heparin 25,000 units in D5W 250 ml infusion (has no administration in time range)           ED Course: Progress Notes, Reevaluation, and Consults:  Patient has been evaluated by Dr. Gabriela Marie cardiologist and Gio THAKKAR from cardiology. 12:43 PM  BIJAN Solis states they will cancel the code STEMI, states is subacute.   Request admit to the hospitalist with them being consult. She states patient can be admitted to stepdown. Cardiology has placed orders    Consult:  Discussed care with Dr. Andry Hennessy, Specialty: Hospitalist, standard discussion; including history of patients chief complaint, available diagnostic results, and treatment course. She accepts admission        Diagnosis     Clinical Impression:   1. ST elevation myocardial infarction (STEMI), unspecified artery (HCC)        Disposition: Admitted    Follow-up Information    None          Patient's Medications   Start Taking    No medications on file   Continue Taking    AMLODIPINE (NORVASC) 5 MG TABLET    Take 5 mg by mouth daily. Indications: HYPERTENSION    ASPIRIN (ASPIRIN) 325 MG TABLET    Take 325 mg by mouth daily. CYANOCOBALAMIN (VITAMIN B-12) 1,500 MCG TBER    Take 1 Tab by mouth. FOLIC ACID (FOLVITE) 1 MG TABLET    1 Tab by Per NG tube route daily. INSULIN GLARGINE (LANTUS) 100 UNIT/ML INJECTION    lantus 22 units subq daily    LOSARTAN (COZAAR) 50 MG TABLET    Take  by mouth daily. Indications: HYPERTENSION    MAGNESIUM OXIDE (MAG-OX) 400 MG TABLET    Take 1 Tab by mouth two (2) times a day. MELATONIN 300 MCG TAB    Take  by mouth. TAMSULOSIN (FLOMAX) 0.4 MG CAPSULE    Take 1 tab by mouth daily until kidney stone passes. VITAMIN E (FORMULA E 400 PO)    Take 1 Tab by mouth daily. These Medications have changed    No medications on file   Stop Taking    No medications on file         DO Leonardo Gould medical dictation software was used for portions of this report. Unintended transcription errors may occur. My signature above authenticates this document and my orders, the final    diagnosis (es), discharge prescription (s), and instructions in the Epic    record.

## 2020-12-10 NOTE — ED NOTES
TRANSFER - OUT REPORT:    Verbal report given to Primary nurse (name) on Naseem Booth  being transferred to Step Down (unit) for routine progression of care       Report consisted of patients Situation, Background, Assessment and   Recommendations(SBAR). Information from the following report(s) ED Summary was reviewed with the receiving nurse. Lines:   Peripheral IV 12/10/20 Right Antecubital (Active)   Site Assessment Clean, dry, & intact 12/10/20 1257   Phlebitis Assessment 0 12/10/20 1257   Infiltration Assessment 0 12/10/20 1257   Dressing Status Clean, dry, & intact 12/10/20 1257   Dressing Type Tape;Transparent 12/10/20 1257   Hub Color/Line Status Pink;Flushed;Patent 12/10/20 1257   Action Taken Blood drawn 12/10/20 1257       Peripheral IV 12/10/20 Left Antecubital (Active)   Site Assessment Clean, dry, & intact 12/10/20 1258   Phlebitis Assessment 0 12/10/20 1258   Infiltration Assessment 0 12/10/20 1258   Dressing Status Clean, dry, & intact 12/10/20 1258   Dressing Type Tape;Transparent 12/10/20 1258   Hub Color/Line Status Green;Flushed;Patent 12/10/20 1258        Opportunity for questions and clarification was provided.       Patient transported with:   Registered Nurse

## 2020-12-10 NOTE — H&P
History & Physical    Patient: Courtney Streeter MRN: [id number]  CSN: [id number]    YOB: 1955  Age: 72 y.o. Sex: female      DOA: 12/10/2020  CC:STEMI    PCP: Kacey Fisher MD       HPI:     Aurelia Stahl is a 72 y.o. female who is a transfer from [facility] ER from [facility] for SOB and [address]. Baseline oriented to self and family. Diagnosed with STEMI, Code Stemi activated,  evaluated by cardiology and transferred to [facility] ER for further evaluation. Started on heparin gtt for troponin 4.79 and 4.83. Reported  mg and Plavix 600 mg, and lasix 20 mg x 1 dose given in ER. Cardiology recommendations for subacute episode, admit to stepdown and continue heparin gtt.      Past Medical History:   Diagnosis Date    Arthritis     Asthma     Chronic pain     BACK PAIN    Diabetes ([address]     Diabetic neuropathy [address] [address] [facility]     Emphysema     Hepatitis C     Hypertension     Nervousness     Osteoarthritis of both knees        Past Surgical History:   Procedure Laterality Date    HX CARPAL TUNNEL RELEASE Right 8/31/09    Dr. Lisa Llanos    HX TUBAL LIGATION         Family History   Problem Relation Age of Onset    Diabetes Mother     Hypertension Mother     Obesity Mother     Alcohol abuse Father     High Cholesterol Father     Lung Disease Father     Stroke Father     Arthritis-osteo Sister     Depression Sister     Diabetes Sister     Hypertension Sister     Obesity Sister     Arthritis-osteo Brother     Diabetes Brother     Hypertension Brother     Obesity Brother        Social History     Socioeconomic History    Marital status: SINGLE     Spouse name: Not on file    Number of children: Not on file    Years of education: Not on file    Highest education level: Not on file   Tobacco Use    Smoking status: Current Every Day Smoker     Packs/day: 1.00   Substance and Sexual Activity    Alcohol use: Yes     Comment: socially    Drug use: Yes     Types: Cocaine    Sexual activity: Never       Prior to Admission medications    Medication Sig Start Date End Date Taking? Authorizing Provider   folic acid (FOLVITE) 1 mg tablet 1 Tab by Per NG tube route daily. 2/15/18   Francisca Roca MD   insulin glargine (LANTUS) 100 unit/mL injection lantus 22 units subq daily 2/15/18   Francisca Roca MD   magnesium oxide (MAG-OX) 400 mg tablet Take 1 Tab by mouth two (2) times a day. 2/14/18   Francisca Roca MD   tamsulosin (FLOMAX) 0.4 mg capsule Take 1 tab by mouth daily until kidney stone passes. 2/5/18   James Villa DO   amLODIPine (NORVASC) 5 mg tablet Take 5 mg by mouth daily. Indications: HYPERTENSION    Provider, Historical   aspirin (ASPIRIN) 325 mg tablet Take 325 mg by mouth daily. Provider, Historical   VITAMIN E (FORMULA E 400 PO) Take 1 Tab by mouth daily. Provider, Historical   losartan (COZAAR) 50 mg tablet Take  by mouth daily. Indications: HYPERTENSION    Provider, Historical   Melatonin 300 mcg Tab Take  by mouth. Provider, Historical   cyanocobalamin (VITAMIN B-12) 1,500 mcg TbER Take 1 Tab by mouth. Provider, Historical       No Known Allergies           Physical Exam:      Visit Vitals  /77   Pulse 83   Resp 16   Ht 5' 7\" (1.702 m)   Wt 81.2 kg (179 lb)   SpO2 96%   BMI 28.04 kg/m²       Physical Exam:  General:  Alert, NAD  Cardiovascular:  RRR  Pulmonary:  LSC throughout; respiratory effort WNL  GI:  +BS in all four quadrants, soft, non-tender  Extremities:  No edema; 2+ dorsalis pedis pulses bilaterally  Neuro: alert and oriented to self and place.      Lab/Data Review:  Labs: Results:       Chemistry Recent Labs     12/10/20  1107   *      K 3.7      CO2 23   BUN 14   CREA 0.55*   CA 9.1   AGAP 9   BUCR 25*   AP 86   TP 8.6*   ALB 2.7*   GLOB 5.9*   AGRAT 0.5*      CBC w/Diff Recent Labs     12/10/20  1107   WBC 8.3   RBC 5.06   HGB 13.9   HCT 43.3   *      Coagulation Recent Labs 12/10/20  1107   PTP 14.5   INR 1.2       Iron/Ferritin No results for input(s): IRON in the last 72 hours. No lab exists for component: TIBCCALC   BNP No results for input(s): BNPP in the last 72 hours. Cardiac Enzymes Recent Labs     12/10/20  1247 12/10/20  1107    179   CKND1 8.8* 10.3*      Liver Enzymes Recent Labs     12/10/20  1107   TP 8.6*   ALB 2.7*   AP 86      Thyroid Studies Lab Results   Component Value Date/Time    TSH 2.92 12/10/2020 11:07 AM          All Micro Results     Procedure Component Value Units Date/Time    CULTURE, URINE [[id number]]     Order Status:  Sent Specimen:  Cath Urine           Imaging Reviewed:    XR Results (most recent):  Results from Hospital Encounter encounter on 12/10/20   XR CHEST SNGL V    Narrative EXAM: XR CHEST SNGL V.    CLINICAL INDICATION/HISTORY: abnormal ECG. -Additional: None. TECHNIQUE: A portable erect AP radiographic view of the chest is compared with  the radiograph of 02/11/2018.  _______________    FINDINGS:    See impression. No pneumothorax or appreciable significant pleural effusion. The  cardiac silhouette, karlo, and mediastinal contours are normal for age. No acute  osseous abnormality is revealed. _______________      Impression IMPRESSION:    Mixed interstitial and alveolar opacities greatest at both lung bases, slightly  asymmetrically more pronounced on the right. . Differential diagnosis includes  asymmetric pulmonary edema versus multifocal airspace disease.    _______________           Assessment:   Active Problems:    STEMI (ST elevation myocardial infarction) ([facility] [address] [address] (12/10/2020)        History of dementia  History of HTN  History of COPD with home oxygen  DMT2  Hepatitis C    Plan:   1. Cardiology consult. Continue heparin gtt. Further recommendations to come. Stepdown admission. NPO after midnight. Serial troponin. Follow heparin gtt protocol   2. Urinalysis and urine culture ordered and pending  3. POC glucose, SSI.  A1c pending  4. Continue home oxygen  5. Monitor metabolic panel and renal function  6.  Updated sister [place] [id number], NP  12/10/2020, 4:21 PM

## 2020-12-10 NOTE — Clinical Note
Sheath #2: sheath exchanged for INTRO [facility] W/GDWIRE [id number] -- BRITE TIP - ORDER AS EA.

## 2020-12-10 NOTE — ED NOTES
Patient repositioned in bed. Tolerated activity well. No signs of distress noted. Will continue to monitor. Patient is currently on cardiac monitoring equipment.

## 2020-12-10 NOTE — PROGRESS NOTES
STAT Echocardiogram completed at bedside in ED. Findings communicated with Dr. Giovani Pereyra and Ampi-NP.

## 2020-12-10 NOTE — Clinical Note
Lesion located in the Proximal LAD. Balloon inserted. Balloon inflated using single inflation technique. Lesion #1: Pressure = 18 lucia; Duration = 8 sec.

## 2020-12-10 NOTE — Clinical Note
TRANSFER - OUT REPORT:     Verbal report given to: [facility]. Report consisted of patient's Situation, Background, Assessment and   Recommendations(SBAR). Opportunity for questions and clarification was provided. Patient transported with a Cardiac Cath Tech / Patient Care Tech and Respiratory Therapist.   Patient transported to: CVT ICU.

## 2020-12-10 NOTE — Clinical Note
Contrast Dose Calculator:   Patient's age: 72.   Patient's sex: Female. Patient weight (kg) = 74.6. Creatinine level (mg/dL) = 0.47. Creatinine clearance (mL/min): 140.54. Contrast concentration (mg/mL) = 300. MACD = 300 mL. Max Contrast dose per Creatinine Cl calculator = 316.21 mL.

## 2020-12-10 NOTE — ED NOTES
Provider spoke with sister, full code, ask to do everything,    Per [place], patient is to go to [facility], nurse on the phone with [facility] to obtain transport

## 2020-12-10 NOTE — Clinical Note
Aspirin Registry Question:   Aspirin was given prior to the procedure.  Last ASA given 2200 12/10/20

## 2020-12-11 ENCOUNTER — ANESTHESIA (OUTPATIENT)
Dept: ICU | Age: 65
DRG: 003 | End: 2020-12-11
Payer: MEDICARE

## 2020-12-11 ENCOUNTER — APPOINTMENT (OUTPATIENT)
Dept: GENERAL RADIOLOGY | Age: 65
DRG: 003 | End: 2020-12-11
Attending: INTERNAL MEDICINE
Payer: MEDICARE

## 2020-12-11 ENCOUNTER — ANESTHESIA EVENT (OUTPATIENT)
Dept: ICU | Age: 65
DRG: 003 | End: 2020-12-11
Payer: MEDICARE

## 2020-12-11 ENCOUNTER — APPOINTMENT (OUTPATIENT)
Dept: GENERAL RADIOLOGY | Age: 65
DRG: 003 | End: 2020-12-11
Attending: NURSE PRACTITIONER
Payer: MEDICARE

## 2020-12-11 LAB
ACT BLD: 131 SECS (ref 79–138)
ACT BLD: 329 SECS (ref 79–138)
ANION GAP SERPL CALC-SCNC: 11 MMOL/L (ref 3–18)
ANION GAP SERPL CALC-SCNC: 8 MMOL/L (ref 3–18)
APPEARANCE UR: CLEAR
ARTERIAL PATENCY WRIST A: ABNORMAL
ARTERIAL PATENCY WRIST A: YES
ATRIAL RATE: 86 BPM
BACTERIA URNS QL MICRO: ABNORMAL /HPF
BASE DEFICIT BLD-SCNC: 5 MMOL/L
BASE DEFICIT BLD-SCNC: 5 MMOL/L
BDY SITE: ABNORMAL
BDY SITE: ABNORMAL
BILIRUB UR QL: ABNORMAL
BNP SERPL-MCNC: 5307 PG/ML (ref 0–900)
BODY TEMPERATURE: 98.5
BUN SERPL-MCNC: 17 MG/DL (ref 7–18)
BUN SERPL-MCNC: 17 MG/DL (ref 7–18)
BUN/CREAT SERPL: 35 (ref 12–20)
BUN/CREAT SERPL: 36 (ref 12–20)
CA-I BLD-MCNC: 1.21 MMOL/L (ref 1.12–1.32)
CALCIUM SERPL-MCNC: 8.4 MG/DL (ref 8.5–10.1)
CALCIUM SERPL-MCNC: 9 MG/DL (ref 8.5–10.1)
CALCULATED P AXIS, ECG09: 34 DEGREES
CALCULATED R AXIS, ECG10: -15 DEGREES
CALCULATED T AXIS, ECG11: 23 DEGREES
CHLORIDE SERPL-SCNC: 106 MMOL/L (ref 100–111)
CHLORIDE SERPL-SCNC: 106 MMOL/L (ref 100–111)
CK MB CFR SERPL CALC: 13.7 % (ref 0–4)
CK MB SERPL-MCNC: 68 NG/ML (ref 5–25)
CK SERPL-CCNC: 497 U/L (ref 26–192)
CO2 SERPL-SCNC: 22 MMOL/L (ref 21–32)
CO2 SERPL-SCNC: 23 MMOL/L (ref 21–32)
COLOR UR: ABNORMAL
CREAT SERPL-MCNC: 0.47 MG/DL (ref 0.6–1.3)
CREAT SERPL-MCNC: 0.48 MG/DL (ref 0.6–1.3)
DIAGNOSIS, 93000: NORMAL
ERYTHROCYTE [DISTWIDTH] IN BLOOD BY AUTOMATED COUNT: 12.6 % (ref 11.6–14.5)
GAS FLOW.O2 O2 DELIVERY SYS: ABNORMAL L/MIN
GAS FLOW.O2 O2 DELIVERY SYS: ABNORMAL L/MIN
GAS FLOW.O2 SETTING OXYMISER: 18 BPM
GLUCOSE BLD STRIP.AUTO-MCNC: 122 MG/DL (ref 70–110)
GLUCOSE BLD STRIP.AUTO-MCNC: 161 MG/DL (ref 70–110)
GLUCOSE BLD STRIP.AUTO-MCNC: 175 MG/DL (ref 74–106)
GLUCOSE BLD STRIP.AUTO-MCNC: 184 MG/DL (ref 70–110)
GLUCOSE SERPL-MCNC: 125 MG/DL (ref 74–99)
GLUCOSE SERPL-MCNC: 163 MG/DL (ref 74–99)
GLUCOSE UR STRIP.AUTO-MCNC: NEGATIVE MG/DL
HCO3 BLD-SCNC: 19.9 MMOL/L (ref 22–26)
HCO3 BLD-SCNC: 20.6 MMOL/L (ref 22–26)
HCT VFR BLD AUTO: 38.4 % (ref 35–45)
HCT VFR BLD CALC: 40 % (ref 36–49)
HGB BLD-MCNC: 12.7 G/DL (ref 12–16)
HGB BLD-MCNC: 13.6 G/DL (ref 12–16)
HGB UR QL STRIP: NEGATIVE
INSPIRATION.DURATION SETTING TIME VENT: 0.9 SEC
KETONES UR QL STRIP.AUTO: NEGATIVE MG/DL
LEUKOCYTE ESTERASE UR QL STRIP.AUTO: ABNORMAL
MCH RBC QN AUTO: 27.2 PG (ref 24–34)
MCHC RBC AUTO-ENTMCNC: 33.1 G/DL (ref 31–37)
MCV RBC AUTO: 82.2 FL (ref 74–97)
MUCOUS THREADS URNS QL MICRO: ABNORMAL /LPF
NITRITE UR QL STRIP.AUTO: NEGATIVE
O2/TOTAL GAS SETTING VFR VENT: 100 %
O2/TOTAL GAS SETTING VFR VENT: 80 %
P-R INTERVAL, ECG05: 160 MS
PCO2 BLD: 32.2 MMHG (ref 35–45)
PCO2 BLD: 34.7 MMHG (ref 35–45)
PEEP RESPIRATORY: 5 CMH2O
PEEP RESPIRATORY: 8 CMH2O
PH BLD: 7.38 [PH] (ref 7.35–7.45)
PH BLD: 7.4 [PH] (ref 7.35–7.45)
PH UR STRIP: 5.5 [PH] (ref 5–8)
PIP ISTAT,IPIP: 19
PLATELET # BLD AUTO: 573 K/UL (ref 135–420)
PMV BLD AUTO: 9.4 FL (ref 9.2–11.8)
PO2 BLD: 110 MMHG (ref 80–100)
PO2 BLD: 47 MMHG (ref 80–100)
POTASSIUM BLD-SCNC: 3.4 MMOL/L (ref 3.5–5.5)
POTASSIUM SERPL-SCNC: 3.5 MMOL/L (ref 3.5–5.5)
POTASSIUM SERPL-SCNC: 3.8 MMOL/L (ref 3.5–5.5)
PRESSURE SUPPORT SETTING VENT: 10 CMH2O
PROT UR STRIP-MCNC: NEGATIVE MG/DL
Q-T INTERVAL, ECG07: 312 MS
QRS DURATION, ECG06: 78 MS
QTC CALCULATION (BEZET), ECG08: 373 MS
RBC # BLD AUTO: 4.67 M/UL (ref 4.2–5.3)
SAO2 % BLD: 83 % (ref 92–97)
SAO2 % BLD: 98 % (ref 92–97)
SERVICE CMNT-IMP: ABNORMAL
SERVICE CMNT-IMP: ABNORMAL
SODIUM BLD-SCNC: 137 MMOL/L (ref 136–145)
SODIUM SERPL-SCNC: 137 MMOL/L (ref 136–145)
SODIUM SERPL-SCNC: 139 MMOL/L (ref 136–145)
SP GR UR REFRACTOMETRY: 1.02 (ref 1–1.03)
SPECIMEN TYPE: ABNORMAL
SPECIMEN TYPE: ABNORMAL
TOTAL RESP. RATE, ITRR: 22
TOTAL RESP. RATE, ITRR: 37
TROPONIN I SERPL-MCNC: 12 NG/ML (ref 0–0.04)
TROPONIN I SERPL-MCNC: 27.8 NG/ML (ref 0–0.04)
UROBILINOGEN UR QL STRIP.AUTO: 2 EU/DL (ref 0.2–1)
VENTILATION MODE VENT: ABNORMAL
VENTRICULAR RATE, ECG03: 86 BPM
VT SETTING VENT: 450 ML
WBC # BLD AUTO: 8.9 K/UL (ref 4.6–13.2)
WBC URNS QL MICRO: ABNORMAL /HPF (ref 0–4)

## 2020-12-11 PROCEDURE — 77030013797 HC KT TRNSDUC PRSSR EDWD -A: Performed by: INTERNAL MEDICINE

## 2020-12-11 PROCEDURE — 92928 PRQ TCAT PLMT NTRAC ST 1 LES: CPT | Performed by: INTERNAL MEDICINE

## 2020-12-11 PROCEDURE — 31500 INSERT EMERGENCY AIRWAY: CPT

## 2020-12-11 PROCEDURE — 99291 CRITICAL CARE FIRST HOUR: CPT | Performed by: HOSPITALIST

## 2020-12-11 PROCEDURE — 74011636637 HC RX REV CODE- 636/637: Performed by: NURSE PRACTITIONER

## 2020-12-11 PROCEDURE — C1751 CATH, INF, PER/CENT/MIDLINE: HCPCS | Performed by: INTERNAL MEDICINE

## 2020-12-11 PROCEDURE — 77030016699 HC CATH ANGI DX INFN1 CARD -A: Performed by: INTERNAL MEDICINE

## 2020-12-11 PROCEDURE — 74011250636 HC RX REV CODE- 250/636

## 2020-12-11 PROCEDURE — 93005 ELECTROCARDIOGRAM TRACING: CPT

## 2020-12-11 PROCEDURE — 74011250636 HC RX REV CODE- 250/636: Performed by: NURSE PRACTITIONER

## 2020-12-11 PROCEDURE — 84295 ASSAY OF SERUM SODIUM: CPT

## 2020-12-11 PROCEDURE — 5A1955Z RESPIRATORY VENTILATION, GREATER THAN 96 CONSECUTIVE HOURS: ICD-10-PCS | Performed by: INTERNAL MEDICINE

## 2020-12-11 PROCEDURE — C1725 CATH, TRANSLUMIN NON-LASER: HCPCS | Performed by: INTERNAL MEDICINE

## 2020-12-11 PROCEDURE — 99153 MOD SED SAME PHYS/QHP EA: CPT | Performed by: INTERNAL MEDICINE

## 2020-12-11 PROCEDURE — 94762 N-INVAS EAR/PLS OXIMTRY CONT: CPT

## 2020-12-11 PROCEDURE — 74011000250 HC RX REV CODE- 250: Performed by: INTERNAL MEDICINE

## 2020-12-11 PROCEDURE — 71045 X-RAY EXAM CHEST 1 VIEW: CPT

## 2020-12-11 PROCEDURE — C1874 STENT, COATED/COV W/DEL SYS: HCPCS | Performed by: INTERNAL MEDICINE

## 2020-12-11 PROCEDURE — 99152 MOD SED SAME PHYS/QHP 5/>YRS: CPT | Performed by: INTERNAL MEDICINE

## 2020-12-11 PROCEDURE — 65620000000 HC RM CCU GENERAL

## 2020-12-11 PROCEDURE — 74011250636 HC RX REV CODE- 250/636: Performed by: INTERNAL MEDICINE

## 2020-12-11 PROCEDURE — 02HQ32Z INSERTION OF MONITORING DEVICE INTO RIGHT PULMONARY ARTERY, PERCUTANEOUS APPROACH: ICD-10-PCS | Performed by: INTERNAL MEDICINE

## 2020-12-11 PROCEDURE — 87086 URINE CULTURE/COLONY COUNT: CPT

## 2020-12-11 PROCEDURE — 4A023N7 MEASUREMENT OF CARDIAC SAMPLING AND PRESSURE, LEFT HEART, PERCUTANEOUS APPROACH: ICD-10-PCS | Performed by: INTERNAL MEDICINE

## 2020-12-11 PROCEDURE — 99291 CRITICAL CARE FIRST HOUR: CPT | Performed by: INTERNAL MEDICINE

## 2020-12-11 PROCEDURE — 74011250637 HC RX REV CODE- 250/637: Performed by: INTERNAL MEDICINE

## 2020-12-11 PROCEDURE — 027034Z DILATION OF CORONARY ARTERY, ONE ARTERY WITH DRUG-ELUTING INTRALUMINAL DEVICE, PERCUTANEOUS APPROACH: ICD-10-PCS | Performed by: INTERNAL MEDICINE

## 2020-12-11 PROCEDURE — C1894 INTRO/SHEATH, NON-LASER: HCPCS | Performed by: INTERNAL MEDICINE

## 2020-12-11 PROCEDURE — 36600 WITHDRAWAL OF ARTERIAL BLOOD: CPT

## 2020-12-11 PROCEDURE — 93458 L HRT ARTERY/VENTRICLE ANGIO: CPT | Performed by: INTERNAL MEDICINE

## 2020-12-11 PROCEDURE — 84484 ASSAY OF TROPONIN QUANT: CPT

## 2020-12-11 PROCEDURE — 83880 ASSAY OF NATRIURETIC PEPTIDE: CPT

## 2020-12-11 PROCEDURE — B2111ZZ FLUOROSCOPY OF MULTIPLE CORONARY ARTERIES USING LOW OSMOLAR CONTRAST: ICD-10-PCS | Performed by: INTERNAL MEDICINE

## 2020-12-11 PROCEDURE — 99233 SBSQ HOSP IP/OBS HIGH 50: CPT | Performed by: INTERNAL MEDICINE

## 2020-12-11 PROCEDURE — 81001 URINALYSIS AUTO W/SCOPE: CPT

## 2020-12-11 PROCEDURE — 74018 RADEX ABDOMEN 1 VIEW: CPT

## 2020-12-11 PROCEDURE — 94002 VENT MGMT INPAT INIT DAY: CPT

## 2020-12-11 PROCEDURE — 74011000250 HC RX REV CODE- 250

## 2020-12-11 PROCEDURE — 74011000636 HC RX REV CODE- 636: Performed by: INTERNAL MEDICINE

## 2020-12-11 PROCEDURE — 0BH17EZ INSERTION OF ENDOTRACHEAL AIRWAY INTO TRACHEA, VIA NATURAL OR ARTIFICIAL OPENING: ICD-10-PCS | Performed by: INTERNAL MEDICINE

## 2020-12-11 PROCEDURE — 2709999900 HC NON-CHARGEABLE SUPPLY

## 2020-12-11 PROCEDURE — 87040 BLOOD CULTURE FOR BACTERIA: CPT

## 2020-12-11 PROCEDURE — 77010033678 HC OXYGEN DAILY

## 2020-12-11 PROCEDURE — C1769 GUIDE WIRE: HCPCS | Performed by: INTERNAL MEDICINE

## 2020-12-11 PROCEDURE — 82803 BLOOD GASES ANY COMBINATION: CPT

## 2020-12-11 PROCEDURE — B2151ZZ FLUOROSCOPY OF LEFT HEART USING LOW OSMOLAR CONTRAST: ICD-10-PCS | Performed by: INTERNAL MEDICINE

## 2020-12-11 PROCEDURE — 82962 GLUCOSE BLOOD TEST: CPT

## 2020-12-11 PROCEDURE — 85347 COAGULATION TIME ACTIVATED: CPT

## 2020-12-11 PROCEDURE — 80048 BASIC METABOLIC PNL TOTAL CA: CPT

## 2020-12-11 PROCEDURE — 74011250636 HC RX REV CODE- 250/636: Performed by: PHYSICIAN ASSISTANT

## 2020-12-11 PROCEDURE — C1887 CATHETER, GUIDING: HCPCS | Performed by: INTERNAL MEDICINE

## 2020-12-11 PROCEDURE — 85027 COMPLETE CBC AUTOMATED: CPT

## 2020-12-11 PROCEDURE — 94400 HC END TIDAL CO2 RESPONSE CURVE: CPT

## 2020-12-11 DEVICE — STENT RONYX30015UX RESOLUTE ONYX 3.00X15
Type: IMPLANTABLE DEVICE | Status: FUNCTIONAL
Brand: RESOLUTE ONYX™

## 2020-12-11 RX ORDER — MIDAZOLAM HYDROCHLORIDE 1 MG/ML
INJECTION, SOLUTION INTRAMUSCULAR; INTRAVENOUS AS NEEDED
Status: DISCONTINUED | OUTPATIENT
Start: 2020-12-11 | End: 2020-12-11 | Stop reason: HOSPADM

## 2020-12-11 RX ORDER — FENTANYL CITRATE 50 UG/ML
INJECTION, SOLUTION INTRAMUSCULAR; INTRAVENOUS
Status: COMPLETED
Start: 2020-12-11 | End: 2020-12-11

## 2020-12-11 RX ORDER — FENTANYL CITRATE 50 UG/ML
INJECTION, SOLUTION INTRAMUSCULAR; INTRAVENOUS AS NEEDED
Status: DISCONTINUED | OUTPATIENT
Start: 2020-12-11 | End: 2020-12-11 | Stop reason: HOSPADM

## 2020-12-11 RX ORDER — CARVEDILOL 3.12 MG/1
3.12 TABLET ORAL EVERY 12 HOURS
Status: DISCONTINUED | OUTPATIENT
Start: 2020-12-11 | End: 2020-12-20

## 2020-12-11 RX ORDER — MIDAZOLAM HYDROCHLORIDE 1 MG/ML
2 INJECTION, SOLUTION INTRAMUSCULAR; INTRAVENOUS ONCE
Status: COMPLETED | OUTPATIENT
Start: 2020-12-11 | End: 2020-12-11

## 2020-12-11 RX ORDER — POTASSIUM CHLORIDE 14.9 MG/ML
20 INJECTION INTRAVENOUS ONCE
Status: DISCONTINUED | OUTPATIENT
Start: 2020-12-11 | End: 2020-12-11 | Stop reason: CLARIF

## 2020-12-11 RX ORDER — METOPROLOL TARTRATE 5 MG/5ML
2.5 INJECTION INTRAVENOUS ONCE
Status: COMPLETED | OUTPATIENT
Start: 2020-12-11 | End: 2020-12-11

## 2020-12-11 RX ORDER — SODIUM CHLORIDE 0.9 % (FLUSH) 0.9 %
5-40 SYRINGE (ML) INJECTION EVERY 8 HOURS
Status: DISCONTINUED | OUTPATIENT
Start: 2020-12-11 | End: 2020-12-12

## 2020-12-11 RX ORDER — MIDAZOLAM HYDROCHLORIDE 1 MG/ML
INJECTION, SOLUTION INTRAMUSCULAR; INTRAVENOUS
Status: DISPENSED
Start: 2020-12-11 | End: 2020-12-11

## 2020-12-11 RX ORDER — SODIUM CHLORIDE 9 MG/ML
INJECTION, SOLUTION INTRAVENOUS
Status: COMPLETED | OUTPATIENT
Start: 2020-12-11 | End: 2020-12-11

## 2020-12-11 RX ORDER — FUROSEMIDE 10 MG/ML
20 INJECTION INTRAMUSCULAR; INTRAVENOUS ONCE
Status: COMPLETED | OUTPATIENT
Start: 2020-12-11 | End: 2020-12-11

## 2020-12-11 RX ORDER — MIDAZOLAM HYDROCHLORIDE 1 MG/ML
1-2 INJECTION, SOLUTION INTRAMUSCULAR; INTRAVENOUS
Status: DISCONTINUED | OUTPATIENT
Start: 2020-12-11 | End: 2020-12-25

## 2020-12-11 RX ORDER — FUROSEMIDE 10 MG/ML
INJECTION INTRAMUSCULAR; INTRAVENOUS
Status: DISPENSED
Start: 2020-12-11 | End: 2020-12-11

## 2020-12-11 RX ORDER — ENOXAPARIN SODIUM 100 MG/ML
40 INJECTION SUBCUTANEOUS EVERY 24 HOURS
Status: DISCONTINUED | OUTPATIENT
Start: 2020-12-12 | End: 2020-12-15

## 2020-12-11 RX ORDER — SODIUM CHLORIDE 0.9 % (FLUSH) 0.9 %
5-40 SYRINGE (ML) INJECTION AS NEEDED
Status: DISCONTINUED | OUTPATIENT
Start: 2020-12-11 | End: 2020-12-31 | Stop reason: SDUPTHER

## 2020-12-11 RX ORDER — POTASSIUM CHLORIDE 7.45 MG/ML
10 INJECTION INTRAVENOUS
Status: COMPLETED | OUTPATIENT
Start: 2020-12-11 | End: 2020-12-11

## 2020-12-11 RX ORDER — METOPROLOL TARTRATE 5 MG/5ML
INJECTION INTRAVENOUS
Status: COMPLETED
Start: 2020-12-11 | End: 2020-12-11

## 2020-12-11 RX ORDER — FENTANYL CITRATE 50 UG/ML
50-100 INJECTION, SOLUTION INTRAMUSCULAR; INTRAVENOUS
Status: DISCONTINUED | OUTPATIENT
Start: 2020-12-11 | End: 2020-12-25

## 2020-12-11 RX ORDER — HEPARIN SODIUM 1000 [USP'U]/ML
3000 INJECTION, SOLUTION INTRAVENOUS; SUBCUTANEOUS
Status: COMPLETED | OUTPATIENT
Start: 2020-12-11 | End: 2020-12-11

## 2020-12-11 RX ORDER — IPRATROPIUM BROMIDE AND ALBUTEROL SULFATE 2.5; .5 MG/3ML; MG/3ML
3 SOLUTION RESPIRATORY (INHALATION)
Status: DISCONTINUED | OUTPATIENT
Start: 2020-12-11 | End: 2021-01-14 | Stop reason: HOSPADM

## 2020-12-11 RX ORDER — MIDAZOLAM HYDROCHLORIDE 1 MG/ML
INJECTION, SOLUTION INTRAMUSCULAR; INTRAVENOUS
Status: COMPLETED
Start: 2020-12-11 | End: 2020-12-11

## 2020-12-11 RX ORDER — HEPARIN SODIUM 1000 [USP'U]/ML
INJECTION, SOLUTION INTRAVENOUS; SUBCUTANEOUS AS NEEDED
Status: DISCONTINUED | OUTPATIENT
Start: 2020-12-11 | End: 2020-12-11 | Stop reason: HOSPADM

## 2020-12-11 RX ORDER — FENTANYL CITRATE 50 UG/ML
25 INJECTION, SOLUTION INTRAMUSCULAR; INTRAVENOUS ONCE
Status: COMPLETED | OUTPATIENT
Start: 2020-12-11 | End: 2020-12-11

## 2020-12-11 RX ORDER — POTASSIUM CHLORIDE 7.45 MG/ML
INJECTION INTRAVENOUS
Status: COMPLETED
Start: 2020-12-11 | End: 2020-12-11

## 2020-12-11 RX ORDER — MIDAZOLAM HYDROCHLORIDE 1 MG/ML
1 INJECTION, SOLUTION INTRAMUSCULAR; INTRAVENOUS
Status: DISCONTINUED | OUTPATIENT
Start: 2020-12-11 | End: 2020-12-11

## 2020-12-11 RX ORDER — LANOLIN ALCOHOL/MO/W.PET/CERES
3 CREAM (GRAM) TOPICAL
Status: DISCONTINUED | OUTPATIENT
Start: 2020-12-11 | End: 2020-12-27

## 2020-12-11 RX ORDER — LIDOCAINE HYDROCHLORIDE 10 MG/ML
INJECTION, SOLUTION EPIDURAL; INFILTRATION; INTRACAUDAL; PERINEURAL AS NEEDED
Status: DISCONTINUED | OUTPATIENT
Start: 2020-12-11 | End: 2020-12-11 | Stop reason: HOSPADM

## 2020-12-11 RX ORDER — MIDAZOLAM HYDROCHLORIDE 1 MG/ML
1 INJECTION, SOLUTION INTRAMUSCULAR; INTRAVENOUS ONCE
Status: COMPLETED | OUTPATIENT
Start: 2020-12-11 | End: 2020-12-11

## 2020-12-11 RX ADMIN — MIDAZOLAM HYDROCHLORIDE 1 MG: 1 INJECTION, SOLUTION INTRAMUSCULAR; INTRAVENOUS at 11:00

## 2020-12-11 RX ADMIN — MIDAZOLAM 2 MG: 1 INJECTION INTRAMUSCULAR; INTRAVENOUS at 14:03

## 2020-12-11 RX ADMIN — HEPARIN SODIUM 3000 UNITS: 1000 INJECTION INTRAVENOUS; SUBCUTANEOUS at 01:21

## 2020-12-11 RX ADMIN — INSULIN LISPRO 2 UNITS: 100 INJECTION, SOLUTION INTRAVENOUS; SUBCUTANEOUS at 22:21

## 2020-12-11 RX ADMIN — FENTANYL CITRATE 50 MCG: 50 INJECTION INTRAMUSCULAR; INTRAVENOUS at 14:38

## 2020-12-11 RX ADMIN — FUROSEMIDE 20 MG: 10 INJECTION, SOLUTION INTRAMUSCULAR; INTRAVENOUS at 09:20

## 2020-12-11 RX ADMIN — FENTANYL CITRATE 100 MCG: 50 INJECTION INTRAMUSCULAR; INTRAVENOUS at 20:33

## 2020-12-11 RX ADMIN — FENTANYL CITRATE 25 MCG: 50 INJECTION, SOLUTION INTRAMUSCULAR; INTRAVENOUS at 11:00

## 2020-12-11 RX ADMIN — MIDAZOLAM 1 MG: 1 INJECTION INTRAMUSCULAR; INTRAVENOUS at 11:00

## 2020-12-11 RX ADMIN — HEPARIN SODIUM AND DEXTROSE 1379 UNITS/HR: 10000; 5 INJECTION INTRAVENOUS at 07:17

## 2020-12-11 RX ADMIN — Medication 3 MG: at 22:03

## 2020-12-11 RX ADMIN — FENTANYL CITRATE 100 MCG: 50 INJECTION INTRAMUSCULAR; INTRAVENOUS at 22:58

## 2020-12-11 RX ADMIN — POTASSIUM CHLORIDE 10 MEQ: 7.46 INJECTION, SOLUTION INTRAVENOUS at 18:07

## 2020-12-11 RX ADMIN — METOPROLOL TARTRATE 2.5 MG: 5 INJECTION INTRAVENOUS at 11:00

## 2020-12-11 RX ADMIN — FUROSEMIDE 20 MG: 10 INJECTION, SOLUTION INTRAMUSCULAR; INTRAVENOUS at 10:00

## 2020-12-11 RX ADMIN — MIDAZOLAM 1 MG: 1 INJECTION INTRAMUSCULAR; INTRAVENOUS at 20:14

## 2020-12-11 RX ADMIN — SODIUM CHLORIDE 10 ML: 9 INJECTION, SOLUTION INTRAMUSCULAR; INTRAVENOUS; SUBCUTANEOUS at 16:20

## 2020-12-11 RX ADMIN — ATORVASTATIN CALCIUM 40 MG: 40 TABLET, FILM COATED ORAL at 22:03

## 2020-12-11 RX ADMIN — FENTANYL CITRATE 50 MCG: 50 INJECTION INTRAMUSCULAR; INTRAVENOUS at 16:17

## 2020-12-11 RX ADMIN — SODIUM CHLORIDE 10 ML: 9 INJECTION, SOLUTION INTRAMUSCULAR; INTRAVENOUS; SUBCUTANEOUS at 06:00

## 2020-12-11 RX ADMIN — METOPROLOL TARTRATE 2.5 MG: 5 INJECTION, SOLUTION INTRAVENOUS at 11:00

## 2020-12-11 RX ADMIN — INSULIN LISPRO 2 UNITS: 100 INJECTION, SOLUTION INTRAVENOUS; SUBCUTANEOUS at 18:18

## 2020-12-11 RX ADMIN — Medication 10 ML: at 22:06

## 2020-12-11 RX ADMIN — POTASSIUM CHLORIDE 10 MEQ: 7.46 INJECTION, SOLUTION INTRAVENOUS at 15:12

## 2020-12-11 RX ADMIN — MIDAZOLAM 2 MG: 1 INJECTION INTRAMUSCULAR; INTRAVENOUS at 11:18

## 2020-12-11 RX ADMIN — FENTANYL CITRATE 50 MCG: 50 INJECTION INTRAMUSCULAR; INTRAVENOUS at 16:54

## 2020-12-11 RX ADMIN — Medication 10 ML: at 18:19

## 2020-12-11 RX ADMIN — MIDAZOLAM HYDROCHLORIDE 2 MG: 1 INJECTION, SOLUTION INTRAMUSCULAR; INTRAVENOUS at 14:03

## 2020-12-11 RX ADMIN — MIDAZOLAM 1 MG: 1 INJECTION INTRAMUSCULAR; INTRAVENOUS at 17:49

## 2020-12-11 RX ADMIN — MIDAZOLAM 1 MG: 1 INJECTION INTRAMUSCULAR; INTRAVENOUS at 22:03

## 2020-12-11 RX ADMIN — FAMOTIDINE 20 MG: 10 INJECTION INTRAVENOUS at 20:15

## 2020-12-11 NOTE — PROGRESS NOTES
Bedside, Verbal, and Written shift change report given to Ivania Conti RN (oncoming nurse) by Kyaw Oscar RN (offgoing nurse). Report included the following information SBAR, Kardex, Intake/Output, MAR, Recent Results, and Cardiac Rhythm NSR.

## 2020-12-11 NOTE — PROGRESS NOTES
13:30  Received pt from cath lab with 6 fr. Sheath in right femoral artery and 6 fr sheath in left femoral vein. Sites CDI with no bruising noted, soft to palpation. Easily palpable pedal pulses (DP,PT) pinky. Pt. Ventilated with ETT 8 cm and 23 cm at lip. Settings AC with rate of 18, Fi02 80%, PEEP 5, tidal volume 450. Sp02   13:46  Spoke with Dr. Shivani Bonilla re:sedating medication - Orders received. 13:50  Spoke with Dr. Liset Panchal - When ACT less than or equal to 150, may pull sheaths. 13:55  Right ankle restrained with soft restraint to keep pt from bending leg while sheaths are in right groin. 14:15  Dr. Shivani Bonilla at bedside - reduced fi02 on ventilator to 70%. 16:40  Spoke with Dr. Shivani Bonilla re: ABG draws. No orders received. Dr. Shivani Bonilla plans to come by tonight to re-assess. 6 fr arterial sheath pulled right femoral artery at 15:40 with 20 minutes hand-held pressure and dressed with quickclot. At 16:00  6 fr venous sheath pulled from right femoral vein with 20 minutes hand-held pressure and dressed with quick clot. Pt tolerated both removals with no complications. Sites with CDI dressings - site soft with no bruising/hematoma noted. Easily palpable pedal pulses. 16:45  Bedside and Verbal shift change report given to ESTEBAN Washington (oncoming nurse) by Anthony Ashley RN (offgoing nurse). Report included the following information SBAR, Kardex, ED Summary, Procedure Summary, Intake/Output, MAR, Recent Results and Cardiac Rhythm NSR.

## 2020-12-11 NOTE — PROGRESS NOTES
[facility]  Progress Note    Patient: Norma Link Age: 72 y.o. : 1955 MR#: [id number] SSN: [id number]  Date/Time: 2020     Subjective: Patient seen during RRT, patient currently on BiPAP. Was able to give limited history. Patient complains of short of breath, chest tightness, no chest pain. Denies any cough. Patient not able to give any further information since she is on BiPAP. RRT was called for hypoxemia. Patient desatted up to 70% on 5 L. Chart reviewed, patient transferred from [facility] for acute MI, cardiology started patient on IV heparin and echo showed EF of 35%. Patient was noted to be in acute pulmonary edema, 20 mg IV Lasix x2 were given during RRT. ABG and chest x-ray were obtained. ABG showed hypoxemia and chest x-ray showed bilateral pulmonary edema. Patient's blood pressure on the lower side, patient SPO2 improved to 92% on BiPAP with 20/8 and 100% FiO2. Patient was transferred to CVT ICU on BiPAP. In ICU patient became more tachypneic and continued to be hypoxic on BiPAP. Discussed with cardiology Dr. Kristy Soliman and the decision was made to intubate the patient. Start anesthesia was called and patient was intubated. Postintubation patient's oxygen sats stabilized. Cardiology planning for aortic balloon placement and a cardiac cath. Assessment/Plan:   1. Acute on chronic hypoxic respiratory failure: Status post intubation by anesthesia, will continue ventilator management per ICU team.  I have consulted Dr. Farrah Dalal intensivist.  2. Acute flash pulmonary edema due to #3: Status post IV Lasix, will use Lasix as needed with close monitoring of blood pressure. 3. Acute systolic heart failure exacerbation, EF 35%: Lasix as needed. Will hold beta-blocker given her low blood pressure. Continue aspirin and statin, ACE inhibitor once blood pressure stabilizes.   4. Acute MI with anterolateral STEMI and Q waves: Continue IV heparin, aspirin. Cardiology planning for cardiac cath and possible aortic balloon placement. 5. History of COPD on home oxygen: Patient currently on vent, BD as tolerated. 6. Diabetes mellitus type 2: Patient on Lantus 20 units at nursing home, will start SSI and monitor blood sugars and start Lantus based on the blood sugars. 7. Hypertension: BP lower side, will monitor off medications. Discussed with cardiology, will start dobutamine if blood pressure drops. 8. Dementia: Unknown baseline, resume Aricept when able to tolerate p.o.  9. Full code    Discussed with the patient's 2 sisters Camryn Wild and Indira Pulling over the phone, updated the current condition, discussed about advanced directives, both want full code. Explained about the patient's current critical condition, further plan of care including cardiac cath. Both understand and want to proceed with the cardiology work-up. Critical care time 45-minute. Addendum  6 PM  Patient seen for follow-up  Patient remains intubated, sedated. Status post cath with stent placement. Good urine output with diuresis. Vent management per ICU team.  Discussed with RN.     Visit Vitals  BP 98/69   Pulse 99   Temp 98.9 °F (37.2 °C)   Resp 18   Ht 5' 7\" (1.702 m)   Wt 74.6 kg (164 lb 8 oz)   SpO2 99%   Breastfeeding No   BMI 25.76 kg/m²           Case discussed with:  []Patient  [x]Family  [x]Nursing  []Case Management  DVT Prophylaxis:  []Lovenox  [x]Hep iv  []SCDs  []Coumadin   []Eliquis/Xarelto     Objective:   VS:   Visit Vitals  /69 (BP 1 Location: Right arm, BP Patient Position: At rest)   Pulse 83   Temp 98.5 °F (36.9 °C)   Resp 25   Ht 5' 7\" (1.702 m)   Wt 74.6 kg (164 lb 8 oz)   SpO2 96%   Breastfeeding No   BMI 25.76 kg/m²      Tmax/24hrs: Temp (24hrs), Av.2 °F (36.8 °C), Min:97.3 °F (36.3 °C), Max:99.8 °F (37.7 °C)  IOBRIEF    Intake/Output Summary (Last 24 hours) at 2020 1024  Last data filed at 2020 0730  Gross per 24 hour   Intake 192.86 ml Output 0 ml   Net 192.86 ml       General:  Alert, acute respiratory distress, tachypnea. Patient on BiPAP   HEENT: PERRLA, anicteric sclerae. Pulmonary: Bilateral basal Rales, accessory muscle use  Cardiovascular: Regular rate and Rhythm. GI:  Soft, Non distended, Non tender. + Bowel sounds. Extremities:  No edema. No calf tenderness. Psych: Not anxious or agitated. Neurologic: AA. Moves all ext.   Additional:    Medications:   Current Facility-Administered Medications   Medication Dose Route Frequency    [Held by provider] carvediloL (COREG) tablet 3.125 mg  3.125 mg Oral Q12H    furosemide (LASIX) 10 mg/mL injection        furosemide (LASIX) 10 mg/mL injection        heparin 25,000 units in D5W 250 ml infusion  12-25 Units/kg/hr IntraVENous TITRATE    sodium chloride (NS) flush 5-40 mL  5-40 mL IntraVENous Q8H    sodium chloride (NS) flush 5-40 mL  5-40 mL IntraVENous PRN    acetaminophen (TYLENOL) tablet 650 mg  650 mg Oral Q6H PRN    Or    acetaminophen (TYLENOL) suppository 650 mg  650 mg Rectal Q6H PRN    polyethylene glycol (MIRALAX) packet 17 g  17 g Oral DAILY PRN    promethazine (PHENERGAN) tablet 12.5 mg  12.5 mg Oral Q6H PRN    Or    ondansetron (ZOFRAN) injection 4 mg  4 mg IntraVENous Q6H PRN    insulin lispro (HUMALOG) injection   SubCUTAneous AC&HS    glucose chewable tablet 16 g  4 Tab Oral PRN    glucagon (GLUCAGEN) injection 1 mg  1 mg IntraMUSCular PRN    dextrose (D50W) injection syrg 12.5-25 g  25-50 mL IntraVENous PRN    aspirin chewable tablet 81 mg  81 mg Oral DAILY    atorvastatin (LIPITOR) tablet 40 mg  40 mg Oral QHS       Labs:    Recent Results (from the past 12 hour(s))   GLUCOSE, POC    Collection Time: 12/11/20  8:02 AM   Result Value Ref Range    Glucose (POC) 122 (H) 70 - 110 mg/dL   POC G3    Collection Time: 12/11/20  9:42 AM   Result Value Ref Range    Device: BIPAP      FIO2 (POC) 100 %    pH (POC) 7.40 7.35 - 7.45      pCO2 (POC) 32.2 (L) 35.0 - 45.0 MMHG    pO2 (POC) 47 (LL) 80 - 100 MMHG    HCO3 (POC) 19.9 (L) 22 - 26 MMOL/L    sO2 (POC) 83 (L) 92 - 97 %    Base deficit (POC) 5 mmol/L    PEEP/CPAP (POC) 8 cmH2O    PIP (POC) 19      Pressure support 10 cmH2O    Allens test (POC) YES      Total resp. rate 37      Site LEFT RADIAL      Specimen type (POC) ARTERIAL      Performed by Lala Us W/ RFLX MICROSCOPIC    Collection Time: 12/11/20 10:17 AM   Result Value Ref Range    Color DARK YELLOW      Appearance CLEAR      Specific gravity 1.017 1.005 - 1.030      pH (UA) 5.5 5.0 - 8.0      Protein Negative NEG mg/dL    Glucose Negative NEG mg/dL    Ketone Negative NEG mg/dL    Bilirubin SMALL (A) NEG      Blood Negative NEG      Urobilinogen 2.0 (H) 0.2 - 1.0 EU/dL    Nitrites Negative NEG      Leukocyte Esterase SMALL (A) NEG     URINE MICROSCOPIC ONLY    Collection Time: 12/11/20 10:17 AM   Result Value Ref Range    WBC 3 to 6 0 - 4 /hpf    Bacteria 2+ (A) NEG /hpf    Mucus 2+ (A) NEG /lpf       Signed By: Kehinde Peter MD     December 11, 2020      Disclaimer: Sections of this note are dictated using utilizing voice recognition software. Minor typographical errors may be present. If questions arise, please do not hesitate to contact me or call our department.

## 2020-12-11 NOTE — PROGRESS NOTES
.  Reason for Admission:  STEMI (ST elevation myocardial infarction) ([facility] [address] [I21.3]                 RUR Score:    12%            Plan for utilizing home health:    No, pt is a longterm care resident of [address] [place] [address]                      Likelihood of Readmission:   LOW                         Transition of Care Plan:              Initial assessment completed. Cognitive status of patient: only aware of  place and person. Face sheet information confirmed:  yes. The patient designates Lius Davidson to participate in her discharge plan and to receive any needed information. This patient lives in a nursing facility. Patient is not able to navigate steps as needed. Prior to hospitalization, patient was considered to be independent with ADLs/IADLS : no . If not independent,  patient needs assist with : dressing, bathing, food preparation, cooking, toileting and grooming    Patient has a current ACP document on file: no  The medicaid stretcher will be available to transport patient home upon discharge. The patient already has all needed medical equipment available in the nursing facility. Patient is not currently active with home health. Pt is a longterm care resident of [address] [place] [address], [facility] [place]. Clinicals uploaded for their review. This patient is on dialysis :no    Currently, the discharge plan is LTC. The patient states that she can obtain her medications from the pharmacy, and take her medications as directed. Patient's current insurance is Medicare and Medicaid. Care Management Interventions  PCP Verified by CM: Yes  Mode of Transport at Discharge:  Other (see comment)(medicaid stretcher)  Transition of Care Consult (CM Consult): Discharge Planning  Physical Therapy Consult: No  Occupational Therapy Consult: No  Current Support Network: Nursing Facility  Confirm Follow Up Transport: Other (see comment)(medicaid stretcher)  Discharge Location  Discharge Placement: Long Term Care(Pt resides at [facility])        [address]  [phone]

## 2020-12-11 NOTE — ANESTHESIA PROCEDURE NOTES
Emergent Intubation  Performed by: Elisabeth Lay MD  Authorized by: Elisabeth Lay MD     Emergent Intubation:   Location:  ICU  Date/Time:  12/11/2020 10:15 AM  Indications:  Respiratory failure    Spontaneous Ventilation: absent    Level of Consciousness: sedated  Preoxygenated:  Yes      Airway Documentation:   Airway:  ETT - Cuffed  Technique:  Direct laryngoscopy  Insertion Site:  Oral  Blade Type:  Robbin  Blade Size:  3  ETT size (mm):  8.0  ETT Line Michael:  Teeth  ETT Insertion depth (cm):  23  Placement verified by: auscultation, EtCO2 and BBS    Attempts:  1  Difficult airway: No

## 2020-12-11 NOTE — CONSULTS
[address] [place] [address] [facility]  Pulmonary, Critical Care, and Sleep Medicine    Name: Jacki Christianson MRN: [id number]   : 1955 Hospital: [facility]   Date: 2020        Pulmonary Medicine-  Initial Patient Consult      IMPRESSION:   · CAD: S/P PCI with DEC- LAD 20  · Respiratory Failure- hypoxic- pulmonary edema, CHF- currently intubated  · Emphysema by report- no inhalers noted on home med list  · Demential- on Aricept  · Hypokalemia- replacing  · DM: on Lantus as OP  · Hepatitic C infection:       RECOMMENDATIONS:   NEURO  · RASS goal 0-1  · Fentanyl and versed PRN  · Melatonin 3mg QHS  CV  · Defer to cardiology   · MAP goal >65  · Trend troponin  RESP  · VAP bundle  · SpO2 goal >92%  · Wean vent as tolerated- plan to extubate within the next 24 hours if patient continues to progress as current pace  · CXR in AM  · Keep HO elevated  · Duo nebs PRN  GI  · Left NGT  · NPO for now except meds  · Clamp per cardiology and Brilinta protocol  · Protonix IV while intubated  · Bowel regimen per primary team    RENAL:  · Por   · Strict I/O  · Electrolyte management per primary team and cardology  ENDO  · Glycemic control per primary team    ID  · No active issues at this time  SKIN  · Per nursing protocol    Will continue to follow    FULL CODE     Subjective/History: This patient has been seen and evaluated at the request of Dr. Saurabh Quintanilla for Ventilator management and IV pressers if need. Patient is a 72 y.o. female with history fo DM, dementia, report of emphysema , hepatitis C and recent MI, went into respiratory distress with pulmonary edema earlier this morning requiring urgent intubation in CVT-SDU. Patient taken to cath lab and underwent LAD stent placement. Patient arrived in CVT-ICU intubated and partially sedated. She was mildly agitated and additional sedation given. Sheath in place. The patient is critically ill and can not provide additional history due to Ventilated. Past Medical History:   Diagnosis Date    Arthritis     Asthma     Chronic pain     BACK PAIN    Diabetes (HCC)     Diabetic neuropathy (HCC)     Emphysema     Hepatitis C     Hypertension     Nervousness     Osteoarthritis of both knees       Past Surgical History:   Procedure Laterality Date    HX CARPAL TUNNEL RELEASE Right 8/31/09    Dr. Shreyas Henry        Prior to Admission medications    Medication Sig Start Date End Date Taking? Authorizing Provider   folic acid (FOLVITE) 1 mg tablet 1 Tab by Per NG tube route daily. 2/15/18   Manasa Hester MD   insulin glargine (LANTUS) 100 unit/mL injection lantus 22 units subq daily 2/15/18   Manasa Hester MD   magnesium oxide (MAG-OX) 400 mg tablet Take 1 Tab by mouth two (2) times a day. 2/14/18   Manasa Hester MD   tamsulosin (FLOMAX) 0.4 mg capsule Take 1 tab by mouth daily until kidney stone passes. 2/5/18   Quiana Villar,    amLODIPine (NORVASC) 5 mg tablet Take 5 mg by mouth daily. Indications: HYPERTENSION    Provider, Historical   aspirin (ASPIRIN) 325 mg tablet Take 325 mg by mouth daily. Provider, Historical   VITAMIN E (FORMULA E 400 PO) Take 1 Tab by mouth daily. Provider, Historical   losartan (COZAAR) 50 mg tablet Take  by mouth daily. Indications: HYPERTENSION    Provider, Historical   Melatonin 300 mcg Tab Take  by mouth. Provider, Historical   cyanocobalamin (VITAMIN B-12) 1,500 mcg TbER Take 1 Tab by mouth.       Provider, Historical     Current Facility-Administered Medications   Medication Dose Route Frequency    midazolam (VERSED) 1 mg/mL injection        [Held by provider] carvediloL (COREG) tablet 3.125 mg  3.125 mg Oral Q12H    furosemide (LASIX) 10 mg/mL injection        furosemide (LASIX) 10 mg/mL injection        metoprolol (LOPRESSOR) 5 mg/5 mL injection        fentaNYL citrate (PF) 50 mcg/mL injection        metoprolol (LOPRESSOR) injection 2.5 mg  2.5 mg IntraVENous ONCE  fentaNYL citrate (PF) injection 25 mcg  25 mcg IntraVENous ONCE    heparin 25,000 units in D5W 250 ml infusion  12-25 Units/kg/hr IntraVENous TITRATE    sodium chloride (NS) flush 5-40 mL  5-40 mL IntraVENous Q8H    insulin lispro (HUMALOG) injection   SubCUTAneous AC&HS    aspirin chewable tablet 81 mg  81 mg Oral DAILY    atorvastatin (LIPITOR) tablet 40 mg  40 mg Oral QHS     No Known Allergies   Social History     Tobacco Use    Smoking status: Current Every Day Smoker     Packs/day: 1.00   Substance Use Topics    Alcohol use: Yes     Comment: socially      Family History   Problem Relation Age of Onset    Diabetes Mother     Hypertension Mother     Obesity Mother     Alcohol abuse Father     High Cholesterol Father     Lung Disease Father     Stroke Father     Arthritis-osteo Sister     Depression Sister     Diabetes Sister     Hypertension Sister     Obesity Sister     Arthritis-osteo Brother     Diabetes Brother     Hypertension Brother     Obesity Brother         Review of Systems:  Review of systems not obtained due to patient factors.     Objective:   Vital Signs:    Visit Vitals  /69 (BP 1 Location: Right arm, BP Patient Position: At rest)   Pulse 83   Temp 98.5 °F (36.9 °C)   Resp 25   Ht 5' 7\" (1.702 m)   Wt 74.6 kg (164 lb 8 oz)   SpO2 96%   Breastfeeding No   BMI 25.76 kg/m²       O2 Device: Nasal cannula   O2 Flow Rate (L/min): 4 l/min   Temp (24hrs), Av.9 °F (36.6 °C), Min:97.3 °F (36.3 °C), Max:98.9 °F (37.2 °C)       Intake/Output:   Last shift:      701 - 1900  In: 20.7 [I.V.:20.7]  Out: -   Last 3 shifts: 1901 - 700  In: 172.2 [I.V.:172.2]  Out: 0     Intake/Output Summary (Last 24 hours) at 2020 1134  Last data filed at 2020 0730  Gross per 24 hour   Intake 192.86 ml   Output 0 ml   Net 192.86 ml         Ventilator Settings:  Mode Rate Tidal Volume Pressure FiO2 PEEP                    Peak airway pressure:      Minute ventilation:           Physical Exam:    General:  Alert, cooperative, no distress, appears stated age. Head:  Normocephalic, without obvious abnormality, atraumatic. Eyes:  Conjunctivae/corneas clear. PERRL, EOMs intact. Nose: Nares normal. Septum midline. Mucosa normal. No drainage or sinus tenderness. Throat: Lips, mucosa, and tongue normal. Teeth and gums normal.   Neck: Supple, symmetrical, trachea midline, no adenopathy, thyroid: no enlargment/tenderness/nodules, no carotid bruit and no JVD. Back:   Symmetric, no curvature. ROM normal.   Lungs:   Clear to auscultation bilaterally. Chest wall:  No tenderness or deformity. Heart:  Regular rate and rhythm, S1, S2 normal, no murmur, click, rub or gallop. Abdomen:   Soft, non-tender. Bowel sounds normal. No masses,  No organomegaly. Extremities: Extremities normal, atraumatic, no cyanosis or edema. Pulses: 2+ and symmetric all extremities.    Skin: Skin color, texture, turgor normal. No rashes or lesions   Lymph nodes:      Cervical, supraclavicular, and axillary nodes normal.   Neurologic: Grossly nonfocal       Data:     Recent Results (from the past 24 hour(s))   CARDIAC PANEL,(CK, CKMB & TROPONIN)    Collection Time: 12/10/20 12:47 PM   Result Value Ref Range    CK - MB 16.6 (H) <3.6 ng/ml    CK-MB Index 8.8 (H) 0.0 - 4.0 %     26 - 192 U/L    Troponin-I, QT 4.83 (HH) 0.0 - 0.045 NG/ML   EKG, 12 LEAD, INITIAL    Collection Time: 12/10/20 12:52 PM   Result Value Ref Range    Ventricular Rate 82 BPM    Atrial Rate 82 BPM    P-R Interval 158 ms    QRS Duration 88 ms    Q-T Interval 370 ms    QTC Calculation (Bezet) 432 ms    Calculated P Axis 51 degrees    Calculated R Axis -58 degrees    Calculated T Axis 30 degrees    Diagnosis       Normal sinus rhythm  Possible Left atrial enlargement  Left axis deviation  Inferior infarct (cited on or before 07-FEB-2018)  Anterolateral infarct (cited on or before 07-FEB-2018)  ** ** ACUTE MI / STEMI ** **  Abnormal ECG  When compared with ECG of 10-DEC-2020 11:01,  QRS axis shifted left  ST more elevated in Lateral leads  QT has lengthened  Confirmed by Jina Bob ([id number]) on 12/10/2020 3:24:05 PM     ECHO ADULT COMPLETE    Collection Time: 12/10/20  1:32 PM   Result Value Ref Range    IVSd 1.05 (A) 0.6 - 0.9 cm    LVIDd 4.20 3.9 - 5.3 cm    LVIDs 3.47 cm    LVOT d 1.87 cm    LVPWd 1.13 (A) 0.6 - 0.9 cm    RVIDd 3.00 cm    LA Volume 46.31 22 - 52 mL    LA Area 4C 13.83 cm2    LA Vol 2C 61.74 (A) 22 - 52 mL    LA Vol 4C 25.76 22 - 52 mL    Triscuspid Valve Regurgitation Peak Gradient 22.53 mmHg    TR Max Velocity 237.35 cm/s    Ao Root D 3.76 cm    LV Mass .0 67 - 162 g    LV Mass AL Index 80.4 43 - 95 g/m2    LA Vol Index 24.01 16 - 28 ml/m2    LA Vol Index 32.01 16 - 28 ml/m2    LA Vol Index 13.35 16 - 28 ml/m2   PTT    Collection Time: 12/10/20  5:10 PM   Result Value Ref Range    aPTT 81.0 (H) 23.0 - 36.4 SEC   GLUCOSE, POC    Collection Time: 12/10/20  5:48 PM   Result Value Ref Range    Glucose (POC) 162 (H) 70 - 110 mg/dL   HEMOGLOBIN A1C WITH EAG    Collection Time: 12/10/20  6:51 PM   Result Value Ref Range    Hemoglobin A1c 8.3 (H) 4.2 - 5.6 %    Est. average glucose 192 mg/dL   PTT    Collection Time: 12/10/20  6:51 PM   Result Value Ref Range    aPTT 30.1 23.0 - 36.4 SEC   CARDIAC PANEL,(CK, CKMB & TROPONIN)    Collection Time: 12/10/20  6:51 PM   Result Value Ref Range    CK - MB 60.5 (H) <3.6 ng/ml    CK-MB Index 13.0 (H) 0.0 - 4.0 %     (H) 26 - 192 U/L    Troponin-I, QT 18.10 (HH) 0.0 - 0.045 NG/ML   GLUCOSE, POC    Collection Time: 12/10/20 10:52 PM   Result Value Ref Range    Glucose (POC) 134 (H) 70 - 110 mg/dL   CARDIAC PANEL,(CK, CKMB & TROPONIN)    Collection Time: 12/10/20 11:29 PM   Result Value Ref Range    CK - MB 68.0 (H) <3.6 ng/ml    CK-MB Index 13.7 (H) 0.0 - 4.0 %     (H) 26 - 192 U/L    Troponin-I, QT 27.80 (HH) 0.0 - 0.045 NG/ML   PTT    Collection Time: 12/10/20 11:29 PM   Result Value Ref Range    aPTT 40.0 (H) 23.0 - 06.4 SEC   METABOLIC PANEL, BASIC    Collection Time: 12/10/20 11:29 PM   Result Value Ref Range    Sodium 137 136 - 145 mmol/L    Potassium 3.8 3.5 - 5.5 mmol/L    Chloride 106 100 - 111 mmol/L    CO2 23 21 - 32 mmol/L    Anion gap 8 3.0 - 18 mmol/L    Glucose 125 (H) 74 - 99 mg/dL    BUN 17 7.0 - 18 MG/DL    Creatinine 0.47 (L) 0.6 - 1.3 MG/DL    BUN/Creatinine ratio 36 (H) 12 - 20      GFR est AA >60 >60 ml/min/1.73m2    GFR est non-AA >60 >60 ml/min/1.73m2    Calcium 9.0 8.5 - 10.1 MG/DL   GLUCOSE, POC    Collection Time: 12/11/20  8:02 AM   Result Value Ref Range    Glucose (POC) 122 (H) 70 - 110 mg/dL   POC G3    Collection Time: 12/11/20  9:42 AM   Result Value Ref Range    Device: BIPAP      FIO2 (POC) 100 %    pH (POC) 7.40 7.35 - 7.45      pCO2 (POC) 32.2 (L) 35.0 - 45.0 MMHG    pO2 (POC) 47 (LL) 80 - 100 MMHG    HCO3 (POC) 19.9 (L) 22 - 26 MMOL/L    sO2 (POC) 83 (L) 92 - 97 %    Base deficit (POC) 5 mmol/L    PEEP/CPAP (POC) 8 cmH2O    PIP (POC) 19      Pressure support 10 cmH2O    Allens test (POC) YES      Total resp.  rate 37      Site LEFT RADIAL      Specimen type (POC) ARTERIAL      Performed by Lala Us W/ RFLX MICROSCOPIC    Collection Time: 12/11/20 10:17 AM   Result Value Ref Range    Color DARK YELLOW      Appearance CLEAR      Specific gravity 1.017 1.005 - 1.030      pH (UA) 5.5 5.0 - 8.0      Protein Negative NEG mg/dL    Glucose Negative NEG mg/dL    Ketone Negative NEG mg/dL    Bilirubin SMALL (A) NEG      Blood Negative NEG      Urobilinogen 2.0 (H) 0.2 - 1.0 EU/dL    Nitrites Negative NEG      Leukocyte Esterase SMALL (A) NEG     URINE MICROSCOPIC ONLY    Collection Time: 12/11/20 10:17 AM   Result Value Ref Range    WBC 3 to 6 0 - 4 /hpf    Bacteria 2+ (A) NEG /hpf    Mucus 2+ (A) NEG /lpf             Lab Results   Component Value Date/Time    NT pro-BNP 5,307 (H) 12/11/2020 01:35 PM    NT pro-BNP 5,306 (H) 02/07/2018 09:47 PM     Results for Aleida Bush (MRN [id number]) as of 12/11/2020 17:47   Ref. Range 12/10/2020 11:07 12/10/2020 12:47 12/10/2020 18:51 12/10/2020 23:29 12/11/2020 13:35   Troponin-I, Qt. Latest Ref Range: 0.0 - 0.045 NG/ML 4.79 (HH) 4.83 (HH) 18.10 (HH) 27.80 (HH) 12.00 (HH)   CK-MB Index Latest Ref Range: 0.0 - 4.0 % 10.3 (H) 8.8 (H) 13.0 (H) 13.7 (H)    CK Latest Ref Range: 26 - 192 U/L 179 189 466 (H) 497 (H)    NT pro-BNP Latest Ref Range: 0 - 900 PG/ML     5,307 (H)     4/4/2020  7:17 PM - Fer, [facility] In     PageBites  Ref Range  Units  Status    HCV RNA, PCR, QT  [id number]  High     IU/mL  Final    HCV RNA, PCR QT  6.51  High     log IU/mL  Final    Comment:    Reference Range:          Recent Labs     12/11/20  0942   FIO2I 100   HCO3I 19.9*   PCO2I 32.2*   PHI 7.40   PO2I 47*     Telemetry:normal sinus rhythm    Imaging:  I have personally reviewed the patients radiographs and have reviewed the reports:  CT Results  (Last 48 hours)    None        CXR Results  (Last 48 hours)               12/11/20 1431  XR CHEST PORT Final result    Impression:  IMPRESSION:   1. Tubes and lines without evidence of complication. 2.  Unchanged bilateral airspace opacities. Follow to resolution is recommended. Narrative:  EXAM: Chest X-Ray       INDICATION:  post-op cath lab -. TECHNIQUE: AP view of the chest       COMPARISON: 12/11/2020 at 1017 hours, 12/11/2020 at 0909 hours, 12/10/2020 and   2/11/2018       FINDINGS:    Tubes and Lines: ET tube and NG tube are unchanged. Pleura: No pneumothorax appreciated. No effusions appreciated. Lungs:  Unchanged diffuse bilateral opacities and bronchiectasis. Cardiac/Mediastinum/Aorta: It is unremarkable       Pulmonary Vascularity: The pulmonary vasculature is unremarkable.        Chest Wall: No acute finding       Osseous Structures: Unremarkable       Upper Abdomen: No acute findings appreciated. 12/11/20 1050  XR CHEST PORT Final result    Impression:  IMPRESSION:   1. Tubes and lines without evidence of complication. 2.  Mildly improved aeration and expansion of the lungs. Diffuse interstitial   opacities are noted and bronchiectasis. Narrative:  EXAM: Chest X-Ray       INDICATION:  s/p intubation. TECHNIQUE: AP view of the chest       COMPARISON: 12/11/2020 at 0903 hours, 2/11/2018 and 2/10/2018       FINDINGS:    Tubes and Lines: The patient is intubated. ET tube is 3 cm above chace. An NG   tube is present with tip overlying the stomach. Pleura: No pneumothorax appreciated. No effusions appreciated. Lungs: There is mild improvement in aeration and expansion the lungs. Interstitial opacities are noted bilaterally. Atelectasis is present. Cardiac/Mediastinum/Aorta: It is unremarkable       Pulmonary Vascularity: The pulmonary vasculature is unremarkable. Chest Wall: No acute finding       Osseous Structures: Unremarkable       Upper Abdomen: No acute findings appreciated. 12/11/20 0940  XR CHEST PORT Final result    Impression:  Impression:   1. Acute on chronic opacities. Follow to resolution is recommended. Narrative:  EXAM: Chest Radiograph       INDICATION:  SOB       TECHNIQUE: AP view of the chest       COMPARISON: 12/10/2020, 2/11/2018, 2/10/2018       FINDINGS: No pneumothorax identified. Patient has chronic interstitial changes. There is acute opacities along the periphery of the lung predominantly in the   basilar sections which are progressed since prior imaging. No effusions   identified. The cardiomediastinal silhouette is unremarkable. The pulmonary   vasculature is unremarkable. The osseous structures are unremarkable.            12/10/20 1133  XR CHEST SNGL V Final result    Impression:  IMPRESSION:       Mixed interstitial and alveolar opacities greatest at both lung bases, slightly asymmetrically more pronounced on the right. . Differential diagnosis includes   asymmetric pulmonary edema versus multifocal airspace disease.       _______________           Narrative:  EXAM: XR CHEST SNGL V.       CLINICAL INDICATION/HISTORY: abnormal ECG. -Additional: None. TECHNIQUE: A portable erect AP radiographic view of the chest is compared with   the radiograph of 02/11/2018.   _______________       FINDINGS:       See impression. No pneumothorax or appreciable significant pleural effusion. The   cardiac silhouette, karlo, and mediastinal contours are normal for age. No acute   osseous abnormality is revealed. _______________                  Best practice :  Core measures:    Glycemic control  [facility]. Ventilated patients- aim to keep peak plateau pressure 95-40IF H2O. Sress ulcer prophylaxis  DVT prophylaxis       Pro Bundle Followed and Vent Bundle Followed, Vent Day 1        Critical Care Time:  The services I provided to this patient were to treat and/or prevent clinically significant deterioration that could result in the failure of one or more body systems and/or organ systems due to respiratory distress, hypoxia, cardiac dysrhythmia. Services included the following:  -reviewing nursing notes and old charts  -vital sign assessments- reassessed BP at bedside- moved cuff to left upper arm.   -direct patient care  -medication orders and management  -interpreting and reviewing diagnostic studies/labs  -re-evaluations  -documentation time  -Spoke with ICU nurse and Cardiology and hospitalist     Aggregate critical care time was 55   minutes, which includes only time during which I was engaged in work directly related to the patient's care as described above. During this entire length of time I was immediately available to the patient.  The reason for providing this level of medical care for this critically ill patient was due a critical illness that impaired one or more vital organ systems such that there was a high probability of imminent or life threatening deterioration in the patients condition. This care involved high complexity decision making to assess, manipulate, and support vital system functions, to treat this degreee vital organ system failure and to prevent further life threatening deterioration of the patients condition      Complex decision making was made in the evaluation and management plans during this consultation. More than 50% of time was spent in counseling and coordination of care including review of data and discussion with other team members.     Alejandra Castle DO, [facility]    [facility]  Pulmonary, Critical Care, and Sleep Medicine

## 2020-12-11 NOTE — ROUTINE PROCESS
Bedside shift change report given to Maritza Haley RN (oncoming nurse) by Vanesa Garcia RN (offgoing nurse). Report included the following information SBAR, Kardex, ED Summary, Intake/Output, MAR, Recent Results and Dual Neuro Assessment.

## 2020-12-11 NOTE — DIABETES MGMT
Glycemic Control Plan of Care    T2DM with A1c of 8.3% (12/10/2020)  12/11: Patient intub and sedated. Noted patient came from [facility] with history of dementia. Home diabetes medications: Unverified  Lantus insulin 22 units daily    Patient was admitted to [facility] from [facility] on 12/10 with report of shortness of breath and altered mental state. Noted the following assessment:  STEMI  Acute flash pulmonary edema  Acute systolic heart failure exacerbation  Rapid response 12/11  Status post cardiac procedure 12/11 then to CVT ICU    Glycemic recommendation(s): sliding scale lispro insulin as ordered    Glycemic assessment:    POC BG range on 12/10: 134-162  POC BG 12/11 at time of review: 122, 175  Lab FBG 12/11: 163        Current A1C: 8.3% (12/10/2020) which is equivalent to estimated average blood glucose of 192 mg/dL during the past 2-3 months    Current hospital diabetes medications:  Correctional lispro insulin ACHS. Normal sensitivity dose    Total daily dose insulin requirement previous day: 12/10  Lispro: 2 units    Diet: NPO    Goals:  Blood glucose will be within target range of  mg/dL by 12/14/2020    Education: 12/11: Intubated, sedated.  Noted patient came from [facility] with history of dementia.  ___  Refer to Diabetes Education Record   ___  Education not indicated at this time    Gilbert Gan RN [facility]  Pager: [phone]

## 2020-12-11 NOTE — PROGRESS NOTES
Rapid Response Note  Family Medicine    Patient: Yessy Cain 72 y.o. female  [id number]  1955      Admit Date: 12/10/2020   Admission Diagnosis: STEMI (ST elevation myocardial infarction) ([facility] [I21.3]    RAPID RESPONSE     Rapid response called for shortness of breath. Pt was found to have O2 sat in low 80s. RT in room, patient on BiPAP. CXR showed increased congestion most likely due to CHF secondary to MI. Pt was given a dose of 20mg Furosemide. Her BP was 110/85. She was stable and taken to Cardiac ICU awaiting Cardiology eval and possible stent. Medications Reviewed    OBJECTIVE     Patient Vitals for the past 12 hrs:   Temp Pulse Resp BP SpO2   12/11/20 0818 98.5 °F (36.9 °C) 83 25 110/69 96 %   12/11/20 0400 98.9 °F (37.2 °C) 80 23 107/64 100 %   12/10/20 2335 97.5 °F (36.4 °C) 88 22 111/70 99 %         PHYSICAL:  General:  Alert and Responsive and in moderate distress. CV:  RRR, no murmurs, rubs, or gallops. PMI not displaced. No visible pulsations or thrills. No carotid bruits. RESP:  Unlabored breathing. Rales in Bases BL. ABD:  Soft, nontender, nondistended. Normoactive bowel sounds. No hepatosplenomegaly. No suprapubic tenderness. No CVA tenderness. Neuro:  5/5 strength bilateral upper extremities and lower extremities. CN II-XII intact. Sensation grossly intact. A+Ox3. EKG: Q waves in anterolateral lead with ST elevation      ASSESSMENT, PLAN & DISPOSITION   Aurelia Mcguire is a 72y.o. year old female admitted for STEMI (ST elevation myocardial infarction) ([facility] [I21.3]. Rapid response called for hypoxia. Patient condition currently: stable. Medications Administered: Lasix 20mg    Labs ordered/Pending:  Cardiac panel, ABG    Disposition: Cardiac ICU    Attending Dr. Jose Christian notified of rapid response. In agreement with plan. Primary team resuming care.      Senior resident Dr. Edin Pearson present during RRT and evaluation    Phong Payment DO PGY-1  [facility]. PGY-1  10:38 AM 12/11/20

## 2020-12-11 NOTE — MED STUDENT NOTES
Hospitalist Progress Note Patient: Darci Query               Sex: female          DOA: 12/10/2020 YOB: 1955      Age:  72 y.o.        LOS:  LOS: 0 days Subjective:  
 
CC: SOB, AMS HxCC:  Maria D Vargas is a 72 y.o. female who presents from SNF with complaints of SOB and AMS. Per nurse at [facility], patient is oriented to self and family per baseline. She was transferred to [facility] from [facility] for management of  STEMI. Patient is poor historian. PMH includes COPD on 4L NC, DM, HTN, Diabetic neuropathy, and OA. ED Course: 
Cardiology consult-started on Heparin gtt Echo CXR Objective:  
 
Physical Exam:  
Blood pressure 95/67, pulse 79, temperature 97.3 °F (36.3 °C), resp. rate 20, height 5' 7\" (1.702 m), weight 81.2 kg (179 lb), SpO2 92 %, not currently breastfeeding. GEN = aaox1 nad HEENT = bloody drainage from oral cavity HEART = no abnormal heart sounds LUNGS = no c/w/r 
CV = no edema Intake and Output: 
Current Shift:  No intake/output data recorded. Last three shifts:  No intake/output data recorded. Lab/Data Reviewed: 
Troponin 4.83 PTT 81.0 Platelets 205 Glucose 202 Creatinine 0.55 Potassium 3.7 Medications review deferred, patient is poor historian Assessment/Plan Patient Active Problem List  
Diagnosis Code  Diverticula of colon K57.30  Sepsis ([facility] A41.9  Sepsis secondary to UTI ([facility] A41.9, N39.0  DM hyperosmolarity type II, uncontrolled (HCC) E11.00, E11.65  
 HTN (hypertension) I10  
 Febrile illness, acute R50.9  Gram-negative bacteremia R78.81  
 Diabetic neuropathy ([facility]) E11.40  Osteoarthritis of both knees M17.0  Acidosis E87.2  Respiratory failure ([address] J96.90  Shock ([facility] R57.9  Hyperosmolar (nonketotic) coma ([facility]) E11.01  
 Acute UTI N39.0  Macrocytic anemia D53.9  STEMI (ST elevation myocardial infarction) ([facility]) I21.3 XR Results (most recent): 
 Results from [facility] [place] Encounter encounter on 12/10/20 XR CHEST SNGL V  
 Narrative EXAM: XR CHEST SNGL V. 
 
CLINICAL INDICATION/HISTORY: abnormal ECG. -Additional: None. TECHNIQUE: A portable erect AP radiographic view of the chest is compared with 
the radiograph of 02/11/2018. 
_______________ FINDINGS: 
 
See impression. No pneumothorax or appreciable significant pleural effusion. The 
cardiac silhouette, karlo, and mediastinal contours are normal for age. No acute 
osseous abnormality is revealed. _______________ Impression IMPRESSION: 
 
Mixed interstitial and alveolar opacities greatest at both lung bases, slightly 
asymmetrically more pronounced on the right. . Differential diagnosis includes 
asymmetric pulmonary edema versus multifocal airspace disease. 
 
_______________ 
  
  
12/10/20 ECHO ADULT COMPLETE 12/10/2020 12/10/2020 Narrative · LV: Estimated LVEF is 35 - 40%. Visually measured ejection fraction. Normal cavity size and wall thickness. Moderately and segmentally reduced  
systolic function. Inconclusive left ventricular diastolic function. · AO: Mild aortic root dilatation; diameter is 3.8 cm. Signed by: Betty Espinoza MD  
  
Assessment: 
NSTEMI 
AMS-oriented to self History of COPD History of DM History of HTN Plan: 1. Cardiology consult-NSTEMI, on Heparin gtt 2. NPO after midnight for cath lab in am 
3. COPD-on 4L NC, chronic 4. Serial cardiac enzymes. 5. PTT per IV Heparin protocol 6. Pain control 7. DM-SSI 8. U/A-possible UTI 9. Full code 10. Designated  is patient's sister. Rustam Luis Daniel 12/10/20 7:12 PM

## 2020-12-11 NOTE — ROUTINE PROCESS
2130 - Lab called with critial value of troponin: 18.1. 
 
2145 - Called on call cardiology, left a message. [place] PARAG Coyle, verbal orders given for aspirin 81mg and Lipitor 40mg. Orders to start immediately. 0030 - Lab called with critical trop: 27.80. 
 
0035 - Hospitalist notified. No orders given currently.

## 2020-12-11 NOTE — PROGRESS NOTES
0830 pt was moved to bedside commode for bowel movement, when pt got to commode started complaining that she could not breathe. Pt had 5L NC on, 02s were decreasing to low 60s and RR in 40s. Pt back in bed, respiratory was notified. Sats would not increase past 94, pt was placed on bipap. RRT called. 2 doses of 20mg of lasix were administered, and a means catheter was inserted. Chest xray and EKG done. Urine Culture done and sent to lab. Pt transported to CVT ICU and report given to ESTEBAN [place].

## 2020-12-11 NOTE — PROGRESS NOTES
[facility]      CARDIOLOGY PROGRESS NOTE  RECS:        1. Subacute MI-EKG showed Q waves in anterolateral lead with ST elevation, with suspicion of late presentation of anterior MI- currently chest pain free. Still c/o SOB  2. Acute respiratory failure-in the setting acute CHF- On BIPAP. Ordered ABG. Transfer patient to ICU- discussed with family. Obtained consent for LHC/PCI and Impella balloom pump. 3. Acute Systolic Congestive heart Failure-decompensated. chest x-ray stat. Lasix iv 20 mg now. 4. Hypertension- low normal   5. COPD, on home O2 4L NC   6. Hepatitis C ? -Per family member   9. DM2- medications per medical team   8. Dementia-oriented to person only        patient with late presentation anterior wall MI- today remains hypoxic and impending cardiogenic shock. Will intubate patient and proceed with LHC +/- IMPELLA for acute CHF. Above plan discussed with patient's family and hospitalist.  Prognosis guarded    Echo 12/10/20  · LV: Estimated LVEF is 35 - 40%. Visually measured ejection fraction. Normal cavity size and wall thickness. Moderately and segmentally reduced systolic function. Inconclusive left ventricular diastolic function. · AO: Mild aortic root dilatation; diameter is 3.8 cm.      ASSESSMENT:  Hospital Problems  Date Reviewed: 2/7/2018          Codes Class Noted POA    STEMI (ST elevation myocardial infarction) [facility]) ICD-10-CM: I21.3  ICD-9-CM: 410.90  12/10/2020 Unknown                SUBJECTIVE:  No CP  C/o SOB    OBJECTIVE:    VS:   Visit Vitals  /69 (BP 1 Location: Right arm, BP Patient Position: At rest)   Pulse 83   Temp 98.5 °F (36.9 °C)   Resp 25   Ht 5' 7\" (1.702 m)   Wt 74.6 kg (164 lb 8 oz)   SpO2 96%   Breastfeeding No   BMI 25.76 kg/m²         Intake/Output Summary (Last 24 hours) at 12/11/2020 0836  Last data filed at 12/11/2020 0730  Gross per 24 hour   Intake 192.86 ml   Output 0 ml   Net 192.86 ml     TELE: normal sinus rhythm    General: alert, well developed, pleasant and in no apparent distress  HENT: Normocephalic, atraumatic. Normal external eye. Neck :  no bruit, no JVD  Cardiac:  regular rate and rhythm, S1, S2 normal, no murmur, click, rub or gallop  Lungs: rales R base, L base  Abdomen: Soft, nontender, no masses  Extremities:  No c/c/e, peripheral pulses present      Labs: Results:       Chemistry Recent Labs     12/10/20  2329 12/10/20  1107   * 202*    136   K 3.8 3.7    104   CO2 23 23   BUN 17 14   CREA 0.47* 0.55*   CA 9.0 9.1   AGAP 8 9   BUCR 36* 25*   AP  --  86   TP  --  8.6*   ALB  --  2.7*   GLOB  --  5.9*   AGRAT  --  0.5*      CBC w/Diff Recent Labs     12/10/20  1107 12/10/20  0400   WBC 8.3 6.3   RBC 5.06 4.66   HGB 13.9 12.8   HCT 43.3 38.2   * 559*   GRANS  --  71   LYMPH  --  16*   EOS  --  2      Cardiac Enzymes Recent Labs     12/10/20  2329 12/10/20  1851   * 466*   CKND1 13.7* 13.0*      Coagulation Recent Labs     12/10/20  2329 12/10/20  1851  12/10/20  1107   PTP  --   --   --  14.5   INR  --   --   --  1.2   APTT 40.0* 30.1   < >  --     < > = values in this interval not displayed. Lipid Panel No results found for: CHOL, CHOLPOCT, CHOLX, CHLST, CHOLV, 340975, HDL, HDLP, LDL, LDLC, DLDLP, 572118, VLDLC, VLDL, TGLX, TRIGL, TRIGP, TGLPOCT, CHHD, CHHDX   BNP No results for input(s): BNPP in the last 72 hours. Liver Enzymes Recent Labs     12/10/20  1107   TP 8.6*   ALB 2.7*   AP 86      Thyroid Studies Lab Results   Component Value Date/Time    TSH 2.92 12/10/2020 11:07 AM              Lisa MACKEYC Lynne:[phone] supervised    I have independently evaluated and examined the patient. All relevant labs and testing data's are reviewed. Care plan discussed and updated after review.     Luis A Coffman MD

## 2020-12-12 ENCOUNTER — APPOINTMENT (OUTPATIENT)
Dept: GENERAL RADIOLOGY | Age: 65
DRG: 003 | End: 2020-12-12
Attending: INTERNAL MEDICINE
Payer: MEDICARE

## 2020-12-12 LAB
ALBUMIN SERPL-MCNC: 2.4 G/DL (ref 3.4–5)
ALBUMIN/GLOB SERPL: 0.5 {RATIO} (ref 0.8–1.7)
ALP SERPL-CCNC: 76 U/L (ref 45–117)
ALT SERPL-CCNC: 36 U/L (ref 13–56)
ANION GAP SERPL CALC-SCNC: 11 MMOL/L (ref 3–18)
ANION GAP SERPL CALC-SCNC: 13 MMOL/L (ref 3–18)
APPEARANCE UR: CLEAR
ARTERIAL PATENCY WRIST A: YES
AST SERPL-CCNC: 91 U/L (ref 10–38)
ATRIAL RATE: 88 BPM
ATRIAL RATE: 93 BPM
ATRIAL RATE: 95 BPM
BACTERIA SPEC CULT: ABNORMAL
BACTERIA URNS QL MICRO: NEGATIVE /HPF
BASE DEFICIT BLD-SCNC: 7 MMOL/L
BASOPHILS # BLD: 0 K/UL (ref 0–0.1)
BASOPHILS NFR BLD: 0 % (ref 0–2)
BDY SITE: ABNORMAL
BILIRUB SERPL-MCNC: 0.9 MG/DL (ref 0.2–1)
BILIRUB UR QL: ABNORMAL
BNP SERPL-MCNC: 6465 PG/ML (ref 0–900)
BUN SERPL-MCNC: 19 MG/DL (ref 7–18)
BUN SERPL-MCNC: 23 MG/DL (ref 7–18)
BUN/CREAT SERPL: 34 (ref 12–20)
BUN/CREAT SERPL: 34 (ref 12–20)
CALCIUM SERPL-MCNC: 8.6 MG/DL (ref 8.5–10.1)
CALCIUM SERPL-MCNC: 9 MG/DL (ref 8.5–10.1)
CALCULATED P AXIS, ECG09: 40 DEGREES
CALCULATED P AXIS, ECG09: 45 DEGREES
CALCULATED P AXIS, ECG09: 47 DEGREES
CALCULATED R AXIS, ECG10: -118 DEGREES
CALCULATED R AXIS, ECG10: -120 DEGREES
CALCULATED R AXIS, ECG10: -174 DEGREES
CALCULATED T AXIS, ECG11: 35 DEGREES
CALCULATED T AXIS, ECG11: 50 DEGREES
CALCULATED T AXIS, ECG11: 71 DEGREES
CC UR VC: ABNORMAL
CHLORIDE SERPL-SCNC: 107 MMOL/L (ref 100–111)
CHLORIDE SERPL-SCNC: 111 MMOL/L (ref 100–111)
CO2 SERPL-SCNC: 19 MMOL/L (ref 21–32)
CO2 SERPL-SCNC: 19 MMOL/L (ref 21–32)
COLOR UR: ABNORMAL
CREAT SERPL-MCNC: 0.56 MG/DL (ref 0.6–1.3)
CREAT SERPL-MCNC: 0.68 MG/DL (ref 0.6–1.3)
DIAGNOSIS, 93000: NORMAL
DIFFERENTIAL METHOD BLD: ABNORMAL
EOSINOPHIL # BLD: 0.1 K/UL (ref 0–0.4)
EOSINOPHIL NFR BLD: 1 % (ref 0–5)
EPITH CASTS URNS QL MICRO: ABNORMAL /LPF (ref 0–5)
ERYTHROCYTE [DISTWIDTH] IN BLOOD BY AUTOMATED COUNT: 12.7 % (ref 11.6–14.5)
GAS FLOW.O2 O2 DELIVERY SYS: ABNORMAL L/MIN
GAS FLOW.O2 SETTING OXYMISER: 18 BPM
GLOBULIN SER CALC-MCNC: 4.8 G/DL (ref 2–4)
GLUCOSE BLD STRIP.AUTO-MCNC: 156 MG/DL (ref 70–110)
GLUCOSE BLD STRIP.AUTO-MCNC: 189 MG/DL (ref 70–110)
GLUCOSE SERPL-MCNC: 159 MG/DL (ref 74–99)
GLUCOSE SERPL-MCNC: 159 MG/DL (ref 74–99)
GLUCOSE UR STRIP.AUTO-MCNC: NEGATIVE MG/DL
HCO3 BLD-SCNC: 18.9 MMOL/L (ref 22–26)
HCT VFR BLD AUTO: 36.7 % (ref 35–45)
HGB BLD-MCNC: 12.2 G/DL (ref 12–16)
HGB UR QL STRIP: ABNORMAL
INSPIRATION.DURATION SETTING TIME VENT: 0.9 SEC
KETONES UR QL STRIP.AUTO: 40 MG/DL
LEUKOCYTE ESTERASE UR QL STRIP.AUTO: ABNORMAL
LYMPHOCYTES # BLD: 0.7 K/UL (ref 0.9–3.6)
LYMPHOCYTES NFR BLD: 8 % (ref 21–52)
MCH RBC QN AUTO: 27.7 PG (ref 24–34)
MCHC RBC AUTO-ENTMCNC: 33.2 G/DL (ref 31–37)
MCV RBC AUTO: 83.2 FL (ref 74–97)
MONOCYTES # BLD: 0.8 K/UL (ref 0.05–1.2)
MONOCYTES NFR BLD: 9 % (ref 3–10)
MUCOUS THREADS URNS QL MICRO: ABNORMAL /LPF
NEUTS SEG # BLD: 7.6 K/UL (ref 1.8–8)
NEUTS SEG NFR BLD: 82 % (ref 40–73)
NITRITE UR QL STRIP.AUTO: NEGATIVE
O2/TOTAL GAS SETTING VFR VENT: 50 %
P-R INTERVAL, ECG05: 146 MS
P-R INTERVAL, ECG05: 154 MS
P-R INTERVAL, ECG05: 162 MS
PCO2 BLD: 34.9 MMHG (ref 35–45)
PEEP RESPIRATORY: 5 CMH2O
PH BLD: 7.34 [PH] (ref 7.35–7.45)
PH UR STRIP: 5.5 [PH] (ref 5–8)
PLATELET # BLD AUTO: 605 K/UL (ref 135–420)
PMV BLD AUTO: 9.1 FL (ref 9.2–11.8)
PO2 BLD: 114 MMHG (ref 80–100)
POTASSIUM SERPL-SCNC: 4.3 MMOL/L (ref 3.5–5.5)
POTASSIUM SERPL-SCNC: 4.5 MMOL/L (ref 3.5–5.5)
PROT SERPL-MCNC: 7.2 G/DL (ref 6.4–8.2)
PROT UR STRIP-MCNC: 30 MG/DL
Q-T INTERVAL, ECG07: 350 MS
Q-T INTERVAL, ECG07: 380 MS
Q-T INTERVAL, ECG07: 412 MS
QRS DURATION, ECG06: 86 MS
QRS DURATION, ECG06: 88 MS
QRS DURATION, ECG06: 90 MS
QTC CALCULATION (BEZET), ECG08: 423 MS
QTC CALCULATION (BEZET), ECG08: 477 MS
QTC CALCULATION (BEZET), ECG08: 512 MS
RBC # BLD AUTO: 4.41 M/UL (ref 4.2–5.3)
RBC #/AREA URNS HPF: ABNORMAL /HPF (ref 0–5)
SAO2 % BLD: 98 % (ref 92–97)
SERVICE CMNT-IMP: ABNORMAL
SERVICE CMNT-IMP: ABNORMAL
SODIUM SERPL-SCNC: 137 MMOL/L (ref 136–145)
SODIUM SERPL-SCNC: 143 MMOL/L (ref 136–145)
SP GR UR REFRACTOMETRY: 1.03 (ref 1–1.03)
SPECIMEN TYPE: ABNORMAL
TOTAL RESP. RATE, ITRR: 22
TROPONIN I SERPL-MCNC: 6.96 NG/ML (ref 0–0.04)
UROBILINOGEN UR QL STRIP.AUTO: 4 EU/DL (ref 0.2–1)
VENTILATION MODE VENT: ABNORMAL
VENTRICULAR RATE, ECG03: 88 BPM
VENTRICULAR RATE, ECG03: 93 BPM
VENTRICULAR RATE, ECG03: 95 BPM
VOLUME CONTROL PLUS IVLCP: YES
VT SETTING VENT: 450 ML
WBC # BLD AUTO: 9.3 K/UL (ref 4.6–13.2)
WBC URNS QL MICRO: ABNORMAL /HPF (ref 0–4)

## 2020-12-12 PROCEDURE — 74011000258 HC RX REV CODE- 258: Performed by: PHYSICIAN ASSISTANT

## 2020-12-12 PROCEDURE — 74011000250 HC RX REV CODE- 250: Performed by: INTERNAL MEDICINE

## 2020-12-12 PROCEDURE — 74011250637 HC RX REV CODE- 250/637: Performed by: NURSE PRACTITIONER

## 2020-12-12 PROCEDURE — P9047 ALBUMIN (HUMAN), 25%, 50ML: HCPCS | Performed by: INTERNAL MEDICINE

## 2020-12-12 PROCEDURE — 71045 X-RAY EXAM CHEST 1 VIEW: CPT

## 2020-12-12 PROCEDURE — 2709999900 HC NON-CHARGEABLE SUPPLY

## 2020-12-12 PROCEDURE — 74011250636 HC RX REV CODE- 250/636: Performed by: INTERNAL MEDICINE

## 2020-12-12 PROCEDURE — 74011250637 HC RX REV CODE- 250/637: Performed by: INTERNAL MEDICINE

## 2020-12-12 PROCEDURE — 93005 ELECTROCARDIOGRAM TRACING: CPT

## 2020-12-12 PROCEDURE — 85025 COMPLETE CBC W/AUTO DIFF WBC: CPT

## 2020-12-12 PROCEDURE — 82803 BLOOD GASES ANY COMBINATION: CPT

## 2020-12-12 PROCEDURE — 74011636637 HC RX REV CODE- 636/637: Performed by: INTERNAL MEDICINE

## 2020-12-12 PROCEDURE — 80053 COMPREHEN METABOLIC PANEL: CPT

## 2020-12-12 PROCEDURE — 99233 SBSQ HOSP IP/OBS HIGH 50: CPT | Performed by: INTERNAL MEDICINE

## 2020-12-12 PROCEDURE — 65620000000 HC RM CCU GENERAL

## 2020-12-12 PROCEDURE — 81001 URINALYSIS AUTO W/SCOPE: CPT

## 2020-12-12 PROCEDURE — 82962 GLUCOSE BLOOD TEST: CPT

## 2020-12-12 PROCEDURE — 74011250636 HC RX REV CODE- 250/636: Performed by: PHYSICIAN ASSISTANT

## 2020-12-12 PROCEDURE — 84484 ASSAY OF TROPONIN QUANT: CPT

## 2020-12-12 PROCEDURE — 94003 VENT MGMT INPAT SUBQ DAY: CPT

## 2020-12-12 PROCEDURE — 36415 COLL VENOUS BLD VENIPUNCTURE: CPT

## 2020-12-12 PROCEDURE — 99232 SBSQ HOSP IP/OBS MODERATE 35: CPT | Performed by: INTERNAL MEDICINE

## 2020-12-12 PROCEDURE — 51798 US URINE CAPACITY MEASURE: CPT

## 2020-12-12 PROCEDURE — 36600 WITHDRAWAL OF ARTERIAL BLOOD: CPT

## 2020-12-12 PROCEDURE — 74011000250 HC RX REV CODE- 250: Performed by: PHYSICIAN ASSISTANT

## 2020-12-12 PROCEDURE — 83880 ASSAY OF NATRIURETIC PEPTIDE: CPT

## 2020-12-12 RX ORDER — FUROSEMIDE 10 MG/ML
20 INJECTION INTRAMUSCULAR; INTRAVENOUS ONCE
Status: COMPLETED | OUTPATIENT
Start: 2020-12-12 | End: 2020-12-12

## 2020-12-12 RX ORDER — SODIUM CHLORIDE, SODIUM LACTATE, POTASSIUM CHLORIDE, CALCIUM CHLORIDE 600; 310; 30; 20 MG/100ML; MG/100ML; MG/100ML; MG/100ML
50 INJECTION, SOLUTION INTRAVENOUS CONTINUOUS
Status: DISCONTINUED | OUTPATIENT
Start: 2020-12-12 | End: 2020-12-15

## 2020-12-12 RX ORDER — ALBUMIN HUMAN 250 G/1000ML
25 SOLUTION INTRAVENOUS ONCE
Status: COMPLETED | OUTPATIENT
Start: 2020-12-12 | End: 2020-12-12

## 2020-12-12 RX ORDER — INSULIN LISPRO 100 [IU]/ML
INJECTION, SOLUTION INTRAVENOUS; SUBCUTANEOUS EVERY 6 HOURS
Status: DISCONTINUED | OUTPATIENT
Start: 2020-12-12 | End: 2021-01-14 | Stop reason: HOSPADM

## 2020-12-12 RX ADMIN — MIDAZOLAM HYDROCHLORIDE 2 MG: 2 INJECTION, SOLUTION INTRAMUSCULAR; INTRAVENOUS at 03:33

## 2020-12-12 RX ADMIN — Medication 10 ML: at 06:54

## 2020-12-12 RX ADMIN — Medication 3 MG: at 21:41

## 2020-12-12 RX ADMIN — FUROSEMIDE 20 MG: 10 INJECTION, SOLUTION INTRAMUSCULAR; INTRAVENOUS at 09:37

## 2020-12-12 RX ADMIN — FAMOTIDINE 20 MG: 10 INJECTION INTRAVENOUS at 21:41

## 2020-12-12 RX ADMIN — MIDAZOLAM HYDROCHLORIDE 2 MG: 2 INJECTION, SOLUTION INTRAMUSCULAR; INTRAVENOUS at 00:38

## 2020-12-12 RX ADMIN — FENTANYL CITRATE 100 MCG: 50 INJECTION INTRAMUSCULAR; INTRAVENOUS at 03:33

## 2020-12-12 RX ADMIN — DEXMEDETOMIDINE HYDROCHLORIDE 0.4 MCG/KG/HR: 100 INJECTION, SOLUTION INTRAVENOUS at 06:20

## 2020-12-12 RX ADMIN — FAMOTIDINE 20 MG: 10 INJECTION INTRAVENOUS at 09:38

## 2020-12-12 RX ADMIN — FENTANYL CITRATE 100 MCG: 50 INJECTION INTRAMUSCULAR; INTRAVENOUS at 01:26

## 2020-12-12 RX ADMIN — DEXMEDETOMIDINE HYDROCHLORIDE 0.3 MCG/KG/HR: 100 INJECTION, SOLUTION INTRAVENOUS at 22:24

## 2020-12-12 RX ADMIN — INSULIN LISPRO 2 UNITS: 100 INJECTION, SOLUTION INTRAVENOUS; SUBCUTANEOUS at 12:54

## 2020-12-12 RX ADMIN — SODIUM CHLORIDE, SODIUM LACTATE, POTASSIUM CHLORIDE, AND CALCIUM CHLORIDE 50 ML/HR: 600; 310; 30; 20 INJECTION, SOLUTION INTRAVENOUS at 17:30

## 2020-12-12 RX ADMIN — TICAGRELOR 90 MG: 90 TABLET ORAL at 17:25

## 2020-12-12 RX ADMIN — SODIUM CHLORIDE 10 ML: 9 INJECTION, SOLUTION INTRAMUSCULAR; INTRAVENOUS; SUBCUTANEOUS at 21:41

## 2020-12-12 RX ADMIN — ATORVASTATIN CALCIUM 40 MG: 40 TABLET, FILM COATED ORAL at 21:41

## 2020-12-12 RX ADMIN — ENOXAPARIN SODIUM 40 MG: 40 INJECTION SUBCUTANEOUS at 09:37

## 2020-12-12 RX ADMIN — TICAGRELOR 90 MG: 90 TABLET ORAL at 09:38

## 2020-12-12 RX ADMIN — ALBUMIN (HUMAN) 25 G: 0.25 INJECTION, SOLUTION INTRAVENOUS at 14:25

## 2020-12-12 RX ADMIN — INSULIN LISPRO 2 UNITS: 100 INJECTION, SOLUTION INTRAVENOUS; SUBCUTANEOUS at 17:25

## 2020-12-12 RX ADMIN — MIDAZOLAM HYDROCHLORIDE 2 MG: 2 INJECTION, SOLUTION INTRAMUSCULAR; INTRAVENOUS at 04:35

## 2020-12-12 RX ADMIN — ACETAMINOPHEN 650 MG: 325 TABLET ORAL at 10:17

## 2020-12-12 RX ADMIN — FENTANYL CITRATE 100 MCG: 50 INJECTION INTRAMUSCULAR; INTRAVENOUS at 05:14

## 2020-12-12 RX ADMIN — MIDAZOLAM HYDROCHLORIDE 2 MG: 2 INJECTION, SOLUTION INTRAMUSCULAR; INTRAVENOUS at 20:58

## 2020-12-12 RX ADMIN — MIDAZOLAM HYDROCHLORIDE 2 MG: 2 INJECTION, SOLUTION INTRAMUSCULAR; INTRAVENOUS at 01:27

## 2020-12-12 RX ADMIN — ASPIRIN 81 MG CHEWABLE TABLET 81 MG: 81 TABLET CHEWABLE at 09:38

## 2020-12-12 RX ADMIN — SODIUM CHLORIDE 10 ML: 9 INJECTION, SOLUTION INTRAMUSCULAR; INTRAVENOUS; SUBCUTANEOUS at 14:20

## 2020-12-12 RX ADMIN — Medication 10 ML: at 06:57

## 2020-12-12 NOTE — PROGRESS NOTES
FiO2 steadily increased to keep saturations >95%. Alerted by nursing patient began desaturating and became tachypneic post bath. FiO2, at this time, is 100%. Will titrate as necessary in accordance with the saturations.

## 2020-12-12 NOTE — PROGRESS NOTES
[facility]  Progress Note    Patient: Garry Landon Age: 72 y.o. : 1955 MR#: [id number] SSN: [id number]  Date/Time: 2020     Subjective:   Patient seen intubated. Easily woke up with tactile stimulation but did not make any meaningful efforts towards communication. Peers comfortable. Per nurse with some increase in temperature. Assessment/Plan:   1. Acute on chronic hypoxic respiratory failure: Status post intubation by anesthesia on ,ventilator management per ICU team.   2. Acute flash pulmonary edema due to #3: Status post IV Lasix, will use Lasix as needed with close monitoring of blood pressure. 3. Acute systolic heart failure exacerbation, EF 35%: Lasix as needed. Will hold beta-blocker given her low blood pressure. Continue aspirin and statin, ACE inhibitor once blood pressure stabilizes. 4. Acute MI with anterolateral STEMI and Q waves:S/p ptca/stent to proximal/ mid LAD using ARELY on DAPT. Beta-blocker held due to hypotension. 5. Low-grade fever today, also somewhat hypotensive. Likely hypotension due to her cardiac issues. Plan to continue to monitor for signs and symptoms of infection, check urinalysis, low threshold to start broad-spectrum antibiotics given risk of complications due to infections  6. History of COPD on home oxygen: Patient currently on vent, BD as tolerated. 7. Diabetes mellitus type 2: Sugars are within acceptable limits on corrective insulin. Plan to cont corrective insulin. Plan to start Lantus if blood sugars are consistently elevated. .  8. Hypertension: BP lower side, will monitor off medications.   Consider dobutamine for continued sustained hypotension   9. dementia: Unknown baseline, resume Aricept when able to tolerate p.o.  10. Full code        Visit Vitals  BP (!) 100/57   Pulse 71   Temp 99.5 °F (37.5 °C)   Resp 22   Ht 5' 7\" (1.702 m)   Wt 74.6 kg (164 lb 8 oz)   SpO2 100%   Breastfeeding No   BMI 25.76 kg/m²           Case discussed with:  []Patient  []Family  [x]Nursing  []Case Management  DVT Prophylaxis:  [x]Lovenox  []Hep iv  []SCDs  []Coumadin   []Eliquis/Xarelto     Objective:   VS:   Visit Vitals  BP (!) 100/57   Pulse 71   Temp 99.5 °F (37.5 °C)   Resp 22   Ht 5' 7\" (1.702 m)   Wt 74.6 kg (164 lb 8 oz)   SpO2 100%   Breastfeeding No   BMI 25.76 kg/m²      Tmax/24hrs: Temp (24hrs), Av.2 °F (37.3 °C), Min:98.4 °F (36.9 °C), Max:100.1 °F (37.8 °C)  IOBRIEF    Intake/Output Summary (Last 24 hours) at 2020 1743  Last data filed at 2020 1730  Gross per 24 hour   Intake 528.13 ml   Output 605 ml   Net -76.87 ml       General: Somnolent but wakes up with tactile stimulus, intubated    Pulmonary: CTAB  Cardiovascular: Regular rate and Rhythm. GI:  Soft, Non distended, Non tender. + Bowel sounds. Extremities:  No edema. No calf tenderness. Psych: Not anxious or agitated.     Additional:    Medications:   Current Facility-Administered Medications   Medication Dose Route Frequency    dexmedeTOMidine (PRECEDEX) 400 mcg in 0.9% sodium chloride 100 mL infusion  0.1-1.5 mcg/kg/hr IntraVENous TITRATE    insulin lispro (HUMALOG) injection   SubCUTAneous Q6H    lactated Ringers infusion  50 mL/hr IntraVENous CONTINUOUS    [Held by provider] carvediloL (COREG) tablet 3.125 mg  3.125 mg Oral Q12H    fentaNYL citrate (PF) injection  mcg   mcg IntraVENous Q2H PRN    sodium chloride (NS) flush 5-40 mL  5-40 mL IntraVENous Q8H    sodium chloride (NS) flush 5-40 mL  5-40 mL IntraVENous PRN    ticagrelor (BRILINTA) tablet 90 mg  90 mg Per NG tube BID    enoxaparin (LOVENOX) injection 40 mg  40 mg SubCUTAneous Q24H    sodium chloride (NS) flush 5-40 mL  5-40 mL IntraVENous Q8H    sodium chloride (NS) flush 5-40 mL  5-40 mL IntraVENous PRN    albuterol-ipratropium (DUO-NEB) 2.5 MG-0.5 MG/3 ML  3 mL Nebulization Q4H PRN    melatonin tablet 3 mg  3 mg Oral QHS    famotidine (PF) (PEPCID) 20 mg in 0.9% sodium chloride 10 mL injection  20 mg IntraVENous Q12H    midazolam (VERSED) injection 1-2 mg  1-2 mg IntraVENous Q1H PRN    sodium chloride (NS) flush 5-40 mL  5-40 mL IntraVENous Q8H    sodium chloride (NS) flush 5-40 mL  5-40 mL IntraVENous PRN    acetaminophen (TYLENOL) tablet 650 mg  650 mg Oral Q6H PRN    Or    acetaminophen (TYLENOL) suppository 650 mg  650 mg Rectal Q6H PRN    polyethylene glycol (MIRALAX) packet 17 g  17 g Oral DAILY PRN    promethazine (PHENERGAN) tablet 12.5 mg  12.5 mg Oral Q6H PRN    Or    ondansetron (ZOFRAN) injection 4 mg  4 mg IntraVENous Q6H PRN    glucose chewable tablet 16 g  4 Tab Oral PRN    glucagon (GLUCAGEN) injection 1 mg  1 mg IntraMUSCular PRN    dextrose (D50W) injection syrg 12.5-25 g  25-50 mL IntraVENous PRN    aspirin chewable tablet 81 mg  81 mg Oral DAILY    atorvastatin (LIPITOR) tablet 40 mg  40 mg Oral QHS       Labs:    Recent Results (from the past 12 hour(s))   NT-PRO BNP    Collection Time: 12/12/20  6:20 AM   Result Value Ref Range    NT pro-BNP 6,465 (H) 0 - 900 PG/ML   CBC WITH AUTOMATED DIFF    Collection Time: 12/12/20  6:20 AM   Result Value Ref Range    WBC 9.3 4.6 - 13.2 K/uL    RBC 4.41 4.20 - 5.30 M/uL    HGB 12.2 12.0 - 16.0 g/dL    HCT 36.7 35.0 - 45.0 %    MCV 83.2 74.0 - 97.0 FL    MCH 27.7 24.0 - 34.0 PG    MCHC 33.2 31.0 - 37.0 g/dL    RDW 12.7 11.6 - 14.5 %    PLATELET 473 (H) 033 - 420 K/uL    MPV 9.1 (L) 9.2 - 11.8 FL    NEUTROPHILS 82 (H) 40 - 73 %    LYMPHOCYTES 8 (L) 21 - 52 %    MONOCYTES 9 3 - 10 %    EOSINOPHILS 1 0 - 5 %    BASOPHILS 0 0 - 2 %    ABS. NEUTROPHILS 7.6 1.8 - 8.0 K/UL    ABS. LYMPHOCYTES 0.7 (L) 0.9 - 3.6 K/UL    ABS. MONOCYTES 0.8 0.05 - 1.2 K/UL    ABS. EOSINOPHILS 0.1 0.0 - 0.4 K/UL    ABS.  BASOPHILS 0.0 0.0 - 0.1 K/UL    DF AUTOMATED     METABOLIC PANEL, COMPREHENSIVE    Collection Time: 12/12/20  6:20 AM   Result Value Ref Range    Sodium 137 136 - 145 mmol/L    Potassium 4.3 3.5 - 5.5 mmol/L    Chloride 107 100 - 111 mmol/L    CO2 19 (L) 21 - 32 mmol/L    Anion gap 11 3.0 - 18 mmol/L    Glucose 159 (H) 74 - 99 mg/dL    BUN 19 (H) 7.0 - 18 MG/DL    Creatinine 0.56 (L) 0.6 - 1.3 MG/DL    BUN/Creatinine ratio 34 (H) 12 - 20      GFR est AA >60 >60 ml/min/1.73m2    GFR est non-AA >60 >60 ml/min/1.73m2    Calcium 9.0 8.5 - 10.1 MG/DL    Bilirubin, total 0.9 0.2 - 1.0 MG/DL    ALT (SGPT) 36 13 - 56 U/L    AST (SGOT) 91 (H) 10 - 38 U/L    Alk. phosphatase 76 45 - 117 U/L    Protein, total 7.2 6.4 - 8.2 g/dL    Albumin 2.4 (L) 3.4 - 5.0 g/dL    Globulin 4.8 (H) 2.0 - 4.0 g/dL    A-G Ratio 0.5 (L) 0.8 - 1.7     TROPONIN I    Collection Time: 12/12/20  6:20 AM   Result Value Ref Range    Troponin-I, QT 6.96 (HH) 0.0 - 0.045 NG/ML   EKG, 12 LEAD, INITIAL    Collection Time: 12/12/20  7:54 AM   Result Value Ref Range    Ventricular Rate 95 BPM    Atrial Rate 95 BPM    P-R Interval 162 ms    QRS Duration 86 ms    Q-T Interval 380 ms    QTC Calculation (Bezet) 477 ms    Calculated P Axis 47 degrees    Calculated R Axis -118 degrees    Calculated T Axis 50 degrees    Diagnosis       Normal sinus rhythm  Right superior axis deviation  Inferior infarct (cited on or before 07-FEB-2018)  Anteroseptal infarct (cited on or before 10-DEC-2020)  T wave abnormality, consider lateral ischemia  ACUTE MI  Abnormal ECG  When compared with ECG of 11-DEC-2020 14:18,  Serial changes of Anteroseptal infarct present     GLUCOSE, POC    Collection Time: 12/12/20 12:47 PM   Result Value Ref Range    Glucose (POC) 189 (H) 70 - 110 mg/dL   GLUCOSE, POC    Collection Time: 12/12/20  5:18 PM   Result Value Ref Range    Glucose (POC) 156 (H) 70 - 110 mg/dL       Signed By: Nate Velazquez MD     December 12, 2020      Disclaimer: Sections of this note are dictated using utilizing voice recognition software. Minor typographical errors may be present.  If questions arise, please do not hesitate to contact me or call our department.

## 2020-12-12 NOTE — PROGRESS NOTES
[facility]      CARDIOLOGY PROGRESS NOTE  RECS:        1. Subacute MI-EKG showed Q waves in anterolateral lead with ST elevation, with suspicion of late presentation of anterior MI-s/p [facility] S/p ptca/stent to proximal/ mid LAD using ARELY. LVEDP 8 mmHg. Normal PASP pressure with normal PCWP. Moderate to severe LV dysfunction. Continue asa and Brilinta. 2. Acute respiratory failure-in the setting acute CHF- requiring mechanical ventilation. 3. Borderline BP- monitor will resume BB when BP stabilizes   4. Acute Systolic Congestive heart Failure-decompensated- recent echo with EF% 35%-40 lasix iv 20 mg this am. Follow chest x-ray  5. COPD, on home O2 4L NC   6. Hepatitis C ? -Per family member   9. DM2- medications per medical team   8. Dementia      Remains intubated. Continue DAPT. Extubation per [facility]. PCWP/LVEDP normal. Gentle diuresis if needed for extubation. Will f/u      Cardiac cath 12/11/20  Patient presented to [address] [place] [address] with late presentation anterior wall MI.  EF 35-40%. Patient was initially managed medically-- however developed acute pulmonary edema requiring intubation. Also troponin level increasing consistent with ongoing ischemia. S/p ptca/stent to proximal/ mid LAD using ARELY. LVEDP 8 mmHg. Normal PASP pressure with normal PCWP. Moderate to severe LV dysfunction. Echo 12/10/20  · LV: Estimated LVEF is 35 - 40%. Visually measured ejection fraction. Normal cavity size and wall thickness. Moderately and segmentally reduced systolic function. Inconclusive left ventricular diastolic function. · AO: Mild aortic root dilatation; diameter is 3.8 cm.      ASSESSMENT:  Hospital Problems  Date Reviewed: 2/7/2018          Codes Class Noted POA    STEMI (ST elevation myocardial infarction) [facility]) ICD-10-CM: I21.3  ICD-9-CM: 410.90  12/10/2020 Unknown                SUBJECTIVE:  Intubated and sedated     OBJECTIVE:    VS:   Visit Vitals  BP 98/70   Pulse 94   Temp 98.9 °F (37.2 °C)   Resp 26   Ht 5' 7\" (1.702 m)   Wt 74.6 kg (164 lb 8 oz)   SpO2 100%   Breastfeeding No   BMI 25.76 kg/m²         Intake/Output Summary (Last 24 hours) at 12/12/2020 0954  Last data filed at 12/12/2020 0400  Gross per 24 hour   Intake 0 ml   Output 1660 ml   Net -1660 ml     TELE: normal sinus rhythm    General: intubated and sedated   HENT: Normocephalic, atraumatic. Normal external eye. Neck :  no bruit, no JVD  Cardiac:  regular rate and rhythm, S1, S2 normal, no murmur, click, rub or gallop  Lungs: rales R base, L base  Abdomen: Soft, nontender, no masses  Extremities:  No c/c/e, peripheral pulses present      Labs: Results:       Chemistry Recent Labs     12/12/20  0620 12/11/20  1335 12/10/20  2329 12/10/20  1107   * 163* 125* 202*    139 137 136   K 4.3 3.5 3.8 3.7    106 106 104   CO2 19* 22 23 23   BUN 19* 17 17 14   CREA 0.56* 0.48* 0.47* 0.55*   CA 9.0 8.4* 9.0 9.1   AGAP 11 11 8 9   BUCR 34* 35* 36* 25*   AP 76  --   --  86   TP 7.2  --   --  8.6*   ALB 2.4*  --   --  2.7*   GLOB 4.8*  --   --  5.9*   AGRAT 0.5*  --   --  0.5*      CBC w/Diff Recent Labs     12/12/20  0620 12/11/20  1335 12/10/20  1107 12/10/20  0400   WBC 9.3 8.9 8.3 6.3   RBC 4.41 4.67 5.06 4.66   HGB 12.2 12.7 13.9 12.8   HCT 36.7 38.4 43.3 38.2   * 573* 638* 559*   GRANS 82*  --   --  71   LYMPH 8*  --   --  16*   EOS 1  --   --  2      Cardiac Enzymes Recent Labs     12/10/20  2329 12/10/20  1851   * 466*   CKND1 13.7* 13.0*      Coagulation Recent Labs     12/10/20  2329 12/10/20  1851  12/10/20  1107   PTP  --   --   --  14.5   INR  --   --   --  1.2   APTT 40.0* 30.1   < >  --     < > = values in this interval not displayed. Lipid Panel No results found for: CHOL, CHOLPOCT, CHOLX, CHLST, CHOLV, 704074, HDL, HDLP, LDL, LDLC, DLDLP, 749970, VLDLC, VLDL, TGLX, TRIGL, TRIGP, TGLPOCT, CHHD, CHHDX   BNP No results for input(s): BNPP in the last 72 hours.    Liver Enzymes Recent Labs     12/12/20  0620   TP 7.2   ALB 2.4*   AP 76      Thyroid Studies Lab Results   Component Value Date/Time    TSH 2.92 12/10/2020 11:07 AM              Lisa DAHL supervised Lynne:[phone]     I have independently evaluated and examined the patient. All relevant labs and testing data's are reviewed. Care plan discussed and updated after review.     Sarah Sanchez MD

## 2020-12-12 NOTE — CONSULTS
[facility]  Pulmonary, Critical Care, and Sleep Medicine    Name: Kody Kovacs MRN: [id number]   : 1955 Hospital: [address] [place] [address]   Date: 2020        Pulmonary Medicine-  Initial Patient Consult      IMPRESSION:   · CAD: S/P PCI with DEC- LAD 20  · Respiratory Failure- hypoxic- pulmonary edema, CHF- currently intubated  · Emphysema by report- no inhalers noted on home med list  · Demential- on Aricept  · Hypokalemia- replacing  · DM: on Lantus as OP  · Hepatitic C infection:       RECOMMENDATIONS:   NEURO  · RASS goal 0-1  · Precedex drip; Fentanyl and versed PRN  · Melatonin 3mg QHS  CV  · Defer to cardiology   · MAP goal >65  · Trend troponin  RESP  · VAP bundle  · SpO2 goal >92%  · Wean vent as tolerated- currently significant pulmonary edema likely cause of SBT failure this am - Lasix 20 mg x 1 this am  · CXR in AM  · Keep HO elevated  · Duo nebs PRN  GI  · Left NGT  · NPO for now except meds  · Clamp per cardiology and Brilinta protocol  · Protonix IV while intubated  · Bowel regimen per primary team    RENAL:  · Pro   · Strict I/O  · Electrolyte management per primary team and cardology  ENDO  · Glycemic control per primary team    ID  · No active issues at this time  SKIN  · Per nursing protocol    Will continue to follow    FULL CODE     Subjective/History: This patient has been seen and evaluated at the request of Dr. Frandy Reis for Ventilator management and IV pressers if need. Patient is a 72 y.o. female with history fo DM, dementia, report of emphysema , hepatitis C and recent MI, went into respiratory distress with pulmonary edema earlier this morning requiring urgent intubation in CVT-SDU. Patient taken to cath lab and underwent LAD stent placement. Patient arrived in CVT-ICU intubated and partially sedated. She was mildly agitated and additional sedation given. Sheath in place.      The patient is critically ill and can not provide additional history due to Ventilated. 12/12/2020:    Failed SBT this am due to increased tachypnea. Also needed to go up on FiO2 this am to keep adequate oxygenation - currently at 0.65  CXR: reviewed and note significant bilateral opacities most likely representing pulmonary edema    Past Medical History:   Diagnosis Date    Arthritis     Asthma     Chronic pain     BACK PAIN    [facility]     Diabetic neuropathy ([facility]     Emphysema     Hepatitis C     Hypertension     Nervousness     Osteoarthritis of both knees       Past Surgical History:   Procedure Laterality Date    HX CARPAL TUNNEL RELEASE Right 8/31/09    Dr. Rosey Purcell        Prior to Admission medications    Medication Sig Start Date End Date Taking? Authorizing Provider   folic acid (FOLVITE) 1 mg tablet 1 Tab by Per NG tube route daily. 2/15/18   Awa Trevizo MD   insulin glargine (LANTUS) 100 unit/mL injection lantus 22 units subq daily 2/15/18   Awa Trevizo MD   magnesium oxide (MAG-OX) 400 mg tablet Take 1 Tab by mouth two (2) times a day. 2/14/18   Awa Trevizo MD   tamsulosin (FLOMAX) 0.4 mg capsule Take 1 tab by mouth daily until kidney stone passes. 2/5/18   Natasha Villa Seen, DO   amLODIPine (NORVASC) 5 mg tablet Take 5 mg by mouth daily. Indications: HYPERTENSION    Provider, Historical   aspirin (ASPIRIN) 325 mg tablet Take 325 mg by mouth daily. Provider, Historical   VITAMIN E (FORMULA E 400 PO) Take 1 Tab by mouth daily. Provider, Historical   losartan (COZAAR) 50 mg tablet Take  by mouth daily. Indications: HYPERTENSION    Provider, Historical   Melatonin 300 mcg Tab Take  by mouth. Provider, Historical   cyanocobalamin (VITAMIN B-12) 1,500 mcg TbER Take 1 Tab by mouth.       Provider, Historical     Current Facility-Administered Medications   Medication Dose Route Frequency    dexmedeTOMidine (PRECEDEX) 400 mcg in 0.9% sodium chloride 100 mL infusion  0.1-1.5 mcg/kg/hr IntraVENous TITRATE    [Held by provider] carvediloL (COREG) tablet 3.125 mg  3.125 mg Oral Q12H    sodium chloride (NS) flush 5-40 mL  5-40 mL IntraVENous Q8H    ticagrelor (BRILINTA) tablet 90 mg  90 mg Per NG tube BID    enoxaparin (LOVENOX) injection 40 mg  40 mg SubCUTAneous Q24H    sodium chloride (NS) flush 5-40 mL  5-40 mL IntraVENous Q8H    melatonin tablet 3 mg  3 mg Oral QHS    famotidine (PF) (PEPCID) 20 mg in 0.9% sodium chloride 10 mL injection  20 mg IntraVENous Q12H    sodium chloride (NS) flush 5-40 mL  5-40 mL IntraVENous Q8H    insulin lispro (HUMALOG) injection   SubCUTAneous AC&HS    aspirin chewable tablet 81 mg  81 mg Oral DAILY    atorvastatin (LIPITOR) tablet 40 mg  40 mg Oral QHS     No Known Allergies   Social History     Tobacco Use    Smoking status: Current Every Day Smoker     Packs/day: 1.00   Substance Use Topics    Alcohol use: Yes     Comment: socially      Family History   Problem Relation Age of Onset    Diabetes Mother     Hypertension Mother     Obesity Mother     Alcohol abuse Father     High Cholesterol Father     Lung Disease Father     Stroke Father     Arthritis-osteo Sister     Depression Sister     Diabetes Sister     Hypertension Sister     Obesity Sister     Arthritis-osteo Brother     Diabetes Brother     Hypertension Brother     Obesity Brother         Review of Systems:  Review of systems not obtained due to patient factors. Objective:   Vital Signs:    Visit Vitals  BP 99/72   Pulse 97   Temp 98.9 °F (37.2 °C)   Resp 20   Ht 5' 7\" (1.702 m)   Wt 74.6 kg (164 lb 8 oz)   SpO2 99%   Breastfeeding No   BMI 25.76 kg/m²       O2 Device: Ventilator   O2 Flow Rate (L/min): 4 l/min   Temp (24hrs), Av.6 °F (37 °C), Min:98.4 °F (36.9 °C), Max:98.9 °F (37.2 °C)       Intake/Output:   Last shift:      No intake/output data recorded.   Last 3 shifts: 12/10 1901 -  0700  In: 192.9 [I.V.:192.9]  Out: 1660 [Urine:1035]    Intake/Output Summary (Last 24 hours) at 12/12/2020 0819  Last data filed at 12/12/2020 0400  Gross per 24 hour   Intake 0 ml   Output 1660 ml   Net -1660 ml         Ventilator Settings:  Mode Rate Tidal Volume Pressure FiO2 PEEP   VC+   450 ml    60 %(Per SPO2) 5 cm H20     Peak airway pressure: 22 cm H2O    Minute ventilation: 8.3 l/min         Physical Exam:    General:  On vent with sedation running - opens eyes to voice and moves all extremities spontaneously   Head:  Normocephalic, without obvious abnormality, atraumatic. Eyes:  Conjunctivae/corneas clear. PERRL, EOMs intact. Nose: Nares normal. Septum midline. Mucosa normal. No drainage or sinus tenderness. Throat: Lips, mucosa, and tongue normal. Teeth and gums normal.   Neck: Supple, symmetrical, trachea midline, no adenopathy, thyroid: no enlargment/tenderness/nodules, no carotid bruit and no JVD. Back:   Symmetric, no curvature. ROM normal.   Lungs:   Clear to auscultation bilaterally. Chest wall:  No tenderness or deformity. Heart:  Regular rate and rhythm, S1, S2 normal, no murmur, click, rub or gallop. Abdomen:   Soft, non-tender. Bowel sounds normal. No masses,  No organomegaly. Extremities: Extremities normal, atraumatic, no cyanosis or edema. Pulses: 2+ and symmetric all extremities. Skin: Skin color, texture, turgor normal. No rashes or lesions   Lymph nodes:      Cervical, supraclavicular, and axillary nodes normal.   Neurologic: Grossly nonfocal       Data:     Recent Results (from the past 24 hour(s))   POC G3    Collection Time: 12/11/20  9:42 AM   Result Value Ref Range    Device: BIPAP      FIO2 (POC) 100 %    pH (POC) 7.40 7.35 - 7.45      pCO2 (POC) 32.2 (L) 35.0 - 45.0 MMHG    pO2 (POC) 47 (LL) 80 - 100 MMHG    HCO3 (POC) 19.9 (L) 22 - 26 MMOL/L    sO2 (POC) 83 (L) 92 - 97 %    Base deficit (POC) 5 mmol/L    PEEP/CPAP (POC) 8 cmH2O    PIP (POC) 19      Pressure support 10 cmH2O    Allens test (POC) YES      Total resp.  rate 37      Site LEFT RADIAL Specimen type (POC) ARTERIAL      Performed by Lala Us W/ RFLX MICROSCOPIC    Collection Time: 12/11/20 10:17 AM   Result Value Ref Range    Color DARK YELLOW      Appearance CLEAR      Specific gravity 1.017 1.005 - 1.030      pH (UA) 5.5 5.0 - 8.0      Protein Negative NEG mg/dL    Glucose Negative NEG mg/dL    Ketone Negative NEG mg/dL    Bilirubin SMALL (A) NEG      Blood Negative NEG      Urobilinogen 2.0 (H) 0.2 - 1.0 EU/dL    Nitrites Negative NEG      Leukocyte Esterase SMALL (A) NEG     URINE MICROSCOPIC ONLY    Collection Time: 12/11/20 10:17 AM   Result Value Ref Range    WBC 3 to 6 0 - 4 /hpf    Bacteria 2+ (A) NEG /hpf    Mucus 2+ (A) NEG /lpf   EKG, 12 LEAD, SUBSEQUENT    Collection Time: 12/11/20 10:18 AM   Result Value Ref Range    Ventricular Rate 93 BPM    Atrial Rate 93 BPM    P-R Interval 154 ms    QRS Duration 88 ms    Q-T Interval 412 ms    QTC Calculation (Bezet) 512 ms    Calculated P Axis 40 degrees    Calculated R Axis -120 degrees    Calculated T Axis 71 degrees    Diagnosis       Normal sinus rhythm  Right superior axis deviation  Inferior infarct (cited on or before 07-FEB-2018)  Anteroseptal infarct (cited on or before 10-DEC-2020)  Lateral injury pattern  Prolonged QT  ACUTE MI  Abnormal ECG  When compared with ECG of 11-DEC-2020 10:18,  premature ventricular complexes are no longer present  premature atrial complexes are no longer present  Serial changes of Anteroseptal infarct present     POC ACTIVATED CLOTTING TIME    Collection Time: 12/11/20 12:41 PM   Result Value Ref Range    Activated Clotting Time (POC) 329 (H) 79 - 138 SECS   NT-PRO BNP    Collection Time: 12/11/20  1:35 PM   Result Value Ref Range    NT pro-BNP 5,307 (H) 0 - 900 PG/ML   CBC W/O DIFF    Collection Time: 12/11/20  1:35 PM   Result Value Ref Range    WBC 8.9 4.6 - 13.2 K/uL    RBC 4.67 4.20 - 5.30 M/uL    HGB 12.7 12.0 - 16.0 g/dL    HCT 38.4 35.0 - 45.0 %    MCV 82.2 74.0 - 97.0 FL MCH 27.2 24.0 - 34.0 PG    MCHC 33.1 31.0 - 37.0 g/dL    RDW 12.6 11.6 - 14.5 %    PLATELET 435 (H) 829 - 420 K/uL    MPV 9.4 9.2 - 98.1 FL   METABOLIC PANEL, BASIC    Collection Time: 12/11/20  1:35 PM   Result Value Ref Range    Sodium 139 136 - 145 mmol/L    Potassium 3.5 3.5 - 5.5 mmol/L    Chloride 106 100 - 111 mmol/L    CO2 22 21 - 32 mmol/L    Anion gap 11 3.0 - 18 mmol/L    Glucose 163 (H) 74 - 99 mg/dL    BUN 17 7.0 - 18 MG/DL    Creatinine 0.48 (L) 0.6 - 1.3 MG/DL    BUN/Creatinine ratio 35 (H) 12 - 20      GFR est AA >60 >60 ml/min/1.73m2    GFR est non-AA >60 >60 ml/min/1.73m2    Calcium 8.4 (L) 8.5 - 10.1 MG/DL   TROPONIN I    Collection Time: 12/11/20  1:35 PM   Result Value Ref Range    Troponin-I, QT 12.00 (HH) 0.0 - 0.045 NG/ML   POC CG8I    Collection Time: 12/11/20  1:37 PM   Result Value Ref Range    Device: VENT      FIO2 (POC) 80 %    pH (POC) 7.38 7.35 - 7.45      pCO2 (POC) 34.7 (L) 35.0 - 45.0 MMHG    pO2 (POC) 110 (H) 80 - 100 MMHG    HCO3 (POC) 20.6 (L) 22 - 26 MMOL/L    sO2 (POC) 98 (H) 92 - 97 %    Base deficit (POC) 5 mmol/L    Mode ASSIST CONTROL      Tidal volume 450 ml    Set Rate 18 bpm    PEEP/CPAP (POC) 5 cmH2O    Allens test (POC) N/A      Inspiratory Time 0.9 sec    Total resp. rate 22      Site DRAWN FROM ARTERIAL LINE      Patient temp.  98.5      Specimen type (POC) ARTERIAL      Sodium,  136 - 145 MMOL/L    Potassium, POC 3.4 (L) 3.5 - 5.5 MMOL/L    Glucose,  (H) 74 - 106 MG/DL    Calcium, ionized (POC) 1.21 1.12 - 1.32 mmol/L    Hematocrit, POC 40 36 - 49 %    Hemoglobin, POC 13.6 12 - 16 G/DL    Performed by Yelena Dodson    CULTURE, BLOOD    Collection Time: 12/11/20  3:30 PM    Specimen: Blood   Result Value Ref Range    Special Requests: NO SPECIAL REQUESTS      Culture result: NO GROWTH AFTER 9 HOURS     POC ACTIVATED CLOTTING TIME    Collection Time: 12/11/20  3:32 PM   Result Value Ref Range    Activated Clotting Time (POC) 131 79 - 138 SECS   GLUCOSE, POC    Collection Time: 12/11/20  6:14 PM   Result Value Ref Range    Glucose (POC) 184 (H) 70 - 110 mg/dL   GLUCOSE, POC    Collection Time: 12/11/20 10:16 PM   Result Value Ref Range    Glucose (POC) 161 (H) 70 - 110 mg/dL   POC G3    Collection Time: 12/12/20  3:48 AM   Result Value Ref Range    Device: VENT      FIO2 (POC) 50 %    pH (POC) 7.34 (L) 7.35 - 7.45      pCO2 (POC) 34.9 (L) 35.0 - 45.0 MMHG    pO2 (POC) 114 (H) 80 - 100 MMHG    HCO3 (POC) 18.9 (L) 22 - 26 MMOL/L    sO2 (POC) 98 (H) 92 - 97 %    Base deficit (POC) 7 mmol/L    Mode ASSIST CONTROL      Tidal volume 450 ml    Set Rate 18 bpm    PEEP/CPAP (POC) 5 cmH2O    Allens test (POC) YES      Inspiratory Time 0.90 sec    Total resp. rate 22      Site RIGHT RADIAL      Specimen type (POC) ARTERIAL      Performed by Pepito Dodson     Volume control plus YES     CBC WITH AUTOMATED DIFF    Collection Time: 12/12/20  6:20 AM   Result Value Ref Range    WBC 9.3 4.6 - 13.2 K/uL    RBC 4.41 4.20 - 5.30 M/uL    HGB 12.2 12.0 - 16.0 g/dL    HCT 36.7 35.0 - 45.0 %    MCV 83.2 74.0 - 97.0 FL    MCH 27.7 24.0 - 34.0 PG    MCHC 33.2 31.0 - 37.0 g/dL    RDW 12.7 11.6 - 14.5 %    PLATELET 374 (H) 536 - 420 K/uL    MPV 9.1 (L) 9.2 - 11.8 FL    NEUTROPHILS 82 (H) 40 - 73 %    LYMPHOCYTES 8 (L) 21 - 52 %    MONOCYTES 9 3 - 10 %    EOSINOPHILS 1 0 - 5 %    BASOPHILS 0 0 - 2 %    ABS. NEUTROPHILS 7.6 1.8 - 8.0 K/UL    ABS. LYMPHOCYTES 0.7 (L) 0.9 - 3.6 K/UL    ABS. MONOCYTES 0.8 0.05 - 1.2 K/UL    ABS. EOSINOPHILS 0.1 0.0 - 0.4 K/UL    ABS.  BASOPHILS 0.0 0.0 - 0.1 K/UL    DF AUTOMATED     EKG, 12 LEAD, INITIAL    Collection Time: 12/12/20  7:54 AM   Result Value Ref Range    Ventricular Rate 95 BPM    Atrial Rate 95 BPM    P-R Interval 162 ms    QRS Duration 86 ms    Q-T Interval 380 ms    QTC Calculation (Bezet) 477 ms    Calculated P Axis 47 degrees    Calculated R Axis -118 degrees    Calculated T Axis 50 degrees    Diagnosis       Normal sinus rhythm  Right superior axis deviation  Inferior infarct (cited on or before 07-FEB-2018)  Anteroseptal infarct (cited on or before 10-DEC-2020)  T wave abnormality, consider lateral ischemia  ACUTE MI  Abnormal ECG  When compared with ECG of 11-DEC-2020 14:18,  Serial changes of Anteroseptal infarct present               Lab Results   Component Value Date/Time    NT pro-BNP 5,307 (H) 12/11/2020 01:35 PM    NT pro-BNP 5,306 (H) 02/07/2018 09:47 PM     Results for Sharlene Thomas (MRN [id number]) as of 12/11/2020 17:47   Ref. Range 12/10/2020 11:07 12/10/2020 12:47 12/10/2020 18:51 12/10/2020 23:29 12/11/2020 13:35   Troponin-I, Qt. Latest Ref Range: 0.0 - 0.045 NG/ML 4.79 (HH) 4.83 (HH) 18.10 (HH) 27.80 (HH) 12.00 (HH)   CK-MB Index Latest Ref Range: 0.0 - 4.0 % 10.3 (H) 8.8 (H) 13.0 (H) 13.7 (H)    CK Latest Ref Range: 26 - 192 U/L 179 189 466 (H) 497 (H)    NT pro-BNP Latest Ref Range: 0 - 900 PG/ML     5,307 (H)     4/4/2020  7:17 PM - Fer, [facility]     [employer]  Value  Flag  Ref Range  Units  Status    HCV RNA, PCR, QT  [id number]  High     IU/mL  Final    HCV RNA, PCR QT  6.51  High     log IU/mL  Final    Comment:    Reference Range:          Recent Labs     12/12/20  0348 12/11/20  1337 12/11/20  0942   FIO2I 50 80 100   HCO3I 18.9* 20.6* 19.9*   PCO2I 34.9* 34.7* 32.2*   PHI 7.34* 7.38 7.40   PO2I 114* 110* 47*     Telemetry:normal sinus rhythm    Imaging:  I have personally reviewed the patients radiographs and have reviewed the reports:  CT Results  (Last 48 hours)    None        CXR Results  (Last 48 hours)               12/11/20 1431  XR CHEST PORT Final result    Impression:  IMPRESSION:   1. Tubes and lines without evidence of complication. 2.  Unchanged bilateral airspace opacities. Follow to resolution is recommended. Narrative:  EXAM: Chest X-Ray       INDICATION:  post-op cath lab -.        TECHNIQUE: AP view of the chest       COMPARISON: 12/11/2020 at 1017 hours, 12/11/2020 at 0909 hours, 12/10/2020 and   2/11/2018       FINDINGS:    Tubes and Lines: ET tube and NG tube are unchanged. Pleura: No pneumothorax appreciated. No effusions appreciated. Lungs:  Unchanged diffuse bilateral opacities and bronchiectasis. Cardiac/Mediastinum/Aorta: It is unremarkable       Pulmonary Vascularity: The pulmonary vasculature is unremarkable. Chest Wall: No acute finding       Osseous Structures: Unremarkable       Upper Abdomen: No acute findings appreciated. 12/11/20 1050  XR CHEST PORT Final result    Impression:  IMPRESSION:   1. Tubes and lines without evidence of complication. 2.  Mildly improved aeration and expansion of the lungs. Diffuse interstitial   opacities are noted and bronchiectasis. Narrative:  EXAM: Chest X-Ray       INDICATION:  s/p intubation. TECHNIQUE: AP view of the chest       COMPARISON: 12/11/2020 at 0903 hours, 2/11/2018 and 2/10/2018       FINDINGS:    Tubes and Lines: The patient is intubated. ET tube is 3 cm above chace. An NG   tube is present with tip overlying the stomach. Pleura: No pneumothorax appreciated. No effusions appreciated. Lungs: There is mild improvement in aeration and expansion the lungs. Interstitial opacities are noted bilaterally. Atelectasis is present. Cardiac/Mediastinum/Aorta: It is unremarkable       Pulmonary Vascularity: The pulmonary vasculature is unremarkable. Chest Wall: No acute finding       Osseous Structures: Unremarkable       Upper Abdomen: No acute findings appreciated. 12/11/20 0940  XR CHEST PORT Final result    Impression:  Impression:   1. Acute on chronic opacities. Follow to resolution is recommended. Narrative:  EXAM: Chest Radiograph       INDICATION:  SOB       TECHNIQUE: AP view of the chest       COMPARISON: 12/10/2020, 2/11/2018, 2/10/2018       FINDINGS: No pneumothorax identified. Patient has chronic interstitial changes.    There is acute opacities along the periphery of the lung predominantly in the   basilar sections which are progressed since prior imaging. No effusions   identified. The cardiomediastinal silhouette is unremarkable. The pulmonary   vasculature is unremarkable. The osseous structures are unremarkable. 12/10/20 1133  XR CHEST SNGL V Final result    Impression:  IMPRESSION:       Mixed interstitial and alveolar opacities greatest at both lung bases, slightly   asymmetrically more pronounced on the right. . Differential diagnosis includes   asymmetric pulmonary edema versus multifocal airspace disease.       _______________           Narrative:  EXAM: XR CHEST SNGL V.       CLINICAL INDICATION/HISTORY: abnormal ECG. -Additional: None. TECHNIQUE: A portable erect AP radiographic view of the chest is compared with   the radiograph of 02/11/2018.   _______________       FINDINGS:       See impression. No pneumothorax or appreciable significant pleural effusion. The   cardiac silhouette, karlo, and mediastinal contours are normal for age. No acute   osseous abnormality is revealed. _______________                  Best practice :  Core measures:    Glycemic control  [facility]. Ventilated patients- aim to keep peak plateau pressure 46-52TS H2O. Sress ulcer prophylaxis  DVT prophylaxis       Pro Bundle Followed and Vent Bundle Followed, Vent Day 1        Critical Care Time:  The services I provided to this patient were to treat and/or prevent clinically significant deterioration that could result in the failure of one or more body systems and/or organ systems due to respiratory distress, hypoxia, cardiac dysrhythmia.      Services included the following:  -reviewing nursing notes and old charts  -vital sign assessments- reassessed BP at bedside- moved cuff to left upper arm.   -direct patient care  -medication orders and management  -interpreting and reviewing diagnostic studies/labs  -re-evaluations  -documentation time  -Spoke with ICU nurse and Cardiology and hospitalist     Aggregate critical care time was 55   minutes, which includes only time during which I was engaged in work directly related to the patient's care as described above. During this entire length of time I was immediately available to the patient. The reason for providing this level of medical care for this critically ill patient was due a critical illness that impaired one or more vital organ systems such that there was a high probability of imminent or life threatening deterioration in the patients condition. This care involved high complexity decision making to assess, manipulate, and support vital system functions, to treat this degreee vital organ system failure and to prevent further life threatening deterioration of the patients condition      Complex decision making was made in the evaluation and management plans during this consultation. More than 50% of time was spent in counseling and coordination of care including review of data and discussion with other team members.     Flora Briggs DO, [facility]    [facility]  Pulmonary, Critical Care, and Sleep Medicine

## 2020-12-12 NOTE — PROGRESS NOTES
After AM ABG results noted close to [facility] respiratory therapist decreased the FIO2 to 45%. Within 15 min pt became agitated, tachypnic and was desating to mid 80's. BIJAN Singer notified and new orders received.

## 2020-12-12 NOTE — PROGRESS NOTES
07:15  Received pt from [facility], [facility]. Pt. Supine with HOB 35 degrees. Pt lying quietly - opens eyes to voice. ROMERO. Soft wrist restraints to pinky. Wrists - No skin breakdown noted. Right groin access site with CDI  Quick clot dressing with tegaderm to cover. Site soft with no evidence of bruising, hematoma or bleeding. Precedex infusing at 0.4 mcg/kg/hr. Vent program AC with rate of 18, PEEP 5, Fi02 75%  08:15  Spoke with sister, Cisco Deleon [phone])  Updated sister on pt. Condition including use of wrist restraints to prevent pt from self-extubating. 9:10  Spoke with Dr. Evangelista Bowers, regarding am ABG with bicarb of 18.9 - No replacement at this time. 12:40  Dr. Rakesh Villafuerte notified of pt's temp 100.1 orally (tylenol 650 mg po given at 10:17)  14:05  Urinary output 10 ml for 1400 hour and 10 ml for 1300 hour. Bladder scanned for 25 ml.  BP 83/62 (70)  14:10  Spoke with Dr. Ricardo Rios, Pulmonologist - Updated on poor urinary output s/p lasix administration. Updated on hypotension. Orders received to infuse 25 g albumin (100 ml). 14:18 Dr. Yusuf Tijerina at bedside. 16:55  Spoke with Dr. Ricardo Rios via phone - updated him on pt's urinary ouput of 20 ml/hr x last 2 hours s/p albumin administration. SBP 90s. Orders received for IVFs. 17:30  Pt. Received 250 ml bolus LR - maintenance infusion started at 50 ml/hr  18:15  Urinay output 20 ml past hour - infusing second 250 ml bolus LR. Pt. Agitated - Banging hand on knee, throwing legs around in bed. Restarted precedex drip at 0.4 mcg/kg/hr  19:04  Bedside and Verbal shift change report given to Aroldo Cardoso RN (oncoming nurse) by Adriano Canada RN (offgoing nurse). Report included the following information SBAR, Kardex, ED Summary, Procedure Summary, Intake/Output, MAR, Recent Results, Med Rec Status and Cardiac Rhythm nsr with st elevation.

## 2020-12-13 LAB
ANION GAP SERPL CALC-SCNC: 10 MMOL/L (ref 3–18)
ARTERIAL PATENCY WRIST A: YES
BASE DEFICIT BLD-SCNC: 7 MMOL/L
BASOPHILS # BLD: 0 K/UL (ref 0–0.1)
BASOPHILS NFR BLD: 0 % (ref 0–2)
BDY SITE: ABNORMAL
BUN SERPL-MCNC: 23 MG/DL (ref 7–18)
BUN/CREAT SERPL: 51 (ref 12–20)
CALCIUM SERPL-MCNC: 8.9 MG/DL (ref 8.5–10.1)
CHLORIDE SERPL-SCNC: 110 MMOL/L (ref 100–111)
CO2 SERPL-SCNC: 20 MMOL/L (ref 21–32)
CREAT SERPL-MCNC: 0.45 MG/DL (ref 0.6–1.3)
DIFFERENTIAL METHOD BLD: ABNORMAL
EOSINOPHIL # BLD: 0.2 K/UL (ref 0–0.4)
EOSINOPHIL NFR BLD: 3 % (ref 0–5)
ERYTHROCYTE [DISTWIDTH] IN BLOOD BY AUTOMATED COUNT: 12.7 % (ref 11.6–14.5)
GAS FLOW.O2 O2 DELIVERY SYS: ABNORMAL L/MIN
GAS FLOW.O2 SETTING OXYMISER: 18 BPM
GLUCOSE BLD STRIP.AUTO-MCNC: 147 MG/DL (ref 70–110)
GLUCOSE BLD STRIP.AUTO-MCNC: 199 MG/DL (ref 70–110)
GLUCOSE BLD STRIP.AUTO-MCNC: 210 MG/DL (ref 70–110)
GLUCOSE BLD STRIP.AUTO-MCNC: 211 MG/DL (ref 70–110)
GLUCOSE BLD STRIP.AUTO-MCNC: 231 MG/DL (ref 70–110)
GLUCOSE SERPL-MCNC: 179 MG/DL (ref 74–99)
HCO3 BLD-SCNC: 18.2 MMOL/L (ref 22–26)
HCT VFR BLD AUTO: 33.5 % (ref 35–45)
HGB BLD-MCNC: 10.6 G/DL (ref 12–16)
INSPIRATION.DURATION SETTING TIME VENT: 0.9 SEC
LYMPHOCYTES # BLD: 0.7 K/UL (ref 0.9–3.6)
LYMPHOCYTES NFR BLD: 11 % (ref 21–52)
MCH RBC QN AUTO: 26.6 PG (ref 24–34)
MCHC RBC AUTO-ENTMCNC: 31.6 G/DL (ref 31–37)
MCV RBC AUTO: 84.2 FL (ref 74–97)
MONOCYTES # BLD: 0.6 K/UL (ref 0.05–1.2)
MONOCYTES NFR BLD: 9 % (ref 3–10)
NEUTS SEG # BLD: 5.2 K/UL (ref 1.8–8)
NEUTS SEG NFR BLD: 77 % (ref 40–73)
O2/TOTAL GAS SETTING VFR VENT: 60 %
PCO2 BLD: 32.1 MMHG (ref 35–45)
PEEP RESPIRATORY: 5 CMH2O
PH BLD: 7.36 [PH] (ref 7.35–7.45)
PLATELET # BLD AUTO: 481 K/UL (ref 135–420)
PMV BLD AUTO: 9.1 FL (ref 9.2–11.8)
PO2 BLD: 141 MMHG (ref 80–100)
POTASSIUM SERPL-SCNC: 4.1 MMOL/L (ref 3.5–5.5)
RBC # BLD AUTO: 3.98 M/UL (ref 4.2–5.3)
SAO2 % BLD: 99 % (ref 92–97)
SERVICE CMNT-IMP: ABNORMAL
SODIUM SERPL-SCNC: 140 MMOL/L (ref 136–145)
SPECIMEN TYPE: ABNORMAL
TOTAL RESP. RATE, ITRR: 21
VENTILATION MODE VENT: ABNORMAL
VOLUME CONTROL PLUS IVLCP: YES
VT SETTING VENT: 450 ML
WBC # BLD AUTO: 6.7 K/UL (ref 4.6–13.2)

## 2020-12-13 PROCEDURE — 65620000000 HC RM CCU GENERAL

## 2020-12-13 PROCEDURE — 74011250636 HC RX REV CODE- 250/636: Performed by: NURSE PRACTITIONER

## 2020-12-13 PROCEDURE — 80048 BASIC METABOLIC PNL TOTAL CA: CPT

## 2020-12-13 PROCEDURE — 82962 GLUCOSE BLOOD TEST: CPT

## 2020-12-13 PROCEDURE — 36600 WITHDRAWAL OF ARTERIAL BLOOD: CPT

## 2020-12-13 PROCEDURE — 85025 COMPLETE CBC W/AUTO DIFF WBC: CPT

## 2020-12-13 PROCEDURE — 74011250637 HC RX REV CODE- 250/637: Performed by: INTERNAL MEDICINE

## 2020-12-13 PROCEDURE — 74011250636 HC RX REV CODE- 250/636: Performed by: INTERNAL MEDICINE

## 2020-12-13 PROCEDURE — 74011636637 HC RX REV CODE- 636/637: Performed by: INTERNAL MEDICINE

## 2020-12-13 PROCEDURE — 82803 BLOOD GASES ANY COMBINATION: CPT

## 2020-12-13 PROCEDURE — 74011250636 HC RX REV CODE- 250/636: Performed by: PHYSICIAN ASSISTANT

## 2020-12-13 PROCEDURE — 74011000258 HC RX REV CODE- 258: Performed by: PHYSICIAN ASSISTANT

## 2020-12-13 PROCEDURE — 94762 N-INVAS EAR/PLS OXIMTRY CONT: CPT

## 2020-12-13 PROCEDURE — 74011000250 HC RX REV CODE- 250: Performed by: INTERNAL MEDICINE

## 2020-12-13 PROCEDURE — 94003 VENT MGMT INPAT SUBQ DAY: CPT

## 2020-12-13 PROCEDURE — 2709999900 HC NON-CHARGEABLE SUPPLY

## 2020-12-13 PROCEDURE — 74011250637 HC RX REV CODE- 250/637: Performed by: NURSE PRACTITIONER

## 2020-12-13 PROCEDURE — 36415 COLL VENOUS BLD VENIPUNCTURE: CPT

## 2020-12-13 PROCEDURE — 74011000258 HC RX REV CODE- 258: Performed by: INTERNAL MEDICINE

## 2020-12-13 PROCEDURE — 99233 SBSQ HOSP IP/OBS HIGH 50: CPT | Performed by: INTERNAL MEDICINE

## 2020-12-13 PROCEDURE — 74011000250 HC RX REV CODE- 250: Performed by: PHYSICIAN ASSISTANT

## 2020-12-13 PROCEDURE — 99232 SBSQ HOSP IP/OBS MODERATE 35: CPT | Performed by: INTERNAL MEDICINE

## 2020-12-13 RX ORDER — DEXMEDETOMIDINE HYDROCHLORIDE 4 UG/ML
.1-1.5 INJECTION, SOLUTION INTRAVENOUS
Status: DISCONTINUED | OUTPATIENT
Start: 2020-12-13 | End: 2020-12-13

## 2020-12-13 RX ORDER — DEXMEDETOMIDINE HYDROCHLORIDE 4 UG/ML
.1-1.5 INJECTION, SOLUTION INTRAVENOUS
Status: DISCONTINUED | OUTPATIENT
Start: 2020-12-13 | End: 2020-12-18

## 2020-12-13 RX ORDER — DOBUTAMINE HYDROCHLORIDE 200 MG/100ML
3 INJECTION INTRAVENOUS CONTINUOUS
Status: DISCONTINUED | OUTPATIENT
Start: 2020-12-13 | End: 2020-12-18 | Stop reason: ALTCHOICE

## 2020-12-13 RX ORDER — MIDAZOLAM IN 0.9 % SOD.CHLORID 1 MG/ML
1-10 PLASTIC BAG, INJECTION (ML) INTRAVENOUS
Status: DISCONTINUED | OUTPATIENT
Start: 2020-12-13 | End: 2020-12-17

## 2020-12-13 RX ADMIN — MIDAZOLAM HYDROCHLORIDE 2 MG: 2 INJECTION, SOLUTION INTRAMUSCULAR; INTRAVENOUS at 06:02

## 2020-12-13 RX ADMIN — MIDAZOLAM HYDROCHLORIDE 2 MG: 2 INJECTION, SOLUTION INTRAMUSCULAR; INTRAVENOUS at 12:02

## 2020-12-13 RX ADMIN — FAMOTIDINE 20 MG: 10 INJECTION INTRAVENOUS at 22:18

## 2020-12-13 RX ADMIN — FAMOTIDINE 20 MG: 10 INJECTION INTRAVENOUS at 11:13

## 2020-12-13 RX ADMIN — MIDAZOLAM HYDROCHLORIDE 2 MG: 2 INJECTION, SOLUTION INTRAMUSCULAR; INTRAVENOUS at 22:31

## 2020-12-13 RX ADMIN — MIDAZOLAM HYDROCHLORIDE 2 MG: 2 INJECTION, SOLUTION INTRAMUSCULAR; INTRAVENOUS at 02:45

## 2020-12-13 RX ADMIN — DOBUTAMINE HYDROCHLORIDE 3 MCG/KG/MIN: 200 INJECTION INTRAVENOUS at 11:14

## 2020-12-13 RX ADMIN — TICAGRELOR 90 MG: 90 TABLET ORAL at 19:06

## 2020-12-13 RX ADMIN — TICAGRELOR 90 MG: 90 TABLET ORAL at 11:13

## 2020-12-13 RX ADMIN — DEXMEDETOMIDINE HYDROCHLORIDE 1.5 MCG/KG/HR: 100 INJECTION, SOLUTION INTRAVENOUS at 06:03

## 2020-12-13 RX ADMIN — DEXMEDETOMIDINE HYDROCHLORIDE 1.5 MCG/KG/HR: 100 INJECTION, SOLUTION INTRAVENOUS at 16:04

## 2020-12-13 RX ADMIN — MIDAZOLAM HYDROCHLORIDE 2 MG: 2 INJECTION, SOLUTION INTRAMUSCULAR; INTRAVENOUS at 18:14

## 2020-12-13 RX ADMIN — DEXMEDETOMIDINE HYDROCHLORIDE 1.2 MCG/KG/HR: 100 INJECTION, SOLUTION INTRAVENOUS at 14:13

## 2020-12-13 RX ADMIN — MIDAZOLAM HYDROCHLORIDE 2 MG: 2 INJECTION, SOLUTION INTRAMUSCULAR; INTRAVENOUS at 00:13

## 2020-12-13 RX ADMIN — MIDAZOLAM HYDROCHLORIDE 2 MG: 2 INJECTION, SOLUTION INTRAMUSCULAR; INTRAVENOUS at 14:05

## 2020-12-13 RX ADMIN — DEXMEDETOMIDINE HYDROCHLORIDE 0.5 MCG/KG/HR: 100 INJECTION, SOLUTION INTRAVENOUS at 09:33

## 2020-12-13 RX ADMIN — INSULIN LISPRO 4 UNITS: 100 INJECTION, SOLUTION INTRAVENOUS; SUBCUTANEOUS at 06:42

## 2020-12-13 RX ADMIN — SODIUM CHLORIDE 10 ML: 9 INJECTION, SOLUTION INTRAMUSCULAR; INTRAVENOUS; SUBCUTANEOUS at 06:45

## 2020-12-13 RX ADMIN — SODIUM CHLORIDE 10 ML: 9 INJECTION, SOLUTION INTRAMUSCULAR; INTRAVENOUS; SUBCUTANEOUS at 16:04

## 2020-12-13 RX ADMIN — SODIUM CHLORIDE, SODIUM LACTATE, POTASSIUM CHLORIDE, AND CALCIUM CHLORIDE 50 ML/HR: 600; 310; 30; 20 INJECTION, SOLUTION INTRAVENOUS at 00:16

## 2020-12-13 RX ADMIN — SODIUM CHLORIDE 10 ML: 9 INJECTION, SOLUTION INTRAMUSCULAR; INTRAVENOUS; SUBCUTANEOUS at 22:19

## 2020-12-13 RX ADMIN — ENOXAPARIN SODIUM 40 MG: 40 INJECTION SUBCUTANEOUS at 11:13

## 2020-12-13 RX ADMIN — ATORVASTATIN CALCIUM 40 MG: 40 TABLET, FILM COATED ORAL at 22:18

## 2020-12-13 RX ADMIN — ACETAMINOPHEN 650 MG: 325 TABLET ORAL at 11:23

## 2020-12-13 RX ADMIN — MIDAZOLAM 1 MG/HR: 5 INJECTION, SOLUTION INTRAMUSCULAR; INTRAVENOUS at 15:26

## 2020-12-13 RX ADMIN — INSULIN LISPRO 4 UNITS: 100 INJECTION, SOLUTION INTRAVENOUS; SUBCUTANEOUS at 23:16

## 2020-12-13 RX ADMIN — INSULIN LISPRO 4 UNITS: 100 INJECTION, SOLUTION INTRAVENOUS; SUBCUTANEOUS at 11:23

## 2020-12-13 RX ADMIN — INSULIN LISPRO 2 UNITS: 100 INJECTION, SOLUTION INTRAVENOUS; SUBCUTANEOUS at 19:05

## 2020-12-13 RX ADMIN — ASPIRIN 81 MG CHEWABLE TABLET 81 MG: 81 TABLET CHEWABLE at 11:13

## 2020-12-13 RX ADMIN — FENTANYL CITRATE 100 MCG: 50 INJECTION INTRAMUSCULAR; INTRAVENOUS at 18:32

## 2020-12-13 RX ADMIN — DEXMEDETOMIDINE HYDROCHLORIDE 1.5 MCG/KG/HR: 100 INJECTION, SOLUTION INTRAVENOUS at 20:00

## 2020-12-13 RX ADMIN — Medication 3 MG: at 22:18

## 2020-12-13 NOTE — CONSULTS
[facility]  Pulmonary, Critical Care, and Sleep Medicine    Name: Boyd Hammans MRN: [id number]   : 1955 Hospital: [facility]   Date: 2020        Pulmonary Medicine-  Initial Patient Consult      IMPRESSION:   · CAD: S/P PCI with DEC- LAD 20  · Respiratory Failure- hypoxic- pulmonary edema, CHF- currently intubated  · Emphysema by report- no inhalers noted on home med list  · Demential- on Aricept  · Hypokalemia- replacing  · DM: on Lantus as OP  · Hepatitic C infection:       RECOMMENDATIONS:   NEURO  · RASS goal 0-1  · Precedex drip; Fentanyl and versed PRN  · Melatonin 3mg QHS  CV  · Defer to cardiology; starting dobutamine drip. · MAP goal >65  · Trend troponin  RESP  · VAP bundle  · SpO2 goal >92%  · Wean vent as tolerated; likely not ready for SBT given need to add ionotropic therapy today. · Keep HO elevated  · Duo nebs PRN  GI  · Left NGT  · NPO for now except meds  · Clamp per cardiology and Brilinta protocol  · Protonix IV while intubated  · Bowel regimen per primary team    RENAL:  · Pro   · Strict I/O  · Electrolyte management per primary team and cardology  ENDO  · Glycemic control per primary team    ID  · No active issues at this time  SKIN  · Per nursing protocol    Will continue to follow    FULL CODE     Subjective/History: This patient has been seen and evaluated at the request of Dr. Chace Mota for Ventilator management and IV pressers if need. Patient is a 72 y.o. female with history fo DM, dementia, report of emphysema , hepatitis C and recent MI, went into respiratory distress with pulmonary edema earlier this morning requiring urgent intubation in CVT-SDU. Patient taken to cath lab and underwent LAD stent placement. Patient arrived in CVT-ICU intubated and partially sedated. She was mildly agitated and additional sedation given. Sheath in place.      The patient is critically ill and can not provide additional history due to Ventilated. 12/12/2020:    Decreasing UO despite fluids  Diuresis attempted yesterday without success. Plan for starting dobutamine drip today per cardiology  CXR: reviewed and note significant bilateral opacities most likely representing pulmonary edema    Past Medical History:   Diagnosis Date    Arthritis     Asthma     Chronic pain     BACK PAIN    [facility] [address]     Diabetic neuropathy [facility] [address]     Emphysema     Hepatitis C     Hypertension     Nervousness     Osteoarthritis of both knees       Past Surgical History:   Procedure Laterality Date    HX CARPAL TUNNEL RELEASE Right 8/31/09    Dr. To Romo        Prior to Admission medications    Medication Sig Start Date End Date Taking? Authorizing Provider   folic acid (FOLVITE) 1 mg tablet 1 Tab by Per NG tube route daily. 2/15/18   Elli Davidson MD   insulin glargine (LANTUS) 100 unit/mL injection lantus 22 units subq daily 2/15/18   Elli Davidson MD   magnesium oxide (MAG-OX) 400 mg tablet Take 1 Tab by mouth two (2) times a day. 2/14/18   Elli Davidson MD   tamsulosin (FLOMAX) 0.4 mg capsule Take 1 tab by mouth daily until kidney stone passes. 2/5/18   AllenKenyatta Racer, DO   amLODIPine (NORVASC) 5 mg tablet Take 5 mg by mouth daily. Indications: HYPERTENSION    Provider, Historical   aspirin (ASPIRIN) 325 mg tablet Take 325 mg by mouth daily. Provider, Historical   VITAMIN E (FORMULA E 400 PO) Take 1 Tab by mouth daily. Provider, Historical   losartan (COZAAR) 50 mg tablet Take  by mouth daily. Indications: HYPERTENSION    Provider, Historical   Melatonin 300 mcg Tab Take  by mouth. Provider, Historical   cyanocobalamin (VITAMIN B-12) 1,500 mcg TbER Take 1 Tab by mouth.       Provider, Historical     Current Facility-Administered Medications   Medication Dose Route Frequency    DOBUTamine (DOBUTREX) 500 mg/250 mL (2,000 mcg/mL) infusion  3 mcg/kg/min IntraVENous CONTINUOUS    dexmedeTOMidine (PRECEDEX) 400 mcg in 0.9% sodium chloride 100 mL infusion  0.1-1.5 mcg/kg/hr IntraVENous TITRATE    insulin lispro (HUMALOG) injection   SubCUTAneous Q6H    lactated Ringers infusion  50 mL/hr IntraVENous CONTINUOUS    [Held by provider] carvediloL (COREG) tablet 3.125 mg  3.125 mg Oral Q12H    ticagrelor (BRILINTA) tablet 90 mg  90 mg Per NG tube BID    enoxaparin (LOVENOX) injection 40 mg  40 mg SubCUTAneous Q24H    melatonin tablet 3 mg  3 mg Oral QHS    famotidine (PF) (PEPCID) 20 mg in 0.9% sodium chloride 10 mL injection  20 mg IntraVENous Q12H    sodium chloride (NS) flush 5-40 mL  5-40 mL IntraVENous Q8H    aspirin chewable tablet 81 mg  81 mg Oral DAILY    atorvastatin (LIPITOR) tablet 40 mg  40 mg Oral QHS     No Known Allergies   Social History     Tobacco Use    Smoking status: Current Every Day Smoker     Packs/day: 1.00   Substance Use Topics    Alcohol use: Yes     Comment: socially      Family History   Problem Relation Age of Onset    Diabetes Mother     Hypertension Mother     Obesity Mother     Alcohol abuse Father     High Cholesterol Father     Lung Disease Father     Stroke Father     Arthritis-osteo Sister     Depression Sister     Diabetes Sister     Hypertension Sister     Obesity Sister     Arthritis-osteo Brother     Diabetes Brother     Hypertension Brother     Obesity Brother         Review of Systems:  Review of systems not obtained due to patient factors. Objective:   Vital Signs:    Visit Vitals  /61   Pulse (!) 59   Temp 98.9 °F (37.2 °C)   Resp 23   Ht 5' 7\" (1.702 m)   Wt 74.6 kg (164 lb 8 oz)   SpO2 99%   Breastfeeding No   BMI 25.76 kg/m²       O2 Device: Endotracheal tube, Ventilator   O2 Flow Rate (L/min): 4 l/min   Temp (24hrs), Av.2 °F (37.3 °C), Min:98.6 °F (37 °C), Max:100.1 °F (37.8 °C)       Intake/Output:   Last shift:      No intake/output data recorded.   Last 3 shifts:  1901 -  0700  In: 1746.8 [I.V.:1466.8]  Out: 825 [Urine:825]    Intake/Output Summary (Last 24 hours) at 12/13/2020 1029  Last data filed at 12/13/2020 0600  Gross per 24 hour   Intake 1559.3 ml   Output 450 ml   Net 1109.3 ml         Ventilator Settings:  Mode Rate Tidal Volume Pressure FiO2 PEEP   Assist control, VC+   450 ml    60 % 5 cm H20     Peak airway pressure: 23 cm H2O    Minute ventilation: 9.01 l/min         Physical Exam:    General:  On vent with sedation running - opens eyes to voice and moves all extremities spontaneously   Head:  Normocephalic, without obvious abnormality, atraumatic. Eyes:  Conjunctivae/corneas clear. PERRL, EOMs intact. Nose: Nares normal. Septum midline. Mucosa normal. No drainage or sinus tenderness. Throat: Lips, mucosa, and tongue normal. Teeth and gums normal.   Neck: Supple, symmetrical, trachea midline, no adenopathy, thyroid: no enlargment/tenderness/nodules, no carotid bruit and no JVD. Back:   Symmetric, no curvature. ROM normal.   Lungs:   Clear to auscultation bilaterally. Chest wall:  No tenderness or deformity. Heart:  Regular rate and rhythm, S1, S2 normal, no murmur, click, rub or gallop. Abdomen:   Soft, non-tender. Bowel sounds normal. No masses,  No organomegaly. Extremities: Extremities normal, atraumatic, no cyanosis or edema. Pulses: 2+ and symmetric all extremities.    Skin: Skin color, texture, turgor normal. No rashes or lesions   Lymph nodes:      Cervical, supraclavicular, and axillary nodes normal.   Neurologic: Grossly nonfocal       Data:     Recent Results (from the past 24 hour(s))   GLUCOSE, POC    Collection Time: 12/12/20 12:47 PM   Result Value Ref Range    Glucose (POC) 189 (H) 70 - 167 mg/dL   METABOLIC PANEL, BASIC    Collection Time: 12/12/20  3:40 PM   Result Value Ref Range    Sodium 143 136 - 145 mmol/L    Potassium 4.5 3.5 - 5.5 mmol/L    Chloride 111 100 - 111 mmol/L    CO2 19 (L) 21 - 32 mmol/L    Anion gap 13 3.0 - 18 mmol/L    Glucose 159 (H) 74 - 99 mg/dL    BUN 23 (H) 7.0 - 18 MG/DL    Creatinine 0.68 0.6 - 1.3 MG/DL    BUN/Creatinine ratio 34 (H) 12 - 20      GFR est AA >60 >60 ml/min/1.73m2    GFR est non-AA >60 >60 ml/min/1.73m2    Calcium 8.6 8.5 - 10.1 MG/DL   GLUCOSE, POC    Collection Time: 12/12/20  5:18 PM   Result Value Ref Range    Glucose (POC) 156 (H) 70 - 110 mg/dL   URINALYSIS W/ RFLX MICROSCOPIC    Collection Time: 12/12/20  8:30 PM   Result Value Ref Range    Color DARK YELLOW      Appearance CLEAR      Specific gravity 1.030 1.005 - 1.030      pH (UA) 5.5 5.0 - 8.0      Protein 30 (A) NEG mg/dL    Glucose Negative NEG mg/dL    Ketone 40 (A) NEG mg/dL    Bilirubin LARGE (A) NEG      Blood LARGE (A) NEG      Urobilinogen 4.0 (H) 0.2 - 1.0 EU/dL    Nitrites Negative NEG      Leukocyte Esterase TRACE (A) NEG     URINE MICROSCOPIC ONLY    Collection Time: 12/12/20  8:30 PM   Result Value Ref Range    WBC 0 to 3 0 - 4 /hpf    RBC TOO NUMEROUS TO COUNT 0 - 5 /hpf    Epithelial cells FEW 0 - 5 /lpf    Bacteria Negative NEG /hpf    Mucus 4+ (A) NEG /lpf   GLUCOSE, POC    Collection Time: 12/13/20  1:35 AM   Result Value Ref Range    Glucose (POC) 147 (H) 70 - 110 mg/dL   POC G3    Collection Time: 12/13/20  4:29 AM   Result Value Ref Range    Device: VENT      FIO2 (POC) 60 %    pH (POC) 7.36 7.35 - 7.45      pCO2 (POC) 32.1 (L) 35.0 - 45.0 MMHG    pO2 (POC) 141 (H) 80 - 100 MMHG    HCO3 (POC) 18.2 (L) 22 - 26 MMOL/L    sO2 (POC) 99 (H) 92 - 97 %    Base deficit (POC) 7 mmol/L    Mode ASSIST CONTROL      Tidal volume 450 ml    Set Rate 18 bpm    PEEP/CPAP (POC) 5 cmH2O    Allens test (POC) YES      Inspiratory Time 0.90 sec    Total resp.  rate 21      Site RIGHT RADIAL      Specimen type (POC) ARTERIAL      Performed by Lauren Kramer     Volume control plus YES     GLUCOSE, POC    Collection Time: 12/13/20  6:34 AM   Result Value Ref Range    Glucose (POC) 211 (H) 70 - 110 mg/dL   CBC WITH AUTOMATED DIFF    Collection Time: 12/13/20  6:40 AM   Result Value Ref Range    WBC 6.7 4.6 - 13.2 K/uL    RBC 3.98 (L) 4.20 - 5.30 M/uL    HGB 10.6 (L) 12.0 - 16.0 g/dL    HCT 33.5 (L) 35.0 - 45.0 %    MCV 84.2 74.0 - 97.0 FL    MCH 26.6 24.0 - 34.0 PG    MCHC 31.6 31.0 - 37.0 g/dL    RDW 12.7 11.6 - 14.5 %    PLATELET 586 (H) 661 - 420 K/uL    MPV 9.1 (L) 9.2 - 11.8 FL    NEUTROPHILS 77 (H) 40 - 73 %    LYMPHOCYTES 11 (L) 21 - 52 %    MONOCYTES 9 3 - 10 %    EOSINOPHILS 3 0 - 5 %    BASOPHILS 0 0 - 2 %    ABS. NEUTROPHILS 5.2 1.8 - 8.0 K/UL    ABS. LYMPHOCYTES 0.7 (L) 0.9 - 3.6 K/UL    ABS. MONOCYTES 0.6 0.05 - 1.2 K/UL    ABS. EOSINOPHILS 0.2 0.0 - 0.4 K/UL    ABS. BASOPHILS 0.0 0.0 - 0.1 K/UL    DF AUTOMATED     METABOLIC PANEL, BASIC    Collection Time: 12/13/20  6:40 AM   Result Value Ref Range    Sodium 140 136 - 145 mmol/L    Potassium 4.1 3.5 - 5.5 mmol/L    Chloride 110 100 - 111 mmol/L    CO2 20 (L) 21 - 32 mmol/L    Anion gap 10 3.0 - 18 mmol/L    Glucose 179 (H) 74 - 99 mg/dL    BUN 23 (H) 7.0 - 18 MG/DL    Creatinine 0.45 (L) 0.6 - 1.3 MG/DL    BUN/Creatinine ratio 51 (H) 12 - 20      GFR est AA >60 >60 ml/min/1.73m2    GFR est non-AA >60 >60 ml/min/1.73m2    Calcium 8.9 8.5 - 10.1 MG/DL             Lab Results   Component Value Date/Time    NT pro-BNP 6,465 (H) 12/12/2020 06:20 AM    NT pro-BNP 5,307 (H) 12/11/2020 01:35 PM    NT pro-BNP 5,306 (H) 02/07/2018 09:47 PM     Results for Vamsi Esparza (MRN [id number]) as of 12/11/2020 17:47   Ref. Range 12/10/2020 11:07 12/10/2020 12:47 12/10/2020 18:51 12/10/2020 23:29 12/11/2020 13:35   Troponin-I, Qt.  Latest Ref Range: 0.0 - 0.045 NG/ML 4.79 (HH) 4.83 (HH) 18.10 (HH) 27.80 (HH) 12.00 (HH)   CK-MB Index Latest Ref Range: 0.0 - 4.0 % 10.3 (H) 8.8 (H) 13.0 (H) 13.7 (H)    CK Latest Ref Range: 26 - 192 U/L 179 189 466 (H) 497 (H)    NT pro-BNP Latest Ref Range: 0 - 900 PG/ML     5,307 (H)     4/4/2020  7:17 PM - Fer, [facility]     Drivr  Ref Range  Units  Status    HCV RNA, PCR, QT  [id number]  High IU/mL  Final    HCV RNA, PCR QT  6.51  High     log IU/mL  Final    Comment:    Reference Range:          Recent Labs     12/13/20  0429 12/12/20  0348 12/11/20  1337   FIO2I 60 50 80   HCO3I 18.2* 18.9* 20.6*   PCO2I 32.1* 34.9* 34.7*   PHI 7.36 7.34* 7.38   PO2I 141* 114* 110*     Telemetry:normal sinus rhythm    Imaging:  I have personally reviewed the patients radiographs and have reviewed the reports:  CT Results  (Last 48 hours)    None        CXR Results  (Last 48 hours)               12/12/20 0438  XR CHEST PORT Final result    Impression:  IMPRESSION:   No significant interval change from recent prior study. Similar diffuse   bilateral pulmonary opacities. Narrative:  ONE VIEW CHEST RADIOGRAPH        INDICATION: interval evaluation. Chest pain. COMPARISON: Chest radiograph 12/11/2020. TECHNIQUE: AP view of the chest       FINDINGS:       LINES/TUBES: Endotracheal tube tip is 2.7 cm from the chace. Enteric tube tip   is not included on the image, but is below the diaphragm. MEDIASTINUM: Cardiac silhouette is within normal limits. LUNGS: Similar appearance of diffuse bilateral opacities. No significant   interval change from recent prior study. No pneumothorax or pleural effusion. BONES/OTHER: No acute osseous abnormality. 12/11/20 1431  XR CHEST PORT Final result    Impression:  IMPRESSION:   1. Tubes and lines without evidence of complication. 2.  Unchanged bilateral airspace opacities. Follow to resolution is recommended. Narrative:  EXAM: Chest X-Ray       INDICATION:  post-op cath lab -. TECHNIQUE: AP view of the chest       COMPARISON: 12/11/2020 at 1017 hours, 12/11/2020 at 0909 hours, 12/10/2020 and   2/11/2018       FINDINGS:    Tubes and Lines: ET tube and NG tube are unchanged. Pleura: No pneumothorax appreciated. No effusions appreciated. Lungs:  Unchanged diffuse bilateral opacities and bronchiectasis. Cardiac/Mediastinum/Aorta: It is unremarkable       Pulmonary Vascularity: The pulmonary vasculature is unremarkable. Chest Wall: No acute finding       Osseous Structures: Unremarkable       Upper Abdomen: No acute findings appreciated. 12/11/20 1050  XR CHEST PORT Final result    Impression:  IMPRESSION:   1. Tubes and lines without evidence of complication. 2.  Mildly improved aeration and expansion of the lungs. Diffuse interstitial   opacities are noted and bronchiectasis. Narrative:  EXAM: Chest X-Ray       INDICATION:  s/p intubation. TECHNIQUE: AP view of the chest       COMPARISON: 12/11/2020 at 0903 hours, 2/11/2018 and 2/10/2018       FINDINGS:    Tubes and Lines: The patient is intubated. ET tube is 3 cm above chace. An NG   tube is present with tip overlying the stomach. Pleura: No pneumothorax appreciated. No effusions appreciated. Lungs: There is mild improvement in aeration and expansion the lungs. Interstitial opacities are noted bilaterally. Atelectasis is present. Cardiac/Mediastinum/Aorta: It is unremarkable       Pulmonary Vascularity: The pulmonary vasculature is unremarkable. Chest Wall: No acute finding       Osseous Structures: Unremarkable       Upper Abdomen: No acute findings appreciated. Best practice :  Core measures:    Glycemic control  [facility]. Ventilated patients- aim to keep peak plateau pressure 17-32LB H2O. Sress ulcer prophylaxis  DVT prophylaxis       Pro Bundle Followed and Vent Bundle Followed, Vent Day 1        Critical Care Time:      Complex decision making was made in the evaluation and management plans during this consultation. More than 50% of time was spent in counseling and coordination of care including review of data and discussion with other team members. Adam Dominguez.  [facility]  Pulmonary, Critical Care, and Sleep Medicine

## 2020-12-13 NOTE — PROGRESS NOTES
[facility]  [facility]    Patient: Editha Rinne Age: 72 y.o. : 1955 MR#: [id number] SSN: [id number]  Date/Time: 2020     Subjective:   Patient seen intubated. Easily wakes up with verbal stimulus. Appears comfortable. Assessment/Plan:   1. Acute on chronic hypoxic respiratory failure: Status post intubation by anesthesia on ,ventilator management per ICU team.   2. Acute flash pulmonary edema due to #3: Diuresis per Cardiology. 3. Acute systolic heart failure exacerbation, EF 35%, diuresis per cardiology, limiting factor is her low bp, now on dobutamine drip, with improvement in her bp. Her beta-blocker has been held given her low blood pressure. Continue aspirin and statin, ACE inhibitor and bb to be added once blood pressure stabilizes. 4. Acute MI with anterolateral STEMI and Q waves:S/p ptca/stent to proximal/ mid LAD using ARELY on DAPT on 20 . Beta-blocker held due to hypotension. 5. Low-grade fever , is also hypotensive. Likely hypotension due to her cardiac issues. Plan to continue to monitor for signs and symptoms of infection. Her urinalysis not c/w uti, low threshold to start broad-spectrum antibiotics given risk of complications due to infections  6. History of COPD on home oxygen: Patient currently on vent, BD as tolerated. 7. Diabetes mellitus type 2: Sugars are somewhat within acceptable limits on corrective insulin. Plan to cont corrective insulin. Plan to start Lantus if blood sugars are consistently elevated. .  8. Hypertension: BP lower side, will monitor off medications. Now on dobutamine for continued hypotension   9. dementia: Unknown baseline, resume Aricept when able to tolerate p.o.  10. Full code    Called sister ms Brianda and left voicemail as she did not .     Visit Vitals  BP (!) 147/65 (BP 1 Location: Right arm, BP Patient Position: At rest)   Pulse 79   Temp 99.5 °F (37.5 °C)   Resp (!) 32   Ht 5' 7\" (1.702 m)   Wt 74.6 kg (164 lb 8 oz)   SpO2 93%   Breastfeeding No   BMI 25.76 kg/m²           Case discussed with:  []Patient  []Family  [x]Nursing  []Case Management  DVT Prophylaxis:  [x]Lovenox  []Hep iv  []SCDs  []Coumadin   []Eliquis/Xarelto     Objective:   VS:   Visit Vitals  BP (!) 147/65 (BP 1 Location: Right arm, BP Patient Position: At rest)   Pulse 79   Temp 99.5 °F (37.5 °C)   Resp (!) 32   Ht 5' 7\" (1.702 m)   Wt 74.6 kg (164 lb 8 oz)   SpO2 93%   Breastfeeding No   BMI 25.76 kg/m²      Tmax/24hrs: Temp (24hrs), Av.2 °F (37.3 °C), Min:98.6 °F (37 °C), Max:99.6 °F (37.6 °C)  IOBRIEF    Intake/Output Summary (Last 24 hours) at 2020 1257  Last data filed at 2020 1200  Gross per 24 hour   Intake 1524.3 ml   Output 470 ml   Net 1054.3 ml       General: Somnolent but wakes up with tactile stimulus, intubated    Pulmonary: CTAB  Cardiovascular: Regular rate and Rhythm. GI:  Soft, Non distended, Non tender. + Bowel sounds. Extremities:  No edema. Psych: Not anxious or agitated.     Additional:    Medications:   Current Facility-Administered Medications   Medication Dose Route Frequency    DOBUTamine (DOBUTREX) 500 mg/250 mL (2,000 mcg/mL) infusion  3 mcg/kg/min IntraVENous CONTINUOUS    dexmedeTOMidine in 0.9 % NaCl (PRECEDEX) 400 mcg/100 mL (4 mcg/mL) infusion soln  0.1-1.5 mcg/kg/hr IntraVENous TITRATE    insulin lispro (HUMALOG) injection   SubCUTAneous Q6H    lactated Ringers infusion  50 mL/hr IntraVENous CONTINUOUS    [Held by provider] carvediloL (COREG) tablet 3.125 mg  3.125 mg Oral Q12H    fentaNYL citrate (PF) injection  mcg   mcg IntraVENous Q2H PRN    sodium chloride (NS) flush 5-40 mL  5-40 mL IntraVENous PRN    ticagrelor (BRILINTA) tablet 90 mg  90 mg Per NG tube BID    enoxaparin (LOVENOX) injection 40 mg  40 mg SubCUTAneous Q24H    sodium chloride (NS) flush 5-40 mL  5-40 mL IntraVENous PRN    albuterol-ipratropium (DUO-NEB) 2.5 MG-0.5 MG/3 ML  3 mL Nebulization Q4H PRN   • melatonin tablet 3 mg  3 mg Oral QHS   • famotidine (PF) (PEPCID) 20 mg in 0.9% sodium chloride 10 mL injection  20 mg IntraVENous Q12H   • midazolam (VERSED) injection 1-2 mg  1-2 mg IntraVENous Q1H PRN   • sodium chloride (NS) flush 5-40 mL  5-40 mL IntraVENous Q8H   • sodium chloride (NS) flush 5-40 mL  5-40 mL IntraVENous PRN   • acetaminophen (TYLENOL) tablet 650 mg  650 mg Oral Q6H PRN    Or   • acetaminophen (TYLENOL) suppository 650 mg  650 mg Rectal Q6H PRN   • polyethylene glycol (MIRALAX) packet 17 g  17 g Oral DAILY PRN   • promethazine (PHENERGAN) tablet 12.5 mg  12.5 mg Oral Q6H PRN    Or   • ondansetron (ZOFRAN) injection 4 mg  4 mg IntraVENous Q6H PRN   • glucose chewable tablet 16 g  4 Tab Oral PRN   • glucagon (GLUCAGEN) injection 1 mg  1 mg IntraMUSCular PRN   • dextrose (D50W) injection syrg 12.5-25 g  25-50 mL IntraVENous PRN   • aspirin chewable tablet 81 mg  81 mg Oral DAILY   • atorvastatin (LIPITOR) tablet 40 mg  40 mg Oral QHS       Labs:    Recent Results (from the past 12 hour(s))   GLUCOSE, POC    Collection Time: 12/13/20  1:35 AM   Result Value Ref Range    Glucose (POC) 147 (H) 70 - 110 mg/dL   POC G3    Collection Time: 12/13/20  4:29 AM   Result Value Ref Range    Device: VENT      FIO2 (POC) 60 %    pH (POC) 7.36 7.35 - 7.45      pCO2 (POC) 32.1 (L) 35.0 - 45.0 MMHG    pO2 (POC) 141 (H) 80 - 100 MMHG    HCO3 (POC) 18.2 (L) 22 - 26 MMOL/L    sO2 (POC) 99 (H) 92 - 97 %    Base deficit (POC) 7 mmol/L    Mode ASSIST CONTROL      Tidal volume 450 ml    Set Rate 18 bpm    PEEP/CPAP (POC) 5 cmH2O    Allens test (POC) YES      Inspiratory Time 0.90 sec    Total resp. rate 21      Site RIGHT RADIAL      Specimen type (POC) ARTERIAL      Performed by Humberto Oleary     Volume control plus YES     GLUCOSE, POC    Collection Time: 12/13/20  6:34 AM   Result Value Ref Range    Glucose (POC) 211 (H) 70 - 110 mg/dL   CBC WITH AUTOMATED DIFF    Collection Time:  12/13/20  6:40 AM   Result Value Ref Range    WBC 6.7 4.6 - 13.2 K/uL    RBC 3.98 (L) 4.20 - 5.30 M/uL    HGB 10.6 (L) 12.0 - 16.0 g/dL    HCT 33.5 (L) 35.0 - 45.0 %    MCV 84.2 74.0 - 97.0 FL    MCH 26.6 24.0 - 34.0 PG    MCHC 31.6 31.0 - 37.0 g/dL    RDW 12.7 11.6 - 14.5 %    PLATELET 898 (H) 298 - 420 K/uL    MPV 9.1 (L) 9.2 - 11.8 FL    NEUTROPHILS 77 (H) 40 - 73 %    LYMPHOCYTES 11 (L) 21 - 52 %    MONOCYTES 9 3 - 10 %    EOSINOPHILS 3 0 - 5 %    BASOPHILS 0 0 - 2 %    ABS. NEUTROPHILS 5.2 1.8 - 8.0 K/UL    ABS. LYMPHOCYTES 0.7 (L) 0.9 - 3.6 K/UL    ABS. MONOCYTES 0.6 0.05 - 1.2 K/UL    ABS. EOSINOPHILS 0.2 0.0 - 0.4 K/UL    ABS. BASOPHILS 0.0 0.0 - 0.1 K/UL    DF AUTOMATED     METABOLIC PANEL, BASIC    Collection Time: 12/13/20  6:40 AM   Result Value Ref Range    Sodium 140 136 - 145 mmol/L    Potassium 4.1 3.5 - 5.5 mmol/L    Chloride 110 100 - 111 mmol/L    CO2 20 (L) 21 - 32 mmol/L    Anion gap 10 3.0 - 18 mmol/L    Glucose 179 (H) 74 - 99 mg/dL    BUN 23 (H) 7.0 - 18 MG/DL    Creatinine 0.45 (L) 0.6 - 1.3 MG/DL    BUN/Creatinine ratio 51 (H) 12 - 20      GFR est AA >60 >60 ml/min/1.73m2    GFR est non-AA >60 >60 ml/min/1.73m2    Calcium 8.9 8.5 - 10.1 MG/DL   GLUCOSE, POC    Collection Time: 12/13/20 11:12 AM   Result Value Ref Range    Glucose (POC) 210 (H) 70 - 110 mg/dL       Signed By: Rich Neal MD     December 13, 2020      Disclaimer: Sections of this note are dictated using utilizing voice recognition software. Minor typographical errors may be present. If questions arise, please do not hesitate to contact me or call our department.

## 2020-12-13 NOTE — PROGRESS NOTES
[facility]      CARDIOLOGY PROGRESS NOTE  RECS:        1. Subacute MI-EKG showed Q waves in anterolateral lead with ST elevation, with suspicion of late presentation of anterior MI-s/p [facility] S/p ptca/stent to proximal/ mid LAD using ARELY. LVEDP 8 mmHg. Normal PASP pressure with normal PCWP. Moderate to severe LV dysfunction. Continue asa and Brilinta. 2. Acute respiratory failure-in the setting acute CHF- requiring mechanical ventilation. 3. Borderline BP- monitor will resume BB when BP stabilizes   4. Acute Systolic Congestive heart Failure-recent echo with EF% 35%-40-mildly dedecompensated- low urine output - added low dose Dobutamine for inotropric support   5. COPD, on home O2 4L NC   6. Hepatitis C ? -Per family member   9. DM2- medications per medical team   8. Dementia      Continue Supportive Care  CXR suggestive of pulmonary edema-- will add low dose dobutamine and start diuretic if urine output remains low. Above plan discussed with PCCM. Cardiac cath 12/11/20  Patient presented to [address] [place] [address] with late presentation anterior wall MI.  EF 35-40%. Patient was initially managed medically-- however developed acute pulmonary edema requiring intubation. Also troponin level increasing consistent with ongoing ischemia. S/p ptca/stent to proximal/ mid LAD using ARELY. LVEDP 8 mmHg. Normal PASP pressure with normal PCWP. Moderate to severe LV dysfunction. Echo 12/10/20  · LV: Estimated LVEF is 35 - 40%. Visually measured ejection fraction. Normal cavity size and wall thickness. Moderately and segmentally reduced systolic function. Inconclusive left ventricular diastolic function. · AO: Mild aortic root dilatation; diameter is 3.8 cm.      ASSESSMENT:  Hospital Problems  Date Reviewed: 2/7/2018          Codes Class Noted POA    STEM[facility] (ST elevation [facility]) ([facility] ICD-10-CM: I21.3  ICD-9-CM: 410.90  12/10/2020 Unknown                SUBJECTIVE:  Intubated and sedated     OBJECTIVE:    VS:   Visit Vitals  /61   Pulse (!) 59   Temp 98.9 °F (37.2 °C)   Resp 23   Ht 5' 7\" (1.702 m)   Wt 74.6 kg (164 lb 8 oz)   SpO2 99%   Breastfeeding No   BMI 25.76 kg/m²         Intake/Output Summary (Last 24 hours) at 12/13/2020 0905  Last data filed at 12/13/2020 0600  Gross per 24 hour   Intake 1696.8 ml   Output 475 ml   Net 1221.8 ml     TELE: normal sinus rhythm    General: intubated and sedated   HENT: Normocephalic, atraumatic. Normal external eye. Neck :  no bruit, no JVD  Cardiac:  regular rate and rhythm, S1, S2 normal, no murmur, click, rub or gallop  Lungs: diminished breath sound bilaterally   Abdomen: Soft, nontender, no masses  Extremities:  No c/c/e, peripheral pulses present      Labs: Results:       Chemistry Recent Labs     12/13/20  0640 12/12/20  1540 12/12/20  0620  12/10/20  1107   * 159* 159*   < > 202*    143 137   < > 136   K 4.1 4.5 4.3   < > 3.7    111 107   < > 104   CO2 20* 19* 19*   < > 23   BUN 23* 23* 19*   < > 14   CREA 0.45* 0.68 0.56*   < > 0.55*   CA 8.9 8.6 9.0   < > 9.1   AGAP 10 13 11   < > 9   BUCR 51* 34* 34*   < > 25*   AP  --   --  76  --  86   TP  --   --  7.2  --  8.6*   ALB  --   --  2.4*  --  2.7*   GLOB  --   --  4.8*  --  5.9*   AGRAT  --   --  0.5*  --  0.5*    < > = values in this interval not displayed. CBC w/Diff Recent Labs     12/13/20  0640 12/12/20  0620 12/11/20  1335   WBC 6.7 9.3 8.9   RBC 3.98* 4.41 4.67   HGB 10.6* 12.2 12.7   HCT 33.5* 36.7 38.4   * 605* 573*   GRANS 77* 82*  --    LYMPH 11* 8*  --    EOS 3 1  --       Cardiac Enzymes Recent Labs     12/10/20  2329 12/10/20  1851   * 466*   CKND1 13.7* 13.0*      Coagulation Recent Labs     12/10/20  2329 12/10/20  1851  12/10/20  1107   PTP  --   --   --  14.5   INR  --   --   --  1.2   APTT 40.0* 30.1   < >  --     < > = values in this interval not displayed.        Lipid Panel No results found for: CHOL, CHOLPOCT, [address], CHLST, CHOLV, [id number], HDL, HDLP, LDL, LDLC, DLDLP, [id number], VLDLC, VLDL, TGLX, TRIGL, TRIGP, TGLPOCT, CHHD, CHHDX   BNP No results for input(s): BNPP in the last 72 hours. Liver Enzymes Recent Labs     12/12/20  0620   TP 7.2   ALB 2.4*   AP 76      Thyroid Studies Lab Results   Component Value Date/Time    TSH 2.92 12/10/2020 11:07 AM              Lisa DAHL supervised Lynne:[phone]     I have independently evaluated and examined the patient. All relevant labs and testing data's are reviewed. Care plan discussed and updated after review.     Agus Boyd MD

## 2020-12-13 NOTE — PROGRESS NOTES
1915-Received report/assumed care of pt.    2000-Pt assessment done. Pt can follow commands but tries to pull ET tube. Pt has restraints to bilateral wrists. 2058-Pt found unconnected from ventilator and pulling at ET tube with right hand. Bilateral wrist restraints are on, pt moved down in bed to reach ET tube. Pt reconnected to vent, repositioned in bed, wrist restraints checked, Versed given. 0013-Pt in bed kicking feet and trying to reach ET tube. Versed given for agitation. 0245-Pt woke up and immediately began trying to reach for tube. Pt points at tube when nurse comes into the room. Pt pulling against restraints and kicking legs. Versed given for agitation. 0600-Nurse attempting bath. Pt got very agitated. Versed given and Precedex increased to 1.5 mcg/kg/hr temporarily for pt to tolerate bath. 0633-Bath complete. Precedex decreased to 0.5 mcg/kg/hr. Bedside shift change report given to Carlos Greene RN (oncoming nurse) by Yenni Sandoval RN (offgoing nurse). Report included the following information SBAR, Kardex, Intake/Output, MAR and Cardiac Rhythm Sinus rhythm with ST elevation.

## 2020-12-14 ENCOUNTER — APPOINTMENT (OUTPATIENT)
Dept: GENERAL RADIOLOGY | Age: 65
DRG: 003 | End: 2020-12-14
Attending: INTERNAL MEDICINE
Payer: MEDICARE

## 2020-12-14 LAB
ANION GAP SERPL CALC-SCNC: 10 MMOL/L (ref 3–18)
APPEARANCE UR: ABNORMAL
ARTERIAL PATENCY WRIST A: YES
BACTERIA URNS QL MICRO: ABNORMAL /HPF
BASE DEFICIT BLD-SCNC: 7 MMOL/L
BDY SITE: ABNORMAL
BILIRUB UR QL: ABNORMAL
BUN SERPL-MCNC: 13 MG/DL (ref 7–18)
BUN/CREAT SERPL: 28 (ref 12–20)
CALCIUM SERPL-MCNC: 8.9 MG/DL (ref 8.5–10.1)
CHLORIDE SERPL-SCNC: 111 MMOL/L (ref 100–111)
CO2 SERPL-SCNC: 20 MMOL/L (ref 21–32)
COLOR UR: ABNORMAL
CREAT SERPL-MCNC: 0.46 MG/DL (ref 0.6–1.3)
EPITH CASTS URNS QL MICRO: ABNORMAL /LPF (ref 0–5)
GAS FLOW.O2 O2 DELIVERY SYS: ABNORMAL L/MIN
GAS FLOW.O2 SETTING OXYMISER: 18 BPM
GLUCOSE BLD STRIP.AUTO-MCNC: 144 MG/DL (ref 70–110)
GLUCOSE BLD STRIP.AUTO-MCNC: 164 MG/DL (ref 70–110)
GLUCOSE BLD STRIP.AUTO-MCNC: 219 MG/DL (ref 70–110)
GLUCOSE BLD STRIP.AUTO-MCNC: 237 MG/DL (ref 70–110)
GLUCOSE SERPL-MCNC: 222 MG/DL (ref 74–99)
GLUCOSE UR STRIP.AUTO-MCNC: NEGATIVE MG/DL
HCO3 BLD-SCNC: 17.8 MMOL/L (ref 22–26)
HGB UR QL STRIP: ABNORMAL
INSPIRATION.DURATION SETTING TIME VENT: 1 SEC
KETONES UR QL STRIP.AUTO: >160 MG/DL
LEUKOCYTE ESTERASE UR QL STRIP.AUTO: ABNORMAL
NITRITE UR QL STRIP.AUTO: POSITIVE
O2/TOTAL GAS SETTING VFR VENT: 50 %
PCO2 BLD: 27.5 MMHG (ref 35–45)
PEEP RESPIRATORY: 5 CMH2O
PH BLD: 7.42 [PH] (ref 7.35–7.45)
PH UR STRIP: 5.5 [PH] (ref 5–8)
PO2 BLD: 88 MMHG (ref 80–100)
POTASSIUM SERPL-SCNC: 3.9 MMOL/L (ref 3.5–5.5)
PROT UR STRIP-MCNC: 30 MG/DL
RBC #/AREA URNS HPF: ABNORMAL /HPF (ref 0–5)
SAO2 % BLD: 97 % (ref 92–97)
SERVICE CMNT-IMP: ABNORMAL
SODIUM SERPL-SCNC: 141 MMOL/L (ref 136–145)
SP GR UR REFRACTOMETRY: 1.02 (ref 1–1.03)
SPECIMEN TYPE: ABNORMAL
TOTAL RESP. RATE, ITRR: 27
UROBILINOGEN UR QL STRIP.AUTO: 4 EU/DL (ref 0.2–1)
VENTILATION MODE VENT: ABNORMAL
VOLUME CONTROL PLUS IVLCP: YES
VT SETTING VENT: 450 ML
WBC URNS QL MICRO: ABNORMAL /HPF (ref 0–4)

## 2020-12-14 PROCEDURE — 74011250637 HC RX REV CODE- 250/637: Performed by: INTERNAL MEDICINE

## 2020-12-14 PROCEDURE — 71045 X-RAY EXAM CHEST 1 VIEW: CPT

## 2020-12-14 PROCEDURE — 99233 SBSQ HOSP IP/OBS HIGH 50: CPT | Performed by: INTERNAL MEDICINE

## 2020-12-14 PROCEDURE — 74011250637 HC RX REV CODE- 250/637: Performed by: NURSE PRACTITIONER

## 2020-12-14 PROCEDURE — 80048 BASIC METABOLIC PNL TOTAL CA: CPT

## 2020-12-14 PROCEDURE — 74011250636 HC RX REV CODE- 250/636: Performed by: PHYSICIAN ASSISTANT

## 2020-12-14 PROCEDURE — 87077 CULTURE AEROBIC IDENTIFY: CPT

## 2020-12-14 PROCEDURE — 94762 N-INVAS EAR/PLS OXIMTRY CONT: CPT

## 2020-12-14 PROCEDURE — 74011636637 HC RX REV CODE- 636/637: Performed by: INTERNAL MEDICINE

## 2020-12-14 PROCEDURE — 82962 GLUCOSE BLOOD TEST: CPT

## 2020-12-14 PROCEDURE — 74011000258 HC RX REV CODE- 258: Performed by: INTERNAL MEDICINE

## 2020-12-14 PROCEDURE — 81001 URINALYSIS AUTO W/SCOPE: CPT

## 2020-12-14 PROCEDURE — 36600 WITHDRAWAL OF ARTERIAL BLOOD: CPT

## 2020-12-14 PROCEDURE — 74011000250 HC RX REV CODE- 250: Performed by: INTERNAL MEDICINE

## 2020-12-14 PROCEDURE — 87086 URINE CULTURE/COLONY COUNT: CPT

## 2020-12-14 PROCEDURE — 74011250636 HC RX REV CODE- 250/636: Performed by: INTERNAL MEDICINE

## 2020-12-14 PROCEDURE — 87186 SC STD MICRODIL/AGAR DIL: CPT

## 2020-12-14 PROCEDURE — 36415 COLL VENOUS BLD VENIPUNCTURE: CPT

## 2020-12-14 PROCEDURE — 94003 VENT MGMT INPAT SUBQ DAY: CPT

## 2020-12-14 PROCEDURE — 65620000000 HC RM CCU GENERAL

## 2020-12-14 PROCEDURE — 82803 BLOOD GASES ANY COMBINATION: CPT

## 2020-12-14 PROCEDURE — 2709999900 HC NON-CHARGEABLE SUPPLY

## 2020-12-14 RX ORDER — FUROSEMIDE 10 MG/ML
20 INJECTION INTRAMUSCULAR; INTRAVENOUS DAILY
Status: DISCONTINUED | OUTPATIENT
Start: 2020-12-14 | End: 2020-12-15

## 2020-12-14 RX ORDER — INSULIN GLARGINE 100 [IU]/ML
7 INJECTION, SOLUTION SUBCUTANEOUS DAILY
Status: DISCONTINUED | OUTPATIENT
Start: 2020-12-14 | End: 2020-12-17

## 2020-12-14 RX ORDER — DIAZEPAM 5 MG/1
5 TABLET ORAL 3 TIMES DAILY
Status: DISCONTINUED | OUTPATIENT
Start: 2020-12-14 | End: 2020-12-19

## 2020-12-14 RX ORDER — QUETIAPINE FUMARATE 25 MG/1
50 TABLET, FILM COATED ORAL 2 TIMES DAILY
Status: DISCONTINUED | OUTPATIENT
Start: 2020-12-14 | End: 2020-12-19

## 2020-12-14 RX ADMIN — INSULIN LISPRO 4 UNITS: 100 INJECTION, SOLUTION INTRAVENOUS; SUBCUTANEOUS at 06:32

## 2020-12-14 RX ADMIN — DEXMEDETOMIDINE HYDROCHLORIDE 1.5 MCG/KG/HR: 100 INJECTION, SOLUTION INTRAVENOUS at 19:00

## 2020-12-14 RX ADMIN — INSULIN LISPRO 3 UNITS: 100 INJECTION, SOLUTION INTRAVENOUS; SUBCUTANEOUS at 17:45

## 2020-12-14 RX ADMIN — MIDAZOLAM HYDROCHLORIDE 2 MG: 2 INJECTION, SOLUTION INTRAMUSCULAR; INTRAVENOUS at 02:16

## 2020-12-14 RX ADMIN — Medication 3 MG: at 21:15

## 2020-12-14 RX ADMIN — INSULIN GLARGINE 7 UNITS: 100 INJECTION, SOLUTION SUBCUTANEOUS at 15:52

## 2020-12-14 RX ADMIN — INSULIN LISPRO 6 UNITS: 100 INJECTION, SOLUTION INTRAVENOUS; SUBCUTANEOUS at 13:14

## 2020-12-14 RX ADMIN — ATORVASTATIN CALCIUM 40 MG: 40 TABLET, FILM COATED ORAL at 21:15

## 2020-12-14 RX ADMIN — DEXMEDETOMIDINE HYDROCHLORIDE 1.5 MCG/KG/HR: 100 INJECTION, SOLUTION INTRAVENOUS at 08:45

## 2020-12-14 RX ADMIN — FAMOTIDINE 20 MG: 10 INJECTION INTRAVENOUS at 21:16

## 2020-12-14 RX ADMIN — DIAZEPAM 5 MG: 5 TABLET ORAL at 21:15

## 2020-12-14 RX ADMIN — DEXMEDETOMIDINE HYDROCHLORIDE 1.5 MCG/KG/HR: 100 INJECTION, SOLUTION INTRAVENOUS at 00:02

## 2020-12-14 RX ADMIN — MIDAZOLAM HYDROCHLORIDE 2 MG: 2 INJECTION, SOLUTION INTRAMUSCULAR; INTRAVENOUS at 05:21

## 2020-12-14 RX ADMIN — SODIUM CHLORIDE 10 ML: 9 INJECTION, SOLUTION INTRAMUSCULAR; INTRAVENOUS; SUBCUTANEOUS at 13:15

## 2020-12-14 RX ADMIN — SODIUM CHLORIDE 10 ML: 9 INJECTION, SOLUTION INTRAMUSCULAR; INTRAVENOUS; SUBCUTANEOUS at 06:32

## 2020-12-14 RX ADMIN — DEXMEDETOMIDINE HYDROCHLORIDE 1.5 MCG/KG/HR: 100 INJECTION, SOLUTION INTRAVENOUS at 07:47

## 2020-12-14 RX ADMIN — POLYETHYLENE GLYCOL 3350 17 G: 17 POWDER, FOR SOLUTION ORAL at 21:15

## 2020-12-14 RX ADMIN — FENTANYL CITRATE 100 MCG: 50 INJECTION INTRAMUSCULAR; INTRAVENOUS at 05:56

## 2020-12-14 RX ADMIN — ACETAMINOPHEN 650 MG: 325 TABLET ORAL at 11:09

## 2020-12-14 RX ADMIN — SODIUM CHLORIDE 10 ML: 9 INJECTION, SOLUTION INTRAMUSCULAR; INTRAVENOUS; SUBCUTANEOUS at 21:16

## 2020-12-14 RX ADMIN — FAMOTIDINE 20 MG: 10 INJECTION INTRAVENOUS at 11:03

## 2020-12-14 RX ADMIN — DEXMEDETOMIDINE HYDROCHLORIDE 1.5 MCG/KG/HR: 100 INJECTION, SOLUTION INTRAVENOUS at 11:02

## 2020-12-14 RX ADMIN — FUROSEMIDE 20 MG: 10 INJECTION, SOLUTION INTRAMUSCULAR; INTRAVENOUS at 15:54

## 2020-12-14 RX ADMIN — DEXMEDETOMIDINE HYDROCHLORIDE 1.5 MCG/KG/HR: 100 INJECTION, SOLUTION INTRAVENOUS at 03:53

## 2020-12-14 RX ADMIN — ENOXAPARIN SODIUM 40 MG: 40 INJECTION SUBCUTANEOUS at 11:03

## 2020-12-14 RX ADMIN — CEFTRIAXONE SODIUM 1 G: 1 INJECTION, POWDER, FOR SOLUTION INTRAMUSCULAR; INTRAVENOUS at 18:59

## 2020-12-14 RX ADMIN — ASPIRIN 81 MG CHEWABLE TABLET 81 MG: 81 TABLET CHEWABLE at 11:03

## 2020-12-14 RX ADMIN — DEXMEDETOMIDINE HYDROCHLORIDE 1.5 MCG/KG/HR: 100 INJECTION, SOLUTION INTRAVENOUS at 15:52

## 2020-12-14 RX ADMIN — MIDAZOLAM 3 MG/HR: 5 INJECTION, SOLUTION INTRAMUSCULAR; INTRAVENOUS at 19:34

## 2020-12-14 RX ADMIN — DIAZEPAM 5 MG: 5 TABLET ORAL at 15:52

## 2020-12-14 RX ADMIN — TICAGRELOR 90 MG: 90 TABLET ORAL at 17:46

## 2020-12-14 RX ADMIN — QUETIAPINE FUMARATE 50 MG: 25 TABLET ORAL at 11:39

## 2020-12-14 RX ADMIN — QUETIAPINE FUMARATE 50 MG: 25 TABLET ORAL at 17:45

## 2020-12-14 RX ADMIN — TICAGRELOR 90 MG: 90 TABLET ORAL at 11:03

## 2020-12-14 NOTE — CONSULTS
[facility]  Pulmonary, Critical Care, and Sleep Medicine    Name: Darci Bojorquez MRN: [id number]   : 1955 Hospital: [facility]   Date: 2020        Pulmonary Medicine-  Initial Patient Consult      IMPRESSION:   · CAD: S/P PCI with DEC- LAD 20  · Respiratory Failure- hypoxic- pulmonary edema, CHF- currently intubated  · Emphysema by report- no inhalers noted on home med list  · Demential- on Aricept  · Hypokalemia- replacing  · DM: on Lantus as OP  · Hepatitic C infection:       RECOMMENDATIONS:   - Pt seems to be failing SBTs due to agitation, will add some PO valium and seroquel to attempt to get off gtts. - Safe extubation may be difficult given the patient's severe underlying dementia  - Discussed diuresis with cardiology, given the patient's low urine output. Defer to them. - CXR improved  - Wean off dobutamine per cards     Subjective/History: This patient has been seen and evaluated at the request of Dr. Zahida Nagel for Ventilator management and IV pressers if need. Patient is a 72 y.o. female with history fo DM, dementia, report of emphysema , hepatitis C and recent MI, went into respiratory distress with pulmonary edema earlier this morning requiring urgent intubation in CVT-SDU. Patient taken to cath lab and underwent LAD stent placement. Patient arrived in CVT-ICU intubated and partially sedated. She was mildly agitated and additional sedation given. Sheath in place. The patient is critically ill and can not provide additional history due to Ventilated. 2020: Intermittent tachypnea, failing SBTs and agitated.    ABGs improved, VSS  On dobutamine gtt, BPs stable    Past Medical History:   Diagnosis Date    Arthritis     Asthma     Chronic pain     BACK PAIN    Diabetes (HCC)     Diabetic neuropathy (HCC)     Emphysema     Hepatitis C     Hypertension     Nervousness     Osteoarthritis of both knees       Past Surgical History:   Procedure Laterality Date    HX CARPAL TUNNEL RELEASE Right 8/31/09    Dr. Pee Salmeron        Prior to Admission medications    Medication Sig Start Date End Date Taking? Authorizing Provider   folic acid (FOLVITE) 1 mg tablet 1 Tab by Per NG tube route daily. 2/15/18   Rose Baeza MD   insulin glargine (LANTUS) 100 unit/mL injection lantus 22 units subq daily 2/15/18   Rose Baeza MD   magnesium oxide (MAG-OX) 400 mg tablet Take 1 Tab by mouth two (2) times a day. 2/14/18   Rose Baeza MD   tamsulosin (FLOMAX) 0.4 mg capsule Take 1 tab by mouth daily until kidney stone passes. 2/5/18   Kris Villa,    amLODIPine (NORVASC) 5 mg tablet Take 5 mg by mouth daily. Indications: HYPERTENSION    Provider, Historical   aspirin (ASPIRIN) 325 mg tablet Take 325 mg by mouth daily. Provider, Historical   VITAMIN E (FORMULA E 400 PO) Take 1 Tab by mouth daily. Provider, Historical   losartan (COZAAR) 50 mg tablet Take  by mouth daily. Indications: HYPERTENSION    Provider, Historical   Melatonin 300 mcg Tab Take  by mouth. Provider, Historical   cyanocobalamin (VITAMIN B-12) 1,500 mcg TbER Take 1 Tab by mouth.       Provider, Historical     Current Facility-Administered Medications   Medication Dose Route Frequency    diazePAM (VALIUM) tablet 5 mg  5 mg Oral TID    QUEtiapine (SEROquel) tablet 50 mg  50 mg Oral BID    insulin glargine (LANTUS) injection 7 Units  7 Units SubCUTAneous DAILY    furosemide (LASIX) injection 20 mg  20 mg IntraVENous DAILY    DOBUTamine (DOBUTREX) 500 mg/250 mL (2,000 mcg/mL) infusion  3 mcg/kg/min IntraVENous CONTINUOUS    dexmedeTOMidine in 0.9 % NaCl (PRECEDEX) 400 mcg/100 mL (4 mcg/mL) infusion soln  0.1-1.5 mcg/kg/hr IntraVENous TITRATE    midazolam in normal saline (VERSED) 1 mg/mL infusion  1-10 mg/hr IntraVENous TITRATE    insulin lispro (HUMALOG) injection   SubCUTAneous Q6H    lactated Ringers infusion  50 mL/hr IntraVENous CONTINUOUS    [Held by provider] carvediloL (COREG) tablet 3.125 mg  3.125 mg Oral Q12H    ticagrelor (BRILINTA) tablet 90 mg  90 mg Per NG tube BID    enoxaparin (LOVENOX) injection 40 mg  40 mg SubCUTAneous Q24H    melatonin tablet 3 mg  3 mg Oral QHS    famotidine (PF) (PEPCID) 20 mg in 0.9% sodium chloride 10 mL injection  20 mg IntraVENous Q12H    sodium chloride (NS) flush 5-40 mL  5-40 mL IntraVENous Q8H    aspirin chewable tablet 81 mg  81 mg Oral DAILY    atorvastatin (LIPITOR) tablet 40 mg  40 mg Oral QHS     No Known Allergies   Social History     Tobacco Use    Smoking status: Current Every Day Smoker     Packs/day: 1.00   Substance Use Topics    Alcohol use: Yes     Comment: socially      Family History   Problem Relation Age of Onset    Diabetes Mother     Hypertension Mother     Obesity Mother     Alcohol abuse Father     High Cholesterol Father     Lung Disease Father     Stroke Father     Arthritis-osteo Sister     Depression Sister     Diabetes Sister     Hypertension Sister     Obesity Sister     Arthritis-osteo Brother     Diabetes Brother     Hypertension Brother     Obesity Brother         Review of Systems:  Review of systems not obtained due to patient factors.     Objective:   Vital Signs:    Visit Vitals  /67   Pulse 85   Temp 100 °F (37.8 °C)   Resp 24   Ht 5' 7\" (1.702 m)   Wt 74.6 kg (164 lb 8 oz)   SpO2 99%   Breastfeeding No   BMI 25.76 kg/m²       O2 Device: Endotracheal tube, Ventilator   O2 Flow Rate (L/min): 4 l/min   Temp (24hrs), Av.9 °F (37.7 °C), Min:99.5 °F (37.5 °C), Max:100.5 °F (38.1 °C)       Intake/Output:   Last shift:      701 - 1900  In: 300   Out: 103 [Urine:920]  Last 3 shifts: 1901 -  07  In: 1868.3 [I.V.:1668.3]  Out: 880 [Urine:880]    Intake/Output Summary (Last 24 hours) at 2020 1757  Last data filed at 2020 1754  Gross per 24 hour   Intake 875.83 ml   Output 1400 ml   Net -524.17 ml         Ventilator Settings:  Mode Rate Tidal Volume Pressure FiO2 PEEP   Assist control, VC+   450 ml    45 % 5 cm H20     Peak airway pressure: 22 cm H2O    Minute ventilation: 8.9 l/min         Physical Exam:    General:  Moving all extremities, not following commands. Lungs:   Few scattered crackles. Heart:  Regular rate and rhythm, S1, S2 normal, no murmur, click, rub or gallop. Abdomen:   Soft, non-tender. Bowel sounds normal. No masses,  No organomegaly. Extremities: Extremities normal, atraumatic, no cyanosis or edema. Pulses: 2+ and symmetric all extremities. Neurologic: Grossly nonfocal       Data:     Recent Results (from the past 24 hour(s))   GLUCOSE, POC    Collection Time: 12/13/20  6:42 PM   Result Value Ref Range    Glucose (POC) 199 (H) 70 - 110 mg/dL   GLUCOSE, POC    Collection Time: 12/13/20 10:49 PM   Result Value Ref Range    Glucose (POC) 231 (H) 70 - 110 mg/dL   POC G3    Collection Time: 12/14/20  4:22 AM   Result Value Ref Range    Device: VENT      FIO2 (POC) 50 %    pH (POC) 7.42 7.35 - 7.45      pCO2 (POC) 27.5 (L) 35.0 - 45.0 MMHG    pO2 (POC) 88 80 - 100 MMHG    HCO3 (POC) 17.8 (L) 22 - 26 MMOL/L    sO2 (POC) 97 92 - 97 %    Base deficit (POC) 7 mmol/L    Mode ASSIST CONTROL      Tidal volume 450 ml    Set Rate 18 bpm    PEEP/CPAP (POC) 5 cmH2O    Allens test (POC) YES      Inspiratory Time 1.00 sec    Total resp.  rate 27      Site RIGHT RADIAL      Specimen type (POC) ARTERIAL      Performed by Peral Srivastava     Volume control plus YES     GLUCOSE, POC    Collection Time: 12/14/20  6:07 AM   Result Value Ref Range    Glucose (POC) 219 (H) 70 - 702 mg/dL   METABOLIC PANEL, BASIC    Collection Time: 12/14/20  6:09 AM   Result Value Ref Range    Sodium 141 136 - 145 mmol/L    Potassium 3.9 3.5 - 5.5 mmol/L    Chloride 111 100 - 111 mmol/L    CO2 20 (L) 21 - 32 mmol/L    Anion gap 10 3.0 - 18 mmol/L    Glucose 222 (H) 74 - 99 mg/dL    BUN 13 7.0 - 18 MG/DL Creatinine 0.46 (L) 0.6 - 1.3 MG/DL    BUN/Creatinine ratio 28 (H) 12 - 20      GFR est AA >60 >60 ml/min/1.73m2    GFR est non-AA >60 >60 ml/min/1.73m2    Calcium 8.9 8.5 - 10.1 MG/DL   GLUCOSE, POC    Collection Time: 12/14/20  1:04 PM   Result Value Ref Range    Glucose (POC) 237 (H) 70 - 110 mg/dL   URINALYSIS W/ RFLX MICROSCOPIC    Collection Time: 12/14/20  2:00 PM   Result Value Ref Range    Color DARK YELLOW      Appearance CLOUDY      Specific gravity 1.025 1.005 - 1.030      pH (UA) 5.5 5.0 - 8.0      Protein 30 (A) NEG mg/dL    Glucose Negative NEG mg/dL    Ketone >160 (A) NEG mg/dL    Bilirubin SMALL (A) NEG      Blood SMALL (A) NEG      Urobilinogen 4.0 (H) 0.2 - 1.0 EU/dL    Nitrites Positive (A) NEG      Leukocyte Esterase MODERATE (A) NEG     URINE MICROSCOPIC ONLY    Collection Time: 12/14/20  2:00 PM   Result Value Ref Range    WBC 11 to 20 0 - 4 /hpf    RBC 0 to 4 0 - 5 /hpf    Epithelial cells FEW 0 - 5 /lpf    Bacteria 2+ (A) NEG /hpf   GLUCOSE, POC    Collection Time: 12/14/20  5:39 PM   Result Value Ref Range    Glucose (POC) 164 (H) 70 - 110 mg/dL             Lab Results   Component Value Date/Time    NT pro-BNP 6,465 (H) 12/12/2020 06:20 AM    NT pro-BNP 5,307 (H) 12/11/2020 01:35 PM    NT pro-BNP 5,306 (H) 02/07/2018 09:47 PM     Results for Ellen Dorado (MRN [id number]) as of 12/11/2020 17:47   Ref. Range 12/10/2020 11:07 12/10/2020 12:47 12/10/2020 18:51 12/10/2020 23:29 12/11/2020 13:35   Troponin-I, Qt.  Latest Ref Range: 0.0 - 0.045 NG/ML 4.79 (HH) 4.83 (HH) 18.10 (HH) 27.80 (HH) 12.00 (HH)   CK-MB Index Latest Ref Range: 0.0 - 4.0 % 10.3 (H) 8.8 (H) 13.0 (H) 13.7 (H)    CK Latest Ref Range: 26 - 192 U/L 179 189 466 (H) 497 (H)    NT pro-BNP Latest Ref Range: 0 - 900 PG/ML     5,307 (H)     4/4/2020  7:17 PM - Fer, [facility] In     ModiFace  Ref Range  Units  Status    HCV RNA, PCR, QT  [id number]  High     IU/mL  Final    HCV RNA, PCR QT  6.51  High     log IU/mL Final    Comment:    Reference Range:          Recent Labs     12/14/20  0422 12/13/20  0429 12/12/20  0348   FIO2I 50 60 50   HCO3I 17.8* 18.2* 18.9*   PCO2I 27.5* 32.1* 34.9*   PHI 7.42 7.36 7.34*   PO2I 88 141* 114*     Telemetry:normal sinus rhythm    Imaging:  I have personally reviewed the patients radiographs and have reviewed the reports:  CT Results  (Last 48 hours)    None        CXR Results  (Last 48 hours)               12/14/20 1018  XR CHEST SNGL V Final result    Impression:  IMPRESSION:       1. Life support devices as described. No pneumothorax. 2.  Increased interstitial thickening, suggestive of underlying acute or chronic   interstitial lung disease. Similar to recent prior study. Not present on   02/11/18. Narrative:  EXAM:  Chest Portable. INDICATION:  Respiratory failure        COMPARISON:  12/12/20, 02/11/18. TECHNIQUE:  Portable AP chest study       FINDINGS:        - ET tube placement, distal tip at 4.2 cm above the chace.   - NG/OG tube placement, distal tip coursing below the inferior margin field of   view. - Increased peripheral interstitial thickening. Similar appearance compared to   recent prior study.   - No significant cardiac silhouette enlargement. Best practice :  Core measures:    Glycemic control  [facility]. Ventilated patients- aim to keep peak plateau pressure 99-54SJ H2O.   Sress ulcer prophylaxis  DVT prophylaxis       Pro Bundle Followed and Vent Bundle Followed, Vent Day 1        Critical Care Time:      Richi Spencer MD  Critical Care Medicine

## 2020-12-14 NOTE — PROGRESS NOTES
1915-Received report/assumed care of pt.    2000-Pt assessment done. Pt agitated at times, will kick legs and try to pull out tube. IV sites intact. 2231-Pt kicking legs and trying to pull out tube. Additional dose of Versed given. 0216-Pt agitated, kicking and banging hand on the bed. Versed given. 0521-Pt agitated, nurse about to give bath. Versed given. 0556-Pt really agitated from bath and pulled out right IV. Fentanyl given for pain and to help calm pt so new IV can be started. New IV started.

## 2020-12-14 NOTE — PROGRESS NOTES
[facility]      CARDIOLOGY PROGRESS NOTE  RECS:        1. Subacute MI-EKG showed Q waves in anterolateral lead with ST elevation, with suspicion of late presentation of anterior MI-s/p [facility] S/p ptca/stent to proximal/ mid LAD using ARELY. LVEDP 8 mmHg. Normal PASP pressure with normal PCWP. Moderate to severe LV dysfunction. Continue asa and Brilinta. 2. Acute respiratory failure-in the setting acute CHF- requiring mechanical ventilation. 3. Borderline BP- monitor will resume BB when BP stabilizes   4. Acute Systolic Congestive heart Failure-recent echo with EF% 35%-40-mildly dedecompensated- low urine output - added low dose Dobutamine for inotropric support   5. COPD, on home O2 4L NC   6. Hepatitis C ? -Per family member   9. DM2- medications per medical team   8. Dementia      Remains intubated-- continue dobutamine. Lasix 20 mg ivp daily-- CXR shows improvement- extubation per PCCM. Above plan discussed with PCCM. Cardiac cath 12/11/20  Patient presented to [address] [place] [address] with late presentation anterior wall MI.  EF 35-40%. Patient was initially managed medically-- however developed acute pulmonary edema requiring intubation. Also troponin level increasing consistent with ongoing ischemia. S/p ptca/stent to proximal/ mid LAD using ARELY. LVEDP 8 mmHg. Normal PASP pressure with normal PCWP. Moderate to severe LV dysfunction. Echo 12/10/20  · LV: Estimated LVEF is 35 - 40%. Visually measured ejection fraction. Normal cavity size and wall thickness. Moderately and segmentally reduced systolic function. Inconclusive left ventricular diastolic function. · AO: Mild aortic root dilatation; diameter is 3.8 cm.      ASSESSMENT:  Hospital Problems  Date Reviewed: 2/7/2018          Codes Class Noted POA    [facility] ICD-10-CM: I21.3  ICD-9-CM: 410.90  12/10/2020 Unknown                SUBJECTIVE:  Intubated and sedated OBJECTIVE:    VS:   Visit Vitals  BP (!) 143/70   Pulse 88   Temp 99.5 °F (37.5 °C)   Resp 25   Ht 5' 7\" (1.702 m)   Wt 74.6 kg (164 lb 8 oz)   SpO2 100%   Breastfeeding No   BMI 25.76 kg/m²         Intake/Output Summary (Last 24 hours) at 12/14/2020 1541  Last data filed at 12/14/2020 1300  Gross per 24 hour   Intake 644.37 ml   Output 680 ml   Net -35.63 ml     TELE: normal sinus rhythm    General: intubated and sedated   HENT: Normocephalic, atraumatic. Normal external eye. Neck :  no bruit, no JVD  Cardiac:  regular rate and rhythm, S1, S2 normal, no murmur, click, rub or gallop  Lungs: diminished breath sound bilaterally   Abdomen: Soft, nontender, no masses  Extremities:  No c/c/e, peripheral pulses present      Labs: Results:       Chemistry Recent Labs     12/14/20  0609 12/13/20  0640 12/12/20  1540 12/12/20  0620   * 179* 159* 159*    140 143 137   K 3.9 4.1 4.5 4.3    110 111 107   CO2 20* 20* 19* 19*   BUN 13 23* 23* 19*   CREA 0.46* 0.45* 0.68 0.56*   CA 8.9 8.9 8.6 9.0   AGAP 10 10 13 11   BUCR 28* 51* 34* 34*   AP  --   --   --  76   TP  --   --   --  7.2   ALB  --   --   --  2.4*   GLOB  --   --   --  4.8*   AGRAT  --   --   --  0.5*      CBC w/Diff Recent Labs     12/13/20  0640 12/12/20  0620   WBC 6.7 9.3   RBC 3.98* 4.41   HGB 10.6* 12.2   HCT 33.5* 36.7   * 605*   GRANS 77* 82*   LYMPH 11* 8*   EOS 3 1      Cardiac Enzymes No results for input(s): CPK, CKND1, NATHANIEL in the last 72 hours. No lab exists for component: CKRMB, TROIP   Coagulation No results for input(s): PTP, INR, APTT, INREXT, INREXT in the last 72 hours. Lipid Panel No results found for: CHOL, CHOLPOCT, CHOLX, CHLST, CHOLV, [id number], HDL, HDLP, LDL, LDLC, DLDLP, [id number], VLDLC, VLDL, TGLX, TRIGL, TRIGP, TGLPOCT, CHHD, CHHDX   BNP No results for input(s): BNPP in the last 72 hours.    Liver Enzymes Recent Labs     12/12/20  0620   TP 7.2   ALB 2.4*   AP 76      Thyroid Studies Lab Results   Component Value Date/Time    TSH 2.92 12/10/2020 11:07 AM              Mat Reyes MD

## 2020-12-14 NOTE — PROGRESS NOTES
Comprehensive Nutrition Assessment    Type and Reason for Visit: Initial, NPO/clear liquid    Nutrition Recommendations/Plan:   - Monitor ability to tolerate oral diet versus need for enteral nutrition support- if patient remains intubated. Recommend starting tube feeding of Glucerna 1.5 at 10 mL/hr and advance as tolerated by 10 mL q 8 hours to goal rate of 50 mL/hr with 100 mL q 4 hour water flushes (goal regimen to provide 1800 kcal, 99 gm protein, 1510 mL total free water, 100% RDIs). - IVF per MD.     Nutrition Assessment:  RRT for hypoxia, respiratory distress with pulmonary edema requiring urgent intubation on 12/11; s/p PCI with DEC- LAD stent placement on 12/11. Remains NPO with NGT and intubated, receiving Lasix and on IVF (LR at 50 mL/hr) with hyperglycemia on lantus, SSI. Malnutrition Assessment:  Malnutrition Status:  Insufficient data      Nutrition History and Allergies: Past medical history of DM, COPD, HTN, dementia, hepatitis C with c/o shortness of breath and altered mentation, diagnosed with STEMI and transferred from [facility] from [facility] for emergent cardiac cath. Weight history per chart: 180 lb (8/17/18), 179 lb (12/10/20). NKFA. Estimated Daily Nutrient Needs:  Energy (kcal): 1485-3138; Weight Used for Energy Requirements: Current(75 kg)  Protein (g): ; Weight Used for Protein Requirements: Current(1.2-2)  Fluid (ml/day): 0140-2856; Method Used for Fluid Requirements: 1 ml/kcal    Nutrition Related Findings:  Last BM PTA. NGT to intermittent suction- no documented output. Wounds:  None       Current Nutrition Therapies:  DIET NPO    Anthropometric Measures:  · Height:  5' 7\" (170.2 cm)  · Current Body Wt:  74.6 kg (164 lb 7.4 oz)   · Admission Body Wt:  179 lb 14.3 oz    · Usual Body Wt:  81.6 kg (180 lb)     · Ideal Body Wt:  135 lbs:  121.8 %   · BMI Category: Overweight (BMI 25.0-29. 9)       Nutrition Diagnosis:   · Inadequate oral intake related to cognitive or neurological impairment, impaired respiratory function as evidenced by NPO or clear liquid status due to medical condition, intubation      Nutrition Interventions:   Food and/or Nutrient Delivery: Continue NPO, IV fluid delivery(tube feeding as medically appropriate)  Nutrition Education and Counseling: Education not indicated  Coordination of Nutrition Care: Continue to monitor while inpatient    Goals:  Nutritional needs will be met through adequate oral intake or nutrition support within the next 7 days. Nutrition Monitoring and Evaluation:   Behavioral-Environmental Outcomes: None identified  Food/Nutrient Intake Outcomes: Diet advancement/tolerance, IVF intake  Physical Signs/Symptoms Outcomes: Biochemical data, GI status, Fluid status or edema, Hemodynamic status, Nutrition focused physical findings    Discharge Planning:     Too soon to determine     Electronically signed by Max Woodard RD, [address]  on 12/14/2020 at 4:05 PM    Contact: [phone]

## 2020-12-14 NOTE — PROGRESS NOTES
07:15  Received pt from Reggie Ford RN. Pt. Resting comfortably. 13:28  Spoke with Dr. Mota January, U/A ordered Pt. Urine cloudy, with orange crystal sediment adhering to means tube. 13:31  Spoke with BIJAN Posey cardiology - relayed Dr Avelino Morgan request to call him to discuss diuresis. 16:00  20 mg lasix iv administered. 18:08  Pt. Diuresed 700 ml urine from lasix administration. Dr. Mota January notified of positive urinalysis via phone. 19:00  Rocephin administered per order. Bedside and Verbal shift change report given to Prabha Chaney RN (oncoming nurse) by John Snowden RN (offgoing nurse). Report included the following information SBAR, Kardex, Procedure Summary, Intake/Output, MAR, Recent Results and Cardiac Rhythm NSR.

## 2020-12-14 NOTE — PROGRESS NOTES
Pt not medically ready for discharge. Pt is LTC at [address] [place] [address]. Current plan is to return when ready. CM continues to follow along for discharge planning.     Kari Adames RN BSN  Care Manager  [phone]

## 2020-12-14 NOTE — PROGRESS NOTES
07:15  Received pt from Justa Daugherty RN. Pt. In bed resting comfortably. Precedex 0.5 mcg/kg/hr infusing for sedation for ventilator. Right groin cathetar access site (arterial and venous) with scant drop of blood - other wise CDI. Site is soft without evidence of hematoma or bruising. NG to left nare inserted to 65 cm. Pro cathetar draining kartik urine. 11:15  Dobutamine 3 mcg/kg/min infusing  12:40  Pt. With episode of agitation, attempting to pull ET tube. HR increased into low 100s, Sp02 sats into mid 80s, RR 40s. Versed iv given (2 mg) to sedate. Increased precedex to 1 mcg/kg/hr. 13:20  Updated pt's son via phone  15:30  Versed drip started at 1 mg/hr for sedation, precedex at max of 1.5 mcg/kg/min. 18:20  Pt. With episode of agitation, increased versed drip to 2 mg/hr. 19:00  Bedside and Verbal shift change report given to Justa Daugherty RN (oncoming nurse) by Tye Millan RN (offgoing nurse). Report included the following information SBAR, Kardex, Procedure Summary, Intake/Output, MAR, Recent Results and Cardiac Rhythm NSR.

## 2020-12-15 ENCOUNTER — APPOINTMENT (OUTPATIENT)
Dept: GENERAL RADIOLOGY | Age: 65
DRG: 003 | End: 2020-12-15
Attending: INTERNAL MEDICINE
Payer: MEDICARE

## 2020-12-15 PROBLEM — I50.23 ACUTE ON CHRONIC SYSTOLIC CONGESTIVE HEART FAILURE (HCC): Status: ACTIVE | Noted: 2020-12-15

## 2020-12-15 LAB
ANION GAP SERPL CALC-SCNC: 8 MMOL/L (ref 3–18)
APTT PPP: 31.2 SEC (ref 23–36.4)
ARTERIAL PATENCY WRIST A: YES
BASE DEFICIT BLD-SCNC: 3 MMOL/L
BASE DEFICIT BLD-SCNC: 4 MMOL/L
BASE DEFICIT BLD-SCNC: 4 MMOL/L
BASOPHILS # BLD: 0 K/UL (ref 0–0.1)
BASOPHILS NFR BLD: 0 % (ref 0–2)
BDY SITE: ABNORMAL
BNP SERPL-MCNC: 2099 PG/ML (ref 0–900)
BUN SERPL-MCNC: 14 MG/DL (ref 7–18)
BUN/CREAT SERPL: 34 (ref 12–20)
CALCIUM SERPL-MCNC: 8.9 MG/DL (ref 8.5–10.1)
CHLORIDE SERPL-SCNC: 111 MMOL/L (ref 100–111)
CK MB CFR SERPL CALC: 1.1 % (ref 0–4)
CK MB SERPL-MCNC: 1.3 NG/ML (ref 5–25)
CK SERPL-CCNC: 120 U/L (ref 26–192)
CO2 SERPL-SCNC: 23 MMOL/L (ref 21–32)
CREAT SERPL-MCNC: 0.41 MG/DL (ref 0.6–1.3)
D DIMER PPP FEU-MCNC: 1.89 UG/ML(FEU)
DIFFERENTIAL METHOD BLD: ABNORMAL
EOSINOPHIL # BLD: 0.1 K/UL (ref 0–0.4)
EOSINOPHIL NFR BLD: 1 % (ref 0–5)
ERYTHROCYTE [DISTWIDTH] IN BLOOD BY AUTOMATED COUNT: 12.6 % (ref 11.6–14.5)
ERYTHROCYTE [DISTWIDTH] IN BLOOD BY AUTOMATED COUNT: 12.7 % (ref 11.6–14.5)
GAS FLOW.O2 O2 DELIVERY SYS: ABNORMAL L/MIN
GAS FLOW.O2 SETTING OXYMISER: 14 BPM
GLUCOSE BLD STRIP.AUTO-MCNC: 159 MG/DL (ref 70–110)
GLUCOSE BLD STRIP.AUTO-MCNC: 175 MG/DL (ref 70–110)
GLUCOSE SERPL-MCNC: 160 MG/DL (ref 74–99)
HCO3 BLD-SCNC: 20 MMOL/L (ref 22–26)
HCO3 BLD-SCNC: 20.3 MMOL/L (ref 22–26)
HCO3 BLD-SCNC: 21.2 MMOL/L (ref 22–26)
HCT VFR BLD AUTO: 34.5 % (ref 35–45)
HCT VFR BLD AUTO: 35.2 % (ref 35–45)
HGB BLD-MCNC: 11.2 G/DL (ref 12–16)
HGB BLD-MCNC: 11.4 G/DL (ref 12–16)
INSPIRATION.DURATION SETTING TIME VENT: 0.8 SEC
INSPIRATION.DURATION SETTING TIME VENT: 0.8 SEC
INSPIRATION.DURATION SETTING TIME VENT: 1 SEC
LACTATE SERPL-SCNC: 1.6 MMOL/L (ref 0.4–2)
LYMPHOCYTES # BLD: 0.6 K/UL (ref 0.9–3.6)
LYMPHOCYTES NFR BLD: 7 % (ref 21–52)
MAGNESIUM SERPL-MCNC: 1.6 MG/DL (ref 1.6–2.6)
MCH RBC QN AUTO: 26.8 PG (ref 24–34)
MCH RBC QN AUTO: 27.1 PG (ref 24–34)
MCHC RBC AUTO-ENTMCNC: 32.4 G/DL (ref 31–37)
MCHC RBC AUTO-ENTMCNC: 32.5 G/DL (ref 31–37)
MCV RBC AUTO: 82.6 FL (ref 74–97)
MCV RBC AUTO: 83.3 FL (ref 74–97)
MONOCYTES # BLD: 0.9 K/UL (ref 0.05–1.2)
MONOCYTES NFR BLD: 11 % (ref 3–10)
NEUTS SEG # BLD: 7.1 K/UL (ref 1.8–8)
NEUTS SEG NFR BLD: 81 % (ref 40–73)
O2/TOTAL GAS SETTING VFR VENT: 100 %
O2/TOTAL GAS SETTING VFR VENT: 100 %
O2/TOTAL GAS SETTING VFR VENT: 45 %
PCO2 BLD: 28.4 MMHG (ref 35–45)
PCO2 BLD: 30.5 MMHG (ref 35–45)
PCO2 BLD: 31 MMHG (ref 35–45)
PEEP RESPIRATORY: 10 CMH2O
PEEP RESPIRATORY: 14 CMH2O
PEEP RESPIRATORY: 5 CMH2O
PH BLD: 7.42 [PH] (ref 7.35–7.45)
PH BLD: 7.44 [PH] (ref 7.35–7.45)
PH BLD: 7.46 [PH] (ref 7.35–7.45)
PHOSPHATE SERPL-MCNC: 3 MG/DL (ref 2.5–4.9)
PLATELET # BLD AUTO: 505 K/UL (ref 135–420)
PLATELET # BLD AUTO: 574 K/UL (ref 135–420)
PMV BLD AUTO: 8.8 FL (ref 9.2–11.8)
PMV BLD AUTO: 9.2 FL (ref 9.2–11.8)
PO2 BLD: 55 MMHG (ref 80–100)
PO2 BLD: 58 MMHG (ref 80–100)
PO2 BLD: 64 MMHG (ref 80–100)
POTASSIUM SERPL-SCNC: 3.2 MMOL/L (ref 3.5–5.5)
POTASSIUM SERPL-SCNC: 5 MMOL/L (ref 3.5–5.5)
RBC # BLD AUTO: 4.14 M/UL (ref 4.2–5.3)
RBC # BLD AUTO: 4.26 M/UL (ref 4.2–5.3)
SAO2 % BLD: 90 % (ref 92–97)
SAO2 % BLD: 91 % (ref 92–97)
SAO2 % BLD: 93 % (ref 92–97)
SERVICE CMNT-IMP: ABNORMAL
SODIUM SERPL-SCNC: 142 MMOL/L (ref 136–145)
SPECIMEN TYPE: ABNORMAL
TOTAL RESP. RATE, ITRR: 17
TOTAL RESP. RATE, ITRR: 36
TOTAL RESP. RATE, ITRR: 43
TROPONIN I SERPL-MCNC: 1.25 NG/ML (ref 0–0.04)
VENTILATION MODE VENT: ABNORMAL
VOLUME CONTROL PLUS IVLCP: YES
VT SETTING VENT: 450 ML
WBC # BLD AUTO: 7.7 K/UL (ref 4.6–13.2)
WBC # BLD AUTO: 8.7 K/UL (ref 4.6–13.2)

## 2020-12-15 PROCEDURE — 74011000250 HC RX REV CODE- 250: Performed by: INTERNAL MEDICINE

## 2020-12-15 PROCEDURE — 36415 COLL VENOUS BLD VENIPUNCTURE: CPT

## 2020-12-15 PROCEDURE — 74011250636 HC RX REV CODE- 250/636: Performed by: NURSE PRACTITIONER

## 2020-12-15 PROCEDURE — 74011250636 HC RX REV CODE- 250/636: Performed by: PHYSICIAN ASSISTANT

## 2020-12-15 PROCEDURE — 74011000258 HC RX REV CODE- 258: Performed by: INTERNAL MEDICINE

## 2020-12-15 PROCEDURE — 84132 ASSAY OF SERUM POTASSIUM: CPT

## 2020-12-15 PROCEDURE — 74011000258 HC RX REV CODE- 258: Performed by: PHYSICIAN ASSISTANT

## 2020-12-15 PROCEDURE — 74011250637 HC RX REV CODE- 250/637: Performed by: NURSE PRACTITIONER

## 2020-12-15 PROCEDURE — 82553 CREATINE MB FRACTION: CPT

## 2020-12-15 PROCEDURE — 85025 COMPLETE CBC W/AUTO DIFF WBC: CPT

## 2020-12-15 PROCEDURE — 84100 ASSAY OF PHOSPHORUS: CPT

## 2020-12-15 PROCEDURE — 71045 X-RAY EXAM CHEST 1 VIEW: CPT

## 2020-12-15 PROCEDURE — 2709999900 HC NON-CHARGEABLE SUPPLY

## 2020-12-15 PROCEDURE — 85730 THROMBOPLASTIN TIME PARTIAL: CPT

## 2020-12-15 PROCEDURE — 74011636637 HC RX REV CODE- 636/637: Performed by: INTERNAL MEDICINE

## 2020-12-15 PROCEDURE — 94003 VENT MGMT INPAT SUBQ DAY: CPT

## 2020-12-15 PROCEDURE — 65620000000 HC RM CCU GENERAL

## 2020-12-15 PROCEDURE — 02HV33Z INSERTION OF INFUSION DEVICE INTO SUPERIOR VENA CAVA, PERCUTANEOUS APPROACH: ICD-10-PCS | Performed by: INTERNAL MEDICINE

## 2020-12-15 PROCEDURE — 36600 WITHDRAWAL OF ARTERIAL BLOOD: CPT

## 2020-12-15 PROCEDURE — 85027 COMPLETE CBC AUTOMATED: CPT

## 2020-12-15 PROCEDURE — 85379 FIBRIN DEGRADATION QUANT: CPT

## 2020-12-15 PROCEDURE — 83735 ASSAY OF MAGNESIUM: CPT

## 2020-12-15 PROCEDURE — 82962 GLUCOSE BLOOD TEST: CPT

## 2020-12-15 PROCEDURE — 83605 ASSAY OF LACTIC ACID: CPT

## 2020-12-15 PROCEDURE — 83880 ASSAY OF NATRIURETIC PEPTIDE: CPT

## 2020-12-15 PROCEDURE — 74011250636 HC RX REV CODE- 250/636: Performed by: INTERNAL MEDICINE

## 2020-12-15 PROCEDURE — 82803 BLOOD GASES ANY COMBINATION: CPT

## 2020-12-15 PROCEDURE — 93005 ELECTROCARDIOGRAM TRACING: CPT

## 2020-12-15 PROCEDURE — 99233 SBSQ HOSP IP/OBS HIGH 50: CPT | Performed by: INTERNAL MEDICINE

## 2020-12-15 PROCEDURE — 74011250637 HC RX REV CODE- 250/637: Performed by: INTERNAL MEDICINE

## 2020-12-15 RX ORDER — MAGNESIUM SULFATE HEPTAHYDRATE 40 MG/ML
4 INJECTION, SOLUTION INTRAVENOUS ONCE
Status: COMPLETED | OUTPATIENT
Start: 2020-12-15 | End: 2020-12-16

## 2020-12-15 RX ORDER — FUROSEMIDE 10 MG/ML
40 INJECTION INTRAMUSCULAR; INTRAVENOUS ONCE
Status: COMPLETED | OUTPATIENT
Start: 2020-12-15 | End: 2020-12-15

## 2020-12-15 RX ORDER — SPIRONOLACTONE 25 MG/1
25 TABLET ORAL DAILY
Status: DISCONTINUED | OUTPATIENT
Start: 2020-12-15 | End: 2021-01-14 | Stop reason: HOSPADM

## 2020-12-15 RX ORDER — SODIUM CHLORIDE 9 MG/ML
250 INJECTION, SOLUTION INTRAVENOUS CONTINUOUS
Status: DISCONTINUED | OUTPATIENT
Start: 2020-12-15 | End: 2020-12-16

## 2020-12-15 RX ORDER — HEPARIN SODIUM 10000 [USP'U]/100ML
18-36 INJECTION, SOLUTION INTRAVENOUS
Status: DISCONTINUED | OUTPATIENT
Start: 2020-12-16 | End: 2020-12-16

## 2020-12-15 RX ORDER — POTASSIUM CHLORIDE 20 MEQ/1
40 TABLET, EXTENDED RELEASE ORAL EVERY 4 HOURS
Status: DISCONTINUED | OUTPATIENT
Start: 2020-12-15 | End: 2020-12-15

## 2020-12-15 RX ORDER — FUROSEMIDE 10 MG/ML
20 INJECTION INTRAMUSCULAR; INTRAVENOUS EVERY 8 HOURS
Status: DISCONTINUED | OUTPATIENT
Start: 2020-12-15 | End: 2020-12-16

## 2020-12-15 RX ORDER — PROPOFOL 10 MG/ML
0-50 VIAL (ML) INTRAVENOUS
Status: DISCONTINUED | OUTPATIENT
Start: 2020-12-15 | End: 2020-12-15

## 2020-12-15 RX ORDER — VANCOMYCIN/0.9 % SOD CHLORIDE 1.5G/250ML
1500 PLASTIC BAG, INJECTION (ML) INTRAVENOUS ONCE
Status: COMPLETED | OUTPATIENT
Start: 2020-12-16 | End: 2020-12-16

## 2020-12-15 RX ADMIN — FENTANYL CITRATE 100 MCG: 50 INJECTION INTRAMUSCULAR; INTRAVENOUS at 10:23

## 2020-12-15 RX ADMIN — ATORVASTATIN CALCIUM 40 MG: 40 TABLET, FILM COATED ORAL at 21:03

## 2020-12-15 RX ADMIN — FENTANYL CITRATE 100 MCG: 50 INJECTION INTRAMUSCULAR; INTRAVENOUS at 14:15

## 2020-12-15 RX ADMIN — FAMOTIDINE: 10 INJECTION INTRAVENOUS at 20:24

## 2020-12-15 RX ADMIN — INSULIN LISPRO 3 UNITS: 100 INJECTION, SOLUTION INTRAVENOUS; SUBCUTANEOUS at 12:27

## 2020-12-15 RX ADMIN — FENTANYL CITRATE 100 MCG: 50 INJECTION INTRAMUSCULAR; INTRAVENOUS at 17:05

## 2020-12-15 RX ADMIN — FENTANYL CITRATE 150 MCG/HR: 0.05 INJECTION, SOLUTION INTRAMUSCULAR; INTRAVENOUS at 21:34

## 2020-12-15 RX ADMIN — FUROSEMIDE 20 MG: 10 INJECTION, SOLUTION INTRAMUSCULAR; INTRAVENOUS at 08:06

## 2020-12-15 RX ADMIN — FENTANYL CITRATE 100 MCG: 50 INJECTION INTRAMUSCULAR; INTRAVENOUS at 12:50

## 2020-12-15 RX ADMIN — PIPERACILLIN AND TAZOBACTAM 3.38 G: 3; .375 INJECTION, POWDER, LYOPHILIZED, FOR SOLUTION INTRAVENOUS at 23:42

## 2020-12-15 RX ADMIN — FENTANYL CITRATE 100 MCG: 50 INJECTION INTRAMUSCULAR; INTRAVENOUS at 19:39

## 2020-12-15 RX ADMIN — FUROSEMIDE 40 MG: 10 INJECTION, SOLUTION INTRAMUSCULAR; INTRAVENOUS at 20:24

## 2020-12-15 RX ADMIN — DOBUTAMINE HYDROCHLORIDE 3 MCG/KG/MIN: 200 INJECTION INTRAVENOUS at 00:10

## 2020-12-15 RX ADMIN — MAGNESIUM SULFATE IN WATER 4 G: 40 INJECTION, SOLUTION INTRAVENOUS at 21:45

## 2020-12-15 RX ADMIN — QUETIAPINE FUMARATE 50 MG: 25 TABLET ORAL at 17:06

## 2020-12-15 RX ADMIN — SODIUM CHLORIDE 10 ML: 9 INJECTION, SOLUTION INTRAMUSCULAR; INTRAVENOUS; SUBCUTANEOUS at 21:17

## 2020-12-15 RX ADMIN — ASPIRIN 81 MG CHEWABLE TABLET 81 MG: 81 TABLET CHEWABLE at 08:06

## 2020-12-15 RX ADMIN — FENTANYL CITRATE 100 MCG/HR: 0.05 INJECTION, SOLUTION INTRAMUSCULAR; INTRAVENOUS at 21:03

## 2020-12-15 RX ADMIN — FAMOTIDINE 20 MG: 10 INJECTION INTRAVENOUS at 08:05

## 2020-12-15 RX ADMIN — DIAZEPAM 5 MG: 5 TABLET ORAL at 21:03

## 2020-12-15 RX ADMIN — SODIUM CHLORIDE 250 ML: 900 INJECTION, SOLUTION INTRAVENOUS at 23:00

## 2020-12-15 RX ADMIN — DIAZEPAM 5 MG: 5 TABLET ORAL at 17:06

## 2020-12-15 RX ADMIN — INSULIN GLARGINE 7 UNITS: 100 INJECTION, SOLUTION SUBCUTANEOUS at 08:06

## 2020-12-15 RX ADMIN — MIDAZOLAM HYDROCHLORIDE 2 MG: 2 INJECTION, SOLUTION INTRAMUSCULAR; INTRAVENOUS at 19:12

## 2020-12-15 RX ADMIN — DEXMEDETOMIDINE HYDROCHLORIDE 1 MCG/KG/HR: 100 INJECTION, SOLUTION INTRAVENOUS at 05:56

## 2020-12-15 RX ADMIN — Medication 3 MG: at 21:03

## 2020-12-15 RX ADMIN — DIAZEPAM 5 MG: 5 TABLET ORAL at 08:05

## 2020-12-15 RX ADMIN — POLYETHYLENE GLYCOL 3350 17 G: 17 POWDER, FOR SOLUTION ORAL at 08:05

## 2020-12-15 RX ADMIN — POTASSIUM BICARBONATE 40 MEQ: 782 TABLET, EFFERVESCENT ORAL at 17:06

## 2020-12-15 RX ADMIN — TICAGRELOR 90 MG: 90 TABLET ORAL at 17:06

## 2020-12-15 RX ADMIN — FENTANYL CITRATE 100 MCG: 50 INJECTION INTRAMUSCULAR; INTRAVENOUS at 17:31

## 2020-12-15 RX ADMIN — FENTANYL CITRATE 100 MCG: 50 INJECTION INTRAMUSCULAR; INTRAVENOUS at 19:23

## 2020-12-15 RX ADMIN — POTASSIUM BICARBONATE 40 MEQ: 782 TABLET, EFFERVESCENT ORAL at 12:20

## 2020-12-15 RX ADMIN — DEXMEDETOMIDINE HYDROCHLORIDE 1 MCG/KG/HR: 100 INJECTION, SOLUTION INTRAVENOUS at 00:10

## 2020-12-15 RX ADMIN — INSULIN LISPRO 3 UNITS: 100 INJECTION, SOLUTION INTRAVENOUS; SUBCUTANEOUS at 05:57

## 2020-12-15 RX ADMIN — PROPOFOL 30 MCG/KG/MIN: 10 INJECTION, EMULSION INTRAVENOUS at 18:05

## 2020-12-15 RX ADMIN — CEFTRIAXONE SODIUM 1 G: 1 INJECTION, POWDER, FOR SOLUTION INTRAMUSCULAR; INTRAVENOUS at 19:23

## 2020-12-15 RX ADMIN — ENOXAPARIN SODIUM 40 MG: 40 INJECTION SUBCUTANEOUS at 10:23

## 2020-12-15 RX ADMIN — TICAGRELOR 90 MG: 90 TABLET ORAL at 08:05

## 2020-12-15 RX ADMIN — SODIUM CHLORIDE 10 ML: 9 INJECTION, SOLUTION INTRAMUSCULAR; INTRAVENOUS; SUBCUTANEOUS at 13:54

## 2020-12-15 RX ADMIN — QUETIAPINE FUMARATE 50 MG: 25 TABLET ORAL at 08:06

## 2020-12-15 RX ADMIN — SODIUM CHLORIDE 10 ML: 9 INJECTION, SOLUTION INTRAMUSCULAR; INTRAVENOUS; SUBCUTANEOUS at 07:03

## 2020-12-15 RX ADMIN — DEXMEDETOMIDINE HYDROCHLORIDE 0.5 MCG/KG/HR: 100 INJECTION, SOLUTION INTRAVENOUS at 20:23

## 2020-12-15 RX ADMIN — HEPARIN SODIUM AND DEXTROSE 18 UNITS/KG/HR: 10000; 5 INJECTION INTRAVENOUS at 23:45

## 2020-12-15 RX ADMIN — SPIRONOLACTONE 25 MG: 25 TABLET, FILM COATED ORAL at 12:20

## 2020-12-15 NOTE — PROGRESS NOTES
[facility]  Progress Note    Patient: Rajendra Gutierrez Age: 72 y.o. : 1955 MR#: [id number] SSN: [id number]  Date/Time: 12/15/2020     Subjective:   Patient seen intubated. Having temps to 100.2. Becomes agitated easily- sedation added. Assessment/Plan:   1. Acute on chronic hypoxic respiratory failure: Status post intubation by anesthesia on ,ventilator management per ICU team.   2. Acute flash pulmonary edema due to #3: Diuresis per Cardiology. 3. Acute systolic heart failure exacerbation, EF 35%, diuresis per cardiology, limiting factor is her low bp, now on dobutamine drip, with improvement in her bp. Her beta-blocker has been held given her low blood pressure. Continue aspirin and statin, ACE inhibitor and bb to be added once blood pressure stabilizes. 4. Acute MI with anterolateral STEMI and Q waves:S/p ptca/stent to proximal/ mid LAD using ARELY on DAPT on 20 . Beta-blocker held due to hypotension. 5. Low-grade fever , is also hypotensive. Likely hypotension due to her cardiac issues. Plan to continue to monitor for signs and symptoms of infection. Her urinalysis not c/w uti, low threshold to start broad-spectrum antibiotics given risk of complications due to infections  6. History of COPD on home oxygen: Patient currently on vent, BD as tolerated. Discussed with Dr. Gini Richards at bedside. 7. Diabetes mellitus type 2: Sugars are somewhat within acceptable limits on corrective insulin. Plan to cont corrective insulin. Plan to start Lantus if blood sugars are consistently elevated. .  8. Hypertension: BP lower side, will monitor off medications.   Now on dobutamine for continued hypotension   9. dementia: Unknown baseline, resume Aricept when able to tolerate p.o.  10. Full code      Visit Vitals  /61   Pulse (!) 110   Temp 100.2 °F (37.9 °C)   Resp (!) 41   Ht 5' 7\" (1.702 m)   Wt 74.6 kg (164 lb 8 oz)   SpO2 90%   Breastfeeding No BMI 25.76 kg/m²           Case discussed with:  []Patient  [x]Family  [x]Nursing  []Case Management  DVT Prophylaxis:  [x]Lovenox  []Hep iv  []SCDs  []Coumadin   []Eliquis/Xarelto     Objective:   VS:   Visit Vitals  /61   Pulse (!) 110   Temp 100.2 °F (37.9 °C)   Resp (!) 41   Ht 5' 7\" (1.702 m)   Wt 74.6 kg (164 lb 8 oz)   SpO2 90%   Breastfeeding No   BMI 25.76 kg/m²      Tmax/24hrs: Temp (24hrs), Av.3 °F (37.4 °C), Min:98.5 °F (36.9 °C), Max:100.2 °F (37.9 °C)  IOBRIEF    Intake/Output Summary (Last 24 hours) at 12/15/2020 1855  Last data filed at 12/15/2020 1800  Gross per 24 hour   Intake 654.84 ml   Output 955 ml   Net -300.16 ml       General: Somnolent but wakes up with tactile stimulus, intubated    Pulmonary: CTAB  Cardiovascular: Regular rate and Rhythm. GI:  Soft, Non distended, Non tender. + Bowel sounds. + menas   Extremities:  No edema. Psych: Not anxious or agitated.     Additional:    Medications:   Current Facility-Administered Medications   Medication Dose Route Frequency    spironolactone (ALDACTONE) tablet 25 mg  25 mg Oral DAILY    furosemide (LASIX) injection 20 mg  20 mg IntraVENous Q8H    [START ON ] multivit-folic acid-herbal 759 (WELLESSE PLUS) oral liquid 30 mL  30 mL Per NG tube DAILY    propofol (DIPRIVAN) 10 mg/mL infusion  0-50 mcg/kg/min IntraVENous TITRATE    diazePAM (VALIUM) tablet 5 mg  5 mg Oral TID    QUEtiapine (SEROquel) tablet 50 mg  50 mg Oral BID    insulin glargine (LANTUS) injection 7 Units  7 Units SubCUTAneous DAILY    cefTRIAXone (ROCEPHIN) 1 g in sterile water (preservative free) 10 mL IV syringe  1 g IntraVENous Q24H    DOBUTamine (DOBUTREX) 500 mg/250 mL (2,000 mcg/mL) infusion  3 mcg/kg/min IntraVENous CONTINUOUS    dexmedeTOMidine in 0.9 % NaCl (PRECEDEX) 400 mcg/100 mL (4 mcg/mL) infusion soln  0.1-1.5 mcg/kg/hr IntraVENous TITRATE    midazolam in normal saline (VERSED) 1 mg/mL infusion  1-10 mg/hr IntraVENous TITRATE    insulin lispro (HUMALOG) injection   SubCUTAneous Q6H    [Held by provider] carvediloL (COREG) tablet 3.125 mg  3.125 mg Oral Q12H    fentaNYL citrate (PF) injection  mcg   mcg IntraVENous Q2H PRN    sodium chloride (NS) flush 5-40 mL  5-40 mL IntraVENous PRN    ticagrelor (BRILINTA) tablet 90 mg  90 mg Per NG tube BID    enoxaparin (LOVENOX) injection 40 mg  40 mg SubCUTAneous Q24H    sodium chloride (NS) flush 5-40 mL  5-40 mL IntraVENous PRN    albuterol-ipratropium (DUO-NEB) 2.5 MG-0.5 MG/3 ML  3 mL Nebulization Q4H PRN    melatonin tablet 3 mg  3 mg Oral QHS    famotidine (PF) (PEPCID) 20 mg in 0.9% sodium chloride 10 mL injection  20 mg IntraVENous Q12H    midazolam (VERSED) injection 1-2 mg  1-2 mg IntraVENous Q1H PRN    sodium chloride (NS) flush 5-40 mL  5-40 mL IntraVENous Q8H    sodium chloride (NS) flush 5-40 mL  5-40 mL IntraVENous PRN    acetaminophen (TYLENOL) tablet 650 mg  650 mg Oral Q6H PRN    Or    acetaminophen (TYLENOL) suppository 650 mg  650 mg Rectal Q6H PRN    polyethylene glycol (MIRALAX) packet 17 g  17 g Oral DAILY PRN    promethazine (PHENERGAN) tablet 12.5 mg  12.5 mg Oral Q6H PRN    Or    ondansetron (ZOFRAN) injection 4 mg  4 mg IntraVENous Q6H PRN    glucose chewable tablet 16 g  4 Tab Oral PRN    glucagon (GLUCAGEN) injection 1 mg  1 mg IntraMUSCular PRN    dextrose (D50W) injection syrg 12.5-25 g  25-50 mL IntraVENous PRN    aspirin chewable tablet 81 mg  81 mg Oral DAILY    atorvastatin (LIPITOR) tablet 40 mg  40 mg Oral QHS       Labs:    Recent Results (from the past 12 hour(s))   CBC W/O DIFF    Collection Time: 12/15/20 11:28 AM   Result Value Ref Range    WBC 7.7 4.6 - 13.2 K/uL    RBC 4.26 4.20 - 5.30 M/uL    HGB 11.4 (L) 12.0 - 16.0 g/dL    HCT 35.2 35.0 - 45.0 %    MCV 82.6 74.0 - 97.0 FL    MCH 26.8 24.0 - 34.0 PG    MCHC 32.4 31.0 - 37.0 g/dL    RDW 12.6 11.6 - 14.5 %    PLATELET 616 (H) 503 - 420 K/uL    MPV 8.8 (L) 9.2 - 11.8 FL GLUCOSE, POC    Collection Time: 12/15/20 12:22 PM   Result Value Ref Range    Glucose (POC) 175 (H) 70 - 110 mg/dL   MAGNESIUM    Collection Time: 12/15/20  4:42 PM   Result Value Ref Range    Magnesium 1.6 1.6 - 2.6 mg/dL   PHOSPHORUS    Collection Time: 12/15/20  4:42 PM   Result Value Ref Range    Phosphorus 3.0 2.5 - 4.9 MG/DL       Signed By: Benjamin Hernandez MD     December 15, 2020      Disclaimer: Sections of this note are dictated using utilizing voice recognition software. Minor typographical errors may be present. If questions arise, please do not hesitate to contact me or call our department.

## 2020-12-15 NOTE — PROGRESS NOTES
[facility]  Pulmonary, Critical Care, and Sleep Medicine    Pulmonary Medicine Progress Note    Name: Hayes Santana MRN: [id number]  : 1955 Hospital: [facility]  Date: 12/15/2020       Subjective:  Pt stable overnight. Remains on dobutamine. Improved urine output. Patient Active Problem List   Diagnosis Code    Diverticula of colon K57.30    Sepsis ([address] A41.9    Sepsis secondary to UTI ([address] A41.9, N39.0    DM hyperosmolarity type II, uncontrolled ([address] E11.00, E11.65    HTN (hypertension) I10    Febrile illness, acute R50.9    Gram-negative bacteremia R78.81    Diabetic neuropathy ([address] E11.40    Osteoarthritis of both knees M17.0    Acidosis E87.2    Respiratory failure ([address] J96.90    Shock ([address] R57.9    Hyperosmolar (nonketotic) coma ([address] E11.01    Acute UTI N39.0    Macrocytic anemia D53.9    STEMI (ST elevation myocardial infarction) (HCC) I21.3    Acute on chronic systolic congestive heart failure (HCC) I50.23       Assessment:  · CAD: S/P PCI with DEC- LAD 20  · Respiratory Failure- hypoxic- pulmonary edema, CHF- currently intubated  · Emphysema by report- no inhalers noted on home med list  · Demential- on Aricept  · Hypokalemia- replacing  · DM: on Lantus as OP  · Hepatitic C infection    Impression/Plan:  - Plan for SBT today, hold continuous sedation  - Safe extubation may be difficult given the patient's severe underlying dementia  - Diuresis and dobutamine per cardiology  - Okay to start TFs    Full Code       FiO2 to keep SpO2 >=92%, HOB >=30 degree, aspiration precautions, aggressive pulmonary toileting, incentive spirometry. Other issues management by primary team and respective consultants. Events and notes from last 24 hours reviewed. Discussed with patient and family, answered all questions to their satisfaction. Care plan discussed with nursing.      Labs and images personally seen and available reports reviewed  All current medicines are reviewed       Medications- Current:  Current Facility-Administered Medications   Medication Dose Route Frequency    spironolactone (ALDACTONE) tablet 25 mg  25 mg Oral DAILY    furosemide (LASIX) injection 20 mg  20 mg IntraVENous Q8H    potassium bicarb-citric acid (EFFER-K) tablet 40 mEq  40 mEq Oral Q4H    [START ON 48/22/6331] multivit-folic acid-herbal 055 (WELLESSE PLUS) oral liquid 30 mL  30 mL Per NG tube DAILY    diazePAM (VALIUM) tablet 5 mg  5 mg Oral TID    QUEtiapine (SEROquel) tablet 50 mg  50 mg Oral BID    insulin glargine (LANTUS) injection 7 Units  7 Units SubCUTAneous DAILY    cefTRIAXone (ROCEPHIN) 1 g in sterile water (preservative free) 10 mL IV syringe  1 g IntraVENous Q24H    DOBUTamine (DOBUTREX) 500 mg/250 mL (2,000 mcg/mL) infusion  3 mcg/kg/min IntraVENous CONTINUOUS    dexmedeTOMidine in 0.9 % NaCl (PRECEDEX) 400 mcg/100 mL (4 mcg/mL) infusion soln  0.1-1.5 mcg/kg/hr IntraVENous TITRATE    midazolam in normal saline (VERSED) 1 mg/mL infusion  1-10 mg/hr IntraVENous TITRATE    insulin lispro (HUMALOG) injection   SubCUTAneous Q6H    [Held by provider] carvediloL (COREG) tablet 3.125 mg  3.125 mg Oral Q12H    fentaNYL citrate (PF) injection  mcg   mcg IntraVENous Q2H PRN    sodium chloride (NS) flush 5-40 mL  5-40 mL IntraVENous PRN    ticagrelor (BRILINTA) tablet 90 mg  90 mg Per NG tube BID    enoxaparin (LOVENOX) injection 40 mg  40 mg SubCUTAneous Q24H    sodium chloride (NS) flush 5-40 mL  5-40 mL IntraVENous PRN    albuterol-ipratropium (DUO-NEB) 2.5 MG-0.5 MG/3 ML  3 mL Nebulization Q4H PRN    melatonin tablet 3 mg  3 mg Oral QHS    famotidine (PF) (PEPCID) 20 mg in 0.9% sodium chloride 10 mL injection  20 mg IntraVENous Q12H    midazolam (VERSED) injection 1-2 mg  1-2 mg IntraVENous Q1H PRN    sodium chloride (NS) flush 5-40 mL  5-40 mL IntraVENous Q8H    sodium chloride (NS) flush 5-40 mL  5-40 mL IntraVENous PRN    acetaminophen (TYLENOL) tablet 650 mg  650 mg Oral Q6H PRN    Or    acetaminophen (TYLENOL) suppository 650 mg  650 mg Rectal Q6H PRN    polyethylene glycol (MIRALAX) packet 17 g  17 g Oral DAILY PRN    promethazine (PHENERGAN) tablet 12.5 mg  12.5 mg Oral Q6H PRN    Or    ondansetron (ZOFRAN) injection 4 mg  4 mg IntraVENous Q6H PRN    glucose chewable tablet 16 g  4 Tab Oral PRN    glucagon (GLUCAGEN) injection 1 mg  1 mg IntraMUSCular PRN    dextrose (D50W) injection syrg 12.5-25 g  25-50 mL IntraVENous PRN    aspirin chewable tablet 81 mg  81 mg Oral DAILY    atorvastatin (LIPITOR) tablet 40 mg  40 mg Oral QHS       Objective:  Vital Signs:    Visit Vitals  BP 93/61   Pulse (!) 101   Temp 98.5 °F (36.9 °C)   Resp (!) 39   Ht 5' 7\" (1.702 m)   Wt 74.6 kg (164 lb 8 oz)   SpO2 91%   Breastfeeding No   BMI 25.76 kg/m²      O2 Device: Ventilator  O2 Flow Rate (L/min): 4 l/min  Temp (24hrs), Av.3 °F (37.4 °C), Min:98.5 °F (36.9 °C), Max:100.1 °F (37.8 °C)      Intake/Output:   Last shift:      12/15 07 - 12/15 1900  In: 132.2 [I.V.:72.2]  Out: 425 [Urine:425]  Last 3 shifts: 1901 - 12/15 0700  In: 1590.9 [I.V.:1290.9]  Out: 1875 [Urine:1875]    Intake/Output Summary (Last 24 hours) at 12/15/2020 1512  Last data filed at 12/15/2020 1100  Gross per 24 hour   Intake 861.84 ml   Output 1675 ml   Net -813.16 ml       Physical Exam:     General/Neurology: Alert, Awake  Head:   Normocephalic, without obvious abnormality  Eye:   PERRL, EOM intact  Oral:  Mucus membranes moist  Lung:   B/l air entry fair. No rales. No rhonchi. No wheezing  Heart:   S1 S2 present  Abdomen:  Soft, non tender, BS+nt   Extremities:  No pedal edema.    Skin:   Dry, intact  Data:      Recent Results (from the past 24 hour(s))   GLUCOSE, POC    Collection Time: 20  5:39 PM   Result Value Ref Range    Glucose (POC) 164 (H) 70 - 110 mg/dL   GLUCOSE, POC    Collection Time: 20 11:19 PM   Result Value Ref Range Glucose (POC) 144 (H) 70 - 110 mg/dL   POC G3    Collection Time: 12/15/20  4:09 AM   Result Value Ref Range    Device: VENT      FIO2 (POC) 45 %    pH (POC) 7.44 7.35 - 7.45      pCO2 (POC) 31.0 (L) 35.0 - 45.0 MMHG    pO2 (POC) 64 (L) 80 - 100 MMHG    HCO3 (POC) 21.2 (L) 22 - 26 MMOL/L    sO2 (POC) 93 92 - 97 %    Base deficit (POC) 3 mmol/L    Mode ASSIST CONTROL      Tidal volume 450 ml    Set Rate 14 bpm    PEEP/CPAP (POC) 5 cmH2O    Allens test (POC) YES      Inspiratory Time 1.00 sec    Total resp.  rate 17      Site RIGHT RADIAL      Specimen type (POC) ARTERIAL      Performed by Blanca Enrique     Volume control plus YES     GLUCOSE, POC    Collection Time: 12/15/20  5:54 AM   Result Value Ref Range    Glucose (POC) 159 (H) 70 - 793 mg/dL   METABOLIC PANEL, BASIC    Collection Time: 12/15/20  6:20 AM   Result Value Ref Range    Sodium 142 136 - 145 mmol/L    Potassium 3.2 (L) 3.5 - 5.5 mmol/L    Chloride 111 100 - 111 mmol/L    CO2 23 21 - 32 mmol/L    Anion gap 8 3.0 - 18 mmol/L    Glucose 160 (H) 74 - 99 mg/dL    BUN 14 7.0 - 18 MG/DL    Creatinine 0.41 (L) 0.6 - 1.3 MG/DL    BUN/Creatinine ratio 34 (H) 12 - 20      GFR est AA >60 >60 ml/min/1.73m2    GFR est non-AA >60 >60 ml/min/1.73m2    Calcium 8.9 8.5 - 10.1 MG/DL   CBC W/O DIFF    Collection Time: 12/15/20 11:28 AM   Result Value Ref Range    WBC 7.7 4.6 - 13.2 K/uL    RBC 4.26 4.20 - 5.30 M/uL    HGB 11.4 (L) 12.0 - 16.0 g/dL    HCT 35.2 35.0 - 45.0 %    MCV 82.6 74.0 - 97.0 FL    MCH 26.8 24.0 - 34.0 PG    MCHC 32.4 31.0 - 37.0 g/dL    RDW 12.6 11.6 - 14.5 %    PLATELET 048 (H) 633 - 420 K/uL    MPV 8.8 (L) 9.2 - 11.8 FL   GLUCOSE, POC    Collection Time: 12/15/20 12:22 PM   Result Value Ref Range    Glucose (POC) 175 (H) 70 - 110 mg/dL         Chemistry   Recent Labs     12/15/20  0620 12/14/20  0609 12/13/20  0640   * 222* 179*    141 140   K 3.2* 3.9 4.1    111 110   CO2 23 20* 20*   BUN 14 13 23*   CREA 0.41* 0.46* 0.45*   CA 8.9 8.9 8.9   AGAP 8 10 10   BUCR 34* 28* 51*       CBC w/Diff   Recent Labs     12/15/20  1128 12/13/20  0640   WBC 7.7 6.7   RBC 4.26 3.98*   HGB 11.4* 10.6*   HCT 35.2 33.5*   * 481*   GRANS  --  77*   LYMPH  --  11*   EOS  --  3       ABG   Recent Labs     12/15/20  0409 12/14/20  0422 12/13/20  0429   PHI 7.44 7.42 7.36   PCO2I 31.0* 27.5* 32.1*   PO2I 64* 88 141*   HCO3I 21.2* 17.8* 18.2*   FIO2I 45 50 60       Micro    Recent Labs     12/14/20  1400   CULT GRAM NEGATIVE RODS*     Recent Labs     12/14/20  1400   CULT GRAM NEGATIVE RODS*       CT (Most Recent)   Results from Hospital Encounter encounter on 04/02/19   CT HEAD WO CONT    Narrative EXAM: CT head    CLINICAL INDICATION/HISTORY: Follow-up abnormal marrow enhancing lesion left  sphenoid wing. COMPARISON: Brain MRI 8/17/2018, CT head 2/8/2018. TECHNIQUE: Axial CT imaging of the head  was obtained from skull base to vertex  without intravenous contrast. Coronal and sagittal reconstructions were  obtained. One or more dose reduction techniques were used on this CT: automated  exposure control, adjustment of the mAs and/or kVp according to patient's size,  and iterative reconstruction techniques. The specific techniques utilized on  this CT exam have been documented in the patient's electronic medical record. Digital imaging and communications and medicine (DICOM) format image data are  available to nonaffiliated external healthcare facilities or entities on a  secure, media free, reciprocally searchable basis with patient authorization for  at least a 12 month period after this study. _______________    FINDINGS:    BRAIN AND POSTERIOR FOSSA: There is mild diffuse central and cortical volume  loss. There is no intracranial hemorrhage, mass effect, or shift of midline  structures. Mild bilateral deep white matter hypodensity with no new cortical  hypodensity.     EXTRA-AXIAL SPACES AND MENINGES: There are no abnormal extra-axial fluid  collections. CALVARIUM: Again noted is a mixed lytic and sclerotic process involving the left  sphenoid including greater wing with some associated expansion and some  associated cortical sclerotic thickening. This does not appear significantly  change since prior CT with no new bony destructive change or associated  extraosseous soft tissue component. No new lytic or sclerotic lesion. SINUSES: The visualized portions of the paranasal sinuses and mastoid air cells  are well aerated. OTHER: None.    _______________      Impression IMPRESSION:    1. No acute intracranial abnormalities are identified. No CT evidence to  suggest acute intracranial hematoma, cortical infarct, or mass effect/mass  lesion. 2. Stable mild diffuse volume loss and mild deep white matter hypodensity likely  chronic microvascular ischemic changes. 3. 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 XR (Most Recent). CXR reviewed by me and compared with previous CXR   Results from Hospital Encounter encounter on 12/10/20   XR CHEST PORT    Narrative INDICATION: Congestive heart failure. Intubated patient. TECHNIQUE: Portable chest radiograph    COMPARISON: 14 December 2020, 12 December 2020    FINDINGS:     Endotracheal tube with tip 2.4 cm above the level of the chace. Enteric tube passes below the diaphragm without visualization the tip. Unchanged mixed interstitial/alveolar opacities, right greater than left. Suspect infectious or inflammatory process. No large pleural effusion or pneumothorax. Heart size is unchanged. Impression IMPRESSION:     No significant interval change.        See my orders for details     Total care time exclusive of procedures with complex decision making, coordination of care and counseling patient performed and > 50% time spent in face to face evaluation as mentioned above.     Meera Suggs MD  Critical Care Medicine

## 2020-12-15 NOTE — PROGRESS NOTES
[facility]      CARDIOLOGY PROGRESS NOTE  RECS:        1. Subacute MI-EKG showed Q waves in anterolateral lead with ST elevation, with suspicion of late presentation of anterior MI-s/p [facility] S/p ptca/stent to proximal/ mid LAD using ARELY. LVEDP 8 mmHg. Normal PASP pressure with normal PCWP. Moderate to severe LV dysfunction. Continue asa and Brilinta. 2. Acute respiratory failure-in the setting acute CHF- requiring mechanical ventilation. 3. Borderline BP- monitor will resume BB when BP stabilizes. Since I am increasing diuresis today, will start beta-blockers tomorrow depending on the blood pressure. 4. Acute Systolic Congestive heart Failure-recent echo with EF% 35%-40-mildly dedecompensated-continue IV dobutamine. Increase diuresis. Follow-up CXR and electrolytes. Replace potassium. 5. COPD, on home O2 4L NC   6. Hepatitis C ? -Per family member   9. DM2- medications per medical team   8. Dementia          Cardiac cath 12/11/20  Patient presented to [address] [place] [address] with late presentation anterior wall MI.  EF 35-40%. Patient was initially managed medically-- however developed acute pulmonary edema requiring intubation. Also troponin level increasing consistent with ongoing ischemia. S/p ptca/stent to proximal/ mid LAD using ARELY. LVEDP 8 mmHg. Normal PASP pressure with normal PCWP. Moderate to severe LV dysfunction. Echo 12/10/20  · LV: Estimated LVEF is 35 - 40%. Visually measured ejection fraction. Normal cavity size and wall thickness. Moderately and segmentally reduced systolic function. Inconclusive left ventricular diastolic function. · AO: Mild aortic root dilatation; diameter is 3.8 cm.      ASSESSMENT:  Hospital Problems  Date Reviewed: 2/7/2018          Codes Class Noted POA    [facility] ICD-10-CM: I21.3  ICD-9-CM: 410.90  12/10/2020 Unknown                SUBJECTIVE:  Intubated and sedated     OBJECTIVE:    VS: Visit Vitals  /71   Pulse 96   Temp 98.8 °F (37.1 °C)   Resp (!) 33   Ht 5' 7\" (1.702 m)   Wt 74.6 kg (164 lb 8 oz)   SpO2 99%   Breastfeeding No   BMI 25.76 kg/m²         Intake/Output Summary (Last 24 hours) at 12/15/2020 1146  Last data filed at 12/15/2020 1100  Gross per 24 hour   Intake 1012.64 ml   Output 1750 ml   Net -737.36 ml     TELE: normal sinus rhythm    General: intubated and sedated   HENT: Normocephalic, atraumatic. Normal external eye. Neck :  no bruit, no JVD  Cardiac:  regular rate and rhythm, S1, S2 normal, no murmur, click, rub or gallop  Lungs: diminished breath sound bilaterally   Abdomen: Soft, nontender, no masses  Extremities:  No c/c/e, peripheral pulses present      Labs: Results:       Chemistry Recent Labs     12/15/20  0620 12/14/20  0609 12/13/20  0640   * 222* 179*    141 140   K 3.2* 3.9 4.1    111 110   CO2 23 20* 20*   BUN 14 13 23*   CREA 0.41* 0.46* 0.45*   CA 8.9 8.9 8.9   AGAP 8 10 10   BUCR 34* 28* 51*      CBC w/Diff Recent Labs     12/13/20  0640   WBC 6.7   RBC 3.98*   HGB 10.6*   HCT 33.5*   *   GRANS 77*   LYMPH 11*   EOS 3      Cardiac Enzymes No results for input(s): CPK, CKND1, NATHANIEL in the last 72 hours. No lab exists for component: CKRMB, TROIP   Coagulation No results for input(s): PTP, INR, APTT, INREXT, INREXT in the last 72 hours. Lipid Panel No results found for: CHOL, CHOLPOCT, CHOLX, CHLST, CHOLV, 427095, HDL, HDLP, LDL, LDLC, DLDLP, 562537, VLDLC, VLDL, TGLX, TRIGL, TRIGP, TGLPOCT, CHHD, CHHDX   BNP No results for input(s): BNPP in the last 72 hours. Liver Enzymes No results for input(s): TP, ALB, TBIL, AP in the last 72 hours.     No lab exists for component: SGOT, GPT, DBIL   Thyroid Studies Lab Results   Component Value Date/Time    TSH 2.92 12/10/2020 11:07 AM              Maite Srivastava MD

## 2020-12-15 NOTE — PROGRESS NOTES
Nutrition Note    Patient remains intubated with NGT clamped following medication administration, previously to intermittent suction with dark bilious output and hypoactive bowel sounds, no BM since admission; receiving miralax daily prn, IVF (LR at 50 mL/hr) on lasix and aldactone and replaced with 40 mEq K bicarb x 2 doses. Plan to start feeds, stop IVF and monitor electrolytes due to concern for refeeding syndrome per discussion with MD.     Recommendations/Plan  - Initiate tube feeding of Glucerna 1.5 at 10 mL/hr and advance as tolerated by 10 mL q 12 hours to goal rate of 50 mL/hr with daily multivitamin and 100 mL q 4 hour water flushes (goal regimen to provide 1800 kcal, 99 gm protein, 910 mL free water, 100% RDIs). - Monitor and replace electrolytes as needed. Check Mg and Phos. - Continue bowel regimen. - Discontinue IVF.     Electronically signed by Zach Moe RD, [address]  on 12/15/2020 at 3:00 PM    Contact: [phone]

## 2020-12-15 NOTE — PROGRESS NOTES
CM spoke with Becky Carvalho at [facility]. Updated clnicals have been sent to them in [place].     Caroline Brooks RN BSN  Care Manager  [phone]

## 2020-12-15 NOTE — DIABETES MGMT
Glycemic Control Plan of Care    T2DM with A1c of 8.3% (12/10/2020)  12/11: Patient intub and sedated. Noted patient came from [facility] with history of dementia. Home diabetes medications: Unverified  Lantus insulin 22 units daily    Patient was admitted to [facility] from [facility] on 12/10 with report of shortness of breath and altered mental state. Noted the following assessment:  STEMI  Status post PTCA/stent on 12/11  Acute respiratory failure, intub/vent  Acute flash pulmonary edema  Acute systolic heart failure exacerbation  Rapid response 12/11    Glycemic recommendation(s): continue on current basal lantus and sliding scale lispro    Glycemic assessment:    NPO. Noted order to start NG TF Glucerna 1.5  POC BG range on 12/14: 219, 237  POC BG 12/15 at time of review: 159, 175  Lab FBG 12/15: 160        Current A1C: 8.3% (12/10/2020) which is equivalent to estimated average blood glucose of 192 mg/dL during the past 2-3 months    Current hospital diabetes medications:  Basal lantus insulin 7 units daily  Correctional lispro insulin ACHS. Very resistant dose    Total daily dose insulin requirement previous day: 12/14:  Lantus: 7 units  Lispro: 13 units  TDD insulin: 20 units    Diet: NPO and noted order for TF    Goals:  Blood glucose will be within target range of  mg/dL by 12/18/2020    Education: 12/11: Intubated, sedated.  Noted patient came from [facility] with history of dementia.  ___  Refer to Diabetes Education Record   ___  Education not indicated at this time    Kong Steiner RN [place] [facility]  Pager: [phone]

## 2020-12-15 NOTE — PROGRESS NOTES
Physician Progress Note      PATIENTHogary Paul  [facility] #:                  [id number]  :                       1955  ADMIT DATE:       12/10/2020 12:15 PM  DISCH DATE:  RESPONDING  PROVIDER #:        Iman DUNN DO          QUERY TEXT:    Patient admitted with STEMI. Pt noted to have hypotension on dobutamine drip. If possible, please document in the progress notes and discharge summary if you are evaluating and/or treating any of the following: The medical record reflects the following:  Risk Factors: Acute MI; acute on chronic systolic heart failure  Clinical Indicators:  Medical note: Now on dobutamine for continued hypotension    Medical note: BP lower side, will monitor off medications. Consider dobutamine for continued sustained hypotension  Treatment: Dobutamine gtt; ICU management    Thank you,  Griselda Hamilton RN/CCDS  [phone]  Options provided:  -- Cardiogenic Shock  -- Hypotension without Shock  -- Other - I will add my own diagnosis  -- Disagree - Not applicable / Not valid  -- Disagree - Clinically unable to determine / Unknown  -- Refer to Clinical Documentation Reviewer    PROVIDER RESPONSE TEXT:    This patient has Cardiogenic Shock.     Query created by: Fe Linda on 2020 1:12 PM      Electronically signed by:  Abeba Vargas DO 12/15/2020 9:11 AM

## 2020-12-15 NOTE — PROGRESS NOTES
Patient with worsening tachypnea. RT increased FiO2 from 40% to 60%. PEEP increased to 8. SpO2 89-91%. Fentanyl given, resulting to lower respiratory rate, improvement in saturation. Rere Victor MD notified. Orders received for propofol to start at 30mcg/kg/kg/min.

## 2020-12-16 ENCOUNTER — APPOINTMENT (OUTPATIENT)
Dept: CT IMAGING | Age: 65
DRG: 003 | End: 2020-12-16
Attending: INTERNAL MEDICINE
Payer: MEDICARE

## 2020-12-16 PROBLEM — E43 SEVERE PROTEIN-CALORIE MALNUTRITION (HCC): Status: ACTIVE | Noted: 2020-12-16

## 2020-12-16 LAB
ANION GAP SERPL CALC-SCNC: 6 MMOL/L (ref 3–18)
ANION GAP SERPL CALC-SCNC: 9 MMOL/L (ref 3–18)
APTT PPP: 41.7 SEC (ref 23–36.4)
APTT PPP: 52.7 SEC (ref 23–36.4)
APTT PPP: >180 SEC (ref 23–36.4)
ARTERIAL PATENCY WRIST A: YES
ATRIAL RATE: 138 BPM
BASE EXCESS BLD CALC-SCNC: 1 MMOL/L
BASOPHILS # BLD: 0 K/UL (ref 0–0.1)
BASOPHILS NFR BLD: 0 % (ref 0–2)
BDY SITE: ABNORMAL
BUN SERPL-MCNC: 21 MG/DL (ref 7–18)
BUN SERPL-MCNC: 22 MG/DL (ref 7–18)
BUN/CREAT SERPL: 25 (ref 12–20)
BUN/CREAT SERPL: 27 (ref 12–20)
CALCIUM SERPL-MCNC: 8.3 MG/DL (ref 8.5–10.1)
CALCIUM SERPL-MCNC: 8.4 MG/DL (ref 8.5–10.1)
CALCULATED P AXIS, ECG09: -68 DEGREES
CALCULATED R AXIS, ECG10: 67 DEGREES
CALCULATED T AXIS, ECG11: 29 DEGREES
CHLORIDE SERPL-SCNC: 108 MMOL/L (ref 100–111)
CHLORIDE SERPL-SCNC: 108 MMOL/L (ref 100–111)
CO2 SERPL-SCNC: 26 MMOL/L (ref 21–32)
CO2 SERPL-SCNC: 28 MMOL/L (ref 21–32)
CREAT SERPL-MCNC: 0.79 MG/DL (ref 0.6–1.3)
CREAT SERPL-MCNC: 0.88 MG/DL (ref 0.6–1.3)
DIAGNOSIS, 93000: NORMAL
DIFFERENTIAL METHOD BLD: ABNORMAL
EOSINOPHIL # BLD: 0.4 K/UL (ref 0–0.4)
EOSINOPHIL NFR BLD: 6 % (ref 0–5)
ERYTHROCYTE [DISTWIDTH] IN BLOOD BY AUTOMATED COUNT: 13.1 % (ref 11.6–14.5)
GAS FLOW.O2 O2 DELIVERY SYS: ABNORMAL L/MIN
GAS FLOW.O2 SETTING OXYMISER: 14 BPM
GLUCOSE BLD STRIP.AUTO-MCNC: 146 MG/DL (ref 70–110)
GLUCOSE BLD STRIP.AUTO-MCNC: 149 MG/DL (ref 70–110)
GLUCOSE BLD STRIP.AUTO-MCNC: 175 MG/DL (ref 70–110)
GLUCOSE BLD STRIP.AUTO-MCNC: 182 MG/DL (ref 70–110)
GLUCOSE BLD STRIP.AUTO-MCNC: 199 MG/DL (ref 70–110)
GLUCOSE SERPL-MCNC: 181 MG/DL (ref 74–99)
GLUCOSE SERPL-MCNC: 186 MG/DL (ref 74–99)
HCO3 BLD-SCNC: 26.4 MMOL/L (ref 22–26)
HCT VFR BLD AUTO: 31.1 % (ref 35–45)
HGB BLD-MCNC: 9.8 G/DL (ref 12–16)
INSPIRATION.DURATION SETTING TIME VENT: 0.8 SEC
LYMPHOCYTES # BLD: 0.7 K/UL (ref 0.9–3.6)
LYMPHOCYTES NFR BLD: 10 % (ref 21–52)
MAGNESIUM SERPL-MCNC: 2.9 MG/DL (ref 1.6–2.6)
MAGNESIUM SERPL-MCNC: 3.2 MG/DL (ref 1.6–2.6)
MCH RBC QN AUTO: 26.3 PG (ref 24–34)
MCHC RBC AUTO-ENTMCNC: 31.5 G/DL (ref 31–37)
MCV RBC AUTO: 83.6 FL (ref 74–97)
MONOCYTES # BLD: 0.8 K/UL (ref 0.05–1.2)
MONOCYTES NFR BLD: 13 % (ref 3–10)
NEUTS SEG # BLD: 4.7 K/UL (ref 1.8–8)
NEUTS SEG NFR BLD: 71 % (ref 40–73)
O2/TOTAL GAS SETTING VFR VENT: 100 %
P-R INTERVAL, ECG05: 124 MS
PCO2 BLD: 44.9 MMHG (ref 35–45)
PEEP RESPIRATORY: 12 CMH2O
PH BLD: 7.38 [PH] (ref 7.35–7.45)
PHOSPHATE SERPL-MCNC: 3.4 MG/DL (ref 2.5–4.9)
PLATELET # BLD AUTO: 493 K/UL (ref 135–420)
PMV BLD AUTO: 9.4 FL (ref 9.2–11.8)
PO2 BLD: 284 MMHG (ref 80–100)
POTASSIUM SERPL-SCNC: 4.3 MMOL/L (ref 3.5–5.5)
POTASSIUM SERPL-SCNC: 4.4 MMOL/L (ref 3.5–5.5)
Q-T INTERVAL, ECG07: 292 MS
QRS DURATION, ECG06: 86 MS
QTC CALCULATION (BEZET), ECG08: 442 MS
RBC # BLD AUTO: 3.72 M/UL (ref 4.2–5.3)
SAO2 % BLD: 100 % (ref 92–97)
SERVICE CMNT-IMP: ABNORMAL
SODIUM SERPL-SCNC: 142 MMOL/L (ref 136–145)
SODIUM SERPL-SCNC: 143 MMOL/L (ref 136–145)
SPECIMEN TYPE: ABNORMAL
TOTAL RESP. RATE, ITRR: 20
VENTILATION MODE VENT: ABNORMAL
VENTRICULAR RATE, ECG03: 138 BPM
VOLUME CONTROL PLUS IVLCP: YES
VT SETTING VENT: 450 ML
WBC # BLD AUTO: 6.6 K/UL (ref 4.6–13.2)

## 2020-12-16 PROCEDURE — 71275 CT ANGIOGRAPHY CHEST: CPT

## 2020-12-16 PROCEDURE — 94003 VENT MGMT INPAT SUBQ DAY: CPT

## 2020-12-16 PROCEDURE — 74011250637 HC RX REV CODE- 250/637: Performed by: INTERNAL MEDICINE

## 2020-12-16 PROCEDURE — 82803 BLOOD GASES ANY COMBINATION: CPT

## 2020-12-16 PROCEDURE — 74011636637 HC RX REV CODE- 636/637: Performed by: INTERNAL MEDICINE

## 2020-12-16 PROCEDURE — 82962 GLUCOSE BLOOD TEST: CPT

## 2020-12-16 PROCEDURE — 80048 BASIC METABOLIC PNL TOTAL CA: CPT

## 2020-12-16 PROCEDURE — 99232 SBSQ HOSP IP/OBS MODERATE 35: CPT | Performed by: INTERNAL MEDICINE

## 2020-12-16 PROCEDURE — 74011250636 HC RX REV CODE- 250/636: Performed by: PHYSICIAN ASSISTANT

## 2020-12-16 PROCEDURE — 74011000258 HC RX REV CODE- 258

## 2020-12-16 PROCEDURE — 87186 SC STD MICRODIL/AGAR DIL: CPT

## 2020-12-16 PROCEDURE — 74011000636 HC RX REV CODE- 636: Performed by: INTERNAL MEDICINE

## 2020-12-16 PROCEDURE — 83735 ASSAY OF MAGNESIUM: CPT

## 2020-12-16 PROCEDURE — 65620000000 HC RM CCU GENERAL

## 2020-12-16 PROCEDURE — 74011250637 HC RX REV CODE- 250/637: Performed by: NURSE PRACTITIONER

## 2020-12-16 PROCEDURE — 36600 WITHDRAWAL OF ARTERIAL BLOOD: CPT

## 2020-12-16 PROCEDURE — 74011250636 HC RX REV CODE- 250/636: Performed by: NURSE PRACTITIONER

## 2020-12-16 PROCEDURE — 74011250636 HC RX REV CODE- 250/636: Performed by: INTERNAL MEDICINE

## 2020-12-16 PROCEDURE — 36415 COLL VENOUS BLD VENIPUNCTURE: CPT

## 2020-12-16 PROCEDURE — 94762 N-INVAS EAR/PLS OXIMTRY CONT: CPT

## 2020-12-16 PROCEDURE — 85730 THROMBOPLASTIN TIME PARTIAL: CPT

## 2020-12-16 PROCEDURE — 84100 ASSAY OF PHOSPHORUS: CPT

## 2020-12-16 PROCEDURE — 99233 SBSQ HOSP IP/OBS HIGH 50: CPT | Performed by: INTERNAL MEDICINE

## 2020-12-16 PROCEDURE — 87070 CULTURE OTHR SPECIMN AEROBIC: CPT

## 2020-12-16 PROCEDURE — 74011000258 HC RX REV CODE- 258: Performed by: INTERNAL MEDICINE

## 2020-12-16 PROCEDURE — 87077 CULTURE AEROBIC IDENTIFY: CPT

## 2020-12-16 PROCEDURE — 87040 BLOOD CULTURE FOR BACTERIA: CPT

## 2020-12-16 PROCEDURE — 85025 COMPLETE CBC W/AUTO DIFF WBC: CPT

## 2020-12-16 PROCEDURE — 74011000258 HC RX REV CODE- 258: Performed by: PHYSICIAN ASSISTANT

## 2020-12-16 PROCEDURE — 74011250636 HC RX REV CODE- 250/636

## 2020-12-16 PROCEDURE — 74011000250 HC RX REV CODE- 250: Performed by: INTERNAL MEDICINE

## 2020-12-16 RX ORDER — HEPARIN SODIUM 1000 [USP'U]/ML
80 INJECTION, SOLUTION INTRAVENOUS; SUBCUTANEOUS ONCE
Status: COMPLETED | OUTPATIENT
Start: 2020-12-16 | End: 2020-12-16

## 2020-12-16 RX ORDER — SODIUM CHLORIDE 9 MG/ML
250 INJECTION, SOLUTION INTRAVENOUS ONCE
Status: COMPLETED | OUTPATIENT
Start: 2020-12-16 | End: 2020-12-16

## 2020-12-16 RX ORDER — POLYETHYLENE GLYCOL 3350 17 G/17G
17 POWDER, FOR SOLUTION ORAL DAILY
Status: DISCONTINUED | OUTPATIENT
Start: 2020-12-16 | End: 2020-12-27

## 2020-12-16 RX ADMIN — POLYETHYLENE GLYCOL 3350 17 G: 17 POWDER, FOR SOLUTION ORAL at 14:07

## 2020-12-16 RX ADMIN — PIPERACILLIN AND TAZOBACTAM 3.38 G: 3; .375 INJECTION, POWDER, LYOPHILIZED, FOR SOLUTION INTRAVENOUS at 12:50

## 2020-12-16 RX ADMIN — ACETAMINOPHEN 650 MG: 325 TABLET ORAL at 00:08

## 2020-12-16 RX ADMIN — SODIUM CHLORIDE 10 ML: 9 INJECTION, SOLUTION INTRAMUSCULAR; INTRAVENOUS; SUBCUTANEOUS at 06:38

## 2020-12-16 RX ADMIN — SPIRONOLACTONE 25 MG: 25 TABLET, FILM COATED ORAL at 08:11

## 2020-12-16 RX ADMIN — QUETIAPINE FUMARATE 50 MG: 25 TABLET ORAL at 17:28

## 2020-12-16 RX ADMIN — ASPIRIN 81 MG CHEWABLE TABLET 81 MG: 81 TABLET CHEWABLE at 08:11

## 2020-12-16 RX ADMIN — PIPERACILLIN AND TAZOBACTAM 3.38 G: 3; .375 INJECTION, POWDER, LYOPHILIZED, FOR SOLUTION INTRAVENOUS at 17:28

## 2020-12-16 RX ADMIN — QUETIAPINE FUMARATE 50 MG: 25 TABLET ORAL at 08:11

## 2020-12-16 RX ADMIN — FAMOTIDINE 20 MG: 10 INJECTION INTRAVENOUS at 08:11

## 2020-12-16 RX ADMIN — INSULIN LISPRO 3 UNITS: 100 INJECTION, SOLUTION INTRAVENOUS; SUBCUTANEOUS at 00:16

## 2020-12-16 RX ADMIN — DIAZEPAM 5 MG: 5 TABLET ORAL at 17:28

## 2020-12-16 RX ADMIN — FAMOTIDINE 20 MG: 10 INJECTION INTRAVENOUS at 21:03

## 2020-12-16 RX ADMIN — FENTANYL CITRATE 75 MCG/HR: 0.05 INJECTION, SOLUTION INTRAMUSCULAR; INTRAVENOUS at 17:47

## 2020-12-16 RX ADMIN — SODIUM CHLORIDE 10 ML: 9 INJECTION, SOLUTION INTRAMUSCULAR; INTRAVENOUS; SUBCUTANEOUS at 14:07

## 2020-12-16 RX ADMIN — Medication 30 ML: at 08:11

## 2020-12-16 RX ADMIN — NALOXEGOL OXALATE 12.5 MG: 12.5 TABLET, FILM COATED ORAL at 13:30

## 2020-12-16 RX ADMIN — HEPARIN SODIUM AND DEXTROSE 22 UNITS/KG/HR: 10000; 5 INJECTION INTRAVENOUS at 17:40

## 2020-12-16 RX ADMIN — PIPERACILLIN AND TAZOBACTAM 3.38 G: 3; .375 INJECTION, POWDER, LYOPHILIZED, FOR SOLUTION INTRAVENOUS at 06:00

## 2020-12-16 RX ADMIN — VANCOMYCIN HYDROCHLORIDE 1500 MG: 10 INJECTION, POWDER, LYOPHILIZED, FOR SOLUTION INTRAVENOUS at 02:40

## 2020-12-16 RX ADMIN — HEPARIN SODIUM 5970 UNITS: 1000 INJECTION, SOLUTION INTRAVENOUS; SUBCUTANEOUS at 09:20

## 2020-12-16 RX ADMIN — FUROSEMIDE 5 MG/HR: 10 INJECTION, SOLUTION INTRAMUSCULAR; INTRAVENOUS at 14:06

## 2020-12-16 RX ADMIN — FENTANYL CITRATE 100 MCG: 50 INJECTION INTRAMUSCULAR; INTRAVENOUS at 16:32

## 2020-12-16 RX ADMIN — FENTANYL CITRATE 100 MCG/HR: 0.05 INJECTION, SOLUTION INTRAMUSCULAR; INTRAVENOUS at 06:37

## 2020-12-16 RX ADMIN — INSULIN LISPRO 3 UNITS: 100 INJECTION, SOLUTION INTRAVENOUS; SUBCUTANEOUS at 06:00

## 2020-12-16 RX ADMIN — CARVEDILOL 3.12 MG: 3.12 TABLET, FILM COATED ORAL at 21:01

## 2020-12-16 RX ADMIN — SODIUM CHLORIDE 250 ML: 900 INJECTION, SOLUTION INTRAVENOUS at 00:51

## 2020-12-16 RX ADMIN — IOPAMIDOL 100 ML: 755 INJECTION, SOLUTION INTRAVENOUS at 12:00

## 2020-12-16 RX ADMIN — DOBUTAMINE HYDROCHLORIDE 3 MCG/KG/MIN: 200 INJECTION INTRAVENOUS at 17:40

## 2020-12-16 RX ADMIN — FENTANYL CITRATE 100 MCG/HR: 0.05 INJECTION, SOLUTION INTRAMUSCULAR; INTRAVENOUS at 19:45

## 2020-12-16 RX ADMIN — Medication 3 MG: at 21:01

## 2020-12-16 RX ADMIN — METHYLPREDNISOLONE SODIUM SUCCINATE 40 MG: 40 INJECTION, POWDER, FOR SOLUTION INTRAMUSCULAR; INTRAVENOUS at 20:15

## 2020-12-16 RX ADMIN — DEXMEDETOMIDINE HYDROCHLORIDE 1 MCG/KG/HR: 100 INJECTION, SOLUTION INTRAVENOUS at 01:40

## 2020-12-16 RX ADMIN — FENTANYL CITRATE 100 MCG/HR: 0.05 INJECTION, SOLUTION INTRAMUSCULAR; INTRAVENOUS at 08:11

## 2020-12-16 RX ADMIN — TICAGRELOR 90 MG: 90 TABLET ORAL at 08:11

## 2020-12-16 RX ADMIN — TICAGRELOR 90 MG: 90 TABLET ORAL at 21:02

## 2020-12-16 RX ADMIN — ATORVASTATIN CALCIUM 40 MG: 40 TABLET, FILM COATED ORAL at 21:01

## 2020-12-16 RX ADMIN — SODIUM CHLORIDE 40 ML: 9 INJECTION, SOLUTION INTRAMUSCULAR; INTRAVENOUS; SUBCUTANEOUS at 21:04

## 2020-12-16 RX ADMIN — FUROSEMIDE 20 MG: 10 INJECTION, SOLUTION INTRAMUSCULAR; INTRAVENOUS at 06:00

## 2020-12-16 RX ADMIN — DIAZEPAM 5 MG: 5 TABLET ORAL at 21:01

## 2020-12-16 RX ADMIN — DIAZEPAM 5 MG: 5 TABLET ORAL at 08:11

## 2020-12-16 RX ADMIN — INSULIN GLARGINE 7 UNITS: 100 INJECTION, SOLUTION SUBCUTANEOUS at 08:25

## 2020-12-16 NOTE — PROGRESS NOTES
Pt not medically ready for discharge. Pt is LTC at [address] [place] [address]. Current plan is to return when ready.  CM continues to follow along for discharge planning.     Caroline Brooks RN BSN  Care Manager  [phone]

## 2020-12-16 NOTE — PROGRESS NOTES
[facility]  [facility]    Patient: Clementina Sen Age: 72 y.o. : 1955 MR#: [id number] SSN: [id number]  Date/Time: 2020     Subjective:   Patient continues intubated. Assessment/Plan:   1. Acute on chronic hypoxic respiratory failure: Status post intubation by anesthesia on ,ventilator management per ICU team. CTA chest done today negative for PE but a number of abnormalities noted including advanced fibrosis, extensive groundglass infiltrates possibly due to pulmonary edema, pneumonia, aspiration, pulm hemorrhage and ALI. 2. Acute flash pulmonary edema due to #3: Diuresis per Cardiology. 3. Acute systolic heart failure exacerbation, EF 35%, diuresis per cardiology, limiting factor is her low bp, now on dobutamine drip, with improvement in her bp. Her beta-blocker has been held given her low blood pressure. Continue aspirin and statin, ACE inhibitor  to be added once blood pressure stabilizes. 4. Acute MI with anterolateral STEMI and Q waves:S/p ptca/stent to proximal/ mid LAD using ARELY on DAPT on 20 . On Beta-blocker and statin. On heparin drip as well. In light of possible pulmonary hemorrhage although unlikely will stop heparin drip. Discussed with cardiologist.  5. Fever:  Etiology unclear. CT imaging today with extensive groundglass infiltrates possibly aspiration, at risk. On broad-spectrum antibiotics already  6. -Lung lesions on cta chest  7. History of COPD on home oxygen: Patient currently on vent, BD as tolerated. 8. Diabetes mellitus type 2: Sugars are somewhat within acceptable limits on corrective insulin. Plan to cont corrective insulin. Plan to adjust Lantus if blood sugars are consistently elevated. .  9. Hypertension: BP lower side, will monitor off medications. Now on dobutamine for continued hypotension   10. dementia: Unknown baseline, resume Aricept when able to tolerate p.o.   Full code: Code discussed with daughter at length. Daughter at this point has many concerns about the patient including the care she had been getting at a nursing home prior to her admission here. Had difficulty in focusing the conversation on goals of care at this time. However at the end she has confirmed her wishes for her mother to continue to have full CODE STATUS. -For constipation  Visit Vitals  /65   Pulse 87   Temp 98.8 °F (37.1 °C)   Resp 15   Ht 5' 7\" (1.702 m)   Wt 74.6 kg (164 lb 8 oz)   SpO2 97%   Breastfeeding No   BMI 25.76 kg/m²           Case discussed with:  []Patient  [x]Family  [x]Nursing  []Case Management  DVT Prophylaxis:  [x]Lovenox  []Hep iv  []SCDs  []Coumadin   []Eliquis/Xarelto     Objective:   VS:   Visit Vitals  /65   Pulse 87   Temp 98.8 °F (37.1 °C)   Resp 15   Ht 5' 7\" (1.702 m)   Wt 74.6 kg (164 lb 8 oz)   SpO2 97%   Breastfeeding No   BMI 25.76 kg/m²      Tmax/24hrs: Temp (24hrs), Av.9 °F (37.7 °C), Min:98.8 °F (37.1 °C), Max:101.2 °F (38.4 °C)  IOBRIEF    Intake/Output Summary (Last 24 hours) at 2020 1808  Last data filed at 2020 1800  Gross per 24 hour   Intake 2476.16 ml   Output 1815 ml   Net 661.16 ml       General: Somnolent, intubated does not appear to be in any distress. Pulmonary: CTAB  Cardiovascular: Regular rate and Rhythm. GI:  Soft, Non distended, Non tender. + Bowel sounds. + means   Extremities:  No edema. Psych: Not anxious or agitated.     Additional:    Medications:   Current Facility-Administered Medications   Medication Dose Route Frequency    furosemide (LASIX) 100 mg in 0.9% sodium chloride (MBP/ADV) 110 mL infusion  5 mg/hr IntraVENous CONTINUOUS    naloxegoL (MOVANTIK) tablet 12.5 mg  12.5 mg Oral ACB    polyethylene glycol (MIRALAX) packet 17 g  17 g Per NG tube DAILY    spironolactone (ALDACTONE) tablet 25 mg  25 mg Oral DAILY    multivit-folic acid-herbal 804 (WELLESSE PLUS) oral liquid 30 mL  30 mL Per NG tube DAILY    fentaNYL (PF) 900 mcg/30 ml infusion soln  0-200 mcg/hr IntraVENous TITRATE    piperacillin-tazobactam (ZOSYN) 3.375 g in 0.9% sodium chloride (MBP/ADV) 100 mL MBP  3.375 g IntraVENous Q6H    heparin 25,000 units in D5W 250 ml infusion  18-36 Units/kg/hr IntraVENous TITRATE    diazePAM (VALIUM) tablet 5 mg  5 mg Oral TID    QUEtiapine (SEROquel) tablet 50 mg  50 mg Oral BID    insulin glargine (LANTUS) injection 7 Units  7 Units SubCUTAneous DAILY    DOBUTamine (DOBUTREX) 500 mg/250 mL (2,000 mcg/mL) infusion  3 mcg/kg/min IntraVENous CONTINUOUS    dexmedeTOMidine in 0.9 % NaCl (PRECEDEX) 400 mcg/100 mL (4 mcg/mL) infusion soln  0.1-1.5 mcg/kg/hr IntraVENous TITRATE    midazolam in normal saline (VERSED) 1 mg/mL infusion  1-10 mg/hr IntraVENous TITRATE    insulin lispro (HUMALOG) injection   SubCUTAneous Q6H    carvediloL (COREG) tablet 3.125 mg  3.125 mg Oral Q12H    fentaNYL citrate (PF) injection  mcg   mcg IntraVENous Q2H PRN    sodium chloride (NS) flush 5-40 mL  5-40 mL IntraVENous PRN    ticagrelor (BRILINTA) tablet 90 mg  90 mg Per NG tube BID    sodium chloride (NS) flush 5-40 mL  5-40 mL IntraVENous PRN    albuterol-ipratropium (DUO-NEB) 2.5 MG-0.5 MG/3 ML  3 mL Nebulization Q4H PRN    melatonin tablet 3 mg  3 mg Oral QHS    famotidine (PF) (PEPCID) 20 mg in 0.9% sodium chloride 10 mL injection  20 mg IntraVENous Q12H    midazolam (VERSED) injection 1-2 mg  1-2 mg IntraVENous Q1H PRN    sodium chloride (NS) flush 5-40 mL  5-40 mL IntraVENous Q8H    sodium chloride (NS) flush 5-40 mL  5-40 mL IntraVENous PRN    acetaminophen (TYLENOL) tablet 650 mg  650 mg Oral Q6H PRN    Or    acetaminophen (TYLENOL) suppository 650 mg  650 mg Rectal Q6H PRN    promethazine (PHENERGAN) tablet 12.5 mg  12.5 mg Oral Q6H PRN    Or    ondansetron (ZOFRAN) injection 4 mg  4 mg IntraVENous Q6H PRN    glucose chewable tablet 16 g  4 Tab Oral PRN    glucagon (GLUCAGEN) injection 1 mg  1 mg IntraMUSCular PRN    dextrose (D50W) injection syrg 12.5-25 g  25-50 mL IntraVENous PRN    aspirin chewable tablet 81 mg  81 mg Oral DAILY    atorvastatin (LIPITOR) tablet 40 mg  40 mg Oral QHS       Labs:    Recent Results (from the past 12 hour(s))   GLUCOSE, POC    Collection Time: 12/16/20  1:49 PM   Result Value Ref Range    Glucose (POC) 146 (H) 70 - 110 mg/dL   GLUCOSE, POC    Collection Time: 12/16/20  5:37 PM   Result Value Ref Range    Glucose (POC) 149 (H) 70 - 110 mg/dL       Signed By: Hattie Walden MD     December 16, 2020      Disclaimer: Sections of this note are dictated using utilizing voice recognition software. Minor typographical errors may be present. If questions arise, please do not hesitate to contact me or call our department.

## 2020-12-16 NOTE — PROGRESS NOTES
Lab notified that blood cultures have been ordered for more than an hour, and antibiotics are being held until labs are drawn. Lab informed nursing that they have no resources to draw the labs or bring supplies for the labs to the [address] [place] [address]. Will attempt to find resources to draw my labs.

## 2020-12-16 NOTE — PROGRESS NOTES
Nutrition Note       Patient started on tube feeding yesterday and tolerating advancement up to 20 mL/hr with no bowel movement since admission- receiving miralax daily prn (given the past 2 days), sedated on fentanyl with plan to schedule bowel regimen and add movantik per discussion with MD. Receiving diuretics on aldactone and changed to lasix drip, given 250 mL NS bolus overnight s/p emergent CVL placement. Electrolytes WNL, elevated Mg noted with concern for refeeding syndrome.      Recommendations/Plan  - Continue to advance tube feeding of Glucerna 1.5 as tolerated by 10 mL q 12 hours to goal rate of 50 mL/hr with daily multivitamin and 100 mL q 4 hour water flushes (goal regimen to provide 1800 kcal, 99 gm protein, 910 mL free water, 100% RDIs). - Continue bowel regimen, change to scheduled miralax daily and add movantik. - Monitor and replace electrolytes as needed.      Electronically signed by Shayna Vail RD, [address]  on 12/16/2020 at 1:29 PM    Contact: [phone]

## 2020-12-16 NOTE — PROCEDURES
[facility]  [facility] Procedure [facility] With [facility]ound Guidance    Indication: Need for vasopressors, inadequate venous access    Emergent procedure. Time out performed. Patient positioned in Trendelenburg. Central line Bundle:  Full sterile barrier precautions used. 7-Step Sterility Protocol followed. (cap, mask sterile gown, sterile gloves, large sterile sheet, hand hygiene, 2% chlorhexidine for cutaneous antisepsis)  5 mL 1% Lidocaine placed at insertion site. Using ultrasound guidance,   Right femoral vein cannulated x 1 attempt(s) utilizing the modified Seldinger technique. Position of guidewire confirmed in vein using ultrasound prior to dilating. Guidewire was removed. Central venous catheter was placed without difficulty. no immediate complications encountered. Good blood return on all 3 ports. Catheter secured & Biopatch applied. Sterile Tegaderm placed.          Nataly Rios PA-C  12/15/20  Pulmonary, Critical Care Medicine  [facility]     11:01 PM    ICU Staff- On Call  Case reviewed and discussed with DELFIN Harding and [facility] Cardiology staff  Agree with need for central access due to hypotension, worsening hypoxia and concern for possible evolving sepsis    Bettina Matson DO, FCCP    [facility]  Pulmonary, Critical Care, and Sleep Medicine

## 2020-12-16 NOTE — PROGRESS NOTES
[facility]      CARDIOLOGY PROGRESS NOTE  RECS:        1. Subacute MI-EKG showed Q waves in anterolateral lead with ST elevation, with suspicion of late presentation of anterior MI-s/p [facility] S/p ptca/stent to proximal/ mid LAD using ARELY. LVEDP 8 mmHg. Normal PASP pressure with normal PCWP. Moderate to severe LV dysfunction. Continue asa and Brilinta. 2. Acute respiratory failure-in the setting acute CHF- requiring mechanical ventilation. Weaning and extubation per pulmonary. 3. Borderline BP- monitor will resume BB when BP stabilizes. Since I am increasing diuresis today. 4. Acute Systolic Congestive heart Failure-recent echo with EF% 35%-40-mildly dedecompensated-continue IV dobutamine. Not much urine output with IV Lasix every 8 hours. Try gentle Lasix drip. 5. COPD, on home O2 4L NC   6. Hepatitis C ? -Per family member   9. DM2- medications per medical team   8. Dementia          Cardiac cath 12/11/20  Patient presented to [address] [place] [address] with late presentation anterior wall MI.  EF 35-40%. Patient was initially managed medically-- however developed acute pulmonary edema requiring intubation. Also troponin level increasing consistent with ongoing ischemia. S/p ptca/stent to proximal/ mid LAD using ARELY. LVEDP 8 mmHg. Normal PASP pressure with normal PCWP. Moderate to severe LV dysfunction. Echo 12/10/20  · LV: Estimated LVEF is 35 - 40%. Visually measured ejection fraction. Normal cavity size and wall thickness. Moderately and segmentally reduced systolic function. Inconclusive left ventricular diastolic function. · AO: Mild aortic root dilatation; diameter is 3.8 cm.      ASSESSMENT:  Hospital Problems  Date Reviewed: 2/7/2018          Codes Class Noted POA    Acute on chronic systolic congestive heart failure (HCC) ICD-10-CM: L00.64  ICD-9-CM: 428.23, 428.0  12/15/2020 Unknown        * (Principal) STEM[facility] (ST elevation [facility] infarction) [facility] ICD-10-CM: I21.3  ICD-9-CM: 410.90  12/10/2020 Unknown                SUBJECTIVE:  Intubated and sedated     OBJECTIVE:    VS:   Visit Vitals  BP 96/61   Pulse 93   Temp 98.8 °F (37.1 °C)   Resp 15   Ht 5' 7\" (1.702 m)   Wt 74.6 kg (164 lb 8 oz)   SpO2 100%   Breastfeeding No   BMI 25.76 kg/m²         Intake/Output Summary (Last 24 hours) at 12/16/2020 1154  Last data filed at 12/16/2020 0825  Gross per 24 hour   Intake 2102.26 ml   Output 890 ml   Net 1212.26 ml     TELE: normal sinus rhythm    General: intubated and sedated   HENT: Normocephalic, atraumatic. Normal external eye. Neck :  no bruit, no JVD  Cardiac:  regular rate and rhythm, S1, S2 normal, no murmur, click, rub or gallop  Lungs: diminished breath sound bilaterally   Abdomen: Soft, nontender, no masses  Extremities:  No c/c/e, peripheral pulses present      Labs: Results:       Chemistry Recent Labs     12/16/20  0200 12/16/20  0046 12/15/20  1945 12/15/20  0620   * 186*  --  160*    143  --  142   K 4.3 4.4 5.0 3.2*    108  --  111   CO2 28 26  --  23   BUN 22* 21*  --  14   CREA 0.88 0.79  --  0.41*   CA 8.3* 8.4*  --  8.9   AGAP 6 9  --  8   BUCR 25* 27*  --  34*      CBC w/Diff Recent Labs     12/15/20  1945 12/15/20  1128   WBC 8.7 7.7   RBC 4.14* 4.26   HGB 11.2* 11.4*   HCT 34.5* 35.2   * 505*   GRANS 81*  --    LYMPH 7*  --    EOS 1  --       Cardiac Enzymes Recent Labs     12/15/20  1945      CKND1 1.1      Coagulation Recent Labs     12/16/20  0600 12/16/20  0200   APTT 52.7* 41.7*       Lipid Panel No results found for: CHOL, CHOLPOCT, CHOLX, CHLST, CHOLV, 238193, HDL, HDLP, LDL, LDLC, DLDLP, 087398, VLDLC, VLDL, TGLX, TRIGL, TRIGP, TGLPOCT, CHHD, CHHDX   BNP No results for input(s): BNPP in the last 72 hours. Liver Enzymes No results for input(s): TP, ALB, TBIL, AP in the last 72 hours.     No lab exists for component: SGOT, GPT, DBIL   Thyroid Studies Lab Results   Component Value Date/Time    TSH 2.92 12/10/2020 11:07 AM              Maite Srivastava MD

## 2020-12-16 NOTE — PROGRESS NOTES
Alerted by nursing for desaturations and the patient being very tachypneic on the ventilator. Settings were adjusted to attempt to keep saturations >90% such as the FiO2 being increased to 100% and the PEEP increased to 14 post ABG. Follow up ABG requested post 1 hour after first gas for any improvement. Will continue to monitor.

## 2020-12-16 NOTE — PROGRESS NOTES
Pt received, tachypnic, tachycardic, BP low. 1920 ICU PA  at bedside to assess pt. 2100 fentanyl gtt started. 2220 ICU PA at bedside performed sterile technique and placed CVC line to Rt femoral with attempt times one, pt remains sedated. Dr Jessica Perez called and was updated on pt current status by RN and PA. 2300 means catheter changed to ureometer to have accurate  Hourly urine output monitoring. Sterile technique used. 2345 heparin gtt started per MD order. Update given to ICU PA.

## 2020-12-16 NOTE — DIABETES MGMT
GLYCEMIC CONTROL PLAN OF CARE     Assessment/Recommendations:  Blood glucose this am 199 mg/dl  Recommend increasing Lantus dose to 10 units daily  Continue corrective insulin coverage as needed  Already receiving very insulin resistant coverage  Will continue inpatient monitoring. Most recent blood glucose values:      Results for Ying Stallings (MRN [id number]) as of 12/16/2020 12:29   Ref. Range 12/14/2020 23:19 12/15/2020 05:54 12/15/2020 12:22 12/16/2020 00:15 12/16/2020 06:02   GLUCOSE,FAST - POC Latest Ref Range: 70 - 110 mg/dL 144 (H) 159 (H) 175 (H) 182 (H) 199 (H)     Current A1C of 8.3 % is equivalent to average blood glucose of 192 mg/dl over the past 2-3 months. Current hospital diabetes medications:   lantus 7 units daily. Lispro corrective insulin coverage every 6 hours  Previous day's insulin requirements:   lantus 7 units  Lispro 6 units corrective insulin  Home diabetes medications:  Per pta med list  lantus 22 units daily  Diet:    NPO.  TF  Education:  ____Refer to Diabetes Education Record             __x_Education not indicated at this time  Pt from [facility] CDE  Ext [phone]

## 2020-12-16 NOTE — PROGRESS NOTES
PCC Update:    ~19:30 - Pt noted to be tachypneic in the upper 30s-40s, pulse ox 84-85%, tachycardic in the 130s-140s, SBP in the 100-110s. Pt was sedated on Propofol 30 and Precedex 0.4. RN notified to stop propofol as the SBP dropped in the 80s prior to my evaluation. Pt responsive to fentanyl PRN, plan to start fentanyl gtt. Repeat CXR shows minimally improved pulmonary edema. STAT ABG on 100% FiO2 and 10 of PEEP shows hypoxia - 7.42, 30.5, 58, 20.0. Vent settings changed to 100% FiO2 and 14 of PEEP. RN notified to hold the next dose of lasix 20mg and give lasix 40mg now. Will order repeat labs. Cards was paged, awaiting call back. Dr. Onofre Snowden notified.     ~22:30 - Spoke to RN - Pt noted to still be tachycardic in the 140s, tachypneic in the 30s, SBP in the low 90s. Repeat ABG showed worsening hypoxia. Pt with only 1 PIV 22g. Case discussed with Dr. Leodan Jamil (cards) who recommended diuresis and starting a heparin gtt for possible PE. R fem CVL placed emergently. Pt now febrile with a temp of 100.5, concern for sepsis - UTI vs respiratory (tan thick secretions noted from ETT). Plan to reculture patient, start vanc and zosyn. Case discussed with Dr. Breanne Mccauley who recommended a trial of  cc bolus given current clinical picture. Will start levophed if needed. Dr. Onofre Snowden aware. Family was notified.      Total Critical Care Time - 45 minutes    Tra Macias PA-C  12/15/20  Pulmonary, Critical Care Medicine  [facility]       ICU Staff:  Above reviewed and discussed with DELFIN Sharma DO, [facility]    [facility]  Pulmonary, Critical Care, and Sleep Medicine

## 2020-12-16 NOTE — PROGRESS NOTES
[facility]  Pulmonary, Critical Care, and Sleep Medicine    Pulmonary Medicine Progress Note    Name: Ev Horn MRN: [id number]  : 1955 Hospital: [facility]  Date: 2020       Subjective:  Pt had some tachycardia and worsening hypoxia overnight. This seemed to improve with some sedation. Fentanyl gtt started. This AM, she appears comfortable. Oxygenation improved. Good TVs on the vent. Still waking up agitated occasionally. Noted fevers overnight and Cx were done. Patient Active Problem List   Diagnosis Code    Diverticula of colon K57.30    Sepsis ([address] A41.9    Sepsis secondary to UTI ([address] A41.9, N39.0    DM hyperosmolarity type II, uncontrolled ([address] E11.00, E11.65    HTN (hypertension) I10    Febrile illness, acute R50.9    Gram-negative bacteremia R78.81    Diabetic neuropathy ([address] E11.40    Osteoarthritis of both knees M17.0    Acidosis E87.2    Respiratory failure ([address] J96.90    Shock ([address] R57.9    Hyperosmolar (nonketotic) coma ([address] E11.01    Acute UTI N39.0    Macrocytic anemia D53.9    STEMI (ST elevation myocardial infarction) (HCC) I21.3    Acute on chronic systolic congestive heart failure (HCC) I50.23    Severe protein-calorie malnutrition (HCC) E43       Assessment:  · CAD: S/P PCI with DEC- LAD 20  · Respiratory Failure- hypoxic- pulmonary edema, CHF- currently intubated  · Encephalopathy  · - severe, likely delirium from acute illness  · Emphysema by report- no inhalers noted on home med list  · Demential- on Aricept  · Hypokalemia  · DM: on Lantus as OP  · Hepatitic C infection\  · Fevers    Impression/Plan:  - Vent adjusted and weaned  - Continue fentanyl gtt for now, plan to wean tomorrow  - Safe extubation may be difficult given the patient's severe underlying dementia  - Awake Cx results, monitor for fevers.    - Diuresis and dobutamine per cardiology  - Okay to start TFs, bowel regimen per primary service. Full Code       FiO2 to keep SpO2 >=92%, HOB >=30 degree, aspiration precautions, aggressive pulmonary toileting, incentive spirometry. Other issues management by primary team and respective consultants. Events and notes from last 24 hours reviewed. Discussed with patient and family, answered all questions to their satisfaction. Care plan discussed with nursing.      Labs and images personally seen and available reports reviewed  All current medicines are reviewed       Medications- Current:  Current Facility-Administered Medications   Medication Dose Route Frequency    furosemide (LASIX) 100 mg in 0.9% sodium chloride (MBP/ADV) 110 mL infusion  5 mg/hr IntraVENous CONTINUOUS    naloxegoL (MOVANTIK) tablet 12.5 mg  12.5 mg Oral ACB    polyethylene glycol (MIRALAX) packet 17 g  17 g Per NG tube DAILY    spironolactone (ALDACTONE) tablet 25 mg  25 mg Oral DAILY    multivit-folic acid-herbal 818 (WELLESSE PLUS) oral liquid 30 mL  30 mL Per NG tube DAILY    fentaNYL (PF) 900 mcg/30 ml infusion soln  0-200 mcg/hr IntraVENous TITRATE    piperacillin-tazobactam (ZOSYN) 3.375 g in 0.9% sodium chloride (MBP/ADV) 100 mL MBP  3.375 g IntraVENous Q6H    heparin 25,000 units in D5W 250 ml infusion  18-36 Units/kg/hr IntraVENous TITRATE    diazePAM (VALIUM) tablet 5 mg  5 mg Oral TID    QUEtiapine (SEROquel) tablet 50 mg  50 mg Oral BID    insulin glargine (LANTUS) injection 7 Units  7 Units SubCUTAneous DAILY    DOBUTamine (DOBUTREX) 500 mg/250 mL (2,000 mcg/mL) infusion  3 mcg/kg/min IntraVENous CONTINUOUS    dexmedeTOMidine in 0.9 % NaCl (PRECEDEX) 400 mcg/100 mL (4 mcg/mL) infusion soln  0.1-1.5 mcg/kg/hr IntraVENous TITRATE    midazolam in normal saline (VERSED) 1 mg/mL infusion  1-10 mg/hr IntraVENous TITRATE    insulin lispro (HUMALOG) injection   SubCUTAneous Q6H    carvediloL (COREG) tablet 3.125 mg  3.125 mg Oral Q12H    fentaNYL citrate (PF) injection  mcg   mcg IntraVENous Q2H PRN    sodium chloride (NS) flush 5-40 mL  5-40 mL IntraVENous PRN    ticagrelor (BRILINTA) tablet 90 mg  90 mg Per NG tube BID    sodium chloride (NS) flush 5-40 mL  5-40 mL IntraVENous PRN    albuterol-ipratropium (DUO-NEB) 2.5 MG-0.5 MG/3 ML  3 mL Nebulization Q4H PRN    melatonin tablet 3 mg  3 mg Oral QHS    famotidine (PF) (PEPCID) 20 mg in 0.9% sodium chloride 10 mL injection  20 mg IntraVENous Q12H    midazolam (VERSED) injection 1-2 mg  1-2 mg IntraVENous Q1H PRN    sodium chloride (NS) flush 5-40 mL  5-40 mL IntraVENous Q8H    sodium chloride (NS) flush 5-40 mL  5-40 mL IntraVENous PRN    acetaminophen (TYLENOL) tablet 650 mg  650 mg Oral Q6H PRN    Or    acetaminophen (TYLENOL) suppository 650 mg  650 mg Rectal Q6H PRN    promethazine (PHENERGAN) tablet 12.5 mg  12.5 mg Oral Q6H PRN    Or    ondansetron (ZOFRAN) injection 4 mg  4 mg IntraVENous Q6H PRN    glucose chewable tablet 16 g  4 Tab Oral PRN    glucagon (GLUCAGEN) injection 1 mg  1 mg IntraMUSCular PRN    dextrose (D50W) injection syrg 12.5-25 g  25-50 mL IntraVENous PRN    aspirin chewable tablet 81 mg  81 mg Oral DAILY    atorvastatin (LIPITOR) tablet 40 mg  40 mg Oral QHS       Objective:  Vital Signs:    Visit Vitals  /63 (BP 1 Location: Right arm, BP Patient Position: At rest)   Pulse 88   Temp 98.8 °F (37.1 °C)   Resp 14   Ht 5' 7\" (1.702 m)   Wt 74.6 kg (164 lb 8 oz)   SpO2 98%   Breastfeeding No   BMI 25.76 kg/m²      O2 Device: Ventilator  O2 Flow Rate (L/min): 4 l/min  Temp (24hrs), Av.9 °F (37.7 °C), Min:98.8 °F (37.1 °C), Max:101.2 °F (38.4 °C)      Intake/Output:   Last shift:       07 -  1900  In: 204.1 [I.V.:24.1]  Out: 825 [Urine:825]  Last 3 shifts:  1901 -  0700  In: 2419.2 [I.V.:1729.2]  Out: 1320 [Urine:1320]    Intake/Output Summary (Last 24 hours) at 2020 1632  Last data filed at 2020 1406  Gross per 24 hour   Intake 2040.76 ml   Output 1315 ml Net 725.76 ml       Physical Exam:     General/Neurology: Alert, Awake  Head:   Normocephalic, without obvious abnormality  Eye:   PERRL, EOM intact  Oral:  Mucus membranes moist  Lung:   B/l air entry fair. No rales. No rhonchi. No wheezing  Heart:   S1 S2 present  Abdomen:  Soft, non tender, BS+nt   Extremities:  No pedal edema. Skin:   Dry, intact  Data:      Recent Results (from the past 24 hour(s))   MAGNESIUM    Collection Time: 12/15/20  4:42 PM   Result Value Ref Range    Magnesium 1.6 1.6 - 2.6 mg/dL   PHOSPHORUS    Collection Time: 12/15/20  4:42 PM   Result Value Ref Range    Phosphorus 3.0 2.5 - 4.9 MG/DL   EKG, 12 LEAD, SUBSEQUENT    Collection Time: 12/15/20  7:33 PM   Result Value Ref Range    Ventricular Rate 138 BPM    Atrial Rate 138 BPM    P-R Interval 124 ms    QRS Duration 86 ms    Q-T Interval 292 ms    QTC Calculation (Bezet) 442 ms    Calculated P Axis -68 degrees    Calculated R Axis 67 degrees    Calculated T Axis 29 degrees    Diagnosis       Unusual P axis and short FL, probable junctional tachycardia  Possible Inferior infarct (cited on or before 07-FEB-2018)  Anterolateral infarct (cited on or before 10-DEC-2020)  Abnormal ECG  When compared with ECG of 12-DEC-2020 07:54,  Junctional rhythm has replaced Sinus rhythm  Serial changes of Anterior infarct present  Confirmed by Giovani Boss M.D., [address] [place] [address] ([id number]) on 12/16/2020 9:56:00 AM     POC G3    Collection Time: 12/15/20  7:35 PM   Result Value Ref Range    Device: VENT      FIO2 (POC) 100 %    pH (POC) 7.42 7.35 - 7.45      pCO2 (POC) 30.5 (L) 35.0 - 45.0 MMHG    pO2 (POC) 58 (L) 80 - 100 MMHG    HCO3 (POC) 20.0 (L) 22 - 26 MMOL/L    sO2 (POC) 91 (L) 92 - 97 %    Base deficit (POC) 4 mmol/L    Mode ASSIST CONTROL      Tidal volume 450 ml    Set Rate 14 bpm    PEEP/CPAP (POC) 10 cmH2O    Allens test (POC) YES      Inspiratory Time 0.80 sec    Total resp.  rate 36      Site RIGHT RADIAL      Specimen type (POC) ARTERIAL      Performed by Orlando Bernabe Zanoni     Volume control plus YES     CARDIAC PANEL,(CK, CKMB & TROPONIN)    Collection Time: 12/15/20  7:45 PM   Result Value Ref Range    CK - MB 1.3 <3.6 ng/ml    CK-MB Index 1.1 0.0 - 4.0 %     26 - 192 U/L    Troponin-I, QT 1.25 (H) 0.0 - 0.045 NG/ML   NT-PRO BNP    Collection Time: 12/15/20  7:45 PM   Result Value Ref Range    NT pro-BNP 2,099 (H) 0 - 900 PG/ML   POTASSIUM    Collection Time: 12/15/20  7:45 PM   Result Value Ref Range    Potassium 5.0 3.5 - 5.5 mmol/L   LACTIC ACID    Collection Time: 12/15/20  7:45 PM   Result Value Ref Range    Lactic acid 1.6 0.4 - 2.0 MMOL/L   D DIMER    Collection Time: 12/15/20  7:45 PM   Result Value Ref Range    D DIMER 1.89 (H) <0.46 ug/ml(FEU)   CBC WITH AUTOMATED DIFF    Collection Time: 12/15/20  7:45 PM   Result Value Ref Range    WBC 8.7 4.6 - 13.2 K/uL    RBC 4.14 (L) 4.20 - 5.30 M/uL    HGB 11.2 (L) 12.0 - 16.0 g/dL    HCT 34.5 (L) 35.0 - 45.0 %    MCV 83.3 74.0 - 97.0 FL    MCH 27.1 24.0 - 34.0 PG    MCHC 32.5 31.0 - 37.0 g/dL    RDW 12.7 11.6 - 14.5 %    PLATELET 790 (H) 798 - 420 K/uL    MPV 9.2 9.2 - 11.8 FL    NEUTROPHILS 81 (H) 40 - 73 %    LYMPHOCYTES 7 (L) 21 - 52 %    MONOCYTES 11 (H) 3 - 10 %    EOSINOPHILS 1 0 - 5 %    BASOPHILS 0 0 - 2 %    ABS. NEUTROPHILS 7.1 1.8 - 8.0 K/UL    ABS. LYMPHOCYTES 0.6 (L) 0.9 - 3.6 K/UL    ABS. MONOCYTES 0.9 0.05 - 1.2 K/UL    ABS. EOSINOPHILS 0.1 0.0 - 0.4 K/UL    ABS.  BASOPHILS 0.0 0.0 - 0.1 K/UL    DF AUTOMATED     PTT    Collection Time: 12/15/20  7:45 PM   Result Value Ref Range    aPTT 31.2 23.0 - 36.4 SEC   POC G3    Collection Time: 12/15/20  8:46 PM   Result Value Ref Range    Device: VENT      FIO2 (POC) 100 %    pH (POC) 7.46 (H) 7.35 - 7.45      pCO2 (POC) 28.4 (L) 35.0 - 45.0 MMHG    pO2 (POC) 55 (L) 80 - 100 MMHG    HCO3 (POC) 20.3 (L) 22 - 26 MMOL/L    sO2 (POC) 90 (L) 92 - 97 %    Base deficit (POC) 4 mmol/L    Mode ASSIST CONTROL      Tidal volume 450 ml    Set Rate 14 bpm    PEEP/CPAP (POC) 14 cmH2O    Allens test (POC) YES      Inspiratory Time 0.80 sec    Total resp.  rate 43      Site RIGHT RADIAL      Specimen type (POC) ARTERIAL      Performed by Daniela Frank     Volume control plus YES     GLUCOSE, POC    Collection Time: 12/16/20 12:15 AM   Result Value Ref Range    Glucose (POC) 182 (H) 70 - 750 mg/dL   METABOLIC PANEL, BASIC    Collection Time: 12/16/20 12:46 AM   Result Value Ref Range    Sodium 143 136 - 145 mmol/L    Potassium 4.4 3.5 - 5.5 mmol/L    Chloride 108 100 - 111 mmol/L    CO2 26 21 - 32 mmol/L    Anion gap 9 3.0 - 18 mmol/L    Glucose 186 (H) 74 - 99 mg/dL    BUN 21 (H) 7.0 - 18 MG/DL    Creatinine 0.79 0.6 - 1.3 MG/DL    BUN/Creatinine ratio 27 (H) 12 - 20      GFR est AA >60 >60 ml/min/1.73m2    GFR est non-AA >60 >60 ml/min/1.73m2    Calcium 8.4 (L) 8.5 - 10.1 MG/DL   MAGNESIUM    Collection Time: 12/16/20 12:46 AM   Result Value Ref Range    Magnesium 3.2 (H) 1.6 - 2.6 mg/dL   CULTURE, RESPIRATORY/SPUTUM/BRONCH W GRAM STAIN    Collection Time: 12/16/20 12:50 AM    Specimen: Endotracheal aspirate   Result Value Ref Range    Special Requests: NO SPECIAL REQUESTS      GRAM STAIN NO WBC'S SEEN      GRAM STAIN NO ORGANISMS SEEN      Culture result: PENDING    CULTURE, BLOOD    Collection Time: 12/16/20  2:00 AM    Specimen: Blood   Result Value Ref Range    Special Requests: NO SPECIAL REQUESTS      Culture result: NO GROWTH AFTER 4 HOURS     METABOLIC PANEL, BASIC    Collection Time: 12/16/20  2:00 AM   Result Value Ref Range    Sodium 142 136 - 145 mmol/L    Potassium 4.3 3.5 - 5.5 mmol/L    Chloride 108 100 - 111 mmol/L    CO2 28 21 - 32 mmol/L    Anion gap 6 3.0 - 18 mmol/L    Glucose 181 (H) 74 - 99 mg/dL    BUN 22 (H) 7.0 - 18 MG/DL    Creatinine 0.88 0.6 - 1.3 MG/DL    BUN/Creatinine ratio 25 (H) 12 - 20      GFR est AA >60 >60 ml/min/1.73m2    GFR est non-AA >60 >60 ml/min/1.73m2    Calcium 8.3 (L) 8.5 - 10.1 MG/DL   MAGNESIUM    Collection Time: 12/16/20  2:00 AM   Result Value Ref Range    Magnesium 2.9 (H) 1.6 - 2.6 mg/dL   PHOSPHORUS    Collection Time: 12/16/20  2:00 AM   Result Value Ref Range    Phosphorus 3.4 2.5 - 4.9 MG/DL   PTT    Collection Time: 12/16/20  2:00 AM   Result Value Ref Range    aPTT 41.7 (H) 23.0 - 36.4 SEC   POC G3    Collection Time: 12/16/20  3:58 AM   Result Value Ref Range    Device: VENT      FIO2 (POC) 100 %    pH (POC) 7.38 7.35 - 7.45      pCO2 (POC) 44.9 35.0 - 45.0 MMHG    pO2 (POC) 284 (H) 80 - 100 MMHG    HCO3 (POC) 26.4 (H) 22 - 26 MMOL/L    sO2 (POC) 100 (H) 92 - 97 %    Base excess (POC) 1 mmol/L    Mode ASSIST CONTROL      Tidal volume 450 ml    Set Rate 14 bpm    PEEP/CPAP (POC) 12 cmH2O    Allens test (POC) YES      Inspiratory Time 0.80 sec    Total resp.  rate 20      Site RIGHT RADIAL      Specimen type (POC) ARTERIAL      Performed by Peggy Abreu     Volume control plus YES     PTT    Collection Time: 12/16/20  6:00 AM   Result Value Ref Range    aPTT 52.7 (H) 23.0 - 36.4 SEC   GLUCOSE, POC    Collection Time: 12/16/20  6:02 AM   Result Value Ref Range    Glucose (POC) 199 (H) 70 - 110 mg/dL   GLUCOSE, POC    Collection Time: 12/16/20  1:49 PM   Result Value Ref Range    Glucose (POC) 146 (H) 70 - 110 mg/dL         Chemistry   Recent Labs     12/16/20  0200 12/16/20  0046 12/15/20  1945 12/15/20  1642 12/15/20  0620   * 186*  --   --  160*    143  --   --  142   K 4.3 4.4 5.0  --  3.2*    108  --   --  111   CO2 28 26  --   --  23   BUN 22* 21*  --   --  14   CREA 0.88 0.79  --   --  0.41*   CA 8.3* 8.4*  --   --  8.9   MG 2.9* 3.2*  --  1.6  --    PHOS 3.4  --   --  3.0  --    AGAP 6 9  --   --  8   BUCR 25* 27*  --   --  34*       CBC w/Diff   Recent Labs     12/15/20  1945 12/15/20  1128   WBC 8.7 7.7   RBC 4.14* 4.26   HGB 11.2* 11.4*   HCT 34.5* 35.2   * 505*   GRANS 81*  --    LYMPH 7*  --    EOS 1  --        ABG   Recent Labs     12/16/20  0358 12/15/20  2046 12/15/20  1935   PHI 7.38 7.46* 7.42   PCO2I 44.9 28.4* 30.5*   PO2I 284* 55* 58*   HCO3I 26.4* 20.3* 20.0*   FIO2I 100 100 100       Micro    Recent Labs     12/16/20  0200 12/16/20  0050 12/14/20  1400   CULT NO GROWTH AFTER 4 HOURS PENDING GRAM NEGATIVE RODS*     Recent Labs     12/16/20  0200 12/16/20  0050 12/14/20  1400   CULT NO GROWTH AFTER 4 HOURS PENDING GRAM NEGATIVE RODS*       CT (Most Recent)   Results from [place] encounter on 12/10/20   CTA CHEST W OR W WO CONT    Narrative CT Pulmonary Angiogram    CPT CODE: 41950    CLINICAL: Shortness of breath, concern for PE.    COMPARISON: Current and previous plain films. TECHNICAL: Volumetric data acquisition performed through the chest with a  multislice scanner. Reconstructions were created in the axial, coronal, and  sagittal planes. Coronal and sagittal reconstructions were created using maximal  intensity projection methodology to maximize sensitivity for emboli. Bolus  timing was optimized for a pulmonary embolism evaluation. 100 cc of Isovue-370  were utilized for this examination. FINDINGS:  The patient is intubated. Endotracheal tube is in satisfactory position. There are no pulmonary emboli. The ascending aorta is prominent at 3.7 cm. There  are dense calcifications in the anterior surface of the ascending aorta. The lungs there are extensive coarse subpleural reticulations increasing in  confluence in severity in the bases. Honeycombing is present. There is traction  bronchiectasis extensively in the lower lung zones . There is groundglass  infiltrate and consolidation between the areas of honeycombing and traction  bronchiectasis  No pleural effusion or pneumothorax is apparent. There is a small pneumomediastinum evident. .  No mediastinal adenopathy or mass is evident. There is multichamber cardiac  enlargement. Sections in the upper abdomen reveal a inhomogeneous mass in the posterior right  lobe of the liver measuring 9 x 10 cm transaxially.  There may be an additional  smaller lesion (2 cm) in the medial left lobe. Impression IMPRESSION:    Negative for acute pulmonary embolism or acute aortic abnormality. Dilated ascending aorta to 3.7 cm. Advanced pulmonary fibrosis with traction bronchiectasis and honeycombing. The  appearance is suggestive of UIP. There are extensive groundglass and confluent infiltrates superimposed upon the  interstitial process in the lower lobes. Primary differential considerations for  this include pulmonary edema, pneumonia, aspiration, pulmonary hemorrhage, and  acute lung injury. There is a large inhomogeneous mass in the right lobe of the liver and possibly  a second smaller lesion. Differential includes primary and metastatic disease. Broad differential.  Minimal pneumomediastinum        All CT scans at this facility are performed using dose optimization technique as  appropriate to the performed exam, to include automated exposure control,  adjustment of the mA and/or kV according to patient's size (Including  appropriate matching for site-specific examinations), or use of iterative  reconstruction technique. XR (Most Recent). CXR reviewed by me and compared with previous CXR   Results from Hospital Encounter encounter on 12/10/20   XR CHEST PORT    Narrative EXAM: XR CHEST PORT    INDICATION: 72 years Female. Tachypnea. ADDITIONAL HISTORY: None. TECHNIQUE: Frontal view of the chest.    COMPARISON: Chest radiograph 12/15/2020 1127 hours    FINDINGS:    Endotracheal tube with tip 4 cm proximal to chace. There is an enteric tube  which extends inferior to the field-of-view. The cardiac silhouette is within normal limits in size. Atherosclerotic  calcifications are noted in the aorta. There are diffuse reticular and airspace opacities throughout the bilateral  lungs, without significant interval change compared to the prior chest  radiograph. No definite evidence of pleural effusion or pneumothorax.     Osseous structures are unchanged in appearance. Impression IMPRESSION:  1. No significant interval change. 2.  Support devices in acceptable positions, as above. 3.  Unchanged moderately extensive bilateral lung reticular and airspace  opacities, likely infectious or inflammatory. See my orders for details     Total care time exclusive of procedures with complex decision making, coordination of care and counseling patient performed and > 50% time spent in face to face evaluation as mentioned above.     Gael Majano MD  Critical Care Medicine

## 2020-12-17 LAB
ANION GAP SERPL CALC-SCNC: 4 MMOL/L (ref 3–18)
ARTERIAL PATENCY WRIST A: YES
BACTERIA SPEC CULT: ABNORMAL
BACTERIA SPEC CULT: NORMAL
BASE EXCESS BLD CALC-SCNC: 7 MMOL/L
BDY SITE: ABNORMAL
BODY TEMPERATURE: 98.6
BUN SERPL-MCNC: 18 MG/DL (ref 7–18)
BUN/CREAT SERPL: 26 (ref 12–20)
CA-I BLD-MCNC: 1.14 MMOL/L (ref 1.12–1.32)
CALCIUM SERPL-MCNC: 8.1 MG/DL (ref 8.5–10.1)
CC UR VC: ABNORMAL
CHLORIDE SERPL-SCNC: 105 MMOL/L (ref 100–111)
CO2 SERPL-SCNC: 30 MMOL/L (ref 21–32)
CREAT SERPL-MCNC: 0.69 MG/DL (ref 0.6–1.3)
ERYTHROCYTE [DISTWIDTH] IN BLOOD BY AUTOMATED COUNT: 12.9 % (ref 11.6–14.5)
GAS FLOW.O2 O2 DELIVERY SYS: ABNORMAL L/MIN
GAS FLOW.O2 SETTING OXYMISER: 14 BPM
GLUCOSE BLD STRIP.AUTO-MCNC: 289 MG/DL (ref 70–110)
GLUCOSE BLD STRIP.AUTO-MCNC: 291 MG/DL (ref 70–110)
GLUCOSE BLD STRIP.AUTO-MCNC: 305 MG/DL (ref 74–106)
GLUCOSE BLD STRIP.AUTO-MCNC: 384 MG/DL (ref 70–110)
GLUCOSE SERPL-MCNC: 289 MG/DL (ref 74–99)
HCO3 BLD-SCNC: 31.2 MMOL/L (ref 22–26)
HCT VFR BLD AUTO: 30.5 % (ref 35–45)
HCT VFR BLD CALC: 30 % (ref 36–49)
HGB BLD-MCNC: 10.2 G/DL (ref 12–16)
HGB BLD-MCNC: 9.6 G/DL (ref 12–16)
INSPIRATION.DURATION SETTING TIME VENT: 0.9 SEC
MAGNESIUM SERPL-MCNC: 2 MG/DL (ref 1.6–2.6)
MCH RBC QN AUTO: 26.4 PG (ref 24–34)
MCHC RBC AUTO-ENTMCNC: 31.5 G/DL (ref 31–37)
MCV RBC AUTO: 83.8 FL (ref 74–97)
O2/TOTAL GAS SETTING VFR VENT: 35 %
PCO2 BLD: 43.4 MMHG (ref 35–45)
PEEP RESPIRATORY: 8 CMH2O
PH BLD: 7.46 [PH] (ref 7.35–7.45)
PHOSPHATE SERPL-MCNC: 3.2 MG/DL (ref 2.5–4.9)
PLATELET # BLD AUTO: 493 K/UL (ref 135–420)
PMV BLD AUTO: 9.1 FL (ref 9.2–11.8)
PO2 BLD: 105 MMHG (ref 80–100)
POTASSIUM BLD-SCNC: 4.4 MMOL/L (ref 3.5–5.5)
POTASSIUM SERPL-SCNC: 4.1 MMOL/L (ref 3.5–5.5)
RBC # BLD AUTO: 3.64 M/UL (ref 4.2–5.3)
SAO2 % BLD: 98 % (ref 92–97)
SERVICE CMNT-IMP: ABNORMAL
SERVICE CMNT-IMP: ABNORMAL
SERVICE CMNT-IMP: NORMAL
SODIUM BLD-SCNC: 140 MMOL/L (ref 136–145)
SODIUM SERPL-SCNC: 139 MMOL/L (ref 136–145)
SPECIMEN TYPE: ABNORMAL
TOTAL RESP. RATE, ITRR: 18
VENTILATION MODE VENT: ABNORMAL
VOLUME CONTROL PLUS IVLCP: YES
VT SETTING VENT: 450 ML
WBC # BLD AUTO: 6.3 K/UL (ref 4.6–13.2)

## 2020-12-17 PROCEDURE — 82962 GLUCOSE BLOOD TEST: CPT

## 2020-12-17 PROCEDURE — 74011250637 HC RX REV CODE- 250/637: Performed by: INTERNAL MEDICINE

## 2020-12-17 PROCEDURE — 99232 SBSQ HOSP IP/OBS MODERATE 35: CPT | Performed by: INTERNAL MEDICINE

## 2020-12-17 PROCEDURE — 74011250637 HC RX REV CODE- 250/637: Performed by: NURSE PRACTITIONER

## 2020-12-17 PROCEDURE — 74011000258 HC RX REV CODE- 258: Performed by: INTERNAL MEDICINE

## 2020-12-17 PROCEDURE — 74011636637 HC RX REV CODE- 636/637: Performed by: INTERNAL MEDICINE

## 2020-12-17 PROCEDURE — 74011000258 HC RX REV CODE- 258: Performed by: PHYSICIAN ASSISTANT

## 2020-12-17 PROCEDURE — 82330 ASSAY OF CALCIUM: CPT

## 2020-12-17 PROCEDURE — 85027 COMPLETE CBC AUTOMATED: CPT

## 2020-12-17 PROCEDURE — 36600 WITHDRAWAL OF ARTERIAL BLOOD: CPT

## 2020-12-17 PROCEDURE — 74011250636 HC RX REV CODE- 250/636: Performed by: INTERNAL MEDICINE

## 2020-12-17 PROCEDURE — 74011250636 HC RX REV CODE- 250/636: Performed by: PHYSICIAN ASSISTANT

## 2020-12-17 PROCEDURE — 80048 BASIC METABOLIC PNL TOTAL CA: CPT

## 2020-12-17 PROCEDURE — 99221 1ST HOSP IP/OBS SF/LOW 40: CPT | Performed by: NURSE PRACTITIONER

## 2020-12-17 PROCEDURE — 84100 ASSAY OF PHOSPHORUS: CPT

## 2020-12-17 PROCEDURE — 94003 VENT MGMT INPAT SUBQ DAY: CPT

## 2020-12-17 PROCEDURE — 65620000000 HC RM CCU GENERAL

## 2020-12-17 PROCEDURE — 94640 AIRWAY INHALATION TREATMENT: CPT

## 2020-12-17 PROCEDURE — 74011000250 HC RX REV CODE- 250: Performed by: INTERNAL MEDICINE

## 2020-12-17 PROCEDURE — 83735 ASSAY OF MAGNESIUM: CPT

## 2020-12-17 PROCEDURE — 82803 BLOOD GASES ANY COMBINATION: CPT

## 2020-12-17 RX ORDER — INSULIN GLARGINE 100 [IU]/ML
3 INJECTION, SOLUTION SUBCUTANEOUS ONCE
Status: COMPLETED | OUTPATIENT
Start: 2020-12-17 | End: 2020-12-17

## 2020-12-17 RX ORDER — INSULIN GLARGINE 100 [IU]/ML
10 INJECTION, SOLUTION SUBCUTANEOUS DAILY
Status: DISCONTINUED | OUTPATIENT
Start: 2020-12-18 | End: 2020-12-18

## 2020-12-17 RX ADMIN — TICAGRELOR 90 MG: 90 TABLET ORAL at 09:36

## 2020-12-17 RX ADMIN — FUROSEMIDE 5 MG/HR: 10 INJECTION, SOLUTION INTRAMUSCULAR; INTRAVENOUS at 07:59

## 2020-12-17 RX ADMIN — DEXMEDETOMIDINE HYDROCHLORIDE 0.1 MCG/KG/HR: 100 INJECTION, SOLUTION INTRAVENOUS at 01:21

## 2020-12-17 RX ADMIN — ASPIRIN 81 MG CHEWABLE TABLET 81 MG: 81 TABLET CHEWABLE at 09:36

## 2020-12-17 RX ADMIN — INSULIN LISPRO 15 UNITS: 100 INJECTION, SOLUTION INTRAVENOUS; SUBCUTANEOUS at 11:52

## 2020-12-17 RX ADMIN — INSULIN LISPRO 9 UNITS: 100 INJECTION, SOLUTION INTRAVENOUS; SUBCUTANEOUS at 06:16

## 2020-12-17 RX ADMIN — FAMOTIDINE 20 MG: 10 INJECTION INTRAVENOUS at 09:37

## 2020-12-17 RX ADMIN — METHYLPREDNISOLONE SODIUM SUCCINATE 40 MG: 40 INJECTION, POWDER, FOR SOLUTION INTRAMUSCULAR; INTRAVENOUS at 00:43

## 2020-12-17 RX ADMIN — CARVEDILOL 3.12 MG: 3.12 TABLET, FILM COATED ORAL at 21:14

## 2020-12-17 RX ADMIN — TICAGRELOR 90 MG: 90 TABLET ORAL at 21:14

## 2020-12-17 RX ADMIN — NALOXEGOL OXALATE 12.5 MG: 12.5 TABLET, FILM COATED ORAL at 09:36

## 2020-12-17 RX ADMIN — DIAZEPAM 5 MG: 5 TABLET ORAL at 09:36

## 2020-12-17 RX ADMIN — PIPERACILLIN AND TAZOBACTAM 3.38 G: 3; .375 INJECTION, POWDER, LYOPHILIZED, FOR SOLUTION INTRAVENOUS at 23:59

## 2020-12-17 RX ADMIN — IPRATROPIUM BROMIDE AND ALBUTEROL SULFATE 3 ML: .5; 3 SOLUTION RESPIRATORY (INHALATION) at 15:24

## 2020-12-17 RX ADMIN — INSULIN LISPRO 9 UNITS: 100 INJECTION, SOLUTION INTRAVENOUS; SUBCUTANEOUS at 18:11

## 2020-12-17 RX ADMIN — QUETIAPINE FUMARATE 50 MG: 25 TABLET ORAL at 18:10

## 2020-12-17 RX ADMIN — ATORVASTATIN CALCIUM 40 MG: 40 TABLET, FILM COATED ORAL at 21:14

## 2020-12-17 RX ADMIN — DIAZEPAM 5 MG: 5 TABLET ORAL at 21:14

## 2020-12-17 RX ADMIN — METHYLPREDNISOLONE SODIUM SUCCINATE 40 MG: 40 INJECTION, POWDER, FOR SOLUTION INTRAMUSCULAR; INTRAVENOUS at 11:53

## 2020-12-17 RX ADMIN — POLYETHYLENE GLYCOL 3350 17 G: 17 POWDER, FOR SOLUTION ORAL at 09:53

## 2020-12-17 RX ADMIN — FENTANYL CITRATE 125 MCG/HR: 0.05 INJECTION, SOLUTION INTRAMUSCULAR; INTRAVENOUS at 02:55

## 2020-12-17 RX ADMIN — SPIRONOLACTONE 25 MG: 25 TABLET, FILM COATED ORAL at 09:36

## 2020-12-17 RX ADMIN — PIPERACILLIN AND TAZOBACTAM 3.38 G: 3; .375 INJECTION, POWDER, LYOPHILIZED, FOR SOLUTION INTRAVENOUS at 18:10

## 2020-12-17 RX ADMIN — METHYLPREDNISOLONE SODIUM SUCCINATE 40 MG: 40 INJECTION, POWDER, FOR SOLUTION INTRAMUSCULAR; INTRAVENOUS at 06:17

## 2020-12-17 RX ADMIN — Medication 3 MG: at 21:14

## 2020-12-17 RX ADMIN — SODIUM CHLORIDE 10 ML: 9 INJECTION, SOLUTION INTRAMUSCULAR; INTRAVENOUS; SUBCUTANEOUS at 14:00

## 2020-12-17 RX ADMIN — INSULIN GLARGINE 3 UNITS: 100 INJECTION, SOLUTION SUBCUTANEOUS at 11:52

## 2020-12-17 RX ADMIN — PIPERACILLIN AND TAZOBACTAM 3.38 G: 3; .375 INJECTION, POWDER, LYOPHILIZED, FOR SOLUTION INTRAVENOUS at 11:53

## 2020-12-17 RX ADMIN — METHYLPREDNISOLONE SODIUM SUCCINATE 40 MG: 40 INJECTION, POWDER, FOR SOLUTION INTRAMUSCULAR; INTRAVENOUS at 18:10

## 2020-12-17 RX ADMIN — INSULIN LISPRO 9 UNITS: 100 INJECTION, SOLUTION INTRAVENOUS; SUBCUTANEOUS at 23:59

## 2020-12-17 RX ADMIN — INSULIN GLARGINE 7 UNITS: 100 INJECTION, SOLUTION SUBCUTANEOUS at 09:51

## 2020-12-17 RX ADMIN — Medication 30 ML: at 09:37

## 2020-12-17 RX ADMIN — INSULIN LISPRO 3 UNITS: 100 INJECTION, SOLUTION INTRAVENOUS; SUBCUTANEOUS at 00:42

## 2020-12-17 RX ADMIN — PIPERACILLIN AND TAZOBACTAM 3.38 G: 3; .375 INJECTION, POWDER, LYOPHILIZED, FOR SOLUTION INTRAVENOUS at 06:17

## 2020-12-17 RX ADMIN — SODIUM CHLORIDE 40 ML: 9 INJECTION, SOLUTION INTRAMUSCULAR; INTRAVENOUS; SUBCUTANEOUS at 22:00

## 2020-12-17 RX ADMIN — QUETIAPINE FUMARATE 50 MG: 25 TABLET ORAL at 09:36

## 2020-12-17 RX ADMIN — METHYLPREDNISOLONE SODIUM SUCCINATE 40 MG: 40 INJECTION, POWDER, FOR SOLUTION INTRAMUSCULAR; INTRAVENOUS at 23:59

## 2020-12-17 RX ADMIN — SODIUM CHLORIDE 40 ML: 9 INJECTION, SOLUTION INTRAMUSCULAR; INTRAVENOUS; SUBCUTANEOUS at 06:00

## 2020-12-17 RX ADMIN — PIPERACILLIN AND TAZOBACTAM 3.38 G: 3; .375 INJECTION, POWDER, LYOPHILIZED, FOR SOLUTION INTRAVENOUS at 00:43

## 2020-12-17 RX ADMIN — CARVEDILOL 3.12 MG: 3.12 TABLET, FILM COATED ORAL at 09:36

## 2020-12-17 RX ADMIN — DIAZEPAM 5 MG: 5 TABLET ORAL at 16:43

## 2020-12-17 RX ADMIN — FAMOTIDINE 20 MG: 10 INJECTION INTRAVENOUS at 21:14

## 2020-12-17 NOTE — CONSULTS
[facility]: [phone] ([phone])  [facility]: [phone]   [facility]: [phone]    Patient Name: Pushpa Garcia  YOB: 1955    Date of Initial Consult: 12/17/2020   Reason for Consult: goals of care and support   Requesting Provider: Dr Jermain Ferreira  Primary Care Physician: Skyler Whiteside MD      SUMMARY:   Pushpa Garcia is a 72y.o. year old with a past history of diabetes, chronic back pain, hypertension, dementia lives in long term care, who was admitted on 12/10/2020 as a transfer from [facility] with a diagnosis of anterior wall  MI  Current medical issues leading to Palliative Medicine involvement include: 72year old female who resides in long term care due to dementia, presented with acute MI. Developed acute pulmonary edema requiring intubation. Palliative medicine is consulted for goals of care conversations. PALLIATIVE DIAGNOSES:   1. Goals of care   2. MI  3. Acute resp failure   4. dementia       PLAN:   1. Goals of care Ms Mahogany Rios is currently orally intubated. She is on sedation but eyes open, calm tracked me in room. There is no AMD on file. Listed is her sister Ms Glenda Herndon as contact. I have left a message for Ms Cho. Patient is not able to currently participate in her medical decisions. Goals of care full code with full interventions  2. Acute MI cardiology on board on dobutamine drip. S/P ptac/ stent to proximal / mid LAD. EF 35 to 40%   3. Acute resp failure d/t pulmonary edema, currently ventilator supported, awake on vent. Also has ILD per pulmonary with possible new finding of pulmonary fibrosis. PCCM managing, weaning down sedation, possible SBT   4. Dementia on Aricept, per records baseline orientation self and family. Lives in long term care. 5. Initial consult note routed to primary continuity provider  6.  Communicated plan of care with: Palliative IDT    Patient/Health Care Proxy Stated Goals: Prolong life      TREATMENT PREFERENCES:   Code Status: Full Code    Advance Care Planning:  [] [facility] Interdisciplinary Team has updated the ACP Navigator with [address] and Patient Capacity    Primary Decision Maker ([address]): no AMD on file. Listed is sister Ms Myrna Escobedo Interventions: Full interventions         Other:  As far as possible, the palliative care team has discussed with patient / health care proxy about goals of care / treatment preferences for patient. HISTORY:     History obtained from: chart     CHIEF COMPLAINT: acute MI     HPI/SUBJECTIVE:    The patient is:   [] Verbal and participatory  [x] Non-participatory due to: sedated, orally intubated      Clinical Pain Assessment (nonverbal scale for nonverbal patients): Clinical Pain Assessment  Severity: 0     Activity (Movement): Lying quietly, normal position    Duration: for how long has pt been experiencing pain (e.g., 2 days, 1 month, years)  Frequency: how often pain is an issue (e.g., several times per day, once every few days, constant)     FUNCTIONAL ASSESSMENT:     Palliative Performance Scale (PPS):  PPS: 40            PSYCHOSOCIAL/SPIRITUAL SCREENING:      Any spiritual / Samaritan concerns: unable to assess   [] Yes /  [] No    Caregiver Burnout:  [] Yes /  [] No /  [x] No Caregiver Present      Anticipatory grief assessment: unable to assess   [] Normal  / [] Maladaptive        REVIEW OF SYSTEMS:     Positive and pertinent negative findings in ROS are noted above in HPI. The following systems were [] reviewed / [x] unable to be reviewed as noted in HPI  Other findings are noted below. Systems: constitutional, ears/nose/mouth/throat, respiratory, gastrointestinal, genitourinary, musculoskeletal, integumentary, neurologic, psychiatric, endocrine. Positive findings noted below.   Modified ESAS Completed by: provider           Pain: 0           Dyspnea: 0                    PHYSICAL EXAM:     Wt Readings from Last 3 Encounters:   12/17/20 67 kg (147 lb 11.3 oz)   12/10/20 81.6 kg (179 lb 14.3 oz)   08/17/18 81.6 kg (180 lb)     Blood pressure 129/68, pulse 83, temperature 98 °F (36.7 °C), resp. rate 22, height 5' 7\" (1.702 m), weight 67 kg (147 lb 11.3 oz), SpO2 98 %, not currently breastfeeding. Pain:  Pain Scale 1: Adult Nonverbal Pain Scale  Pain Intensity 1: 0              Pain Intervention(s) 1: Medication (see MAR)  Last bowel movement: none recorded    Constitutional: orally intubated, eyes open alert calm in NAD  Eyes: pupils equal, anicteric  ENMT: orally intubated   Respiratory: ventilator supported not labored   Skin: warm, dry  Neuro alert sedated calm         HISTORY:     Principal Problem:    STEMI (ST elevation myocardial infarction) ([facility] [address] (12/10/2020)    Active Problems:    Acute on chronic systolic congestive heart failure ([address] (12/15/2020)      Severe protein-calorie malnutrition ([address] (12/16/2020)      Past Medical History:   Diagnosis Date    Arthritis     Asthma     Chronic pain     BACK PAIN    Diabetes ([address]     Diabetic neuropathy ([address]     Emphysema     Hepatitis C     Hypertension     Nervousness     Osteoarthritis of both knees       Past Surgical History:   Procedure Laterality Date    HX CARPAL TUNNEL RELEASE Right 8/31/09    Dr. Da Silva Post    HX TUBAL LIGATION        Family History   Problem Relation Age of Onset    Diabetes Mother     Hypertension Mother     Obesity Mother     Alcohol abuse Father     High Cholesterol Father     Lung Disease Father     Stroke Father     Arthritis-osteo Sister     Depression Sister     Diabetes Sister     Hypertension Sister     Obesity Sister     Arthritis-osteo Brother     Diabetes Brother     Hypertension Brother     Obesity Brother      History reviewed, no pertinent family history.   Social History     Tobacco Use    Smoking status: Current Every Day Smoker     Packs/day: 1.00   Substance Use Topics  Alcohol use: Yes     Comment: socially     No Known Allergies   Current Facility-Administered Medications   Medication Dose Route Frequency    [START ON 12/18/2020] insulin glargine (LANTUS) injection 10 Units  10 Units SubCUTAneous DAILY    furosemide (LASIX) 100 mg in 0.9% sodium chloride (MBP/ADV) 110 mL infusion  5 mg/hr IntraVENous CONTINUOUS    naloxegoL (MOVANTIK) tablet 12.5 mg  12.5 mg Oral ACB    polyethylene glycol (MIRALAX) packet 17 g  17 g Per NG tube DAILY    methylPREDNISolone (PF) (SOLU-MEDROL) injection 40 mg  40 mg IntraVENous Q6H    spironolactone (ALDACTONE) tablet 25 mg  25 mg Oral DAILY    multivit-folic acid-herbal 007 (WELLESSE PLUS) oral liquid 30 mL  30 mL Per NG tube DAILY    fentaNYL (PF) 900 mcg/30 ml infusion soln  0-200 mcg/hr IntraVENous TITRATE    piperacillin-tazobactam (ZOSYN) 3.375 g in 0.9% sodium chloride (MBP/ADV) 100 mL MBP  3.375 g IntraVENous Q6H    diazePAM (VALIUM) tablet 5 mg  5 mg Oral TID    QUEtiapine (SEROquel) tablet 50 mg  50 mg Oral BID    DOBUTamine (DOBUTREX) 500 mg/250 mL (2,000 mcg/mL) infusion  3 mcg/kg/min IntraVENous CONTINUOUS    dexmedeTOMidine in 0.9 % NaCl (PRECEDEX) 400 mcg/100 mL (4 mcg/mL) infusion soln  0.1-1.5 mcg/kg/hr IntraVENous TITRATE    midazolam in normal saline (VERSED) 1 mg/mL infusion  1-10 mg/hr IntraVENous TITRATE    insulin lispro (HUMALOG) injection   SubCUTAneous Q6H    carvediloL (COREG) tablet 3.125 mg  3.125 mg Oral Q12H    fentaNYL citrate (PF) injection  mcg   mcg IntraVENous Q2H PRN    sodium chloride (NS) flush 5-40 mL  5-40 mL IntraVENous PRN    ticagrelor (BRILINTA) tablet 90 mg  90 mg Per NG tube BID    sodium chloride (NS) flush 5-40 mL  5-40 mL IntraVENous PRN    albuterol-ipratropium (DUO-NEB) 2.5 MG-0.5 MG/3 ML  3 mL Nebulization Q4H PRN    melatonin tablet 3 mg  3 mg Oral QHS    famotidine (PF) (PEPCID) 20 mg in 0.9% sodium chloride 10 mL injection  20 mg IntraVENous Q12H    midazolam (VERSED) injection 1-2 mg  1-2 mg IntraVENous Q1H PRN    sodium chloride (NS) flush 5-40 mL  5-40 mL IntraVENous Q8H    sodium chloride (NS) flush 5-40 mL  5-40 mL IntraVENous PRN    acetaminophen (TYLENOL) tablet 650 mg  650 mg Oral Q6H PRN    Or    acetaminophen (TYLENOL) suppository 650 mg  650 mg Rectal Q6H PRN    promethazine (PHENERGAN) tablet 12.5 mg  12.5 mg Oral Q6H PRN    Or    ondansetron (ZOFRAN) injection 4 mg  4 mg IntraVENous Q6H PRN    glucose chewable tablet 16 g  4 Tab Oral PRN    glucagon (GLUCAGEN) injection 1 mg  1 mg IntraMUSCular PRN    dextrose (D50W) injection syrg 12.5-25 g  25-50 mL IntraVENous PRN    aspirin chewable tablet 81 mg  81 mg Oral DAILY    atorvastatin (LIPITOR) tablet 40 mg  40 mg Oral QHS        LAB AND IMAGING FINDINGS:     Lab Results   Component Value Date/Time    WBC 6.3 12/17/2020 04:15 AM    HGB 9.6 (L) 12/17/2020 04:15 AM    PLATELET 029 (H) 46/54/3105 04:15 AM     Lab Results   Component Value Date/Time    Sodium 139 12/17/2020 04:15 AM    Potassium 4.1 12/17/2020 04:15 AM    Chloride 105 12/17/2020 04:15 AM    CO2 30 12/17/2020 04:15 AM    BUN 18 12/17/2020 04:15 AM    Creatinine 0.69 12/17/2020 04:15 AM    Calcium 8.1 (L) 12/17/2020 04:15 AM    Magnesium 2.0 12/17/2020 04:15 AM    Phosphorus 3.2 12/17/2020 04:15 AM      Lab Results   Component Value Date/Time    Alk.  phosphatase 76 12/12/2020 06:20 AM    Protein, total 7.2 12/12/2020 06:20 AM    Albumin 2.4 (L) 12/12/2020 06:20 AM    Globulin 4.8 (H) 12/12/2020 06:20 AM     Lab Results   Component Value Date/Time    INR 1.2 12/10/2020 11:07 AM    Prothrombin time 14.5 12/10/2020 11:07 AM    aPTT >180.0 (HH) 12/16/2020 03:35 PM      No results found for: IRON, FE, TIBC, IBCT, PSAT, FERR   No results found for: PH, PCO2, PO2  No components found for: [place]   Lab Results   Component Value Date/Time     12/15/2020 07:45 PM    CK - MB 1.3 12/15/2020 07:45 PM              Total time: 30 minutes Counseling / coordination time, spent as noted above: 20 minutes   > 50% counseling / coordination: yes     Prolonged service was provided for  []30 min   []75 min in face to face time in the presence of the patient, spent as noted above. Time Start:   Time End:   Note: this can only be billed with 33066 (initial) or 38077 (follow up). If multiple start / stop times, list each separately.

## 2020-12-17 NOTE — PROGRESS NOTES
[facility]  Pulmonary, Critical Care, and Sleep Medicine    Pulmonary Medicine Progress Note    Name: Nathanel Dakins MRN: [id number]  : 1955 Hospital: [address] [place] [address] Dr  Date: 2020       Subjective:  Pt is much improved this AM. More awake, intermittently agitated but overall more calm. Hemodynamics stable, remains on dobutamine gtt. On fentanyl gtt and precedex gtt. Patient Active Problem List   Diagnosis Code    Diverticula of colon K57.30    Sepsis ([address] A41.9    Sepsis secondary to UTI ([address] A41.9, N39.0    DM hyperosmolarity type II, uncontrolled ([address] E11.00, E11.65    HTN (hypertension) I10    Febrile illness, acute R50.9    Gram-negative bacteremia R78.81    Diabetic neuropathy ([address] E11.40    Osteoarthritis of both knees M17.0    Acidosis E87.2    Respiratory failure ([address] J96.90    Shock ([address] R57.9    Hyperosmolar (nonketotic) coma ([address] E11.01    Acute UTI N39.0    Macrocytic anemia D53.9    STEMI (ST elevation myocardial infarction) (HCC) I21.3    Acute on chronic systolic congestive heart failure (HCC) I50.23    Severe protein-calorie malnutrition (HCC) E43       Assessment:  · CAD: S/P PCI with DEC- LAD 20  · Respiratory Failure- hypoxic- pulmonary edema, CHF in the setting of severe ILD  · Pulmonary Fibrosis  · - severe, with extensive honeycombing and bronchiectasis on CT scan  · - appears to be a new diagnosis  · Encephalopathy  · - severe, likely delirium from acute illness  · Dementia- on Aricept  · Hypokalemia  · DM: on Lantus as OP  · Hepatitic C infection\  · UTI  · - gnr, awaiting speciation    Impression/Plan:  - SBT this AM, possible extubation  - Wean down fentanyl gtt, keep precedex gtt for now  - Extensive pulmonary fibrosis with areas of GGO noted, unclear if she or the family is aware of this diagnosis. Will start steroids for a possible acute inflammatory component.    - Continue Zosyn, await Urine cx speciation  - Diuresis and dobutamine per cardiology  - TFs, increase miralax     Full Code       FiO2 to keep SpO2 >=92%, HOB >=30 degree, aspiration precautions, aggressive pulmonary toileting, incentive spirometry.    Other issues management by primary team and respective consultants.    Events and notes from last 24 hours reviewed.   Discussed with patient and family, answered all questions to their satisfaction.   Care plan discussed with nursing.     Labs and images personally seen and available reports reviewed  All current medicines are reviewed       Medications- Current:  Current Facility-Administered Medications   Medication Dose Route Frequency   • [START ON 12/18/2020] insulin glargine (LANTUS) injection 10 Units  10 Units SubCUTAneous DAILY   • furosemide (LASIX) 100 mg in 0.9% sodium chloride (MBP/ADV) 110 mL infusion  5 mg/hr IntraVENous CONTINUOUS   • naloxegoL (MOVANTIK) tablet 12.5 mg  12.5 mg Oral ACB   • polyethylene glycol (MIRALAX) packet 17 g  17 g Per NG tube DAILY   • methylPREDNISolone (PF) (SOLU-MEDROL) injection 40 mg  40 mg IntraVENous Q6H   • spironolactone (ALDACTONE) tablet 25 mg  25 mg Oral DAILY   • multivit-folic acid-herbal 275 (WELLESSE PLUS) oral liquid 30 mL  30 mL Per NG tube DAILY   • fentaNYL (PF) 900 mcg/30 ml infusion soln  0-200 mcg/hr IntraVENous TITRATE   • piperacillin-tazobactam (ZOSYN) 3.375 g in 0.9% sodium chloride (MBP/ADV) 100 mL MBP  3.375 g IntraVENous Q6H   • diazePAM (VALIUM) tablet 5 mg  5 mg Oral TID   • QUEtiapine (SEROquel) tablet 50 mg  50 mg Oral BID   • DOBUTamine (DOBUTREX) 500 mg/250 mL (2,000 mcg/mL) infusion  3 mcg/kg/min IntraVENous CONTINUOUS   • dexmedeTOMidine in 0.9 % NaCl (PRECEDEX) 400 mcg/100 mL (4 mcg/mL) infusion soln  0.1-1.5 mcg/kg/hr IntraVENous TITRATE   • midazolam in normal saline (VERSED) 1 mg/mL infusion  1-10 mg/hr IntraVENous TITRATE   • insulin lispro (HUMALOG) injection   SubCUTAneous Q6H   • carvediloL (COREG) tablet 3.125  mg  3.125 mg Oral Q12H    fentaNYL citrate (PF) injection  mcg   mcg IntraVENous Q2H PRN    sodium chloride (NS) flush 5-40 mL  5-40 mL IntraVENous PRN    ticagrelor (BRILINTA) tablet 90 mg  90 mg Per NG tube BID    sodium chloride (NS) flush 5-40 mL  5-40 mL IntraVENous PRN    albuterol-ipratropium (DUO-NEB) 2.5 MG-0.5 MG/3 ML  3 mL Nebulization Q4H PRN    melatonin tablet 3 mg  3 mg Oral QHS    famotidine (PF) (PEPCID) 20 mg in 0.9% sodium chloride 10 mL injection  20 mg IntraVENous Q12H    midazolam (VERSED) injection 1-2 mg  1-2 mg IntraVENous Q1H PRN    sodium chloride (NS) flush 5-40 mL  5-40 mL IntraVENous Q8H    sodium chloride (NS) flush 5-40 mL  5-40 mL IntraVENous PRN    acetaminophen (TYLENOL) tablet 650 mg  650 mg Oral Q6H PRN    Or    acetaminophen (TYLENOL) suppository 650 mg  650 mg Rectal Q6H PRN    promethazine (PHENERGAN) tablet 12.5 mg  12.5 mg Oral Q6H PRN    Or    ondansetron (ZOFRAN) injection 4 mg  4 mg IntraVENous Q6H PRN    glucose chewable tablet 16 g  4 Tab Oral PRN    glucagon (GLUCAGEN) injection 1 mg  1 mg IntraMUSCular PRN    dextrose (D50W) injection syrg 12.5-25 g  25-50 mL IntraVENous PRN    aspirin chewable tablet 81 mg  81 mg Oral DAILY    atorvastatin (LIPITOR) tablet 40 mg  40 mg Oral QHS       Objective:  Vital Signs:    Visit Vitals  /66   Pulse 68   Temp 98 °F (36.7 °C)   Resp 15   Ht 5' 7\" (1.702 m)   Wt 67 kg (147 lb 11.3 oz)   SpO2 98%   Breastfeeding No   BMI 23.13 kg/m²      O2 Device: Endotracheal tube, Ventilator  O2 Flow Rate (L/min): 4 l/min  Temp (24hrs), Av.4 °F (36.9 °C), Min:97.9 °F (36.6 °C), Max:99.1 °F (37.3 °C)      Intake/Output:   Last shift:       0701 -  1900  In: 908.8 [I.V.:318.8]  Out: 705 [Urine:705]  Last 3 shifts: 12/15 1901 -  0700  In: 3304.8 [I.V.:1634.8]  Out: 6786 [Urine:3570]    Intake/Output Summary (Last 24 hours) at 2020 1423  Last data filed at 2020 1300  Gross per 24 hour   Intake 1977.48 ml   Output 2960 ml   Net -982.52 ml       Physical Exam:     General/Neurology: Alert, Awake  Head:   Normocephalic, without obvious abnormality  Eye:   PERRL, EOM intact  Oral:  Mucus membranes moist  Lung:   B/l air entry fair. No rales. No rhonchi. No wheezing  Heart:   S1 S2 present  Abdomen:  Soft, non tender, BS+nt   Extremities:  No pedal edema. Skin:   Dry, intact  Data:      Recent Results (from the past 24 hour(s))   CULTURE, BLOOD    Collection Time: 12/16/20  3:35 PM    Specimen: Blood   Result Value Ref Range    Special Requests: NO SPECIAL REQUESTS      Culture result: NO GROWTH AFTER 14 HOURS     CBC WITH AUTOMATED DIFF    Collection Time: 12/16/20  3:35 PM   Result Value Ref Range    WBC 6.6 4.6 - 13.2 K/uL    RBC 3.72 (L) 4.20 - 5.30 M/uL    HGB 9.8 (L) 12.0 - 16.0 g/dL    HCT 31.1 (L) 35.0 - 45.0 %    MCV 83.6 74.0 - 97.0 FL    MCH 26.3 24.0 - 34.0 PG    MCHC 31.5 31.0 - 37.0 g/dL    RDW 13.1 11.6 - 14.5 %    PLATELET 294 (H) 872 - 420 K/uL    MPV 9.4 9.2 - 11.8 FL    NEUTROPHILS 71 40 - 73 %    LYMPHOCYTES 10 (L) 21 - 52 %    MONOCYTES 13 (H) 3 - 10 %    EOSINOPHILS 6 (H) 0 - 5 %    BASOPHILS 0 0 - 2 %    ABS. NEUTROPHILS 4.7 1.8 - 8.0 K/UL    ABS. LYMPHOCYTES 0.7 (L) 0.9 - 3.6 K/UL    ABS. MONOCYTES 0.8 0.05 - 1.2 K/UL    ABS. EOSINOPHILS 0.4 0.0 - 0.4 K/UL    ABS.  BASOPHILS 0.0 0.0 - 0.1 K/UL    DF AUTOMATED     PTT    Collection Time: 12/16/20  3:35 PM   Result Value Ref Range    aPTT >180.0 (HH) 23.0 - 36.4 SEC   GLUCOSE, POC    Collection Time: 12/16/20  5:37 PM   Result Value Ref Range    Glucose (POC) 149 (H) 70 - 110 mg/dL   GLUCOSE, POC    Collection Time: 12/16/20 11:39 PM   Result Value Ref Range    Glucose (POC) 175 (H) 70 - 110 mg/dL   POC CG8I    Collection Time: 12/17/20  4:09 AM   Result Value Ref Range    Device: VENT      FIO2 (POC) 35 %    pH (POC) 7.46 (H) 7.35 - 7.45      pCO2 (POC) 43.4 35.0 - 45.0 MMHG    pO2 (POC) 105 (H) 80 - 100 MMHG    HCO3 (POC) 31. 2 (H) 22 - 26 MMOL/L    sO2 (POC) 98 (H) 92 - 97 %    Base excess (POC) 7 mmol/L    Mode ASSIST CONTROL      Tidal volume 450 ml    Set Rate 14 bpm    PEEP/CPAP (POC) 8 cmH2O    Allens test (POC) YES      Inspiratory Time 0.9 sec    Total resp. rate 18      Site LEFT RADIAL      Patient temp.  98.6      Specimen type (POC) ARTERIAL      Sodium,  136 - 145 MMOL/L    Potassium, POC 4.4 3.5 - 5.5 MMOL/L    Glucose,  (H) 74 - 106 MG/DL    Calcium, ionized (POC) 1.14 1.12 - 1.32 mmol/L    Hematocrit, POC 30 (L) 36 - 49 %    Hemoglobin, POC 10.2 (L) 12 - 16 G/DL    Performed by Loyda Doyle     Volume control plus YES     METABOLIC PANEL, BASIC    Collection Time: 12/17/20  4:15 AM   Result Value Ref Range    Sodium 139 136 - 145 mmol/L    Potassium 4.1 3.5 - 5.5 mmol/L    Chloride 105 100 - 111 mmol/L    CO2 30 21 - 32 mmol/L    Anion gap 4 3.0 - 18 mmol/L    Glucose 289 (H) 74 - 99 mg/dL    BUN 18 7.0 - 18 MG/DL    Creatinine 0.69 0.6 - 1.3 MG/DL    BUN/Creatinine ratio 26 (H) 12 - 20      GFR est AA >60 >60 ml/min/1.73m2    GFR est non-AA >60 >60 ml/min/1.73m2    Calcium 8.1 (L) 8.5 - 10.1 MG/DL   CBC W/O DIFF    Collection Time: 12/17/20  4:15 AM   Result Value Ref Range    WBC 6.3 4.6 - 13.2 K/uL    RBC 3.64 (L) 4.20 - 5.30 M/uL    HGB 9.6 (L) 12.0 - 16.0 g/dL    HCT 30.5 (L) 35.0 - 45.0 %    MCV 83.8 74.0 - 97.0 FL    MCH 26.4 24.0 - 34.0 PG    MCHC 31.5 31.0 - 37.0 g/dL    RDW 12.9 11.6 - 14.5 %    PLATELET 534 (H) 040 - 420 K/uL    MPV 9.1 (L) 9.2 - 11.8 FL   MAGNESIUM    Collection Time: 12/17/20  4:15 AM   Result Value Ref Range    Magnesium 2.0 1.6 - 2.6 mg/dL   PHOSPHORUS    Collection Time: 12/17/20  4:15 AM   Result Value Ref Range    Phosphorus 3.2 2.5 - 4.9 MG/DL   GLUCOSE, POC    Collection Time: 12/17/20 11:48 AM   Result Value Ref Range    Glucose (POC) 384 (H) 70 - 110 mg/dL         Chemistry   Recent Labs     12/17/20  0415 12/16/20  0200 12/16/20  0046 12/15/20  1642 12/15/20  1642 * 181* 186*  --   --     142 143  --   --    K 4.1 4.3 4.4   < >  --     108 108  --   --    CO2 30 28 26  --   --    BUN 18 22* 21*  --   --    CREA 0.69 0.88 0.79  --   --    CA 8.1* 8.3* 8.4*  --   --    MG 2.0 2.9* 3.2*  --  1.6   PHOS 3.2 3.4  --   --  3.0   AGAP 4 6 9  --   --    BUCR 26* 25* 27*  --   --     < > = values in this interval not displayed. CBC w/Diff   Recent Labs     12/17/20  0415 12/16/20  1535 12/15/20  1945   WBC 6.3 6.6 8.7   RBC 3.64* 3.72* 4.14*   HGB 9.6* 9.8* 11.2*   HCT 30.5* 31.1* 34.5*   * 493* 574*   GRANS  --  71 81*   LYMPH  --  10* 7*   EOS  --  6* 1       ABG   Recent Labs     12/17/20  0409 12/16/20  0358 12/15/20  2046   PHI 7.46* 7.38 7.46*   PCO2I 43.4 44.9 28.4*   PO2I 105* 284* 55*   HCO3I 31.2* 26.4* 20.3*   FIO2I 35 100 100       Micro    Recent Labs     12/16/20  1535 12/16/20  0200 12/16/20  0050   CULT NO GROWTH AFTER 14 HOURS NO GROWTH 1 DAY SCANT GRAM NEGATIVE RODS*  SCANT NORMAL RESPIRATORY AILEEN     Recent Labs     12/16/20  1535 12/16/20  0200 12/16/20  0050   CULT NO GROWTH AFTER 14 HOURS NO GROWTH 1 DAY SCANT GRAM NEGATIVE RODS*  SCANT NORMAL RESPIRATORY AILEEN       CT (Most Recent)   Results from Hospital Encounter encounter on 12/10/20   CTA CHEST W OR W WO CONT    Narrative CT Pulmonary Angiogram    CPT CODE: 49604    CLINICAL: Shortness of breath, concern for PE.    COMPARISON: Current and previous plain films. TECHNICAL: Volumetric data acquisition performed through the chest with a  multislice scanner. Reconstructions were created in the axial, coronal, and  sagittal planes. Coronal and sagittal reconstructions were created using maximal  intensity projection methodology to maximize sensitivity for emboli. Bolus  timing was optimized for a pulmonary embolism evaluation. 100 cc of Isovue-370  were utilized for this examination. FINDINGS:  The patient is intubated.  Endotracheal tube is in satisfactory position.  There are no pulmonary emboli. The ascending aorta is prominent at 3.7 cm. There  are dense calcifications in the anterior surface of the ascending aorta.  The lungs there are extensive coarse subpleural reticulations increasing in  confluence in severity in the bases. Honeycombing is present. There is traction  bronchiectasis extensively in the lower lung zones . There is groundglass  infiltrate and consolidation between the areas of honeycombing and traction  bronchiectasis  No pleural effusion or pneumothorax is apparent.  There is a small pneumomediastinum evident..  No mediastinal adenopathy or mass is evident. There is multichamber cardiac  enlargement.  Sections in the upper abdomen reveal a inhomogeneous mass in the posterior right  lobe of the liver measuring 9 x 10 cm transaxially. There may be an additional  smaller lesion (2 cm) in the medial left lobe.      Impression IMPRESSION:    Negative for acute pulmonary embolism or acute aortic abnormality.  Dilated ascending aorta to 3.7 cm.  Advanced pulmonary fibrosis with traction bronchiectasis and honeycombing. The  appearance is suggestive of UIP.  There are extensive groundglass and confluent infiltrates superimposed upon the  interstitial process in the lower lobes. Primary differential considerations for  this include pulmonary edema, pneumonia, aspiration, pulmonary hemorrhage, and  acute lung injury.  There is a large inhomogeneous mass in the right lobe of the liver and possibly  a second smaller lesion. Differential includes primary and metastatic disease.  Broad differential.  Minimal pneumomediastinum        All CT scans at this facility are performed using dose optimization technique as  appropriate to the performed exam, to include automated exposure control,  adjustment of the mA and/or kV according to patient's size (Including  appropriate matching for site-specific examinations), or use of iterative  reconstruction technique.  XR (Most Recent). CXR reviewed by me and compared with previous CXR   Results from Hospital Encounter encounter on 12/10/20   XR CHEST PORT    Narrative EXAM: XR CHEST PORT    INDICATION: 72 years Female. Tachypnea. ADDITIONAL HISTORY: None. TECHNIQUE: Frontal view of the chest.    COMPARISON: Chest radiograph 12/15/2020 1127 hours    FINDINGS:    Endotracheal tube with tip 4 cm proximal to chace. There is an enteric tube  which extends inferior to the field-of-view. The cardiac silhouette is within normal limits in size. Atherosclerotic  calcifications are noted in the aorta. There are diffuse reticular and airspace opacities throughout the bilateral  lungs, without significant interval change compared to the prior chest  radiograph. No definite evidence of pleural effusion or pneumothorax. Osseous structures are unchanged in appearance. Impression IMPRESSION:  1. No significant interval change. 2.  Support devices in acceptable positions, as above. 3.  Unchanged moderately extensive bilateral lung reticular and airspace  opacities, likely infectious or inflammatory. See my orders for details     Total care time exclusive of procedures with complex decision making, coordination of care and counseling patient performed and > 50% time spent in face to face evaluation as mentioned above.     Gael Majano MD  Critical Care Medicine

## 2020-12-17 NOTE — PROGRESS NOTES
[facility]      CARDIOLOGY PROGRESS NOTE  RECS:    1. Subacute MI-EKG showed Q waves in anterolateral lead with ST elevation, with suspicion of late presentation of anterior MI-s/p [facility] S/p ptca/stent to proximal/ mid LAD using ARELY. LVEDP 8 mmHg. Normal PASP pressure with normal PCWP. Moderate to severe LV dysfunction. Continue asa and Brilinta. 2. Acute respiratory failure-in the setting acute CHF- requiring mechanical ventilation. Weaning and extubation per pulmonary. 3. Borderline BP- monitor will resume BB when BP stabilizes. Since I am increasing diuresis today. 4. Acute Systolic Congestive heart Failure-recent echo with EF% 35%-40-mildly dedecompensated-continue IV dobutamine. Increased urine output with lasix drip. 5. COPD, on home O2 4L NC   6. Hepatitis C ? -Per family member   9. DM2- medications per medical team   8. Dementia      Agree with possibility of pulmonary fibrosis- given extensive air space disease and normal PCWP. Continue dobutamine. Will f/u     Cardiac cath 12/11/20  Patient presented to [address] [place] [address] with late presentation anterior wall MI.  EF 35-40%. Patient was initially managed medically-- however developed acute pulmonary edema requiring intubation. Also troponin level increasing consistent with ongoing ischemia. S/p ptca/stent to proximal/ mid LAD using ARELY. LVEDP 8 mmHg. Normal PASP pressure with normal PCWP. Moderate to severe LV dysfunction.     Echo 12/10/20  · LV: Estimated LVEF is 35 - 40%. Visually measured ejection fraction. Normal cavity size and wall thickness. Moderately and segmentally reduced systolic function. Inconclusive left ventricular diastolic function. · AO: Mild aortic root dilatation; diameter is 3.8 cm.   ASSESSMENT:  Hospital Problems  Date Reviewed: 2/7/2018          Codes Class Noted POA    Severe protein-calorie malnutrition ([facility] [address] ICD-10-CM: Z68  ICD-9-CM: 698  12/16/2020 Clinically Undetermined Acute on chronic systolic congestive heart failure (HCC) ICD-10-CM: I50.23  ICD-9-CM: 428.23, 428.0  12/15/2020 Unknown        * (Principal) STEMI (ST elevation myocardial infarction) [facility]) ICD-10-CM: I21.3  ICD-9-CM: 410.90  12/10/2020 Unknown                SUBJECTIVE:  Sedated / Intubated    OBJECTIVE:    VS:   Visit Vitals  /66   Pulse 71   Temp 98 °F (36.7 °C)   Resp 17   Ht 5' 7\" (1.702 m)   Wt 67 kg (147 lb 11.3 oz)   SpO2 99%   Breastfeeding No   BMI 23.13 kg/m²         Intake/Output Summary (Last 24 hours) at 12/17/2020 1347  Last data filed at 12/17/2020 1300  Gross per 24 hour   Intake 1997.48 ml   Output 3260 ml   Net -1262.52 ml     TELE: normal sinus rhythm    General: Intubate / sedated  HENT: Normocephalic, atraumatic. Normal external eye.   Neck :  no JVD  Cardiac:  regular rate and rhythm, S1, S2 normal, no murmur, click, rub or gallop  Lungs: clear to auscultation bilaterally, diminished breath sounds bilaterally  Abdomen: Soft, nontender, no masses  Extremities:  No c/c/e, peripheral pulses present      Labs: Results:       Chemistry Recent Labs     12/17/20  0415 12/16/20  0200 12/16/20  0046   * 181* 186*    142 143   K 4.1 4.3 4.4    108 108   CO2 30 28 26   BUN 18 22* 21*   CREA 0.69 0.88 0.79   CA 8.1* 8.3* 8.4*   AGAP 4 6 9   BUCR 26* 25* 27*      CBC w/Diff Recent Labs     12/17/20  0415 12/16/20  1535 12/15/20  1945   WBC 6.3 6.6 8.7   RBC 3.64* 3.72* 4.14*   HGB 9.6* 9.8* 11.2*   HCT 30.5* 31.1* 34.5*   * 493* 574*   GRANS  --  71 81*   LYMPH  --  10* 7*   EOS  --  6* 1      Cardiac Enzymes Recent Labs     12/15/20  1945      CKND1 1.1      Coagulation Recent Labs     12/16/20  1535 12/16/20  0600   APTT >180.0* 52.7*       Lipid Panel No results found for: CHOL, CHOLPOCT, CHOLX, CHLST, CHOLV, 900758, HDL, HDLP, LDL, LDLC, DLDLP, 693630, VLDLC, VLDL, TGLX, TRIGL, TRIGP, TGLPOCT, CHHD, CHHDX   BNP No results for input(s): BNPP in the last 72 hours.   Liver Enzymes No results for input(s): TP, ALB, TBIL, AP in the last 72 hours. No lab exists for component: SGOT, GPT, DBIL   Thyroid Studies Lab Results   Component Value Date/Time    TSH 2.92 12/10/2020 11:07 AM              Gato Sosa NP  Supervised    I have independently evaluated and examined the patient. All relevant labs and testing data's are reviewed. Care plan discussed and updated after review.     Georgina Santiago MD

## 2020-12-17 NOTE — PROGRESS NOTES
conducted an initial consultation and Spiritual Assessment for Aurelia Keenan, who is a 72 y.o.,female. Patient's Primary Language is: [place]. According to the patient's EMR Restorationist Affiliation is: [facility].     The reason the Patient came to the hospital is:   Patient Active Problem List    Diagnosis Date Noted    Gram-negative bacteremia 02/05/2015     Priority: 1 - One    Sepsis secondary to UTI ([address] 02/03/2015     Priority: 1 - One    DM hyperosmolarity type II, uncontrolled ([address] 02/03/2015     Priority: 1 - One    HTN (hypertension) 02/03/2015     Priority: 1 - One    Severe protein-calorie malnutrition ([address] 12/16/2020    Acute on chronic systolic congestive heart failure ([address] 12/15/2020    STEMI (ST elevation myocardial infarction) ([address] 12/10/2020    Acidosis 02/07/2018    Respiratory failure ([address] 02/07/2018    Shock ([address] 02/07/2018    Hyperosmolar (nonketotic) coma ([address] 02/07/2018    Acute UTI 02/07/2018    Macrocytic anemia 02/07/2018    Diabetic neuropathy ([address]     Osteoarthritis of both knees     Sepsis ([address] 02/03/2015    Febrile illness, acute 02/03/2015    Diverticula of colon 09/21/2012        The  provided the following Interventions:  Initiated a relationship of care and support. Prayer on patient's behalf. Assessment:  There are no spiritual or Adventist issues which require intervention at this time. Plan:  Chaplains will continue to follow and will provide pastoral care on an as needed/requested basis.  recommends bedside caregivers page  on duty if patient shows signs of acute spiritual or emotional distress.     [facility]   [phone]

## 2020-12-17 NOTE — PROGRESS NOTES
0700- Received report from Shaunna Denver, RN. Assumed care of patient. 46- Patients sister called and was updated on current condition. 0930- Fentanyl rate decreased to 75 mcg/hr per request of Dr. Lito Cueto. Remains on Precedex 0.1mcg/kg/hr. Vent settings changed to SIMV to attempt weaning today. 1200- remains on SIMV and appears to be tolerating without complications. Resting with occasional bouts of agitation; easily redirected. 46- Dr. Lito Cueto on phone for update on patient status. Informed that patient appears to be tolerating SIMV. States he will call RT to see if patient ready for extubation. 1500- Paused tube feeds and stopped sedation. Patient extubated and placed on 5L/NC. Initially increased secretions with weak cough. NT suction able to clear some but still has audibly coarse upper airway. Restraints discontinued. 1600- RT able to NT suction again and remove large amount of upper airway secretions. Patient resting quietly now and appears much more comfortable. O2 sats 98-99% on 5L/NC.     1700- Pulled up, repositioned, and cleaned patient; changed lift sheet/pads. Patient more awake/alert and tolerated without complication. O2 sats dropped slightly, 93-95%, with activity but recovering with rest. Patient still has moderate to large amount of secretions but cough appears to be a little stronger now as well. Remains on 5L/NC.

## 2020-12-17 NOTE — PROGRESS NOTES
[facility]  Progress Note    Patient: Darci Query Age: 72 y.o. : 1955 MR#: [id number] SSN: [id number]  Date/Time: 2020     Subjective:   Patient continues intubated. Per nursing still w/o BM and her fentanyl sedation has been decreased. Assessment/Plan:   1. Acute on chronic hypoxic respiratory failure: Status post intubation by anesthesia on ,ventilator management per ICU team. CTA chest done  negative for PE but a number of abnormalities noted including advanced fibrosis, extensive groundglass infiltrates possibly due to pulmonary edema, pneumonia, aspiration, pulm hemorrhage and ALI, discussed w/ pulmonary. 2. Acute flash pulmonary edema due to #3: Diuresis per Cardiology. 3. Acute systolic heart failure exacerbation, EF 35%, diuresis per cardiology, limiting factor is her low bp, now on dobutamine drip, with improvement in her bp. Her beta-blocker has been held given her low blood pressure. Continue aspirin and statin, ACE inhibitor  to be added once blood pressure stabilizes. 4. Acute MI with anterolateral STEMI and Q waves:S/p ptca/stent to proximal/ mid LAD using ARELY on DAPT on 20 . On Beta-blocker and statin. On heparin drip as well. In light of possible pulmonary hemorrhage although unlikely, heparin drip stopped. Discussed with cardiologist.  5. Fever, improved today:  Etiology unclear. CT imaging  with extensive groundglass infiltrates possibly aspiration, at risk. On broad-spectrum antibiotics already  6. -Lung lesions on cta chest  7. History of COPD on home oxygen: Patient currently on vent, BD as tolerated. 8. Diabetes mellitus type 2: Sugars are somewhat within acceptable limits on corrective insulin. Plan to cont corrective insulin. Plan to adjust Lantus if blood sugars are consistently elevated. .  9. Hypertension: BP lower side, will monitor off medications.   Now on dobutamine for continued hypotension   10. dementia: Unknown baseline, resume Aricept when able to tolerate p.o. Full code: Code discussed with daughter at length on 12/15. Daughter had many concerns about the patient including the care she had been getting at a nursing home prior to her admission here. Had difficulty in focusing the conversation on goals of care. However at the end she has confirmed her wishes for her mother to continue to have full CODE STATUS. PLAN:  -Vent management per pccm, started on steroids by pulm  -Cont DAPT, bb, statin  Visit Vitals  /89   Pulse 88   Temp 99.1 °F (37.3 °C)   Resp 22   Ht 5' 7\" (1.702 m)   Wt 67 kg (147 lb 11.3 oz)   SpO2 96%   Breastfeeding No   BMI 23.13 kg/m²           Case discussed with:  []Patient  [x]Family  [x]Nursing  []Case Management  DVT Prophylaxis:  [x]Lovenox  []Hep iv  []SCDs  []Coumadin   []Eliquis/Xarelto     Objective:   VS:   Visit Vitals  /89   Pulse 88   Temp 99.1 °F (37.3 °C)   Resp 22   Ht 5' 7\" (1.702 m)   Wt 67 kg (147 lb 11.3 oz)   SpO2 96%   Breastfeeding No   BMI 23.13 kg/m²      Tmax/24hrs: Temp (24hrs), Av.5 °F (36.9 °C), Min:97.9 °F (36.6 °C), Max:99.1 °F (37.3 °C)  IOBRIEF    Intake/Output Summary (Last 24 hours) at 2020 1219  Last data filed at 2020 1100  Gross per 24 hour   Intake 1108.68 ml   Output 3110 ml   Net -2001.32 ml       General: awake, intubated does not appear to be in any distress. Pulmonary: CTAB  Cardiovascular: Regular rate and Rhythm. GI:  Soft, Non distended, Non tender. + Bowel sounds. + means   Extremities:  No edema. Psych: Not anxious or agitated.     Additional:    Medications:   Current Facility-Administered Medications   Medication Dose Route Frequency    [START ON 2020] insulin glargine (LANTUS) injection 10 Units  10 Units SubCUTAneous DAILY    furosemide (LASIX) 100 mg in 0.9% sodium chloride (MBP/ADV) 110 mL infusion  5 mg/hr IntraVENous CONTINUOUS    naloxegoL (MOVANTIK) tablet 12.5 mg  12.5 mg Oral ACB    polyethylene glycol (MIRALAX) packet 17 g  17 g Per NG tube DAILY    methylPREDNISolone (PF) (SOLU-MEDROL) injection 40 mg  40 mg IntraVENous Q6H    spironolactone (ALDACTONE) tablet 25 mg  25 mg Oral DAILY    multivit-folic acid-herbal 232 (WELLESSE PLUS) oral liquid 30 mL  30 mL Per NG tube DAILY    fentaNYL (PF) 900 mcg/30 ml infusion soln  0-200 mcg/hr IntraVENous TITRATE    piperacillin-tazobactam (ZOSYN) 3.375 g in 0.9% sodium chloride (MBP/ADV) 100 mL MBP  3.375 g IntraVENous Q6H    diazePAM (VALIUM) tablet 5 mg  5 mg Oral TID    QUEtiapine (SEROquel) tablet 50 mg  50 mg Oral BID    DOBUTamine (DOBUTREX) 500 mg/250 mL (2,000 mcg/mL) infusion  3 mcg/kg/min IntraVENous CONTINUOUS    dexmedeTOMidine in 0.9 % NaCl (PRECEDEX) 400 mcg/100 mL (4 mcg/mL) infusion soln  0.1-1.5 mcg/kg/hr IntraVENous TITRATE    midazolam in normal saline (VERSED) 1 mg/mL infusion  1-10 mg/hr IntraVENous TITRATE    insulin lispro (HUMALOG) injection   SubCUTAneous Q6H    carvediloL (COREG) tablet 3.125 mg  3.125 mg Oral Q12H    fentaNYL citrate (PF) injection  mcg   mcg IntraVENous Q2H PRN    sodium chloride (NS) flush 5-40 mL  5-40 mL IntraVENous PRN    ticagrelor (BRILINTA) tablet 90 mg  90 mg Per NG tube BID    sodium chloride (NS) flush 5-40 mL  5-40 mL IntraVENous PRN    albuterol-ipratropium (DUO-NEB) 2.5 MG-0.5 MG/3 ML  3 mL Nebulization Q4H PRN    melatonin tablet 3 mg  3 mg Oral QHS    famotidine (PF) (PEPCID) 20 mg in 0.9% sodium chloride 10 mL injection  20 mg IntraVENous Q12H    midazolam (VERSED) injection 1-2 mg  1-2 mg IntraVENous Q1H PRN    sodium chloride (NS) flush 5-40 mL  5-40 mL IntraVENous Q8H    sodium chloride (NS) flush 5-40 mL  5-40 mL IntraVENous PRN    acetaminophen (TYLENOL) tablet 650 mg  650 mg Oral Q6H PRN    Or    acetaminophen (TYLENOL) suppository 650 mg  650 mg Rectal Q6H PRN    promethazine (PHENERGAN) tablet 12.5 mg  12.5 mg Oral Q6H PRN    Or    ondansetron (ZOFRAN) injection 4 mg  4 mg IntraVENous Q6H PRN    glucose chewable tablet 16 g  4 Tab Oral PRN    glucagon (GLUCAGEN) injection 1 mg  1 mg IntraMUSCular PRN    dextrose (D50W) injection syrg 12.5-25 g  25-50 mL IntraVENous PRN    aspirin chewable tablet 81 mg  81 mg Oral DAILY    atorvastatin (LIPITOR) tablet 40 mg  40 mg Oral QHS       Labs:    Recent Results (from the past 12 hour(s))   POC CG8I    Collection Time: 12/17/20  4:09 AM   Result Value Ref Range    Device: VENT      FIO2 (POC) 35 %    pH (POC) 7.46 (H) 7.35 - 7.45      pCO2 (POC) 43.4 35.0 - 45.0 MMHG    pO2 (POC) 105 (H) 80 - 100 MMHG    HCO3 (POC) 31.2 (H) 22 - 26 MMOL/L    sO2 (POC) 98 (H) 92 - 97 %    Base excess (POC) 7 mmol/L    Mode ASSIST CONTROL      Tidal volume 450 ml    Set Rate 14 bpm    PEEP/CPAP (POC) 8 cmH2O    Allens test (POC) YES      Inspiratory Time 0.9 sec    Total resp. rate 18      Site LEFT RADIAL      Patient temp.  98.6      Specimen type (POC) ARTERIAL      Sodium,  136 - 145 MMOL/L    Potassium, POC 4.4 3.5 - 5.5 MMOL/L    Glucose,  (H) 74 - 106 MG/DL    Calcium, ionized (POC) 1.14 1.12 - 1.32 mmol/L    Hematocrit, POC 30 (L) 36 - 49 %    Hemoglobin, POC 10.2 (L) 12 - 16 G/DL    Performed by Dimple Habermann     Volume control plus YES     METABOLIC PANEL, BASIC    Collection Time: 12/17/20  4:15 AM   Result Value Ref Range    Sodium 139 136 - 145 mmol/L    Potassium 4.1 3.5 - 5.5 mmol/L    Chloride 105 100 - 111 mmol/L    CO2 30 21 - 32 mmol/L    Anion gap 4 3.0 - 18 mmol/L    Glucose 289 (H) 74 - 99 mg/dL    BUN 18 7.0 - 18 MG/DL    Creatinine 0.69 0.6 - 1.3 MG/DL    BUN/Creatinine ratio 26 (H) 12 - 20      GFR est AA >60 >60 ml/min/1.73m2    GFR est non-AA >60 >60 ml/min/1.73m2    Calcium 8.1 (L) 8.5 - 10.1 MG/DL   CBC W/O DIFF    Collection Time: 12/17/20  4:15 AM   Result Value Ref Range    WBC 6.3 4.6 - 13.2 K/uL    RBC 3.64 (L) 4.20 - 5.30 M/uL    HGB 9.6 (L) 12.0 - 16.0 g/dL    HCT 30.5 (L) 35.0 - 45.0 %    MCV 83.8 74.0 - 97.0 FL    MCH 26.4 24.0 - 34.0 PG    MCHC 31.5 31.0 - 37.0 g/dL    RDW 12.9 11.6 - 14.5 %    PLATELET 253 (H) 877 - 420 K/uL    MPV 9.1 (L) 9.2 - 11.8 FL   MAGNESIUM    Collection Time: 12/17/20  4:15 AM   Result Value Ref Range    Magnesium 2.0 1.6 - 2.6 mg/dL   PHOSPHORUS    Collection Time: 12/17/20  4:15 AM   Result Value Ref Range    Phosphorus 3.2 2.5 - 4.9 MG/DL   GLUCOSE, POC    Collection Time: 12/17/20 11:48 AM   Result Value Ref Range    Glucose (POC) 384 (H) 70 - 110 mg/dL       Signed By: Pilar Moore MD     December 17, 2020      Disclaimer: Sections of this note are dictated using utilizing voice recognition software. Minor typographical errors may be present. If questions arise, please do not hesitate to contact me or call our department.

## 2020-12-17 NOTE — DIABETES MGMT
Glycemic Control Plan of Care    T2DM with A1c of 8.3% (12/10/2020)  12/11: Patient intub and sedated. Noted patient came from [facility] with history of dementia. Home diabetes medications: Unverified  Lantus insulin 22 units daily    Patient was admitted to [facility] from [facility] on 12/10 with report of shortness of breath and altered mental state. Noted the following assessment:  STEMI  Status post PTCA/stent on 12/11  Acute respiratory failure, intub/vent  Acute flash pulmonary edema  Acute systolic heart failure exacerbation  Rapid response 12/11    Hyperglycemia. Noted palliative care consult requested today. Glycemic recommendation(s): increase lantus insulin dose from 7 units to 10 units daily starting today. Order obtained. Glycemic assessment:    NPO on TF Glucerna 1.5  IV Solumedrol 40 mg every 6 hours. POC BG 12/16: 182, 199  POC BG 12/17 at time of review: 305, 384  Lab FBG 12/17: 289        Current A1C: 8.3% (12/10/2020) which is equivalent to estimated average blood glucose of 192 mg/dL during the past 2-3 months    Current hospital diabetes medications:  Basal lantus insulin 10 units daily  Correctional lispro insulin ACHS. Very resistant dose    Total daily dose insulin requirement previous day: 12/16:  Lantus: 7 units  Lispro: 6units  TDD insulin: 13 units    Diet: NPO. TF Glucerna    Goals:  Blood glucose will be within target range of  mg/dL by 12/20/2020    Education: 12/11: Intubated, sedated.  Noted patient came from [facility] with history of dementia.  ___  Refer to Diabetes Education Record   ___  Education not indicated at this time    Jimmie Hartmann RN [facility]  Pager: [phone]

## 2020-12-18 ENCOUNTER — APPOINTMENT (OUTPATIENT)
Dept: GENERAL RADIOLOGY | Age: 65
DRG: 003 | End: 2020-12-18
Attending: INTERNAL MEDICINE
Payer: MEDICARE

## 2020-12-18 LAB
ANION GAP SERPL CALC-SCNC: 3 MMOL/L (ref 3–18)
APTT PPP: 25.4 SEC (ref 23–36.4)
BACTERIA SPEC CULT: ABNORMAL
BACTERIA SPEC CULT: ABNORMAL
BUN SERPL-MCNC: 21 MG/DL (ref 7–18)
BUN/CREAT SERPL: 28 (ref 12–20)
CALCIUM SERPL-MCNC: 9.1 MG/DL (ref 8.5–10.1)
CHLORIDE SERPL-SCNC: 101 MMOL/L (ref 100–111)
CO2 SERPL-SCNC: 35 MMOL/L (ref 21–32)
CREAT SERPL-MCNC: 0.74 MG/DL (ref 0.6–1.3)
GLUCOSE BLD STRIP.AUTO-MCNC: 320 MG/DL (ref 70–110)
GLUCOSE BLD STRIP.AUTO-MCNC: 334 MG/DL (ref 70–110)
GLUCOSE BLD STRIP.AUTO-MCNC: 386 MG/DL (ref 70–110)
GLUCOSE SERPL-MCNC: 324 MG/DL (ref 74–99)
GRAM STN SPEC: ABNORMAL
GRAM STN SPEC: ABNORMAL
MAGNESIUM SERPL-MCNC: 2 MG/DL (ref 1.6–2.6)
PHOSPHATE SERPL-MCNC: 1.3 MG/DL (ref 2.5–4.9)
POTASSIUM SERPL-SCNC: 3.5 MMOL/L (ref 3.5–5.5)
SERVICE CMNT-IMP: ABNORMAL
SODIUM SERPL-SCNC: 139 MMOL/L (ref 136–145)

## 2020-12-18 PROCEDURE — 74011250636 HC RX REV CODE- 250/636: Performed by: PHYSICIAN ASSISTANT

## 2020-12-18 PROCEDURE — 74011636637 HC RX REV CODE- 636/637: Performed by: INTERNAL MEDICINE

## 2020-12-18 PROCEDURE — 94762 N-INVAS EAR/PLS OXIMTRY CONT: CPT

## 2020-12-18 PROCEDURE — 77010033678 HC OXYGEN DAILY

## 2020-12-18 PROCEDURE — 74011250636 HC RX REV CODE- 250/636: Performed by: NURSE PRACTITIONER

## 2020-12-18 PROCEDURE — 36415 COLL VENOUS BLD VENIPUNCTURE: CPT

## 2020-12-18 PROCEDURE — 84100 ASSAY OF PHOSPHORUS: CPT

## 2020-12-18 PROCEDURE — 74011250637 HC RX REV CODE- 250/637: Performed by: INTERNAL MEDICINE

## 2020-12-18 PROCEDURE — 2709999900 HC NON-CHARGEABLE SUPPLY

## 2020-12-18 PROCEDURE — 76450000000

## 2020-12-18 PROCEDURE — 83735 ASSAY OF MAGNESIUM: CPT

## 2020-12-18 PROCEDURE — 80048 BASIC METABOLIC PNL TOTAL CA: CPT

## 2020-12-18 PROCEDURE — 74011250636 HC RX REV CODE- 250/636: Performed by: INTERNAL MEDICINE

## 2020-12-18 PROCEDURE — 74011000258 HC RX REV CODE- 258: Performed by: PHYSICIAN ASSISTANT

## 2020-12-18 PROCEDURE — 99232 SBSQ HOSP IP/OBS MODERATE 35: CPT | Performed by: INTERNAL MEDICINE

## 2020-12-18 PROCEDURE — 74011000250 HC RX REV CODE- 250: Performed by: INTERNAL MEDICINE

## 2020-12-18 PROCEDURE — 85730 THROMBOPLASTIN TIME PARTIAL: CPT

## 2020-12-18 PROCEDURE — 65270000029 HC RM PRIVATE

## 2020-12-18 PROCEDURE — 92526 ORAL FUNCTION THERAPY: CPT

## 2020-12-18 PROCEDURE — 74011250637 HC RX REV CODE- 250/637: Performed by: NURSE PRACTITIONER

## 2020-12-18 PROCEDURE — 71045 X-RAY EXAM CHEST 1 VIEW: CPT

## 2020-12-18 PROCEDURE — 92610 EVALUATE SWALLOWING FUNCTION: CPT

## 2020-12-18 PROCEDURE — 82962 GLUCOSE BLOOD TEST: CPT

## 2020-12-18 PROCEDURE — 99233 SBSQ HOSP IP/OBS HIGH 50: CPT | Performed by: INTERNAL MEDICINE

## 2020-12-18 PROCEDURE — 74011000258 HC RX REV CODE- 258: Performed by: INTERNAL MEDICINE

## 2020-12-18 PROCEDURE — 99232 SBSQ HOSP IP/OBS MODERATE 35: CPT | Performed by: NURSE PRACTITIONER

## 2020-12-18 RX ORDER — INSULIN GLARGINE 100 [IU]/ML
10 INJECTION, SOLUTION SUBCUTANEOUS ONCE
Status: COMPLETED | OUTPATIENT
Start: 2020-12-18 | End: 2020-12-18

## 2020-12-18 RX ORDER — INSULIN GLARGINE 100 [IU]/ML
20 INJECTION, SOLUTION SUBCUTANEOUS DAILY
Status: DISCONTINUED | OUTPATIENT
Start: 2020-12-19 | End: 2020-12-21

## 2020-12-18 RX ORDER — POTASSIUM CHLORIDE 20 MEQ/1
40 TABLET, EXTENDED RELEASE ORAL
Status: DISCONTINUED | OUTPATIENT
Start: 2020-12-18 | End: 2020-12-18 | Stop reason: CLARIF

## 2020-12-18 RX ORDER — FUROSEMIDE 10 MG/ML
40 INJECTION INTRAMUSCULAR; INTRAVENOUS EVERY 12 HOURS
Status: DISCONTINUED | OUTPATIENT
Start: 2020-12-18 | End: 2021-01-03

## 2020-12-18 RX ADMIN — FENTANYL CITRATE 100 MCG: 50 INJECTION INTRAMUSCULAR; INTRAVENOUS at 11:16

## 2020-12-18 RX ADMIN — SODIUM CHLORIDE 40 ML: 9 INJECTION, SOLUTION INTRAMUSCULAR; INTRAVENOUS; SUBCUTANEOUS at 06:31

## 2020-12-18 RX ADMIN — DIAZEPAM 5 MG: 5 TABLET ORAL at 08:37

## 2020-12-18 RX ADMIN — POLYETHYLENE GLYCOL 3350 17 G: 17 POWDER, FOR SOLUTION ORAL at 08:37

## 2020-12-18 RX ADMIN — ASPIRIN 81 MG CHEWABLE TABLET 81 MG: 81 TABLET CHEWABLE at 08:52

## 2020-12-18 RX ADMIN — PIPERACILLIN AND TAZOBACTAM 3.38 G: 3; .375 INJECTION, POWDER, LYOPHILIZED, FOR SOLUTION INTRAVENOUS at 11:16

## 2020-12-18 RX ADMIN — CARVEDILOL 3.12 MG: 3.12 TABLET, FILM COATED ORAL at 08:37

## 2020-12-18 RX ADMIN — METHYLPREDNISOLONE SODIUM SUCCINATE 40 MG: 40 INJECTION, POWDER, FOR SOLUTION INTRAMUSCULAR; INTRAVENOUS at 11:16

## 2020-12-18 RX ADMIN — DIAZEPAM 5 MG: 5 TABLET ORAL at 17:44

## 2020-12-18 RX ADMIN — SODIUM CHLORIDE 10 ML: 9 INJECTION, SOLUTION INTRAMUSCULAR; INTRAVENOUS; SUBCUTANEOUS at 22:47

## 2020-12-18 RX ADMIN — Medication 30 ML: at 08:37

## 2020-12-18 RX ADMIN — POTASSIUM PHOSPHATE, MONOBASIC AND POTASSIUM PHOSPHATE, DIBASIC: 224; 236 INJECTION, SOLUTION, CONCENTRATE INTRAVENOUS at 17:53

## 2020-12-18 RX ADMIN — POTASSIUM BICARBONATE 40 MEQ: 782 TABLET, EFFERVESCENT ORAL at 08:05

## 2020-12-18 RX ADMIN — FAMOTIDINE 20 MG: 10 INJECTION INTRAVENOUS at 08:37

## 2020-12-18 RX ADMIN — QUETIAPINE FUMARATE 50 MG: 25 TABLET ORAL at 08:37

## 2020-12-18 RX ADMIN — PIPERACILLIN AND TAZOBACTAM 3.38 G: 3; .375 INJECTION, POWDER, LYOPHILIZED, FOR SOLUTION INTRAVENOUS at 06:30

## 2020-12-18 RX ADMIN — WATER 2 G: 1 INJECTION INTRAMUSCULAR; INTRAVENOUS; SUBCUTANEOUS at 17:44

## 2020-12-18 RX ADMIN — INSULIN LISPRO 12 UNITS: 100 INJECTION, SOLUTION INTRAVENOUS; SUBCUTANEOUS at 06:30

## 2020-12-18 RX ADMIN — METHYLPREDNISOLONE SODIUM SUCCINATE 40 MG: 40 INJECTION, POWDER, FOR SOLUTION INTRAMUSCULAR; INTRAVENOUS at 06:30

## 2020-12-18 RX ADMIN — INSULIN LISPRO 12 UNITS: 100 INJECTION, SOLUTION INTRAVENOUS; SUBCUTANEOUS at 17:39

## 2020-12-18 RX ADMIN — TICAGRELOR 90 MG: 90 TABLET ORAL at 08:37

## 2020-12-18 RX ADMIN — INSULIN LISPRO 15 UNITS: 100 INJECTION, SOLUTION INTRAVENOUS; SUBCUTANEOUS at 12:30

## 2020-12-18 RX ADMIN — FAMOTIDINE 20 MG: 10 INJECTION INTRAVENOUS at 22:47

## 2020-12-18 RX ADMIN — QUETIAPINE FUMARATE 50 MG: 25 TABLET ORAL at 17:44

## 2020-12-18 RX ADMIN — SODIUM CHLORIDE 10 ML: 9 INJECTION, SOLUTION INTRAMUSCULAR; INTRAVENOUS; SUBCUTANEOUS at 14:00

## 2020-12-18 RX ADMIN — SPIRONOLACTONE 25 MG: 25 TABLET, FILM COATED ORAL at 08:37

## 2020-12-18 RX ADMIN — FUROSEMIDE 5 MG/HR: 10 INJECTION, SOLUTION INTRAMUSCULAR; INTRAVENOUS at 08:06

## 2020-12-18 RX ADMIN — FUROSEMIDE 40 MG: 10 INJECTION, SOLUTION INTRAMUSCULAR; INTRAVENOUS at 22:47

## 2020-12-18 RX ADMIN — FUROSEMIDE 40 MG: 10 INJECTION, SOLUTION INTRAMUSCULAR; INTRAVENOUS at 11:16

## 2020-12-18 RX ADMIN — NALOXEGOL OXALATE 12.5 MG: 12.5 TABLET, FILM COATED ORAL at 08:05

## 2020-12-18 RX ADMIN — METHYLPREDNISOLONE SODIUM SUCCINATE 40 MG: 40 INJECTION, POWDER, FOR SOLUTION INTRAMUSCULAR; INTRAVENOUS at 17:44

## 2020-12-18 RX ADMIN — DOBUTAMINE HYDROCHLORIDE 3 MCG/KG/MIN: 200 INJECTION INTRAVENOUS at 09:06

## 2020-12-18 RX ADMIN — INSULIN GLARGINE 10 UNITS: 100 INJECTION, SOLUTION SUBCUTANEOUS at 08:36

## 2020-12-18 RX ADMIN — INSULIN GLARGINE 10 UNITS: 100 INJECTION, SOLUTION SUBCUTANEOUS at 14:57

## 2020-12-18 NOTE — PROGRESS NOTES
12/17/20 2109   Oxygen Therapy   O2 Sat (%) 96 %   Pulse via Oximetry 83 beats per minute   O2 Device Nasal cannula   O2 Flow Rate (L/min) 5 l/min     No respiratory distress or increased WOB noted

## 2020-12-18 NOTE — PROGRESS NOTES
[facility]  Progress Note    Patient: Jacki Christianson Age: 72 y.o. : 1955 MR#: [id number] SSN: [id number]  Date/Time: 2020     Subjective:    Patient seen and examined. Extubated. Resting comfortably. Calm. Follow most directions. Occasionally becomes confused. Dobutamine drip off. Pulled out NG tube. Assessment/Plan:   1. Acute on chronic hypoxic respiratory failure: Status post intubation by anesthesia on ,ventilator management per ICU team. CTA chest done  negative for PE but a number of abnormalities noted including advanced fibrosis, extensive groundglass infiltrates possibly due to pulmonary edema, pneumonia, aspiration, pulm hemorrhage and ALI, discussed w/ pulmonary. - extubated afternoon   2. Acute flash pulmonary edema due to #3: now off lasix, dobutamine discontinued   3. Acute systolic heart failure exacerbation, EF 35%, diuresis per cardiology, limiting factor is her low bp, now on dobutamine drip, with improvement in her bp. Her beta-blocker has been held given her low blood pressure. Continue aspirin and statin, ACE inhibitor  to be added once blood pressure stabilizes. 4. Acute MI with anterolateral STEMI and Q waves:S/p ptca/stent to proximal/ mid LAD using ARELY on DAPT on 20 . On Beta-blocker and statin. On heparin drip as well. In light of possible pulmonary hemorrhage although unlikely, heparin drip stopped. Discussed with cardiologist.  5. Fever:  None in 48 hours;  Etiology unclear. CT imaging  with extensive groundglass infiltrates possibly aspiration, at risk. On broad-spectrum antibiotics already  6. -Lung lesions on cta chest  7. History of COPD on home oxygen: Patient currently on vent, BD as tolerated. 8. Diabetes mellitus type 2: Sugars are somewhat within acceptable limits on corrective insulin. Plan to cont corrective insulin.   Plan to adjust Lantus if blood sugars are consistently elevated. .  9. Hypertension: BP lower side, will monitor off medications. Now on dobutamine for continued hypotension   10. dementia: Unknown baseline, resume Aricept when able to tolerate p.o. Full code: Code discussed with daughter at length on 12/15. Daughter had many concerns about the patient including the care she had been getting at a nursing home prior to her admission here. Had difficulty in focusing the conversation on goals of care. However at the end she has confirmed her wishes for her mother to continue to have full CODE STATUS. PLAN:  -Aspiration precautions,   - off lasix and dobutamine  - speech eval- if she fails, will need to replace NG.   -Cont DAPT, bb, statin    Visit Vitals  /69   Pulse 83   Temp 97.6 °F (36.4 °C)   Resp 21   Ht 5' 7\" (1.702 m)   Wt 63.6 kg (140 lb 4.8 oz)   SpO2 97%   Breastfeeding No   BMI 21.97 kg/m²           Case discussed with:  []Patient  [x]Family  [x]Nursing  []Case Management  DVT Prophylaxis:  [x]Lovenox  []Hep iv  []SCDs  []Coumadin   []Eliquis/Xarelto     Objective:   VS:   Visit Vitals  /69   Pulse 83   Temp 97.6 °F (36.4 °C)   Resp 21   Ht 5' 7\" (1.702 m)   Wt 63.6 kg (140 lb 4.8 oz)   SpO2 97%   Breastfeeding No   BMI 21.97 kg/m²      Tmax/24hrs: Temp (24hrs), Av.9 °F (36.6 °C), Min:97.6 °F (36.4 °C), Max:98.1 °F (36.7 °C)  IOBRIEF    Intake/Output Summary (Last 24 hours) at 2020 1259  Last data filed at 2020 1116  Gross per 24 hour   Intake 1193.03 ml   Output 2900 ml   Net -1706.97 ml       General: awake, follows commands    Pulmonary: CTAB  Cardiovascular: Regular rate and Rhythm. GI:  Soft, Non distended, Non tender. + Bowel sounds. + means   Extremities:  No edema. Psych: Not anxious or agitated.     Additional:    Medications:   Current Facility-Administered Medications   Medication Dose Route Frequency    furosemide (LASIX) injection 40 mg  40 mg IntraVENous Q12H    insulin glargine (LANTUS) injection 10 Units 10 Units SubCUTAneous DAILY    polyethylene glycol (MIRALAX) packet 17 g  17 g Per NG tube DAILY    methylPREDNISolone (PF) (SOLU-MEDROL) injection 40 mg  40 mg IntraVENous Q6H    spironolactone (ALDACTONE) tablet 25 mg  25 mg Oral DAILY    multivit-folic acid-herbal 420 (WELLESSE PLUS) oral liquid 30 mL  30 mL Per NG tube DAILY    piperacillin-tazobactam (ZOSYN) 3.375 g in 0.9% sodium chloride (MBP/ADV) 100 mL MBP  3.375 g IntraVENous Q6H    diazePAM (VALIUM) tablet 5 mg  5 mg Oral TID    QUEtiapine (SEROquel) tablet 50 mg  50 mg Oral BID    DOBUTamine (DOBUTREX) 500 mg/250 mL (2,000 mcg/mL) infusion  3 mcg/kg/min IntraVENous CONTINUOUS    insulin lispro (HUMALOG) injection   SubCUTAneous Q6H    carvediloL (COREG) tablet 3.125 mg  3.125 mg Oral Q12H    fentaNYL citrate (PF) injection  mcg   mcg IntraVENous Q2H PRN    sodium chloride (NS) flush 5-40 mL  5-40 mL IntraVENous PRN    ticagrelor (BRILINTA) tablet 90 mg  90 mg Per NG tube BID    sodium chloride (NS) flush 5-40 mL  5-40 mL IntraVENous PRN    albuterol-ipratropium (DUO-NEB) 2.5 MG-0.5 MG/3 ML  3 mL Nebulization Q4H PRN    melatonin tablet 3 mg  3 mg Oral QHS    famotidine (PF) (PEPCID) 20 mg in 0.9% sodium chloride 10 mL injection  20 mg IntraVENous Q12H    midazolam (VERSED) injection 1-2 mg  1-2 mg IntraVENous Q1H PRN    sodium chloride (NS) flush 5-40 mL  5-40 mL IntraVENous Q8H    sodium chloride (NS) flush 5-40 mL  5-40 mL IntraVENous PRN    acetaminophen (TYLENOL) tablet 650 mg  650 mg Oral Q6H PRN    Or    acetaminophen (TYLENOL) suppository 650 mg  650 mg Rectal Q6H PRN    promethazine (PHENERGAN) tablet 12.5 mg  12.5 mg Oral Q6H PRN    Or    ondansetron (ZOFRAN) injection 4 mg  4 mg IntraVENous Q6H PRN    glucose chewable tablet 16 g  4 Tab Oral PRN    glucagon (GLUCAGEN) injection 1 mg  1 mg IntraMUSCular PRN    dextrose (D50W) injection syrg 12.5-25 g  25-50 mL IntraVENous PRN    aspirin chewable tablet 81 mg  81 mg Oral DAILY    atorvastatin (LIPITOR) tablet 40 mg  40 mg Oral QHS       Labs:    Recent Results (from the past 12 hour(s))   METABOLIC PANEL, BASIC    Collection Time: 12/18/20  4:38 AM   Result Value Ref Range    Sodium 139 136 - 145 mmol/L    Potassium 3.5 3.5 - 5.5 mmol/L    Chloride 101 100 - 111 mmol/L    CO2 35 (H) 21 - 32 mmol/L    Anion gap 3 3.0 - 18 mmol/L    Glucose 324 (H) 74 - 99 mg/dL    BUN 21 (H) 7.0 - 18 MG/DL    Creatinine 0.74 0.6 - 1.3 MG/DL    BUN/Creatinine ratio 28 (H) 12 - 20      GFR est AA >60 >60 ml/min/1.73m2    GFR est non-AA >60 >60 ml/min/1.73m2    Calcium 9.1 8.5 - 10.1 MG/DL   MAGNESIUM    Collection Time: 12/18/20  4:38 AM   Result Value Ref Range    Magnesium 2.0 1.6 - 2.6 mg/dL   PHOSPHORUS    Collection Time: 12/18/20  4:38 AM   Result Value Ref Range    Phosphorus 1.3 (L) 2.5 - 4.9 MG/DL   GLUCOSE, POC    Collection Time: 12/18/20  8:28 AM   Result Value Ref Range    Glucose (POC) 334 (H) 70 - 110 mg/dL   GLUCOSE, POC    Collection Time: 12/18/20 12:27 PM   Result Value Ref Range    Glucose (POC) 386 (H) 70 - 110 mg/dL       Signed By: Mikey Hill MD     December 18, 2020      Disclaimer: Sections of this note are dictated using utilizing voice recognition software. Minor typographical errors may be present. If questions arise, please do not hesitate to contact me or call our department.

## 2020-12-18 NOTE — PROGRESS NOTES
Chart reviewed. Pt extubated yesterday. Pt is LTC at [address] [place] [address]. Current plan is to return when ready. CM called and spoke to YAYA from [facility]. They require a COVID test with in 72 hours of admission back to the facility.  CM continues to follow along for discharge planning.     Christiano Haley RN BSN  Care Manager  [phone]

## 2020-12-18 NOTE — PROGRESS NOTES
Nutrition Assessment     Type and Reason for Visit: Reassess    Nutrition Recommendations/Plan:   - Once NGT placement confirmed, resume tube feeding of Glucerna 1.5 at 30 mL/hr and advance as tolerated by 10 mL q 4 hours to goal rate of 50 mL/hr with 50 mL q 4 hour water flushes (goal regimen to provide 1800 kcal, 99 gm protein, 1210 mL total free water, 100% RDIs). - Provide 20 mmol IV K Phos. Nutrition Assessment:  Extubated yesterday afternoon, NGT remained in place with tube feeding infusing at goal then pulled by patient this afternoon. NPO per SLP today and plan to replace NGT. BM yesterday- movantik discontinued per discussion with MD.    Malnutrition Assessment:  Malnutrition Status: Severe malnutrition     Estimated Daily Nutrient Needs:  Energy (kcal):  1455-9633  Protein (g):  51-77       Fluid (ml/day):  5307-3405    Nutrition Related Findings:  BM 12/17, loose; receiving lasix, aldactone, lantus, 40 mEq K, steroid, miralax.       Current Nutrition Therapies:  DIET NPO  DIET TUBE FEEDING    Current Tube Feeding (TF) Orders:   · Feeding Route: Nasogastric  · Formula: Glucerna 1.5  · Schedule:Continuous    · Regimen: 50 mL/hr (held when NGT pulled)  · Water Flushes: 100 mL q 4 hours (600 mL)- held  · Current TF Orders Provides: not applicable  · Goal TF Orders Provides: 1800 kcal, 99 gm protein, 910 mL free water, 100% RDIs       Anthropometric Measures:  · Height:  5' 7\" (170.2 cm)  · Current Body Wt:  74.6 kg (164 lb 7.4 oz)  · BMI: 25.8    Nutrition Diagnosis:   · Inadequate oral intake related to swallowing difficulty as evidenced by NPO or clear liquid status due to medical condition      Nutrition Intervention:  Food and/or Nutrient Delivery: Continue NPO, Modify tube feeding  Nutrition Education and Counseling: Education not indicated  Coordination of Nutrition Care: Continue to monitor while inpatient, Coordination of community care, Swallow evaluation    Goals:  Nutritional needs will be met through adequate oral intake or nutrition support within the next 7 days. Nutrition Monitoring and Evaluation:   Behavioral-Environmental Outcomes: None identified  Food/Nutrient Intake Outcomes: Diet advancement/tolerance, Enteral nutrition intake/tolerance  Physical Signs/Symptoms Outcomes: Biochemical data, Chewing or swallowing, GI status, Meal time behavior, Nutrition focused physical findings, Fluid status or edema    Discharge Planning:     Too soon to determine     Electronically signed by Blanche Burroughs RD, [address]  on 12/18/2020 at 2:14 PM    Contact Number: [phone]

## 2020-12-18 NOTE — PROGRESS NOTES
[address] [place] [address] [facility]  Pulmonary, Critical Care, and Sleep Medicine    Pulmonary Medicine Progress Note    Name: Becky Sharma MRN: [id number]  : 1955 Hospital: [address] [place] [address]  Date: 2020       Subjective:  Pt is doing very well post extubation. Currently on NC. Awake and alert, somewhat confused. Wants to eat. Denies CP.      Patient Active Problem List   Diagnosis Code    Diverticula of colon K57.30    Sepsis ([address] A41.9    Sepsis secondary to UTI ([address] A41.9, N39.0    DM hyperosmolarity type II, uncontrolled ([address] E11.00, E11.65    HTN (hypertension) I10    Febrile illness, acute R50.9    Gram-negative bacteremia R78.81    Diabetic neuropathy ([address] E11.40    Osteoarthritis of both knees M17.0    Acidosis E87.2    Respiratory failure ([address] J96.90    Shock ([address] R57.9    Hyperosmolar (nonketotic) coma ([address] E11.01    Acute UTI N39.0    Macrocytic anemia D53.9    STEMI (ST elevation myocardial infarction) ([facility]) I21.3    Acute on chronic systolic congestive heart failure (HCC) I50.23    Severe protein-calorie malnutrition (HCC) E43    Goals of care, counseling/discussion Z71.89    Dementia without behavioral disturbance ([facility]) F03.90    Pulmonary fibrosis ([facility]) J84.10       Assessment:  · CAD: S/P PCI with DEC- LAD 20  · Respiratory Failure- hypoxic- pulmonary edema, CHF in the setting of severe ILD  · Pulmonary Fibrosis  · - severe, with extensive honeycombing and bronchiectasis on CT scan  · - appears to be a new diagnosis  · Encephalopathy  · - severe, likely delirium from acute illness  · Dementia- on Aricept  · Hypokalemia  · DM: on Lantus as OP  · Hepatitic C infection\  · UTI  · - gnr, awaiting speciation    Impression/Plan:  - Wean O2 as tolerated  - Stop lasix gtt, appears to be more euvolemic  - Wean down dobutamine  - Continue PO valium and seroquel for agitation  - Extensive pulmonary fibrosis with areas of GGO noted, unclear if she or the family is aware of this diagnosis. Continue steroids for a possible acute inflammatory component. - Urine Cx with E Coli- switch to ceftriaxone. - Speech evaluation pending    Full Code       FiO2 to keep SpO2 >=92%, HOB >=30 degree, aspiration precautions, aggressive pulmonary toileting, incentive spirometry. Other issues management by primary team and respective consultants. Events and notes from last 24 hours reviewed. Discussed with patient and family, answered all questions to their satisfaction. Care plan discussed with nursing.      Labs and images personally seen and available reports reviewed  All current medicines are reviewed       Medications- Current:  Current Facility-Administered Medications   Medication Dose Route Frequency    furosemide (LASIX) injection 40 mg  40 mg IntraVENous Q12H    potassium phosphate 20 mmol in 0.9% sodium chloride 250 mL infusion   IntraVENous ONCE    [START ON 12/19/2020] insulin glargine (LANTUS) injection 20 Units  20 Units SubCUTAneous DAILY    polyethylene glycol (MIRALAX) packet 17 g  17 g Per NG tube DAILY    methylPREDNISolone (PF) (SOLU-MEDROL) injection 40 mg  40 mg IntraVENous Q6H    spironolactone (ALDACTONE) tablet 25 mg  25 mg Oral DAILY    multivit-folic acid-herbal 721 (WELLESSE PLUS) oral liquid 30 mL  30 mL Per NG tube DAILY    piperacillin-tazobactam (ZOSYN) 3.375 g in 0.9% sodium chloride (MBP/ADV) 100 mL MBP  3.375 g IntraVENous Q6H    diazePAM (VALIUM) tablet 5 mg  5 mg Oral TID    QUEtiapine (SEROquel) tablet 50 mg  50 mg Oral BID    DOBUTamine (DOBUTREX) 500 mg/250 mL (2,000 mcg/mL) infusion  3 mcg/kg/min IntraVENous CONTINUOUS    insulin lispro (HUMALOG) injection   SubCUTAneous Q6H    carvediloL (COREG) tablet 3.125 mg  3.125 mg Oral Q12H    fentaNYL citrate (PF) injection  mcg   mcg IntraVENous Q2H PRN    sodium chloride (NS) flush 5-40 mL  5-40 mL IntraVENous PRN    ticagrelor (BRILINTA) tablet 90 mg  90 mg Per NG tube BID    sodium chloride (NS) flush 5-40 mL  5-40 mL IntraVENous PRN    albuterol-ipratropium (DUO-NEB) 2.5 MG-0.5 MG/3 ML  3 mL Nebulization Q4H PRN    melatonin tablet 3 mg  3 mg Oral QHS    famotidine (PF) (PEPCID) 20 mg in 0.9% sodium chloride 10 mL injection  20 mg IntraVENous Q12H    midazolam (VERSED) injection 1-2 mg  1-2 mg IntraVENous Q1H PRN    sodium chloride (NS) flush 5-40 mL  5-40 mL IntraVENous Q8H    sodium chloride (NS) flush 5-40 mL  5-40 mL IntraVENous PRN    acetaminophen (TYLENOL) tablet 650 mg  650 mg Oral Q6H PRN    Or    acetaminophen (TYLENOL) suppository 650 mg  650 mg Rectal Q6H PRN    promethazine (PHENERGAN) tablet 12.5 mg  12.5 mg Oral Q6H PRN    Or    ondansetron (ZOFRAN) injection 4 mg  4 mg IntraVENous Q6H PRN    glucose chewable tablet 16 g  4 Tab Oral PRN    glucagon (GLUCAGEN) injection 1 mg  1 mg IntraMUSCular PRN    dextrose (D50W) injection syrg 12.5-25 g  25-50 mL IntraVENous PRN    aspirin chewable tablet 81 mg  81 mg Oral DAILY    atorvastatin (LIPITOR) tablet 40 mg  40 mg Oral QHS       Objective:  Vital Signs:    Visit Vitals  /77   Pulse 97   Temp 97.9 °F (36.6 °C)   Resp 24   Ht 5' 7\" (1.702 m)   Wt 63.6 kg (140 lb 4.8 oz)   SpO2 (!) 73%   Breastfeeding No   BMI 21.97 kg/m²      O2 Device: Nasal cannula  O2 Flow Rate (L/min): 5 l/min  Temp (24hrs), Av.9 °F (36.6 °C), Min:97.6 °F (36.4 °C), Max:98.1 °F (36.7 °C)      Intake/Output:   Last shift:      701 - 1900  In: 120.6 [I.V.:120.6]  Out: 800 [Urine:800]  Last 3 shifts: 1901 -  07  In: 2809.9 [I.V.:1199.9]  Out: 0282 [Urine:4410]    Intake/Output Summary (Last 24 hours) at 2020 1524  Last data filed at 2020 1116  Gross per 24 hour   Intake 1193.03 ml   Output 2750 ml   Net -1556.97 ml       Physical Exam:     General/Neurology: Alert, Awake, uncooperative  Head:   Normocephalic, without obvious abnormality  Eye:   PERRL, EOM intact  Oral:  Mucus membranes moist  Lung:   Fine crackles at the bases. Heart:   S1 S2 present  Abdomen:  Soft, non tender, BS+nt   Extremities:  No pedal edema.    Skin:   Dry, intact  Data:      Recent Results (from the past 24 hour(s))   GLUCOSE, POC    Collection Time: 12/17/20  5:22 PM   Result Value Ref Range    Glucose (POC) 291 (H) 70 - 110 mg/dL   GLUCOSE, POC    Collection Time: 12/17/20 11:50 PM   Result Value Ref Range    Glucose (POC) 289 (H) 70 - 708 mg/dL   METABOLIC PANEL, BASIC    Collection Time: 12/18/20  4:38 AM   Result Value Ref Range    Sodium 139 136 - 145 mmol/L    Potassium 3.5 3.5 - 5.5 mmol/L    Chloride 101 100 - 111 mmol/L    CO2 35 (H) 21 - 32 mmol/L    Anion gap 3 3.0 - 18 mmol/L    Glucose 324 (H) 74 - 99 mg/dL    BUN 21 (H) 7.0 - 18 MG/DL    Creatinine 0.74 0.6 - 1.3 MG/DL    BUN/Creatinine ratio 28 (H) 12 - 20      GFR est AA >60 >60 ml/min/1.73m2    GFR est non-AA >60 >60 ml/min/1.73m2    Calcium 9.1 8.5 - 10.1 MG/DL   MAGNESIUM    Collection Time: 12/18/20  4:38 AM   Result Value Ref Range    Magnesium 2.0 1.6 - 2.6 mg/dL   PHOSPHORUS    Collection Time: 12/18/20  4:38 AM   Result Value Ref Range    Phosphorus 1.3 (L) 2.5 - 4.9 MG/DL   GLUCOSE, POC    Collection Time: 12/18/20  8:28 AM   Result Value Ref Range    Glucose (POC) 334 (H) 70 - 110 mg/dL   GLUCOSE, POC    Collection Time: 12/18/20 12:27 PM   Result Value Ref Range    Glucose (POC) 386 (H) 70 - 110 mg/dL         Chemistry   Recent Labs     12/18/20  0438 12/17/20  0415 12/16/20  0200   * 289* 181*    139 142   K 3.5 4.1 4.3    105 108   CO2 35* 30 28   BUN 21* 18 22*   CREA 0.74 0.69 0.88   CA 9.1 8.1* 8.3*   MG 2.0 2.0 2.9*   PHOS 1.3* 3.2 3.4   AGAP 3 4 6   BUCR 28* 26* 25*       CBC w/Diff   Recent Labs     12/17/20  0415 12/16/20  1535 12/15/20  1945   WBC 6.3 6.6 8.7   RBC 3.64* 3.72* 4.14*   HGB 9.6* 9.8* 11.2*   HCT 30.5* 31.1* 34.5*   * 493* 574*   GRANS  --  71 81*   LYMPH  --  10* 7*   EOS  --  6* 1 ABG   Recent Labs     12/17/20  0409 12/16/20  0358 12/15/20  2046   PHI 7.46* 7.38 7.46*   PCO2I 43.4 44.9 28.4*   PO2I 105* 284* 55*   HCO3I 31.2* 26.4* 20.3*   FIO2I 35 100 100       Micro    Recent Labs     12/16/20  1535 12/16/20  0200 12/16/20  0050   CULT NO GROWTH 2 DAYS NO GROWTH 2 DAYS SCANT GRAM NEGATIVE RODS*  SCANT NORMAL RESPIRATORY AILEEN     Recent Labs     12/16/20  1535 12/16/20  0200 12/16/20  0050   CULT NO GROWTH 2 DAYS NO GROWTH 2 DAYS SCANT GRAM NEGATIVE RODS*  SCANT NORMAL RESPIRATORY AILEEN       CT (Most Recent)   Results from Hospital Encounter encounter on 12/10/20   CTA CHEST W OR W WO CONT    Narrative CT Pulmonary Angiogram    CPT CODE: 88371    CLINICAL: Shortness of breath, concern for PE.    COMPARISON: Current and previous plain films. TECHNICAL: Volumetric data acquisition performed through the chest with a  multislice scanner. Reconstructions were created in the axial, coronal, and  sagittal planes. Coronal and sagittal reconstructions were created using maximal  intensity projection methodology to maximize sensitivity for emboli. Bolus  timing was optimized for a pulmonary embolism evaluation. 100 cc of Isovue-370  were utilized for this examination. FINDINGS:  The patient is intubated. Endotracheal tube is in satisfactory position. There are no pulmonary emboli. The ascending aorta is prominent at 3.7 cm. There  are dense calcifications in the anterior surface of the ascending aorta. The lungs there are extensive coarse subpleural reticulations increasing in  confluence in severity in the bases. Honeycombing is present. There is traction  bronchiectasis extensively in the lower lung zones . There is groundglass  infiltrate and consolidation between the areas of honeycombing and traction  bronchiectasis  No pleural effusion or pneumothorax is apparent. There is a small pneumomediastinum evident. .  No mediastinal adenopathy or mass is evident.  There is multichamber cardiac  enlargement. Sections in the upper abdomen reveal a inhomogeneous mass in the posterior right  lobe of the liver measuring 9 x 10 cm transaxially. There may be an additional  smaller lesion (2 cm) in the medial left lobe. Impression IMPRESSION:    Negative for acute pulmonary embolism or acute aortic abnormality. Dilated ascending aorta to 3.7 cm. Advanced pulmonary fibrosis with traction bronchiectasis and honeycombing. The  appearance is suggestive of UIP. There are extensive groundglass and confluent infiltrates superimposed upon the  interstitial process in the lower lobes. Primary differential considerations for  this include pulmonary edema, pneumonia, aspiration, pulmonary hemorrhage, and  acute lung injury. There is a large inhomogeneous mass in the right lobe of the liver and possibly  a second smaller lesion. Differential includes primary and metastatic disease. Broad differential.  Minimal pneumomediastinum        All CT scans at this facility are performed using dose optimization technique as  appropriate to the performed exam, to include automated exposure control,  adjustment of the mA and/or kV according to patient's size (Including  appropriate matching for site-specific examinations), or use of iterative  reconstruction technique. XR (Most Recent). CXR reviewed by me and compared with previous CXR   Results from Hospital Encounter encounter on 12/10/20   XR CHEST PORT    Narrative EXAM: XR CHEST PORT    INDICATION: 72 years Female. Tachypnea. ADDITIONAL HISTORY: None. TECHNIQUE: Frontal view of the chest.    COMPARISON: Chest radiograph 12/15/2020 1127 hours    FINDINGS:    Endotracheal tube with tip 4 cm proximal to chace. There is an enteric tube  which extends inferior to the field-of-view. The cardiac silhouette is within normal limits in size. Atherosclerotic  calcifications are noted in the aorta.     There are diffuse reticular and airspace opacities throughout the bilateral  lungs, without significant interval change compared to the prior chest  radiograph. No definite evidence of pleural effusion or pneumothorax. Osseous structures are unchanged in appearance. Impression IMPRESSION:  1. No significant interval change. 2.  Support devices in acceptable positions, as above. 3.  Unchanged moderately extensive bilateral lung reticular and airspace  opacities, likely infectious or inflammatory. See my orders for details     Total care time exclusive of procedures with complex decision making, coordination of care and counseling patient performed and > 50% time spent in face to face evaluation as mentioned above.     Belkys Dunn MD  Critical Care Medicine

## 2020-12-18 NOTE — DIABETES MGMT
Glycemic Control Plan of Care    T2DM with A1c of 8.3% (12/10/2020)  12/11: Patient intub and sedated. Noted patient came from [facility] with history of dementia. Home diabetes medications: Unverified  Lantus insulin 22 units daily    Patient was admitted to [facility] from [facility] on 12/10 with report of shortness of breath and altered mental state. Noted the following assessment:  STEMI  Status post PTCA/stent on 12/11  Acute respiratory failure  Acute flash pulmonary edema  Acute systolic heart failure exacerbation  Rapid response 12/11    Noted per palliative care team: full code with full interventions    Seen patient in CVT ICU this afternoon. Glycemic recommendation(s): increase lantus insulin dose from 10 units to 20 units daily starting today. Order obtained. Glycemic assessment:    NPO on TF Glucerna 1.5. Noted report patient pulled her NG out. Plan to resume feeding once NG tube is in place. IV Solumedrol 40 mg every 6 hours. POC BG 12/17: 384, 291, 289  POC BG 12/18 at time of review: 334, 386  Lab FBG 12/18: 324        Current A1C: 8.3% (12/10/2020) which is equivalent to estimated average blood glucose of 192 mg/dL during the past 2-3 months    Current hospital diabetes medications:  Basal lantus insulin 20 units daily  Correctional lispro insulin ACHS. Very resistant dose    Total daily dose insulin requirement previous day: 12/17:  Lantus: 10 units  Lispro: 45 units  TDD insulin: 55 units    Diet: NPO.  TF Glucerna    Goals:  Blood glucose will be within target range of  mg/dL by 12/21/2020    Education:   ___  Refer to Diabetes Education Record   _X__  Education not indicated at this time    Terry Mohr RN [facility]  Pager: [phone]

## 2020-12-18 NOTE — PROGRESS NOTES
[facility]      CARDIOLOGY PROGRESS NOTE  RECS:  1. Subacute MI-EKG showed Q waves in anterolateral lead with ST elevation, with suspicion of late presentation of anterior MI-s/p [facility] S/p ptca/stent to proximal/ mid LAD using ARELY.  LVEDP 8 mmHg. Normal PASP pressure with normal PCWP. Moderate to severe LV dysfunction. Continue asa and Brilinta. 2. Acute respiratory failure-tolerating extubation. Possibility of pulmonary fibrosis- given extensive air space disease and normal PCWP - Pulmonary following  3. Borderline BP- monitor and continue low dose BB. 4. Acute Systolic Congestive heart Failure-recent echo with EF% 35%-40-mildly dedecompensated-dobutamine dced. Will add lasix po.  5. COPD, on home O2 4L NC   6. Hepatitis C ? -Per family member   9. DM2- medications per medical team   8. [address] [place] [address]          Cardiac cath 12/11/20  Patient presented to [address] [place] [address] with late presentation anterior wall MI.  EF 35-40%. Patient was initially managed medically-- however developed acute pulmonary edema requiring intubation. Also troponin level increasing consistent with ongoing ischemia. S/p ptca/stent to proximal/ mid LAD using ARELY. LVEDP 8 mmHg. Normal PASP pressure with normal PCWP. Moderate to severe LV dysfunction.     Echo 12/10/20  · LV: Estimated LVEF is 35 - 40%. Visually measured ejection fraction. Normal cavity size and wall thickness. Moderately and segmentally reduced systolic function. Inconclusive left ventricular diastolic function. · AO: Mild aortic root dilatation; diameter is 3.8 cm.     ASSESSMENT:  Hospital Problems  Date Reviewed: 2/7/2018          Codes Class Noted POA    Goals of care, counseling/discussion ICD-10-CM: Z71.89  ICD-9-CM: V65.49  Unknown Unknown        Dementia without behavioral disturbance ([facility] ICD-10-CM: F03.90  ICD-9-CM: 294.20  Unknown Unknown        Pulmonary fibrosis ([facility] ICD-10-CM: J84.10  ICD-9-CM: 691  Unknown Unknown Severe protein-calorie malnutrition ([facility] ICD-10-CM: E43  ICD-9-CM: 456  12/16/2020 Clinically Undetermined        Acute on chronic systolic congestive heart failure (HCC) ICD-10-CM: D69.55  ICD-9-CM: 428.23, 428.0  12/15/2020 Unknown        * (Principal) STEMI (ST elevation myocardial infarction) ([facility] ICD-10-CM: I21.3  ICD-9-CM: 410.90  12/10/2020 Unknown                SUBJECTIVE:  No cp or sob    OBJECTIVE:    VS:   Visit Vitals  /69   Pulse 83   Temp 97.6 °F (36.4 °C)   Resp 21   Ht 5' 7\" (1.702 m)   Wt 63.6 kg (140 lb 4.8 oz)   SpO2 97%   Breastfeeding No   BMI 21.97 kg/m²         Intake/Output Summary (Last 24 hours) at 12/18/2020 1349  Last data filed at 12/18/2020 1116  Gross per 24 hour   Intake 1193.03 ml   Output 2750 ml   Net -1556.97 ml     TELE: normal sinus rhythm    General: alert and in no apparent distress  HENT: Normocephalic, atraumatic. Normal external eye.   Neck :  no JVD  Cardiac:  regular rate and rhythm, S1, S2 normal, no murmur, click, rub or gallop  Lungs: clear to auscultation bilaterally  Abdomen: Soft, nontender, no masses  Extremities:  No c/c/e, peripheral pulses present      Labs: Results:       Chemistry Recent Labs     12/18/20  0438 12/17/20  0415 12/16/20  0200   * 289* 181*    139 142   K 3.5 4.1 4.3    105 108   CO2 35* 30 28   BUN 21* 18 22*   CREA 0.74 0.69 0.88   CA 9.1 8.1* 8.3*   AGAP 3 4 6   BUCR 28* 26* 25*      CBC w/Diff Recent Labs     12/17/20  0415 12/16/20  1535 12/15/20  1945   WBC 6.3 6.6 8.7   RBC 3.64* 3.72* 4.14*   HGB 9.6* 9.8* 11.2*   HCT 30.5* 31.1* 34.5*   * 493* 574*   GRANS  --  71 81*   LYMPH  --  10* 7*   EOS  --  6* 1      Cardiac Enzymes Recent Labs     12/15/20  1945      CKND1 1.1      Coagulation Recent Labs     12/16/20  1535 12/16/20  0600   APTT >180.0* 52.7*       Lipid Panel No results found for: CHOL, CHOLPOCT, CHOLX, CHLST, CHOLV, 411139, HDL, HDLP, LDL, LDLC, DLDLP, 800302, VLDLC, VLDL, TGLX, TRIGL, TRIGP, TGLPOCT, CHHD, CHHDX   BNP No results for input(s): BNPP in the last 72 hours. Liver Enzymes No results for input(s): TP, ALB, TBIL, AP in the last 72 hours. No lab exists for component: SGOT, GPT, DBIL   Thyroid Studies Lab Results   Component Value Date/Time    TSH 2.92 12/10/2020 11:07 AM              Gato Acosta NP  Supervised    I have independently evaluated and examined the patient. All relevant labs and testing data's are reviewed. Care plan discussed and updated after review.     Noe Payton MD

## 2020-12-18 NOTE — PROGRESS NOTES
Upon visual assessment, pt is in no resp distress and no signs of increased WOB. Pt seems to be doing good. Stand-by ventilator removed from room . No respiratory distress noted, patient is comfortable.

## 2020-12-18 NOTE — PROGRESS NOTES
[facility]: [phone] ([phone])  Formerly [facility]: [phone]   [facility]: [phone]    Patient Name: Raghav Cardoso  YOB: 1955    Date of progress note: 12/18/2020   Reason for Consult: goals of care and support   Requesting Provider: Dr Ricardo Swartz  Primary Care Physician: Aj Vaughn MD      SUMMARY:   Raghav Cardoso is a 72y.o. year old with a past history of diabetes, chronic back pain, hypertension, dementia lives in long term care, who was admitted on 12/10/2020 as a transfer from [facility] with a diagnosis of anterior wall  MI  Current medical issues leading to Palliative Medicine involvement include: 72year old female who resides in long term care due to dementia, presented with acute MI. Developed acute pulmonary edema requiring intubation. Palliative medicine is consulted for goals of care conversations. PALLIATIVE DIAGNOSES:   1. Goals of care   2. MI  3. Acute resp failure   4. Dementia  5. Pulmonary Fibrosis       PLAN:   12/18/2020:  Spoke with both sisters listed in the chart Oracio Russell and [facility]. Have explained this patients disease process and that she has a poor long term prognosis. Pt meets criteria for Hospice and we feel that this would be a good option for the patient as she transitions to home or back to her facility. Mrs Oracio Russell notes that she has 7 sisters and they will all need to talk in order to make a decision or do any changes in this patient's level of care. Both of the sisters admit that the patient has a poor quality of life and that her dementia is quite extensive. Neither are ready to change her goals of care at this time but stated that they will call us back and let us know. At this time patient is a FULL CODE with FULL INTERVENTIONS   1. Goals of care Ms Vic Edmondson is currently orally intubated. She is on sedation but eyes open, calm tracked me in room.  There is no AMD on file. Listed is her sister Ms Glenda Herndon as contact. I have left a message for Ms Cho. Patient is not able to currently participate in her medical decisions. Goals of care full code with full interventions  1. Acute MI cardiology on board on dobutamine drip. S/P ptac/ stent to proximal / mid LAD. EF 35 to 40%   2. Acute resp failure d/t pulmonary edema, currently ventilator supported, awake on vent. Also has ILD per pulmonary with possible new finding of pulmonary fibrosis. PCCM managing, weaning down sedation, possible SBT   3. Dementia on Aricept, per records baseline orientation self and family. Lives in long term care. 4. Pulmonary Fibrosis  CT scan on 12/16 shows The lungs there are extensive coarse subpleural reticulations increasing in  confluence in severity in the bases. Honeycombing is present. There is traction bronchiectasis extensively in the lower lung zones . There is groundglass infiltrate and consolidation between the areas of honeycombing and traction bronchiectasis. Per discussion with the Pulmonologist she has extensive pulmonary fibrosis which is progressive and terminal.   5. Initial consult note routed to primary continuity provider  6. Communicated plan of care with: Palliative IDT    Patient/Health Care Proxy Stated Goals: Prolong life      TREATMENT PREFERENCES:   Code Status: Full Code    Advance Care Planning:  [] The [facility] Interdisciplinary Team has updated the ACP Navigator with [address] and Patient Capacity    Primary Decision Maker ([address]): no AMD on file. Listed is sister Ms Deeanna Angelucci Interventions: Full interventions         Other:  As far as possible, the palliative care team has discussed with patient / health care proxy about goals of care / treatment preferences for patient.      HISTORY:     History obtained from: chart     CHIEF COMPLAINT: acute MI     HPI/SUBJECTIVE:    The patient is:   [] Verbal and participatory  [x] Non-participatory due to: sedated, orally intubated      Clinical Pain Assessment (nonverbal scale for nonverbal patients): Clinical Pain Assessment  Severity: 0     Activity (Movement): Seeking attention through movement or slow, cautious movement    Duration: for how long has pt been experiencing pain (e.g., 2 days, 1 month, years)  Frequency: how often pain is an issue (e.g., several times per day, once every few days, constant)     FUNCTIONAL ASSESSMENT:     Palliative Performance Scale (PPS):  PPS: 40            PSYCHOSOCIAL/SPIRITUAL SCREENING:      Any spiritual / Baptism concerns: unable to assess   [] Yes /  [] No    Caregiver Burnout:  [] Yes /  [] No /  [x] No Caregiver Present      Anticipatory grief assessment: unable to assess   [] Normal  / [] Maladaptive        REVIEW OF SYSTEMS:     Positive and pertinent negative findings in ROS are noted above in HPI. The following systems were [] reviewed / [x] unable to be reviewed as noted in HPI  Other findings are noted below. Systems: constitutional, ears/nose/mouth/throat, respiratory, gastrointestinal, genitourinary, musculoskeletal, integumentary, neurologic, psychiatric, endocrine. Positive findings noted below. Modified ESAS Completed by: provider           Pain: 0           Dyspnea: 0                    PHYSICAL EXAM:     Wt Readings from Last 3 Encounters:   12/17/20 67 kg (147 lb 11.3 oz)   12/10/20 81.6 kg (179 lb 14.3 oz)   08/17/18 81.6 kg (180 lb)     Blood pressure (!) 150/79, pulse 100, temperature 97.6 °F (36.4 °C), resp. rate (!) 32, height 5' 7\" (1.702 m), weight 67 kg (147 lb 11.3 oz), SpO2 94 %, not currently breastfeeding.   Pain:  Pain Scale 1: Adult Nonverbal Pain Scale  Pain Intensity 1: 0              Pain Intervention(s) 1: Medication (see MAR)  Last bowel movement: none recorded    Constitutional: orally intubated, eyes open alert calm in NAD  Eyes: pupils equal, anicteric  ENMT: orally intubated   Respiratory: ventilator supported not labored   Skin: warm, dry  Neuro alert sedated calm         HISTORY:     Principal Problem:    STEMI (ST elevation myocardial infarction) ([facility] [address] (12/10/2020)    Active Problems:    Acute on chronic systolic congestive heart failure ([facility] [address] (12/15/2020)      Severe protein-calorie malnutrition ([facility] [address] (12/16/2020)      Past Medical History:   Diagnosis Date    Arthritis     Asthma     Chronic pain     BACK PAIN    Diabetes ([facility] [address]     [facility]     Emphysema     Hepatitis C     Hypertension     Nervousness     Osteoarthritis of both knees       Past Surgical History:   Procedure Laterality Date    HX CARPAL TUNNEL RELEASE Right 8/31/09    Dr. Jessica Fritz    HX TUBAL LIGATION        Family History   Problem Relation Age of Onset    Diabetes Mother     Hypertension Mother     Obesity Mother     Alcohol abuse Father     High Cholesterol Father     Lung Disease Father     Stroke Father     Arthritis-osteo Sister     Depression Sister     Diabetes Sister     Hypertension Sister     Obesity Sister     Arthritis-osteo Brother     Diabetes Brother     Hypertension Brother     Obesity Brother      History reviewed, no pertinent family history.   Social History     Tobacco Use    Smoking status: Current Every Day Smoker     Packs/day: 1.00   Substance Use Topics    Alcohol use: Yes     Comment: socially     No Known Allergies   Current Facility-Administered Medications   Medication Dose Route Frequency    furosemide (LASIX) injection 40 mg  40 mg IntraVENous Q12H    insulin glargine (LANTUS) injection 10 Units  10 Units SubCUTAneous DAILY    polyethylene glycol (MIRALAX) packet 17 g  17 g Per NG tube DAILY    methylPREDNISolone (PF) (SOLU-MEDROL) injection 40 mg  40 mg IntraVENous Q6H    spironolactone (ALDACTONE) tablet 25 mg  25 mg Oral DAILY    multivit-folic acid-herbal 604 (WELLESSE PLUS) oral liquid 30 mL  30 mL Per NG tube DAILY    piperacillin-tazobactam (ZOSYN) 3.375 g in 0.9% sodium chloride (MBP/ADV) 100 mL MBP  3.375 g IntraVENous Q6H    diazePAM (VALIUM) tablet 5 mg  5 mg Oral TID    QUEtiapine (SEROquel) tablet 50 mg  50 mg Oral BID    DOBUTamine (DOBUTREX) 500 mg/250 mL (2,000 mcg/mL) infusion  3 mcg/kg/min IntraVENous CONTINUOUS    insulin lispro (HUMALOG) injection   SubCUTAneous Q6H    carvediloL (COREG) tablet 3.125 mg  3.125 mg Oral Q12H    fentaNYL citrate (PF) injection  mcg   mcg IntraVENous Q2H PRN    sodium chloride (NS) flush 5-40 mL  5-40 mL IntraVENous PRN    ticagrelor (BRILINTA) tablet 90 mg  90 mg Per NG tube BID    sodium chloride (NS) flush 5-40 mL  5-40 mL IntraVENous PRN    albuterol-ipratropium (DUO-NEB) 2.5 MG-0.5 MG/3 ML  3 mL Nebulization Q4H PRN    melatonin tablet 3 mg  3 mg Oral QHS    famotidine (PF) (PEPCID) 20 mg in 0.9% sodium chloride 10 mL injection  20 mg IntraVENous Q12H    midazolam (VERSED) injection 1-2 mg  1-2 mg IntraVENous Q1H PRN    sodium chloride (NS) flush 5-40 mL  5-40 mL IntraVENous Q8H    sodium chloride (NS) flush 5-40 mL  5-40 mL IntraVENous PRN    acetaminophen (TYLENOL) tablet 650 mg  650 mg Oral Q6H PRN    Or    acetaminophen (TYLENOL) suppository 650 mg  650 mg Rectal Q6H PRN    promethazine (PHENERGAN) tablet 12.5 mg  12.5 mg Oral Q6H PRN    Or    ondansetron (ZOFRAN) injection 4 mg  4 mg IntraVENous Q6H PRN    glucose chewable tablet 16 g  4 Tab Oral PRN    glucagon (GLUCAGEN) injection 1 mg  1 mg IntraMUSCular PRN    dextrose (D50W) injection syrg 12.5-25 g  25-50 mL IntraVENous PRN    aspirin chewable tablet 81 mg  81 mg Oral DAILY    atorvastatin (LIPITOR) tablet 40 mg  40 mg Oral QHS        LAB AND IMAGING FINDINGS:     Lab Results   Component Value Date/Time    WBC 6.3 12/17/2020 04:15 AM    HGB 9.6 (L) 12/17/2020 04:15 AM    PLATELET 861 (H) 77/69/7973 04:15 AM     Lab Results   Component Value Date/Time    Sodium 139 12/18/2020 04:38 AM    Potassium 3.5 12/18/2020 04:38 AM    Chloride 101 12/18/2020 04:38 AM    CO2 35 (H) 12/18/2020 04:38 AM    BUN 21 (H) 12/18/2020 04:38 AM    Creatinine 0.74 12/18/2020 04:38 AM    Calcium 9.1 12/18/2020 04:38 AM    Magnesium 2.0 12/18/2020 04:38 AM    Phosphorus 1.3 (L) 12/18/2020 04:38 AM      Lab Results   Component Value Date/Time    Alk. phosphatase 76 12/12/2020 06:20 AM    Protein, total 7.2 12/12/2020 06:20 AM    Albumin 2.4 (L) 12/12/2020 06:20 AM    Globulin 4.8 (H) 12/12/2020 06:20 AM     Lab Results   Component Value Date/Time    INR 1.2 12/10/2020 11:07 AM    Prothrombin time 14.5 12/10/2020 11:07 AM    aPTT >180.0 (HH) 12/16/2020 03:35 PM      No results found for: IRON, FE, TIBC, IBCT, PSAT, FERR   No results found for: PH, PCO2, PO2  No components found for: [place]   Lab Results   Component Value Date/Time     12/15/2020 07:45 PM    CK - MB 1.3 12/15/2020 07:45 PM              Total time: 25 minutes   Counseling / coordination time, spent as noted above: 20 minutes   > 50% counseling / coordination: yes     Prolonged service was provided for  []30 min   []75 min in face to face time in the presence of the patient, spent as noted above. Time Start:   Time End:   Note: this can only be billed with 32371 (initial) or 24757 (follow up). If multiple start / stop times, list each separately.

## 2020-12-18 NOTE — PROGRESS NOTES
Problem: Dysphagia (Adult)  Goal: *Acute Goals and Plan of Care (Insert Text)  Description: Patient will:  1. Tolerate PO trials with 0 s/s overt distress in 4/5 trials  2. Utilize compensatory swallow strategies/maneuvers (decrease bite/sip, size/rate, alt. liq/sol) with min cues in 4/5 trials  3. Perform oral-motor/laryngeal exercises to increase oropharyngeal swallow function with min cues  4. Complete an objective swallow study (i.e., MBSS) to assess swallow integrity, r/o aspiration, and determine of safest LRD, min A  5. Pt will utilize comp strategies to improve respiration to swallow coordination to reduce aspiration risk and improve activity tolerance for sustained nutrition/ hydration given min-mod cues. Rec:     NPO with consideration for alt means of nutrition  Aspiration precautions  HOB >45 at all times  Small bites/sips; alternate liquid/solid with slow feeding rate with SLP only  Oral care TID  Meds: non oral   MBS to further assess oropharyngeal swallow fxn      Outcome: Progressing Towards Goal       SPEECH LANGUAGE PATHOLOGY BEDSIDE SWALLOW   EVALUATION & TREATMENT     Patient: Aurelia Telles (72 y.o. female)  Date: 12/18/2020  Primary Diagnosis: STEMI (ST elevation myocardial infarction) ([facility] [address] [I21.3]  Procedure(s) (LRB):  Left Heart Cath / Coronary Angiography (Right)  Left Ventriculography (N/A)  Percutaneous Coronary Intervention (N/A)  Right Heart Cath (N/A) 7 Days Post-Op   Precautions: Aspiration risk     PLOF: Per H&P    ASSESSMENT :  Based on the objective data described below, the patient presents with suspected moderate oral and severe pharyngeal dysphagia with poor end endurance, in the setting of MI and s/p extubation x1 day. Chart Review and clearance obtained by RN. Today, SLP conducted a Clinical Beside Swallow Evaluation, per MD orders. Pt A&Ox self and place; she was able to follow commands.   Speech/voice appear very aphonic with very weak cough and pt unable to elicit a volitional throat clear. RN reports improved cough and oral secretion management. Oral mech examination revealed structures functional for speech and deglutition with, however, very weak orolingual strength/ ROM. Pt  administer ice chips with adequate bolus control, delayed swallow, very minimal laryngeal elevation immediate weak cough, and increased wet VQ. SLP then administered honey thick via tsp/sip which also resulted in immediate weak cough and change in vocal wetness. SLP RECS: NPO with consideration for alt means of nutrition, meds non oral, and oral care TID. ST will continue to follow. Pt educated with regard to s/sx aspiration, aspiration risk, diet recs and role of SLP. Pt able to verbalize understanding, but will need re-enforcement. D/w RN and RD    TREATMENT :  Skilled therapy initiated; Educated pt on aspiration precautions and importance of compensatory swallow techniques to decrease aspiration risk (decrease rate of intake & sip/bite size, upright @HOB for all po intake and ~30 minutes after po); verbalized comprehension. SLP to follow as indicated. Patient will benefit from skilled intervention to address the above impairments. Patient's rehabilitation potential is considered to be Good  Factors which may influence rehabilitation potential include:   []            None noted  [x]            Mental ability/status  [x]            Medical condition  []            Home/family situation and support systems  [x]            Safety awareness  []            Pain tolerance/management  []            Other:      PLAN :  Recommendations and Planned Interventions:   See above  Frequency/Duration: Patient will be followed by speech-language pathology 1-2 times per day/3-5  days per week to address goals. Discharge Recommendations: Rehab     SUBJECTIVE:   Patient stated I'm Poppy Jose Eliass.     OBJECTIVE:     Past Medical History:   Diagnosis Date    Arthritis     Asthma     Chronic pain BACK PAIN    Diabetes ([facility] [address]     Diabetic neuropathy (HCC)     Emphysema     Hepatitis C     Hypertension     Nervousness     Osteoarthritis of both knees      Past Surgical History:   Procedure Laterality Date    HX CARPAL TUNNEL RELEASE Right 8/31/09    Dr. Cathy Pleitez Situation:   Home Situation  Home Environment: Assisted living  One/Two Story Residence: One story  Living Alone: No  Support Systems: Assisted living  Patient Expects to be Discharged to[de-identified] Assisted living  Current DME Used/Available at Home: Other (comment)(unable to tell me)      Cognitive and Communication Status:  Neurologic State: Alert  Orientation Level: Oriented to person, Oriented to place  Cognition: Follows commands, Poor safety awareness           Oral Assessment:  Oral Assessment  Labial: No impairment  Oral Hygiene: fair  Lingual: Decreased strength  P.O. Trials:  Patient Position: > 70 HOB  Vocal quality prior to P.O.: Aphonic  Consistency Presented: Honey thick liquid; Ice chips  How Presented: SLP-fed/presented;Spoon     Bolus Acceptance: No impairment  Bolus Formation/Control: Impaired  Type of Impairment: Delayed;Lip closure  Propulsion: Delayed (# of seconds); Tongue pumping  Oral Residue: None  Initiation of Swallow: Delayed (# of seconds)  Laryngeal Elevation: Weak  Aspiration Signs/Symptoms: Change vocal quality; Increase in RR;Weak cough  Pharyngeal Phase Characteristics: Altered vocal quality;Poor endurance  Effective Modifications: None          Oral Phase Severity: Moderate  Pharyngeal Phase Severity : Severe    PAIN:  Pain level pre-treatment: 0/10   Pain level post-treatment: 0/10   Pain Intervention(s): Medication (see MAR);  Rest, Ice, Repositioning  Response to intervention: Nurse notified, See doc flow    After treatment:   []            Patient left in no apparent distress sitting up in chair  [x]            Patient left in no apparent distress in bed  [x]            Call bell left within reach  [x]            Nursing notified  []            Family present  []            Caregiver present  []            Bed alarm activated    COMMUNICATION/EDUCATION:   [x]            Aspiration precautions; swallow safety; compensatory techniques. [x]            Patient/family have participated as able in goal setting and plan of care. [x]            Patient/family agree to work toward stated goals and plan of care. []            Patient understands intent and goals of therapy; neutral about participation. []            Patient unable to participate in goal setting/plan of care; educ ongoing with interdisciplinary staff  [x]         Posted safety precautions in patient's room.     Thank you for this referral.  Cat Galvan, SLP  MA, CCC-SLP  Speech-Language Pathologist    Time Calculation: 23 mins  Evaluation Time: 13 minutes   Treatment Time: 10 minutes

## 2020-12-18 NOTE — PROGRESS NOTES
0700- Received report from Kaylee High RN. Assumed care of patient. Currently awake in bed with O2/5L/NC. Tube feeds to left NG infusing. Pro to bedside drainage with yellow, hazy urine. Patient appears anxious, irritable and hard to redirect. Bilateral wrist restraints in place to prevent removal of lines/tubes. 0800- Assessment complete. Patient remains agitated and states she is hungry. Also throwing legs off bed, attempting to get to commode. Explained that we are waiting on ST and PT evaluation to start activity and diet but needs reinforcement. Placed bedpan under patient as requested. 0820- removed bedpan and no BM noted. Patient is passing gas. 46- Dr. Mikki White here. Received order to stop lasix gtt and decrease dobutamine gtt in half of current dose. 1015- NG tube removed by patient. Restraints still in place but and patient needs constant redirection. 1115- Patient c/o pain. Administered PRN fentanyl. [id number] Dr. Melisa Rankin at bedside. Received order to stop dobutamine gtt for now. Also OK with discontinuation of lasix gtt. Patient currently awake and remains easily agitated. Called ST department to request consult today. Patient has no access for PO meds until cleared to swallow. 1300- Dr. Elmore here. OK to reinsert NG tube if patient fails eval by ST.      1330- SLP at bedside for swallow eval.     1345- Swallow eval failed. Patient to remain NPO with tube feeds. Will replace NG tube. 1530- Inserted NG to right nare and restarted tube feeds as ordered. 1545- Noted patient now positive for ESBL which recommends contact isolation. Notified Dr. Elmore and requested transfer if possible. States she will review chart and advise on POC. 1815- Called report to [facility] on 4N.     1915- Patient transferred to 4 via bed, per transport team. O2/4L/NC.

## 2020-12-19 ENCOUNTER — APPOINTMENT (OUTPATIENT)
Dept: GENERAL RADIOLOGY | Age: 65
DRG: 003 | End: 2020-12-19
Attending: INTERNAL MEDICINE
Payer: MEDICARE

## 2020-12-19 LAB
ANION GAP SERPL CALC-SCNC: 6 MMOL/L (ref 3–18)
APTT PPP: 25.8 SEC (ref 23–36.4)
BASOPHILS # BLD: 0 K/UL (ref 0–0.1)
BASOPHILS NFR BLD: 0 % (ref 0–2)
BUN SERPL-MCNC: 27 MG/DL (ref 7–18)
BUN/CREAT SERPL: 33 (ref 12–20)
CALCIUM SERPL-MCNC: 9.8 MG/DL (ref 8.5–10.1)
CHLORIDE SERPL-SCNC: 102 MMOL/L (ref 100–111)
CO2 SERPL-SCNC: 34 MMOL/L (ref 21–32)
CREAT SERPL-MCNC: 0.82 MG/DL (ref 0.6–1.3)
DIFFERENTIAL METHOD BLD: ABNORMAL
EOSINOPHIL # BLD: 0 K/UL (ref 0–0.4)
EOSINOPHIL NFR BLD: 0 % (ref 0–5)
ERYTHROCYTE [DISTWIDTH] IN BLOOD BY AUTOMATED COUNT: 12.8 % (ref 11.6–14.5)
GLUCOSE BLD STRIP.AUTO-MCNC: 298 MG/DL (ref 70–110)
GLUCOSE BLD STRIP.AUTO-MCNC: 326 MG/DL (ref 70–110)
GLUCOSE BLD STRIP.AUTO-MCNC: 371 MG/DL (ref 70–110)
GLUCOSE BLD STRIP.AUTO-MCNC: 447 MG/DL (ref 70–110)
GLUCOSE BLD STRIP.AUTO-MCNC: 489 MG/DL (ref 70–110)
GLUCOSE SERPL-MCNC: 347 MG/DL (ref 74–99)
HCT VFR BLD AUTO: 37.8 % (ref 35–45)
HGB BLD-MCNC: 11.9 G/DL (ref 12–16)
LYMPHOCYTES # BLD: 1.5 K/UL (ref 0.9–3.6)
LYMPHOCYTES NFR BLD: 9 % (ref 21–52)
MAGNESIUM SERPL-MCNC: 2.4 MG/DL (ref 1.6–2.6)
MCH RBC QN AUTO: 26.7 PG (ref 24–34)
MCHC RBC AUTO-ENTMCNC: 31.5 G/DL (ref 31–37)
MCV RBC AUTO: 84.8 FL (ref 74–97)
MONOCYTES # BLD: 0.5 K/UL (ref 0.05–1.2)
MONOCYTES NFR BLD: 3 % (ref 3–10)
NEUTS SEG # BLD: 14.5 K/UL (ref 1.8–8)
NEUTS SEG NFR BLD: 88 % (ref 40–73)
PHOSPHATE SERPL-MCNC: 2.7 MG/DL (ref 2.5–4.9)
PLATELET # BLD AUTO: 773 K/UL (ref 135–420)
PMV BLD AUTO: 9.7 FL (ref 9.2–11.8)
POTASSIUM SERPL-SCNC: 3.6 MMOL/L (ref 3.5–5.5)
RBC # BLD AUTO: 4.46 M/UL (ref 4.2–5.3)
SODIUM SERPL-SCNC: 142 MMOL/L (ref 136–145)
WBC # BLD AUTO: 16.5 K/UL (ref 4.6–13.2)

## 2020-12-19 PROCEDURE — 85025 COMPLETE CBC W/AUTO DIFF WBC: CPT

## 2020-12-19 PROCEDURE — 99233 SBSQ HOSP IP/OBS HIGH 50: CPT | Performed by: INTERNAL MEDICINE

## 2020-12-19 PROCEDURE — 2709999900 HC NON-CHARGEABLE SUPPLY

## 2020-12-19 PROCEDURE — 74011000250 HC RX REV CODE- 250: Performed by: INTERNAL MEDICINE

## 2020-12-19 PROCEDURE — 36415 COLL VENOUS BLD VENIPUNCTURE: CPT

## 2020-12-19 PROCEDURE — 74011636637 HC RX REV CODE- 636/637: Performed by: INTERNAL MEDICINE

## 2020-12-19 PROCEDURE — 65270000029 HC RM PRIVATE

## 2020-12-19 PROCEDURE — 74018 RADEX ABDOMEN 1 VIEW: CPT

## 2020-12-19 PROCEDURE — 80048 BASIC METABOLIC PNL TOTAL CA: CPT

## 2020-12-19 PROCEDURE — 74011250637 HC RX REV CODE- 250/637: Performed by: INTERNAL MEDICINE

## 2020-12-19 PROCEDURE — 74011250636 HC RX REV CODE- 250/636: Performed by: INTERNAL MEDICINE

## 2020-12-19 PROCEDURE — 84100 ASSAY OF PHOSPHORUS: CPT

## 2020-12-19 PROCEDURE — 82962 GLUCOSE BLOOD TEST: CPT

## 2020-12-19 PROCEDURE — 74011250637 HC RX REV CODE- 250/637: Performed by: NURSE PRACTITIONER

## 2020-12-19 PROCEDURE — 83735 ASSAY OF MAGNESIUM: CPT

## 2020-12-19 PROCEDURE — 85730 THROMBOPLASTIN TIME PARTIAL: CPT

## 2020-12-19 PROCEDURE — 99232 SBSQ HOSP IP/OBS MODERATE 35: CPT | Performed by: INTERNAL MEDICINE

## 2020-12-19 RX ADMIN — INSULIN LISPRO 15 UNITS: 100 INJECTION, SOLUTION INTRAVENOUS; SUBCUTANEOUS at 18:04

## 2020-12-19 RX ADMIN — ASPIRIN 81 MG CHEWABLE TABLET 81 MG: 81 TABLET CHEWABLE at 08:37

## 2020-12-19 RX ADMIN — CARVEDILOL 3.12 MG: 3.12 TABLET, FILM COATED ORAL at 08:37

## 2020-12-19 RX ADMIN — METHYLPREDNISOLONE SODIUM SUCCINATE 40 MG: 40 INJECTION, POWDER, FOR SOLUTION INTRAMUSCULAR; INTRAVENOUS at 00:43

## 2020-12-19 RX ADMIN — METHYLPREDNISOLONE SODIUM SUCCINATE 40 MG: 40 INJECTION, POWDER, FOR SOLUTION INTRAMUSCULAR; INTRAVENOUS at 05:00

## 2020-12-19 RX ADMIN — FAMOTIDINE 20 MG: 10 INJECTION INTRAVENOUS at 08:37

## 2020-12-19 RX ADMIN — SODIUM CHLORIDE 10 ML: 9 INJECTION, SOLUTION INTRAMUSCULAR; INTRAVENOUS; SUBCUTANEOUS at 05:00

## 2020-12-19 RX ADMIN — TICAGRELOR 90 MG: 90 TABLET ORAL at 00:22

## 2020-12-19 RX ADMIN — METHYLPREDNISOLONE SODIUM SUCCINATE 40 MG: 40 INJECTION, POWDER, FOR SOLUTION INTRAMUSCULAR; INTRAVENOUS at 22:28

## 2020-12-19 RX ADMIN — FUROSEMIDE 40 MG: 10 INJECTION, SOLUTION INTRAMUSCULAR; INTRAVENOUS at 22:27

## 2020-12-19 RX ADMIN — QUETIAPINE FUMARATE 50 MG: 25 TABLET ORAL at 08:37

## 2020-12-19 RX ADMIN — WATER 2 G: 1 INJECTION INTRAMUSCULAR; INTRAVENOUS; SUBCUTANEOUS at 15:01

## 2020-12-19 RX ADMIN — INSULIN GLARGINE 20 UNITS: 100 INJECTION, SOLUTION SUBCUTANEOUS at 08:56

## 2020-12-19 RX ADMIN — DIAZEPAM 5 MG: 5 TABLET ORAL at 00:22

## 2020-12-19 RX ADMIN — SODIUM CHLORIDE 10 ML: 9 INJECTION, SOLUTION INTRAMUSCULAR; INTRAVENOUS; SUBCUTANEOUS at 22:34

## 2020-12-19 RX ADMIN — Medication 3 MG: at 00:23

## 2020-12-19 RX ADMIN — INSULIN LISPRO 12 UNITS: 100 INJECTION, SOLUTION INTRAVENOUS; SUBCUTANEOUS at 05:08

## 2020-12-19 RX ADMIN — ATORVASTATIN CALCIUM 40 MG: 40 TABLET, FILM COATED ORAL at 00:22

## 2020-12-19 RX ADMIN — FAMOTIDINE 20 MG: 10 INJECTION INTRAVENOUS at 22:27

## 2020-12-19 RX ADMIN — INSULIN LISPRO 9 UNITS: 100 INJECTION, SOLUTION INTRAVENOUS; SUBCUTANEOUS at 00:41

## 2020-12-19 RX ADMIN — SPIRONOLACTONE 25 MG: 25 TABLET, FILM COATED ORAL at 08:37

## 2020-12-19 RX ADMIN — CARVEDILOL 3.12 MG: 3.12 TABLET, FILM COATED ORAL at 00:22

## 2020-12-19 RX ADMIN — FUROSEMIDE 40 MG: 10 INJECTION, SOLUTION INTRAMUSCULAR; INTRAVENOUS at 08:37

## 2020-12-19 RX ADMIN — DIAZEPAM 5 MG: 5 TABLET ORAL at 08:37

## 2020-12-19 RX ADMIN — INSULIN LISPRO 15 UNITS: 100 INJECTION, SOLUTION INTRAVENOUS; SUBCUTANEOUS at 11:02

## 2020-12-19 RX ADMIN — Medication 30 ML: at 08:37

## 2020-12-19 RX ADMIN — TICAGRELOR 90 MG: 90 TABLET ORAL at 08:37

## 2020-12-19 RX ADMIN — POLYETHYLENE GLYCOL 3350 17 G: 17 POWDER, FOR SOLUTION ORAL at 08:37

## 2020-12-19 RX ADMIN — SODIUM CHLORIDE 10 ML: 9 INJECTION, SOLUTION INTRAMUSCULAR; INTRAVENOUS; SUBCUTANEOUS at 15:01

## 2020-12-19 NOTE — PROGRESS NOTES
[address] [place] [address] [facility]  Pulmonary, Critical Care, and Sleep Medicine    Pulmonary Medicine Progress Note    Name: Ying Moffett MRN: [id number]  : 1955 Hospital: [address] [place] [address]  Date: 2020       Subjective:  Pt is doing well on NC. Remains agitated, pulling out lines and NGTs. Did not pass her swallow. Breathing appears stable. Patient Active Problem List   Diagnosis Code    Diverticula of colon K57.30    Sepsis ([address] A41.9    Sepsis secondary to UTI ([address] A41.9, N39.0    DM hyperosmolarity type II, uncontrolled ([address] E11.00, E11.65    HTN (hypertension) I10    Febrile illness, acute R50.9    Gram-negative bacteremia R78.81    Diabetic neuropathy ([address] E11.40    Osteoarthritis of both knees M17.0    Acidosis E87.2    Respiratory failure ([address] J96.90    Shock ([address] R57.9    Hyperosmolar (nonketotic) coma ([address] E11.01    Acute UTI N39.0    Macrocytic anemia D53.9    STEMI (ST elevation myocardial infarction) ([facility]) I21.3    Acute on chronic systolic congestive heart failure ([facility]) I50.23    Severe protein-calorie malnutrition (HCC) E43    Goals of care, counseling/discussion Z71.89    Dementia without behavioral disturbance ([facility]) F03.90    Pulmonary fibrosis ([facility]) J84.10       Assessment:  · CAD: S/P PCI with DEC- LAD 20  · Respiratory Failure- hypoxic- pulmonary edema, CHF in the setting of severe ILD  · Pulmonary Fibrosis  · - severe, with extensive honeycombing and bronchiectasis on CT scan  · - appears to be a new diagnosis  · Encephalopathy  · - severe, likely delirium from acute illness  · Dementia- on Aricept  · Hypokalemia  · DM: on Lantus as OP  · Hepatitic C infection\  · UTI  · - E coli    Impression/Plan:  - Wean O2 as tolerated  - Diuretics per cards  - d/C sedatives for now, defer to primary service  - Wean down steroids  - ABX per primary service.    - Continue to work with speech    Will follow    Full Code       FiO2 to keep SpO2 >=92%, HOB >=30 degree, aspiration precautions, aggressive pulmonary toileting, incentive spirometry. Other issues management by primary team and respective consultants. Events and notes from last 24 hours reviewed. Discussed with patient and family, answered all questions to their satisfaction. Care plan discussed with nursing.      Labs and images personally seen and available reports reviewed  All current medicines are reviewed       Medications- Current:  Current Facility-Administered Medications   Medication Dose Route Frequency    methylPREDNISolone (PF) (SOLU-MEDROL) injection 40 mg  40 mg IntraVENous Q12H    furosemide (LASIX) injection 40 mg  40 mg IntraVENous Q12H    insulin glargine (LANTUS) injection 20 Units  20 Units SubCUTAneous DAILY    cefTRIAXone (ROCEPHIN) 2 g in sterile water (preservative free) 20 mL IV syringe  2 g IntraVENous Q24H    polyethylene glycol (MIRALAX) packet 17 g  17 g Per NG tube DAILY    spironolactone (ALDACTONE) tablet 25 mg  25 mg Oral DAILY    multivit-folic acid-herbal 427 (WELLESSE PLUS) oral liquid 30 mL  30 mL Per NG tube DAILY    insulin lispro (HUMALOG) injection   SubCUTAneous Q6H    carvediloL (COREG) tablet 3.125 mg  3.125 mg Oral Q12H    fentaNYL citrate (PF) injection  mcg   mcg IntraVENous Q2H PRN    sodium chloride (NS) flush 5-40 mL  5-40 mL IntraVENous PRN    ticagrelor (BRILINTA) tablet 90 mg  90 mg Per NG tube BID    sodium chloride (NS) flush 5-40 mL  5-40 mL IntraVENous PRN    albuterol-ipratropium (DUO-NEB) 2.5 MG-0.5 MG/3 ML  3 mL Nebulization Q4H PRN    melatonin tablet 3 mg  3 mg Oral QHS    famotidine (PF) (PEPCID) 20 mg in 0.9% sodium chloride 10 mL injection  20 mg IntraVENous Q12H    midazolam (VERSED) injection 1-2 mg  1-2 mg IntraVENous Q1H PRN    sodium chloride (NS) flush 5-40 mL  5-40 mL IntraVENous Q8H    sodium chloride (NS) flush 5-40 mL  5-40 mL IntraVENous PRN    acetaminophen (TYLENOL) tablet 650 mg  650 mg Oral Q6H PRN    Or    acetaminophen (TYLENOL) suppository 650 mg  650 mg Rectal Q6H PRN    promethazine (PHENERGAN) tablet 12.5 mg  12.5 mg Oral Q6H PRN    Or    ondansetron (ZOFRAN) injection 4 mg  4 mg IntraVENous Q6H PRN    glucose chewable tablet 16 g  4 Tab Oral PRN    glucagon (GLUCAGEN) injection 1 mg  1 mg IntraMUSCular PRN    dextrose (D50W) injection syrg 12.5-25 g  25-50 mL IntraVENous PRN    aspirin chewable tablet 81 mg  81 mg Oral DAILY    atorvastatin (LIPITOR) tablet 40 mg  40 mg Oral QHS       Objective:  Vital Signs:    Visit Vitals  BP (!) 144/89   Pulse 88   Temp 97.5 °F (36.4 °C)   Resp 16   Ht 5' 7\" (1.702 m)   Wt 63.6 kg (140 lb 4.8 oz)   SpO2 96%   Breastfeeding No   BMI 21.97 kg/m²      O2 Device: Nasal cannula  O2 Flow Rate (L/min): 5 l/min  Temp (24hrs), Av °F (36.7 °C), Min:97.5 °F (36.4 °C), Max:98.5 °F (36.9 °C)      Intake/Output:   Last shift:       07 - 1900  In: 4256   Out: 300 [Urine:300]  Last 3 shifts: 1901 -  0700  In: 789.3 [I.V.:369.3]  Out: 5545 [Urine:4025]    Intake/Output Summary (Last 24 hours) at 2020 1327  Last data filed at 2020 0849  Gross per 24 hour   Intake 2630 ml   Output 2625 ml   Net 5 ml       Physical Exam:     General/Neurology: Alert, Awake, uncooperative  Head:   Normocephalic, without obvious abnormality  Eye:   PERRL, EOM intact  Oral:  Mucus membranes moist  Lung:   Fine crackles at the bases. Heart:   S1 S2 present  Abdomen:  Soft, non tender, BS+nt   Extremities:  No pedal edema.    Skin:   Dry, intact  Data:      Recent Results (from the past 24 hour(s))   GLUCOSE, POC    Collection Time: 20  5:35 PM   Result Value Ref Range    Glucose (POC) 320 (H) 70 - 110 mg/dL   PTT    Collection Time: 20 10:22 PM   Result Value Ref Range    aPTT 25.4 23.0 - 36.4 SEC   GLUCOSE, POC    Collection Time: 20 12:38 AM   Result Value Ref Range    Glucose (POC) 298 (H) 70 - 683 mg/dL   METABOLIC PANEL, BASIC    Collection Time: 12/19/20  4:35 AM   Result Value Ref Range    Sodium 142 136 - 145 mmol/L    Potassium 3.6 3.5 - 5.5 mmol/L    Chloride 102 100 - 111 mmol/L    CO2 34 (H) 21 - 32 mmol/L    Anion gap 6 3.0 - 18 mmol/L    Glucose 347 (H) 74 - 99 mg/dL    BUN 27 (H) 7.0 - 18 MG/DL    Creatinine 0.82 0.6 - 1.3 MG/DL    BUN/Creatinine ratio 33 (H) 12 - 20      GFR est AA >60 >60 ml/min/1.73m2    GFR est non-AA >60 >60 ml/min/1.73m2    Calcium 9.8 8.5 - 10.1 MG/DL   MAGNESIUM    Collection Time: 12/19/20  4:35 AM   Result Value Ref Range    Magnesium 2.4 1.6 - 2.6 mg/dL   PHOSPHORUS    Collection Time: 12/19/20  4:35 AM   Result Value Ref Range    Phosphorus 2.7 2.5 - 4.9 MG/DL   PTT    Collection Time: 12/19/20  4:35 AM   Result Value Ref Range    aPTT 25.8 23.0 - 36.4 SEC   CBC WITH AUTOMATED DIFF    Collection Time: 12/19/20  4:35 AM   Result Value Ref Range    WBC 16.5 (H) 4.6 - 13.2 K/uL    RBC 4.46 4.20 - 5.30 M/uL    HGB 11.9 (L) 12.0 - 16.0 g/dL    HCT 37.8 35.0 - 45.0 %    MCV 84.8 74.0 - 97.0 FL    MCH 26.7 24.0 - 34.0 PG    MCHC 31.5 31.0 - 37.0 g/dL    RDW 12.8 11.6 - 14.5 %    PLATELET 276 (H) 496 - 420 K/uL    MPV 9.7 9.2 - 11.8 FL    NEUTROPHILS 88 (H) 40 - 73 %    LYMPHOCYTES 9 (L) 21 - 52 %    MONOCYTES 3 3 - 10 %    EOSINOPHILS 0 0 - 5 %    BASOPHILS 0 0 - 2 %    ABS. NEUTROPHILS 14.5 (H) 1.8 - 8.0 K/UL    ABS. LYMPHOCYTES 1.5 0.9 - 3.6 K/UL    ABS. MONOCYTES 0.5 0.05 - 1.2 K/UL    ABS. EOSINOPHILS 0.0 0.0 - 0.4 K/UL    ABS.  BASOPHILS 0.0 0.0 - 0.1 K/UL    DF AUTOMATED     GLUCOSE, POC    Collection Time: 12/19/20  5:02 AM   Result Value Ref Range    Glucose (POC) 326 (H) 70 - 110 mg/dL   GLUCOSE, POC    Collection Time: 12/19/20 10:39 AM   Result Value Ref Range    Glucose (POC) 489 (HH) 70 - 110 mg/dL   GLUCOSE, POC    Collection Time: 12/19/20 10:41 AM   Result Value Ref Range    Glucose (POC) 447 (HH) 70 - 110 mg/dL         Chemistry   Recent Labs     12/19/20  0437 12/18/20  0431 12/17/20  0415   * 324* 289*    139 139   K 3.6 3.5 4.1    101 105   CO2 34* 35* 30   BUN 27* 21* 18   CREA 0.82 0.74 0.69   CA 9.8 9.1 8.1*   MG 2.4 2.0 2.0   PHOS 2.7 1.3* 3.2   AGAP 6 3 4   BUCR 33* 28* 26*       CBC w/Diff   Recent Labs     12/19/20  0435 12/17/20  0415 12/16/20  1535   WBC 16.5* 6.3 6.6   RBC 4.46 3.64* 3.72*   HGB 11.9* 9.6* 9.8*   HCT 37.8 30.5* 31.1*   * 493* 493*   GRANS 88*  --  71   LYMPH 9*  --  10*   EOS 0  --  6*       ABG   Recent Labs     12/17/20  0409   PHI 7.46*   PCO2I 43.4   PO2I 105*   HCO3I 31.2*   FIO2I 35       Micro    Recent Labs     12/16/20  1535   CULT NO GROWTH 3 DAYS     Recent Labs     12/16/20  1535   CULT NO GROWTH 3 DAYS       CT (Most Recent)   Results from [place] encounter on 12/10/20   CTA CHEST W OR W WO CONT    Narrative CT Pulmonary Angiogram    CPT CODE: 42326    CLINICAL: Shortness of breath, concern for PE.    COMPARISON: Current and previous plain films. TECHNICAL: Volumetric data acquisition performed through the chest with a  multislice scanner. Reconstructions were created in the axial, coronal, and  sagittal planes. Coronal and sagittal reconstructions were created using maximal  intensity projection methodology to maximize sensitivity for emboli. Bolus  timing was optimized for a pulmonary embolism evaluation. 100 cc of Isovue-370  were utilized for this examination. FINDINGS:  The patient is intubated. Endotracheal tube is in satisfactory position. There are no pulmonary emboli. The ascending aorta is prominent at 3.7 cm. There  are dense calcifications in the anterior surface of the ascending aorta. The lungs there are extensive coarse subpleural reticulations increasing in  confluence in severity in the bases. Honeycombing is present. There is traction  bronchiectasis extensively in the lower lung zones .  There is groundglass  infiltrate and consolidation between the areas of honeycombing and traction  bronchiectasis  No pleural effusion or pneumothorax is apparent. There is a small pneumomediastinum evident. .  No mediastinal adenopathy or mass is evident. There is multichamber cardiac  enlargement. Sections in the upper abdomen reveal a inhomogeneous mass in the posterior right  lobe of the liver measuring 9 x 10 cm transaxially. There may be an additional  smaller lesion (2 cm) in the medial left lobe. Impression IMPRESSION:    Negative for acute pulmonary embolism or acute aortic abnormality. Dilated ascending aorta to 3.7 cm. Advanced pulmonary fibrosis with traction bronchiectasis and honeycombing. The  appearance is suggestive of UIP. There are extensive groundglass and confluent infiltrates superimposed upon the  interstitial process in the lower lobes. Primary differential considerations for  this include pulmonary edema, pneumonia, aspiration, pulmonary hemorrhage, and  acute lung injury. There is a large inhomogeneous mass in the right lobe of the liver and possibly  a second smaller lesion. Differential includes primary and metastatic disease. Broad differential.  Minimal pneumomediastinum        All CT scans at this facility are performed using dose optimization technique as  appropriate to the performed exam, to include automated exposure control,  adjustment of the mA and/or kV according to patient's size (Including  appropriate matching for site-specific examinations), or use of iterative  reconstruction technique. XR (Most Recent). CXR reviewed by me and compared with previous CXR   Results from Hospital Encounter encounter on 12/10/20   XR CHEST PORT    Narrative Clinical:  NG tube placement    Technique: A supine AP abdomen was obtained. Comparison:  Chest radiograph 12/15/2020. Findings: The NG tube terminates in the region of gastric body. There are redemonstrated particular opacities at the lung bases.  There is  possible superimposed consolidation at the medial left lung base. Bowel gas  pattern is nonspecific; there are no abnormally dilated loops of bowel to  suggest obstruction. This supine film is limited for the evaluation of  intraperitoneal free air. No secondary signs of bowel perforation are present. The visualized osseous structures are within normal limits for age. Impression Impression:     NG tube terminates in region of gastric body. The radiographic evidence of  obstruction. Redemonstrated pulmonary fibrosis, with possible superimposed new consolidation  medial left lower lung. Attention on follow-up chest radiography is suggested. See my orders for details     Total care time exclusive of procedures with complex decision making, coordination of care and counseling patient performed and > 50% time spent in face to face evaluation as mentioned above.     Kenia Oates MD  Critical Care Medicine

## 2020-12-19 NOTE — PROGRESS NOTES
[facility]  [facility]    Patient: Hayes Santana Age: 72 y.o. : 1955 MR#: [id number] SSN: [id number]  Date/Time: 2020     Subjective:    Patient seen and examined. Has NG in place. Wrist restraints in place as patient continues to try to pull out her NG tube. Otherwise calm. Occasionally interactive, nods head. Assessment/Plan:   1. Acute on chronic hypoxic respiratory failure: Status post intubation by anesthesia on ,ventilator management per ICU team. CTA chest done  negative for PE but a number of abnormalities noted including advanced fibrosis, extensive groundglass infiltrates possibly due to pulmonary edema, pneumonia, aspiration, pulm hemorrhage and ALI, discussed w/ pulmonary. - extubated afternoon   2. Acute flash pulmonary edema due to #3: now off lasix, dobutamine discontinued   3. Acute systolic heart failure exacerbation, EF 35%, diuresis per cardiology, limiting factor is her low bp, now on dobutamine drip, with improvement in her bp. Her beta-blocker has been held given her low blood pressure. Continue aspirin and statin, ACE inhibitor  to be added once blood pressure stabilizes. 4. Acute MI with anterolateral STEMI and Q waves:S/p ptca/stent to proximal/ mid LAD using ARELY on DAPT on 20 . On Beta-blocker and statin. On heparin drip as well. In light of possible pulmonary hemorrhage although unlikely, heparin drip stopped. Discussed with cardiologist.  5. Fever:  None in 48 hours;  Etiology unclear. CT imaging  with extensive groundglass infiltrates possibly aspiration, at risk. On broad-spectrum antibiotics already  6. -Lung lesions on cta chest  7. History of COPD on home oxygen: Patient currently on vent, BD as tolerated. 8. Diabetes mellitus type 2: Sugars are somewhat within acceptable limits on corrective insulin. Plan to cont corrective insulin.   Plan to adjust Lantus if blood sugars are consistently elevated. .  9. Hypertension: BP lower side, will monitor off medications. Now on dobutamine for continued hypotension   10. dementia: Unknown baseline, resume Aricept when able to tolerate p.o. Full code    PLAN:  - Aspiration precautions, NG in place  - Discussed with Dr. Coon Orn- wean steroids; on home O2 at 4L baseline  - continue po lasix, low dose BB, ASA, Brilinta  - remains NPO with small bites and slow feeding- failed swallow eval    Family updated: family requests that upon discharge she have a mobile oxygen tank if possible. Family considering taking her home upon discharge because they are aware that she is difficult and want her to get the care she needs. - At this time family would like to move forward with PEG placement. - Please call sister López Llanes at [phone] for consent for the procedure. Visit Vitals  BP (!) 144/89   Pulse 88   Temp 97.5 °F (36.4 °C)   Resp 16   Ht 5' 7\" (1.702 m)   Wt 63.6 kg (140 lb 4.8 oz)   SpO2 96%   Breastfeeding No   BMI 21.97 kg/m²     Case discussed with:  []Patient  [x]Family  [x]Nursing  []Case Management  DVT Prophylaxis:  [x]Lovenox  []Hep iv  []SCDs  []Coumadin   []Eliquis/Xarelto     Objective:   VS:   Visit Vitals  BP (!) 144/89   Pulse 88   Temp 97.5 °F (36.4 °C)   Resp 16   Ht 5' 7\" (1.702 m)   Wt 63.6 kg (140 lb 4.8 oz)   SpO2 96%   Breastfeeding No   BMI 21.97 kg/m²      Tmax/24hrs: Temp (24hrs), Av °F (36.7 °C), Min:97.5 °F (36.4 °C), Max:98.5 °F (36.9 °C)  IOBRIEF    Intake/Output Summary (Last 24 hours) at 2020 1059  Last data filed at 2020 0849  Gross per 24 hour   Intake 2730 ml   Output 2625 ml   Net 105 ml       General: awake, follows commands    Pulmonary: CTAB  Cardiovascular: Regular rate and Rhythm. GI:  Soft, Non distended, Non tender. + Bowel sounds. + means   Extremities:  No edema. Psych: Not anxious or agitated.     Additional:    Medications:   Current Facility-Administered Medications Medication Dose Route Frequency    methylPREDNISolone (PF) (SOLU-MEDROL) injection 40 mg  40 mg IntraVENous Q12H    furosemide (LASIX) injection 40 mg  40 mg IntraVENous Q12H    insulin glargine (LANTUS) injection 20 Units  20 Units SubCUTAneous DAILY    cefTRIAXone (ROCEPHIN) 2 g in sterile water (preservative free) 20 mL IV syringe  2 g IntraVENous Q24H    polyethylene glycol (MIRALAX) packet 17 g  17 g Per NG tube DAILY    spironolactone (ALDACTONE) tablet 25 mg  25 mg Oral DAILY    multivit-folic acid-herbal 242 (WELLESSE PLUS) oral liquid 30 mL  30 mL Per NG tube DAILY    insulin lispro (HUMALOG) injection   SubCUTAneous Q6H    carvediloL (COREG) tablet 3.125 mg  3.125 mg Oral Q12H    fentaNYL citrate (PF) injection  mcg   mcg IntraVENous Q2H PRN    sodium chloride (NS) flush 5-40 mL  5-40 mL IntraVENous PRN    ticagrelor (BRILINTA) tablet 90 mg  90 mg Per NG tube BID    sodium chloride (NS) flush 5-40 mL  5-40 mL IntraVENous PRN    albuterol-ipratropium (DUO-NEB) 2.5 MG-0.5 MG/3 ML  3 mL Nebulization Q4H PRN    melatonin tablet 3 mg  3 mg Oral QHS    famotidine (PF) (PEPCID) 20 mg in 0.9% sodium chloride 10 mL injection  20 mg IntraVENous Q12H    midazolam (VERSED) injection 1-2 mg  1-2 mg IntraVENous Q1H PRN    sodium chloride (NS) flush 5-40 mL  5-40 mL IntraVENous Q8H    sodium chloride (NS) flush 5-40 mL  5-40 mL IntraVENous PRN    acetaminophen (TYLENOL) tablet 650 mg  650 mg Oral Q6H PRN    Or    acetaminophen (TYLENOL) suppository 650 mg  650 mg Rectal Q6H PRN    promethazine (PHENERGAN) tablet 12.5 mg  12.5 mg Oral Q6H PRN    Or    ondansetron (ZOFRAN) injection 4 mg  4 mg IntraVENous Q6H PRN    glucose chewable tablet 16 g  4 Tab Oral PRN    glucagon (GLUCAGEN) injection 1 mg  1 mg IntraMUSCular PRN    dextrose (D50W) injection syrg 12.5-25 g  25-50 mL IntraVENous PRN    aspirin chewable tablet 81 mg  81 mg Oral DAILY    atorvastatin (LIPITOR) tablet 40 mg  40 mg Oral [facility]       Labs:    Recent Results (from the past 12 hour(s))   GLUCOSE, POC    Collection Time: 12/19/20 12:38 AM   Result Value Ref Range    Glucose (POC) 298 (H) 70 - 502 mg/dL   METABOLIC PANEL, BASIC    Collection Time: 12/19/20  4:35 AM   Result Value Ref Range    Sodium 142 136 - 145 mmol/L    Potassium 3.6 3.5 - 5.5 mmol/L    Chloride 102 100 - 111 mmol/L    CO2 34 (H) 21 - 32 mmol/L    Anion gap 6 3.0 - 18 mmol/L    Glucose 347 (H) 74 - 99 mg/dL    BUN 27 (H) 7.0 - 18 MG/DL    Creatinine 0.82 0.6 - 1.3 MG/DL    BUN/Creatinine ratio 33 (H) 12 - 20      GFR est AA >60 >60 ml/min/1.73m2    GFR est non-AA >60 >60 ml/min/1.73m2    Calcium 9.8 8.5 - 10.1 MG/DL   MAGNESIUM    Collection Time: 12/19/20  4:35 AM   Result Value Ref Range    Magnesium 2.4 1.6 - 2.6 mg/dL   PHOSPHORUS    Collection Time: 12/19/20  4:35 AM   Result Value Ref Range    Phosphorus 2.7 2.5 - 4.9 MG/DL   PTT    Collection Time: 12/19/20  4:35 AM   Result Value Ref Range    aPTT 25.8 23.0 - 36.4 SEC   CBC WITH AUTOMATED DIFF    Collection Time: 12/19/20  4:35 AM   Result Value Ref Range    WBC 16.5 (H) 4.6 - 13.2 K/uL    RBC 4.46 4.20 - 5.30 M/uL    HGB 11.9 (L) 12.0 - 16.0 g/dL    HCT 37.8 35.0 - 45.0 %    MCV 84.8 74.0 - 97.0 FL    MCH 26.7 24.0 - 34.0 PG    MCHC 31.5 31.0 - 37.0 g/dL    RDW 12.8 11.6 - 14.5 %    PLATELET 401 (H) 338 - 420 K/uL    MPV 9.7 9.2 - 11.8 FL    NEUTROPHILS 88 (H) 40 - 73 %    LYMPHOCYTES 9 (L) 21 - 52 %    MONOCYTES 3 3 - 10 %    EOSINOPHILS 0 0 - 5 %    BASOPHILS 0 0 - 2 %    ABS. NEUTROPHILS 14.5 (H) 1.8 - 8.0 K/UL    ABS. LYMPHOCYTES 1.5 0.9 - 3.6 K/UL    ABS. MONOCYTES 0.5 0.05 - 1.2 K/UL    ABS. EOSINOPHILS 0.0 0.0 - 0.4 K/UL    ABS.  BASOPHILS 0.0 0.0 - 0.1 K/UL    DF AUTOMATED     GLUCOSE, POC    Collection Time: 12/19/20  5:02 AM   Result Value Ref Range    Glucose (POC) 326 (H) 70 - 110 mg/dL   GLUCOSE, POC    Collection Time: 12/19/20 10:39 AM   Result Value Ref Range    Glucose (POC) 489 (HH) 70 - 110 mg/dL   GLUCOSE, POC    Collection Time: 12/19/20 10:41 AM   Result Value Ref Range    Glucose (POC) 447 (HH) 70 - 110 mg/dL       Signed By: Mikey Hill MD     December 19, 2020      Disclaimer: Sections of this note are dictated using utilizing voice recognition software. Minor typographical errors may be present. If questions arise, please do not hesitate to contact me or call our department.

## 2020-12-19 NOTE — ROUTINE PROCESS
Bedside and Verbal shift change report given to [address] [place] [address] [address] (oncoming nurse) by Sophia Salazar RN (offgoing nurse). Report included the following information SBAR, Kardex and MAR.

## 2020-12-19 NOTE — PROGRESS NOTES
[facility]      CARDIOLOGY PROGRESS NOTE  RECS:    1. Subacute MI-EKG showed Q waves in anterolateral lead with ST elevation, with suspicion of late presentation of anterior MI-s/p [facility] S/p ptca/stent to proximal/ mid LAD using ARELY.  LVEDP 8 mmHg. Normal PASP pressure with normal PCWP. Moderate to severe LV dysfunction. Continue asa and Brilinta. 2. Acute respiratory failure-tolerating extubation. Possibility of pulmonary fibrosis- given extensive air space disease and normal PCWP - Pulmonary following  3. Borderline BP- monitor and continue low dose BB. 4. Acute Systolic Congestive heart Failure-recent echo with EF% 35%-40-mildly dedecompensated-dobutamine dced. Continue lasix po.  5. COPD, on home O2 4L NC   6. Hepatitis C ? -Per family member   9. DM2- medications per medical team   8. [facility]        Cardiac cath 12/11/20  Patient presented to [address] [place] [address] with late presentation anterior wall MI.  EF 35-40%. Patient was initially managed medically-- however developed acute pulmonary edema requiring intubation. Also troponin level increasing consistent with ongoing ischemia. S/p ptca/stent to proximal/ mid LAD using ARELY. LVEDP 8 mmHg. Normal PASP pressure with normal PCWP. Moderate to severe LV dysfunction.     Echo 12/10/20  · LV: Estimated LVEF is 35 - 40%. Visually measured ejection fraction. Normal cavity size and wall thickness. Moderately and segmentally reduced systolic function. Inconclusive left ventricular diastolic function. · AO: Mild aortic root dilatation; diameter is 3.8 cm.   ASSESSMENT:  Hospital Problems  Date Reviewed: 2/7/2018          Codes Class Noted POA    Goals of care, counseling/discussion ICD-10-CM: Z71.89  ICD-9-CM: V65.49  Unknown Unknown        Dementia without behavioral disturbance ([facility] ICD-10-CM: F03.90  ICD-9-CM: 294.20  Unknown Unknown        P[facility] ([facility] ICD-10-CM: J84.10  ICD-9-CM: 697  Unknown Unknown Severe protein-calorie malnutrition ([facility] ICD-10-CM: E43  ICD-9-CM: 876  12/16/2020 Clinically Undetermined        Acute on chronic systolic congestive heart failure [facility]) ICD-10-CM: X52.34  ICD-9-CM: 428.23, 428.0  12/15/2020 Unknown        * (Principal) STEMI (ST elevation myocardial infarction) ([facility] ICD-10-CM: I21.3  ICD-9-CM: 410.90  12/10/2020 Unknown                SUBJECTIVE:  No CP or SOB    OBJECTIVE:    VS:   Visit Vitals  BP (!) 147/84   Pulse 92   Temp 97.7 °F (36.5 °C)   Resp 18   Ht 5' 7\" (1.702 m)   Wt 63.6 kg (140 lb 4.8 oz)   SpO2 95%   Breastfeeding No   BMI 21.97 kg/m²         Intake/Output Summary (Last 24 hours) at 12/19/2020 0919  Last data filed at 12/19/2020 0849  Gross per 24 hour   Intake 2750.63 ml   Output 3425 ml   Net -674.37 ml     TELE: normal sinus rhythm    General: lethargic  HENT: Normocephalic, atraumatic. Normal external eye. Neck :  no JVD  Cardiac:  regular rate and rhythm, S1, S2 normal, no murmur, click, rub or gallop  Lungs: rhonchi scattered  Abdomen: Soft, nontender, no masses  Extremities:  No c/c/e, peripheral pulses present      Labs: Results:       Chemistry Recent Labs     12/19/20  0435 12/18/20  0438 12/17/20  0415   * 324* 289*    139 139   K 3.6 3.5 4.1    101 105   CO2 34* 35* 30   BUN 27* 21* 18   CREA 0.82 0.74 0.69   CA 9.8 9.1 8.1*   AGAP 6 3 4   BUCR 33* 28* 26*      CBC w/Diff Recent Labs     12/19/20  0435 12/17/20  0415 12/16/20  1535   WBC 16.5* 6.3 6.6   RBC 4.46 3.64* 3.72*   HGB 11.9* 9.6* 9.8*   HCT 37.8 30.5* 31.1*   * 493* 493*   GRANS 88*  --  71   LYMPH 9*  --  10*   EOS 0  --  6*      Cardiac Enzymes No results for input(s): CPK, CKND1, NATHANIEL in the last 72 hours.     No lab exists for component: CKRMB, TROIP   Coagulation Recent Labs     12/19/20  0435 12/18/20  2222   APTT 25.8 25.4       Lipid Panel No results found for: CHOL, CHOLPOCT, CHOLX, CHLST, CHOLV, 196900, HDL, HDLP, LDL, LDLC, DLDLP, 968657, VLDLC, VLDL, TGLX, TRIGL, TRIGP, TGLPOCT, CHHD, CHHDX   BNP No results for input(s): BNPP in the last 72 hours. Liver Enzymes No results for input(s): TP, ALB, TBIL, AP in the last 72 hours. No lab exists for component: SGOT, GPT, DBIL   Thyroid Studies Lab Results   Component Value Date/Time    TSH 2.92 12/10/2020 11:07 AM              Guanako Valles, NP         I have independently evaluated taken history and examined the patient. All relevant labs and testing data's are reviewed. Care plan discussed and updated after review.   Tez Ricci MD independent

## 2020-12-19 NOTE — PROGRESS NOTES
Physical Therapy consult received. Note a bedrest complete order and bedrest complete (1 hour). Please clarify activity level and upgrade when appropriate. Thank you,  Paola Farr.  Demian Lopez

## 2020-12-20 LAB
ANION GAP SERPL CALC-SCNC: 6 MMOL/L (ref 3–18)
BASOPHILS # BLD: 0 K/UL (ref 0–0.1)
BASOPHILS NFR BLD: 0 % (ref 0–2)
BUN SERPL-MCNC: 37 MG/DL (ref 7–18)
BUN/CREAT SERPL: 45 (ref 12–20)
CALCIUM SERPL-MCNC: 10.1 MG/DL (ref 8.5–10.1)
CHLORIDE SERPL-SCNC: 104 MMOL/L (ref 100–111)
CO2 SERPL-SCNC: 35 MMOL/L (ref 21–32)
CREAT SERPL-MCNC: 0.82 MG/DL (ref 0.6–1.3)
DIFFERENTIAL METHOD BLD: ABNORMAL
EOSINOPHIL # BLD: 0 K/UL (ref 0–0.4)
EOSINOPHIL NFR BLD: 0 % (ref 0–5)
ERYTHROCYTE [DISTWIDTH] IN BLOOD BY AUTOMATED COUNT: 12.9 % (ref 11.6–14.5)
GLUCOSE BLD STRIP.AUTO-MCNC: 254 MG/DL (ref 70–110)
GLUCOSE BLD STRIP.AUTO-MCNC: 289 MG/DL (ref 70–110)
GLUCOSE BLD STRIP.AUTO-MCNC: 365 MG/DL (ref 70–110)
GLUCOSE BLD STRIP.AUTO-MCNC: 393 MG/DL (ref 70–110)
GLUCOSE BLD STRIP.AUTO-MCNC: 412 MG/DL (ref 70–110)
GLUCOSE SERPL-MCNC: 283 MG/DL (ref 74–99)
HCT VFR BLD AUTO: 39.2 % (ref 35–45)
HGB BLD-MCNC: 12.4 G/DL (ref 12–16)
LYMPHOCYTES # BLD: 0.9 K/UL (ref 0.9–3.6)
LYMPHOCYTES NFR BLD: 5 % (ref 21–52)
MAGNESIUM SERPL-MCNC: 2.5 MG/DL (ref 1.6–2.6)
MCH RBC QN AUTO: 27 PG (ref 24–34)
MCHC RBC AUTO-ENTMCNC: 31.6 G/DL (ref 31–37)
MCV RBC AUTO: 85.2 FL (ref 74–97)
MONOCYTES # BLD: 1.1 K/UL (ref 0.05–1.2)
MONOCYTES NFR BLD: 7 % (ref 3–10)
NEUTS SEG # BLD: 14.2 K/UL (ref 1.8–8)
NEUTS SEG NFR BLD: 88 % (ref 40–73)
PHOSPHATE SERPL-MCNC: 2.7 MG/DL (ref 2.5–4.9)
PLATELET # BLD AUTO: 847 K/UL (ref 135–420)
PMV BLD AUTO: 9.6 FL (ref 9.2–11.8)
POTASSIUM SERPL-SCNC: 3.2 MMOL/L (ref 3.5–5.5)
RBC # BLD AUTO: 4.6 M/UL (ref 4.2–5.3)
SODIUM SERPL-SCNC: 145 MMOL/L (ref 136–145)
WBC # BLD AUTO: 16.1 K/UL (ref 4.6–13.2)

## 2020-12-20 PROCEDURE — 74011250637 HC RX REV CODE- 250/637: Performed by: INTERNAL MEDICINE

## 2020-12-20 PROCEDURE — 99232 SBSQ HOSP IP/OBS MODERATE 35: CPT | Performed by: INTERNAL MEDICINE

## 2020-12-20 PROCEDURE — 74011000250 HC RX REV CODE- 250: Performed by: INTERNAL MEDICINE

## 2020-12-20 PROCEDURE — 77010033678 HC OXYGEN DAILY

## 2020-12-20 PROCEDURE — 80048 BASIC METABOLIC PNL TOTAL CA: CPT

## 2020-12-20 PROCEDURE — 97162 PT EVAL MOD COMPLEX 30 MIN: CPT

## 2020-12-20 PROCEDURE — 74011250637 HC RX REV CODE- 250/637: Performed by: NURSE PRACTITIONER

## 2020-12-20 PROCEDURE — 85025 COMPLETE CBC W/AUTO DIFF WBC: CPT

## 2020-12-20 PROCEDURE — 74011250636 HC RX REV CODE- 250/636: Performed by: INTERNAL MEDICINE

## 2020-12-20 PROCEDURE — 74011636637 HC RX REV CODE- 636/637: Performed by: INTERNAL MEDICINE

## 2020-12-20 PROCEDURE — 65270000029 HC RM PRIVATE

## 2020-12-20 PROCEDURE — 97530 THERAPEUTIC ACTIVITIES: CPT

## 2020-12-20 PROCEDURE — 99233 SBSQ HOSP IP/OBS HIGH 50: CPT | Performed by: INTERNAL MEDICINE

## 2020-12-20 PROCEDURE — 84100 ASSAY OF PHOSPHORUS: CPT

## 2020-12-20 PROCEDURE — 83735 ASSAY OF MAGNESIUM: CPT

## 2020-12-20 PROCEDURE — 82962 GLUCOSE BLOOD TEST: CPT

## 2020-12-20 PROCEDURE — 36415 COLL VENOUS BLD VENIPUNCTURE: CPT

## 2020-12-20 PROCEDURE — 2709999900 HC NON-CHARGEABLE SUPPLY

## 2020-12-20 RX ORDER — CARVEDILOL 6.25 MG/1
6.25 TABLET ORAL EVERY 12 HOURS
Status: DISCONTINUED | OUTPATIENT
Start: 2020-12-20 | End: 2021-01-08

## 2020-12-20 RX ORDER — LISINOPRIL 10 MG/1
5 TABLET ORAL DAILY
Status: DISCONTINUED | OUTPATIENT
Start: 2020-12-20 | End: 2021-01-12

## 2020-12-20 RX ADMIN — METHYLPREDNISOLONE SODIUM SUCCINATE 40 MG: 40 INJECTION, POWDER, FOR SOLUTION INTRAMUSCULAR; INTRAVENOUS at 08:54

## 2020-12-20 RX ADMIN — FAMOTIDINE 20 MG: 10 INJECTION INTRAVENOUS at 08:52

## 2020-12-20 RX ADMIN — TICAGRELOR 90 MG: 90 TABLET ORAL at 21:50

## 2020-12-20 RX ADMIN — Medication 30 ML: at 09:00

## 2020-12-20 RX ADMIN — TICAGRELOR 90 MG: 90 TABLET ORAL at 08:54

## 2020-12-20 RX ADMIN — CARVEDILOL 3.12 MG: 3.12 TABLET, FILM COATED ORAL at 08:54

## 2020-12-20 RX ADMIN — SODIUM CHLORIDE 10 ML: 9 INJECTION, SOLUTION INTRAMUSCULAR; INTRAVENOUS; SUBCUTANEOUS at 21:52

## 2020-12-20 RX ADMIN — INSULIN LISPRO 15 UNITS: 100 INJECTION, SOLUTION INTRAVENOUS; SUBCUTANEOUS at 18:32

## 2020-12-20 RX ADMIN — INSULIN LISPRO 15 UNITS: 100 INJECTION, SOLUTION INTRAVENOUS; SUBCUTANEOUS at 12:35

## 2020-12-20 RX ADMIN — POTASSIUM BICARBONATE 40 MEQ: 782 TABLET, EFFERVESCENT ORAL at 05:29

## 2020-12-20 RX ADMIN — METHYLPREDNISOLONE SODIUM SUCCINATE 40 MG: 40 INJECTION, POWDER, FOR SOLUTION INTRAMUSCULAR; INTRAVENOUS at 21:51

## 2020-12-20 RX ADMIN — FAMOTIDINE 20 MG: 10 INJECTION INTRAVENOUS at 21:50

## 2020-12-20 RX ADMIN — SODIUM CHLORIDE 10 ML: 9 INJECTION, SOLUTION INTRAMUSCULAR; INTRAVENOUS; SUBCUTANEOUS at 16:42

## 2020-12-20 RX ADMIN — FUROSEMIDE 40 MG: 10 INJECTION, SOLUTION INTRAMUSCULAR; INTRAVENOUS at 08:53

## 2020-12-20 RX ADMIN — WATER 2 G: 1 INJECTION INTRAMUSCULAR; INTRAVENOUS; SUBCUTANEOUS at 16:34

## 2020-12-20 RX ADMIN — ASPIRIN 81 MG CHEWABLE TABLET 81 MG: 81 TABLET CHEWABLE at 08:54

## 2020-12-20 RX ADMIN — SODIUM CHLORIDE 10 ML: 9 INJECTION, SOLUTION INTRAMUSCULAR; INTRAVENOUS; SUBCUTANEOUS at 05:33

## 2020-12-20 RX ADMIN — INSULIN LISPRO 9 UNITS: 100 INJECTION, SOLUTION INTRAVENOUS; SUBCUTANEOUS at 05:32

## 2020-12-20 RX ADMIN — INSULIN GLARGINE 20 UNITS: 100 INJECTION, SOLUTION SUBCUTANEOUS at 08:53

## 2020-12-20 RX ADMIN — SPIRONOLACTONE 25 MG: 25 TABLET, FILM COATED ORAL at 08:54

## 2020-12-20 RX ADMIN — CARVEDILOL 6.25 MG: 6.25 TABLET, FILM COATED ORAL at 21:50

## 2020-12-20 RX ADMIN — ATORVASTATIN CALCIUM 40 MG: 40 TABLET, FILM COATED ORAL at 21:50

## 2020-12-20 RX ADMIN — LISINOPRIL 5 MG: 5 TABLET ORAL at 12:50

## 2020-12-20 RX ADMIN — INSULIN LISPRO 9 UNITS: 100 INJECTION, SOLUTION INTRAVENOUS; SUBCUTANEOUS at 00:21

## 2020-12-20 RX ADMIN — FUROSEMIDE 40 MG: 10 INJECTION, SOLUTION INTRAMUSCULAR; INTRAVENOUS at 21:51

## 2020-12-20 RX ADMIN — Medication 3 MG: at 21:50

## 2020-12-20 NOTE — PROGRESS NOTES
[facility]      CARDIOLOGY PROGRESS NOTE  RECS:    1. Subacute MI-EKG showed Q waves in anterolateral lead with ST elevation, with suspicion of late presentation of anterior MI-s/p [facility] S/p ptca/stent to proximal/ mid LAD using ARELY.  LVEDP 8 mmHg. Normal PASP pressure with normal PCWP. Moderate to severe LV dysfunction. Continue asa and Brilinta. 2. Acute respiratory failure-tolerating extubation.  Possibility of pulmonary fibrosis- given extensive air space disease and normal PCWP - Pulmonary following  3. Borderline BP- Elevated this AM - increase Coreg to 6.25 mg BID. Add lisinopril  4. Acute Systolic Congestive heart Failure-recent echo with EF% 35%-40-mildly dedecompensated-dobutamine dced. Continue lasix po if needed. 5. COPD, on home O2 4L NC   6. Hepatitis C ? -Per family member   9. DM2- medications per medical team   8. Dementia - failed swallow eval.  Per notes, plan for PEG        Cardiac cath 12/11/20  Patient presented to [address] [place] [address] with late presentation anterior wall MI.  EF 35-40%. Patient was initially managed medically-- however developed acute pulmonary edema requiring intubation. Also troponin level increasing consistent with ongoing ischemia. S/p ptca/stent to proximal/ mid LAD using ARELY. LVEDP 8 mmHg. Normal PASP pressure with normal PCWP. Moderate to severe LV dysfunction.     Echo 12/10/20  · LV: Estimated LVEF is 35 - 40%. Visually measured ejection fraction. Normal cavity size and wall thickness. Moderately and segmentally reduced systolic function. Inconclusive left ventricular diastolic function. · AO: Mild aortic root dilatation; diameter is 3.8 cm.   ASSESSMENT:  Hospital Problems  Date Reviewed: 2/7/2018          Codes Class Noted POA    Goals of care, counseling/discussion ICD-10-CM: Z71.89  ICD-9-CM: V65.49  Unknown Unknown        Dementia without behavioral disturbance ([facility] ICD-10-CM: F03.90  ICD-9-CM: 294.20  Unknown Unknown Pulmonary fibrosis ([facility] ICD-10-CM: J84.10  ICD-9-CM: 483  Unknown Unknown        Severe protein-calorie malnutrition ([facility] ICD-10-CM: E43  ICD-9-CM: 415  12/16/2020 Clinically Undetermined        Acute on chronic systolic congestive heart failure (HCC) ICD-10-CM: I50.23  ICD-9-CM: 428.23, 428.0  12/15/2020 Unknown        * (Principal) STEMI (ST elevation myocardial infarction) [facility]) ICD-10-CM: I21.3  ICD-9-CM: 410.90  12/10/2020 Unknown                SUBJECTIVE:  No CP or SOB    OBJECTIVE:    VS:   Visit Vitals  BP (!) 163/95 (BP 1 Location: Right arm, BP Patient Position: At rest)   Pulse 90   Temp 97.6 °F (36.4 °C)   Resp 18   Ht 5' 7\" (1.702 m)   Wt 63.6 kg (140 lb 4.8 oz)   SpO2 96%   Breastfeeding No   BMI 21.97 kg/m²         Intake/Output Summary (Last 24 hours) at 12/20/2020 1121  Last data filed at 12/20/2020 1053  Gross per 24 hour   Intake    Output 1800 ml   Net -1800 ml     TELE: normal sinus rhythm    General: alert and in no apparent distress  HENT: Normocephalic, atraumatic. Normal external eye. Neck :  no JVD  Cardiac:  regular rate and rhythm, S1, S2 normal, no murmur, click, rub or gallop  Lungs: Scattered rhonchi  Abdomen: Soft, nontender, no masses  Extremities:  No c/c/e, peripheral pulses present      Labs: Results:       Chemistry Recent Labs     12/20/20 0106 12/19/20  0435 12/18/20  0438   * 347* 324*    142 139   K 3.2* 3.6 3.5    102 101   CO2 35* 34* 35*   BUN 37* 27* 21*   CREA 0.82 0.82 0.74   CA 10.1 9.8 9.1   AGAP 6 6 3   BUCR 45* 33* 28*      CBC w/Diff Recent Labs     12/20/20 0106 12/19/20  0435   WBC 16.1* 16.5*   RBC 4.60 4.46   HGB 12.4 11.9*   HCT 39.2 37.8   * 773*   GRANS 88* 88*   LYMPH 5* 9*   EOS 0 0      Cardiac Enzymes No results for input(s): CPK, CKND1, NATHANIEL in the last 72 hours.     No lab exists for component: CKRMB, TROIP   Coagulation Recent Labs     12/19/20  0435 12/18/20  2222   APTT 25.8 25.4       Lipid Panel No results found for: CHOL, CHOLPOCT, CHOLX, CHLST, CHOLV, 743445, HDL, HDLP, LDL, LDLC, DLDLP, 824924, VLDLC, VLDL, TGLX, TRIGL, TRIGP, TGLPOCT, CHHD, CHHDX   BNP No results for input(s): BNPP in the last 72 hours. Liver Enzymes No results for input(s): TP, ALB, TBIL, AP in the last 72 hours. No lab exists for component: SGOT, GPT, DBIL   Thyroid Studies Lab Results   Component Value Date/Time    TSH 2.92 12/10/2020 11:07 AM              Guanako Fabian 57, NP      I have independently evaluated taken history and examined the patient. All relevant labs and testing data's are reviewed. Care plan discussed and updated after review.   Domi Pickens MD

## 2020-12-20 NOTE — ROUTINE PROCESS
Bedside and Verbal shift change report given to [facility] AUTHORITY RN (oncoming nurse) by Leslee Vergara RN (offgoing nurse). Report included the following information SBAR, Kardex and MAR.

## 2020-12-20 NOTE — PROGRESS NOTES
Problem: Mobility Impaired (Adult and Pediatric)  Goal: *Acute Goals and Plan of Care (Insert Text)  Description: Physical Therapy Goals  Initiated 12/20/2020 and to be accomplished within 7 day(s)  1. Patient will move from supine to sit and sit to supine  in bed with minimal assistance/contact guard assist.    2.  Patient will transfer from bed to chair and chair to bed with minimal assistance/contact guard assist using the least restrictive device. 3.  Patient will perform sit to stand with moderate assistance . 4. Patient will sit EOB with CGA for 5 minutes without LOB    PLOF: Pt difficulty to understand due to inability to speak (mouths words), but states she came from home and was able to walk around without issues. Outcome: Progressing Towards Goal     PHYSICAL THERAPY EVALUATION    Patient: Aurelia Miller (89 y.o. female)  Date: 12/20/2020  Primary Diagnosis: STEMI (ST elevation myocardial infarction) ([facility] [I21.3]  Procedure(s) (LRB):  Left Heart Cath / Coronary Angiography (Right)  Left Ventriculography (N/A)  Percutaneous Coronary Intervention (N/A)  Right Heart Cath (N/A) 9 Days Post-Op   Precautions:  Fall, Aspiration, Skin    ASSESSMENT :  Based on the objective data described below, the patient presents with generalized deconditioning, AMS, decreased strength, endurance, and functional mobility tolerance. RN cleared for PT to work with pt. Pt found supine in bed, B wrist restraints on, NG tube intact, on 5L o2 NC, in NAD. Pt is alert but unable to vocalize her words, instead she mouths words and cannot be understood at times. She is able to follow most commands. NG feeding paused during PT session. Pt assisted supine-sit with totalAx1. Once sitting, patient unable to assist with holding herself up and sat back down on her pillow. Pt assisted up to sitting on 2nd attempt, constant support needed to prevent pt from falling back.  She was observed to be tearful once sitting, realizing how weak she was. Pt assisted back supine in bed and scooted up towards Hob total A. PROM/AAROM of B LE's before donning SCD's back. Pt's HOB was elevated, call bell nearby, on 5L o2 NC, wrist restraints back on, tube feeding running. Rn made aware of pts performance. Patient will be placed on a 3-day trial for acute PT at this time to assess his ability to progress with all functional mobility. Recommend SNF at this time. Patient will benefit from skilled intervention to address the above impairments. Patient's rehabilitation potential is considered to be Fair  Factors which may influence rehabilitation potential include:   []         None noted  [x]         Mental ability/status  [x]         Medical condition  [x]         Home/family situation and support systems  [x]         Safety awareness  []         Pain tolerance/management  []         Other:      PLAN :  Recommendations and Planned Interventions:   [x]           Bed Mobility Training             [x]    Neuromuscular Re-Education  [x]           Transfer Training                   []    Orthotic/Prosthetic Training  [x]           Gait Training                          []    Modalities  [x]           Therapeutic Exercises           []    Edema Management/Control  [x]           Therapeutic Activities            [x]    Family Training/Education  [x]           Patient Education  []           Other (comment):    Frequency/Duration: Patient will be followed by physical therapy 1-2 times per day/4-7 days per week to address goals. Discharge Recommendations: [facility]  Further Equipment Recommendations for Discharge: N/A     SUBJECTIVE:   Patient stated I am too weak.     OBJECTIVE DATA SUMMARY:     Past Medical History:   Diagnosis Date    Arthritis     Asthma     Chronic pain     BACK PAIN    Diabetes ([facility]     Diabetic neuropathy (HCC)     Emphysema     Hepatitis C     Hypertension     Nervousness     Osteoarthritis of both knees      Past Surgical History:   Procedure Laterality Date    HX CARPAL TUNNEL RELEASE Right 8/31/09    Dr. Lou Rico TUBAL LIGATION       Barriers to Learning/Limitations: yes;  cognitive and altered mental status (i.e.Sedation, Confusion)  Compensate with: N/A  Home Situation:  Home Situation  Home Environment: Assisted living  One/Two Story Residence: One story  Living Alone: No  Support Systems: Assisted living  Patient Expects to be Discharged to[de-identified] Assisted living  Current DME Used/Available at Home: Other (comment)(unable to tell me)  Critical Behavior:  Neurologic State: Alert  Orientation Level: Unable to verbalize  Cognition: Impaired decision making;Poor safety awareness  Safety/Judgement: Fall prevention; Insight into deficits  Psychosocial  Patient Behaviors: Calm;Lethargic  Needs Expressed: Educational  Purposeful Interaction: Yes  Pt Identified Daily Priority: Clinical issues (comment)  Caritas Process: Establish trust;Nurture loving kindness; Attend basic human needs;Create healing environment  Caring Interventions: Reassure  Reassure: Informing; Therapeutic listening;Quiet presence;Caring rounds; Instilling ramon and hope  Therapeutic Modalities: Intentional therapeutic touch    Strength:    Strength: Generally decreased, functional    Tone & Sensation:   Tone: Normal    Sensation: Intact    Range Of Motion:  AROM: Generally decreased, functional    PROM: Generally decreased, functional    Functional Mobility:  Bed Mobility:     Supine to Sit: Total assistance;Assist x1;Additional time  Sit to Supine: Total assistance;Assist x1  Scooting: Total assistance;Assist x1    Balance:   Sitting: Impaired; With support  Sitting - Static: Poor (constant support)  Sitting - Dynamic: Poor (constant support)    Therapeutic Exercises:   Attempted to assist pt with SLR, but pt states she was unable to do them because she's too weak  Pain:  Pain level pre-treatment: 0/10   Pain level post-treatment: 0/10   Pain Intervention(s) : Medication (see MAR); Rest, Repositioning  Response to intervention: Nurse notified, See doc flow    Activity Tolerance:   Pt unable to tolerate much mobility today 2/2 fatigue and weakness  Please refer to the flowsheet for vital signs taken during this treatment. After treatment:   []         Patient left in no apparent distress sitting up in chair  [x]         Patient left in no apparent distress in bed  [x]         Call bell left within reach  [x]         Nursing notified  []         Caregiver present  [x]         Bed alarm activated  [x]         SCDs applied    COMMUNICATION/EDUCATION:   [x]         Role of Physical Therapy in the acute care setting. [x]         Fall prevention education was provided and the patient/caregiver indicated understanding. [x]         Patient/family have participated as able in goal setting and plan of care. []         Patient/family agree to work toward stated goals and plan of care. []         Patient understands intent and goals of therapy, but is neutral about his/her participation. []         Patient is unable to participate in goal setting/plan of care: ongoing with therapy staff.  []         Other:     Thank you for this referral.  Renetta Freedman   Time Calculation: 23 mins      Eval Complexity: History: MEDIUM  Complexity : 1-2 comorbidities / personal factors will impact the outcome/ POC Exam:MEDIUM Complexity : 3 Standardized tests and measures addressing body structure, function, activity limitation and / or participation in recreation  Presentation: MEDIUM Complexity : Evolving with changing characteristics  Clinical Decision Making:Medium Complexity    Overall Complexity:MEDIUM

## 2020-12-20 NOTE — PROGRESS NOTES
[facility]  [facility]    Patient: Raghav Cardoso Age: 72 y.o. : 1955 MR#: [id number] SSN: [id number]  Date/Time: 2020     Subjective:    Patient seen and examined. Has NG in place. Wrist restraints in place as patient continues. More animated today. Uncomfortable. Assessment/Plan:   1. Acute on chronic hypoxic respiratory failure: Status post intubation by anesthesia on ,ventilator management per ICU team. CTA chest done  negative for PE but a number of abnormalities noted including advanced fibrosis, extensive groundglass infiltrates possibly due to pulmonary edema, pneumonia, aspiration, pulm hemorrhage and ALI, discussed w/ pulmonary. - extubated afternoon   2. Acute flash pulmonary edema due to #3: now off lasix, dobutamine discontinued   3. Acute systolic heart failure exacerbation, EF 35%, diuresis per cardiology, limiting factor is her low bp, now on dobutamine drip, with improvement in her bp. Her beta-blocker has been held given her low blood pressure. Continue aspirin and statin, ACE inhibitor  to be added once blood pressure stabilizes. 4. Acute MI with anterolateral STEMI and Q waves:S/p ptca/stent to proximal/ mid LAD using ARELY on DAPT on 20 . On Beta-blocker and statin. On heparin drip as well. In light of possible pulmonary hemorrhage although unlikely, heparin drip stopped. Discussed with cardiologist.  5. Fever:  None in 48 hours;  Etiology unclear. CT imaging  with extensive groundglass infiltrates possibly aspiration, at risk. On broad-spectrum antibiotics already  6. -Lung lesions on cta chest  7. History of COPD on home oxygen: Patient currently on vent, BD as tolerated. 8. Diabetes mellitus type 2: Sugars are somewhat within acceptable limits on corrective insulin. Plan to cont corrective insulin. Plan to adjust Lantus if blood sugars are consistently elevated. .  9. Hypertension: BP lower side, will monitor off medications. Now on dobutamine for continued hypotension   10. dementia: Unknown baseline, resume Aricept when able to tolerate p.o. Full code    PLAN:  - Aspiration precautions, NG in place  - continue to wean steroids; on home O2 at 4L baseline  - continue po lasix, low dose BB, ASA, Brilinta  - remains NPO with small bites and slow feeding- failed swallow eval  - GI consulted for PEG placement  - PT and OT to see patient- removed from complete bed rest     Family updated : family requests that upon discharge she have a mobile oxygen tank if possible. Family considering taking her home upon discharge because they are aware that she is difficult and want her to get the care she needs. - At this time family would like to move forward with PEG placement. - Please call sister Shantel Luo at [phone] for consent for the procedure. Visit Vitals  /84 (BP 1 Location: Right arm, BP Patient Position: At rest)   Pulse 84   Temp 97.6 °F (36.4 °C)   Resp 16   Ht 5' 7\" (1.702 m)   Wt 63.6 kg (140 lb 4.8 oz)   SpO2 99%   Breastfeeding No   BMI 21.97 kg/m²     Case discussed with:  []Patient  [x]Family  [x]Nursing  []Case Management  DVT Prophylaxis:  [x]Lovenox  []Hep iv  []SCDs  []Coumadin   []Eliquis/Xarelto     Objective:   VS:   Visit Vitals  /84 (BP 1 Location: Right arm, BP Patient Position: At rest)   Pulse 84   Temp 97.6 °F (36.4 °C)   Resp 16   Ht 5' 7\" (1.702 m)   Wt 63.6 kg (140 lb 4.8 oz)   SpO2 99%   Breastfeeding No   BMI 21.97 kg/m²      Tmax/24hrs: Temp (24hrs), Av.8 °F (36.6 °C), Min:97.6 °F (36.4 °C), Max:98 °F (36.7 °C)  IOBRIEF    Intake/Output Summary (Last 24 hours) at 2020 1048  Last data filed at 2020 0444  Gross per 24 hour   Intake    Output 1450 ml   Net -1450 ml       General: awake, follows commands    Pulmonary: CTAB  Cardiovascular: Regular rate and Rhythm. GI:  Soft, Non distended, Non tender. + Bowel sounds.  + means Extremities:  No edema. Psych: Not anxious or agitated.     Additional:    Medications:   Current Facility-Administered Medications   Medication Dose Route Frequency    methylPREDNISolone (PF) (SOLU-MEDROL) injection 40 mg  40 mg IntraVENous Q12H    furosemide (LASIX) injection 40 mg  40 mg IntraVENous Q12H    insulin glargine (LANTUS) injection 20 Units  20 Units SubCUTAneous DAILY    cefTRIAXone (ROCEPHIN) 2 g in sterile water (preservative free) 20 mL IV syringe  2 g IntraVENous Q24H    polyethylene glycol (MIRALAX) packet 17 g  17 g Per NG tube DAILY    spironolactone (ALDACTONE) tablet 25 mg  25 mg Oral DAILY    multivit-folic acid-herbal 210 (WELLESSE PLUS) oral liquid 30 mL  30 mL Per NG tube DAILY    insulin lispro (HUMALOG) injection   SubCUTAneous Q6H    carvediloL (COREG) tablet 3.125 mg  3.125 mg Oral Q12H    fentaNYL citrate (PF) injection  mcg   mcg IntraVENous Q2H PRN    sodium chloride (NS) flush 5-40 mL  5-40 mL IntraVENous PRN    ticagrelor (BRILINTA) tablet 90 mg  90 mg Per NG tube BID    sodium chloride (NS) flush 5-40 mL  5-40 mL IntraVENous PRN    albuterol-ipratropium (DUO-NEB) 2.5 MG-0.5 MG/3 ML  3 mL Nebulization Q4H PRN    melatonin tablet 3 mg  3 mg Oral QHS    famotidine (PF) (PEPCID) 20 mg in 0.9% sodium chloride 10 mL injection  20 mg IntraVENous Q12H    midazolam (VERSED) injection 1-2 mg  1-2 mg IntraVENous Q1H PRN    sodium chloride (NS) flush 5-40 mL  5-40 mL IntraVENous Q8H    sodium chloride (NS) flush 5-40 mL  5-40 mL IntraVENous PRN    acetaminophen (TYLENOL) tablet 650 mg  650 mg Oral Q6H PRN    Or    acetaminophen (TYLENOL) suppository 650 mg  650 mg Rectal Q6H PRN    promethazine (PHENERGAN) tablet 12.5 mg  12.5 mg Oral Q6H PRN    Or    ondansetron (ZOFRAN) injection 4 mg  4 mg IntraVENous Q6H PRN    glucose chewable tablet 16 g  4 Tab Oral PRN    glucagon (GLUCAGEN) injection 1 mg  1 mg IntraMUSCular PRN    dextrose (D50W) injection syrg 12.5-25 g  25-50 mL IntraVENous PRN    aspirin chewable tablet 81 mg  81 mg Oral DAILY    atorvastatin (LIPITOR) tablet 40 mg  40 mg Oral QHS       Labs:    Recent Results (from the past 12 hour(s))   GLUCOSE, POC    Collection Time: 12/20/20 12:13 AM   Result Value Ref Range    Glucose (POC) 254 (H) 70 - 110 mg/dL   METABOLIC PANEL, BASIC    Collection Time: 12/20/20  1:06 AM   Result Value Ref Range    Sodium 145 136 - 145 mmol/L    Potassium 3.2 (L) 3.5 - 5.5 mmol/L    Chloride 104 100 - 111 mmol/L    CO2 35 (H) 21 - 32 mmol/L    Anion gap 6 3.0 - 18 mmol/L    Glucose 283 (H) 74 - 99 mg/dL    BUN 37 (H) 7.0 - 18 MG/DL    Creatinine 0.82 0.6 - 1.3 MG/DL    BUN/Creatinine ratio 45 (H) 12 - 20      GFR est AA >60 >60 ml/min/1.73m2    GFR est non-AA >60 >60 ml/min/1.73m2    Calcium 10.1 8.5 - 10.1 MG/DL   MAGNESIUM    Collection Time: 12/20/20  1:06 AM   Result Value Ref Range    Magnesium 2.5 1.6 - 2.6 mg/dL   PHOSPHORUS    Collection Time: 12/20/20  1:06 AM   Result Value Ref Range    Phosphorus 2.7 2.5 - 4.9 MG/DL   CBC WITH AUTOMATED DIFF    Collection Time: 12/20/20  1:06 AM   Result Value Ref Range    WBC 16.1 (H) 4.6 - 13.2 K/uL    RBC 4.60 4.20 - 5.30 M/uL    HGB 12.4 12.0 - 16.0 g/dL    HCT 39.2 35.0 - 45.0 %    MCV 85.2 74.0 - 97.0 FL    MCH 27.0 24.0 - 34.0 PG    MCHC 31.6 31.0 - 37.0 g/dL    RDW 12.9 11.6 - 14.5 %    PLATELET 185 (H) 706 - 420 K/uL    MPV 9.6 9.2 - 11.8 FL    NEUTROPHILS 88 (H) 40 - 73 %    LYMPHOCYTES 5 (L) 21 - 52 %    MONOCYTES 7 3 - 10 %    EOSINOPHILS 0 0 - 5 %    BASOPHILS 0 0 - 2 %    ABS. NEUTROPHILS 14.2 (H) 1.8 - 8.0 K/UL    ABS. LYMPHOCYTES 0.9 0.9 - 3.6 K/UL    ABS. MONOCYTES 1.1 0.05 - 1.2 K/UL    ABS. EOSINOPHILS 0.0 0.0 - 0.4 K/UL    ABS.  BASOPHILS 0.0 0.0 - 0.1 K/UL    DF AUTOMATED     GLUCOSE, POC    Collection Time: 12/20/20  5:28 AM   Result Value Ref Range    Glucose (POC) 289 (H) 70 - 110 mg/dL       Signed By: Saurav Adamson MD     December 20, 2020 Disclaimer: Sections of this note are dictated using utilizing voice recognition software. Minor typographical errors may be present. If questions arise, please do not hesitate to contact me or call our department.

## 2020-12-20 NOTE — PROGRESS NOTES
Pt pulled out NGT again. Remains in pinky wrist restraints. Re-inserted NGT. awaitng wet reading. Also noted pt had 9 beat run v-tach. Notified Dr Delfino Veronica. Also informed of K=3.6, and MG= 2. 4. Gave order for [place] when NGT placement is confirmed.

## 2020-12-21 LAB
ANION GAP SERPL CALC-SCNC: 5 MMOL/L (ref 3–18)
BASOPHILS # BLD: 0 K/UL (ref 0–0.1)
BASOPHILS NFR BLD: 0 % (ref 0–2)
BUN SERPL-MCNC: 49 MG/DL (ref 7–18)
BUN/CREAT SERPL: 56 (ref 12–20)
CALCIUM SERPL-MCNC: 10 MG/DL (ref 8.5–10.1)
CHLORIDE SERPL-SCNC: 104 MMOL/L (ref 100–111)
CO2 SERPL-SCNC: 36 MMOL/L (ref 21–32)
CREAT SERPL-MCNC: 0.88 MG/DL (ref 0.6–1.3)
DIFFERENTIAL METHOD BLD: ABNORMAL
EOSINOPHIL # BLD: 0 K/UL (ref 0–0.4)
EOSINOPHIL NFR BLD: 0 % (ref 0–5)
ERYTHROCYTE [DISTWIDTH] IN BLOOD BY AUTOMATED COUNT: 13 % (ref 11.6–14.5)
GLUCOSE BLD STRIP.AUTO-MCNC: 182 MG/DL (ref 70–110)
GLUCOSE BLD STRIP.AUTO-MCNC: 321 MG/DL (ref 70–110)
GLUCOSE BLD STRIP.AUTO-MCNC: 353 MG/DL (ref 70–110)
GLUCOSE BLD STRIP.AUTO-MCNC: 359 MG/DL (ref 70–110)
GLUCOSE BLD STRIP.AUTO-MCNC: 386 MG/DL (ref 70–110)
GLUCOSE SERPL-MCNC: 330 MG/DL (ref 74–99)
HCT VFR BLD AUTO: 39.7 % (ref 35–45)
HGB BLD-MCNC: 12.3 G/DL (ref 12–16)
LYMPHOCYTES # BLD: 0.8 K/UL (ref 0.9–3.6)
LYMPHOCYTES NFR BLD: 5 % (ref 21–52)
MAGNESIUM SERPL-MCNC: 2.7 MG/DL (ref 1.6–2.6)
MCH RBC QN AUTO: 27 PG (ref 24–34)
MCHC RBC AUTO-ENTMCNC: 31 G/DL (ref 31–37)
MCV RBC AUTO: 87.3 FL (ref 74–97)
MONOCYTES # BLD: 1.4 K/UL (ref 0.05–1.2)
MONOCYTES NFR BLD: 9 % (ref 3–10)
NEUTS SEG # BLD: 14.4 K/UL (ref 1.8–8)
NEUTS SEG NFR BLD: 86 % (ref 40–73)
PHOSPHATE SERPL-MCNC: 3 MG/DL (ref 2.5–4.9)
PLATELET # BLD AUTO: 814 K/UL (ref 135–420)
PMV BLD AUTO: 9.9 FL (ref 9.2–11.8)
POTASSIUM SERPL-SCNC: 4.1 MMOL/L (ref 3.5–5.5)
RBC # BLD AUTO: 4.55 M/UL (ref 4.2–5.3)
SODIUM SERPL-SCNC: 145 MMOL/L (ref 136–145)
WBC # BLD AUTO: 16.6 K/UL (ref 4.6–13.2)

## 2020-12-21 PROCEDURE — 92526 ORAL FUNCTION THERAPY: CPT

## 2020-12-21 PROCEDURE — 74011250637 HC RX REV CODE- 250/637: Performed by: INTERNAL MEDICINE

## 2020-12-21 PROCEDURE — 74011250636 HC RX REV CODE- 250/636: Performed by: INTERNAL MEDICINE

## 2020-12-21 PROCEDURE — 74011250637 HC RX REV CODE- 250/637: Performed by: NURSE PRACTITIONER

## 2020-12-21 PROCEDURE — 99233 SBSQ HOSP IP/OBS HIGH 50: CPT | Performed by: INTERNAL MEDICINE

## 2020-12-21 PROCEDURE — 74011636637 HC RX REV CODE- 636/637: Performed by: INTERNAL MEDICINE

## 2020-12-21 PROCEDURE — 97110 THERAPEUTIC EXERCISES: CPT

## 2020-12-21 PROCEDURE — 65270000029 HC RM PRIVATE

## 2020-12-21 PROCEDURE — 80048 BASIC METABOLIC PNL TOTAL CA: CPT

## 2020-12-21 PROCEDURE — 85025 COMPLETE CBC W/AUTO DIFF WBC: CPT

## 2020-12-21 PROCEDURE — 74011000250 HC RX REV CODE- 250: Performed by: INTERNAL MEDICINE

## 2020-12-21 PROCEDURE — 99232 SBSQ HOSP IP/OBS MODERATE 35: CPT | Performed by: INTERNAL MEDICINE

## 2020-12-21 PROCEDURE — 2709999900 HC NON-CHARGEABLE SUPPLY

## 2020-12-21 PROCEDURE — 74011636637 HC RX REV CODE- 636/637: Performed by: STUDENT IN AN ORGANIZED HEALTH CARE EDUCATION/TRAINING PROGRAM

## 2020-12-21 PROCEDURE — 83735 ASSAY OF MAGNESIUM: CPT

## 2020-12-21 PROCEDURE — 74011000250 HC RX REV CODE- 250: Performed by: NURSE PRACTITIONER

## 2020-12-21 PROCEDURE — 97530 THERAPEUTIC ACTIVITIES: CPT

## 2020-12-21 PROCEDURE — 84100 ASSAY OF PHOSPHORUS: CPT

## 2020-12-21 PROCEDURE — 36415 COLL VENOUS BLD VENIPUNCTURE: CPT

## 2020-12-21 PROCEDURE — 82962 GLUCOSE BLOOD TEST: CPT

## 2020-12-21 RX ORDER — PREDNISONE 20 MG/1
40 TABLET ORAL
Status: DISCONTINUED | OUTPATIENT
Start: 2020-12-22 | End: 2020-12-21

## 2020-12-21 RX ORDER — INSULIN GLARGINE 100 [IU]/ML
10 INJECTION, SOLUTION SUBCUTANEOUS
Status: DISCONTINUED | OUTPATIENT
Start: 2020-12-21 | End: 2020-12-24

## 2020-12-21 RX ORDER — INSULIN GLARGINE 100 [IU]/ML
30 INJECTION, SOLUTION SUBCUTANEOUS DAILY
Status: DISCONTINUED | OUTPATIENT
Start: 2020-12-21 | End: 2021-01-06

## 2020-12-21 RX ORDER — INSULIN LISPRO 100 [IU]/ML
5 INJECTION, SOLUTION INTRAVENOUS; SUBCUTANEOUS EVERY 6 HOURS
Status: DISCONTINUED | OUTPATIENT
Start: 2020-12-21 | End: 2020-12-28

## 2020-12-21 RX ADMIN — INSULIN LISPRO 15 UNITS: 100 INJECTION, SOLUTION INTRAVENOUS; SUBCUTANEOUS at 18:38

## 2020-12-21 RX ADMIN — SODIUM CHLORIDE 10 ML: 9 INJECTION, SOLUTION INTRAMUSCULAR; INTRAVENOUS; SUBCUTANEOUS at 06:07

## 2020-12-21 RX ADMIN — Medication 3 MG: at 23:21

## 2020-12-21 RX ADMIN — INSULIN GLARGINE 30 UNITS: 100 INJECTION, SOLUTION SUBCUTANEOUS at 09:48

## 2020-12-21 RX ADMIN — INSULIN LISPRO 3 UNITS: 100 INJECTION, SOLUTION INTRAVENOUS; SUBCUTANEOUS at 23:43

## 2020-12-21 RX ADMIN — FAMOTIDINE 20 MG: 10 INJECTION INTRAVENOUS at 23:21

## 2020-12-21 RX ADMIN — INSULIN LISPRO 5 UNITS: 100 INJECTION, SOLUTION INTRAVENOUS; SUBCUTANEOUS at 12:19

## 2020-12-21 RX ADMIN — ASPIRIN 81 MG CHEWABLE TABLET 81 MG: 81 TABLET CHEWABLE at 08:57

## 2020-12-21 RX ADMIN — SODIUM CHLORIDE 10 ML: 9 INJECTION, SOLUTION INTRAMUSCULAR; INTRAVENOUS; SUBCUTANEOUS at 23:22

## 2020-12-21 RX ADMIN — FUROSEMIDE 40 MG: 10 INJECTION, SOLUTION INTRAMUSCULAR; INTRAVENOUS at 08:57

## 2020-12-21 RX ADMIN — TICAGRELOR 90 MG: 90 TABLET ORAL at 23:21

## 2020-12-21 RX ADMIN — Medication 10 ML: at 16:31

## 2020-12-21 RX ADMIN — SODIUM CHLORIDE 10 ML: 9 INJECTION, SOLUTION INTRAMUSCULAR; INTRAVENOUS; SUBCUTANEOUS at 16:32

## 2020-12-21 RX ADMIN — SPIRONOLACTONE 25 MG: 25 TABLET, FILM COATED ORAL at 08:57

## 2020-12-21 RX ADMIN — FAMOTIDINE 20 MG: 10 INJECTION INTRAVENOUS at 08:57

## 2020-12-21 RX ADMIN — INSULIN LISPRO 5 UNITS: 100 INJECTION, SOLUTION INTRAVENOUS; SUBCUTANEOUS at 18:38

## 2020-12-21 RX ADMIN — INSULIN GLARGINE 10 UNITS: 100 INJECTION, SOLUTION SUBCUTANEOUS at 23:43

## 2020-12-21 RX ADMIN — TICAGRELOR 90 MG: 90 TABLET ORAL at 08:57

## 2020-12-21 RX ADMIN — CARVEDILOL 6.25 MG: 6.25 TABLET, FILM COATED ORAL at 08:57

## 2020-12-21 RX ADMIN — ATORVASTATIN CALCIUM 40 MG: 40 TABLET, FILM COATED ORAL at 23:22

## 2020-12-21 RX ADMIN — INSULIN LISPRO 15 UNITS: 100 INJECTION, SOLUTION INTRAVENOUS; SUBCUTANEOUS at 01:34

## 2020-12-21 RX ADMIN — CARVEDILOL 6.25 MG: 6.25 TABLET, FILM COATED ORAL at 23:22

## 2020-12-21 RX ADMIN — INSULIN LISPRO 15 UNITS: 100 INJECTION, SOLUTION INTRAVENOUS; SUBCUTANEOUS at 06:06

## 2020-12-21 RX ADMIN — INSULIN LISPRO 12 UNITS: 100 INJECTION, SOLUTION INTRAVENOUS; SUBCUTANEOUS at 12:19

## 2020-12-21 RX ADMIN — POLYETHYLENE GLYCOL 3350 17 G: 17 POWDER, FOR SOLUTION ORAL at 08:57

## 2020-12-21 RX ADMIN — LISINOPRIL 5 MG: 5 TABLET ORAL at 08:57

## 2020-12-21 RX ADMIN — WATER 2 G: 1 INJECTION INTRAMUSCULAR; INTRAVENOUS; SUBCUTANEOUS at 16:31

## 2020-12-21 RX ADMIN — INSULIN LISPRO 5 UNITS: 100 INJECTION, SOLUTION INTRAVENOUS; SUBCUTANEOUS at 23:44

## 2020-12-21 RX ADMIN — METHYLPREDNISOLONE SODIUM SUCCINATE 40 MG: 40 INJECTION, POWDER, FOR SOLUTION INTRAMUSCULAR; INTRAVENOUS at 08:57

## 2020-12-21 RX ADMIN — FUROSEMIDE 40 MG: 10 INJECTION, SOLUTION INTRAMUSCULAR; INTRAVENOUS at 23:21

## 2020-12-21 RX ADMIN — Medication 30 ML: at 08:57

## 2020-12-21 NOTE — DIABETES MGMT
Glycemic Control Plan of Care    Recommend additional dose lantus 10 units at night and additional lispro 5 units q6h - orders obtained     BG not controlled with current plan of care   Receiving steroids - solu-medrol 40 mg q12h  TF - glucerna 1.5 @ 50 ml/hr    mg/dl this morning  TDD previous day = 68 units  20 units lantus  48 units lispro      Your A1C  was   Lab Results   Component Value Date/Time    Hemoglobin A1c 8.3 (H) 12/10/2020 06:51 PM     Currently receiving;  Lantus 30 units daily  Corrective lispro, very insulin resistant, 4 times daily      Paulina Mckeon MPH RN CDE  Pager [phone]

## 2020-12-21 NOTE — PROGRESS NOTES
[email or web address]  [phone]    Gastroenterology follow up-Progress note    Impression:  1. Severe dysphagia, failed MBS, needs alternate means of nutrition - poor candidate at this time for G-tube due to not being able to stop Brilinta - high risk of clotting new stent and dying. 2. Acute MI - anterolateral STEMI s/p stent placement 12/11/2020  3. COPD  4. CHF  5. DM  6. HTN  7. Dementia    Plan:  1. Continue NGT feeds per nutrition, PEG placement not recommended as noted above, proper restraining of patient's arms to prevent NGT removal recommended  2. Medical management per primary team    Will sign off-Thank you for this consultation and the opportunity to participate in the care of this patient. Please do not hesitate to call with any questions or concerns, or should event occur that may necessitate additional GI evaluation.         Chief Complaint: Severe dysphagia      Subjective:  Dementia, non-verbal        Eyes: conjunctiva normal, EOM normal   Neck: ROM normal, supple and trachea normal   Cardiovascular: heart normal, intact distal pulses, normal rate and regular rhythm   Pulmonary/Chest Wall: breath sounds normal and effort normal   Abdominal: appearance normal, bowel sounds normal and soft, non-acute, non-tender, NGT in place     Patient Active Problem List   Diagnosis Code    Diverticula of colon K57.30    Sepsis ([address] A41.9    Sepsis secondary to UTI ([address] A41.9, N39.0    DM hyperosmolarity type II, uncontrolled ([address] E11.00, E11.65    HTN (hypertension) I10    Febrile illness, acute R50.9    Gram-negative bacteremia R78.81    Diabetic neuropathy ([address] E11.40    Osteoarthritis of both knees M17.0    Acidosis E87.2    Respiratory failure (HCC) J96.90    Shock ([address] R57.9    Hyperosmolar (nonketotic) coma ([facility]) E11.01    Acute UTI N39.0    Macrocytic anemia D53.9    STEMI (ST elevation myocardial infarction) ([facility]) I21.3    Acute on chronic systolic congestive heart failure ([facility]) I50.23    Severe protein-calorie malnutrition ([facility] E43    Goals of care, counseling/discussion Z71.89    Dementia without behavioral disturbance ([facility] F03.90    Pulmonary fibrosis (HCC) J84.10         Visit Vitals  /83   Pulse 80   Temp 97 °F (36.1 °C)   Resp 20   Ht 5' 7\" (1.702 m)   Wt 63.6 kg (140 lb 4.8 oz)   SpO2 100%   Breastfeeding No   BMI 21.97 kg/m²           Intake/Output Summary (Last 24 hours) at 12/21/2020 1038  Last data filed at 12/20/2020 1256  Gross per 24 hour   Intake 3329 ml   Output 350 ml   Net 2979 ml       CBC w/Diff    Lab Results   Component Value Date/Time    WBC 16.6 (H) 12/21/2020 12:21 AM    RBC 4.55 12/21/2020 12:21 AM    HGB 12.3 12/21/2020 12:21 AM    HCT 39.7 12/21/2020 12:21 AM    MCV 87.3 12/21/2020 12:21 AM    MCH 27.0 12/21/2020 12:21 AM    MCHC 31.0 12/21/2020 12:21 AM    RDW 13.0 12/21/2020 12:21 AM     (H) 12/21/2020 12:21 AM    Lab Results   Component Value Date/Time    GRANS 86 (H) 12/21/2020 12:21 AM    LYMPH 5 (L) 12/21/2020 12:21 AM    EOS 0 12/21/2020 12:21 AM    BANDS 5 02/15/2018 01:09 AM    BASOS 0 12/21/2020 12:21 AM      Basic Metabolic Profile   Recent Labs     12/21/20  0021      K 4.1      CO2 36*   BUN 49*   CA 10.0   MG 2.7*   PHOS 3.0        Hepatic Function    Lab Results   Component Value Date/Time    ALB 2.4 (L) 12/12/2020 06:20 AM    TP 7.2 12/12/2020 06:20 AM    AP 76 12/12/2020 06:20 AM    No results found for: TBIL       Coags   Recent Labs     12/19/20  0435 12/18/20  2222   APTT 25.8 25.4               BIJAN Diaz    [facility]. [email or web address]  Phone: [phone]  Pager: [phone]

## 2020-12-21 NOTE — PROGRESS NOTES
[facility]  Progress Note    Patient: Raghav Cardoso Age: 72 y.o. : 1955 MR#: [id number] SSN: [id number]  Date/Time: 2020     Subjective:    Patient seen and examined. Has NG in place- still trying to pull it out. Assessment/Plan:   1. Acute on chronic hypoxic respiratory failure: Status post intubation by anesthesia on ,ventilator management per ICU team. CTA chest done  negative for PE but a number of abnormalities noted including advanced fibrosis, extensive groundglass infiltrates possibly due to pulmonary edema, pneumonia, aspiration, pulm hemorrhage and ALI, discussed w/ pulmonary. - extubated afternoon   2. Acute flash pulmonary edema due to #3: now off lasix, dobutamine discontinued   3. Acute systolic heart failure exacerbation, EF 35%, diuresis per cardiology, limiting factor is her low bp; Continue aspirin, lipitor, coreg, lasix, aldactone, lisinopril  4. Acute MI with anterolateral STEMI and Q waves: S/p ptca/stent to proximal/ mid LAD using ARELY on DAPT on 20 . On Brillinta. 5. Fever:  None since 12/15;  Etiology unclear. CT imaging  with extensive groundglass infiltrates possibly aspiration, at risk. On broad-spectrum antibiotics already  6. Lung lesions on cta chest  7. History of COPD on home oxygen: on po prednisone   8. Diabetes mellitus type 2: Sugars are somewhat within acceptable limits on corrective insulin. 9. Hypertension  10. dementia: Unknown baseline, resume Aricept when able to tolerate p.o.     Full code    PLAN:  - Aspiration precautions, NG in place- re-evaluated by speech on - recommendations unchanged for NPO  - continue to wean steroids; on home O2 at 4L baseline  - continue po lasix, low dose BB, ASA, Brilinta  - remains NPO with small bites and slow feeding- failed swallow eval  - incease Lantus  - IV lasix per cardiology- careful monitoring of BMP  - PT and OT to see patient    Family updated : updated regarding hold on PEG due to recent MI and need to remain on Brillinta   - Please call sister Kandace Payton at [phone] for consent for any procedures. Visit Vitals  /85   Pulse 76   Temp 97.4 °F (36.3 °C)   Resp 20   Ht 5' 7\" (1.702 m)   Wt 63.6 kg (140 lb 4.8 oz)   SpO2 100%   Breastfeeding No   BMI 21.97 kg/m²     Case discussed with:  []Patient  [x]Family  [x]Nursing  []Case Management  DVT Prophylaxis:  [x]Lovenox  []Hep iv  []SCDs  []Coumadin   []Eliquis/Xarelto     Objective:   VS:   Visit Vitals  /85   Pulse 76   Temp 97.4 °F (36.3 °C)   Resp 20   Ht 5' 7\" (1.702 m)   Wt 63.6 kg (140 lb 4.8 oz)   SpO2 100%   Breastfeeding No   BMI 21.97 kg/m²      Tmax/24hrs: Temp (24hrs), Av.6 °F (36.4 °C), Min:97 °F (36.1 °C), Max:98 °F (36.7 °C)  IOBRIEF    Intake/Output Summary (Last 24 hours) at 2020 1715  Last data filed at 2020 1614  Gross per 24 hour   Intake 0 ml   Output    Net 0 ml       General: awake, follows commands    Pulmonary: CTAB  Cardiovascular: Regular rate and Rhythm. GI:  Soft, Non distended, Non tender. + Bowel sounds. + means   Extremities:  No edema. Psych: Not anxious or agitated.     Additional:    Medications:   Current Facility-Administered Medications   Medication Dose Route Frequency    insulin glargine (LANTUS) injection 30 Units  30 Units SubCUTAneous DAILY    insulin glargine (LANTUS) injection 10 Units  10 Units SubCUTAneous QHS    insulin lispro (HUMALOG) injection 5 Units  5 Units SubCUTAneous Q6H    [START ON 2020] predniSONE (DELTASONE) tablet 40 mg  40 mg Oral DAILY WITH BREAKFAST    carvediloL (COREG) tablet 6.25 mg  6.25 mg Oral Q12H    lisinopriL (PRINIVIL, ZESTRIL) tablet 5 mg  5 mg Oral DAILY    furosemide (LASIX) injection 40 mg  40 mg IntraVENous Q12H    cefTRIAXone (ROCEPHIN) 2 g in sterile water (preservative free) 20 mL IV syringe  2 g IntraVENous Q24H    polyethylene glycol (MIRALAX) packet 17 g  17 g Per NG tube DAILY    spironolactone (ALDACTONE) tablet 25 mg  25 mg Oral DAILY    multivit-folic acid-herbal 593 (WELLESSE PLUS) oral liquid 30 mL  30 mL Per NG tube DAILY    insulin lispro (HUMALOG) injection   SubCUTAneous Q6H    fentaNYL citrate (PF) injection  mcg   mcg IntraVENous Q2H PRN    sodium chloride (NS) flush 5-40 mL  5-40 mL IntraVENous PRN    ticagrelor (BRILINTA) tablet 90 mg  90 mg Per NG tube BID    sodium chloride (NS) flush 5-40 mL  5-40 mL IntraVENous PRN    albuterol-ipratropium (DUO-NEB) 2.5 MG-0.5 MG/3 ML  3 mL Nebulization Q4H PRN    melatonin tablet 3 mg  3 mg Oral QHS    famotidine (PF) (PEPCID) 20 mg in 0.9% sodium chloride 10 mL injection  20 mg IntraVENous Q12H    midazolam (VERSED) injection 1-2 mg  1-2 mg IntraVENous Q1H PRN    sodium chloride (NS) flush 5-40 mL  5-40 mL IntraVENous Q8H    sodium chloride (NS) flush 5-40 mL  5-40 mL IntraVENous PRN    acetaminophen (TYLENOL) tablet 650 mg  650 mg Oral Q6H PRN    Or    acetaminophen (TYLENOL) suppository 650 mg  650 mg Rectal Q6H PRN    promethazine (PHENERGAN) tablet 12.5 mg  12.5 mg Oral Q6H PRN    Or    ondansetron (ZOFRAN) injection 4 mg  4 mg IntraVENous Q6H PRN    glucose chewable tablet 16 g  4 Tab Oral PRN    glucagon (GLUCAGEN) injection 1 mg  1 mg IntraMUSCular PRN    dextrose (D50W) injection syrg 12.5-25 g  25-50 mL IntraVENous PRN    aspirin chewable tablet 81 mg  81 mg Oral DAILY    atorvastatin (LIPITOR) tablet 40 mg  40 mg Oral QHS       Labs:    Recent Results (from the past 12 hour(s))   GLUCOSE, POC    Collection Time: 12/21/20  5:54 AM   Result Value Ref Range    Glucose (POC) 359 (H) 70 - 110 mg/dL   GLUCOSE, POC    Collection Time: 12/21/20 11:23 AM   Result Value Ref Range    Glucose (POC) 321 (H) 70 - 110 mg/dL       Signed By: Richard Culp MD     December 21, 2020      Disclaimer: Sections of this note are dictated using utilizing voice recognition software. Minor typographical errors may be present. If questions arise, please do not hesitate to contact me or call our department.

## 2020-12-21 NOTE — PROGRESS NOTES
Nutrition Assessment     Type and Reason for Visit: Reassess    Nutrition Recommendations/Plan:   - Continue tube feeding of Glucerna 1.5 at goal rate of 50 mL/hr   - Modify water flushes, increase to 75 mL q 4 hours       Nutrition Assessment:  Pt tolerating tube feeding of Glucerna 1.5 at goal rate of 50 mL/hr. Receiving water flushes of 50 ml q 4 hours (decreased from 100 mL q 4 hours on 12/18). Noted Na increasing, from 139 mmol/L on 12/18 to 145 mmol/L today. Discussed with MD and RN about increasing water flushes; both agreed with plan. -393 mg/dL x past 24 hours; noted pt on steroid therapy. Pt with NGT in place; per MD report, pt with high risk to receive PEG tube at this time per Cardiology report. K improved    Malnutrition Assessment:  Malnutrition Status: Severe malnutrition     Estimated Daily Nutrient Needs:  Energy (kcal):  7329-9460  Protein (g):  51-77       Fluid (ml/day):  0949-6825    Nutrition Related Findings:   12/20. Meds: SSI, lantus, MVI, bowel regimen, steroid      Current Nutrition Therapies:  DIET NPO  DIET TUBE FEEDING     Current Tube Feeding (TF) Orders:   · Feeding Route: Nasogastric  · Formula: Glucerna 1.5  · Schedule:Continuous    · Regimen: 50 mL/hr  · Additives/Modulars: (none)  · Water Flushes: 50 mL q 4 hours (300 mL).     plan to change to 75 mL q 4 hours (450 mL)  · Current TF Orders Provides: 1800 kcal, 99 gm protein, 910 mL free water, 100% RDIs  · Goal TF Orders Provides: 1800 kcal, 99 gm protein, 910 mL free water, 100% RDIs    Anthropometric Measures:  · Height:  5' 7\" (170.2 cm)  · Current Body Wt:  63.6 kg (140 lb 3.4 oz)  · BMI: 22    Nutrition Diagnosis:   · Inadequate oral intake related to swallowing difficulty as evidenced by NPO or clear liquid status due to medical condition      Nutrition Intervention:  Food and/or Nutrient Delivery: Continue NPO, Modify tube feeding, Vitamin supplement(modify water flushes)  Nutrition Education and Counseling: Education not indicated  Coordination of Nutrition Care: Continue to monitor while inpatient, Swallow evaluation, Interdisciplinary rounds    Goals:  Nutritional needs will be met through adequate oral intake or nutrition support within the next 7 days. Nutrition Monitoring and Evaluation:   Behavioral-Environmental Outcomes: None identified  Food/Nutrient Intake Outcomes: Diet advancement/tolerance, Enteral nutrition intake/tolerance, Vitamin/mineral intake  Physical Signs/Symptoms Outcomes: Biochemical data, Chewing or swallowing, GI status, Meal time behavior, Nutrition focused physical findings, Fluid status or edema    Discharge Planning:     Too soon to determine     Electronically signed by Jessica Ontiveros RD on 12/21/2020 at 12:48 PM    Contact Number: [phone]

## 2020-12-21 NOTE — CONSULTS
[facility] [place]    Name:  Jadiel Brito  MR#:   [id number]  :  1955  ACCOUNT #:  [de-identified]  DATE OF SERVICE:  2020      REASON FOR CONSULTATION:  Advice and opinion regarding G-tube placement. HISTORY OF PRESENT ILLNESS:  The patient is a 78-year-old female who has presented to the hospital with multiple issues including recent MI, stent placement, COPD, CHF. The patient is a poor historian, has failed swallowing eval.  She currently has an NG tube, and request was made for a G-tube placement. Recent cardiac cath with stent placement, and the patient is on Brilinta for anticoagulation. PAST MEDICAL HISTORY:  Remarkable for pulmonary fibrosis, behavioral disorders, CHF, coronary artery disease, hypertension. FAMILY HISTORY/SOCIAL HISTORY:  Generally unavailable at this time. REVIEW OF SYSTEMS:  Unable to obtain. MEDICATIONS:  Have been reviewed, as per listed in the chart. PHYSICAL EXAMINATION:  GENERAL:  Slightly built female, in no distress. VITAL SIGNS:  Stable. HEENT:  Head:  Normocephalic. Eyes:  Pallor is noted. Mouth:  Poor dentition. NECK:  Supple. LUNGS:  Clear to auscultation. HEART:  Both sounds normal.  Rhythm regular. ABDOMEN:  Soft, nontender. Good bowel sounds present. EXTREMITIES:  Unremarkable. NEURO:  Generally nonfocal exam.    LABORATORY DATA:  Reviewed. IMAGING STUDIES:  Reviewed. ASSESSMENT AND PLAN:  A 78-year-old female with recent myocardial infarction who failed swallowing evaluation. I have discussed PEG tube placement with Dr. Tatiana Dennis. Stopping of Brilinta is out of question at this time. The patient has a high risk of clotting the new stent and dying. Hence, at this time, I would not recommend PEG tube placement, but continue nasogastric tube feeding. Proper restraining of the patient's arms to prevent nasogastric tube removal is recommended.       MD GWEN [email or web address]  D: 12/20/2020 12:13  T:  12/20/2020 21:31  JOB #:  [id number]  CC:   Ronan Singer MD

## 2020-12-21 NOTE — PROGRESS NOTES
[facility]  Pulmonary, Critical Care, and Sleep Medicine    Pulmonary Medicine Progress Note    Name: Jad Vasquez MRN: [id number]  : 1955 Hospital: [facility]  Date: 2020       Subjective:  Pt is doing well on NC. Remains delirious. Patient remains restrained, likely pulling out lines and NG tube. Patient grunting, able to answer affirmatively to questions, unable to provide further history    Patient Active Problem List   Diagnosis Code    Diverticula of colon K57.30    Sepsis ([address] A41.9    Sepsis secondary to UTI ([address] A41.9, N39.0    DM hyperosmolarity type II, uncontrolled ([address] E11.00, E11.65    HTN (hypertension) I10    Febrile illness, acute R50.9    Gram-negative bacteremia R78.81    Diabetic neuropathy ([address] E11.40    Osteoarthritis of both knees M17.0    Acidosis E87.2    Respiratory failure ([address] J96.90    Shock ([address] R57.9    Hyperosmolar (nonketotic) coma ([address] E11.01    Acute UTI N39.0    Macrocytic anemia D53.9    STEMI (ST elevation myocardial infarction) ([address] I21.3    Acute on chronic systolic congestive heart failure (HCC) I50.23    Severe protein-calorie malnutrition (HCC) E43    Goals of care, counseling/discussion Z71.89    Dementia without behavioral disturbance (HCC) F03.90    Pulmonary fibrosis (HCC) J84.10       Assessment:  · CAD: S/P PCI with DEC- LAD 20  · Respiratory Failure-acute hypoxic-secondary to pulmonary edema from CHF in the setting of severe ILD, as well as aspiration  · ILD --- likely IPF  · - severe, with extensive honeycombing and bronchiectasis on CT scan  · - appears to be a new diagnosis -- NEW when compared to CT abd/pelvis from 18  · Etiology unclear, given apical basilar gradient and rapid progression, suspect this is due to IPF.   Patient has subpleural reticular fibrosis throughout all lung fields which gets worse in leads to honeycombing in the bases along with traction bronchiectasis, which is consistent with a probable UIP pattern. Patient is having issues with aspiration, however this does not explain reticular changes in the apices. · We will hold off on connective tissue panel at this point given that patient has already been on steroids, will likely be negative. May consider empiric antifibrotic therapy as an outpatient once acute disease has resolved  · Likely not RBILD/DIP, no causative agents to suggest other etiology such as drug-induced or chronic HP  · Encephalopathy  · - severe, likely delirium from acute illness  · Dementia- on Aricept  · Hypokalemia  · DM: on Lantus as OP  · Hepatitic C infection  · Hepatic mass  · UTI  · - E coli    Impression/Plan:  - Wean O2 as tolerated  - Diuretics per cardiology and primary service to maintain net negative fluid balance as renal function tolerates  -Management of encephalopathy per primary service  -Recommend strict aspiration precautions, tube feeds as tolerated  - Wean down steroids, weaned to 30 mg starting tomorrow, wean over the next 6 days, (30 mg for 2 days and decrease by 10 mg every 2 days)  -Workup of hepatic mass per primary service  - ABX per primary service. - Continue to work with speech  -PT/OT/out of bed to chair once delirium has resolved  -Frequent incentive spirometry once delirium has resolved  -Advise goals of care discussion since patient has severe ILD (likely IPF) as well as severe CHF, and new hepatic mass and underlying dementia --given the severity of acute disease in the setting of multiple chronic diseases, patient has an extremely poor prognosis    Will follow    Full Code       FiO2 to keep SpO2 >=92%, HOB >=30 degree, aspiration precautions, aggressive pulmonary toileting, incentive spirometry. Other issues management by primary team and respective consultants. Events and notes from last 24 hours reviewed.    Discussed with patient and family, answered all questions to their satisfaction. Care plan discussed with nursing.      Labs and images personally seen and available reports reviewed  All current medicines are reviewed       Medications- Current:  Current Facility-Administered Medications   Medication Dose Route Frequency    insulin glargine (LANTUS) injection 30 Units  30 Units SubCUTAneous DAILY    insulin glargine (LANTUS) injection 10 Units  10 Units SubCUTAneous QHS    insulin lispro (HUMALOG) injection 5 Units  5 Units SubCUTAneous Q6H    [START ON 12/22/2020] predniSONE (DELTASONE) tablet 40 mg  40 mg Oral DAILY WITH BREAKFAST    carvediloL (COREG) tablet 6.25 mg  6.25 mg Oral Q12H    lisinopriL (PRINIVIL, ZESTRIL) tablet 5 mg  5 mg Oral DAILY    furosemide (LASIX) injection 40 mg  40 mg IntraVENous Q12H    cefTRIAXone (ROCEPHIN) 2 g in sterile water (preservative free) 20 mL IV syringe  2 g IntraVENous Q24H    polyethylene glycol (MIRALAX) packet 17 g  17 g Per NG tube DAILY    spironolactone (ALDACTONE) tablet 25 mg  25 mg Oral DAILY    multivit-folic acid-herbal 967 (WELLESSE PLUS) oral liquid 30 mL  30 mL Per NG tube DAILY    insulin lispro (HUMALOG) injection   SubCUTAneous Q6H    fentaNYL citrate (PF) injection  mcg   mcg IntraVENous Q2H PRN    sodium chloride (NS) flush 5-40 mL  5-40 mL IntraVENous PRN    ticagrelor (BRILINTA) tablet 90 mg  90 mg Per NG tube BID    sodium chloride (NS) flush 5-40 mL  5-40 mL IntraVENous PRN    albuterol-ipratropium (DUO-NEB) 2.5 MG-0.5 MG/3 ML  3 mL Nebulization Q4H PRN    melatonin tablet 3 mg  3 mg Oral QHS    famotidine (PF) (PEPCID) 20 mg in 0.9% sodium chloride 10 mL injection  20 mg IntraVENous Q12H    midazolam (VERSED) injection 1-2 mg  1-2 mg IntraVENous Q1H PRN    sodium chloride (NS) flush 5-40 mL  5-40 mL IntraVENous Q8H    sodium chloride (NS) flush 5-40 mL  5-40 mL IntraVENous PRN    acetaminophen (TYLENOL) tablet 650 mg  650 mg Oral Q6H PRN    Or    acetaminophen (TYLENOL) suppository 650 mg  650 mg Rectal Q6H PRN    promethazine (PHENERGAN) tablet 12.5 mg  12.5 mg Oral Q6H PRN    Or    ondansetron (ZOFRAN) injection 4 mg  4 mg IntraVENous Q6H PRN    glucose chewable tablet 16 g  4 Tab Oral PRN    glucagon (GLUCAGEN) injection 1 mg  1 mg IntraMUSCular PRN    dextrose (D50W) injection syrg 12.5-25 g  25-50 mL IntraVENous PRN    aspirin chewable tablet 81 mg  81 mg Oral DAILY    atorvastatin (LIPITOR) tablet 40 mg  40 mg Oral QHS       Objective:  Vital Signs:    Visit Vitals  /85   Pulse 84   Temp 97.4 °F (36.3 °C)   Resp 20   Ht 5' 7\" (1.702 m)   Wt 63.6 kg (140 lb 4.8 oz)   SpO2 100%   Breastfeeding No   BMI 21.97 kg/m²      O2 Device: Nasal cannula  O2 Flow Rate (L/min): 5 l/min  Temp (24hrs), Av.5 °F (36.4 °C), Min:97 °F (36.1 °C), Max:98 °F (36.7 °C)      Intake/Output:   Last shift:      No intake/output data recorded. Last 3 shifts:  1901 -  0700  In: 3374   Out: 1350 [Urine:1350]  No intake or output data in the 24 hours ending 20 1615    Physical Exam:     General/Neurology: Alert, Awake, uncooperative, laying in bed, upright with NG tube in place, grunting  Head:   Normocephalic, without obvious abnormality, atraumatic  Eye:   PERRL, EOM intact, sclera anicteric, no ocular discharge  Nose:  No nasal drainage, septum midline, NG tube in place  Oral:  Mucus membranes moist, poor dentition, no oral lesions  Lung:   Coarse rhonchi throughout all lung fields, no wheezes  Heart:   S1 S2 present, no M/R/G, regular rate and rhythm  Abdomen:  Soft, non tender, BS+nt, no HSM  Extremities:  No pedal edema.   No clubbing, no cyanosis  Skin:   Dry, intact, no major lesions over arms and legs and face  Data:      Recent Results (from the past 24 hour(s))   GLUCOSE, POC    Collection Time: 20  6:30 PM   Result Value Ref Range    Glucose (POC) 412 (HH) 70 - 110 mg/dL   GLUCOSE, POC    Collection Time: 20  6:32 PM   Result Value Ref Range Glucose (POC) 393 (H) 70 - 634 mg/dL   METABOLIC PANEL, BASIC    Collection Time: 12/21/20 12:21 AM   Result Value Ref Range    Sodium 145 136 - 145 mmol/L    Potassium 4.1 3.5 - 5.5 mmol/L    Chloride 104 100 - 111 mmol/L    CO2 36 (H) 21 - 32 mmol/L    Anion gap 5 3.0 - 18 mmol/L    Glucose 330 (H) 74 - 99 mg/dL    BUN 49 (H) 7.0 - 18 MG/DL    Creatinine 0.88 0.6 - 1.3 MG/DL    BUN/Creatinine ratio 56 (H) 12 - 20      GFR est AA >60 >60 ml/min/1.73m2    GFR est non-AA >60 >60 ml/min/1.73m2    Calcium 10.0 8.5 - 10.1 MG/DL   MAGNESIUM    Collection Time: 12/21/20 12:21 AM   Result Value Ref Range    Magnesium 2.7 (H) 1.6 - 2.6 mg/dL   PHOSPHORUS    Collection Time: 12/21/20 12:21 AM   Result Value Ref Range    Phosphorus 3.0 2.5 - 4.9 MG/DL   CBC WITH AUTOMATED DIFF    Collection Time: 12/21/20 12:21 AM   Result Value Ref Range    WBC 16.6 (H) 4.6 - 13.2 K/uL    RBC 4.55 4.20 - 5.30 M/uL    HGB 12.3 12.0 - 16.0 g/dL    HCT 39.7 35.0 - 45.0 %    MCV 87.3 74.0 - 97.0 FL    MCH 27.0 24.0 - 34.0 PG    MCHC 31.0 31.0 - 37.0 g/dL    RDW 13.0 11.6 - 14.5 %    PLATELET 926 (H) 953 - 420 K/uL    MPV 9.9 9.2 - 11.8 FL    NEUTROPHILS 86 (H) 40 - 73 %    LYMPHOCYTES 5 (L) 21 - 52 %    MONOCYTES 9 3 - 10 %    EOSINOPHILS 0 0 - 5 %    BASOPHILS 0 0 - 2 %    ABS. NEUTROPHILS 14.4 (H) 1.8 - 8.0 K/UL    ABS. LYMPHOCYTES 0.8 (L) 0.9 - 3.6 K/UL    ABS. MONOCYTES 1.4 (H) 0.05 - 1.2 K/UL    ABS. EOSINOPHILS 0.0 0.0 - 0.4 K/UL    ABS.  BASOPHILS 0.0 0.0 - 0.1 K/UL    DF AUTOMATED     GLUCOSE, POC    Collection Time: 12/21/20  1:26 AM   Result Value Ref Range    Glucose (POC) 386 (H) 70 - 110 mg/dL   GLUCOSE, POC    Collection Time: 12/21/20  5:54 AM   Result Value Ref Range    Glucose (POC) 359 (H) 70 - 110 mg/dL   GLUCOSE, POC    Collection Time: 12/21/20 11:23 AM   Result Value Ref Range    Glucose (POC) 321 (H) 70 - 110 mg/dL         Chemistry   Recent Labs     12/21/20  0021 12/20/20  0106 12/19/20  0435   * 283* 347*    145 142   K 4.1 3.2* 3.6    104 102   CO2 36* 35* 34*   BUN 49* 37* 27*   CREA 0.88 0.82 0.82   CA 10.0 10.1 9.8   MG 2.7* 2.5 2.4   PHOS 3.0 2.7 2.7   AGAP 5 6 6   BUCR 56* 45* 33*       CBC w/Diff   Recent Labs     12/21/20  0021 12/20/20  0106 12/19/20  0435   WBC 16.6* 16.1* 16.5*   RBC 4.55 4.60 4.46   HGB 12.3 12.4 11.9*   HCT 39.7 39.2 37.8   * 847* 773*   GRANS 86* 88* 88*   LYMPH 5* 5* 9*   EOS 0 0 0       ABG   No results for input(s): PHI, PHI, POC2, PCO2I, PO2, PO2I, HCO3, HCO3I, FIO2, FIO2I in the last 72 hours. Micro    No results for input(s): SDES, CULT in the last 72 hours. No results for input(s): CULT in the last 72 hours. CT (Most Recent)   Results from Hospital Encounter encounter on 12/10/20   CTA CHEST W OR W WO CONT    Narrative CT Pulmonary Angiogram    CPT CODE: 28240    CLINICAL: Shortness of breath, concern for PE.    COMPARISON: Current and previous plain films. TECHNICAL: Volumetric data acquisition performed through the chest with a  multislice scanner. Reconstructions were created in the axial, coronal, and  sagittal planes. Coronal and sagittal reconstructions were created using maximal  intensity projection methodology to maximize sensitivity for emboli. Bolus  timing was optimized for a pulmonary embolism evaluation. 100 cc of Isovue-370  were utilized for this examination. FINDINGS:  The patient is intubated. Endotracheal tube is in satisfactory position. There are no pulmonary emboli. The ascending aorta is prominent at 3.7 cm. There  are dense calcifications in the anterior surface of the ascending aorta. The lungs there are extensive coarse subpleural reticulations increasing in  confluence in severity in the bases. Honeycombing is present. There is traction  bronchiectasis extensively in the lower lung zones .  There is groundglass  infiltrate and consolidation between the areas of honeycombing and traction  bronchiectasis  No pleural effusion or pneumothorax is apparent. There is a small pneumomediastinum evident. .  No mediastinal adenopathy or mass is evident. There is multichamber cardiac  enlargement. Sections in the upper abdomen reveal a inhomogeneous mass in the posterior right  lobe of the liver measuring 9 x 10 cm transaxially. There may be an additional  smaller lesion (2 cm) in the medial left lobe. Impression IMPRESSION:    Negative for acute pulmonary embolism or acute aortic abnormality. Dilated ascending aorta to 3.7 cm. Advanced pulmonary fibrosis with traction bronchiectasis and honeycombing. The  appearance is suggestive of UIP. There are extensive groundglass and confluent infiltrates superimposed upon the  interstitial process in the lower lobes. Primary differential considerations for  this include pulmonary edema, pneumonia, aspiration, pulmonary hemorrhage, and  acute lung injury. There is a large inhomogeneous mass in the right lobe of the liver and possibly  a second smaller lesion. Differential includes primary and metastatic disease. Broad differential.  Minimal pneumomediastinum        All CT scans at this facility are performed using dose optimization technique as  appropriate to the performed exam, to include automated exposure control,  adjustment of the mA and/or kV according to patient's size (Including  appropriate matching for site-specific examinations), or use of iterative  reconstruction technique. XR (Most Recent). CXR reviewed by me and compared with previous CXR   Results from [place] encounter on 12/10/20   XR ABD PORT  1 V    Narrative Examination: Supine abdomen, one view     History: NG tube placement    Comparison: December 11, 2020    Findings: NG tube terminates in the expected region of the stomach. Partially  visualized right femoral central venous catheter. Airspace disease in the lung  bases. Nonobstructive bowel gas pattern. Impression Impression:  1.   NG tube in satisfactory position. Airspace disease in the lung bases. See my orders for details     Total care time exclusive of procedures with complex decision making, coordination of care and counseling patient performed and > 50% time spent in face to face evaluation as mentioned above.         Sada Werner MD/MPH     Pulmonary, Critical Care Medicine  [facility]

## 2020-12-21 NOTE — PROGRESS NOTES
Problem: Dysphagia (Adult)  Goal: *Acute Goals and Plan of Care (Insert Text)  Description: Patient will:  1. Tolerate PO trials with 0 s/s overt distress in 4/5 trials  2. Utilize compensatory swallow strategies/maneuvers (decrease bite/sip, size/rate, alt. liq/sol) with min cues in 4/5 trials  3. Perform oral-motor/laryngeal exercises to increase oropharyngeal swallow function with min cues  4. Complete an objective swallow study (i.e., MBSS) to assess swallow integrity, r/o aspiration, and determine of safest LRD, min A  5. Pt will utilize comp strategies to improve respiration to swallow coordination to reduce aspiration risk and improve activity tolerance for sustained nutrition/ hydration given min-mod cues. Rec:     NPO with consideration for alt means of nutrition v. Comfort feeds  Aspiration precautions  HOB >45 at all times  Small bites/sips; alternate liquid/solid with slow feeding rate with SLP only  Oral care TID  Meds: non oral   MBS to further assess oropharyngeal swallow fxn      Outcome: Progressing Towards Goal     SPEECH LANGUAGE PATHOLOGY DYSPHAGIA TREATMENT    Patient: Katina Graham (89 y.o. female)  Date: 12/21/2020  Diagnosis: STEMI (ST elevation myocardial infarction) ([facility] [I21.3] STEMI (ST elevation myocardial infarction) ([facility] [address]  Procedure(s) (LRB):  Left Heart Cath / Coronary Angiography (Right)  Left Ventriculography (N/A)  Percutaneous Coronary Intervention (N/A)  Right Heart Cath (N/A) 10 Days Post-Op  Precautions:  Fall, Aspiration, Skin  PLOF:Per H&P     ASSESSMENT:  Pt seen for FU swallowing tx. Pt without attempts at communication despite max cues. Pt given PO trials ice chip and puree texture x1 each. Ice chip resulted in wet resp quality, unable to elicit volitional throat clear or repeat swallow on command. Mildly delayed weak, wet cough following puree. Delayed AP propulsion, incomplete swallow, multiple swallows, suspected pharyngeal residue across trials.  Recommend continue NPO with consideration alternate means nutrition/hydration v. Comfort feeds. SLP will continue to follow per POC. Progression toward goals:  []         Improving appropriately and progressing toward goals  [x]         Improving slowly and progressing toward goals  []         Not making progress toward goals and plan of care will be adjusted     PLAN:  Recommendations and Planned Interventions:  See above. Patient continues to benefit from skilled intervention to address the above impairments. Continue treatment per established plan of care. Discharge Recommendations: To Be Determined     SUBJECTIVE:   Patient did not make attempts to verbalize despite max cues. OBJECTIVE:   Cognitive and Communication Status:  Neurologic State: Alert  Orientation Level: Unable to verbalize  Cognition: Impaired decision making, Decreased command following  Perception: Appears intact     Safety/Judgement: Fall prevention, Insight into deficits  Dysphagia Treatment:  Oral Assessment:  Oral Assessment  Labial: Decreased seal  Dentition: Natural, Limited  Oral Hygiene: Adequate  Lingual: No impairment  Velum: No impairment  Mandible: No impairment  P.O.  Trials:   Patient Position: HOB 70*   Vocal quality prior to P.O.: Other (comment)(no attempt to communicate)   Consistency Presented: Ice chips, Puree   How Presented: SLP-fed/presented, Spoon       Bolus Acceptance: Impaired   Bolus Formation/Control: Impaired   Type of Impairment: Delayed, Incomplete, Mastication, Premature spillage   Propulsion: Delayed (# of seconds), Tongue pumping   Oral Residue: None   Initiation of Swallow: Delayed (# of seconds)   Laryngeal Elevation: Decreased, Weak   Aspiration Signs/Symptoms: Change vocal quality, Delayed cough/throat clear, Weak cough   Pharyngeal Phase Characteristics: Altered vocal quality, Poor endurance   Effective Modifications: None   Cues for Modifications: Maximal         Oral Phase Severity: Moderate   Pharyngeal Phase Severity : Severe       PAIN:  Pain level pre-treatment: 0/10   Pain level post-treatment: 0/10     After treatment:   []              Patient left in no apparent distress sitting up in chair  [x]              Patient left in no apparent distress in bed  [x]              Call bell left within reach  [x]              Nursing notified  []              Family present  []              Caregiver present  []              Bed alarm activated      COMMUNICATION/EDUCATION:   [x] Aspiration precautions; swallow safety; compensatory techniques  [x]        Patient unable to participate in education; education ongoing with staff  []  Posted safety precautions in patient's room.   [] Oral-motor/laryngeal strengthening exercises      Thank you for this referral.    Quique Zhang M.S., CCC-SLP  Speech-Language Pathologist    Time Calculation: 15 mins

## 2020-12-21 NOTE — PROGRESS NOTES
Defied by nursing that patient's blood glucose has been elevated over the past couple of nights. This morning it is elevated at 359. I am increasing patient's Lantus to 30 units daily from 20 units daily.     Signed By: Stacia Colin MD     December 21, 2020

## 2020-12-21 NOTE — PROGRESS NOTES
Problem: Mobility Impaired (Adult and Pediatric)  Goal: *Acute Goals and Plan of Care (Insert Text)  Description: Physical Therapy Goals  Initiated 12/20/2020 and to be accomplished within 7 day(s)  1. Patient will move from supine to sit and sit to supine  in bed with minimal assistance/contact guard assist.    2.  Patient will transfer from bed to chair and chair to bed with minimal assistance/contact guard assist using the least restrictive device. 3.  Patient will perform sit to stand with moderate assistance . 4. Patient will sit EOB with CGA for 5 minutes without LOB    PLOF: Pt difficulty to understand due to inability to speak (mouths words), but states she came from home and was able to walk around without issues. Outcome: Progressing Towards Goal     PHYSICAL THERAPY TREATMENT    Patient: Aurelia Seymour (84 y.o. female)  Date: 12/21/2020  Diagnosis: STEMI (ST elevation myocardial infarction) ([facility] [I21.3] STEMI (ST elevation myocardial infarction) ([facility]  Procedure(s) (LRB):  Left Heart Cath / Coronary Angiography (Right)  Left Ventriculography (N/A)  Percutaneous Coronary Intervention (N/A)  Right Heart Cath (N/A) 10 Days Post-Op  Precautions: Fall, Aspiration, Skin    ASSESSMENT:  RN cleared for PT session. Pt found supine in bed, on 5L o2 NC, NGT intact, B wrist restraints on. Tube feeding halted during PT session and wrist restraints temporarily taken off. Pt seems more sleepy than yesterday and does not mouth any words today, only nodding yes/no to some questions. Pt was assisted supine-sit with maxAx1, able to use her UE's more in today's session vs last. Pt able to sit on EOB for ~2 minutes with modA for support, lateral lean less prevalent today. Pt was assisted back supine with totalA and scooted up towards HOB. Pt then assisted through exercises/PROM per flow sheet.  Pt left with B wrist restraints on, tube feeding running with HOB elevated, SCD's on, 5L O2 on, bed alarm on, call bell nearby, no new questions or concerns. Progression toward goals:   []      Improving appropriately and progressing toward goals  [x]      Improving slowly and progressing toward goals  []      Not making progress toward goals and plan of care will be adjusted     PLAN:  Patient continues to benefit from skilled intervention to address the above impairments. Continue treatment per established plan of care. Discharge Recommendations:  [facility]ust[facility] Hobson vs [facility]  Further Equipment Recommendations for Discharge:  N/A     SUBJECTIVE:   Patient stated - pt nonverbal today    OBJECTIVE DATA SUMMARY:   Critical Behavior:  Neurologic State: Alert  Orientation Level: Unable to verbalize  Cognition: Impaired decision making, Decreased command following  Safety/Judgement: Fall prevention, Insight into deficits  Functional Mobility Training:  Bed Mobility:     Supine to Sit: Maximum assistance;Assist x1;Additional time  Sit to Supine: Total assistance;Assist x1  Scooting: Total assistance;Assist x1    Balance:  Sitting: Impaired; With support  Sitting - Static: Poor (constant support)  Sitting - Dynamic: Poor (constant support)   Therapeutic Exercises:   Pt assisted through AAROM/PROM of exercises per flow sheet      EXERCISE   Sets   Reps   Active Active Assist   Passive Self ROM   Comments   Ankle Pumps    [] [] [] []    Quad Sets/Glut Sets    [] [] [] [] Hold for 5 secs   Hamstring Sets   [] [] [] []    Short Arc Quads   [] [] [] []    Heel Slides 1 8 [] [x] [x] []    Straight Leg Raises 1 8 [] [x] [x] []    Hip Add   [] [] [] [] Hold for 5 secs, w/ pillow squeeze   Long Arc Quads   [] [] [] []    Seated Marching   [] [] [] []    Standing Marching   [] [] [] []    UE flexion 1 5 [] [x] [] []        Pain:  Pain level pre-treatment: 0/10  Pain level post-treatment: 0/10   Pain Intervention(s): Medication (see MAR);  Rest, Repositioning   Response to intervention: Nurse notified, See doc flow    Activity Tolerance:   Pt tolerated mobility poorly d/t lethargy  Please refer to the flowsheet for vital signs taken during this treatment. After treatment:   [] Patient left in no apparent distress sitting up in chair  [x] Patient left in no apparent distress in bed  [x] Call bell left within reach  [x] Nursing notified  [] Caregiver present  [x] Bed alarm activated  [x] SCDs applied      COMMUNICATION/EDUCATION:   [x]         Role of Physical Therapy in the acute care setting. [x]         Fall prevention education was provided and the patient/caregiver indicated understanding. [x]         Patient/family have participated as able in working toward goals and plan of care. []         Patient/family agree to work toward stated goals and plan of care. []         Patient understands intent and goals of therapy, but is neutral about his/her participation.   []         Patient is unable to participate in stated goals/plan of care: ongoing with therapy staff.  []         Other:        Mike Diehl   Time Calculation: 23 mins

## 2020-12-21 NOTE — ROUTINE PROCESS
Bedside and Verbal shift change report given to [address] [place] [address] (oncoming nurse) by Good Jacobs RN (offgoing nurse). Report included the following information SBAR, Kardex and MAR.

## 2020-12-21 NOTE — PROGRESS NOTES
[facility]      CARDIOLOGY PROGRESS NOTE  RECS:    1. Subacute MI-EKG showed Q waves in anterolateral lead with ST elevation, with suspicion of late presentation of anterior MI-s/p [facility] S/p ptca/stent to proximal/ mid LAD using ARELY.  LVEDP 8 mmHg. Normal PASP pressure with normal PCWP. Moderate to severe LV dysfunction. Continue asa and Brilinta. 2. Acute respiratory failure-extubates. On O2 NC- Possibility of pulmonary fibrosis- given extensive air space disease and normal PCWP -   3. Hypertension- stable on  Coreg to 6.25 mg BID and lisinopril  4. Acute Systolic Congestive heart Failure-recent echo with EF% 35%-40- on lasix iv bid per pulmonary   5. COPD, on home O2 4L NC   6. Hepatitis C ? -Per family member   9. DM2- medications per medical team   8. Dementia - failed swallow eval.  Per GI note - poor candidate at this time for G-tube due to not being able to stop Brilinta. Consider IR consult  for PEG tube      Remains obtunded-- recommend IR consult for G tube. Needs to be on DAPT. Continue supportive care. Will f/u     Cardiac cath 12/11/20  Patient presented to [address] [place] [address] with late presentation anterior wall MI.  EF 35-40%. Patient was initially managed medically-- however developed acute pulmonary edema requiring intubation. Also troponin level increasing consistent with ongoing ischemia. S/p ptca/stent to proximal/ mid LAD using ARELY. LVEDP 8 mmHg. Normal PASP pressure with normal PCWP. Moderate to severe LV dysfunction.     Echo 12/10/20  · LV: Estimated LVEF is 35 - 40%. Visually measured ejection fraction. Normal cavity size and wall thickness. Moderately and segmentally reduced systolic function. Inconclusive left ventricular diastolic function. · AO: Mild aortic root dilatation; diameter is 3.8 cm.   ASSESSMENT:  Hospital Problems  Date Reviewed: 2/7/2018          Codes Class Noted POA    Goals of care, counseling/discussion ICD-10-CM: Z71.89  ICD-9-CM: V65.49  Unknown Unknown        Dementia without behavioral disturbance (HCC) ICD-10-CM: F03.90  ICD-9-CM: 294.20  Unknown Unknown        Pulmonary fibrosis ([facility] ICD-10-CM: J84.10  ICD-9-CM: 519  Unknown Unknown        Severe protein-calorie malnutrition ([facility] ICD-10-CM: E43  ICD-9-CM: 630  12/16/2020 Clinically Undetermined        Acute on chronic systolic congestive heart failure (HCC) ICD-10-CM: I50.23  ICD-9-CM: 428.23, 428.0  12/15/2020 Unknown        * (Principal) STEMI (ST elevation myocardial infarction) ([facility] ICD-10-CM: I21.3  ICD-9-CM: 410.90  12/10/2020 Unknown                SUBJECTIVE:  No CP or SOB  No verbal poorly responsive     OBJECTIVE:    VS:   Visit Vitals  /78   Pulse 82   Temp 97.7 °F (36.5 °C)   Resp 20   Ht 5' 7\" (1.702 m)   Wt 63.6 kg (140 lb 4.8 oz)   SpO2 100%   Breastfeeding No   BMI 21.97 kg/m²       No intake or output data in the 24 hours ending 12/21/20 1309  TELE: normal sinus rhythm    General: alert and poorly responsive   HENT: Normocephalic, atraumatic. Normal external eye. Neck :  no JVD  Cardiac:  regular rate and rhythm, S1, S2 normal, no murmur, click, rub or gallop  Lungs: Scattered rhonchi  Abdomen: Soft, nontender, no masses  Extremities:  No c/c/e, peripheral pulses present      Labs: Results:       Chemistry Recent Labs     12/21/20  0021 12/20/20  0106 12/19/20  0435   * 283* 347*    145 142   K 4.1 3.2* 3.6    104 102   CO2 36* 35* 34*   BUN 49* 37* 27*   CREA 0.88 0.82 0.82   CA 10.0 10.1 9.8   AGAP 5 6 6   BUCR 56* 45* 33*      CBC w/Diff Recent Labs     12/21/20  0021 12/20/20  0106 12/19/20  0435   WBC 16.6* 16.1* 16.5*   RBC 4.55 4.60 4.46   HGB 12.3 12.4 11.9*   HCT 39.7 39.2 37.8   * 847* 773*   GRANS 86* 88* 88*   LYMPH 5* 5* 9*   EOS 0 0 0      Cardiac Enzymes No results for input(s): CPK, CKND1, NATHANIEL in the last 72 hours.     No lab exists for component: CKRMB, TROIP   Coagulation Recent Labs 12/19/20  0435 12/18/20  2222   APTT 25.8 25.4       Lipid Panel No results found for: CHOL, CHOLPOCT, CHOLX, CHLST, CHOLV, 961572, HDL, HDLP, LDL, LDLC, DLDLP, 562905, VLDLC, VLDL, TGLX, TRIGL, TRIGP, TGLPOCT, CHHD, CHHDX   BNP No results for input(s): BNPP in the last 72 hours. Liver Enzymes No results for input(s): TP, ALB, TBIL, AP in the last 72 hours. No lab exists for component: SGOT, GPT, DBIL   Thyroid Studies Lab Results   Component Value Date/Time    TSH 2.92 12/10/2020 11:07 AM              NABILA Almazan supervised    I have independently evaluated and examined the patient. All relevant labs and testing data's are reviewed. Care plan discussed and updated after review.     Irving Barraza MD

## 2020-12-22 ENCOUNTER — APPOINTMENT (OUTPATIENT)
Dept: GENERAL RADIOLOGY | Age: 65
DRG: 003 | End: 2020-12-22
Attending: PHYSICIAN ASSISTANT
Payer: MEDICARE

## 2020-12-22 ENCOUNTER — APPOINTMENT (OUTPATIENT)
Dept: GENERAL RADIOLOGY | Age: 65
DRG: 003 | End: 2020-12-22
Attending: STUDENT IN AN ORGANIZED HEALTH CARE EDUCATION/TRAINING PROGRAM
Payer: MEDICARE

## 2020-12-22 LAB
ALBUMIN SERPL-MCNC: 2.8 G/DL (ref 3.4–5)
ALBUMIN SERPL-MCNC: 3.3 G/DL (ref 3.4–5)
ALBUMIN/GLOB SERPL: 0.5 {RATIO} (ref 0.8–1.7)
ALBUMIN/GLOB SERPL: 0.7 {RATIO} (ref 0.8–1.7)
ALP SERPL-CCNC: 122 U/L (ref 45–117)
ALP SERPL-CCNC: 136 U/L (ref 45–117)
ALT SERPL-CCNC: 187 U/L (ref 13–56)
ALT SERPL-CCNC: 209 U/L (ref 13–56)
AMMONIA PLAS-SCNC: 28 UMOL/L (ref 11–32)
ANION GAP SERPL CALC-SCNC: 6 MMOL/L (ref 3–18)
ANION GAP SERPL CALC-SCNC: 6 MMOL/L (ref 3–18)
ANION GAP SERPL CALC-SCNC: 8 MMOL/L (ref 3–18)
ARTERIAL PATENCY WRIST A: ABNORMAL
ARTERIAL PATENCY WRIST A: ABNORMAL
AST SERPL-CCNC: 445 U/L (ref 10–38)
AST SERPL-CCNC: 520 U/L (ref 10–38)
ATRIAL RATE: 107 BPM
BACTERIA SPEC CULT: NORMAL
BACTERIA SPEC CULT: NORMAL
BASE EXCESS BLD CALC-SCNC: 16 MMOL/L
BASE EXCESS BLD CALC-SCNC: 16 MMOL/L
BASOPHILS # BLD: 0 K/UL (ref 0–0.06)
BASOPHILS # BLD: 0 K/UL (ref 0–0.06)
BASOPHILS # BLD: 0 K/UL (ref 0–0.1)
BASOPHILS NFR BLD: 0 % (ref 0–2)
BASOPHILS NFR BLD: 0 % (ref 0–3)
BASOPHILS NFR BLD: 0 % (ref 0–3)
BDY SITE: ABNORMAL
BDY SITE: ABNORMAL
BILIRUB SERPL-MCNC: 0.4 MG/DL (ref 0.2–1)
BILIRUB SERPL-MCNC: 0.5 MG/DL (ref 0.2–1)
BNP SERPL-MCNC: 3614 PG/ML (ref 0–900)
BODY TEMPERATURE: 97.7
BODY TEMPERATURE: 98.4
BUN SERPL-MCNC: 56 MG/DL (ref 7–18)
BUN SERPL-MCNC: 70 MG/DL (ref 7–18)
BUN SERPL-MCNC: 73 MG/DL (ref 7–18)
BUN/CREAT SERPL: 54 (ref 12–20)
BUN/CREAT SERPL: 54 (ref 12–20)
BUN/CREAT SERPL: 67 (ref 12–20)
CALCIUM SERPL-MCNC: 10.1 MG/DL (ref 8.5–10.1)
CALCIUM SERPL-MCNC: 10.3 MG/DL (ref 8.5–10.1)
CALCIUM SERPL-MCNC: 9.9 MG/DL (ref 8.5–10.1)
CALCULATED P AXIS, ECG09: 31 DEGREES
CALCULATED R AXIS, ECG10: -28 DEGREES
CALCULATED T AXIS, ECG11: 61 DEGREES
CHLORIDE SERPL-SCNC: 105 MMOL/L (ref 100–111)
CHLORIDE SERPL-SCNC: 106 MMOL/L (ref 100–111)
CHLORIDE SERPL-SCNC: 107 MMOL/L (ref 100–111)
CK MB CFR SERPL CALC: 4 % (ref 0–4)
CK MB CFR SERPL CALC: 5.4 % (ref 0–4)
CK MB SERPL-MCNC: 1.9 NG/ML (ref 5–25)
CK MB SERPL-MCNC: 1.9 NG/ML (ref 5–25)
CK SERPL-CCNC: 35 U/L (ref 26–192)
CK SERPL-CCNC: 48 U/L (ref 26–192)
CO2 SERPL-SCNC: 36 MMOL/L (ref 21–32)
CO2 SERPL-SCNC: 38 MMOL/L (ref 21–32)
CO2 SERPL-SCNC: 38 MMOL/L (ref 21–32)
CREAT SERPL-MCNC: 0.84 MG/DL (ref 0.6–1.3)
CREAT SERPL-MCNC: 1.3 MG/DL (ref 0.6–1.3)
CREAT SERPL-MCNC: 1.36 MG/DL (ref 0.6–1.3)
D DIMER PPP FEU-MCNC: 0.63 UG/ML(FEU)
DIAGNOSIS, 93000: NORMAL
DIFFERENTIAL METHOD BLD: ABNORMAL
EOSINOPHIL # BLD: 0 K/UL (ref 0–0.4)
EOSINOPHIL NFR BLD: 0 % (ref 0–5)
ERYTHROCYTE [DISTWIDTH] IN BLOOD BY AUTOMATED COUNT: 13.3 % (ref 11.6–14.5)
ERYTHROCYTE [DISTWIDTH] IN BLOOD BY AUTOMATED COUNT: 13.5 % (ref 11.6–14.5)
ERYTHROCYTE [DISTWIDTH] IN BLOOD BY AUTOMATED COUNT: 13.6 % (ref 11.6–14.5)
GAS FLOW.O2 O2 DELIVERY SYS: ABNORMAL L/MIN
GAS FLOW.O2 O2 DELIVERY SYS: ABNORMAL L/MIN
GAS FLOW.O2 SETTING OXYMISER: 18 BPM
GAS FLOW.O2 SETTING OXYMISER: 5 L/M
GLOBULIN SER CALC-MCNC: 4.8 G/DL (ref 2–4)
GLOBULIN SER CALC-MCNC: 5.5 G/DL (ref 2–4)
GLUCOSE BLD STRIP.AUTO-MCNC: 207 MG/DL (ref 70–110)
GLUCOSE BLD STRIP.AUTO-MCNC: 252 MG/DL (ref 70–110)
GLUCOSE BLD STRIP.AUTO-MCNC: 291 MG/DL (ref 70–110)
GLUCOSE BLD STRIP.AUTO-MCNC: 448 MG/DL (ref 70–110)
GLUCOSE BLD STRIP.AUTO-MCNC: 456 MG/DL (ref 70–110)
GLUCOSE SERPL-MCNC: 205 MG/DL (ref 74–99)
GLUCOSE SERPL-MCNC: 205 MG/DL (ref 74–99)
GLUCOSE SERPL-MCNC: 214 MG/DL (ref 74–99)
HCO3 BLD-SCNC: 39.4 MMOL/L (ref 22–26)
HCO3 BLD-SCNC: 40.7 MMOL/L (ref 22–26)
HCT VFR BLD AUTO: 38.5 % (ref 35–45)
HCT VFR BLD AUTO: 40.6 % (ref 35–45)
HCT VFR BLD AUTO: 42 % (ref 35–45)
HGB BLD-MCNC: 11.4 G/DL (ref 12–16)
HGB BLD-MCNC: 12.5 G/DL (ref 12–16)
HGB BLD-MCNC: 12.8 G/DL (ref 12–16)
INR PPP: 1.2 (ref 0.8–1.2)
INSPIRATION.DURATION SETTING TIME VENT: 0.9 SEC
LACTATE SERPL-SCNC: 1.5 MMOL/L (ref 0.4–2)
LACTATE SERPL-SCNC: 2.4 MMOL/L (ref 0.4–2)
LYMPHOCYTES # BLD: 0 K/UL (ref 0.8–3.5)
LYMPHOCYTES # BLD: 1.9 K/UL (ref 0.8–3.5)
LYMPHOCYTES # BLD: 2.7 K/UL (ref 0.9–3.6)
LYMPHOCYTES NFR BLD: 0 % (ref 20–51)
LYMPHOCYTES NFR BLD: 10 % (ref 20–51)
LYMPHOCYTES NFR BLD: 9 % (ref 21–52)
MAGNESIUM SERPL-MCNC: 2.9 MG/DL (ref 1.6–2.6)
MCH RBC QN AUTO: 26.6 PG (ref 24–34)
MCH RBC QN AUTO: 27.2 PG (ref 24–34)
MCH RBC QN AUTO: 27.3 PG (ref 24–34)
MCHC RBC AUTO-ENTMCNC: 29.6 G/DL (ref 31–37)
MCHC RBC AUTO-ENTMCNC: 30.5 G/DL (ref 31–37)
MCHC RBC AUTO-ENTMCNC: 30.8 G/DL (ref 31–37)
MCV RBC AUTO: 88.6 FL (ref 74–97)
MCV RBC AUTO: 89.2 FL (ref 74–97)
MCV RBC AUTO: 89.7 FL (ref 74–97)
MONOCYTES # BLD: 0.4 K/UL (ref 0.05–1.2)
MONOCYTES # BLD: 0.4 K/UL (ref 0–1)
MONOCYTES # BLD: 1 K/UL (ref 0–1)
MONOCYTES NFR BLD: 1 % (ref 3–10)
MONOCYTES NFR BLD: 2 % (ref 2–9)
MONOCYTES NFR BLD: 3 % (ref 2–9)
NEUTS BAND NFR BLD MANUAL: 2 % (ref 0–5)
NEUTS BAND NFR BLD MANUAL: 8 % (ref 0–5)
NEUTS SEG # BLD: 16.9 K/UL (ref 1.8–8)
NEUTS SEG # BLD: 27.8 K/UL (ref 1.8–8)
NEUTS SEG # BLD: 31 K/UL (ref 1.8–8)
NEUTS SEG NFR BLD: 80 % (ref 42–75)
NEUTS SEG NFR BLD: 90 % (ref 40–73)
NEUTS SEG NFR BLD: 95 % (ref 42–75)
O2/TOTAL GAS SETTING VFR VENT: 0.44 %
P-R INTERVAL, ECG05: 134 MS
PCO2 BLD: 52.8 MMHG (ref 35–45)
PCO2 BLD: 66.3 MMHG (ref 35–45)
PEEP RESPIRATORY: 5 CMH2O
PH BLD: 7.4 [PH] (ref 7.35–7.45)
PH BLD: 7.48 [PH] (ref 7.35–7.45)
PHOSPHATE SERPL-MCNC: 3.2 MG/DL (ref 2.5–4.9)
PIP ISTAT,IPIP: 26
PLATELET # BLD AUTO: 790 K/UL (ref 135–420)
PLATELET # BLD AUTO: 828 K/UL (ref 135–420)
PLATELET # BLD AUTO: 853 K/UL (ref 135–420)
PLATELET COMMENTS,PCOM: ABNORMAL
PLATELET COMMENTS,PCOM: ABNORMAL
PMV BLD AUTO: 10.4 FL (ref 9.2–11.8)
PMV BLD AUTO: 10.5 FL (ref 9.2–11.8)
PMV BLD AUTO: 10.6 FL (ref 9.2–11.8)
PO2 BLD: 210 MMHG (ref 80–100)
PO2 BLD: 64 MMHG (ref 80–100)
POTASSIUM SERPL-SCNC: 3.6 MMOL/L (ref 3.5–5.5)
POTASSIUM SERPL-SCNC: 3.8 MMOL/L (ref 3.5–5.5)
POTASSIUM SERPL-SCNC: 4.2 MMOL/L (ref 3.5–5.5)
PROCALCITONIN SERPL-MCNC: 0.16 NG/ML
PROT SERPL-MCNC: 8.1 G/DL (ref 6.4–8.2)
PROT SERPL-MCNC: 8.3 G/DL (ref 6.4–8.2)
PROTHROMBIN TIME: 14.8 SEC (ref 11.5–15.2)
Q-T INTERVAL, ECG07: 350 MS
QRS DURATION, ECG06: 86 MS
QTC CALCULATION (BEZET), ECG08: 467 MS
RBC # BLD AUTO: 4.29 M/UL (ref 4.2–5.3)
RBC # BLD AUTO: 4.58 M/UL (ref 4.2–5.3)
RBC # BLD AUTO: 4.71 M/UL (ref 4.2–5.3)
RBC MORPH BLD: ABNORMAL
RBC MORPH BLD: ABNORMAL
SAO2 % BLD: 100 % (ref 92–97)
SAO2 % BLD: 91 % (ref 92–97)
SERVICE CMNT-IMP: ABNORMAL
SERVICE CMNT-IMP: ABNORMAL
SERVICE CMNT-IMP: NORMAL
SERVICE CMNT-IMP: NORMAL
SODIUM SERPL-SCNC: 149 MMOL/L (ref 136–145)
SODIUM SERPL-SCNC: 150 MMOL/L (ref 136–145)
SODIUM SERPL-SCNC: 151 MMOL/L (ref 136–145)
SPECIMEN TYPE: ABNORMAL
SPECIMEN TYPE: ABNORMAL
TOTAL RESP. RATE, ITRR: 18
TOTAL RESP. RATE, ITRR: 25
TROPONIN I SERPL-MCNC: 0.12 NG/ML (ref 0–0.04)
TROPONIN I SERPL-MCNC: 0.14 NG/ML (ref 0–0.04)
VENTILATION MODE VENT: ABNORMAL
VENTRICULAR RATE, ECG03: 107 BPM
VOLUME CONTROL IVLC: YES
VT SETTING VENT: 40 ML
WBC # BLD AUTO: 19.2 K/UL (ref 4.6–13.2)
WBC # BLD AUTO: 31 K/UL (ref 4.6–13.2)
WBC # BLD AUTO: 32 K/UL (ref 4.6–13.2)

## 2020-12-22 PROCEDURE — 99232 SBSQ HOSP IP/OBS MODERATE 35: CPT | Performed by: INTERNAL MEDICINE

## 2020-12-22 PROCEDURE — 74011250636 HC RX REV CODE- 250/636

## 2020-12-22 PROCEDURE — 74011250637 HC RX REV CODE- 250/637: Performed by: INTERNAL MEDICINE

## 2020-12-22 PROCEDURE — 76937 US GUIDE VASCULAR ACCESS: CPT | Performed by: INTERNAL MEDICINE

## 2020-12-22 PROCEDURE — 84145 PROCALCITONIN (PCT): CPT

## 2020-12-22 PROCEDURE — 74011636637 HC RX REV CODE- 636/637: Performed by: INTERNAL MEDICINE

## 2020-12-22 PROCEDURE — 87186 SC STD MICRODIL/AGAR DIL: CPT

## 2020-12-22 PROCEDURE — 74011250636 HC RX REV CODE- 250/636: Performed by: PHYSICIAN ASSISTANT

## 2020-12-22 PROCEDURE — 83605 ASSAY OF LACTIC ACID: CPT

## 2020-12-22 PROCEDURE — 82803 BLOOD GASES ANY COMBINATION: CPT

## 2020-12-22 PROCEDURE — 84484 ASSAY OF TROPONIN QUANT: CPT

## 2020-12-22 PROCEDURE — 93005 ELECTROCARDIOGRAM TRACING: CPT

## 2020-12-22 PROCEDURE — 36620 INSERTION CATHETER ARTERY: CPT | Performed by: INTERNAL MEDICINE

## 2020-12-22 PROCEDURE — 85610 PROTHROMBIN TIME: CPT

## 2020-12-22 PROCEDURE — 82140 ASSAY OF AMMONIA: CPT

## 2020-12-22 PROCEDURE — 80053 COMPREHEN METABOLIC PANEL: CPT

## 2020-12-22 PROCEDURE — 04HY32Z INSERTION OF MONITORING DEVICE INTO LOWER ARTERY, PERCUTANEOUS APPROACH: ICD-10-PCS | Performed by: INTERNAL MEDICINE

## 2020-12-22 PROCEDURE — 99291 CRITICAL CARE FIRST HOUR: CPT | Performed by: INTERNAL MEDICINE

## 2020-12-22 PROCEDURE — 5A1955Z RESPIRATORY VENTILATION, GREATER THAN 96 CONSECUTIVE HOURS: ICD-10-PCS | Performed by: INTERNAL MEDICINE

## 2020-12-22 PROCEDURE — 74011250636 HC RX REV CODE- 250/636: Performed by: INTERNAL MEDICINE

## 2020-12-22 PROCEDURE — P9047 ALBUMIN (HUMAN), 25%, 50ML: HCPCS

## 2020-12-22 PROCEDURE — 74011000250 HC RX REV CODE- 250: Performed by: PHYSICIAN ASSISTANT

## 2020-12-22 PROCEDURE — 2709999900 HC NON-CHARGEABLE SUPPLY

## 2020-12-22 PROCEDURE — 99233 SBSQ HOSP IP/OBS HIGH 50: CPT | Performed by: INTERNAL MEDICINE

## 2020-12-22 PROCEDURE — 36556 INSERT NON-TUNNEL CV CATH: CPT | Performed by: INTERNAL MEDICINE

## 2020-12-22 PROCEDURE — 82962 GLUCOSE BLOOD TEST: CPT

## 2020-12-22 PROCEDURE — 87070 CULTURE OTHR SPECIMN AEROBIC: CPT

## 2020-12-22 PROCEDURE — 83735 ASSAY OF MAGNESIUM: CPT

## 2020-12-22 PROCEDURE — 0BH17EZ INSERTION OF ENDOTRACHEAL AIRWAY INTO TRACHEA, VIA NATURAL OR ARTIFICIAL OPENING: ICD-10-PCS | Performed by: INTERNAL MEDICINE

## 2020-12-22 PROCEDURE — 85379 FIBRIN DEGRADATION QUANT: CPT

## 2020-12-22 PROCEDURE — 74011250636 HC RX REV CODE- 250/636: Performed by: NURSE PRACTITIONER

## 2020-12-22 PROCEDURE — 87040 BLOOD CULTURE FOR BACTERIA: CPT

## 2020-12-22 PROCEDURE — 36600 WITHDRAWAL OF ARTERIAL BLOOD: CPT

## 2020-12-22 PROCEDURE — 83880 ASSAY OF NATRIURETIC PEPTIDE: CPT

## 2020-12-22 PROCEDURE — 71045 X-RAY EXAM CHEST 1 VIEW: CPT

## 2020-12-22 PROCEDURE — 36415 COLL VENOUS BLD VENIPUNCTURE: CPT

## 2020-12-22 PROCEDURE — 74011000250 HC RX REV CODE- 250: Performed by: INTERNAL MEDICINE

## 2020-12-22 PROCEDURE — 65610000006 HC RM INTENSIVE CARE

## 2020-12-22 PROCEDURE — 87077 CULTURE AEROBIC IDENTIFY: CPT

## 2020-12-22 PROCEDURE — 84100 ASSAY OF PHOSPHORUS: CPT

## 2020-12-22 PROCEDURE — 94762 N-INVAS EAR/PLS OXIMTRY CONT: CPT

## 2020-12-22 PROCEDURE — 74011636637 HC RX REV CODE- 636/637: Performed by: PHYSICIAN ASSISTANT

## 2020-12-22 PROCEDURE — 74011000258 HC RX REV CODE- 258: Performed by: INTERNAL MEDICINE

## 2020-12-22 PROCEDURE — 94002 VENT MGMT INPAT INIT DAY: CPT

## 2020-12-22 PROCEDURE — 85025 COMPLETE CBC W/AUTO DIFF WBC: CPT

## 2020-12-22 RX ORDER — FENTANYL CITRATE 50 UG/ML
INJECTION, SOLUTION INTRAMUSCULAR; INTRAVENOUS
Status: COMPLETED
Start: 2020-12-22 | End: 2020-12-22

## 2020-12-22 RX ORDER — VANCOMYCIN/0.9 % SOD CHLORIDE 1.5G/250ML
1500 PLASTIC BAG, INJECTION (ML) INTRAVENOUS ONCE
Status: COMPLETED | OUTPATIENT
Start: 2020-12-22 | End: 2020-12-22

## 2020-12-22 RX ORDER — DOBUTAMINE HYDROCHLORIDE 200 MG/100ML
0-10 INJECTION INTRAVENOUS
Status: DISCONTINUED | OUTPATIENT
Start: 2020-12-22 | End: 2020-12-28

## 2020-12-22 RX ORDER — MIDAZOLAM HYDROCHLORIDE 1 MG/ML
INJECTION, SOLUTION INTRAMUSCULAR; INTRAVENOUS
Status: COMPLETED
Start: 2020-12-22 | End: 2020-12-22

## 2020-12-22 RX ORDER — FUROSEMIDE 10 MG/ML
40 INJECTION INTRAMUSCULAR; INTRAVENOUS ONCE
Status: COMPLETED | OUTPATIENT
Start: 2020-12-22 | End: 2020-12-22

## 2020-12-22 RX ORDER — DOBUTAMINE HYDROCHLORIDE 200 MG/100ML
INJECTION INTRAVENOUS
Status: COMPLETED
Start: 2020-12-22 | End: 2020-12-22

## 2020-12-22 RX ORDER — CHLORHEXIDINE GLUCONATE 1.2 MG/ML
10 RINSE ORAL EVERY 12 HOURS
Status: DISCONTINUED | OUTPATIENT
Start: 2020-12-22 | End: 2020-12-24

## 2020-12-22 RX ORDER — SODIUM CHLORIDE 9 MG/ML
250 INJECTION, SOLUTION INTRAVENOUS ONCE
Status: COMPLETED | OUTPATIENT
Start: 2020-12-22 | End: 2020-12-22

## 2020-12-22 RX ORDER — ALBUMIN HUMAN 250 G/1000ML
25 SOLUTION INTRAVENOUS ONCE
Status: COMPLETED | OUTPATIENT
Start: 2020-12-22 | End: 2020-12-22

## 2020-12-22 RX ORDER — ATROPINE SULFATE 0.1 MG/ML
INJECTION INTRAVENOUS
Status: DISPENSED
Start: 2020-12-22 | End: 2020-12-22

## 2020-12-22 RX ORDER — ALBUMIN HUMAN 250 G/1000ML
SOLUTION INTRAVENOUS
Status: COMPLETED
Start: 2020-12-22 | End: 2020-12-22

## 2020-12-22 RX ORDER — INSULIN GLARGINE 100 [IU]/ML
10 INJECTION, SOLUTION SUBCUTANEOUS ONCE
Status: COMPLETED | OUTPATIENT
Start: 2020-12-22 | End: 2020-12-22

## 2020-12-22 RX ORDER — FUROSEMIDE 10 MG/ML
INJECTION INTRAMUSCULAR; INTRAVENOUS
Status: COMPLETED
Start: 2020-12-22 | End: 2020-12-22

## 2020-12-22 RX ORDER — NOREPINEPHRINE BITARTRATE/D5W 8 MG/250ML
.5-5 PLASTIC BAG, INJECTION (ML) INTRAVENOUS
Status: DISCONTINUED | OUTPATIENT
Start: 2020-12-22 | End: 2020-12-28

## 2020-12-22 RX ORDER — DEXMEDETOMIDINE HYDROCHLORIDE 4 UG/ML
.1-1.5 INJECTION, SOLUTION INTRAVENOUS
Status: DISCONTINUED | OUTPATIENT
Start: 2020-12-22 | End: 2020-12-22

## 2020-12-22 RX ORDER — HYDROCORTISONE SODIUM SUCCINATE 100 MG/2ML
50 INJECTION, POWDER, FOR SOLUTION INTRAMUSCULAR; INTRAVENOUS EVERY 6 HOURS
Status: DISCONTINUED | OUTPATIENT
Start: 2020-12-22 | End: 2020-12-23

## 2020-12-22 RX ADMIN — FENTANYL CITRATE 100 MCG: 50 INJECTION INTRAMUSCULAR; INTRAVENOUS at 20:45

## 2020-12-22 RX ADMIN — FENTANYL CITRATE 100 MCG: 50 INJECTION, SOLUTION INTRAMUSCULAR; INTRAVENOUS at 11:30

## 2020-12-22 RX ADMIN — CHLORHEXIDINE GLUCONATE 0.12% ORAL RINSE 10 ML: 1.2 LIQUID ORAL at 21:05

## 2020-12-22 RX ADMIN — MIDAZOLAM HYDROCHLORIDE 2 MG: 2 INJECTION, SOLUTION INTRAMUSCULAR; INTRAVENOUS at 21:00

## 2020-12-22 RX ADMIN — FUROSEMIDE 40 MG: 10 INJECTION INTRAMUSCULAR; INTRAVENOUS at 09:49

## 2020-12-22 RX ADMIN — SODIUM CHLORIDE 10 ML: 9 INJECTION, SOLUTION INTRAMUSCULAR; INTRAVENOUS; SUBCUTANEOUS at 22:00

## 2020-12-22 RX ADMIN — FENTANYL CITRATE 100 MCG: 50 INJECTION INTRAMUSCULAR; INTRAVENOUS at 10:30

## 2020-12-22 RX ADMIN — Medication 7 MCG/MIN: at 17:00

## 2020-12-22 RX ADMIN — SODIUM CHLORIDE 250 ML: 900 INJECTION, SOLUTION INTRAVENOUS at 10:18

## 2020-12-22 RX ADMIN — INSULIN LISPRO 9 UNITS: 100 INJECTION, SOLUTION INTRAVENOUS; SUBCUTANEOUS at 06:13

## 2020-12-22 RX ADMIN — SODIUM CHLORIDE 10 ML: 9 INJECTION, SOLUTION INTRAMUSCULAR; INTRAVENOUS; SUBCUTANEOUS at 14:00

## 2020-12-22 RX ADMIN — SODIUM CHLORIDE 10 ML: 9 INJECTION, SOLUTION INTRAMUSCULAR; INTRAVENOUS; SUBCUTANEOUS at 06:13

## 2020-12-22 RX ADMIN — Medication 50 MCG/HR: at 11:00

## 2020-12-22 RX ADMIN — FUROSEMIDE 40 MG: 10 INJECTION, SOLUTION INTRAMUSCULAR; INTRAVENOUS at 09:49

## 2020-12-22 RX ADMIN — FAMOTIDINE 20 MG: 10 INJECTION INTRAVENOUS at 21:01

## 2020-12-22 RX ADMIN — DOBUTAMINE HYDROCHLORIDE 5 MCG/KG/MIN: 200 INJECTION INTRAVENOUS at 10:10

## 2020-12-22 RX ADMIN — ALBUMIN (HUMAN) 25 G: 0.25 INJECTION, SOLUTION INTRAVENOUS at 09:56

## 2020-12-22 RX ADMIN — INSULIN LISPRO 9 UNITS: 100 INJECTION, SOLUTION INTRAVENOUS; SUBCUTANEOUS at 12:15

## 2020-12-22 RX ADMIN — MEROPENEM 500 MG: 500 INJECTION, POWDER, FOR SOLUTION INTRAVENOUS at 18:00

## 2020-12-22 RX ADMIN — PIPERACILLIN SODIUM AND TAZOBACTAM SODIUM 3.38 G: 3; .375 INJECTION, POWDER, LYOPHILIZED, FOR SOLUTION INTRAVENOUS at 12:00

## 2020-12-22 RX ADMIN — INSULIN LISPRO 10 UNITS: 100 INJECTION, SOLUTION INTRAVENOUS; SUBCUTANEOUS at 18:00

## 2020-12-22 RX ADMIN — INSULIN LISPRO 5 UNITS: 100 INJECTION, SOLUTION INTRAVENOUS; SUBCUTANEOUS at 06:12

## 2020-12-22 RX ADMIN — MIDAZOLAM HYDROCHLORIDE 2 MG: 2 INJECTION, SOLUTION INTRAMUSCULAR; INTRAVENOUS at 10:40

## 2020-12-22 RX ADMIN — HYDROCORTISONE SODIUM SUCCINATE 50 MG: 100 INJECTION, POWDER, FOR SOLUTION INTRAMUSCULAR; INTRAVENOUS at 11:00

## 2020-12-22 RX ADMIN — INSULIN GLARGINE 10 UNITS: 100 INJECTION, SOLUTION SUBCUTANEOUS at 21:02

## 2020-12-22 RX ADMIN — FENTANYL CITRATE 100 MCG: 50 INJECTION INTRAMUSCULAR; INTRAVENOUS at 11:30

## 2020-12-22 RX ADMIN — FENTANYL CITRATE 100 MCG: 50 INJECTION, SOLUTION INTRAMUSCULAR; INTRAVENOUS at 11:00

## 2020-12-22 RX ADMIN — Medication 3 MG: at 22:00

## 2020-12-22 RX ADMIN — FENTANYL CITRATE 100 MCG: 50 INJECTION INTRAMUSCULAR; INTRAVENOUS at 11:00

## 2020-12-22 RX ADMIN — FUROSEMIDE 40 MG: 10 INJECTION, SOLUTION INTRAMUSCULAR; INTRAVENOUS at 21:01

## 2020-12-22 RX ADMIN — Medication 4 MCG/MIN: at 10:23

## 2020-12-22 RX ADMIN — ALBUMIN HUMAN 25 G: 250 SOLUTION INTRAVENOUS at 09:56

## 2020-12-22 RX ADMIN — Medication 150 MCG/HR: at 22:00

## 2020-12-22 RX ADMIN — ATORVASTATIN CALCIUM 40 MG: 40 TABLET, FILM COATED ORAL at 22:00

## 2020-12-22 RX ADMIN — TICAGRELOR 90 MG: 90 TABLET ORAL at 21:00

## 2020-12-22 RX ADMIN — VANCOMYCIN HYDROCHLORIDE 1500 MG: 10 INJECTION, POWDER, LYOPHILIZED, FOR SOLUTION INTRAVENOUS at 13:15

## 2020-12-22 RX ADMIN — INSULIN GLARGINE 10 UNITS: 100 INJECTION, SOLUTION SUBCUTANEOUS at 19:00

## 2020-12-22 RX ADMIN — TICAGRELOR 90 MG: 90 TABLET ORAL at 16:00

## 2020-12-22 RX ADMIN — HYDROCORTISONE SODIUM SUCCINATE 50 MG: 100 INJECTION, POWDER, FOR SOLUTION INTRAMUSCULAR; INTRAVENOUS at 18:00

## 2020-12-22 RX ADMIN — MIDAZOLAM HYDROCHLORIDE 2 MG: 2 INJECTION, SOLUTION INTRAMUSCULAR; INTRAVENOUS at 10:35

## 2020-12-22 RX ADMIN — INSULIN LISPRO 15 UNITS: 100 INJECTION, SOLUTION INTRAVENOUS; SUBCUTANEOUS at 18:00

## 2020-12-22 NOTE — PROGRESS NOTES
Bedside shift change report given to Jess (oncoming nurse) by Sammi Webb (offgoing nurse). Report included the following information Kardex, Intake/Output, MAR, Recent Results and Quality Measures.

## 2020-12-22 NOTE — PROGRESS NOTES
Pt not seen for skilled PT due to:    []  Nausea/vomiting  []  Eating  []  Pain  []  Pt lethargic  []  Off Unit  Other: Pt with RRT and subsequent intubation this morning, will sign-off from caseload at this time, please re-order when medically appropriate. Will f/u later as schedule allows. Thank you.   Hillary Grewal, PT, DPT

## 2020-12-22 NOTE — PROCEDURES
[facility]  Central Line Procedure Note With Ultrasound Guidance    Indication: Need for vasopressors    Emergent procedure. Time out performed. Patient positioned in Trendelenburg. Central line Bundle:  Full sterile barrier precautions used. 7-Step Sterility Protocol followed. (cap, mask sterile gown, sterile gloves, large sterile sheet, hand hygiene, 2% chlorhexidine for cutaneous antisepsis)  5 mL 1% Lidocaine placed at insertion site. Using ultrasound guidance,   Left femoral vein cannulated x 1 attempt(s) utilizing the modified Seldinger technique. Position of guidewire confirmed in vein using ultrasound prior to dilating. Guidewire was removed. Central venous catheter was placed without difficulty. no immediate complications encountered. Good blood return on all 3 ports. Catheter secured & Biopatch applied. Sterile Tegaderm placed. Misti Logan PA-C  12/22/20  Pulmonary, Critical Care Medicine  [facility]     11:17 AM    ICU Staff:  Agree with above and need for central venous and arterial line placement.  I was present at time of line placement    Audrey Collins DO, [address] [facility]  Pulmonary, Critical Care, and Sleep Medicine

## 2020-12-22 NOTE — PROGRESS NOTES
[facility]  [facility]    Patient: Raghav Cardoso Age: 72 y.o. : 1955 MR#: [id number] SSN: [id number]  Date/Time: 2020     Subjective:      RRT called this am. Patient was noted to have irregular rhythm on tele monitor. Nursing checked on patient and found that she was using some accessory muscles of breathing, called RT for an ABG. Ultimately RRT was called due to hypotension and decreased consciousness. She was give Lasix 40 mg IVP, intubated and started on dobutamine drip as well as a levophed drip. She is now being transferred to the ICU for further management. Assessment/Plan:   1. Acute on chronic hypoxic respiratory failure: Status post intubation by anesthesia on ;  CTA chest done  negative for PE but a number of abnormalities noted including advanced fibrosis, extensive groundglass infiltrates possibly due to pulmonary edema, pneumonia, aspiration, pulm hemorrhage and ALI, discussed w/ pulmonary. - extubated afternoon   - re-intubated   2. Acute flash pulmonary edema due to #3: now off lasix, dobutamine discontinued   3. Acute systolic heart failure exacerbation, EF 35%, diuresis per cardiology, limiting factor is her low bp  4. Acute MI with anterolateral STEMI and Q waves: S/p ptca/stent to proximal/ mid LAD using ARELY on DAPT on 20 . On Brillinta. 5. Fever:  None since 12/15;  Etiology unclear. CT imaging  with extensive groundglass infiltrates possibly aspiration, at risk. On broad-spectrum antibiotics   6. Lung lesions on cta chest  7. History of COPD on home oxygen: on po prednisone   8. Diabetes mellitus type 2: Sugars are somewhat within acceptable limits on corrective insulin. 9. Hypertension  10. dementia: Unknown baseline, resume Aricept when able to tolerate p.o. 11. Multiple liver masses: noted on 2020 CT scan. W/u prior to current admission is unclear.      Full code    PLAN:  - CXR pending  - continue Zosyn for possible aspiration  - started on dobutamine drip by cardiology  - titrate levophed to maintain MAP  - Aspiration precautions, NG in place- re-evaluated by speech on - recommendations unchanged for NPO  - continue to wean steroids per PCCM  - continue  ASA, Brilinta  - hold BB due to hypotension  - IV lasix- defer dosing to cardiology- was previously on lesix drip. - Continue SSI, accuchecks, Lantus    Patient transferred to ICU. Discussed with Dr. Efra Page and Dr. Stephanie Gamez. Family updated : updated regarding this morning series of events. Informed that the patient has been intubated again, is back on pressors and is transferred to the ICU.    - Please call sister Carrie Briceno at [phone] for consent for any procedures. Visit Vitals  BP 99/64 (BP 1 Location: Right arm, BP Patient Position: At rest)   Pulse 74   Temp 98.7 °F (37.1 °C)   Resp 22   Ht 5' 7\" (1.702 m)   Wt 63.6 kg (140 lb 4.8 oz)   SpO2 95%   Breastfeeding No   BMI 21.97 kg/m²     Case discussed with:  []Patient  [x]Family  [x]Nursing  []Case Management  DVT Prophylaxis:  [x]Lovenox  []Hep iv  []SCDs  []Coumadin   []Eliquis/Xarelto     Objective:   VS:   Visit Vitals  BP 99/64 (BP 1 Location: Right arm, BP Patient Position: At rest)   Pulse 74   Temp 98.7 °F (37.1 °C)   Resp 22   Ht 5' 7\" (1.702 m)   Wt 63.6 kg (140 lb 4.8 oz)   SpO2 95%   Breastfeeding No   BMI 21.97 kg/m²      Tmax/24hrs: Temp (24hrs), Av °F (36.7 °C), Min:97.4 °F (36.3 °C), Max:98.7 °F (37.1 °C)  IOBRIEF    Intake/Output Summary (Last 24 hours) at 2020 1033  Last data filed at 2020 2326  Gross per 24 hour   Intake 100 ml   Output    Net 100 ml       General: intubated  Pulmonary: + ET, coarse breath sounds  Cardiovascular: tachycardia, minimal peripheral edema  GI:  Soft, Non distended, Non tender. + Bowel sounds. + means   Extremities:  No edema.      Medications:   Current Facility-Administered Medications Medication Dose Route Frequency    furosemide (LASIX) injection 40 mg  40 mg IntraVENous ONCE    albumin human 25% (BUMINATE) solution 25 g  25 g IntraVENous ONCE    furosemide (LASIX) 10 mg/mL injection        albumin human 25% (BUMINATE) 25 % solution        DOBUTamine (DOBUTREX) 500 mg/250 mL (2,000 mcg/mL) infusion  0-10 mcg/kg/min IntraVENous TITRATE    NOREPINephrine (LEVOPHED) 8 mg in 5% dextrose 250mL (32 mcg/mL) infusion  0.5-50 mcg/min IntraVENous TITRATE    0.9% sodium chloride infusion 250 mL  250 mL IntraVENous ONCE    fentaNYL (PF) 900 mcg/30 ml infusion soln  0-200 mcg/hr IntraVENous TITRATE    dexmedeTOMidine (PRECEDEX) 400 mcg in 0.9% sodium chloride 100 mL infusion  0.1-1.5 mcg/kg/hr IntraVENous TITRATE    chlorhexidine (PERIDEX) 0.12 % mouthwash 10 mL  10 mL Oral Q12H    insulin glargine (LANTUS) injection 30 Units  30 Units SubCUTAneous DAILY    insulin glargine (LANTUS) injection 10 Units  10 Units SubCUTAneous QHS    insulin lispro (HUMALOG) injection 5 Units  5 Units SubCUTAneous Q6H    predniSONE (DELTASONE) tablet 30 mg  30 mg Oral DAILY WITH BREAKFAST    [Held by provider] carvediloL (COREG) tablet 6.25 mg  6.25 mg Oral Q12H    [Held by provider] lisinopriL (PRINIVIL, ZESTRIL) tablet 5 mg  5 mg Oral DAILY    furosemide (LASIX) injection 40 mg  40 mg IntraVENous Q12H    cefTRIAXone (ROCEPHIN) 2 g in sterile water (preservative free) 20 mL IV syringe  2 g IntraVENous Q24H    polyethylene glycol (MIRALAX) packet 17 g  17 g Per NG tube DAILY    spironolactone (ALDACTONE) tablet 25 mg  25 mg Oral DAILY    multivit-folic acid-herbal 054 (WELLESSE PLUS) oral liquid 30 mL  30 mL Per NG tube DAILY    insulin lispro (HUMALOG) injection   SubCUTAneous Q6H    fentaNYL citrate (PF) injection  mcg   mcg IntraVENous Q2H PRN    sodium chloride (NS) flush 5-40 mL  5-40 mL IntraVENous PRN    ticagrelor (BRILINTA) tablet 90 mg  90 mg Per NG tube BID    sodium chloride (NS) flush 5-40 mL  5-40 mL IntraVENous PRN    albuterol-ipratropium (DUO-NEB) 2.5 MG-0.5 MG/3 ML  3 mL Nebulization Q4H PRN    melatonin tablet 3 mg  3 mg Oral QHS    famotidine (PF) (PEPCID) 20 mg in 0.9% sodium chloride 10 mL injection  20 mg IntraVENous Q12H    midazolam (VERSED) injection 1-2 mg  1-2 mg IntraVENous Q1H PRN    sodium chloride (NS) flush 5-40 mL  5-40 mL IntraVENous Q8H    sodium chloride (NS) flush 5-40 mL  5-40 mL IntraVENous PRN    acetaminophen (TYLENOL) tablet 650 mg  650 mg Oral Q6H PRN    Or    acetaminophen (TYLENOL) suppository 650 mg  650 mg Rectal Q6H PRN    promethazine (PHENERGAN) tablet 12.5 mg  12.5 mg Oral Q6H PRN    Or    ondansetron (ZOFRAN) injection 4 mg  4 mg IntraVENous Q6H PRN    glucose chewable tablet 16 g  4 Tab Oral PRN    glucagon (GLUCAGEN) injection 1 mg  1 mg IntraMUSCular PRN    dextrose (D50W) injection syrg 12.5-25 g  25-50 mL IntraVENous PRN    aspirin chewable tablet 81 mg  81 mg Oral DAILY    atorvastatin (LIPITOR) tablet 40 mg  40 mg Oral QHS       Labs:    Recent Results (from the past 12 hour(s))   GLUCOSE, POC    Collection Time: 12/21/20 11:33 PM   Result Value Ref Range    Glucose (POC) 182 (H) 70 - 289 mg/dL   METABOLIC PANEL, BASIC    Collection Time: 12/22/20 12:49 AM   Result Value Ref Range    Sodium 150 (H) 136 - 145 mmol/L    Potassium 3.6 3.5 - 5.5 mmol/L    Chloride 106 100 - 111 mmol/L    CO2 38 (H) 21 - 32 mmol/L    Anion gap 6 3.0 - 18 mmol/L    Glucose 214 (H) 74 - 99 mg/dL    BUN 56 (H) 7.0 - 18 MG/DL    Creatinine 0.84 0.6 - 1.3 MG/DL    BUN/Creatinine ratio 67 (H) 12 - 20      GFR est AA >60 >60 ml/min/1.73m2    GFR est non-AA >60 >60 ml/min/1.73m2    Calcium 10.1 8.5 - 10.1 MG/DL   MAGNESIUM    Collection Time: 12/22/20 12:49 AM   Result Value Ref Range    Magnesium 2.9 (H) 1.6 - 2.6 mg/dL   PHOSPHORUS    Collection Time: 12/22/20 12:49 AM   Result Value Ref Range    Phosphorus 3.2 2.5 - 4.9 MG/DL   CBC WITH AUTOMATED DIFF Collection Time: 12/22/20 12:49 AM   Result Value Ref Range    WBC 19.2 (H) 4.6 - 13.2 K/uL    RBC 4.58 4.20 - 5.30 M/uL    HGB 12.5 12.0 - 16.0 g/dL    HCT 40.6 35.0 - 45.0 %    MCV 88.6 74.0 - 97.0 FL    MCH 27.3 24.0 - 34.0 PG    MCHC 30.8 (L) 31.0 - 37.0 g/dL    RDW 13.3 11.6 - 14.5 %    PLATELET 521 (H) 192 - 420 K/uL    MPV 10.4 9.2 - 11.8 FL    NEUTROPHILS 80 (H) 42 - 75 %    BAND NEUTROPHILS 8 (H) 0 - 5 %    LYMPHOCYTES 10 (L) 20 - 51 %    MONOCYTES 2 2 - 9 %    EOSINOPHILS 0 0 - 5 %    BASOPHILS 0 0 - 3 %    ABS. NEUTROPHILS 16.9 (H) 1.8 - 8.0 K/UL    ABS. LYMPHOCYTES 1.9 0.8 - 3.5 K/UL    ABS. MONOCYTES 0.4 0 - 1.0 K/UL    ABS. EOSINOPHILS 0.0 0.0 - 0.4 K/UL    ABS. BASOPHILS 0.0 0.0 - 0.06 K/UL    DF AUTOMATED      PLATELET COMMENTS Increased Platelets      RBC COMMENTS NORMOCYTIC, NORMOCHROMIC     GLUCOSE, POC    Collection Time: 12/22/20  5:55 AM   Result Value Ref Range    Glucose (POC) 252 (H) 70 - 110 mg/dL   GLUCOSE, POC    Collection Time: 12/22/20  9:40 AM   Result Value Ref Range    Glucose (POC) 207 (H) 70 - 110 mg/dL   POC G3    Collection Time: 12/22/20  9:59 AM   Result Value Ref Range    Device: NASAL CANNULA      Flow rate (POC) 5 L/M    FIO2 (POC) 0.44 %    pH (POC) 7.40 7.35 - 7.45      pCO2 (POC) 66.3 (H) 35.0 - 45.0 MMHG    pO2 (POC) 64 (L) 80 - 100 MMHG    HCO3 (POC) 40.7 (H) 22 - 26 MMOL/L    sO2 (POC) 91 (L) 92 - 97 %    Base excess (POC) 16 mmol/L    Allens test (POC) N/A      Total resp. rate 25      Site RIGHT BRACHIAL      Patient temp. 98.4      Specimen type (POC) ARTERIAL      Performed by Donna Dawn        Signed By: Praveena Cosby MD     December 22, 2020      Disclaimer: Sections of this note are dictated using utilizing voice recognition software. Minor typographical errors may be present. If questions arise, please do not hesitate to contact me or call our department.

## 2020-12-22 NOTE — CONSULTS
[facility]  Pulmonary, Critical Care, and Sleep Medicine      Name: Yessy Cain MRN: [id number]   : 1955 Hospital: [address] [place] [address] Dr   Date: 2020          Critical Care Initial Patient Consult    Requesting MD:  Dr. Shonna Lamb                     Reason for CC Consult: Acute on chronic hypoxic respiratory failure    IMPRESSION:   · Acute on chronic hypoxic and hypercapnic respiratory failure - requiring mechanical ventilation, extubated on  and reintubated on   · Acute on chronic systolic heart failure - in the setting of severe ILD, Echo 12/10/20 EF 35-40%, diuresis per cards (Dr. Antonina Tinoco)  · Septic shock likely source Aspiration PNA/Pneumonitis vs UTI- on pressors, on vanc and zosyn  · Acute encephalopathy - likely toxic/metabolic vs infectious vs hepatic in nature, ammonia pending   · Acute flash pulmonary edema 2/ to chronic heart failure - dobutamine D/C'ed on  and restarted on , pro-BNP trending up on   · Severe pulmonary fibrosis - with extensive honeycombing and bronchiectasis as seen on CTA chest 20 - appears to be a new diagnosis  · Acute cystitis - (+)E. coli  · Lactic acidosis - initial 2.4  · Acute MI with anterolateral STEMI and Q waves - s/p ptca/stent to proximal/mid LAD using ARELY on DAPT on 20- Remains on Brilinta  · Severe dysphagia - failed MBS, poor candidate for G-tube 2/ Brilinta per GI (Dr. Fatoumata Rinaldi)  · Hx of Hep C: + RNA viral load 2020  · Multiple liver masses - noted on CT scan 20  · Hx of COPD - on home O2 4L NC  · Hx of HTN  · Hx of DM  · Hx of dementia - unknown baseline, on aricept      RECOMMENDATIONS:   Neuro: Titrate sedation to RASS of 0 to -1. PRN for breakthrough sedation needs. Pulm: Titrate FiO2 for goal SPO2> 90%,VAP prevention bundle, head of the bed at 30' all times. Daily sedation holiday and assessment for weaning with SBT as tolerated. PRN duonebs. Keep HOB > 30' at all times. Aspiration Precautions. CVS Monitor CVP, Actively titrate vasopressors aim MAP >65mmHg, Check cardiac panel, ECHO 12/10/20 EF 35-40%. Continue dobutamine per cards. GI: SUP, Trend LFTs, Zofran PRN for N/V, NPO  Renal:  Trend Renal indices, Strict Is/Os, Pro (+)  Hem/Onc: Daily CBC. Monitor for s/o active bleeding. I/D:Sepsis bundle per hospital protocol, Blood and sputum cultures drawn and will be followed. Lactic acid ordered- initial and repeat Q4hrs till normalized. Antibiotics:Vancomycin and Zosyn. Trend WBCs and temperature curve. Procal pending  Endocrine: Q6 glucoses, SSI. Metabolic:  Daily BMP, mag, phos. Trend lytes, replace as needed. Musc/Skin: no acute issues, wound care  Full Code  Discussed w/ attending physician      Subjective/History: This patient has been seen and evaluated at the request of Dr. Orlando Metcalf for acute on chronic hypoxic and hypercapnic respiratory failure. Patient is a 72 y.o. female with a past medical history of COPD, CHF, HTN, DM, dementia, presented to [facility] with acute on chronic systolic heart failure, acute MI with anterolateral STEMI and Q waves s/p ptca/stent to proximal/mid LAD on 12/11/20. Pt was intubated and extubated on 12/17/20, transferred to . On 12/22/20, RRT was called - pt was tachypneic, using accessory muscles, obtunded, and hypotensive. Pt was subsequently intubated, started on levophed and transferred to the ICU emergently. Initial ABG - 7.40, 66.3, 64, 40.7. Dobutamine was restarted per cards. Upon my initial evaluation, pt is vented, hypotensive with SBP in the 70s, on levophed 4mcg via PIV, ETT suctioning noted to have copious amount of thick secretions (appears to be tube feeds). R fem CVL and A line placed emergently. Levophed and neosynephrine gtt initiated. Labs, CXR and sedation ordered. Dr. Orlando Metcalf called and updated family. The patient is critically ill and can not provide additional history due to Ventilated.      Past Medical History: Diagnosis Date    Arthritis     Asthma     Chronic pain     BACK PAIN    Diabetes (HCC)     Diabetic neuropathy (HCC)     Emphysema     Hepatitis C     Hypertension     Nervousness     Osteoarthritis of both knees       Past Surgical History:   Procedure Laterality Date    HX CARPAL TUNNEL RELEASE Right 8/31/09    Dr. Imelda Christensen        Prior to Admission medications    Medication Sig Start Date End Date Taking? Authorizing Provider   folic acid (FOLVITE) 1 mg tablet 1 Tab by Per NG tube route daily. 2/15/18   Quoc Vines MD   insulin glargine (LANTUS) 100 unit/mL injection lantus 22 units subq daily 2/15/18   Quoc Vines MD   magnesium oxide (MAG-OX) 400 mg tablet Take 1 Tab by mouth two (2) times a day. 2/14/18   Quoc Vines MD   tamsulosin (FLOMAX) 0.4 mg capsule Take 1 tab by mouth daily until kidney stone passes. 2/5/18   Gen Villa DO   amLODIPine (NORVASC) 5 mg tablet Take 5 mg by mouth daily. Indications: HYPERTENSION    Provider, Historical   aspirin (ASPIRIN) 325 mg tablet Take 325 mg by mouth daily. Provider, Historical   VITAMIN E (FORMULA E 400 PO) Take 1 Tab by mouth daily. Provider, Historical   losartan (COZAAR) 50 mg tablet Take  by mouth daily. Indications: HYPERTENSION    Provider, Historical   Melatonin 300 mcg Tab Take  by mouth. Provider, Historical   cyanocobalamin (VITAMIN B-12) 1,500 mcg TbER Take 1 Tab by mouth.       Provider, Historical     Current Facility-Administered Medications   Medication Dose Route Frequency    DOBUTamine (DOBUTREX) 500 mg/250 mL (2,000 mcg/mL) infusion  0-10 mcg/kg/min IntraVENous TITRATE    NOREPINephrine (LEVOPHED) 8 mg in 5% dextrose 250mL (32 mcg/mL) infusion  0.5-50 mcg/min IntraVENous TITRATE    fentaNYL (PF) 900 mcg/30 ml infusion soln  0-200 mcg/hr IntraVENous TITRATE    chlorhexidine (PERIDEX) 0.12 % mouthwash 10 mL  10 mL Oral Q12H    piperacillin-tazobactam (ZOSYN) 3.375 g in 0.9% sodium chloride (MBP/ADV) 100 mL MBP  3.375 g IntraVENous Q6H    hydrocortisone Sod Succ (PF) (SOLU-CORTEF) injection 50 mg  50 mg IntraVENous Q6H    fentaNYL citrate (PF) 50 mcg/mL injection        midazolam (VERSED) 1 mg/mL injection        PHENYLephrine (NEOSYNEPHRINE) 30 mg/ 250 ml infusion        fentaNYL citrate (PF) 50 mcg/mL injection        vancomycin (VANCOCIN) 1500 mg in  ml infusion  1,500 mg IntraVENous ONCE    vasopressin (VASOSTRICT) 20 Units in 0.9% sodium chloride 100 mL infusion  0-0.03 Units/min IntraVENous CONTINUOUS    insulin glargine (LANTUS) injection 30 Units  30 Units SubCUTAneous DAILY    insulin glargine (LANTUS) injection 10 Units  10 Units SubCUTAneous QHS    insulin lispro (HUMALOG) injection 5 Units  5 Units SubCUTAneous Q6H    [Held by provider] predniSONE (DELTASONE) tablet 30 mg  30 mg Oral DAILY WITH BREAKFAST    [Held by provider] carvediloL (COREG) tablet 6.25 mg  6.25 mg Oral Q12H    [Held by provider] lisinopriL (PRINIVIL, ZESTRIL) tablet 5 mg  5 mg Oral DAILY    furosemide (LASIX) injection 40 mg  40 mg IntraVENous Q12H    polyethylene glycol (MIRALAX) packet 17 g  17 g Per NG tube DAILY    [Held by provider] spironolactone (ALDACTONE) tablet 25 mg  25 mg Oral DAILY    multivit-folic acid-herbal 897 (WELLESSE PLUS) oral liquid 30 mL  30 mL Per NG tube DAILY    insulin lispro (HUMALOG) injection   SubCUTAneous Q6H    ticagrelor (BRILINTA) tablet 90 mg  90 mg Per NG tube BID    melatonin tablet 3 mg  3 mg Oral QHS    famotidine (PF) (PEPCID) 20 mg in 0.9% sodium chloride 10 mL injection  20 mg IntraVENous Q12H    sodium chloride (NS) flush 5-40 mL  5-40 mL IntraVENous Q8H    aspirin chewable tablet 81 mg  81 mg Oral DAILY    atorvastatin (LIPITOR) tablet 40 mg  40 mg Oral QHS     No Known Allergies   Social History     Tobacco Use    Smoking status: Current Every Day Smoker     Packs/day: 1.00   Substance Use Topics    Alcohol use:  Yes Comment: socially      Family History   Problem Relation Age of Onset    Diabetes Mother     Hypertension Mother     Obesity Mother     Alcohol abuse Father     High Cholesterol Father     Lung Disease Father     Stroke Father     Arthritis-osteo Sister     Depression Sister     Diabetes Sister     Hypertension Sister     Obesity Sister     Arthritis-osteo Brother     Diabetes Brother     Hypertension Brother     Obesity Brother         Review of Systems:  Review of systems not obtained due to patient factors. Objective:   Vital Signs:    Visit Vitals  BP 99/64 (BP 1 Location: Right arm, BP Patient Position: At rest)   Pulse 69   Temp 98.7 °F (37.1 °C)   Resp 18   Ht 5' 7\" (1.702 m)   Wt 63.6 kg (140 lb 4.8 oz)   SpO2 99%   Breastfeeding No   BMI 21.97 kg/m²       O2 Device: Nasal cannula   O2 Flow Rate (L/min): 5 l/min   Temp (24hrs), Av.1 °F (36.7 °C), Min:97.4 °F (36.3 °C), Max:98.7 °F (37.1 °C)       Intake/Output:   Last shift:      No intake/output data recorded. Last 3 shifts:  1901 -  0700  In: 100   Out: -     Intake/Output Summary (Last 24 hours) at 2020 1131  Last data filed at 2020 2326  Gross per 24 hour   Intake 100 ml   Output    Net 100 ml       Ventilator Settings:  Mode Rate Tidal Volume Pressure FiO2 PEEP   Assist control, VC+   400 ml  8 cm H2O 60 % 5 cm H20     Peak airway pressure: 27 cm H2O    Minute ventilation: 6.77 l/min      ARDS network Guidelines: Lung protective strategy and Pl pressure goals____________      Physical Exam:    General:  Alert, cooperative, no distress, appears stated age. Head:  Normocephalic, without obvious abnormality, atraumatic. Eyes:  Conjunctivae/corneas clear. PERRL, EOMs intact. Back:   Symmetric, no curvature. ROM normal.   Lungs:   (+)coarse breath sounds. Chest wall:  No tenderness or deformity. Heart:  Regular rate and rhythm, S1, S2 normal, no murmur, click, rub or gallop.    Abdomen:   Soft, non-tender. Bowel sounds normal. No masses,  No organomegaly. Extremities: Extremities normal, atraumatic, no cyanosis or edema. + restraints   Pulses: 2+ and symmetric all extremities. Skin: Skin color, texture, turgor normal. No rashes or lesions. Normal cap refill. Neurologic: Sedated, no withdrawal to pain, (+)cough/gag reflex       Data:     Recent Results (from the past 24 hour(s))   GLUCOSE, POC    Collection Time: 12/21/20  6:05 PM   Result Value Ref Range    Glucose (POC) 353 (H) 70 - 110 mg/dL   GLUCOSE, POC    Collection Time: 12/21/20 11:33 PM   Result Value Ref Range    Glucose (POC) 182 (H) 70 - 781 mg/dL   METABOLIC PANEL, BASIC    Collection Time: 12/22/20 12:49 AM   Result Value Ref Range    Sodium 150 (H) 136 - 145 mmol/L    Potassium 3.6 3.5 - 5.5 mmol/L    Chloride 106 100 - 111 mmol/L    CO2 38 (H) 21 - 32 mmol/L    Anion gap 6 3.0 - 18 mmol/L    Glucose 214 (H) 74 - 99 mg/dL    BUN 56 (H) 7.0 - 18 MG/DL    Creatinine 0.84 0.6 - 1.3 MG/DL    BUN/Creatinine ratio 67 (H) 12 - 20      GFR est AA >60 >60 ml/min/1.73m2    GFR est non-AA >60 >60 ml/min/1.73m2    Calcium 10.1 8.5 - 10.1 MG/DL   MAGNESIUM    Collection Time: 12/22/20 12:49 AM   Result Value Ref Range    Magnesium 2.9 (H) 1.6 - 2.6 mg/dL   PHOSPHORUS    Collection Time: 12/22/20 12:49 AM   Result Value Ref Range    Phosphorus 3.2 2.5 - 4.9 MG/DL   CBC WITH AUTOMATED DIFF    Collection Time: 12/22/20 12:49 AM   Result Value Ref Range    WBC 19.2 (H) 4.6 - 13.2 K/uL    RBC 4.58 4.20 - 5.30 M/uL    HGB 12.5 12.0 - 16.0 g/dL    HCT 40.6 35.0 - 45.0 %    MCV 88.6 74.0 - 97.0 FL    MCH 27.3 24.0 - 34.0 PG    MCHC 30.8 (L) 31.0 - 37.0 g/dL    RDW 13.3 11.6 - 14.5 %    PLATELET 740 (H) 946 - 420 K/uL    MPV 10.4 9.2 - 11.8 FL    NEUTROPHILS 80 (H) 42 - 75 %    BAND NEUTROPHILS 8 (H) 0 - 5 %    LYMPHOCYTES 10 (L) 20 - 51 %    MONOCYTES 2 2 - 9 %    EOSINOPHILS 0 0 - 5 %    BASOPHILS 0 0 - 3 %    ABS.  NEUTROPHILS 16.9 (H) 1.8 - 8.0 K/UL ABS. LYMPHOCYTES 1.9 0.8 - 3.5 K/UL    ABS. MONOCYTES 0.4 0 - 1.0 K/UL    ABS. EOSINOPHILS 0.0 0.0 - 0.4 K/UL    ABS. BASOPHILS 0.0 0.0 - 0.06 K/UL    DF AUTOMATED      PLATELET COMMENTS Increased Platelets      RBC COMMENTS NORMOCYTIC, NORMOCHROMIC     GLUCOSE, POC    Collection Time: 12/22/20  5:55 AM   Result Value Ref Range    Glucose (POC) 252 (H) 70 - 110 mg/dL   GLUCOSE, POC    Collection Time: 12/22/20  9:40 AM   Result Value Ref Range    Glucose (POC) 207 (H) 70 - 110 mg/dL   EKG, 12 LEAD, INITIAL    Collection Time: 12/22/20  9:46 AM   Result Value Ref Range    Ventricular Rate 107 BPM    Atrial Rate 107 BPM    P-R Interval 134 ms    QRS Duration 86 ms    Q-T Interval 350 ms    QTC Calculation (Bezet) 467 ms    Calculated P Axis 31 degrees    Calculated R Axis -28 degrees    Calculated T Axis 61 degrees    Diagnosis       Sinus tachycardia  Inferior infarct , age undetermined  Anteroseptal infarct , age undetermined  Abnormal ECG  No previous ECGs available     D DIMER    Collection Time: 12/22/20  9:55 AM   Result Value Ref Range    D DIMER 0.63 (H) <0.46 ug/ml(FEU)   POC G3    Collection Time: 12/22/20  9:59 AM   Result Value Ref Range    Device: NASAL CANNULA      Flow rate (POC) 5 L/M    FIO2 (POC) 0.44 %    pH (POC) 7.40 7.35 - 7.45      pCO2 (POC) 66.3 (H) 35.0 - 45.0 MMHG    pO2 (POC) 64 (L) 80 - 100 MMHG    HCO3 (POC) 40.7 (H) 22 - 26 MMOL/L    sO2 (POC) 91 (L) 92 - 97 %    Base excess (POC) 16 mmol/L    Allens test (POC) N/A      Total resp.  rate 25      Site RIGHT BRACHIAL      Patient temp. 98.4      Specimen type (POC) ARTERIAL      Performed by Manuela García    POC G3    Collection Time: 12/22/20 11:20 AM   Result Value Ref Range    Device: VENT      pH (POC) 7.48 (H) 7.35 - 7.45      pCO2 (POC) 52.8 (H) 35.0 - 45.0 MMHG    pO2 (POC) 210 (H) 80 - 100 MMHG    HCO3 (POC) 39.4 (H) 22 - 26 MMOL/L    sO2 (POC) 100 (H) 92 - 97 %    Base excess (POC) 16 mmol/L    Mode ASSIST CONTROL Tidal volume 40 ml    Set Rate 18 bpm    PEEP/CPAP (POC) 5 cmH2O    PIP (POC) 26      Allens test (POC) N/A      Inspiratory Time 0.9 sec    Total resp. rate 18      Site DRAWN FROM ARTERIAL LINE      Patient temp.  97.7      Specimen type (POC) ARTERIAL      Performed by Capqi Penn     Volume control YES             4/4/2020  7:17 PM - Fer, [facility]    [facility] Ref Range Units Status   HCV RNA, PCR, QT [id number]  High    IU/mL Final   HCV RNA, PCR QT 6.51  High    log IU/mL Final   Comment:       Lab Results   Component Value Date/Time    NT pro-BNP 3,614 (H) 12/22/2020 09:55 AM    NT pro-BNP 2,099 (H) 12/15/2020 07:45 PM    NT pro-BNP 6,465 (H) 12/12/2020 06:20 AM    NT pro-BNP 5,307 (H) 12/11/2020 01:35 PM    NT pro-BNP 5,306 (H) 02/07/2018 09:47 PM     Lab Results   Component Value Date/Time    CK 48 12/22/2020 09:55 AM    CK - MB 1.9 12/22/2020 09:55 AM    CK-MB Index 4.0 12/22/2020 09:55 AM    Troponin-I, QT 0.12 (H) 12/22/2020 09:55 AM     MICRO:  Results     Procedure Component Value Units Date/Time    CULTURE, BLOOD [[id number]]     Order Status: Sent Specimen: Blood     CULTURE, RESPIRATORY/SPUTUM/BRONCH Soraida Sprout STAIN [[id number]]     Order Status: Sent Specimen: Sputum,ET Suction     CULTURE, BLOOD [[id number]]     Order Status: Sent Specimen: Blood     CULTURE, BLOOD [[id number]] Collected: 12/16/20 1535    Order Status: Completed Specimen: Blood Updated: 12/22/20 0654     Special Requests: NO SPECIAL REQUESTS        Culture result: NO GROWTH 6 DAYS       CULTURE, BLOOD [[id number]] Collected: 12/16/20 0200    Order Status: Completed Specimen: Blood Updated: 12/22/20 0654     Special Requests: NO SPECIAL REQUESTS        Culture result: NO GROWTH 6 DAYS       CULTURE, RESPIRATORY/SPUTUM/BRONCH [place] Sprout STAIN [[id number]]  (Abnormal)  (Susceptibility) Collected: 12/16/20 0050    Order Status: Completed Specimen: Endotracheal aspirate Updated: 12/18/20 1529     Special Requests: NO SPECIAL REQUESTS        GRAM STAIN NO WBC'S SEEN         NO ORGANISMS SEEN        Culture result:       SCANT ESCHERICHIA COLI ** (EXTENDED SPECTRUM BETA LACTAMASE ) **                  SCANT NORMAL RESPIRATORY AILEEN          Susceptibility      Escherichia coli     MULU     Amikacin ($) Susceptible     Ampicillin ($) Resistant     Ampicillin/sulbactam ($) Susceptible     Cefazolin ($) Resistant     Cefepime ($$) Resistant     Cefoxitin Intermediate     Ceftazidime ($) Resistant     Ceftriaxone ($) Resistant     Ciprofloxacin ($) Resistant     Gentamicin ($) Susceptible     Levofloxacin ($) Resistant     Meropenem ($$) Susceptible     Piperacillin/Tazobac ($) Susceptible     Tobramycin ($) Susceptible     Trimeth/Sulfa Resistant                    CULTURE, URINE [[id number]]  (Abnormal)  (Susceptibility) Collected: 12/14/20 1400    Order Status: Completed Specimen: Cath Urine Updated: 12/17/20 1200     Special Requests: NO SPECIAL REQUESTS        [place] Count --        >100,000  COLONIES/mL       Culture result: ESCHERICHIA COLI       Susceptibility      Escherichia coli     MULU     Amikacin ($) Susceptible     Ampicillin ($) Susceptible     Ampicillin/sulbactam ($) Susceptible     Cefazolin ($) Susceptible     Cefepime ($$) Susceptible     Cefoxitin Susceptible     Ceftazidime ($) Susceptible     Ceftriaxone ($) Susceptible     Ciprofloxacin ($) Resistant     Gentamicin ($) Susceptible     Levofloxacin ($) Resistant     Meropenem ($$) Susceptible     Nitrofurantoin Susceptible     Piperacillin/Tazobac ($) Susceptible     Tobramycin ($) Susceptible     Trimeth/Sulfa Susceptible                    CULTURE, BLOOD [[id number]] Collected: 12/11/20 1530    Order Status: Completed Specimen: Blood Updated: 12/17/20 0649     Special Requests: NO SPECIAL REQUESTS        Culture result: NO GROWTH 6 DAYS       CULTURE, URINE [[id number]]  (Abnormal) Collected: 12/11/20 1018    Order Status: Completed Specimen: Cath Urine Updated:  12/12/20 1609     Special Requests: NO SPECIAL REQUESTS        [place] Count --        1000  COLONIES/mL       Culture result: GRAM NEGATIVE RODS               Telemetry:normal sinus rhythm    EKG Results     Procedure 720 Value Units Date/Time    EKG, 12 LEAD, INITIAL [[id number]] Collected: 12/22/20 0946    Order Status: Completed Updated: 12/22/20 1237     Ventricular Rate 107 BPM      Atrial Rate 107 BPM      P-R Interval 134 ms      QRS Duration 86 ms      Q-T Interval 350 ms      QTC Calculation (Bezet) 467 ms      Calculated P Axis 31 degrees      Calculated R Axis -28 degrees      Calculated T Axis 61 degrees      Diagnosis --     Sinus tachycardia  Inferior infarct , age undetermined  Anteroseptal infarct , age undetermined  Abnormal ECG  No previous ECGs available  Confirmed by Helena Willard ([id number]) on 12/22/2020 12:37:24 PM      EKG, 12 LEAD, SUBSEQUENT [[id number]] Collected: 12/15/20 1933    Order Status: Completed Updated: 12/16/20 0956     Ventricular Rate 138 BPM      Atrial Rate 138 BPM      P-R Interval 124 ms      QRS Duration 86 ms      Q-T Interval 292 ms      QTC Calculation (Bezet) 442 ms      Calculated P Axis -68 degrees      Calculated R Axis 67 degrees      Calculated T Axis 29 degrees      Diagnosis --     Unusual P axis and short [place], probable junctional tachycardia  Possible Inferior infarct (cited on or before 07-FEB-2018)  Anterolateral infarct (cited on or before 10-DEC-2020)  Abnormal ECG  When compared with ECG of 12-DEC-2020 07:54,  Junctional rhythm has replaced Sinus rhythm  Serial changes of Anterior infarct present  Confirmed by Melia Kelly M.D., [address] [place] [address] [id number]) on 12/16/2020 9:56:00 AM      EKG, 12 LEAD, SUBSEQUENT [[id number]] Collected: 12/11/20 1018    Order Status: Completed Updated: 12/12/20 2039     Ventricular Rate 93 BPM      Atrial Rate 93 BPM      P-R Interval 154 ms      QRS Duration 88 ms      Q-T Interval 412 ms      QTC Calculation (Bezet) 512 ms      Calculated P Axis 40 degrees      Calculated R Axis -120 degrees      Calculated T Axis 71 degrees      Diagnosis --     Normal sinus rhythm  Right superior axis deviation  Inferior infarct (cited on or before 07-FEB-2018)  Anteroseptal infarct (cited on or before 10-DEC-2020)  Lateral injury pattern  Prolonged QT  Abnormal ECG  When compared with ECG of 11-DEC-2020 10:18,  premature ventricular complexes are no longer present  premature atrial complexes are no longer present  Serial changes of Anteroseptal infarct present  Confirmed by Kat Mendoza [id number]) on 12/12/2020 8:39:42 PM      EKG, 12 LEAD, INITIAL [[id number]] Collected: 12/12/20 0754    Order Status: Completed Updated: 12/12/20 2034     Ventricular Rate 95 BPM      Atrial Rate 95 BPM      P-R Interval 162 ms      QRS Duration 86 ms      Q-T Interval 380 ms      QTC Calculation (Bezet) 477 ms      Calculated P Axis 47 degrees      Calculated R Axis -118 degrees      Calculated T Axis 50 degrees      Diagnosis --     Normal sinus rhythm  Right superior axis deviation  Inferior infarct (cited on or before 07-FEB-2018)  Anteroseptal infarct (cited on or before 10-DEC-2020)  T wave abnormality, consider lateral ischemia  Abnormal ECG  When compared with ECG of 11-DEC-2020 14:18,  Serial changes of Anteroseptal infarct present  Confirmed by Kat Mendoza ([id number]) on 12/12/2020 8:34:21 PM      EKG, 12 LEAD, SUBSEQUENT [[id number]] Collected: 12/11/20 0915    Order Status: Completed Updated: 12/12/20 2020     Ventricular Rate 88 BPM      Atrial Rate 88 BPM      P-R Interval 146 ms      QRS Duration 90 ms      Q-T Interval 350 ms      QTC Calculation (Bezet) 423 ms      Calculated P Axis 45 degrees      Calculated R Axis -174 degrees      Calculated T Axis 35 degrees      Diagnosis --     Normal sinus rhythm  Inferior infarct (cited on or before 07-FEB-2018)  Anterolateral infarct (cited on or before 10-DEC-2020)  Abnormal ECG  When compared with ECG of 10-DEC-2020 12:52,  Questionable change in QRS axis  Confirmed by Kaden Raymundo ([id number]) on 12/12/2020 8:20:37 PM      EKG, 12 LEAD, SUBSEQUENT [[id number]]     Order Status: Canceled     EKG, 12 LEAD, INITIAL [[id number]] Collected: 12/10/20 1252    Order Status: Completed Updated: 12/10/20 1524     Ventricular Rate 82 BPM      Atrial Rate 82 BPM      P-R Interval 158 ms      QRS Duration 88 ms      Q-T Interval 370 ms      QTC Calculation (Bezet) 432 ms      Calculated P Axis 51 degrees      Calculated R Axis -58 degrees      Calculated T Axis 30 degrees      Diagnosis --     Normal sinus rhythm  Possible Left atrial enlargement  Left axis deviation  Inferior infarct (cited on or before 07-FEB-2018)  Anterolateral infarct (cited on or before 07-FEB-2018)  ** ** ACUTE MI / STEMI ** **  Abnormal ECG  When compared with ECG of 10-DEC-2020 11:01,  QRS axis shifted left  ST more elevated in Lateral leads  QT has lengthened  Confirmed by Duane Hemp ([id number]) on 12/10/2020 3:24:05 PM                Imaging:  I have personally reviewed the patients radiographs and have reviewed the reports:  CXR Results  (Last 48 hours)    None        CXR Results  (Last 48 hours)               12/22/20 1140  XR CHEST PORT Final result    Impression:   IMPRESSION:       1. Sequela of worsened moderate congestion. Tubes and catheters as above. Follow-up with plain imaging. Narrative:  HISTORY: Respiratory failure. Intubation. EXAM: Chest.       TECHNIQUE: Single view portable supine chest.        COMPARISON: 12/22/2020. FINDINGS: Endotracheal tube placement is demonstrated with its tip projecting   approximately 3.7 cm above the chace. Unchanged position of nasogastric tube. Tip is projecting in the region of the   gastric body.         Worsened aeration of bilateral hemithoraces is demonstrated with moderate   bilateral diffuse interstitial prominence and patchy perihilar and bibasilar   opacities without hemothorax or pleural effusions. Heart is enlarged without   change. . Visualized bony thorax and soft tissues are within normal limits. 12/22/20 1010  XR CHEST PORT Final result    Impression:   IMPRESSION:       1. Endotracheal tube has been removed. Otherwise unchanged exam. Follow-up with   plain imaging. Narrative:  HISTORY: Respiratory failure. EXAM: Chest.       TECHNIQUE: Single view portal AP portable semiupright chest.        COMPARISON: 12/15/2020       FINDINGS: Endotracheal tube has been removed. Essentially unchanged aeration of bilateral hemithoraces is demonstrated with   moderate bilateral diffuse interstitial prominence and associated perihilar and   bibasilar opacities without hemothorax or pleural effusions. Unchanged position of the nasogastric tube. Heart and mediastinal structures are unchanged. Heart is enlarged. . Visualized   bony thorax and soft tissues are within normal limits. Total critical care time exclusive of procedures: 60 minutes  Rosaura Boyd PA-C  12/22/20  Pulmonary, Critical Care Medicine  [facility]         [employer] [facility] Staff Addendum     I have independently evaluated the patient and reviewed the patient's chart. I have discussed the findings and care plan with ICU Care Team. Care Plan reviewed on ICU milti-D rounds. I agree with the above evaluation, assessment and recommendations along with the following comments and observations. Please also review my orders. Patient was a RR earlier today due to worsening respiratory status with hypoxia and hypotension. Suspect component of distributive and possible cardiogenic shock. Patient with reported aspiration event. RT reports suctioning mucus and secretions form her airway. Patient intubated on the floor and transferred to the ICU for ongoing care and management. Cultures sent.   Patient started on broad spectrum antibiotics. Switch Zosyn for Merropenum due to E. Coli ESBL in sputum from 12/16/2020. Urgent line placed for IV pressor support and more definitive IV access. Trend BNP and cardiac markers. Clinical picture continues to evolve. Spoke with Cardiology today who wants to continue wit antiplatelet therapy due to recent LAD stent for STEMI          Complex decision making was made in the evaluation and management plans during this consultation. More than 50% of time was spent in counseling and coordination of care including review of data and discussion with other team members. Further recommendations and therapies pending clinical course.     Critical Care and time spent coordinating care, minus procedure time:  50 min    Aleisha Sullivan DO, ANURADHAP  Pulmonary, Sleep and Critical Care Medicine  4:32 PM

## 2020-12-22 NOTE — PROGRESS NOTES
Problem: Pain  Goal: *Control of Pain  Outcome: Progressing Towards Goal  Goal: *PALLIATIVE CARE:  Alleviation of Pain  Outcome: Progressing Towards Goal     Problem: Patient Education: Go to Patient Education Activity  Goal: Patient/Family Education  Outcome: Progressing Towards Goal     Problem: Diabetes Self-Management  Goal: *Disease process and treatment process  Description: Define diabetes and identify own type of diabetes; list 3 options for treating diabetes. Outcome: Progressing Towards Goal  Goal: *Incorporating nutritional management into lifestyle  Description: Describe effect of type, amount and timing of food on blood glucose; list 3 methods for planning meals. Outcome: Progressing Towards Goal  Goal: *Incorporating physical activity into lifestyle  Description: State effect of exercise on blood glucose levels. Outcome: Progressing Towards Goal  Goal: *Developing strategies to promote health/change behavior  Description: Define the ABC's of diabetes; identify appropriate screenings, schedule and personal plan for screenings. Outcome: Progressing Towards Goal  Goal: *Using medications safely  Description: State effect of diabetes medications on diabetes; name diabetes medication taking, action and side effects. Outcome: Progressing Towards Goal  Goal: *Monitoring blood glucose, interpreting and using results  Description: Identify recommended blood glucose targets  and personal targets. Outcome: Progressing Towards Goal  Goal: *Prevention, detection, treatment of acute complications  Description: List symptoms of hyper- and hypoglycemia; describe how to treat low blood sugar and actions for lowering  high blood glucose level. Outcome: Progressing Towards Goal  Goal: *Prevention, detection and treatment of chronic complications  Description: Define the natural course of diabetes and describe the relationship of blood glucose levels to long term complications of diabetes.   Outcome: Progressing Towards Goal  Goal: *Developing strategies to address psychosocial issues  Description: Describe feelings about living with diabetes; identify support needed and support network  Outcome: Progressing Towards Goal  Goal: *Insulin pump training  Outcome: Progressing Towards Goal  Goal: *Sick day guidelines  Outcome: Progressing Towards Goal  Goal: *Patient Specific Goal (EDIT GOAL, INSERT TEXT)  Outcome: Progressing Towards Goal     Problem: Patient Education: Go to Patient Education Activity  Goal: Patient/Family Education  Outcome: Progressing Towards Goal     Problem: Pressure Injury - Risk of  Goal: *Prevention of pressure injury  Description: Document Pankaj Scale and appropriate interventions in the flowsheet. Outcome: Progressing Towards Goal  Note: Pressure Injury Interventions:  Sensory Interventions: Assess changes in LOC, Assess need for specialty bed, Minimize linen layers, Keep linens dry and wrinkle-free, Turn and reposition approx. every two hours (pillows and wedges if needed)    Moisture Interventions: Absorbent underpads    Activity Interventions: Assess need for specialty bed, Pressure redistribution bed/mattress(bed type)    Mobility Interventions: Assess need for specialty bed    Nutrition Interventions: Document food/fluid/supplement intake    Friction and Shear Interventions: HOB 30 degrees or less, Minimize layers                Problem: Patient Education: Go to Patient Education Activity  Goal: Patient/Family Education  Outcome: Progressing Towards Goal     Problem: Falls - Risk of  Goal: *Absence of Falls  Description: Document Young Fall Risk and appropriate interventions in the flowsheet.   Outcome: Progressing Towards Goal  Note: Fall Risk Interventions:  Mobility Interventions: Bed/chair exit alarm    Mentation Interventions: Adequate sleep, hydration, pain control, Bed/chair exit alarm, More frequent rounding    Medication Interventions: Bed/chair exit alarm    Elimination Interventions: Call light in reach              Problem: Patient Education: Go to Patient Education Activity  Goal: Patient/Family Education  Outcome: Progressing Towards Goal     Problem: Patient Education: Go to Patient Education Activity  Goal: Patient/Family Education  Outcome: Progressing Towards Goal     Problem: Heart Failure: Day 1  Goal: Off Pathway (Use only if patient is Off Pathway)  Outcome: Progressing Towards Goal  Goal: Activity/Safety  Outcome: Progressing Towards Goal  Goal: Consults, if ordered  Outcome: Progressing Towards Goal  Goal: Diagnostic Test/Procedures  Outcome: Progressing Towards Goal  Goal: Nutrition/Diet  Outcome: Progressing Towards Goal  Goal: Discharge Planning  Outcome: Progressing Towards Goal  Goal: Medications  Outcome: Progressing Towards Goal  Goal: Respiratory  Outcome: Progressing Towards Goal  Goal: Treatments/Interventions/Procedures  Outcome: Progressing Towards Goal  Goal: Psychosocial  Outcome: Progressing Towards Goal  Goal: *Oxygen saturation within defined limits  Outcome: Progressing Towards Goal  Goal: *Hemodynamically stable  Outcome: Progressing Towards Goal  Goal: *Optimal pain control at patient's stated goal  Outcome: Progressing Towards Goal  Goal: *Anxiety reduced or absent  Outcome: Progressing Towards Goal     Problem: Heart Failure: Day 2  Goal: Off Pathway (Use only if patient is Off Pathway)  Outcome: Progressing Towards Goal  Goal: Activity/Safety  Outcome: Progressing Towards Goal  Goal: Consults, if ordered  Outcome: Progressing Towards Goal  Goal: Diagnostic Test/Procedures  Outcome: Progressing Towards Goal  Goal: Nutrition/Diet  Outcome: Progressing Towards Goal  Goal: Discharge Planning  Outcome: Progressing Towards Goal  Goal: Medications  Outcome: Progressing Towards Goal  Goal: Respiratory  Outcome: Progressing Towards Goal  Goal: Treatments/Interventions/Procedures  Outcome: Progressing Towards Goal  Goal: Psychosocial  Outcome: Progressing Towards Goal  Goal: *Oxygen saturation within defined limits  Outcome: Progressing Towards Goal  Goal: *Hemodynamically stable  Outcome: Progressing Towards Goal  Goal: *Optimal pain control at patient's stated goal  Outcome: Progressing Towards Goal  Goal: *Anxiety reduced or absent  Outcome: Progressing Towards Goal  Goal: *Demonstrates progressive activity  Outcome: Progressing Towards Goal     Problem: Heart Failure: Day 3  Goal: Off Pathway (Use only if patient is Off Pathway)  Outcome: Progressing Towards Goal  Goal: Activity/Safety  Outcome: Progressing Towards Goal  Goal: Diagnostic Test/Procedures  Outcome: Progressing Towards Goal  Goal: Nutrition/Diet  Outcome: Progressing Towards Goal  Goal: Discharge Planning  Outcome: Progressing Towards Goal  Goal: Medications  Outcome: Progressing Towards Goal  Goal: Respiratory  Outcome: Progressing Towards Goal  Goal: Treatments/Interventions/Procedures  Outcome: Progressing Towards Goal  Goal: Psychosocial  Outcome: Progressing Towards Goal  Goal: *Oxygen saturation within defined limits  Outcome: Progressing Towards Goal  Goal: *Hemodynamically stable  Outcome: Progressing Towards Goal  Goal: *Optimal pain control at patient's stated goal  Outcome: Progressing Towards Goal  Goal: *Anxiety reduced or absent  Outcome: Progressing Towards Goal  Goal: *Demonstrates progressive activity  Outcome: Progressing Towards Goal     Problem: Heart Failure: Day 4  Goal: Off Pathway (Use only if patient is Off Pathway)  Outcome: Progressing Towards Goal  Goal: Activity/Safety  Outcome: Progressing Towards Goal  Goal: Diagnostic Test/Procedures  Outcome: Progressing Towards Goal  Goal: Nutrition/Diet  Outcome: Progressing Towards Goal  Goal: Discharge Planning  Outcome: Progressing Towards Goal  Goal: Medications  Outcome: Progressing Towards Goal  Goal: Respiratory  Outcome: Progressing Towards Goal  Goal: Treatments/Interventions/Procedures  Outcome: Progressing Towards Goal  Goal: Psychosocial  Outcome: Progressing Towards Goal  Goal: *Oxygen saturation within defined limits  Outcome: Progressing Towards Goal  Goal: *Hemodynamically stable  Outcome: Progressing Towards Goal  Goal: *Optimal pain control at patient's stated goal  Outcome: Progressing Towards Goal  Goal: *Anxiety reduced or absent  Outcome: Progressing Towards Goal  Goal: *Demonstrates progressive activity  Outcome: Progressing Towards Goal     Problem: Heart Failure: Day 5  Goal: Off Pathway (Use only if patient is Off Pathway)  Outcome: Progressing Towards Goal  Goal: Activity/Safety  Outcome: Progressing Towards Goal  Goal: Diagnostic Test/Procedures  Outcome: Progressing Towards Goal  Goal: Nutrition/Diet  Outcome: Progressing Towards Goal  Goal: Discharge Planning  Outcome: Progressing Towards Goal  Goal: Medications  Outcome: Progressing Towards Goal  Goal: Respiratory  Outcome: Progressing Towards Goal  Goal: Treatments/Interventions/Procedures  Outcome: Progressing Towards Goal  Goal: Psychosocial  Outcome: Progressing Towards Goal     Problem: Heart Failure: Discharge Outcomes  Goal: *Demonstrates ability to perform prescribed activity without shortness of breath or discomfort  Outcome: Progressing Towards Goal  Goal: *Left ventricular function assessment completed prior to or during stay, or planned for post-discharge  Outcome: Progressing Towards Goal  Goal: *ACEI prescribed if LVEF less than 40% and no contraindications or ARB prescribed  Outcome: Progressing Towards Goal  Goal: *Verbalizes understanding and describes prescribed diet  Outcome: Progressing Towards Goal  Goal: *Verbalizes understanding/describes prescribed medications  Outcome: Progressing Towards Goal  Goal: *Describes available resources and support systems  Description: (eg: Home Health, Palliative Care, Advanced Medical Directive)  Outcome: Progressing Towards Goal  Goal: *Describes smoking cessation resources  Outcome: Progressing Towards Goal  Goal: *Understands and describes signs and symptoms to report to providers(Stroke Metric)  Outcome: Progressing Towards Goal  Goal: *Describes/verbalizes understanding of follow-up/return appt  Description: (eg: to physicians, diabetes treatment coordinator, and other resources  Outcome: Progressing Towards Goal  Goal: *Describes importance of continuing daily weights and changes to report to physician  Outcome: Progressing Towards Goal

## 2020-12-22 NOTE — PROGRESS NOTES
[facility]      CARDIOLOGY PROGRESS NOTE  RECS:      1. Acute respiratory failure- requiring mechanical ventilation- extubated  12/17/20. re intubated 12/22/20. 2. Hypotension- possible septic shock- in the setting aspirations pneumonia vs UTI. - on antibiotics and vasopressor support       3. Subacute MI- 12/10/20 -s/p LHC S/p ptca/stent to proximal/ mid LAD using ARELY.  LVEDP 8 mmHg. Normal PASP pressure with normal PCWP. Moderate to severe LV dysfunction. ekg 12/22/20  NSR w/o  acute ischemic changes. continue to monitor. Continue to trend Troponin. Continue asa and brilinta   4. Acute Systolic Congestive heart Failure-recent echo with EF% 35%-40- mildly decompensated. gently diuresis when stable continue dobutamine for inotropic support. 5. COPD, on home O2 4L NC   6. Hepatitis C ? -Per family member   9. DM2- medications per medical team   8. Dementia - failed swallow eval.  Per GI note - poor candidate at this time for G-tube due to not being able to stop Brilinta. Consider IR consult  for PEG tube   9. Hypernatremia         reintubated secondary to acute respiratory failure likely secondary to aspiration.  ekg no new ischemic changes  Continue DAPT.         Cardiac cath 12/11/20  Patient presented to [address] [place] [address] with late presentation anterior wall MI.  EF 35-40%. Patient was initially managed medically-- however developed acute pulmonary edema requiring intubation. Also troponin level increasing consistent with ongoing ischemia. S/p ptca/stent to proximal/ mid LAD using ARELY. LVEDP 8 mmHg. Normal PASP pressure with normal PCWP. Moderate to severe LV dysfunction.     Echo 12/10/20  · LV: Estimated LVEF is 35 - 40%. Visually measured ejection fraction. Normal cavity size and wall thickness. Moderately and segmentally reduced systolic function. Inconclusive left ventricular diastolic function.   · AO: Mild aortic root dilatation; diameter is 3.8 cm.  ASSESSMENT:  Hospital Problems  Date Reviewed: 2/7/2018          Codes Class Noted POA    Goals of care, counseling/discussion ICD-10-CM: Z71.89  ICD-9-CM: V65.49  Unknown Unknown        Dementia without behavioral disturbance (HCC) ICD-10-CM: F03.90  ICD-9-CM: 294.20  Unknown Unknown        Pulmonary fibrosis ([facility] ICD-10-CM: J84.10  ICD-9-CM: 038  Unknown Unknown        Severe protein-calorie malnutrition ([facility] ICD-10-CM: E43  ICD-9-CM: 979  12/16/2020 Clinically Undetermined        Acute on chronic systolic congestive heart failure ([facility] ICD-10-CM: I50.23  ICD-9-CM: 428.23, 428.0  12/15/2020 Unknown        * (Principal) STEMI (ST elevation myocardial infarction) ([facility] ICD-10-CM: I21.3  ICD-9-CM: 410.90  12/10/2020 Unknown                SUBJECTIVE:  Intubated     OBJECTIVE:    VS:   Visit Vitals  BP (!) 104/54   Pulse 74   Temp 98.7 °F (37.1 °C)   Resp 18   Ht 5' 7\" (1.702 m)   Wt 63.6 kg (140 lb 4.8 oz)   SpO2 100%   Breastfeeding No   BMI 21.97 kg/m²         Intake/Output Summary (Last 24 hours) at 12/22/2020 1615  Last data filed at 12/21/2020 2326  Gross per 24 hour   Intake 100 ml   Output    Net 100 ml     TELE: normal sinus rhythm    General: intubated   HENT: Normocephalic, atraumatic. Normal external eye.   Neck :  no JVD  Cardiac:  regular rate and rhythm, S1, S2 normal, no murmur, click, rub or gallop  Lungs: Scattered rhonchi  Abdomen: Soft, nontender, no masses  Extremities:  No c/c/e, peripheral pulses present      Labs: Results:       Chemistry Recent Labs     12/22/20  0955 12/22/20  0049 12/21/20  0021   * 214* 330*   * 150* 145   K 4.2 3.6 4.1    106 104   CO2 38* 38* 36*   BUN 70* 56* 49*   CREA 1.30 0.84 0.88   CA 10.3* 10.1 10.0   AGAP 6 6 5   BUCR 54* 67* 56*   *  --   --    TP 8.3*  --   --    ALB 2.8*  --   --    GLOB 5.5*  --   --    AGRAT 0.5*  --   --       CBC w/Diff Recent Labs     12/22/20  0955 12/22/20  0049 12/21/20  0021   WBC 32.0* 19.2* 16.6* RBC 4.71 4.58 4.55   HGB 12.8 12.5 12.3   HCT 42.0 40.6 39.7   * 790* 814*   GRANS 95* 80* 86*   LYMPH 0* 10* 5*   EOS 0 0 0      Cardiac Enzymes Recent Labs     12/22/20  0955   CPK 48   CKND1 4.0      Coagulation No results for input(s): PTP, INR, APTT, INREXT, INREXT in the last 72 hours. Lipid Panel No results found for: CHOL, CHOLPOCT, CHOLX, CHLST, CHOLV, 953597, HDL, HDLP, LDL, LDLC, DLDLP, 020046, VLDLC, VLDL, TGLX, TRIGL, TRIGP, TGLPOCT, CHHD, CHHDX   BNP No results for input(s): BNPP in the last 72 hours. Liver Enzymes Recent Labs     12/22/20  0955   TP 8.3*   ALB 2.8*   *      Thyroid Studies Lab Results   Component Value Date/Time    TSH 2.92 12/10/2020 11:07 AM              NABILA Rudd supervised    I have independently evaluated and examined the patient. All relevant labs and testing data's are reviewed. Care plan discussed and updated after review.     Noe Payton MD

## 2020-12-22 NOTE — PROGRESS NOTES
responded to Rapid Response for  Aurelia Santos Patient, who is a 72 y.o.,female,     The  provided the following Interventions:  Provided crisis spiritual care and support. Offered prayers on behalf for the patient. Chart reviewed. The following outcomes were achieved:      Assessment:  There are no further spiritual or Jain issues which require intervention at this time. Plan:  Chaplains will continue to follow and will provide spiritual care as needed.  recommends bedside caregivers page  on duty if patient or family shows signs of acute spiritual or emotional distress.        [address] [facility], [place]   [phone]

## 2020-12-22 NOTE — PROGRESS NOTES
Patient rec'd from [id number] [facility] intubated. Placed patient on  Vent. ABG's to follow.

## 2020-12-22 NOTE — PROGRESS NOTES
Discharge planning    Reviewed chart. Patient is intubated. Patient is LTC at  [facility] and require a COVID test prior to returning. CM will continue to monitor and assist with transitional needs.      ANTHONY Villarreal, RN  Pager # [phone]  Care Manager

## 2020-12-22 NOTE — DIABETES MGMT
Glycemic Control Plan of Care    Note patient intubated and transferred to ICU   Has not received insulins yet today -   Now on hydrocortisone 50 mg q6h - will monitor BG and adjust insulins   mg/dl this morning  TDD previous day = 115 units  40 units lantus  75 units lispro  Will continue to monitor       Your A1C  was   Lab Results   Component Value Date/Time    Hemoglobin A1c 8.3 (H) 12/10/2020 06:51 PM     Currently receiving;  Lantus 30 units am, Lantus 10 units pm  Corrective lispro, very insulin resistant, q6h  Additional lispro 5 units q6h    Kari Adames MPH RN CDE  Pager [phone]

## 2020-12-22 NOTE — PROGRESS NOTES
Pt currently in RRT with subsequent intubation; inappropriate for SLP follow up. Will discontinue orders. Please re-consult if extubated and appropriate for PO.     Thank you for this referral.    Eulalia Dorsey M.S. CCC-SLP/L  Speech-Language Pathologist

## 2020-12-22 NOTE — PROGRESS NOTES
Called by tele who stated tht the patient went into [facility]  Spk with Cards Ampi  NP who ordered for the patient bete blocker to be held and ABG advised of excessive use of accessory muscles for the patient .   Called respiratory to notify       12/22/20 0932   Vital Signs   Temp 98.7 °F (37.1 °C)   Temp Source Axillary   Pulse (Heart Rate) 74   Heart Rate Source Monitor   Cardiac Rhythm AVB- 2nd degree-Mobitz 2   Resp Rate 22   O2 Sat (%) 95 %   Level of Consciousness Responds to Voice   BP 99/64   MAP (Calculated) 76   BP 1 Method Automatic   BP 1 Location Right arm   BP Patient Position At rest   MEWS Score 4   Alarms Set and Audible Cardiac alarms   Box Number 91   Electrodes Replaced Yes   Pain 1   Pain Scale 1 Numeric (0 - 10)   Pain Intensity 1 0   Patient Stated Pain Goal 0

## 2020-12-22 NOTE — PROGRESS NOTES
Rapid response called on patient with AMS, EKG changes, unable to protect her airway second to number one. Pt was suctioned for large amounts of thick bloody mucous . Oral airway placed and pt was manually ventilated until anesthesia was able to intubate. Pt was intubated with a 7.5 24 at the lip. Pt was suctioned for a large amount of thick tan sputum. The patient was transported to ICU and placed on vent.

## 2020-12-22 NOTE — PROGRESS NOTES
Rapid Response Note  [facility]    Patient: Gemma Sprain 72 y.o. female  [id number]  1955      Admit Date: 12/10/2020   Admission Diagnosis: STEMI (ST elevation myocardial infarction) ([facility] [I21.3]    RAPID RESPONSE     Rapid response called for respiratory distress. Patient tachypnea and tachycardiac on 6L NC. Medications Reviewed    OBJECTIVE     Patient Vitals for the past 12 hrs:   Temp Pulse Resp BP SpO2   12/22/20 0932 98.7 °F (37.1 °C) 74 22 99/64 95 %   12/22/20 0526 98.5 °F (36.9 °C) 93 20 110/76 92 %   12/21/20 2326 98.3 °F (36.8 °C) 94 20 123/82 97 %         PHYSICAL:  General: Alert,  Pt nonverbal only says yes and no answers. CV:  RRR, no murmurs, rubs, or gallops. PMI not displaced. No visible pulsations or thrills. No carotid bruits. RESP:  Unlabored breathing. CTAB. ABD:  Soft, nontender, nondistended. Normoactive bowel sounds. No hepatosplenomegaly. No suprapubic tenderness. No CVA tenderness. Neuro:  5/5 strength bilateral upper extremities and lower extremities. CN II-XII intact. Sensation grossly intact. A+Ox3. EKG: sinus tach, rate 107, no new ST changes      ASSESSMENT, PLAN & DISPOSITION   Aurelia Arthur is a 72y.o. year old female admitted for STEMI (ST elevation myocardial infarction) ([facility] [I21.3]. Rapid response called for respiratory distress. Patient w/ tachypnea. Acute onset of respiratory distress concern for heart failure exacerbation and aspiration. RN held hypertensive medications, stopped tube feeds, and ordered lasix 40 mg IV. Pt was given deep suction. Pt became hypotensive in SBP 50-70s. She was given albumin but pressures did not improve. Pt became more drowsy. Pt was bagged and plan to intubate patient and start pressors. Dr. Jorge Luis Lopez spoke with ICU physician for transfer.     Patient condition currently: guarded    Medications Administered:  Lasix 40 mg IV, albumin    Labs ordered/Pending: cbc, cmp, lactic, pro-bnp, cardiac panel, abg, EKG, chest Xray         Disposition: plan was to transfer to ICU, pt was intubated    Attending Dr. Lyle Wiggins was at bedside  In agreement with plan. Primary team resuming care.      Senior resident Dr. Kun Morse present during RRT and evaluation    Kilo Salgado PGY-1  9:41 AM 12/22/20

## 2020-12-22 NOTE — PROGRESS NOTES
0938-Rapid Response called  0939- RT, ICU, Nursing supervisor & PFM arrived at patient's room  0939-BG taken-207. VS-95%  On 5L. HR-66, 92/61  0941-EKG being done  0943- Lab arrived to patient's room  0945- Suction kit-14 FR brought to patient's room  0945- 40mg Lasix ordered. Julieta Parker NP arrived at patient's room  2707- Albumin ordered. Deep suctioning started by RT.  0949- 40 mg lasix administered. EKG completed. 5546- Radiology notified for Xray. Labs drawn: Lactic, D-Dimer, Calcium, CMP, BNP  0955- Vitals rechecked. 74/53, 60, 91%   0955- Hi-flow O2 arrived to unit. 5243- Albumin infusion started. [address] arrived to patient's room. 6539- Radiology arrived at patient's room. 1000- Vitals rechecked. 77/47, 73,90%. ABG completed- 66,7.39, 66. Bicarb-40.7  1001- Dobutamine drip ordered. Dr. Nat Ashton arrived to the patient's room. CXR completed. 1004- Anesthesia paged overhead. 1005-Patient placed on nonrebreather. 1006- Nasal trumpet arrived to patient's room. Anesthesia arrived to patient's room. 1007- Dobutamine delivered to patient's room. 1008- 20G in the R hand placed. BP rechecked- 51/46, 97, 94%  1009-ET tube hicks  1010- Dobutamine started at 5mcg/hr. BP Rechedked- 62/45, 97% O2  1011- Levophed ordered. 1013- ETCO2 at bedside. 1014- Bp Rechecked-88/49, HR-118. Patient intubated. 250 ml NS Bolus ordered. 1015- NS arrived at bedside. 1016- Levophed arrived at bedside. [address] [facility] reports patient had a pause in her HR 5 mins ago. Notified rapid response team.   1018- NS Bolus started. 1021- O2 tank brought to bedside. 1023- Levophed started @ 4 mcg/hr  1024-Vitals rechecked. 79/52, 93, 98%. Patient transported to ICU.

## 2020-12-22 NOTE — PROGRESS NOTES
PCCM Update:    ~16:31 - Patient's sister JOEY ONTIVEROS [facility]) was updated regarding her current critical condition. Sukhdeep Bailey PA-C  12/22/20  Pulmonary, Critical Care Medicine  [facility]       ICU Staff  Above reviewed and noted.  Discussed with DELFIN Musa DO, [facility]  Pulmonary, Critical Care, and Sleep Medicine

## 2020-12-23 ENCOUNTER — APPOINTMENT (OUTPATIENT)
Dept: GENERAL RADIOLOGY | Age: 65
DRG: 003 | End: 2020-12-23
Attending: PHYSICIAN ASSISTANT
Payer: MEDICARE

## 2020-12-23 LAB
ANION GAP SERPL CALC-SCNC: 4 MMOL/L (ref 3–18)
ARTERIAL PATENCY WRIST A: ABNORMAL
BASE EXCESS BLD CALC-SCNC: 12 MMOL/L
BASOPHILS # BLD: 0 K/UL (ref 0–0.06)
BASOPHILS NFR BLD: 0 % (ref 0–3)
BDY SITE: ABNORMAL
BODY TEMPERATURE: 99.5
BUN SERPL-MCNC: 78 MG/DL (ref 7–18)
BUN/CREAT SERPL: 60 (ref 12–20)
CALCIUM SERPL-MCNC: 9.6 MG/DL (ref 8.5–10.1)
CHLORIDE SERPL-SCNC: 107 MMOL/L (ref 100–111)
CO2 SERPL-SCNC: 36 MMOL/L (ref 21–32)
CREAT SERPL-MCNC: 1.3 MG/DL (ref 0.6–1.3)
DIFFERENTIAL METHOD BLD: ABNORMAL
EOSINOPHIL # BLD: 0 K/UL (ref 0–0.4)
EOSINOPHIL NFR BLD: 0 % (ref 0–5)
ERYTHROCYTE [DISTWIDTH] IN BLOOD BY AUTOMATED COUNT: 13.7 % (ref 11.6–14.5)
GAS FLOW.O2 O2 DELIVERY SYS: ABNORMAL L/MIN
GAS FLOW.O2 SETTING OXYMISER: 18 BPM
GLUCOSE BLD STRIP.AUTO-MCNC: 147 MG/DL (ref 70–110)
GLUCOSE BLD STRIP.AUTO-MCNC: 174 MG/DL (ref 70–110)
GLUCOSE BLD STRIP.AUTO-MCNC: 202 MG/DL (ref 70–110)
GLUCOSE BLD STRIP.AUTO-MCNC: 276 MG/DL (ref 70–110)
GLUCOSE SERPL-MCNC: 258 MG/DL (ref 74–99)
HCO3 BLD-SCNC: 35.4 MMOL/L (ref 22–26)
HCT VFR BLD AUTO: 34.3 % (ref 35–45)
HGB BLD-MCNC: 10.4 G/DL (ref 12–16)
INSPIRATION.DURATION SETTING TIME VENT: 0.9 SEC
LYMPHOCYTES # BLD: 1.9 K/UL (ref 0.8–3.5)
LYMPHOCYTES NFR BLD: 8 % (ref 20–51)
MAGNESIUM SERPL-MCNC: 3 MG/DL (ref 1.6–2.6)
MCH RBC QN AUTO: 26.3 PG (ref 24–34)
MCHC RBC AUTO-ENTMCNC: 30.3 G/DL (ref 31–37)
MCV RBC AUTO: 86.6 FL (ref 74–97)
MONOCYTES # BLD: 0.7 K/UL (ref 0–1)
MONOCYTES NFR BLD: 3 % (ref 2–9)
NEUTS BAND NFR BLD MANUAL: 16 % (ref 0–5)
NEUTS SEG # BLD: 21.6 K/UL (ref 1.8–8)
NEUTS SEG NFR BLD: 73 % (ref 42–75)
O2/TOTAL GAS SETTING VFR VENT: 50 %
PCO2 BLD: 45.1 MMHG (ref 35–45)
PEEP RESPIRATORY: 5 CMH2O
PH BLD: 7.51 [PH] (ref 7.35–7.45)
PHOSPHATE SERPL-MCNC: 2.8 MG/DL (ref 2.5–4.9)
PIP ISTAT,IPIP: 23
PLATELET # BLD AUTO: 672 K/UL (ref 135–420)
PLATELET COMMENTS,PCOM: ABNORMAL
PMV BLD AUTO: 10.5 FL (ref 9.2–11.8)
PO2 BLD: 60 MMHG (ref 80–100)
POTASSIUM SERPL-SCNC: 3.5 MMOL/L (ref 3.5–5.5)
RBC # BLD AUTO: 3.96 M/UL (ref 4.2–5.3)
RBC MORPH BLD: ABNORMAL
SAO2 % BLD: 92 % (ref 92–97)
SERVICE CMNT-IMP: ABNORMAL
SODIUM SERPL-SCNC: 147 MMOL/L (ref 136–145)
SPECIMEN TYPE: ABNORMAL
TOTAL RESP. RATE, ITRR: 18
VANCOMYCIN SERPL-MCNC: 11.1 UG/ML (ref 5–40)
VENTILATION MODE VENT: ABNORMAL
VOLUME CONTROL PLUS IVLCP: YES
VT SETTING VENT: 450 ML
WBC # BLD AUTO: 24.2 K/UL (ref 4.6–13.2)

## 2020-12-23 PROCEDURE — 74011636637 HC RX REV CODE- 636/637: Performed by: INTERNAL MEDICINE

## 2020-12-23 PROCEDURE — 74011250637 HC RX REV CODE- 250/637: Performed by: INTERNAL MEDICINE

## 2020-12-23 PROCEDURE — 82962 GLUCOSE BLOOD TEST: CPT

## 2020-12-23 PROCEDURE — 74011636637 HC RX REV CODE- 636/637: Performed by: STUDENT IN AN ORGANIZED HEALTH CARE EDUCATION/TRAINING PROGRAM

## 2020-12-23 PROCEDURE — 82803 BLOOD GASES ANY COMBINATION: CPT

## 2020-12-23 PROCEDURE — 36415 COLL VENOUS BLD VENIPUNCTURE: CPT

## 2020-12-23 PROCEDURE — 80202 ASSAY OF VANCOMYCIN: CPT

## 2020-12-23 PROCEDURE — 74011250636 HC RX REV CODE- 250/636: Performed by: STUDENT IN AN ORGANIZED HEALTH CARE EDUCATION/TRAINING PROGRAM

## 2020-12-23 PROCEDURE — 74011000250 HC RX REV CODE- 250: Performed by: INTERNAL MEDICINE

## 2020-12-23 PROCEDURE — 83735 ASSAY OF MAGNESIUM: CPT

## 2020-12-23 PROCEDURE — 99291 CRITICAL CARE FIRST HOUR: CPT | Performed by: INTERNAL MEDICINE

## 2020-12-23 PROCEDURE — 74011250636 HC RX REV CODE- 250/636: Performed by: PHYSICIAN ASSISTANT

## 2020-12-23 PROCEDURE — 2709999900 HC NON-CHARGEABLE SUPPLY

## 2020-12-23 PROCEDURE — 74011250636 HC RX REV CODE- 250/636: Performed by: INTERNAL MEDICINE

## 2020-12-23 PROCEDURE — 74011000250 HC RX REV CODE- 250: Performed by: NURSE PRACTITIONER

## 2020-12-23 PROCEDURE — 85025 COMPLETE CBC W/AUTO DIFF WBC: CPT

## 2020-12-23 PROCEDURE — 80048 BASIC METABOLIC PNL TOTAL CA: CPT

## 2020-12-23 PROCEDURE — 65610000006 HC RM INTENSIVE CARE

## 2020-12-23 PROCEDURE — 94003 VENT MGMT INPAT SUBQ DAY: CPT

## 2020-12-23 PROCEDURE — 84100 ASSAY OF PHOSPHORUS: CPT

## 2020-12-23 PROCEDURE — 87040 BLOOD CULTURE FOR BACTERIA: CPT

## 2020-12-23 PROCEDURE — 99232 SBSQ HOSP IP/OBS MODERATE 35: CPT | Performed by: INTERNAL MEDICINE

## 2020-12-23 PROCEDURE — 74011250637 HC RX REV CODE- 250/637: Performed by: NURSE PRACTITIONER

## 2020-12-23 PROCEDURE — 74011250636 HC RX REV CODE- 250/636: Performed by: NURSE PRACTITIONER

## 2020-12-23 PROCEDURE — 74011000250 HC RX REV CODE- 250: Performed by: PHYSICIAN ASSISTANT

## 2020-12-23 PROCEDURE — 99232 SBSQ HOSP IP/OBS MODERATE 35: CPT | Performed by: HOSPITALIST

## 2020-12-23 PROCEDURE — 74011000258 HC RX REV CODE- 258: Performed by: PHYSICIAN ASSISTANT

## 2020-12-23 PROCEDURE — 71045 X-RAY EXAM CHEST 1 VIEW: CPT

## 2020-12-23 RX ORDER — HYDROCORTISONE SODIUM SUCCINATE 100 MG/2ML
25 INJECTION, POWDER, FOR SOLUTION INTRAMUSCULAR; INTRAVENOUS EVERY 8 HOURS
Status: DISCONTINUED | OUTPATIENT
Start: 2020-12-23 | End: 2020-12-24

## 2020-12-23 RX ADMIN — DOBUTAMINE HYDROCHLORIDE 5 MCG/KG/MIN: 200 INJECTION INTRAVENOUS at 12:00

## 2020-12-23 RX ADMIN — INSULIN LISPRO 5 UNITS: 100 INJECTION, SOLUTION INTRAVENOUS; SUBCUTANEOUS at 12:00

## 2020-12-23 RX ADMIN — MIDAZOLAM HYDROCHLORIDE 2 MG: 2 INJECTION, SOLUTION INTRAMUSCULAR; INTRAVENOUS at 21:01

## 2020-12-23 RX ADMIN — MEROPENEM 500 MG: 500 INJECTION, POWDER, FOR SOLUTION INTRAVENOUS at 02:00

## 2020-12-23 RX ADMIN — POTASSIUM BICARBONATE 40 MEQ: 782 TABLET, EFFERVESCENT ORAL at 08:00

## 2020-12-23 RX ADMIN — INSULIN LISPRO 9 UNITS: 100 INJECTION, SOLUTION INTRAVENOUS; SUBCUTANEOUS at 01:00

## 2020-12-23 RX ADMIN — HYDROCORTISONE SODIUM SUCCINATE 25 MG: 100 INJECTION, POWDER, FOR SOLUTION INTRAMUSCULAR; INTRAVENOUS at 21:05

## 2020-12-23 RX ADMIN — MEROPENEM 500 MG: 500 INJECTION, POWDER, FOR SOLUTION INTRAVENOUS at 18:00

## 2020-12-23 RX ADMIN — INSULIN LISPRO 3 UNITS: 100 INJECTION, SOLUTION INTRAVENOUS; SUBCUTANEOUS at 12:00

## 2020-12-23 RX ADMIN — Medication 30 ML: at 09:00

## 2020-12-23 RX ADMIN — POLYETHYLENE GLYCOL 3350 17 G: 17 POWDER, FOR SOLUTION ORAL at 09:00

## 2020-12-23 RX ADMIN — ASPIRIN 81 MG CHEWABLE TABLET 81 MG: 81 TABLET CHEWABLE at 09:00

## 2020-12-23 RX ADMIN — Medication 150 MCG/HR: at 04:54

## 2020-12-23 RX ADMIN — FAMOTIDINE 20 MG: 10 INJECTION INTRAVENOUS at 21:00

## 2020-12-23 RX ADMIN — INSULIN GLARGINE 10 UNITS: 100 INJECTION, SOLUTION SUBCUTANEOUS at 22:00

## 2020-12-23 RX ADMIN — TICAGRELOR 90 MG: 90 TABLET ORAL at 09:00

## 2020-12-23 RX ADMIN — TICAGRELOR 90 MG: 90 TABLET ORAL at 21:05

## 2020-12-23 RX ADMIN — HYDROCORTISONE SODIUM SUCCINATE 25 MG: 100 INJECTION, POWDER, FOR SOLUTION INTRAMUSCULAR; INTRAVENOUS at 14:00

## 2020-12-23 RX ADMIN — MIDAZOLAM HYDROCHLORIDE 2 MG: 2 INJECTION, SOLUTION INTRAMUSCULAR; INTRAVENOUS at 04:30

## 2020-12-23 RX ADMIN — CHLORHEXIDINE GLUCONATE 0.12% ORAL RINSE 10 ML: 1.2 LIQUID ORAL at 21:03

## 2020-12-23 RX ADMIN — MIDAZOLAM HYDROCHLORIDE 2 MG: 2 INJECTION, SOLUTION INTRAMUSCULAR; INTRAVENOUS at 12:19

## 2020-12-23 RX ADMIN — Medication 125 MCG/HR: at 18:30

## 2020-12-23 RX ADMIN — INSULIN LISPRO 5 UNITS: 100 INJECTION, SOLUTION INTRAVENOUS; SUBCUTANEOUS at 01:00

## 2020-12-23 RX ADMIN — MIDAZOLAM HYDROCHLORIDE 2 MG: 2 INJECTION, SOLUTION INTRAMUSCULAR; INTRAVENOUS at 17:40

## 2020-12-23 RX ADMIN — CHLORHEXIDINE GLUCONATE 0.12% ORAL RINSE 10 ML: 1.2 LIQUID ORAL at 09:00

## 2020-12-23 RX ADMIN — ATORVASTATIN CALCIUM 40 MG: 40 TABLET, FILM COATED ORAL at 22:00

## 2020-12-23 RX ADMIN — Medication 150 MCG/HR: at 11:42

## 2020-12-23 RX ADMIN — HYDROCORTISONE SODIUM SUCCINATE 50 MG: 100 INJECTION, POWDER, FOR SOLUTION INTRAMUSCULAR; INTRAVENOUS at 00:00

## 2020-12-23 RX ADMIN — SODIUM CHLORIDE 10 ML: 9 INJECTION, SOLUTION INTRAMUSCULAR; INTRAVENOUS; SUBCUTANEOUS at 14:00

## 2020-12-23 RX ADMIN — FUROSEMIDE 40 MG: 10 INJECTION, SOLUTION INTRAMUSCULAR; INTRAVENOUS at 09:00

## 2020-12-23 RX ADMIN — MEROPENEM 500 MG: 500 INJECTION, POWDER, FOR SOLUTION INTRAVENOUS at 10:00

## 2020-12-23 RX ADMIN — Medication 3 MG: at 22:00

## 2020-12-23 RX ADMIN — FAMOTIDINE 20 MG: 10 INJECTION INTRAVENOUS at 09:00

## 2020-12-23 RX ADMIN — SODIUM CHLORIDE 10 ML: 9 INJECTION, SOLUTION INTRAMUSCULAR; INTRAVENOUS; SUBCUTANEOUS at 21:04

## 2020-12-23 RX ADMIN — FUROSEMIDE 40 MG: 10 INJECTION, SOLUTION INTRAMUSCULAR; INTRAVENOUS at 21:05

## 2020-12-23 RX ADMIN — INSULIN GLARGINE 30 UNITS: 100 INJECTION, SOLUTION SUBCUTANEOUS at 09:00

## 2020-12-23 RX ADMIN — FENTANYL CITRATE 100 MCG: 50 INJECTION INTRAMUSCULAR; INTRAVENOUS at 21:30

## 2020-12-23 NOTE — PROGRESS NOTES
Results for Shan Rai (MRN [id number]) as of 12/23/2020 05:35   Ref. Range 12/23/2020 05:23   pH (POC) Latest Ref Range: 7.35 - 7.45   7.51 (H)   pCO2 (POC) Latest Ref Range: 35.0 - 45.0 MMHG 45.1 (H)   pO2 (POC) Latest Ref Range: 80 - 100 MMHG 60 (L)   HCO3 (POC) Latest Ref Range: 22 - 26 MMOL/L 35.4 (H)   sO2 (POC) Latest Ref Range: 92 - 97 % 92   Base excess (POC) Latest Units: mmol/L 12   FIO2 (POC) Latest Units: % 50   Patient temp. Latest Units:   99.5   Specimen type (POC) Latest Units:   ARTERIAL   Set Rate Latest Units: bpm 18   Site Latest Units:   DRAWN FROM ARTERIAL LINE   Device: Latest Units:   VENT   Total resp.  rate Latest Units:   18   Mode Latest Units:   ASSIST CONTROL   Tidal volume Latest Units: ml 450   Volume control plus Latest Units:   YES   PIP (POC) Latest Units:   23   PEEP/CPAP (POC) Latest Units: cmH2O 5   Allens test (POC) Latest Units:   N/A   Inspiratory Time Latest Units: sec 0.9

## 2020-12-23 NOTE — PROGRESS NOTES
Discharge planning    Updated clinicals uploaded in [employer] per Janey Fields admissions at [address] [facility], request. Patient is a LTC resident at [facility]. CM will continue to monitor and assist with transitional needs.      Dayna Schwartz, BSN, RN  Pager # [phone]  Care Manager

## 2020-12-23 NOTE — PROGRESS NOTES
attended the interdisciplinary rounds for Aurelia Mccauley, who is a 72 y.o.,female. Patients Primary Language is: [place]. According to the patients EMR Muslim Affiliation is: Willian Owen.     The reason the Patient came to the hospital is:   Patient Active Problem List    Diagnosis Date Noted    Gram-negative bacteremia 02/05/2015     Priority: 1 - One    Sepsis secondary to UTI ([address] 02/03/2015     Priority: 1 - One    DM hyperosmolarity type II, uncontrolled ([address] 02/03/2015     Priority: 1 - One    HTN (hypertension) 02/03/2015     Priority: 1 - One    Goals of care, counseling/discussion     Dementia without behavioral disturbance ([address]     Pulmonary fibrosis ([address]     Severe protein-calorie malnutrition ([address] 12/16/2020    Acute on chronic systolic congestive heart failure ([address] 12/15/2020    STEMI (ST elevation myocardial infarction) ([address] 12/10/2020    Acidosis 02/07/2018    Respiratory failure ([address] 02/07/2018    Shock ([address] 02/07/2018    Hyperosmolar (nonketotic) coma ([address] 02/07/2018    Acute UTI 02/07/2018    Macrocytic anemia 02/07/2018    Diabetic neuropathy (HCC)     Osteoarthritis of both knees     Sepsis ([address] 02/03/2015    Febrile illness, acute 02/03/2015    Diverticula of colon 09/21/2012        Plan:  Chaplains will continue to follow and will provide pastoral care on an as needed/requested basis.  recommends bedside caregivers page  on duty if patient shows signs of acute spiritual or emotional distress.     Heydi Vera  Board Certified [facility]   [phone]

## 2020-12-23 NOTE — PROGRESS NOTES
[facility]      CARDIOLOGY PROGRESS NOTE  RECS:      1. Acute respiratory failure- requiring mechanical ventilation- extubated  12/17/20. re intubated 12/22/20. 2. Hypotension- possible septic shock- in the setting aspirations pneumonia vs UTI. - on antibiotics and vasopressor support       3. Subacute MI- 12/10/20 -s/p LHC S/p ptca/stent to proximal/ mid LAD using ARELY.  LVEDP 8 mmHg. Normal PASP pressure with normal PCWP. Moderate to severe LV dysfunction. ekg 12/22/20  NSR w/o  acute ischemic changes. continue to monitor. Continue to trend Troponin. Continue asa and brilinta   4. Acute Systolic Congestive heart Failure-recent echo with EF% 35%-40-  continue dobutamine for inotropic support. 5. COPD, on home O2 4L NC   6. Hepatitis C ? -Per family member   9. DM2- medications per medical team   8. Dementia - failed swallow eval.  Per GI note - poor candidate at this time for G-tube due to not being able to stop Brilinta. Consider IR consult  for PEG tube   9. Hypernatremia         reintubated secondary to acute respiratory failure likely secondary to aspiration.  ekg no new ischemic changes suggestive of reinfarction or stent thrombosis. Continue DAPT. May need trach+peg  Prognosis poor         Cardiac cath 12/11/20  Patient presented to [address] [place] [address] with late presentation anterior wall MI.  EF 35-40%. Patient was initially managed medically-- however developed acute pulmonary edema requiring intubation. Also troponin level increasing consistent with ongoing ischemia. S/p ptca/stent to proximal/ mid LAD using ARELY. LVEDP 8 mmHg. Normal PASP pressure with normal PCWP. Moderate to severe LV dysfunction.     Echo 12/10/20  · LV: Estimated LVEF is 35 - 40%. Visually measured ejection fraction. Normal cavity size and wall thickness. Moderately and segmentally reduced systolic function. Inconclusive left ventricular diastolic function.   · AO: Mild aortic root dilatation; diameter is 3.8 cm. ASSESSMENT:  Hospital Problems  Date Reviewed: 2/7/2018          Codes Class Noted POA    Goals of care, counseling/discussion ICD-10-CM: Z71.89  ICD-9-CM: V65.49  Unknown Unknown        Dementia without behavioral disturbance (HCC) ICD-10-CM: F03.90  ICD-9-CM: 294.20  Unknown Unknown        Pulmonary fibrosis ([facility] ICD-10-CM: J84.10  ICD-9-CM: 773  Unknown Unknown        Severe protein-calorie malnutrition ([facility] ICD-10-CM: E43  ICD-9-CM: 964  12/16/2020 Clinically Undetermined        Acute on chronic systolic congestive heart failure ([facility] ICD-10-CM: I50.23  ICD-9-CM: 428.23, 428.0  12/15/2020 Unknown        * (Principal) STEMI (ST elevation myocardial infarction) ([facility] ICD-10-CM: I21.3  ICD-9-CM: 410.90  12/10/2020 Unknown                SUBJECTIVE:  Intubated     OBJECTIVE:    VS:   Visit Vitals  BP (!) 108/57   Pulse 65   Temp 99.4 °F (37.4 °C)   Resp 18   Ht 5' 7\" (1.702 m)   Wt 65.4 kg (144 lb 2.9 oz)   SpO2 96%   Breastfeeding No   BMI 22.58 kg/m²         Intake/Output Summary (Last 24 hours) at 12/23/2020 0937  Last data filed at 12/23/2020 0600  Gross per 24 hour   Intake 631.44 ml   Output 1100 ml   Net -468.56 ml     TELE: normal sinus rhythm    General: intubated   HENT: Normocephalic, atraumatic. Normal external eye.   Neck :  no JVD  Cardiac:  regular rate and rhythm, S1, S2 normal, no murmur, click, rub or gallop  Lungs: Scattered rhonchi  Abdomen: Soft, nontender, no masses  Extremities:  No c/c/e, peripheral pulses present      Labs: Results:       Chemistry Recent Labs     12/23/20  0330 12/22/20  1128 12/22/20  0955   * 205* 205*   * 149* 151*   K 3.5 3.8 4.2    105 107   CO2 36* 36* 38*   BUN 78* 73* 70*   CREA 1.30 1.36* 1.30   CA 9.6 9.9 10.3*   AGAP 4 8 6   BUCR 60* 54* 54*   AP  --  122* 136*   TP  --  8.1 8.3*   ALB  --  3.3* 2.8*   GLOB  --  4.8* 5.5*   AGRAT  --  0.7* 0.5*      CBC w/Diff Recent Labs     12/23/20  0330 12/22/20  1126 12/22/20  0955   WBC 24.2* 31.0* 32.0*   RBC 3.96* 4.29 4.71   HGB 10.4* 11.4* 12.8   HCT 34.3* 38.5 42.0   * 828* 853*   GRANS 73 90* 95*   LYMPH 8* 9* 0*   EOS 0 0 0      Cardiac Enzymes Recent Labs     12/22/20  1128 12/22/20  0955   CPK 35 48   CKND1 5.4* 4.0      Coagulation Recent Labs     12/22/20  1128   PTP 14.8   INR 1.2       Lipid Panel No results found for: CHOL, CHOLPOCT, CHOLX, CHLST, CHOLV, 616053, HDL, HDLP, LDL, LDLC, DLDLP, 490963, VLDLC, VLDL, TGLX, TRIGL, TRIGP, TGLPOCT, CHHD, CHHDX   BNP No results for input(s): BNPP in the last 72 hours.    Liver Enzymes Recent Labs     12/22/20  1128   TP 8.1   ALB 3.3*   *      Thyroid Studies Lab Results   Component Value Date/Time    TSH 2.92 12/10/2020 11:07 AM                  Clem Yip MD

## 2020-12-23 NOTE — PROGRESS NOTES
12/22/20 2334   Patient Observations   Pulse (Heart Rate) 68   Resp Rate 18   O2 Sat (%) 94 %   Airway - Continuous Aspiration of Subglottic Secretions (JACQUELINE) Tube 12/22/20   Placement Date/Time: 12/22/20 1015   Present on Admission/Arrival: No  Airway Tube Size: 7 mm   Insertion Depth (cm) 23 cm   Line Michael Lips   Side Secured Device; Left   Cuff Pressure   (MOV)   Site Assessment Clean, dry, & intact   Suction on Yes   Respiratory   Patient on Vent Yes - If patient is on vent, add Doc Flowsheet Ventilator (). Respiratory Pattern Regular   Chest/Tracheal Assessment Chest expansion, symmetrical   Vent Settings   FIO2 (%) 50 %   SpO2/FIO2 Ratio 188   CMV Rate Set 18   Back-Up Rate 18   Vt Set (ml) 450 ml   PEEP/VENT (cm H2O) 5 cm H20   Insp Time (sec) 0.9 sec   Insp Rise Time % 50 %   Flow Trigger 3   Ventilator Measurements   Resp Rate Observed 18   Vt Exhaled (Machine Breath) (ml) 470 ml   Ve Observed (l/min) 8.5 l/min   PIP Observed (cm H2O) 23 cm H2O   MAP (cm H2O) 10   I:E Ratio Actual 1:2.7   Safety & Alarms   Circuit Temperature 98.6 °F (37 °C)   Backup Mode Checked/Apnea Yes   Pressure Max 45 cm H2O   Ve Min 2   Ve Max 20   Vt Min 200 ml   Vt Max 800 ml   RR Max 40   Ambu Bag Yes   Ambu Mask Yes   Age Specific Ventilator Associated Pneumonia Bundle   Patient Age Group Adult   Vent Method/Mode   Ventilation Method Conventional   Ventilator Mode Assist control;VC+   Pulmonary Toilet   Pulmonary Toilet H. O.B elevated     Check completed and tolerated well. Patient found on above settings with the above results. No resp distress noted.

## 2020-12-23 NOTE — PROGRESS NOTES
0710 - Bedside and Verbal shift change report given to [facility], RN and Jaime Alvarado RN  (oncoming nurse) by Aldair Cantu RN (offgoing nurse). Report included the following information SBAR, Intake/Output, Recent Results and Med Rec Status.

## 2020-12-23 NOTE — PROGRESS NOTES
11.00 : Recevived patient from 4N Rapid response. Care assumed at this time. 1915: Bedside and Verbal shift change report given to Suzanne Freeman RN (oncoming nurse) by Fany Haro RN (offgoing nurse). Report included the following information SBAR, Intake/Output, MAR, Recent Results and Med Rec Status.

## 2020-12-23 NOTE — PROGRESS NOTES
Nutrition Assessment     Type and Reason for Visit: Reassess    Nutrition Recommendations/Plan:   - Resume tube feeding of Glucerna 1.5 at goal rate of 50 mL/hr, add ProSource once daily and increase water flushes to 150 mL q 4 hours via NGT. Nutrition Assessment:  Re-intubated and transferred to ICU following RRT for tachypnea and tachycardia 12/22. TF held since intubation but previously receiving TF via NGT at goal of 50 mL/hr. Nutritional needs modified & okay to resume TF per discussion with MD, RN aware. Steroid therapy, K replacement and lasix    Malnutrition Assessment:  Malnutrition Status: Severe malnutrition     Estimated Daily Nutrient Needs:  Energy (kcal):  7189-5680  Protein (g):         Fluid (ml/day):  9771-3810    Nutrition Related Findings:  BM 12/22.  Hypernatremia & hyperglycemia- lantus, SSI & steroids decreased      Current Nutrition Therapies:  DIET NPO  DIET TUBE FEEDING  DIET NUTRITIONAL SUPPLEMENTS Breakfast; Prosource     Current Tube Feeding (TF) Orders:   · Feeding Route: Nasogastric  · Formula: Glucerna 1.5  · Schedule:Continuous    · Regimen: 50 mL/hr- held since intubation 12/22  · Additives/Modulars: (none)  · Water Flushes: 75 mL q 4 hours (450 mL) plan to increase to 150 mL q 4 hours 900 mL  · Current TF & Flush Orders Provides: held since 12/22  · Goal TF & Flush Orders Provides: 1860 kcal, 114 gm protein, 910 mL free water, 100% RDIs    Anthropometric Measures:  · Height:  5' 7\" (170.2 cm)  · Current Body Wt:  63.6 kg (140 lb 3.4 oz)  · BMI: 22    Nutrition Diagnosis:   · Inadequate oral intake related to impaired respiratory function as evidenced by NPO or clear liquid status due to medical condition, intubation    Nutrition Intervention:  Food and/or Nutrient Delivery: Continue NPO, Modify tube feeding, Vitamin supplement  Nutrition Education and Counseling: Education not indicated  Coordination of Nutrition Care: Continue to monitor while inpatient, Interdisciplinary rounds(verbal orders from Dr. Krupa Velez)    Goals:  Nutritional needs will be met through adequate oral intake or nutrition support within the next 7 days. Nutrition Monitoring and Evaluation:   Behavioral-Environmental Outcomes: None identified  Food/Nutrient Intake Outcomes: Diet advancement/tolerance, Enteral nutrition intake/tolerance, Vitamin/mineral intake  Physical Signs/Symptoms Outcomes: Biochemical data, Fluid status or edema, Nutrition focused physical findings    Discharge Planning:     Too soon to determine     Electronically signed by Nallely Lowe RD on 12/23/2020 at 1:42 PM    Contact Number: [phone]

## 2020-12-23 NOTE — PROGRESS NOTES
[employer] [facility]  Pulmonary, Critical Care, and Sleep Medicine    Name: Garry Landon MRN: [id number]   : 1955 Hospital: [facility]   Date: 2020        IMPRESSION:   · Acute on chronic hypoxic and hypercapnic respiratory failure - requiring mechanical ventilation, extubated on  and reintubated on   · Acute on chronic systolic heart failure - in the setting of severe ILD, Echo 12/10/20 EF 35-40%, diuresis per cards (Dr. Malina Ortiz)  · Septic shock likely source Aspiration PNA/Pneumonitis vs UTI- on pressors, on vanc and zosyn  · Acute encephalopathy - likely toxic/metabolic vs infectious vs hepatic in nature, ammonia pending   · Acute flash pulmonary edema 2/2 to chronic heart failure - dobutamine D/C'ed on  and restarted on , pro-BNP trending up on   · Severe pulmonary fibrosis - with extensive honeycombing and bronchiectasis as seen on CTA chest 20 - appears to be a new diagnosis  · Acute cystitis - (+)E. coli  · Lactic acidosis - initial 2.4, resolved  · Acute MI with anterolateral STEMI and Q waves - s/p ptca/stent to proximal/mid LAD using ARELY on DAPT on 20- Remains on Brilinta  · Severe dysphagia - failed MBS, poor candidate for G-tube 2/2 Brilinta per GI (Dr. Abby Levy)  · Hx of Hep C: + RNA viral load 2020  · Multiple liver masses - noted on CT scan 20  · Hx of COPD - on home O2 4L NC  · Hx of HTN  · Hx of DM  · Hx of dementia - unknown baseline, on aricept     Patient Active Problem List   Diagnosis Code    Diverticula of colon K57.30    Sepsis ([address] A41.9    Sepsis secondary to UTI ([address] A41.9, N39.0    DM hyperosmolarity type II, uncontrolled ([address] E11.00, E11.65    HTN (hypertension) I10    Febrile illness, acute R50.9    Gram-negative bacteremia R78.81    Diabetic neuropathy ([address] E11.40    Osteoarthritis of both knees M17.0    Acidosis E87.2    Respiratory failure ([address] J96.90    Shock ([address] R57.9    Hyperosmolar (nonketotic) coma ([facility]) E11.01    Acute UTI N39.0    Macrocytic anemia D53.9    STEMI (ST elevation myocardial infarction) ([facility]) I21.3    Acute on chronic systolic congestive heart failure ([facility]) I50.23    Severe protein-calorie malnutrition ([facility]) E43    Goals of care, counseling/discussion Z71.89    Dementia without behavioral disturbance ([facility]) F03.90    Pulmonary fibrosis ([facility]) J84.10      PLAN:   Neuro: Titrate sedation to RASS of 0 to -1. PRN for breakthrough sedation needs, daily sedation holiday  Pulm: Titrate FiO2 for goal SPO2> 90%,VAP prevention bundle,  keep head of the bed at 30' all times. Daily sedation holiday and assessment for weaning with SBT as tolerated. Continue PRN duonebs. Keep HOB > 30' at all times. Aspiration Precautions. CVS Monitor CVP, Actively titrate vasopressors for aim MAP >65mmHg, initial troponin 0.12, continue to monitor until normalized. ECHO 12/10/20 EF 35-40%. Continue dobutamine, Lasix and brilinta per cardiology. GI: SUP, Trend LFTs, Zofran PRN for N/V, NPO. Continue TF and advance as tolerated  Renal:  Trend Renal indices, Strict Is/Os, Pro (+)  Hem/Onc: H/H stable, continue to trend and monitor for signs of active bleeding. Daily CBC  I/D:Sepsis bundle per hospital protocol, Blood and sputum cultures drawn and will be followed. LA normalized. Antibiotics:Vancomycin and Zosyn. Trend WBCs and temperature curve. Procal within normal limits  Endocrine: Q6 glucoses, SSI. Avoid hypoglycemia  Metabolic:  Daily BMP, mag, phos. Trend lytes, replace as needed. Musc/Skin: no acute issues, wound care  Full Code  Discussed in interdisciplinary rounds      Subjective/Interval History: This patient has been seen and evaluated at the request of Dr. Shonna Lamb for acute on chronic hypoxic and hypercapnic respiratory failure.   Patient is a 72 y.o. female with a past medical history of COPD, CHF, HTN, DM, dementia, presented to [facility] with acute on chronic systolic heart failure, acute MI with anterolateral STEMI and Q waves s/p ptca/stent to proximal/mid LAD on 20. Pt was intubated and extubated on 20, transferred to . On 20, RRT was called - pt was tachypneic, using accessory muscles, obtunded, and hypotensive. Pt was subsequently intubated, started on levophed and transferred to the ICU emergently. Initial ABG - 7.40, 66.3, 64, 40.7. Dobutamine was restarted per cards.      Upon  initial evaluation, the patient  was vented,  hypotensive with SBP in the 70s, on levophed 4mcg via PIV, ETT suctioning noted to have copious amount of thick secretions (appeared to be tube feeds). R fem CVL and A line placed emergently. Levophed and neosynephrine gtt initiated. Labs, CXR and sedation ordered. Dr. Perry Check called and updated family.      The patient is critically ill and can not provide additional history due to mechanical ventilation. 20    - sedated with versed and fentanyl, FI02 on vent at 50% and 12 of PEEP  - No new events overnight  -tolerating TF  -HD stable on levophed and dobutamine          ROS:Review of systems not obtained due to patient factors.     Objective:   Vital Signs:    Visit Vitals  BP (!) 113/57   Pulse 66   Temp 98.6 °F (37 °C)   Resp 18   Ht 5' 7\" (1.702 m)   Wt 65.4 kg (144 lb 2.9 oz)   SpO2 96%   Breastfeeding No   BMI 22.58 kg/m²       O2 Device: Endotracheal tube, Ventilator   O2 Flow Rate (L/min): 5 l/min   Temp (24hrs), Av.6 °F (37 °C), Min:97.5 °F (36.4 °C), Max:99.4 °F (37.4 °C)       Intake/Output:   Last shift:       07 - 1900  In: 279.8 [I.V.:154.8]  Out: 550 [Urine:550]  Last 3 shifts: 1901 -  0700  In: 731.4 [I.V.:631.4]  Out: 1100 [Urine:1100]    Intake/Output Summary (Last 24 hours) at 2020 1505  Last data filed at 2020 1200  Gross per 24 hour   Intake 825.03 ml   Output 1550 ml   Net -724.97 ml      Physical Exam:     General:  Intubated and sedated, in NAD   Head: Normocephalic, without obvious abnormality, atraumatic. Eyes:  Conjunctivae/corneas clear. PERRL, EOMs intact. Back:   Symmetric, no curvature. ROM normal.   Lungs:   (+)coarse breath sounds. Chest wall:  No tenderness or deformity. Heart:  Regular rate and rhythm, S1, S2 normal, no murmur, click, rub or gallop. Abdomen:   Soft, non-tender. Bowel sounds normal. No masses,  No organomegaly. Extremities: Extremities normal, atraumatic, no cyanosis or edema. + restraints   Pulses: 2+ and symmetric all extremities. Skin: Skin color, texture, turgor normal. No rashes or lesions. Normal cap refill. Neurologic: Sedated, no withdrawal to pain, (+)cough/gag reflex  DEVICES :  ETT, OGT, CVL, A-line, Pro            DATA:  Labs:  Recent Labs     12/23/20  0330 12/22/20  1128 12/22/20  0955   WBC 24.2* 31.0* 32.0*   HGB 10.4* 11.4* 12.8   HCT 34.3* 38.5 42.0   * 828* 853*     Recent Labs     12/23/20  0330 12/22/20  1128 12/22/20  0955 12/22/20  0049 12/21/20  0021   * 149* 151* 150* 145   K 3.5 3.8 4.2 3.6 4.1    105 107 106 104   CO2 36* 36* 38* 38* 36*   * 205* 205* 214* 330*   BUN 78* 73* 70* 56* 49*   CREA 1.30 1.36* 1.30 0.84 0.88   CA 9.6 9.9 10.3* 10.1 10.0   MG 3.0*  --   --  2.9* 2.7*   PHOS 2.8  --   --  3.2 3.0   ALB  --  3.3* 2.8*  --   --    ALT  --  187* 209*  --   --    INR  --  1.2  --   --   --      No results for input(s): PH, PCO2, PO2, HCO3, FIO2 in the last 72 hours.     PFT:                                                     Echo:    Imaging:  [x]I have personally reviewed the patients radiographs  []Radiographs reviewed with radiologist   [x]No change from prior, tubes and lines in adequate position  []Improved   []Worsening    High complexity decision making was performed during the evaluation of this patient at high risk for decompensation with multiple organ involvement     Above mentioned total time spent on reviewing the case/medical record/data/notes/EMR/patient examination/documentation/coordinating care with nurse/consultants, exclusive of procedures with complex decision making performed and > 50% time spent in face to face evaluation. Maritza Seals NP       [facility] Staff Addendum     I have independently evaluated the patient and reviewed the patient's chart. I have discussed the findings and care plan with ICU Care Team. Care Plan reviewed on ICU milti-D rounds. I agree with the above evaluation, assessment and recommendations along with the following comments and observations. Please also review my orders. Patient remains intubated and sedated. 4+ GNRs in sputum- awaiting speciation. Recent history of E.Coli- ESBL- on Meropenum. New bandemia today. Femoral CVL and A-lines placed urgently yesterday under controlled sterile conditions in ICU. Patient remains on anti-PLT therapy with Brillinta due to recent LAD-DEC. Patient is oozing at line sites Hb 10 down from 12 over past 24 hours. Started on tube feeds. Still with IV pressor requirement with Dobutamine and Nor-Epi    Palliative care consult requested. Complex decision making was made in the evaluation and management plans during this consultation. More than 50% of time was spent in counseling and coordination of care including review of data and discussion with other team members. Further recommendations and therapies pending clinical course.     Critical Care and time spent coordinating care, minus procedure time:35   min    Jose Maria Bee DO, [facility]  Pulmonary, Sleep and Critical Care Medicine  3:47 PM

## 2020-12-23 NOTE — DIABETES MGMT
GLYCEMIC CONTROL PLAN OF CARE     Assessment/Recommendations:  Lab glucose this am 258 mg/dl  Noted morning dose of lantus missed yesterday. Noted pt receiving steroids, hydrocortisone 50 mg/ every 6 hours. To be decreased to 25 mg every 8 hours starting today. Continue basal and corrective insulin coverage as ordered. Will continue inpatient monitoring. Most recent blood glucose values:      Results for Chemo Livingston (MRN [id number]) as of 12/23/2020 11:21   Ref. Range 12/22/2020 12:01 12/22/2020 17:50 12/22/2020 17:51 12/23/2020 00:45 12/23/2020 06:08   GLUCOSE,FAST - POC Latest Ref Range: 70 - 110 mg/dL 291 (H) 448 (HH) 456 (HH) 276 (H) 202 (H)     Current A1C of 8.3 % is equivalent to average blood glucose of 192 mg/dl over the past 2-3 months. Current hospital diabetes medications:   lantus 30 units every morning  lantus 10 units every bedtime  Lispro 5 units every 6 hours  Lispro corrective insulin coverage every 6 hours  Previous day's insulin requirements:   lantus 20 units. Lispro 48 units   Home diabetes medications:  Per pta med list.  lantus 22 units daily   Diet:    NPO. TF  Education:  ____Refer to Diabetes Education Record             _x__Education not indicated at this time  Intubated. Sedated. History of dementia.     Honorio Thomason Ala

## 2020-12-23 NOTE — PROGRESS NOTES
[facility] Hospitalist Group  Progress Note    Patient: Ev Horn Age: 72 y.o. : 1955 MR#: [id number] SSN: [id number]  Date/Time: 2020     Subjective:      Pt seen & evaluated - intubated in ICU     Assessment/Plan:   1. Acute on chronic hypoxic respiratory failure: Status post intubation by anesthesia on ;  CTA chest done  negative for PE but a number of abnormalities noted including advanced fibrosis, extensive groundglass infiltrates possibly due to pulmonary edema, pneumonia, aspiration, pulm hemorrhage and ALI, discussed w/ pulmonary. - extubated afternoon   - re-intubated   2. Acute flash pulmonary edema due to #3: now off lasix, dobutamine discontinued   3. Acute systolic heart failure exacerbation, EF 35%, diuresis per cardiology, limiting factor is her low bp  4. Acute MI with anterolateral STEMI and Q waves: S/p ptca/stent to proximal/ mid LAD using ARELY on DAPT on 20 . On Brillinta. 5. Fever:  None since 12/15;  Etiology unclear. CT imaging  with extensive groundglass infiltrates possibly aspiration, at risk. On broad-spectrum antibiotics   6. Lung lesions on cta chest  7. History of COPD on home oxygen: on po prednisone   8. Diabetes mellitus type 2: Sugars are somewhat within acceptable limits on corrective insulin. 9. Hypertension  10. dementia: Unknown baseline, resume Aricept when able to tolerate p.o. 11. Multiple liver masses: noted on 2020 CT scan. W/u prior to current admission is unclear. Full code    PLAN:  - Pt is currently intubated in the ICU - continue current Rx Plan per ICU team   Hospitalist will sign off      - Please call sister López Shraddha at [phone] for consent for any procedures.      Visit Vitals  BP (!) 103/57   Pulse 70   Temp 98.6 °F (37 °C)   Resp 18   Ht 5' 7\" (1.702 m)   Wt 65.4 kg (144 lb 2.9 oz)   SpO2 94%   Breastfeeding No   BMI 22.58 kg/m²     Case discussed with: []Patient  [x]Family  [x]Nursing  []Case Management  DVT Prophylaxis:  [x]Lovenox  []Hep iv  []SCDs  []Coumadin   []Eliquis/Xarelto     Objective:   VS:   Visit Vitals  BP (!) 103/57   Pulse 70   Temp 98.6 °F (37 °C)   Resp 18   Ht 5' 7\" (1.702 m)   Wt 65.4 kg (144 lb 2.9 oz)   SpO2 94%   Breastfeeding No   BMI 22.58 kg/m²      Tmax/24hrs: Temp (24hrs), Av.9 °F (37.2 °C), Min:98.6 °F (37 °C), Max:99.4 °F (37.4 °C)  IOBRIEF    Intake/Output Summary (Last 24 hours) at 2020 1605  Last data filed at 2020 1200  Gross per 24 hour   Intake 699.83 ml   Output 1350 ml   Net -650.17 ml       General: intubated  Pulmonary: + ET, coarse breath sounds  Cardiovascular: tachycardia, minimal peripheral edema  GI:  Soft, Non distended, Non tender. + Bowel sounds. + means   Extremities:  No edema.      Medications:   Current Facility-Administered Medications   Medication Dose Route Frequency    hydrocortisone Sod Succ (PF) (SOLU-CORTEF) injection 25 mg  25 mg IntraVENous Q8H    DOBUTamine (DOBUTREX) 500 mg/250 mL (2,000 mcg/mL) infusion  0-10 mcg/kg/min IntraVENous TITRATE    NOREPINephrine (LEVOPHED) 8 mg in 5% dextrose 250mL (32 mcg/mL) infusion  0.5-50 mcg/min IntraVENous TITRATE    fentaNYL (PF) 900 mcg/30 ml infusion soln  0-200 mcg/hr IntraVENous TITRATE    chlorhexidine (PERIDEX) 0.12 % mouthwash 10 mL  10 mL Oral Q12H    VANCOMYCIN INFORMATION NOTE   Other Rx Dosing/Monitoring    meropenem (MERREM) 500 mg in sterile water (preservative free) 10 mL IV syringe  500 mg IntraVENous Q8H    insulin glargine (LANTUS) injection 30 Units  30 Units SubCUTAneous DAILY    insulin glargine (LANTUS) injection 10 Units  10 Units SubCUTAneous QHS    insulin lispro (HUMALOG) injection 5 Units  5 Units SubCUTAneous Q6H    [Held by provider] carvediloL (COREG) tablet 6.25 mg  6.25 mg Oral Q12H    [Held by provider] lisinopriL (PRINIVIL, ZESTRIL) tablet 5 mg  5 mg Oral DAILY    furosemide (LASIX) injection 40 mg  40 mg IntraVENous Q12H    polyethylene glycol (MIRALAX) packet 17 g  17 g Per NG tube DAILY    [Held by provider] spironolactone (ALDACTONE) tablet 25 mg  25 mg Oral DAILY    multivit-folic acid-herbal 308 (WELLESSE PLUS) oral liquid 30 mL  30 mL Per NG tube DAILY    insulin lispro (HUMALOG) injection   SubCUTAneous Q6H    fentaNYL citrate (PF) injection  mcg   mcg IntraVENous Q2H PRN    sodium chloride (NS) flush 5-40 mL  5-40 mL IntraVENous PRN    ticagrelor (BRILINTA) tablet 90 mg  90 mg Per NG tube BID    sodium chloride (NS) flush 5-40 mL  5-40 mL IntraVENous PRN    albuterol-ipratropium (DUO-NEB) 2.5 MG-0.5 MG/3 ML  3 mL Nebulization Q4H PRN    melatonin tablet 3 mg  3 mg Oral QHS    famotidine (PF) (PEPCID) 20 mg in 0.9% sodium chloride 10 mL injection  20 mg IntraVENous Q12H    midazolam (VERSED) injection 1-2 mg  1-2 mg IntraVENous Q1H PRN    sodium chloride (NS) flush 5-40 mL  5-40 mL IntraVENous Q8H    sodium chloride (NS) flush 5-40 mL  5-40 mL IntraVENous PRN    acetaminophen (TYLENOL) tablet 650 mg  650 mg Oral Q6H PRN    Or    acetaminophen (TYLENOL) suppository 650 mg  650 mg Rectal Q6H PRN    promethazine (PHENERGAN) tablet 12.5 mg  12.5 mg Oral Q6H PRN    Or    ondansetron (ZOFRAN) injection 4 mg  4 mg IntraVENous Q6H PRN    glucose chewable tablet 16 g  4 Tab Oral PRN    glucagon (GLUCAGEN) injection 1 mg  1 mg IntraMUSCular PRN    dextrose (D50W) injection syrg 12.5-25 g  25-50 mL IntraVENous PRN    aspirin chewable tablet 81 mg  81 mg Oral DAILY    atorvastatin (LIPITOR) tablet 40 mg  40 mg Oral QHS       Labs:    Recent Results (from the past 12 hour(s))   POC G3    Collection Time: 12/23/20  5:23 AM   Result Value Ref Range    Device: VENT      FIO2 (POC) 50 %    pH (POC) 7.51 (H) 7.35 - 7.45      pCO2 (POC) 45.1 (H) 35.0 - 45.0 MMHG    pO2 (POC) 60 (L) 80 - 100 MMHG    HCO3 (POC) 35.4 (H) 22 - 26 MMOL/L    sO2 (POC) 92 92 - 97 %    Base excess (POC) 12 mmol/L Mode ASSIST CONTROL      Tidal volume 450 ml    Set Rate 18 bpm    PEEP/CPAP (POC) 5 cmH2O    PIP (POC) 23      Allens test (POC) N/A      Inspiratory Time 0.9 sec    Total resp. rate 18      Site DRAWN FROM ARTERIAL LINE      Patient temp. 99.5      Specimen type (POC) ARTERIAL      Performed by Tian Nuñez     Volume control plus YES     GLUCOSE, POC    Collection Time: 12/23/20  6:08 AM   Result Value Ref Range    Glucose (POC) 202 (H) 70 - 110 mg/dL   GLUCOSE, POC    Collection Time: 12/23/20 11:54 AM   Result Value Ref Range    Glucose (POC) 174 (H) 70 - 110 mg/dL   VANCOMYCIN, RANDOM    Collection Time: 12/23/20  2:00 PM   Result Value Ref Range    Vancomycin, random 11.1 5.0 - 40.0 UG/ML       Signed By: Zahida Crenshaw MD     December 23, 2020      Disclaimer: Sections of this note are dictated using utilizing voice recognition software. Minor typographical errors may be present. If questions arise, please do not hesitate to contact me or call our department.

## 2020-12-23 NOTE — PROGRESS NOTES
1910: Bedside and Verbal shift change report given to ESTEBAN Heath (oncoming nurse) by Lethea Baumgarten, RN (offgoing nurse). Report included the following information SBAR, Intake/Output, MAR, Recent Results and Med Rec Status.

## 2020-12-24 ENCOUNTER — APPOINTMENT (OUTPATIENT)
Dept: GENERAL RADIOLOGY | Age: 65
DRG: 003 | End: 2020-12-24
Attending: PHYSICIAN ASSISTANT
Payer: MEDICARE

## 2020-12-24 LAB
ALBUMIN SERPL-MCNC: 2.4 G/DL (ref 3.4–5)
ALBUMIN/GLOB SERPL: 0.5 {RATIO} (ref 0.8–1.7)
ALP SERPL-CCNC: 132 U/L (ref 45–117)
ALT SERPL-CCNC: 186 U/L (ref 13–56)
AMMONIA PLAS-SCNC: 26 UMOL/L (ref 11–32)
ANION GAP SERPL CALC-SCNC: 5 MMOL/L (ref 3–18)
ARTERIAL PATENCY WRIST A: ABNORMAL
AST SERPL-CCNC: 362 U/L (ref 10–38)
BASE EXCESS BLD CALC-SCNC: 14 MMOL/L
BASOPHILS # BLD: 0 K/UL (ref 0–0.06)
BASOPHILS NFR BLD: 0 % (ref 0–3)
BDY SITE: ABNORMAL
BILIRUB DIRECT SERPL-MCNC: 0.2 MG/DL (ref 0–0.2)
BILIRUB SERPL-MCNC: 0.5 MG/DL (ref 0.2–1)
BODY TEMPERATURE: 97.8
BUN SERPL-MCNC: 73 MG/DL (ref 7–18)
BUN/CREAT SERPL: 58 (ref 12–20)
CALCIUM SERPL-MCNC: 9.5 MG/DL (ref 8.5–10.1)
CHLORIDE SERPL-SCNC: 106 MMOL/L (ref 100–111)
CO2 SERPL-SCNC: 36 MMOL/L (ref 21–32)
CREAT SERPL-MCNC: 1.26 MG/DL (ref 0.6–1.3)
DIFFERENTIAL METHOD BLD: ABNORMAL
EOSINOPHIL # BLD: 0 K/UL (ref 0–0.4)
EOSINOPHIL NFR BLD: 0 % (ref 0–5)
ERYTHROCYTE [DISTWIDTH] IN BLOOD BY AUTOMATED COUNT: 14.1 % (ref 11.6–14.5)
GAS FLOW.O2 O2 DELIVERY SYS: ABNORMAL L/MIN
GAS FLOW.O2 SETTING OXYMISER: 18 BPM
GLOBULIN SER CALC-MCNC: 4.6 G/DL (ref 2–4)
GLUCOSE BLD STRIP.AUTO-MCNC: 163 MG/DL (ref 70–110)
GLUCOSE BLD STRIP.AUTO-MCNC: 301 MG/DL (ref 70–110)
GLUCOSE BLD STRIP.AUTO-MCNC: 349 MG/DL (ref 70–110)
GLUCOSE BLD STRIP.AUTO-MCNC: 349 MG/DL (ref 70–110)
GLUCOSE SERPL-MCNC: 338 MG/DL (ref 74–99)
HCO3 BLD-SCNC: 36.8 MMOL/L (ref 22–26)
HCT VFR BLD AUTO: 31.1 % (ref 35–45)
HGB BLD-MCNC: 9.3 G/DL (ref 12–16)
INSPIRATION.DURATION SETTING TIME VENT: 0.9 SEC
LYMPHOCYTES # BLD: 0.8 K/UL (ref 0.8–3.5)
LYMPHOCYTES NFR BLD: 4 % (ref 20–51)
MAGNESIUM SERPL-MCNC: 3.3 MG/DL (ref 1.6–2.6)
MCH RBC QN AUTO: 26.3 PG (ref 24–34)
MCHC RBC AUTO-ENTMCNC: 29.9 G/DL (ref 31–37)
MCV RBC AUTO: 87.9 FL (ref 74–97)
MONOCYTES # BLD: 1 K/UL (ref 0–1)
MONOCYTES NFR BLD: 5 % (ref 2–9)
NEUTS BAND NFR BLD MANUAL: 4 % (ref 0–5)
NEUTS SEG # BLD: 17.6 K/UL (ref 1.8–8)
NEUTS SEG NFR BLD: 87 % (ref 42–75)
NRBC BLD-RTO: 1 PER 100 WBC
O2/TOTAL GAS SETTING VFR VENT: 50 %
PCO2 BLD: 43.8 MMHG (ref 35–45)
PEEP RESPIRATORY: 5 CMH2O
PH BLD: 7.53 [PH] (ref 7.35–7.45)
PHOSPHATE SERPL-MCNC: 2.9 MG/DL (ref 2.5–4.9)
PIP ISTAT,IPIP: 23
PLATELET # BLD AUTO: 546 K/UL (ref 135–420)
PLATELET COMMENTS,PCOM: ABNORMAL
PMV BLD AUTO: 11 FL (ref 9.2–11.8)
PO2 BLD: 79 MMHG (ref 80–100)
POTASSIUM SERPL-SCNC: 3.7 MMOL/L (ref 3.5–5.5)
PROT SERPL-MCNC: 7 G/DL (ref 6.4–8.2)
RBC # BLD AUTO: 3.54 M/UL (ref 4.2–5.3)
RBC MORPH BLD: ABNORMAL
SAO2 % BLD: 97 % (ref 92–97)
SERVICE CMNT-IMP: ABNORMAL
SODIUM SERPL-SCNC: 147 MMOL/L (ref 136–145)
SPECIMEN TYPE: ABNORMAL
TOTAL RESP. RATE, ITRR: 18
VENTILATION MODE VENT: ABNORMAL
VOLUME CONTROL PLUS IVLCP: YES
VT SETTING VENT: 450 ML
WBC # BLD AUTO: 19.4 K/UL (ref 4.6–13.2)

## 2020-12-24 PROCEDURE — 84100 ASSAY OF PHOSPHORUS: CPT

## 2020-12-24 PROCEDURE — 74011250636 HC RX REV CODE- 250/636: Performed by: PHYSICIAN ASSISTANT

## 2020-12-24 PROCEDURE — 82803 BLOOD GASES ANY COMBINATION: CPT

## 2020-12-24 PROCEDURE — 74011250637 HC RX REV CODE- 250/637: Performed by: INTERNAL MEDICINE

## 2020-12-24 PROCEDURE — 74011000250 HC RX REV CODE- 250: Performed by: INTERNAL MEDICINE

## 2020-12-24 PROCEDURE — 74011250636 HC RX REV CODE- 250/636: Performed by: INTERNAL MEDICINE

## 2020-12-24 PROCEDURE — 74011636637 HC RX REV CODE- 636/637: Performed by: INTERNAL MEDICINE

## 2020-12-24 PROCEDURE — 74011250636 HC RX REV CODE- 250/636: Performed by: NURSE PRACTITIONER

## 2020-12-24 PROCEDURE — 74011000250 HC RX REV CODE- 250: Performed by: PHYSICIAN ASSISTANT

## 2020-12-24 PROCEDURE — 82962 GLUCOSE BLOOD TEST: CPT

## 2020-12-24 PROCEDURE — 65610000006 HC RM INTENSIVE CARE

## 2020-12-24 PROCEDURE — 80076 HEPATIC FUNCTION PANEL: CPT

## 2020-12-24 PROCEDURE — 99291 CRITICAL CARE FIRST HOUR: CPT | Performed by: INTERNAL MEDICINE

## 2020-12-24 PROCEDURE — 93005 ELECTROCARDIOGRAM TRACING: CPT

## 2020-12-24 PROCEDURE — 82140 ASSAY OF AMMONIA: CPT

## 2020-12-24 PROCEDURE — 85025 COMPLETE CBC W/AUTO DIFF WBC: CPT

## 2020-12-24 PROCEDURE — 74011250637 HC RX REV CODE- 250/637: Performed by: NURSE PRACTITIONER

## 2020-12-24 PROCEDURE — 99232 SBSQ HOSP IP/OBS MODERATE 35: CPT | Performed by: INTERNAL MEDICINE

## 2020-12-24 PROCEDURE — 74011636637 HC RX REV CODE- 636/637: Performed by: STUDENT IN AN ORGANIZED HEALTH CARE EDUCATION/TRAINING PROGRAM

## 2020-12-24 PROCEDURE — 71045 X-RAY EXAM CHEST 1 VIEW: CPT

## 2020-12-24 PROCEDURE — 80048 BASIC METABOLIC PNL TOTAL CA: CPT

## 2020-12-24 PROCEDURE — 94003 VENT MGMT INPAT SUBQ DAY: CPT

## 2020-12-24 PROCEDURE — 83735 ASSAY OF MAGNESIUM: CPT

## 2020-12-24 RX ORDER — INSULIN GLARGINE 100 [IU]/ML
15 INJECTION, SOLUTION SUBCUTANEOUS
Status: DISCONTINUED | OUTPATIENT
Start: 2020-12-24 | End: 2020-12-28

## 2020-12-24 RX ORDER — HYDROCORTISONE SODIUM SUCCINATE 100 MG/2ML
25 INJECTION, POWDER, FOR SOLUTION INTRAMUSCULAR; INTRAVENOUS EVERY 12 HOURS
Status: DISCONTINUED | OUTPATIENT
Start: 2020-12-24 | End: 2020-12-29

## 2020-12-24 RX ADMIN — FUROSEMIDE 40 MG: 10 INJECTION, SOLUTION INTRAMUSCULAR; INTRAVENOUS at 22:12

## 2020-12-24 RX ADMIN — INSULIN LISPRO 3 UNITS: 100 INJECTION, SOLUTION INTRAVENOUS; SUBCUTANEOUS at 18:00

## 2020-12-24 RX ADMIN — SODIUM CHLORIDE 10 ML: 9 INJECTION, SOLUTION INTRAMUSCULAR; INTRAVENOUS; SUBCUTANEOUS at 14:00

## 2020-12-24 RX ADMIN — Medication 125 MCG/HR: at 00:00

## 2020-12-24 RX ADMIN — Medication 30 ML: at 08:04

## 2020-12-24 RX ADMIN — INSULIN LISPRO 5 UNITS: 100 INJECTION, SOLUTION INTRAVENOUS; SUBCUTANEOUS at 00:30

## 2020-12-24 RX ADMIN — POLYETHYLENE GLYCOL 3350 17 G: 17 POWDER, FOR SOLUTION ORAL at 08:04

## 2020-12-24 RX ADMIN — ASPIRIN 81 MG CHEWABLE TABLET 81 MG: 81 TABLET CHEWABLE at 08:04

## 2020-12-24 RX ADMIN — CHLORHEXIDINE GLUCONATE 0.12% ORAL RINSE 10 ML: 1.2 LIQUID ORAL at 08:05

## 2020-12-24 RX ADMIN — ATORVASTATIN CALCIUM 40 MG: 40 TABLET, FILM COATED ORAL at 22:13

## 2020-12-24 RX ADMIN — MEROPENEM 500 MG: 500 INJECTION, POWDER, FOR SOLUTION INTRAVENOUS at 03:00

## 2020-12-24 RX ADMIN — INSULIN LISPRO 5 UNITS: 100 INJECTION, SOLUTION INTRAVENOUS; SUBCUTANEOUS at 12:05

## 2020-12-24 RX ADMIN — INSULIN LISPRO 12 UNITS: 100 INJECTION, SOLUTION INTRAVENOUS; SUBCUTANEOUS at 12:06

## 2020-12-24 RX ADMIN — TICAGRELOR 90 MG: 90 TABLET ORAL at 08:04

## 2020-12-24 RX ADMIN — CHLORHEXIDINE GLUCONATE 0.12% ORAL RINSE 10 ML: 1.2 LIQUID ORAL at 22:13

## 2020-12-24 RX ADMIN — HYDROCORTISONE SODIUM SUCCINATE 25 MG: 100 INJECTION, POWDER, FOR SOLUTION INTRAMUSCULAR; INTRAVENOUS at 06:00

## 2020-12-24 RX ADMIN — SODIUM CHLORIDE 10 ML: 9 INJECTION, SOLUTION INTRAMUSCULAR; INTRAVENOUS; SUBCUTANEOUS at 06:00

## 2020-12-24 RX ADMIN — POTASSIUM BICARBONATE 20 MEQ: 782 TABLET, EFFERVESCENT ORAL at 08:04

## 2020-12-24 RX ADMIN — FAMOTIDINE 20 MG: 10 INJECTION INTRAVENOUS at 08:04

## 2020-12-24 RX ADMIN — SODIUM CHLORIDE 10 ML: 9 INJECTION, SOLUTION INTRAMUSCULAR; INTRAVENOUS; SUBCUTANEOUS at 22:18

## 2020-12-24 RX ADMIN — Medication 3 MG: at 22:13

## 2020-12-24 RX ADMIN — MEROPENEM 500 MG: 500 INJECTION, POWDER, FOR SOLUTION INTRAVENOUS at 18:51

## 2020-12-24 RX ADMIN — TICAGRELOR 90 MG: 90 TABLET ORAL at 22:13

## 2020-12-24 RX ADMIN — INSULIN GLARGINE 30 UNITS: 100 INJECTION, SOLUTION SUBCUTANEOUS at 08:04

## 2020-12-24 RX ADMIN — Medication 150 MCG/HR: at 07:15

## 2020-12-24 RX ADMIN — MEROPENEM 500 MG: 500 INJECTION, POWDER, FOR SOLUTION INTRAVENOUS at 10:00

## 2020-12-24 RX ADMIN — FUROSEMIDE 40 MG: 10 INJECTION, SOLUTION INTRAMUSCULAR; INTRAVENOUS at 08:04

## 2020-12-24 RX ADMIN — INSULIN LISPRO 12 UNITS: 100 INJECTION, SOLUTION INTRAVENOUS; SUBCUTANEOUS at 00:30

## 2020-12-24 RX ADMIN — FAMOTIDINE 20 MG: 10 INJECTION INTRAVENOUS at 22:13

## 2020-12-24 RX ADMIN — INSULIN LISPRO 5 UNITS: 100 INJECTION, SOLUTION INTRAVENOUS; SUBCUTANEOUS at 06:00

## 2020-12-24 RX ADMIN — INSULIN GLARGINE 15 UNITS: 100 INJECTION, SOLUTION SUBCUTANEOUS at 22:13

## 2020-12-24 RX ADMIN — INSULIN LISPRO 12 UNITS: 100 INJECTION, SOLUTION INTRAVENOUS; SUBCUTANEOUS at 06:00

## 2020-12-24 RX ADMIN — HYDROCORTISONE SODIUM SUCCINATE 25 MG: 100 INJECTION, POWDER, FOR SOLUTION INTRAMUSCULAR; INTRAVENOUS at 22:13

## 2020-12-24 RX ADMIN — SODIUM CHLORIDE 750 MG: 9 INJECTION, SOLUTION INTRAVENOUS at 00:00

## 2020-12-24 RX ADMIN — INSULIN LISPRO 5 UNITS: 100 INJECTION, SOLUTION INTRAVENOUS; SUBCUTANEOUS at 18:52

## 2020-12-24 NOTE — PROGRESS NOTES
12/23/20 2001   Patient Observations   Pulse (Heart Rate) 62   Resp Rate 18   O2 Sat (%) 97 %   Airway - Continuous Aspiration of Subglottic Secretions (JACQUELINE) Tube 12/22/20   Placement Date/Time: 12/22/20 1015   Present on Admission/Arrival: No  Airway Tube Size: 7 mm   Insertion Depth (cm) 24 cm   Line Michael Lips   Side Secured Device; Left   Cuff Pressure   (MOV)   Site Assessment Clean, dry, & intact   Suction on Yes   Respiratory   Patient on Vent Yes - If patient is on vent, add Doc Flowsheet Ventilator (). Respiratory Pattern Regular   Chest/Tracheal Assessment Chest expansion, symmetrical   Airway Clearance   Suction ET Tube   Suction Device Inline suction catheter   Suction Catheter Size 14 Fr   Sputum Method Obtained Endotracheal   Sputum Amount Scant   Ventilator Initiate/Discontinue   Bio[employer]Med ID # [id number]   Vent Settings   FIO2 (%) 50 %   SpO2/FIO2 Ratio 194   CMV Rate Set 18   Back-Up Rate 18   Vt Set (ml) 450 ml   PEEP/VENT (cm H2O) 5 cm H20   Insp Time (sec) 0.9 sec   Insp Rise Time % 50 %   Flow Trigger 3   Ventilator Measurements   Resp Rate Observed 18   Vt Exhaled (Machine Breath) (ml) 449 ml   Ve Observed (l/min) 8.1 l/min   PIP Observed (cm H2O) 23 cm H2O   MAP (cm H2O) 10   I:E Ratio Actual 1:2.7   Safety & Alarms   Circuit Temperature 96.6 °F (35.9 °C)   Backup Mode Checked/Apnea Yes   Pressure Max 40 cm H2O   Ve Min 3   Ve Max 20   Vt Min 200 ml   Vt Max 800 ml   RR Max 40   Ambu Bag Yes   Ambu Mask Yes   Age Specific Ventilator Associated Pneumonia Bundle   Patient Age Group Adult   Vent Method/Mode   Ventilation Method Conventional   Ventilator Mode Assist control;VC+   Pulmonary Toilet   Pulmonary Toilet H. O.B elevated;Suction     Check completed and tolerated well. Patient found on above settings with the above results. No resp distress noted.

## 2020-12-24 NOTE — PROGRESS NOTES
12/24/20 1021   Airway - Continuous Aspiration of Subglottic Secretions (JACQUELINE) Tube 12/22/20   Placement Date/Time: 12/22/20 1015   Present on Admission/Arrival: No  Airway Tube Size: 7 mm   Suction on Yes  (Lots of ? old blood or GI contents coming from jacqueline tube )   and oral airway

## 2020-12-24 NOTE — PROGRESS NOTES
Results for Halie Ho (MRN [id number]) as of 12/24/2020 04:56   Ref. Range 12/24/2020 04:36   pH (POC) Latest Ref Range: 7.35 - 7.45   7.53 (H)   pCO2 (POC) Latest Ref Range: 35.0 - 45.0 MMHG 43.8   pO2 (POC) Latest Ref Range: 80 - 100 MMHG 79 (L)   HCO3 (POC) Latest Ref Range: 22 - 26 MMOL/L 36.8 (H)   sO2 (POC) Latest Ref Range: 92 - 97 % 97   Base excess (POC) Latest Units: mmol/L 14   FIO2 (POC) Latest Units: % 50   Patient temp. Latest Units:   97.8   Specimen type (POC) Latest Units:   ARTERIAL   Set Rate Latest Units: bpm 18   Site Latest Units:   DRAWN FROM ARTERIAL LINE   Device: Latest Units:   VENT   Total resp.  rate Latest Units:   18   Mode Latest Units:   ASSIST CONTROL   Tidal volume Latest Units: ml 450   Volume control plus Latest Units:   YES   PIP (POC) Latest Units:   23   PEEP/CPAP (POC) Latest Units: cmH2O 5   Allens test (POC) Latest Units:   N/A   Inspiratory Time Latest Units: sec 0.9

## 2020-12-24 NOTE — PROGRESS NOTES
Nutrition      Pt tolerating tube feeding at goal rate rate. Na level remain at 147 mmol/L. Discussed increasing water flushes; per MD, continue with current regimen and plans to reassess later this evening pending on pt ability to tolerate SBT and extubation. Nutrition goals are being met.       Nutrition Recommendations/Plan:   - Continue tube feeding of Glucerna 1.5 at goal rate of 50 mL/hr, add ProSource once daily with water flushes of 150 mL q 4 hours via NGT.  - Recommend increasing water flushes to 200 mL q 4 hours - MD to address as appropriate          Electronically signed by Emma Benedict RD on 12/24/2020 at 2:37 PM     Contact Number: [phone]

## 2020-12-24 NOTE — PROGRESS NOTES
12/24/20 1430   Oxygen Therapy   O2 Sat (%) 97 %   Pulse via Oximetry 102 beats per minute   O2 Device Hi flow nasal cannula; Heated   O2 Flow Rate (L/min) 40 l/min   O2 Temperature 91.4 °F (33 °C)   FIO2 (%) 80 %

## 2020-12-24 NOTE — PROGRESS NOTES
[address] [place] [address] [facility]  Pulmonary, Critical Care, and Sleep Medicine    Name: Ev Horn MRN: [id number]   : 1955 Hospital: [facility]   Date: 2020        IMPRESSION:   · Acute on Chronic Hypoxic and Hypercapnic Respiratory Failure - Requiring mechanical ventilation, extubated on  and reintubated on , 2/2 RRT on floor for \"unresponsiveness\". Likely aspirated  · Acute on Chronic Systolic Heart Failure - In the setting of severe ILD, Echo 12/10/20 EF 35-40%, diuresis per cards (Dr. Hua Other)  · Shock - Septic with possible Cardiogenic component. Improving. Source likely Aspiration PNA/Pneumonitis vs UTI. . Off pressors. Sputum Cx() (+) heavy GNR. Recent Hx of E.coli ESBL. Leukocytosis improving (). Dobutamine D/C'd (). · Acute Encephalopathy - likely toxic/metabolic vs infectious vs hepatic in nature, ammonia pending   · Acute flash pulmonary edema -  in setting of chronic heart failure - Likely multifactorial, in setting of emergent re-intubation, 2/2 above. Dobutamine D/C'd on  and restarted on , D/C'd ().  pro-BNP trending up on   · Severe pulmonary fibrosis - with extensive honeycombing and bronchiectasis as seen on CTA chest 20 - appears to be a new diagnosis  · Acute cystitis - (+)E. coli  · Lactic acidosis - initial 2.4, resolved  · Acute MI with anterolateral STEMI and Q waves - s/p ptca/stent to proximal/mid LAD using ARELY on DAPT on 20- Remains on Brilinta  · Severe dysphagia - failed MBS, poor candidate for G-tube 2/2 Brilinta per GI (Dr. Genevieve Nunez)  · Hx of Hep C: + RNA viral load 2020  · Multiple liver masses - noted on CT scan 20  · Hx of COPD - on home O2 4L NC  · Hx of HTN  · Hx of DM  · Hx of dementia - unknown baseline, on aricept     Patient Active Problem List   Diagnosis Code    Diverticula of colon K57.30    Sepsis ([facility] [address] A41.9    Sepsis secondary to UTI ([address] A41.9, N39.0    DM hyperosmolarity type II, uncontrolled ([facility]) E11.00, E11.65    HTN (hypertension) I10    Febrile illness, acute R50.9    Gram-negative bacteremia R78.81    Diabetic neuropathy ([facility]) E11.40    Osteoarthritis of both knees M17.0    Acidosis E87.2    Respiratory failure ([facility]) J96.90    Shock ([address] R57.9    Hyperosmolar (nonketotic) coma ([facility]) E11.01    Acute UTI N39.0    Macrocytic anemia D53.9    STEMI (ST elevation myocardial infarction) ([facility]) I21.3    Acute on chronic systolic congestive heart failure ([facility]) I50.23    Severe protein-calorie malnutrition ([facility]) E43    Goals of care, counseling/discussion Z71.89    Dementia without behavioral disturbance ([facility]) F03.90    Pulmonary fibrosis ([facility]) J84.10      PLAN:   · Resp: Titrate FiO2 for SpO2 >94%. SBT this AM, plan for hopeful extubation this afternoon. Daily CXR and ABG while intubated. Con't PRN duo-nebs. Optimize bronchial hygiene, especially if pt is extubated. · I/D: F/U BxCx and Sputum Cx. Con't ABX - Merrem and Vanc. ID following. Trend Pro-sharon PRN pending clinical course. Trend temp & WBC curve. · Heme/Onc: Daily CBC. Monitor for active bleeding while on Brilinta and ASA. H/H currently stable. · CVS: Cardiology following. Con't Brilinta and ASA. Goal MAP >65mmHg. D/C Dobutamine gtt and monitor response. Lasix BID. Would trend BNP while diuresing. Meron D/C'd. Consider D/C CVL tomorrow if remains off pressors. · Metabolic: Daily BMP, Mag & Phos. Trend e-lytes, replace PRN. · Renal: Trend renal indices. +means, consider D/C for pure wick. · Endocrine: POC glucose q6 and PRN. Lantus - 15u qHS, 30u qD, 5u q6. Closely monitor BS, may require Insulin gtt if BS rises any further. Con't wean stress dose steroids - 25mg q12. · GI: NPO. SUP. TF currently on hold for SBT. Leave NGT in place if pt is extubated. Zofran PRN for N/V. BM regimen - Miralax qD until 3 consecutive BMs. · Muscl/Skin: Wound care PRN. No active issues.    · Neuro/Pain: Titrate sedation for RASS 0 to -1. Fentanyl currently held for SBT. PRN Versed/Fentanyl. Pending mentation post-extubation, pt may benefit from Zyprexa. May also require a sitter pending mentation. · Full Code  · Discussed in interdisciplinary rounds      Subjective/Interval History: This patient has been seen and evaluated at the request of Dr. Anita Steiner for acute on chronic hypoxic and hypercapnic respiratory failure. Patient is a 72 y.o. female with a past medical history of COPD, CHF, HTN, DM, dementia, presented to [facility] with acute on chronic systolic heart failure, acute MI with anterolateral STEMI and Q waves s/p ptca/stent to proximal/mid LAD on 12/11/20. Pt was intubated and extubated on 12/17/20, transferred to . On 12/22/20, RRT was called - pt was tachypneic, using accessory muscles, obtunded, and hypotensive. Pt was subsequently intubated, started on levophed and transferred to the ICU emergently. Initial ABG - 7.40, 66.3, 64, 40.7. Dobutamine was restarted per cards.      Upon  initial evaluation, the patient  was vented,  hypotensive with SBP in the 70s, on levophed 4mcg via PIV, ETT suctioning noted to have copious amount of thick secretions (appeared to be tube feeds). R fem CVL and A line placed emergently. Levophed and neosynephrine gtt initiated. Labs, CXR and sedation ordered. Dr. Ainta Steiner called and updated family.      The patient is critically ill and can not provide additional history due to mechanical ventilation. 12/24/20  - No new acute overnight events  - HD stable - Levophed gtt off at ~4AM. Dobutamine D/C'd around 13:00.   - Afebrile; leukocytosis improving  - Adequate UOP, +Pro  - Elrama D/C'd  - Weaning steroids  - BS rising, may require Insulin gtt  - Sputum Cx (12/22) (+) Heavy GNR      ROS:Review of systems not obtained due to patient factors.     Objective:   Vital Signs:    Visit Vitals  BP (!) 139/41   Pulse (!) 106   Temp 100 °F (37.8 °C)   Resp 23   Ht 5' 7\" (1.702 m)   Wt 62.6 kg (138 lb 0.1 oz)   SpO2 93%   Breastfeeding No   BMI 21.62 kg/m²       O2 Device: Endotracheal tube   O2 Flow Rate (L/min): 5 l/min   Temp (24hrs), Av.8 °F (37.1 °C), Min:98.1 °F (36.7 °C), Max:100 °F (37.8 °C)       Intake/Output:   Last shift:      701 - 1900  In: 250   Out: 380 [Urine:380]  Last 3 shifts: 1901 - 700  In: 2003.8 [I.V.:903.8]  Out: 2150 [Urine:2150]    Intake/Output Summary (Last 24 hours) at 2020 1353  Last data filed at 2020 1200  Gross per 24 hour   Intake 1495.32 ml   Output 1180 ml   Net 315.32 ml      Physical Exam:     General:  Intubated, awake on SBT, agitated currently    Head:  Normocephalic, without obvious abnormality, atraumatic. Eyes:  Conjunctivae/corneas clear. PERRL, EOMs intact. Back:   Symmetric, no curvature. ROM normal.   Lungs:   (+)coarse breath sounds, diminished bibasilar. Chest wall:  No tenderness or deformity. Heart:  Regular rate and rhythm, S1, S2 normal, no murmur, click, rub or gallop. Abdomen:   Soft, non-tender. Bowel sounds normal. No masses,  No organomegaly. Extremities: Extremities normal, atraumatic, no cyanosis or edema. + restraints   Pulses: 2+ and symmetric all extremities. Skin: Skin color, texture, turgor normal. No rashes or lesions. Normal cap refill.     Neurologic: Awake, doesn't follow commands, moves extremities spontaneously, (+)cough/gag reflex  DEVICES :  ETT, OGT, CVL, A-line, Pro            DATA:  Labs:  Recent Labs     20  0400 20  0330 20  1128   WBC 19.4* 24.2* 31.0*   HGB 9.3* 10.4* 11.4*   HCT 31.1* 34.3* 38.5   * 672* 828*     Recent Labs     20  0400 20  0330 20  1128 20  0955 20  0049   * 147* 149* 151* 150*   K 3.7 3.5 3.8 4.2 3.6    107 105 107 106   CO2 36* 36* 36* 38* 38*   * 258* 205* 205* 214*   BUN 73* 78* 73* 70* 56*   CREA 1.26 1.30 1.36* 1.30 0.84   CA 9.5 9.6 9.9 10.3* 10.1   MG 3.3* 3.0*  --   --  2.9*   PHOS 2.9 2.8  --   --  3.2   ALB  --   --  3.3* 2.8*  --    ALT  --   --  187* 209*  --    INR  --   --  1.2  --   --      No results for input(s): PH, PCO2, PO2, HCO3, FIO2 in the last 72 hours. Lab Results   Component Value Date/Time    NT pro-BNP 3,614 (H) 12/22/2020 09:55 AM    NT pro-BNP 2,099 (H) 12/15/2020 07:45 PM    NT pro-BNP 6,465 (H) 12/12/2020 06:20 AM    NT pro-BNP 5,307 (H) 12/11/2020 01:35 PM    NT pro-BNP 5,306 (H) 02/07/2018 09:47 PM                                                          ECHO [12/10/20]:  · LV: Estimated LVEF is 35 - 40%. Visually measured ejection fraction. Normal cavity size and wall thickness. Moderately and segmentally reduced systolic function. Inconclusive left ventricular diastolic function. · AO: Mild aortic root dilatation; diameter is 3.8 cm      Imaging:     CXR [12/24/20]: FINDINGS:  Stable appropriate position of the tendon interpreted tubes. Upper normal to  mildly enlarged cardiac silhouette, stable. Diffuse increased interstitial  opacities with basilar airspace disease. No pneumothorax. No pleural effusion. Stable osseous structures. IMPRESSION:     Stable support tubes. No significant interval change from one day prior      [x]I have personally reviewed the patients radiographs  []Radiographs reviewed with radiologist   [x]No change from prior, tubes and lines in adequate position  []Improved   []Worsening    High complexity decision making was performed during the evaluation of this patient at high risk for decompensation with multiple organ involvement     Above mentioned total time spent on reviewing the case/medical record/data/notes/EMR/patient examination/documentation/coordinating care with nurse/consultants, exclusive of procedures with complex decision making performed and > 50% time spent in face to face evaluation.     Salvador Marin PA-C   12/24/20    Pulmonary Critical Care Medicine  [facility]        [facility] Staff Addendum     I have independently evaluated the patient and reviewed the patient's chart. I have discussed the findings and care plan with ICU Care Team. Care Plan reviewed on ICU milti-D rounds. I agree with the above evaluation, assessment and recommendations along with the following comments and observations. Please also review my orders. Patient intubated and sedated. SBT today- will try to extubate if able. Prognosis is poor. Will attempt to discuss with family goals of care. Continue with NGT tube    Arterial line has been discontinued. Continue with CVL for now with plant to d/c in the near future pending clinical course    I spoke with cardiology staff today. If PEG and possible trach are needed ok to hold antiplatelet therapy with the  Use of a heparin window. Complex decision making was made in the evaluation and management plans during this consultation. More than 50% of time was spent in counseling and coordination of care including review of data and discussion with other team members. Further recommendations and therapies pending clinical course.     Critical Care and time spent coordinating care, minus procedure time:35   min    Alejandra Berg DO, ANURADHAP  Pulmonary, Sleep and Critical Care Medicine  2:54 PM

## 2020-12-24 NOTE — PROGRESS NOTES
12/24/20 1721   Oxygen Therapy   O2 Sat (%) 96 %   Pulse via Oximetry 101 beats per minute   O2 Device Hi flow nasal cannula; Heated   O2 Flow Rate (L/min) 40 l/min   O2 Temperature 91.4 °F (33 °C)   FIO2 (%) 75 %

## 2020-12-24 NOTE — PROGRESS NOTES
12/24/20 0245   Patient Observations   Pulse (Heart Rate) 67   Resp Rate 18   O2 Sat (%) 99 %   Airway - Continuous Aspiration of Subglottic Secretions (JACQUELINE) Tube 12/22/20   Placement Date/Time: 12/22/20 1015   Present on Admission/Arrival: No  Airway Tube Size: 7 mm   Insertion Depth (cm) 24 cm   Line Michael Lips   Side Secured Device; Centered   Cuff Pressure   (MOV)   Site Assessment Clean, dry, & intact   Suction on Yes   Respiratory   Patient on Vent Yes - If patient is on vent, add Doc Flowsheet Ventilator (). Respiratory Pattern Regular   Chest/Tracheal Assessment Chest expansion, symmetrical   Airway Clearance   Suction ET Tube   Suction Device Inline suction catheter   Suction Catheter Size 14 Fr   Sputum Method Obtained Endotracheal   Sputum Amount Scant   Vent Settings   FIO2 (%) 50 %   SpO2/FIO2 Ratio 198   CMV Rate Set 18   Back-Up Rate 18   Vt Set (ml) 450 ml   PEEP/VENT (cm H2O) 5 cm H20   Insp Time (sec) 0.9 sec   Insp Rise Time % 50 %   Flow Trigger 3   Ventilator Measurements   Resp Rate Observed 18   Vt Exhaled (Machine Breath) (ml) 454 ml   Ve Observed (l/min) 8.2 l/min   PIP Observed (cm H2O) 23 cm H2O   MAP (cm H2O) 10   I:E Ratio Actual 1:2.7   Safety & Alarms   Circuit Temperature 99 °F (37.2 °C)   Backup Mode Checked/Apnea Yes   Pressure Max 40 cm H2O   Ve Min 3   Ve Max 20   Vt Min 200 ml   Vt Max 800 ml   RR Max 40   Ambu Bag Yes   Ambu Mask Yes   Age Specific Ventilator Associated Pneumonia Bundle   Patient Age Group Adult   Vent Method/Mode   Ventilation Method Conventional   Ventilator Mode Assist control;VC+   $$ Ventilator Subsequent Subsequent Vent Day   Pulmonary Toilet   Pulmonary Toilet H. O.B elevated;Suction     Check completed and tolerated well. Patient found on above settings with the above results. No resp distress noted. Call bell within reach.

## 2020-12-24 NOTE — PROGRESS NOTES
Patient extubated to vapotherm canula, 40L 100%. Large amounts of secretions suctioned out by RT, Zayra and this RN. Patient tolerating vapotherm canula. Orders to extubate written and verbal from Dr. Charlynne Denver.

## 2020-12-24 NOTE — PROGRESS NOTES
Start of wean HR 84 sats 95. the patient some what agitated      12/24/20 1231   Weaning Parameters   Spontaneous Breathing Trial Complete No (Comments)   Resp Rate Observed 18      RSBI 58

## 2020-12-24 NOTE — PROGRESS NOTES
Pt extubated to high flow. Pt required a frequent amount of NT suctioning post extubation.       12/24/20 1429   Weaning Parameters   Spontaneous Breathing Trial Complete Yes      RSBI 76

## 2020-12-24 NOTE — PROGRESS NOTES
12/23/20 2349   Patient Observations   Pulse (Heart Rate) 74   Resp Rate 20   O2 Sat (%) 97 %   Airway - Continuous Aspiration of Subglottic Secretions (JACQUELINE) Tube 12/22/20   Placement Date/Time: 12/22/20 1015   Present on Admission/Arrival: No  Airway Tube Size: 7 mm   Insertion Depth (cm) 24 cm   Line Michael Lips   Side Secured Device; Left   Cuff Pressure   (MOV)   Site Assessment Clean, dry, & intact   Suction on Yes   Respiratory   Patient on Vent Yes - If patient is on vent, add Doc Flowsheet Ventilator (). Respiratory Pattern Regular   Chest/Tracheal Assessment Chest expansion, symmetrical   Vent Settings   FIO2 (%) 50 %   SpO2/FIO2 Ratio 194   CMV Rate Set 18   Back-Up Rate 18   Vt Set (ml) 450 ml   PEEP/VENT (cm H2O) 5 cm H20   Insp Time (sec) 0.9 sec   Insp Rise Time % 50 %   Flow Trigger 3   Ventilator Measurements   Resp Rate Observed 20   Vt Exhaled (Machine Breath) (ml) 473 ml   Ve Observed (l/min) 8.7 l/min   PIP Observed (cm H2O) 24 cm H2O   MAP (cm H2O) 11   I:E Ratio Actual 1:2.7   Safety & Alarms   Circuit Temperature 98.6 °F (37 °C)   Backup Mode Checked/Apnea Yes   Pressure Max 40 cm H2O   Ve Min 3   Ve Max 20   Vt Min 200 ml   Vt Max 800 ml   RR Max 40   Ambu Bag Yes   Ambu Mask Yes   Age Specific Ventilator Associated Pneumonia Bundle   Patient Age Group Adult   Vent Method/Mode   Ventilation Method Conventional   Ventilator Mode Assist control;VC+   Pulmonary Toilet   Pulmonary Toilet H. O.B elevated     Check completed and tolerated well. Patient found on above settings with the above results. No resp distress noted.

## 2020-12-24 NOTE — DIABETES MGMT
GLYCEMIC CONTROL PLAN OF CARE     Assessment/Recommendations:  Lab glucose this am 338 mg/dl  Noted pt receiving steroids, hydrocortisone 25 mg decreased to every 12 hours  Recommend increasing bedtime lantus dose to 15 units every bedtime. Continue morning lantus and corrective insulin coverage as ordered. Will continue inpatient monitoring. Most recent blood glucose values:        Results for Paulo Raphael (MRN [id number]) as of 12/24/2020 10:43   Ref. Range 12/23/2020 06:08 12/23/2020 11:54 12/23/2020 17:24 12/23/2020 23:48 12/24/2020 06:06   GLUCOSE,FAST - POC Latest Ref Range: 70 - 110 mg/dL 202 (H) 174 (H) 147 (H) 301 (H) 349 (H)     Current A1C of 8.3 % is equivalent to average blood glucose of 192 mg/dl over the past 2-3 months. Current hospital diabetes medications:   lantus 30 units every morning  lantus 10 units every bedtime  Lispro 5 units every 6 hours  Lispro corrective insulin coverage every 6 hours  Previous day's insulin requirements:   lantus 40 units. Lispro 22 units   Home diabetes medications:  Per pta med list.  lantus 22 units daily   Diet:    NPO. TF  Education:  ____Refer to Diabetes Education Record             _x__Education not indicated at this time  Intubated. Sedated. History of dementia.     Honorio Fuentes

## 2020-12-25 ENCOUNTER — APPOINTMENT (OUTPATIENT)
Dept: GENERAL RADIOLOGY | Age: 65
DRG: 003 | End: 2020-12-25
Attending: PHYSICIAN ASSISTANT
Payer: MEDICARE

## 2020-12-25 ENCOUNTER — ANESTHESIA (OUTPATIENT)
Dept: ICU | Age: 65
DRG: 003 | End: 2020-12-25
Payer: MEDICARE

## 2020-12-25 ENCOUNTER — ANESTHESIA EVENT (OUTPATIENT)
Dept: ICU | Age: 65
DRG: 003 | End: 2020-12-25
Payer: MEDICARE

## 2020-12-25 LAB
ALBUMIN SERPL-MCNC: 3 G/DL (ref 3.4–5)
ALBUMIN/GLOB SERPL: 0.8 {RATIO} (ref 0.8–1.7)
ALP SERPL-CCNC: 101 U/L (ref 45–117)
ALT SERPL-CCNC: 107 U/L (ref 13–56)
ANION GAP SERPL CALC-SCNC: 1 MMOL/L (ref 3–18)
ANION GAP SERPL CALC-SCNC: 2 MMOL/L (ref 3–18)
ANION GAP SERPL CALC-SCNC: 3 MMOL/L (ref 3–18)
ANION GAP SERPL CALC-SCNC: 5 MMOL/L (ref 3–18)
APTT PPP: 27.5 SEC (ref 23–36.4)
ARTERIAL PATENCY WRIST A: ABNORMAL
AST SERPL-CCNC: 143 U/L (ref 10–38)
ATRIAL RATE: 163 BPM
ATRIAL RATE: 96 BPM
BACTERIA SPEC CULT: ABNORMAL
BACTERIA SPEC CULT: ABNORMAL
BASE EXCESS BLD CALC-SCNC: 14 MMOL/L
BASOPHILS # BLD: 0 K/UL (ref 0–0.06)
BASOPHILS # BLD: 0 K/UL (ref 0–0.1)
BASOPHILS NFR BLD: 0 % (ref 0–2)
BASOPHILS NFR BLD: 0 % (ref 0–3)
BDY SITE: ABNORMAL
BILIRUB SERPL-MCNC: 0.7 MG/DL (ref 0.2–1)
BNP SERPL-MCNC: 4065 PG/ML (ref 0–900)
BUN SERPL-MCNC: 54 MG/DL (ref 7–18)
BUN SERPL-MCNC: 62 MG/DL (ref 7–18)
BUN SERPL-MCNC: 62 MG/DL (ref 7–18)
BUN SERPL-MCNC: 64 MG/DL (ref 7–18)
BUN/CREAT SERPL: 52 (ref 12–20)
BUN/CREAT SERPL: 55 (ref 12–20)
BUN/CREAT SERPL: 61 (ref 12–20)
BUN/CREAT SERPL: 61 (ref 12–20)
CA-I SERPL-SCNC: 1.17 MMOL/L (ref 1.12–1.32)
CALCIUM SERPL-MCNC: 9.2 MG/DL (ref 8.5–10.1)
CALCIUM SERPL-MCNC: 9.3 MG/DL (ref 8.5–10.1)
CALCIUM SERPL-MCNC: 9.4 MG/DL (ref 8.5–10.1)
CALCIUM SERPL-MCNC: 9.5 MG/DL (ref 8.5–10.1)
CALCULATED P AXIS, ECG09: -2 DEGREES
CALCULATED P AXIS, ECG09: 26 DEGREES
CALCULATED R AXIS, ECG10: -41 DEGREES
CALCULATED R AXIS, ECG10: -8 DEGREES
CALCULATED T AXIS, ECG11: 39 DEGREES
CALCULATED T AXIS, ECG11: 56 DEGREES
CHLORIDE SERPL-SCNC: 112 MMOL/L (ref 100–111)
CHLORIDE SERPL-SCNC: 114 MMOL/L (ref 100–111)
CHLORIDE SERPL-SCNC: 115 MMOL/L (ref 100–111)
CHLORIDE SERPL-SCNC: 115 MMOL/L (ref 100–111)
CK MB CFR SERPL CALC: 5.6 % (ref 0–4)
CK MB CFR SERPL CALC: 6.5 % (ref 0–4)
CK MB CFR SERPL CALC: 7.9 % (ref 0–4)
CK MB SERPL-MCNC: 1.8 NG/ML (ref 5–25)
CK MB SERPL-MCNC: 2.2 NG/ML (ref 5–25)
CK MB SERPL-MCNC: 2.3 NG/ML (ref 5–25)
CK SERPL-CCNC: 29 U/L (ref 26–192)
CK SERPL-CCNC: 32 U/L (ref 26–192)
CK SERPL-CCNC: 34 U/L (ref 26–192)
CO2 SERPL-SCNC: 37 MMOL/L (ref 21–32)
CO2 SERPL-SCNC: 38 MMOL/L (ref 21–32)
CO2 SERPL-SCNC: 38 MMOL/L (ref 21–32)
CO2 SERPL-SCNC: 40 MMOL/L (ref 21–32)
CREAT SERPL-MCNC: 1.02 MG/DL (ref 0.6–1.3)
CREAT SERPL-MCNC: 1.03 MG/DL (ref 0.6–1.3)
CREAT SERPL-MCNC: 1.05 MG/DL (ref 0.6–1.3)
CREAT SERPL-MCNC: 1.13 MG/DL (ref 0.6–1.3)
DIAGNOSIS, 93000: NORMAL
DIAGNOSIS, 93000: NORMAL
DIFFERENTIAL METHOD BLD: ABNORMAL
DIFFERENTIAL METHOD BLD: ABNORMAL
EOSINOPHIL # BLD: 0 K/UL (ref 0–0.4)
EOSINOPHIL # BLD: 0 K/UL (ref 0–0.4)
EOSINOPHIL NFR BLD: 0 % (ref 0–5)
EOSINOPHIL NFR BLD: 0 % (ref 0–5)
ERYTHROCYTE [DISTWIDTH] IN BLOOD BY AUTOMATED COUNT: 14.3 % (ref 11.6–14.5)
ERYTHROCYTE [DISTWIDTH] IN BLOOD BY AUTOMATED COUNT: 14.3 % (ref 11.6–14.5)
GAS FLOW.O2 O2 DELIVERY SYS: ABNORMAL L/MIN
GAS FLOW.O2 SETTING OXYMISER: 16 BPM
GLOBULIN SER CALC-MCNC: 4 G/DL (ref 2–4)
GLUCOSE BLD STRIP.AUTO-MCNC: 168 MG/DL (ref 70–110)
GLUCOSE BLD STRIP.AUTO-MCNC: 238 MG/DL (ref 70–110)
GLUCOSE BLD STRIP.AUTO-MCNC: 248 MG/DL (ref 70–110)
GLUCOSE BLD STRIP.AUTO-MCNC: 290 MG/DL (ref 70–110)
GLUCOSE BLD STRIP.AUTO-MCNC: 319 MG/DL (ref 70–110)
GLUCOSE BLD STRIP.AUTO-MCNC: 328 MG/DL (ref 70–110)
GLUCOSE SERPL-MCNC: 189 MG/DL (ref 74–99)
GLUCOSE SERPL-MCNC: 191 MG/DL (ref 74–99)
GLUCOSE SERPL-MCNC: 273 MG/DL (ref 74–99)
GLUCOSE SERPL-MCNC: 330 MG/DL (ref 74–99)
GRAM STN SPEC: ABNORMAL
HCO3 BLD-SCNC: 36.8 MMOL/L (ref 22–26)
HCT VFR BLD AUTO: 26 % (ref 35–45)
HCT VFR BLD AUTO: 26.4 % (ref 35–45)
HGB BLD-MCNC: 7.7 G/DL (ref 12–16)
HGB BLD-MCNC: 7.8 G/DL (ref 12–16)
INR PPP: 1.2 (ref 0.8–1.2)
INSPIRATION.DURATION SETTING TIME VENT: 0.9 SEC
LACTATE SERPL-SCNC: 1.4 MMOL/L (ref 0.4–2)
LYMPHOCYTES # BLD: 1 K/UL (ref 0.9–3.6)
LYMPHOCYTES # BLD: 1.4 K/UL (ref 0.8–3.5)
LYMPHOCYTES NFR BLD: 11 % (ref 20–51)
LYMPHOCYTES NFR BLD: 8 % (ref 21–52)
MAGNESIUM SERPL-MCNC: 3 MG/DL (ref 1.6–2.6)
MAGNESIUM SERPL-MCNC: 3 MG/DL (ref 1.6–2.6)
MCH RBC QN AUTO: 26.1 PG (ref 24–34)
MCH RBC QN AUTO: 26.3 PG (ref 24–34)
MCHC RBC AUTO-ENTMCNC: 29.5 G/DL (ref 31–37)
MCHC RBC AUTO-ENTMCNC: 29.6 G/DL (ref 31–37)
MCV RBC AUTO: 88.1 FL (ref 74–97)
MCV RBC AUTO: 88.9 FL (ref 74–97)
MONOCYTES # BLD: 0.8 K/UL (ref 0.05–1.2)
MONOCYTES # BLD: 0.8 K/UL (ref 0–1)
MONOCYTES NFR BLD: 6 % (ref 2–9)
MONOCYTES NFR BLD: 7 % (ref 3–10)
NEUTS BAND NFR BLD MANUAL: 3 % (ref 0–5)
NEUTS SEG # BLD: 10.5 K/UL (ref 1.8–8)
NEUTS SEG # BLD: 10.5 K/UL (ref 1.8–8)
NEUTS SEG NFR BLD: 80 % (ref 42–75)
NEUTS SEG NFR BLD: 85 % (ref 40–73)
O2/TOTAL GAS SETTING VFR VENT: 100 %
P-R INTERVAL, ECG05: 124 MS
P-R INTERVAL, ECG05: 136 MS
PCO2 BLD: 44.9 MMHG (ref 35–45)
PEEP RESPIRATORY: 5 CMH2O
PH BLD: 7.52 [PH] (ref 7.35–7.45)
PHOSPHATE SERPL-MCNC: 2.6 MG/DL (ref 2.5–4.9)
PHOSPHATE SERPL-MCNC: 2.6 MG/DL (ref 2.5–4.9)
PHOSPHATE SERPL-MCNC: 2.8 MG/DL (ref 2.5–4.9)
PHOSPHATE SERPL-MCNC: 2.9 MG/DL (ref 2.5–4.9)
PIP ISTAT,IPIP: 22
PLATELET # BLD AUTO: 461 K/UL (ref 135–420)
PLATELET # BLD AUTO: 497 K/UL (ref 135–420)
PLATELET COMMENTS,PCOM: ABNORMAL
PMV BLD AUTO: 10.8 FL (ref 9.2–11.8)
PMV BLD AUTO: 11 FL (ref 9.2–11.8)
PO2 BLD: 128 MMHG (ref 80–100)
POTASSIUM SERPL-SCNC: 3.1 MMOL/L (ref 3.5–5.5)
POTASSIUM SERPL-SCNC: 3.4 MMOL/L (ref 3.5–5.5)
POTASSIUM SERPL-SCNC: 3.5 MMOL/L (ref 3.5–5.5)
POTASSIUM SERPL-SCNC: 3.6 MMOL/L (ref 3.5–5.5)
PROT SERPL-MCNC: 7 G/DL (ref 6.4–8.2)
PROTHROMBIN TIME: 15.1 SEC (ref 11.5–15.2)
Q-T INTERVAL, ECG07: 306 MS
Q-T INTERVAL, ECG07: 332 MS
QRS DURATION, ECG06: 78 MS
QRS DURATION, ECG06: 78 MS
QTC CALCULATION (BEZET), ECG08: 419 MS
QTC CALCULATION (BEZET), ECG08: 503 MS
RBC # BLD AUTO: 2.95 M/UL (ref 4.2–5.3)
RBC # BLD AUTO: 2.97 M/UL (ref 4.2–5.3)
RBC MORPH BLD: ABNORMAL
RBC MORPH BLD: ABNORMAL
SAO2 % BLD: 99 % (ref 92–97)
SERVICE CMNT-IMP: ABNORMAL
SERVICE CMNT-IMP: ABNORMAL
SODIUM SERPL-SCNC: 152 MMOL/L (ref 136–145)
SODIUM SERPL-SCNC: 155 MMOL/L (ref 136–145)
SODIUM SERPL-SCNC: 156 MMOL/L (ref 136–145)
SODIUM SERPL-SCNC: 157 MMOL/L (ref 136–145)
SPECIMEN TYPE: ABNORMAL
TOTAL RESP. RATE, ITRR: 16
TROPONIN I SERPL-MCNC: 0.12 NG/ML (ref 0–0.04)
TROPONIN I SERPL-MCNC: 0.12 NG/ML (ref 0–0.04)
TROPONIN I SERPL-MCNC: 0.16 NG/ML (ref 0–0.04)
VANCOMYCIN SERPL-MCNC: 4.7 UG/ML (ref 5–40)
VENTILATION MODE VENT: ABNORMAL
VENTRICULAR RATE, ECG03: 163 BPM
VENTRICULAR RATE, ECG03: 96 BPM
VT SETTING VENT: 450 ML
WBC # BLD AUTO: 12.3 K/UL (ref 4.6–13.2)
WBC # BLD AUTO: 12.7 K/UL (ref 4.6–13.2)

## 2020-12-25 PROCEDURE — 74011250636 HC RX REV CODE- 250/636: Performed by: PHYSICIAN ASSISTANT

## 2020-12-25 PROCEDURE — 93005 ELECTROCARDIOGRAM TRACING: CPT

## 2020-12-25 PROCEDURE — 74011000250 HC RX REV CODE- 250

## 2020-12-25 PROCEDURE — 85025 COMPLETE CBC W/AUTO DIFF WBC: CPT

## 2020-12-25 PROCEDURE — 74011636637 HC RX REV CODE- 636/637: Performed by: INTERNAL MEDICINE

## 2020-12-25 PROCEDURE — 82553 CREATINE MB FRACTION: CPT

## 2020-12-25 PROCEDURE — P9045 ALBUMIN (HUMAN), 5%, 250 ML: HCPCS | Performed by: PHYSICIAN ASSISTANT

## 2020-12-25 PROCEDURE — 80053 COMPREHEN METABOLIC PANEL: CPT

## 2020-12-25 PROCEDURE — 36592 COLLECT BLOOD FROM PICC: CPT

## 2020-12-25 PROCEDURE — 74011000250 HC RX REV CODE- 250: Performed by: INTERNAL MEDICINE

## 2020-12-25 PROCEDURE — 99292 CRITICAL CARE ADDL 30 MIN: CPT | Performed by: INTERNAL MEDICINE

## 2020-12-25 PROCEDURE — 94400 HC END TIDAL CO2 RESPONSE CURVE: CPT

## 2020-12-25 PROCEDURE — 65610000006 HC RM INTENSIVE CARE

## 2020-12-25 PROCEDURE — 74011250637 HC RX REV CODE- 250/637: Performed by: INTERNAL MEDICINE

## 2020-12-25 PROCEDURE — 83735 ASSAY OF MAGNESIUM: CPT

## 2020-12-25 PROCEDURE — 74011250637 HC RX REV CODE- 250/637: Performed by: PHYSICIAN ASSISTANT

## 2020-12-25 PROCEDURE — 74011000250 HC RX REV CODE- 250: Performed by: PHYSICIAN ASSISTANT

## 2020-12-25 PROCEDURE — 74011250636 HC RX REV CODE- 250/636

## 2020-12-25 PROCEDURE — 82962 GLUCOSE BLOOD TEST: CPT

## 2020-12-25 PROCEDURE — 83605 ASSAY OF LACTIC ACID: CPT

## 2020-12-25 PROCEDURE — 94762 N-INVAS EAR/PLS OXIMTRY CONT: CPT

## 2020-12-25 PROCEDURE — 2709999900 HC NON-CHARGEABLE SUPPLY

## 2020-12-25 PROCEDURE — 36600 WITHDRAWAL OF ARTERIAL BLOOD: CPT

## 2020-12-25 PROCEDURE — 74011000258 HC RX REV CODE- 258: Performed by: PHYSICIAN ASSISTANT

## 2020-12-25 PROCEDURE — 85610 PROTHROMBIN TIME: CPT

## 2020-12-25 PROCEDURE — 74011250636 HC RX REV CODE- 250/636: Performed by: INTERNAL MEDICINE

## 2020-12-25 PROCEDURE — 82330 ASSAY OF CALCIUM: CPT

## 2020-12-25 PROCEDURE — 99291 CRITICAL CARE FIRST HOUR: CPT | Performed by: INTERNAL MEDICINE

## 2020-12-25 PROCEDURE — 85730 THROMBOPLASTIN TIME PARTIAL: CPT

## 2020-12-25 PROCEDURE — 83880 ASSAY OF NATRIURETIC PEPTIDE: CPT

## 2020-12-25 PROCEDURE — 84100 ASSAY OF PHOSPHORUS: CPT

## 2020-12-25 PROCEDURE — 94002 VENT MGMT INPAT INIT DAY: CPT

## 2020-12-25 PROCEDURE — 71045 X-RAY EXAM CHEST 1 VIEW: CPT

## 2020-12-25 PROCEDURE — 82803 BLOOD GASES ANY COMBINATION: CPT

## 2020-12-25 PROCEDURE — 74011636637 HC RX REV CODE- 636/637: Performed by: STUDENT IN AN ORGANIZED HEALTH CARE EDUCATION/TRAINING PROGRAM

## 2020-12-25 PROCEDURE — 80202 ASSAY OF VANCOMYCIN: CPT

## 2020-12-25 PROCEDURE — 80048 BASIC METABOLIC PNL TOTAL CA: CPT

## 2020-12-25 RX ORDER — ATROPINE SULFATE 0.1 MG/ML
0.5 INJECTION INTRAVENOUS ONCE
Status: COMPLETED | OUTPATIENT
Start: 2020-12-25 | End: 2020-12-25

## 2020-12-25 RX ORDER — NOREPINEPHRINE BITARTRATE/D5W 8 MG/250ML
PLASTIC BAG, INJECTION (ML) INTRAVENOUS
Status: COMPLETED
Start: 2020-12-25 | End: 2020-12-25

## 2020-12-25 RX ORDER — MIDAZOLAM IN 0.9 % SOD.CHLORID 1 MG/ML
1-5 PLASTIC BAG, INJECTION (ML) INTRAVENOUS
Status: DISCONTINUED | OUTPATIENT
Start: 2020-12-25 | End: 2021-01-05

## 2020-12-25 RX ORDER — ATROPINE SULFATE 0.1 MG/ML
INJECTION INTRAVENOUS
Status: COMPLETED
Start: 2020-12-25 | End: 2020-12-25

## 2020-12-25 RX ORDER — FUROSEMIDE 10 MG/ML
40 INJECTION INTRAMUSCULAR; INTRAVENOUS EVERY 12 HOURS
Status: CANCELLED | OUTPATIENT
Start: 2020-12-25

## 2020-12-25 RX ORDER — FENTANYL CITRATE 50 UG/ML
50-100 INJECTION, SOLUTION INTRAMUSCULAR; INTRAVENOUS
Status: DISCONTINUED | OUTPATIENT
Start: 2020-12-25 | End: 2021-01-08 | Stop reason: HOSPADM

## 2020-12-25 RX ORDER — SUCCINYLCHOLINE CHLORIDE 20 MG/ML INJECTION SOLUTION
SOLUTION AS NEEDED
Status: DISCONTINUED | OUTPATIENT
Start: 2020-12-25 | End: 2020-12-26 | Stop reason: ALTCHOICE

## 2020-12-25 RX ORDER — ALBUMIN HUMAN 50 G/1000ML
25 SOLUTION INTRAVENOUS ONCE
Status: COMPLETED | OUTPATIENT
Start: 2020-12-25 | End: 2020-12-25

## 2020-12-25 RX ORDER — MIDAZOLAM HYDROCHLORIDE 1 MG/ML
1-2 INJECTION, SOLUTION INTRAMUSCULAR; INTRAVENOUS
Status: DISCONTINUED | OUTPATIENT
Start: 2020-12-25 | End: 2021-01-08 | Stop reason: HOSPADM

## 2020-12-25 RX ORDER — ETOMIDATE 2 MG/ML
INJECTION INTRAVENOUS AS NEEDED
Status: DISCONTINUED | OUTPATIENT
Start: 2020-12-25 | End: 2020-12-26 | Stop reason: ALTCHOICE

## 2020-12-25 RX ORDER — CHLORHEXIDINE GLUCONATE 1.2 MG/ML
10 RINSE ORAL EVERY 12 HOURS
Status: DISCONTINUED | OUTPATIENT
Start: 2020-12-25 | End: 2021-01-14 | Stop reason: HOSPADM

## 2020-12-25 RX ORDER — ALBUMIN HUMAN 250 G/1000ML
SOLUTION INTRAVENOUS
Status: DISPENSED
Start: 2020-12-25 | End: 2020-12-25

## 2020-12-25 RX ORDER — PHENYLEPHRINE HCL IN 0.9% NACL 1 MG/10 ML
SYRINGE (ML) INTRAVENOUS
Status: DISCONTINUED
Start: 2020-12-25 | End: 2020-12-25 | Stop reason: WASHOUT

## 2020-12-25 RX ORDER — VANCOMYCIN HYDROCHLORIDE
1250 ONCE
Status: COMPLETED | OUTPATIENT
Start: 2020-12-25 | End: 2020-12-25

## 2020-12-25 RX ORDER — POTASSIUM CHLORIDE 14.9 MG/ML
20 INJECTION INTRAVENOUS ONCE
Status: COMPLETED | OUTPATIENT
Start: 2020-12-25 | End: 2020-12-25

## 2020-12-25 RX ADMIN — Medication 10 MCG/MIN: at 10:00

## 2020-12-25 RX ADMIN — TICAGRELOR 90 MG: 90 TABLET ORAL at 09:17

## 2020-12-25 RX ADMIN — Medication 5 MCG/MIN: at 22:01

## 2020-12-25 RX ADMIN — INSULIN LISPRO 9 UNITS: 100 INJECTION, SOLUTION INTRAVENOUS; SUBCUTANEOUS at 05:51

## 2020-12-25 RX ADMIN — Medication 50 MCG/HR: at 02:50

## 2020-12-25 RX ADMIN — FUROSEMIDE 40 MG: 10 INJECTION, SOLUTION INTRAMUSCULAR; INTRAVENOUS at 09:17

## 2020-12-25 RX ADMIN — SODIUM CHLORIDE 10 ML: 9 INJECTION, SOLUTION INTRAMUSCULAR; INTRAVENOUS; SUBCUTANEOUS at 05:54

## 2020-12-25 RX ADMIN — INSULIN LISPRO 12 UNITS: 100 INJECTION, SOLUTION INTRAVENOUS; SUBCUTANEOUS at 13:15

## 2020-12-25 RX ADMIN — POTASSIUM BICARBONATE 40 MEQ: 782 TABLET, EFFERVESCENT ORAL at 17:03

## 2020-12-25 RX ADMIN — CHLORHEXIDINE GLUCONATE 0.12% ORAL RINSE 10 ML: 1.2 LIQUID ORAL at 09:18

## 2020-12-25 RX ADMIN — MIDAZOLAM HYDROCHLORIDE 2 MG: 2 INJECTION, SOLUTION INTRAMUSCULAR; INTRAVENOUS at 13:39

## 2020-12-25 RX ADMIN — CHLORHEXIDINE GLUCONATE 0.12% ORAL RINSE 10 ML: 1.2 LIQUID ORAL at 21:33

## 2020-12-25 RX ADMIN — Medication 125 MCG/HR: at 08:30

## 2020-12-25 RX ADMIN — ACETAMINOPHEN ORAL SOLUTION 500 MG: 650 SOLUTION ORAL at 03:16

## 2020-12-25 RX ADMIN — ATROPINE SULFATE 0.5 MG: 0.1 INJECTION INTRAVENOUS at 02:28

## 2020-12-25 RX ADMIN — POTASSIUM BICARBONATE 40 MEQ: 782 TABLET, EFFERVESCENT ORAL at 22:53

## 2020-12-25 RX ADMIN — ALBUMIN (HUMAN) 25 G: 12.5 INJECTION, SOLUTION INTRAVENOUS at 03:26

## 2020-12-25 RX ADMIN — MEROPENEM 500 MG: 500 INJECTION, POWDER, FOR SOLUTION INTRAVENOUS at 17:03

## 2020-12-25 RX ADMIN — ATROPINE SULFATE 0.5 MG: 0.1 INJECTION, SOLUTION INTRAVENOUS at 02:28

## 2020-12-25 RX ADMIN — FAMOTIDINE 20 MG: 10 INJECTION INTRAVENOUS at 21:33

## 2020-12-25 RX ADMIN — INSULIN LISPRO 12 UNITS: 100 INJECTION, SOLUTION INTRAVENOUS; SUBCUTANEOUS at 17:29

## 2020-12-25 RX ADMIN — SUCCINYLCHOLINE CHLORIDE 20 MG/ML INJECTION SOLUTION 140 MG: SOLUTION at 02:28

## 2020-12-25 RX ADMIN — ATORVASTATIN CALCIUM 40 MG: 40 TABLET, FILM COATED ORAL at 21:33

## 2020-12-25 RX ADMIN — MEROPENEM 500 MG: 500 INJECTION, POWDER, FOR SOLUTION INTRAVENOUS at 09:17

## 2020-12-25 RX ADMIN — ASPIRIN 81 MG CHEWABLE TABLET 81 MG: 81 TABLET CHEWABLE at 09:17

## 2020-12-25 RX ADMIN — HYDROCORTISONE SODIUM SUCCINATE 25 MG: 100 INJECTION, POWDER, FOR SOLUTION INTRAMUSCULAR; INTRAVENOUS at 09:17

## 2020-12-25 RX ADMIN — VANCOMYCIN HYDROCHLORIDE 1250 MG: 10 INJECTION, POWDER, LYOPHILIZED, FOR SOLUTION INTRAVENOUS at 13:10

## 2020-12-25 RX ADMIN — INSULIN LISPRO 5 UNITS: 100 INJECTION, SOLUTION INTRAVENOUS; SUBCUTANEOUS at 00:35

## 2020-12-25 RX ADMIN — FAMOTIDINE 20 MG: 10 INJECTION INTRAVENOUS at 09:17

## 2020-12-25 RX ADMIN — MEROPENEM 500 MG: 500 INJECTION, POWDER, FOR SOLUTION INTRAVENOUS at 01:49

## 2020-12-25 RX ADMIN — Medication 30 ML: at 09:17

## 2020-12-25 RX ADMIN — CHLORHEXIDINE GLUCONATE 0.12% ORAL RINSE 10 ML: 1.2 LIQUID ORAL at 03:27

## 2020-12-25 RX ADMIN — INSULIN GLARGINE 30 UNITS: 100 INJECTION, SOLUTION SUBCUTANEOUS at 09:17

## 2020-12-25 RX ADMIN — MIDAZOLAM 2 MG/HR: 5 INJECTION, SOLUTION INTRAMUSCULAR; INTRAVENOUS at 03:40

## 2020-12-25 RX ADMIN — INSULIN GLARGINE 15 UNITS: 100 INJECTION, SOLUTION SUBCUTANEOUS at 21:50

## 2020-12-25 RX ADMIN — POTASSIUM CHLORIDE 20 MEQ: 14.9 INJECTION, SOLUTION INTRAVENOUS at 09:17

## 2020-12-25 RX ADMIN — MIDAZOLAM HYDROCHLORIDE 2 MG: 2 INJECTION, SOLUTION INTRAMUSCULAR; INTRAVENOUS at 03:00

## 2020-12-25 RX ADMIN — INSULIN LISPRO 5 UNITS: 100 INJECTION, SOLUTION INTRAVENOUS; SUBCUTANEOUS at 13:15

## 2020-12-25 RX ADMIN — INSULIN LISPRO 5 UNITS: 100 INJECTION, SOLUTION INTRAVENOUS; SUBCUTANEOUS at 17:28

## 2020-12-25 RX ADMIN — ETOMIDATE 14 MG: 2 INJECTION INTRAVENOUS at 02:28

## 2020-12-25 RX ADMIN — INSULIN LISPRO 6 UNITS: 100 INJECTION, SOLUTION INTRAVENOUS; SUBCUTANEOUS at 00:45

## 2020-12-25 RX ADMIN — POTASSIUM BICARBONATE 40 MEQ: 782 TABLET, EFFERVESCENT ORAL at 09:17

## 2020-12-25 RX ADMIN — MEROPENEM 500 MG: 500 INJECTION, POWDER, FOR SOLUTION INTRAVENOUS at 21:33

## 2020-12-25 RX ADMIN — POLYETHYLENE GLYCOL 3350 17 G: 17 POWDER, FOR SOLUTION ORAL at 09:18

## 2020-12-25 RX ADMIN — Medication 3 MG: at 21:33

## 2020-12-25 RX ADMIN — SODIUM CHLORIDE 20 ML: 9 INJECTION, SOLUTION INTRAMUSCULAR; INTRAVENOUS; SUBCUTANEOUS at 22:00

## 2020-12-25 RX ADMIN — PHENYLEPHRINE HYDROCHLORIDE 30 MCG/MIN: 10 INJECTION INTRAVENOUS at 03:15

## 2020-12-25 RX ADMIN — TICAGRELOR 90 MG: 90 TABLET ORAL at 21:33

## 2020-12-25 RX ADMIN — SODIUM CHLORIDE 10 ML: 9 INJECTION, SOLUTION INTRAMUSCULAR; INTRAVENOUS; SUBCUTANEOUS at 15:11

## 2020-12-25 RX ADMIN — INSULIN LISPRO 5 UNITS: 100 INJECTION, SOLUTION INTRAVENOUS; SUBCUTANEOUS at 05:50

## 2020-12-25 RX ADMIN — HYDROCORTISONE SODIUM SUCCINATE 25 MG: 100 INJECTION, POWDER, FOR SOLUTION INTRAMUSCULAR; INTRAVENOUS at 21:34

## 2020-12-25 NOTE — PROGRESS NOTES
RN at bedside and noticed that the patient's heart rhythm changed to an irregular, wide-complex rhythm, possibly Vtach. RN immediately notified Cabrera Cunningham, and Dr. Leyla Thorpe, who came to bedside immediately. Patient's HR hazel to the 30s with Jm Conley at bedside while RN grabbed the crash cart, atropine, & EKG machine. HR improved without the atropine after about 10 seconds. EKG obtained and read at bedside by Dr. Anita Bowman. Bradley of labs (including cardiac panel) sent to lab. Pt SBP in 80s, so verbal order to D/C the jaspal infusion and to start levophed at 8. Levophed increased per verbal orders from Dr. Anita Bowman to 20 for low Bps and RN is to gradually decrease as able per BP. Verbal order to turn off fentanyl and versed as well. Potassium infusion running to replace AM K+ is 3.1 (40meq efferK already given this AM). AM Mg 3.0. Cardiology consulted. RN will continue to monitor patient.

## 2020-12-25 NOTE — PROGRESS NOTES
Cardiology  [address] [facility] [address], [address] [facility]  [facility]  [phone]    PROGRESS NOTE    Aurelia Wharton          12/25/2020   11:09 AM    Follow-up of CAD    Impression:   1. Acute respiratory failure- requiring mechanical ventilation- extubated  12/17/20. re intubated 12/22/20. 2. Hypotension- possible septic shock- in the setting aspirations pneumonia vs UTI. - on antibiotics. Off vasopressor support       3. Subacute MI- 12/10/20 -s/p LHC S/p ptca/stent to proximal/ mid LAD using ARELY.  LVEDP 8 mmHg. Normal PASP pressure with normal PCWP. Moderate to severe LV dysfunction. ekg 12/22/20  NSR w/o  acute ischemic changes. continue to monitor. Continue to trend Troponin. Continue asa and brilinta   4. Acute Systolic Congestive heart Failure-recent echo with EF% 35%-40-better. on lasix 40 mg bid   5. COPD, on home O2 4L NC   6. Hepatitis C ? -Per family member   9. DM2- medications per medical team   8. Dementia - failed swallow eval.  Per GI note - poor candidate at this time for G-tube due to not being able to stop Brilinta. Consider IR consult  for PEG tube     Plan:   1) Continue DAPT. May need trach+peg  2) Prognosis poor  3) Can use heparin drip if DAPT is interuppted. 4) Extubation per PCCM    Above mentioned treatment plan was discussed in detail with the patient and communicated with the hospital staff as well. Patient understands the plan and agrees. Interval Epic notes and radiographs independently reviewed. Labs reviewed and time marked. Subjective: Patient doing fair. Currently intubated.     Objective:     Visit Vitals:  Visit Vitals  BP (!) 125/57   Pulse 64   Temp 98.2 °F (36.8 °C)   Resp 16   Ht 5' 7\" (1.702 m)   Wt 60 kg (132 lb 4.4 oz)   SpO2 100%   Breastfeeding No   BMI 20.72 kg/m²       Intake/Output Summary (Last 24 hours)     Intake/Output Summary (Last 24 hours) at 12/25/2020 1109  Last data filed at 12/25/2020 1046  Gross per 24 hour   Intake 348.52 ml   Output 2010 ml   Net -1661.48 ml       Physical Exam:  GENERAL: alert, cooperative, no distress, appears stated age  SKIN: no rash or abnormalities  LYMPHATIC: Cervical, supraclavicular, and axillary nodes normal.   HEENT: PERRLA, EOMI and Sclera clear, anicteric  NECK: Supple without nodes or mass. No thyromegaly or bruit  LUNG: clear to auscultation bilaterally  HEART: regular rate and rhythm, S1, S2 normal, no murmur, click, rub or gallop  BREAST normal appearance, no masses or tenderness  ABDOMEN: soft, non-tender.  Bowel sounds normal. No masses,  no organomegaly  EXTREMITIES:  extremities normal, atraumatic, no cyanosis or edema  NEURO: RANJITH  reflexes normal and symmetric    Current Facility-Administration Medications  Current Facility-Administered Medications   Medication Dose Route Frequency    chlorhexidine (PERIDEX) 0.12 % mouthwash 10 mL  10 mL Oral Q12H    fentaNYL (PF) 900 mcg/30 ml infusion soln  0-200 mcg/hr IntraVENous TITRATE    midazolam (VERSED) injection 1-2 mg  1-2 mg IntraVENous Q10MIN PRN    fentaNYL citrate (PF) injection  mcg   mcg IntraVENous Q30MIN PRN    PHENYLephrine (ANILA-SYNEPHRINE) 30 mg in 0.9% sodium chloride 250 mL infusion   mcg/min IntraVENous TITRATE    albumin human 25% (BUMINATE) 25 % solution        midazolam in normal saline (VERSED) 1 mg/mL infusion  1-5 mg/hr IntraVENous TITRATE    potassium chloride 20 mEq in 100 ml IVPB  20 mEq IntraVENous ONCE    vancomycin (VANCOCIN) 1250 mg in  ml infusion  1,250 mg IntraVENous ONCE    hydrocortisone Sod Succ (PF) (SOLU-CORTEF) injection 25 mg  25 mg IntraVENous Q12H    insulin glargine (LANTUS) injection 15 Units  15 Units SubCUTAneous QHS    DOBUTamine (DOBUTREX) 500 mg/250 mL (2,000 mcg/mL) infusion  0-10 mcg/kg/min IntraVENous TITRATE    NOREPINephrine (LEVOPHED) 8 mg in 5% dextrose 250mL (32 mcg/mL) infusion  0.5-50 mcg/min IntraVENous TITRATE    VANCOMYCIN INFORMATION NOTE   Other Rx Dosing/Monitoring    meropenem (MERREM) 500 mg in sterile water (preservative free) 10 mL IV syringe  500 mg IntraVENous Q8H    insulin glargine (LANTUS) injection 30 Units  30 Units SubCUTAneous DAILY    insulin lispro (HUMALOG) injection 5 Units  5 Units SubCUTAneous Q6H    [Held by provider] carvediloL (COREG) tablet 6.25 mg  6.25 mg Oral Q12H    [Held by provider] lisinopriL (PRINIVIL, ZESTRIL) tablet 5 mg  5 mg Oral DAILY    furosemide (LASIX) injection 40 mg  40 mg IntraVENous Q12H    polyethylene glycol (MIRALAX) packet 17 g  17 g Per NG tube DAILY    [Held by provider] spironolactone (ALDACTONE) tablet 25 mg  25 mg Oral DAILY    multivit-folic acid-herbal 902 (WELLESSE PLUS) oral liquid 30 mL  30 mL Per NG tube DAILY    insulin lispro (HUMALOG) injection   SubCUTAneous Q6H    sodium chloride (NS) flush 5-40 mL  5-40 mL IntraVENous PRN    ticagrelor (BRILINTA) tablet 90 mg  90 mg Per NG tube BID    sodium chloride (NS) flush 5-40 mL  5-40 mL IntraVENous PRN    albuterol-ipratropium (DUO-NEB) 2.5 MG-0.5 MG/3 ML  3 mL Nebulization Q4H PRN    melatonin tablet 3 mg  3 mg Oral QHS    famotidine (PF) (PEPCID) 20 mg in 0.9% sodium chloride 10 mL injection  20 mg IntraVENous Q12H    sodium chloride (NS) flush 5-40 mL  5-40 mL IntraVENous Q8H    sodium chloride (NS) flush 5-40 mL  5-40 mL IntraVENous PRN    acetaminophen (TYLENOL) tablet 650 mg  650 mg Oral Q6H PRN    Or    acetaminophen (TYLENOL) suppository 650 mg  650 mg Rectal Q6H PRN    promethazine (PHENERGAN) tablet 12.5 mg  12.5 mg Oral Q6H PRN    Or    ondansetron (ZOFRAN) injection 4 mg  4 mg IntraVENous Q6H PRN    glucose chewable tablet 16 g  4 Tab Oral PRN    glucagon (GLUCAGEN) injection 1 mg  1 mg IntraMUSCular PRN    dextrose (D50W) injection syrg 12.5-25 g  25-50 mL IntraVENous PRN    aspirin chewable tablet 81 mg  81 mg Oral DAILY    atorvastatin (LIPITOR) tablet 40 mg  40 mg Oral QHS     Facility-Administered Medications Ordered in Other Encounters   Medication Dose Route Frequency    etomidate (AMIDATE) 2 mg/mL injection    PRN    succinylcholine chloride 200 mg/10 ml syringe   IntraVENous PRN       No Known Allergies      Past Medical History  Past Medical History:   Diagnosis Date    Arthritis     Asthma     Chronic pain     BACK PAIN    Diabetes (HCC)     Diabetic neuropathy (HCC)     Emphysema     Hepatitis C     Hypertension     Nervousness     Osteoarthritis of both knees      Social History  Social History     Socioeconomic History    Marital status: SINGLE     Spouse name: Not on file    Number of children: Not on file    Years of education: Not on file    Highest education level: Not on file   Occupational History    Not on file   Social Needs    Financial resource strain: Not on file    Food insecurity     Worry: Not on file     Inability: Not on file    Transportation needs     Medical: Not on file     Non-medical: Not on file   Tobacco Use    Smoking status: Current Every Day Smoker     Packs/day: 1.00   Substance and Sexual Activity    Alcohol use: Yes     Comment: socially    Drug use: Yes     Types: Cocaine    Sexual activity: Never   Lifestyle    Physical activity     Days per week: Not on file     Minutes per session: Not on file    Stress: Not on file   Relationships    Social connections     Talks on phone: Not on file     Gets together: Not on file     Attends Alevism service: Not on file     Active member of club or organization: Not on file     Attends meetings of clubs or organizations: Not on file     Relationship status: Not on file    Intimate partner violence     Fear of current or ex partner: Not on file     Emotionally abused: Not on file     Physically abused: Not on file     Forced sexual activity: Not on file   Other Topics Concern    Not on file   Social History Narrative    Not on file     Family History  Family History   Problem Relation Age of Onset    Diabetes Mother     Hypertension Mother     Obesity Mother     Alcohol abuse Father     High Cholesterol Father     Lung Disease Father     Stroke Father     Arthritis-osteo Sister     Depression Sister     Diabetes Sister     Hypertension Sister     Obesity Sister     Arthritis-osteo Brother     Diabetes Brother     Hypertension Brother     Obesity Brother        Constitutional: Negative for sweats  Skin: Negative for increasing thickness, none  HEENT: Negative for hearing loss  Eyes: Negative for blurred vision  Cardiovascular: Negative for No edema  Respiratory: Negative for hemoptysis  Gastrointestinal: Negative for change in bowel habits  Genitourinary: Negative for negative  Musculoskeletal: Negative for neck pain  Heme: Negative for bruising  Allergies: Negative for sneezing  Neurological: Negative for dizziness  Psychiatric: Negative for  anxiety    Telemetry Findings:NSR    Labs last 24 hours:  Recent Results (from the past 24 hour(s))   GLUCOSE, POC    Collection Time: 12/24/20 12:04 PM   Result Value Ref Range    Glucose (POC) 349 (H) 70 - 110 mg/dL   GLUCOSE, POC    Collection Time: 12/24/20  5:38 PM   Result Value Ref Range    Glucose (POC) 163 (H) 70 - 110 mg/dL   EKG, 12 LEAD, SUBSEQUENT    Collection Time: 12/24/20  6:46 PM   Result Value Ref Range    Ventricular Rate 96 BPM    Atrial Rate 96 BPM    P-R Interval 136 ms    QRS Duration 78 ms    Q-T Interval 332 ms    QTC Calculation (Bezet) 419 ms    Calculated P Axis 26 degrees    Calculated R Axis -41 degrees    Calculated T Axis 39 degrees    Diagnosis       Normal sinus rhythm  Left axis deviation  Inferior infarct , age undetermined  Anterior infarct , age undetermined  Abnormal ECG  No previous ECGs available  Confirmed by Stephany De La Fuente ([id number]) on 12/25/2020 8:21:35 AM     AMMONIA    Collection Time: 12/24/20  6:50 PM   Result Value Ref Range    Ammonia 26 11 - 32 UMOL/L   GLUCOSE, POC    Collection Time: 12/25/20 12:21 AM   Result Value Ref Range    Glucose (POC) 238 (H) 70 - 110 mg/dL   EKG, 12 LEAD, SUBSEQUENT    Collection Time: 12/25/20  2:41 AM   Result Value Ref Range    Ventricular Rate 163 BPM    Atrial Rate 163 BPM    P-R Interval 124 ms    QRS Duration 78 ms    Q-T Interval 306 ms    QTC Calculation (Bezet) 503 ms    Calculated P Axis -2 degrees    Calculated R Axis -8 degrees    Calculated T Axis 56 degrees    Diagnosis       Sinus tachycardia  Possible Inferior infarct , age undetermined  Anteroseptal infarct , age undetermined  Abnormal ECG  No previous ECGs available  Confirmed by Latricia Matias ([id number] [phone]) on 12/25/2020 8:19:53 AM     POC G3    Collection Time: 12/25/20  3:22 AM   Result Value Ref Range    Device: VENT      FIO2 (POC) 100 %    pH (POC) 7.52 (H) 7.35 - 7.45      pCO2 (POC) 44.9 35.0 - 45.0 MMHG    pO2 (POC) 128 (H) 80 - 100 MMHG    HCO3 (POC) 36.8 (H) 22 - 26 MMOL/L    sO2 (POC) 99 (H) 92 - 97 %    Base excess (POC) 14 mmol/L    Mode ASSIST CONTROL      Tidal volume 450 ml    Set Rate 16 bpm    PEEP/CPAP (POC) 5 cmH2O    PIP (POC) 22      Allens test (POC) N/A      Inspiratory Time 0.9 sec    Total resp.  rate 16      Site RIGHT RADIAL      Specimen type (POC) ARTERIAL      Performed by Yi Frazier    METABOLIC PANEL, BASIC    Collection Time: 12/25/20  4:30 AM   Result Value Ref Range    Sodium 156 (H) 136 - 145 mmol/L    Potassium 3.1 (L) 3.5 - 5.5 mmol/L    Chloride 115 (H) 100 - 111 mmol/L    CO2 40 (H) 21 - 32 mmol/L    Anion gap 1 (L) 3.0 - 18 mmol/L    Glucose 273 (H) 74 - 99 mg/dL    BUN 64 (H) 7.0 - 18 MG/DL    Creatinine 1.05 0.6 - 1.3 MG/DL    BUN/Creatinine ratio 61 (H) 12 - 20      GFR est AA >60 >60 ml/min/1.73m2    GFR est non-AA 53 (L) >60 ml/min/1.73m2    Calcium 9.5 8.5 - 10.1 MG/DL   MAGNESIUM    Collection Time: 12/25/20  4:30 AM   Result Value Ref Range    Magnesium 3.0 (H) 1.6 - 2.6 mg/dL   PHOSPHORUS    Collection Time: 12/25/20  4:30 AM   Result Value Ref Range    Phosphorus 2.6 2.5 - 4.9 MG/DL   CBC WITH AUTOMATED DIFF    Collection Time: 12/25/20  4:30 AM   Result Value Ref Range    WBC 12.7 4.6 - 13.2 K/uL    RBC 2.95 (L) 4.20 - 5.30 M/uL    HGB 7.7 (L) 12.0 - 16.0 g/dL    HCT 26.0 (L) 35.0 - 45.0 %    MCV 88.1 74.0 - 97.0 FL    MCH 26.1 24.0 - 34.0 PG    MCHC 29.6 (L) 31.0 - 37.0 g/dL    RDW 14.3 11.6 - 14.5 %    PLATELET 771 (H) 338 - 420 K/uL    MPV 11.0 9.2 - 11.8 FL    NEUTROPHILS 80 (H) 42 - 75 %    BAND NEUTROPHILS 3 0 - 5 %    LYMPHOCYTES 11 (L) 20 - 51 %    MONOCYTES 6 2 - 9 %    EOSINOPHILS 0 0 - 5 %    BASOPHILS 0 0 - 3 %    ABS. NEUTROPHILS 10.5 (H) 1.8 - 8.0 K/UL    ABS. LYMPHOCYTES 1.4 0.8 - 3.5 K/UL    ABS. MONOCYTES 0.8 0 - 1.0 K/UL    ABS. EOSINOPHILS 0.0 0.0 - 0.4 K/UL    ABS.  BASOPHILS 0.0 0.0 - 0.06 K/UL    DF MANUAL      PLATELET COMMENTS Increased Platelets      RBC COMMENTS POLYCHROMASIA  1+        RBC COMMENTS HYPOCHROMIA  1+       NT-PRO BNP    Collection Time: 12/25/20  4:30 AM   Result Value Ref Range    NT pro-BNP 4,065 (H) 0 - 900 PG/ML   VANCOMYCIN, RANDOM    Collection Time: 12/25/20  4:30 AM   Result Value Ref Range    Vancomycin, random 4.7 (L) 5.0 - 40.0 UG/ML   GLUCOSE, POC    Collection Time: 12/25/20  5:50 AM   Result Value Ref Range    Glucose (POC) 290 (H) 70 - 110 mg/dL   GLUCOSE, POC    Collection Time: 12/25/20  9:13 AM   Result Value Ref Range    Glucose (POC) 168 (H) 70 - 977 mg/dL   METABOLIC PANEL, COMPREHENSIVE    Collection Time: 12/25/20 10:00 AM   Result Value Ref Range    Sodium 157 (H) 136 - 145 mmol/L    Potassium 3.4 (L) 3.5 - 5.5 mmol/L    Chloride 115 (H) 100 - 111 mmol/L    CO2 37 (H) 21 - 32 mmol/L    Anion gap 5 3.0 - 18 mmol/L    Glucose 191 (H) 74 - 99 mg/dL    BUN 62 (H) 7.0 - 18 MG/DL    Creatinine 1.02 0.6 - 1.3 MG/DL    BUN/Creatinine ratio 61 (H) 12 - 20      GFR est AA >60 >60 ml/min/1.73m2    GFR est non-AA 54 (L) >60 ml/min/1.73m2    Calcium 9.2 8.5 - 10.1 MG/DL    Bilirubin, total 0.7 0.2 - 1.0 MG/DL    ALT (SGPT) 107 (H) 13 - 56 U/L    AST (SGOT) 143 (H) 10 - 38 U/L    Alk. phosphatase 101 45 - 117 U/L    Protein, total 7.0 6.4 - 8.2 g/dL    Albumin 3.0 (L) 3.4 - 5.0 g/dL    Globulin 4.0 2.0 - 4.0 g/dL    A-G Ratio 0.8 0.8 - 1.7     MAGNESIUM    Collection Time: 12/25/20 10:00 AM   Result Value Ref Range    Magnesium 3.0 (H) 1.6 - 2.6 mg/dL   PHOSPHORUS    Collection Time: 12/25/20 10:00 AM   Result Value Ref Range    Phosphorus 2.8 2.5 - 4.9 MG/DL   CBC WITH AUTOMATED DIFF    Collection Time: 12/25/20 10:00 AM   Result Value Ref Range    WBC 12.3 4.6 - 13.2 K/uL    RBC 2.97 (L) 4.20 - 5.30 M/uL    HGB 7.8 (L) 12.0 - 16.0 g/dL    HCT 26.4 (L) 35.0 - 45.0 %    MCV 88.9 74.0 - 97.0 FL    MCH 26.3 24.0 - 34.0 PG    MCHC 29.5 (L) 31.0 - 37.0 g/dL    RDW 14.3 11.6 - 14.5 %    PLATELET 044 (H) 174 - 420 K/uL    MPV 10.8 9.2 - 11.8 FL    NEUTROPHILS 85 (H) 40 - 73 %    LYMPHOCYTES 8 (L) 21 - 52 %    MONOCYTES 7 3 - 10 %    EOSINOPHILS 0 0 - 5 %    BASOPHILS 0 0 - 2 %    ABS. NEUTROPHILS 10.5 (H) 1.8 - 8.0 K/UL    ABS. LYMPHOCYTES 1.0 0.9 - 3.6 K/UL    ABS. MONOCYTES 0.8 0.05 - 1.2 K/UL    ABS. EOSINOPHILS 0.0 0.0 - 0.4 K/UL    ABS.  BASOPHILS 0.0 0.0 - 0.1 K/UL    DF AUTOMATED     CARDIAC PANEL,(CK, CKMB & TROPONIN)    Collection Time: 12/25/20 10:00 AM   Result Value Ref Range    CK - MB 2.3 <3.6 ng/ml    CK-MB Index 7.9 (H) 0.0 - 4.0 %    CK 29 26 - 192 U/L    Troponin-I, QT 0.16 (H) 0.0 - 0.045 NG/ML   CALCIUM, IONIZED    Collection Time: 12/25/20 10:00 AM   Result Value Ref Range    Ionized Calcium 1.17 1.12 - 1.32 MMOL/L   PROTHROMBIN TIME + INR    Collection Time: 12/25/20 10:00 AM   Result Value Ref Range    Prothrombin time 15.1 11.5 - 15.2 sec    INR 1.2 0.8 - 1.2     PTT    Collection Time: 12/25/20 10:00 AM   Result Value Ref Range    aPTT 27.5 23.0 - 36.4 SEC   LACTIC ACID    Collection Time: 12/25/20 10:00 AM   Result Value Ref Range    Lactic acid 1.4 0.4 - 2.0 MMOL/L          Zaida Delgado MD  12/25/2020  11:09 AM

## 2020-12-25 NOTE — PROGRESS NOTES
[facility]  Pulmonary, Critical Care, and Sleep Medicine    Name: Tico Juarez MRN: [id number]   : 1955 Hospital: [facility]   Date: 2020        IMPRESSION:   · Acute on Chronic Hypoxic and Hypercapnic Respiratory Failure - Requiring mechanical ventilation, re-intubated () overnight 2/2 increased WOB and for airway protection. Concern for new aspiration. Extubated on  and reintubated on , 2/2 RRT on floor for \"unresponsiveness\". Likely aspirated. · Wide Complex Arrhythmia () - Noted on bedside cardiac monitor. Lasted roughly 5-10mins with near code blue. Initially HR in the 60s, then dropped to 30s, pulse remained intact. Spontaneous resolution. E-lyte derangement vs Drug effect (pt was on 55mcg/hr Oliverio-Synephrine at the time) vs cardiac insult. Oliverio switched to Levo. Cardio paged and aware. · Acute on Chronic Systolic Heart Failure - In the setting of severe ILD, Echo 12/10/20 EF 35-40%, diuresis per cards (Dr. Jose Manuel Menchaca)  · Shock - Septic with possible Cardiogenic component. Interval worsening s/p re-intubation on . Back on Levophed gtt (). Suspect multifactorial with component of sedation effect for current hypotension, in addition to cardiogenic shock. Source likely Aspiration PNA/Pneumonitis vs UTI. Sputum Cx() (+) heavy E.Coli-ESBL. Recent Hx of E.coli ESBL. Leukocytosis improving (). Dobutamine D/C'd (). · Acute Encephalopathy - likely toxic/metabolic vs infectious vs hepatic in nature, ammonia pending   · Acute flash pulmonary edema -  in setting of chronic heart failure - Likely multifactorial, in setting of emergent re-intubation, 2/2 above. Dobutamine D/C'd on  and restarted on , D/C'd (). pro-BNP trending up on   · Severe pulmonary fibrosis - with extensive honeycombing and bronchiectasis as seen on CTA chest 20 - appears to be a new diagnosis  · Acute cystitis - (+)E. coli  · Lactic acidosis - initial 2.4, resolved  · Acute MI with anterolateral STEMI and Q waves - s/p ptca/stent to proximal/mid LAD using ARELY on DAPT on 12/11/20- Remains on Brilinta  · Severe dysphagia - failed MBS, poor candidate for G-tube 2/2 Brilinta per GI (Dr. Art Singer)  · Hx of Hep C: + RNA viral load 4/4/2020  · Multiple liver masses - noted on CT scan 11/25/20  · Hx of COPD - on home O2 4L NC  · Hx of HTN  · Hx of DM  · Hx of dementia - unknown baseline, on aricept     Patient Active Problem List   Diagnosis Code    Diverticula of colon K57.30    Sepsis ([address] A41.9    Sepsis secondary to UTI ([address] A41.9, N39.0    DM hyperosmolarity type II, uncontrolled ([address] E11.00, E11.65    HTN (hypertension) I10    Febrile illness, acute R50.9    Gram-negative bacteremia R78.81    Diabetic neuropathy ([address] E11.40    Osteoarthritis of both knees M17.0    Acidosis E87.2    Respiratory failure ([address] J96.90    Shock ([address] R57.9    Hyperosmolar (nonketotic) coma ([address] E11.01    Acute UTI N39.0    Macrocytic anemia D53.9    STEMI (ST elevation myocardial infarction) ([address] I21.3    Acute on chronic systolic congestive heart failure (HCC) I50.23    Severe protein-calorie malnutrition (HCC) E43    Goals of care, counseling/discussion Z71.89    Dementia without behavioral disturbance (HCC) F03.90    Pulmonary fibrosis (HCC) J84.10      PLAN:   · Resp: Titrate FiO2 for SpO2 >94%. Re-Intubated overnight. Daily CXR and ABG while intubated. Con't PRN duo-nebs. Optimize bronchial hygiene, with attention to suction and lavage if needed. Pt will require Katarzyna Solo if family wishes to continue full aggressive care. · I/D: F/U BxCx and Sputum Cx. Con't ABX - Merrem and Vanc. ID following. Trend Pro-sharon PRN. Anticaipate new aspiration event vs aspiration pneumonitis. Trend temp & WBC curve. · Heme/Onc: Daily CBC. Monitor for active bleeding while on Brilinta and ASA. H/H currently stable.    · CVS: Cardiology following -->Con't current medical management s/p wide complex bradyarrhythmia this AM. Cardio will evaluate at bedside for any further recs. F/u STAT Rashel. Con't Brilinta and ASA. Switched Oliverio to Levo during event, wean Levophed gtt as able. Goal MAP >65mmHg. Hold Lasix for now 2/2 e-lyte derangement. Trend BNP while diuresing. May require new Malathi pending clinical course. Keep CVL in place today, if pt remains stable, may consider D/C tomorrow AM. Consider Dopamine vs Dobutamine if pt becomes bradycardiac again. · Metabolic: STAT CMP, Mag, phos, Ionized Ca++ s/p wide complex arrhythmia. q4 BMP, Mag & Phos thereafter for now. Closely monitor Na+ trend. May require D5 infusion to bring down Na+. Trend e-lytes, replace PRN. · Renal: Trend renal indices. +means, keep for now for accurate I/Os. · Endocrine: POC glucose q6 and PRN. Lantus - 15u qHS, 30u qD, 5u q6. Closely monitor BS, currently improving without need for Insulin gtt. Con't wean stress dose steroids - 25mg q12. · GI: NPO. SUP. TF currently on hold, s/p re-intubation. Con't to hold for now. Con't H2O flushes 300cc q4. NGT in place. Zofran PRN for N/V. BM regimen - Miralax qD   · Muscl/Skin: Wound care PRN. No active issues. · Neuro/Pain: Titrate sedation for RASS 0 to -1. Fentanyl & Versed gtt;  PRN Versed/Fentanyl. · Full Code  · Family updated on pt's current critical condition multiple times over the past 24 hrs - remains full code and family agrees to PEG tube (consent not yet obtained). · Discussed Dr. Aditya Cortez     Subjective/Interval History: This patient has been seen and evaluated at the request of Dr. Svetlana Sharif for acute on chronic hypoxic and hypercapnic respiratory failure. Patient is a 72 y.o. female with a past medical history of COPD, CHF, HTN, DM, dementia, presented to [facility] with acute on chronic systolic heart failure, acute MI with anterolateral STEMI and Q waves s/p ptca/stent to proximal/mid LAD on 12/11/20.  Pt was intubated and extubated on 12/17/20, transferred to . On 12/22/20, RRT was called - pt was tachypneic, using accessory muscles, obtunded, and hypotensive. Pt was subsequently intubated, started on levophed and transferred to the ICU emergently. Initial ABG - 7.40, 66.3, 64, 40.7. Dobutamine was restarted per cards.      Upon  initial evaluation, the patient  was vented,  hypotensive with SBP in the 70s, on levophed 4mcg via PIV, ETT suctioning noted to have copious amount of thick secretions (appeared to be tube feeds). R fem CVL and A line placed emergently. Levophed and neosynephrine gtt initiated. Labs, CXR and sedation ordered. Dr. Leidy Almanzar called and updated family.      The patient is critically ill and can not provide additional history due to mechanical ventilation. 12/25/20:  - Events noted overnight - increased WOB and agitation, re-intubated. Katie-intubation hypotension with brief bradycardia, resolved s/p 0.5mg Atropine. BP stable while on Oliverio-Synephrine at 55mcg/min.      09:30 - Pt developed sudden wide-complex arrhythmia, HR in the 60s, that devolved into wide-complex bradyarrhythmia with HR into the 30's. Pulse intact. SBP in the 80's, MAP 60mmHg. Oliverio stopped --> concern for possible arrhythmia side effect. Versed & Fentanyl drips stopped. Episode lasted about 5-10mins in total. No loss of pulse. Dfib pads applied. Crash cart at bedside. Spontaneous resolution without chemical or mechanical intervention. Switched vasopressor to Levophed gtt for continued hypotension, SBP in mid 90s. EKG obtained after event with sinus rhythm with occasional PVCs, T wave abnormality, consider lateral ischemia. STAT e-lytes and CE's drawn and sent to lab. Cardiology paged, discussed with Dr. Valeria Ramos --> continue current medical management, will further evaluate at bedside.      - HD stable now while on Levophed gtt, actively weaning down  - Afebrile; aleukocytosis   - NA+ noted --> rising, currently Na+ 157.    · BID Lasix held ( pt did receive morning dose)  · K+ continues to be replaced  · Labs changed to q4, monitor K+ and Na+  · Continue H2O flushes - 300mL q4     - Adequate UOP, +Pro  - Sputum Cx () (+) Heavy GNR, not speciated yet. ROS:Review of systems not obtained due to patient factors. Objective:   Vital Signs:    Visit Vitals  BP (!) 157/70   Pulse 67   Temp 98.6 °F (37 °C)   Resp 16   Ht 5' 7\" (1.702 m)   Wt 60 kg (132 lb 4.4 oz)   SpO2 98%   Breastfeeding No   BMI 20.72 kg/m²       O2 Device: Endotracheal tube   O2 Flow Rate (L/min): 40 l/min   Temp (24hrs), Av.4 °F (36.9 °C), Min:97.8 °F (36.6 °C), Max:100 °F (37.8 °C)       Intake/Output:   Last shift:       07 - 1900  In: 360   Out: 295 [Urine:295]  Last 3 shifts: 1901 -  0700  In: 1309.5 [I.V.:409.5]  Out: 2650 [Urine:2650]    Intake/Output Summary (Last 24 hours) at 2020 1150  Last data filed at 2020 1046  Gross per 24 hour   Intake 708.52 ml   Output 2010 ml   Net -1301.48 ml      Physical Exam:     General:  Intubated, sedated, NAD    Head:  Normocephalic, without obvious abnormality, atraumatic. Eyes:  Conjunctivae/corneas clear. PERRL, EOMs intact. Back:   Symmetric, no curvature. ROM normal.   Lungs:   (+)coarse breath sounds, diminished bibasilar. Chest wall:  No tenderness or deformity. Heart:  Regular rate and rhythm, S1, S2 normal, no murmur, click, rub or gallop. Abdomen:   Soft, non-tender. Bowel sounds normal. No masses,  No organomegaly. Extremities: Extremities normal, atraumatic, no cyanosis or edema. + restraints   Pulses: 2+ and symmetric all extremities. Skin: Skin color, texture, turgor normal. No rashes or lesions. Normal cap refill.     Neurologic: Awake, doesn't follow commands, moves extremities spontaneously, (+)cough/gag reflex  DEVICES :  ETT, OGT, CVL, A-line, Pro            DATA:  Labs:  Recent Labs     20  1000 20  0430 20  0400   WBC 12.3 12.7 19.4*   HGB 7. 8* 7.7* 9.3*   HCT 26.4* 26.0* 31.1*   * 461* 546*     Recent Labs     12/25/20  1000 12/25/20  0430 12/24/20  0400   * 156* 147*   K 3.4* 3.1* 3.7   * 115* 106   CO2 37* 40* 36*   * 273* 338*   BUN 62* 64* 73*   CREA 1.02 1.05 1.26   CA 9.2 9.5 9.5   MG 3.0* 3.0* 3.3*   PHOS 2.8 2.6 2.9   ALB 3.0*  --  2.4*   *  --  186*   INR 1.2  --   --      No results for input(s): PH, PCO2, PO2, HCO3, FIO2 in the last 72 hours. Lab Results   Component Value Date/Time    NT pro-BNP 4,065 (H) 12/25/2020 04:30 AM    NT pro-BNP 3,614 (H) 12/22/2020 09:55 AM    NT pro-BNP 2,099 (H) 12/15/2020 07:45 PM    NT pro-BNP 6,465 (H) 12/12/2020 06:20 AM    NT pro-BNP 5,307 (H) 12/11/2020 01:35 PM                                                          ECHO [12/10/20]:  · LV: Estimated LVEF is 35 - 40%. Visually measured ejection fraction. Normal cavity size and wall thickness. Moderately and segmentally reduced systolic function. Inconclusive left ventricular diastolic function. · AO: Mild aortic root dilatation; diameter is 3.8 cm      Imaging:     CXR [12/25/20]: FINDINGS:      Support lines/catheters: Tip of the chest tube projects 3.5 cm above the chace. The tip of the nasogastric tube is not visualized however the side-port is at the proximal gastric level. Overlying external cardiac monitoring leads.     Lungs: Stable coarse reticular interstitial opacities with basilar and  peripheral predominance.       Cardiac silhouette and mediastinal contours: Normal.     Pleural spaces: No pneumothorax or pleural effusion.     Bones and soft tissues: Unremarkable.     IMPRESSION:     Appropriately positioned endotracheal tube. Chronic reticular interstitial disease with peripheral and basilar predominance  most likely representing fibrosis. CXR [12/24/20]: FINDINGS:  Stable appropriate position of the tendon interpreted tubes. Upper normal to  mildly enlarged cardiac silhouette, stable.  Diffuse increased interstitial  opacities with basilar airspace disease. No pneumothorax. No pleural effusion. Stable osseous structures. IMPRESSION:     Stable support tubes. No significant interval change from one day prior      [x]I have personally reviewed the patients radiographs  []Radiographs reviewed with radiologist   [x]No change from prior, tubes and lines in adequate position  []Improved   []Worsening    High complexity decision making was performed during the evaluation of this patient at high risk for decompensation with multiple organ involvement     Above mentioned total time spent on reviewing the case/medical record/data/notes/EMR/patient examination/documentation/coordinating care with nurse/consultants, exclusive of procedures with complex decision making performed and > 50% time spent in face to face evaluation. Brianna Berrios PA-C   12/25/20    Pulmonary Critical Care Medicine  [facility]        [facility] Staff Addendum     I have independently evaluated the patient and reviewed the patient's chart. I have discussed the findings and care plan with ICU Care Team. Care Plan reviewed on ICU milti-D rounds. I agree with the above evaluation, assessment and recommendations along with the following comments and observations. Please also review my orders. Patient extubated yesterday but reintubated several hours later due to increase work of breathing, tachycardia and worsening mentation. Hypokalemia this morning with a K: 3.1 was in the process of being replaced when the patient developed a wide complex arrhythmia with a heart rate from 30-60's. Self-resolving Phenylephrine and sedation was held. Nor-epi started for hypotension. Patient with good palpable radial pulse  Cardiology staff notified. No new recommendations at this time. Pursing strict electrolyte management.  Hypernatremia noted today - improving    E.Coli- with ESBL in sputum- Will continue with Meropenum and stop vancomycin    Continue with supportive care. Base on ICU care teams last discussion with family- patient remains full code. At this point should pursue PEG/TRACH with plan for LTACH. Continuing with antiplatelet therapy due to recent LAD-ARELY placement    Prognosis poor      Complex decision making was made in the evaluation and management plans during this consultation. More than 50% of time was spent in counseling and coordination of care including review of data and discussion with other team members. Further recommendations and therapies pending clinical course.     Critical Care and time spent coordinating care, minus procedure time:  75min    Des Sullivan DO, FCCP  Pulmonary, Sleep and Critical Care Medicine  4:32 PM

## 2020-12-25 NOTE — PROGRESS NOTES
Increased Peep 7/X-ray and ABG results     12/25/20 1525   Patient Observations   Pulse (Heart Rate) 66   Resp Rate 16   O2 Sat (%) 100 %   ETCO2 (mmHg) 33 mmHg   Airway - Continuous Aspiration of Subglottic Secretions (JACQUELINE) Tube 12/25/20 Oral   Placement Date/Time: 12/25/20 0228   Number of Attempts: 1  Location: Oral  Placement Verified: Auscultation;BBS  Airway Types: Endotracheal, cuffed  Airway Tube Size: 8 mm   Insertion Depth (cm) 25 cm   Line Michael Lips   Side Secured Right   Cuff Pressure   (MLT)   Site Assessment Clean, dry, & intact   Respiratory   Respiratory (WDL) WDL   Patient on Vent Yes - If patient is on vent, add Doc Flowsheet Ventilator ().    Respiratory Pattern Regular   Breath Sounds Bilateral Diminished   Vent Settings   FIO2 (%) 70 %   SpO2/FIO2 Ratio 142.86   CMV Rate Set 16   Back-Up Rate 16   Vt Set (ml) 450 ml   PEEP/VENT (cm H2O) 7 cm H20   Insp Time (sec) 0.9 sec   Insp Rise Time % 50 %   Flow Trigger 3   Ventilator Measurements   Resp Rate Observed 16   Vt Exhaled (Machine Breath) (ml) 455 ml   Ve Observed (l/min) 7.29 l/min   PIP Observed (cm H2O) 23 cm H2O   Plateau Pressure (cm H2O) 21 cm H2O   MAP (cm H2O) 15   I:E Ratio Actual 1:3.2   Static Compliance (ml/cm H20) 29 ml/cm H20   Safety & Alarms   Circuit Temperature 98.6 °F (37 °C)   Pressure Max 40 cm H2O   Pressure Min 9 cm H2O   Ve Min 2   Ve Max 20   Vt Min 200 ml   Vt Max 800 ml   RR Max 40   Ambu Bag Yes   Ambu Mask Yes   Vent Method/Mode   Ventilation Method Conventional   Ventilator Mode Assist control;VC+

## 2020-12-25 NOTE — ROUTINE PROCESS
0730 Bedside and Verbal shift change report given to Kenneth Patel (oncoming nurse) by JUNIOR Bradford (offgoing nurse). Report included the following information SBAR, Kardex, MAR and Recent Results.

## 2020-12-25 NOTE — PROGRESS NOTES
PCCM Update:    Patient monitored multiple times throughout the night. Had increasing tachycardia (120s-160s) and tachypnea (30s-50s) with bouts of apnea. Rales throughout lungs and upper airways. Worsening mentation. Decision made to intubate patient. Anesthesia intubated at bedside. Acutely bradycardic following induction, 0.5 mg atropine given. Hypotensive following intubation. Oliverio push given. Sedation ordered, VAP bundle placed- see orders. Updated sister Ellen Nguyen over the phone. Advised that trach really was not in patient's best interest given severity of chronic conditions. Lilian Artis PA-C  12/25/20  Pulmonary, Critical Care Medicine  [facility]       ICU Staff:  Above reviewed and noted.  Please also see daily ICU progress note    Brennan Garcia DO, [address] [facility]  Pulmonary, Critical Care, and Sleep Medicine

## 2020-12-25 NOTE — PROGRESS NOTES
End tidal CO2 monitor changed out. Will continue to monitor.      12/25/20 1405   Patient Observations   Pulse (Heart Rate) 80   Resp Rate 17   O2 Sat (%) 100 %   ETCO2 (mmHg) 33 mmHg

## 2020-12-25 NOTE — ANESTHESIA PROCEDURE NOTES
Emergent Intubation  Performed by: Alea Villanueva CRNA  Authorized by: Alea Villanueva CRNA     Emergent Intubation:   Location:  ICU (room 312)  Date/Time:  12/25/2020 2:28 AM  Indications:  Impending respiratory failure  Spontaneous Ventilation: present    Level of Consciousness: unresponsive  Preoxygenated: Yes      Airway Documentation:   Airway:  ETT - Cuffed  Technique:  Rapid sequence  Advanced Technique:  CMAC  Insertion Site:  Oral  Blade Type:  Robbin  Blade size: D blade.   ETT Line Michael:  Lips  ETT Insertion depth (cm):  23  Placement verified by: auscultation, EtCO2 and BBS    Attempts:  1  Difficult airway: No

## 2020-12-26 ENCOUNTER — APPOINTMENT (OUTPATIENT)
Dept: GENERAL RADIOLOGY | Age: 65
DRG: 003 | End: 2020-12-26
Attending: PHYSICIAN ASSISTANT
Payer: MEDICARE

## 2020-12-26 LAB
ANION GAP SERPL CALC-SCNC: 3 MMOL/L (ref 3–18)
ANION GAP SERPL CALC-SCNC: 3 MMOL/L (ref 3–18)
ANION GAP SERPL CALC-SCNC: 4 MMOL/L (ref 3–18)
ANION GAP SERPL CALC-SCNC: 6 MMOL/L (ref 3–18)
ARTERIAL PATENCY WRIST A: ABNORMAL
ATRIAL RATE: 62 BPM
BASE EXCESS BLD CALC-SCNC: 14 MMOL/L
BASOPHILS # BLD: 0 K/UL (ref 0–0.1)
BASOPHILS NFR BLD: 0 % (ref 0–2)
BDY SITE: ABNORMAL
BUN SERPL-MCNC: 46 MG/DL (ref 7–18)
BUN SERPL-MCNC: 47 MG/DL (ref 7–18)
BUN SERPL-MCNC: 51 MG/DL (ref 7–18)
BUN SERPL-MCNC: 52 MG/DL (ref 7–18)
BUN/CREAT SERPL: 48 (ref 12–20)
BUN/CREAT SERPL: 48 (ref 12–20)
BUN/CREAT SERPL: 52 (ref 12–20)
BUN/CREAT SERPL: 55 (ref 12–20)
CALCIUM SERPL-MCNC: 8.8 MG/DL (ref 8.5–10.1)
CALCIUM SERPL-MCNC: 8.9 MG/DL (ref 8.5–10.1)
CALCIUM SERPL-MCNC: 8.9 MG/DL (ref 8.5–10.1)
CALCIUM SERPL-MCNC: 9.1 MG/DL (ref 8.5–10.1)
CALCULATED P AXIS, ECG09: 38 DEGREES
CALCULATED R AXIS, ECG10: -30 DEGREES
CALCULATED T AXIS, ECG11: 90 DEGREES
CHLORIDE SERPL-SCNC: 109 MMOL/L (ref 100–111)
CHLORIDE SERPL-SCNC: 109 MMOL/L (ref 100–111)
CHLORIDE SERPL-SCNC: 111 MMOL/L (ref 100–111)
CHLORIDE SERPL-SCNC: 112 MMOL/L (ref 100–111)
CO2 SERPL-SCNC: 35 MMOL/L (ref 21–32)
CO2 SERPL-SCNC: 36 MMOL/L (ref 21–32)
CO2 SERPL-SCNC: 36 MMOL/L (ref 21–32)
CO2 SERPL-SCNC: 37 MMOL/L (ref 21–32)
CREAT SERPL-MCNC: 0.95 MG/DL (ref 0.6–1.3)
CREAT SERPL-MCNC: 0.95 MG/DL (ref 0.6–1.3)
CREAT SERPL-MCNC: 0.97 MG/DL (ref 0.6–1.3)
CREAT SERPL-MCNC: 0.98 MG/DL (ref 0.6–1.3)
DIAGNOSIS, 93000: NORMAL
DIFFERENTIAL METHOD BLD: ABNORMAL
EOSINOPHIL # BLD: 0 K/UL (ref 0–0.4)
EOSINOPHIL NFR BLD: 0 % (ref 0–5)
ERYTHROCYTE [DISTWIDTH] IN BLOOD BY AUTOMATED COUNT: 14.5 % (ref 11.6–14.5)
GAS FLOW.O2 O2 DELIVERY SYS: ABNORMAL L/MIN
GAS FLOW.O2 SETTING OXYMISER: 16 BPM
GLUCOSE BLD STRIP.AUTO-MCNC: 142 MG/DL (ref 70–110)
GLUCOSE BLD STRIP.AUTO-MCNC: 200 MG/DL (ref 70–110)
GLUCOSE SERPL-MCNC: 254 MG/DL (ref 74–99)
GLUCOSE SERPL-MCNC: 280 MG/DL (ref 74–99)
GLUCOSE SERPL-MCNC: 298 MG/DL (ref 74–99)
GLUCOSE SERPL-MCNC: 317 MG/DL (ref 74–99)
HCO3 BLD-SCNC: 37.2 MMOL/L (ref 22–26)
HCT VFR BLD AUTO: 25.6 % (ref 35–45)
HGB BLD-MCNC: 7.4 G/DL (ref 12–16)
INSPIRATION.DURATION SETTING TIME VENT: 0.9 SEC
LYMPHOCYTES # BLD: 1.3 K/UL (ref 0.9–3.6)
LYMPHOCYTES NFR BLD: 11 % (ref 21–52)
MAGNESIUM SERPL-MCNC: 2.9 MG/DL (ref 1.6–2.6)
MAGNESIUM SERPL-MCNC: 3 MG/DL (ref 1.6–2.6)
MCH RBC QN AUTO: 25.8 PG (ref 24–34)
MCHC RBC AUTO-ENTMCNC: 28.9 G/DL (ref 31–37)
MCV RBC AUTO: 89.2 FL (ref 74–97)
MONOCYTES # BLD: 0.3 K/UL (ref 0.05–1.2)
MONOCYTES NFR BLD: 2 % (ref 3–10)
NEUTS SEG # BLD: 10 K/UL (ref 1.8–8)
NEUTS SEG NFR BLD: 87 % (ref 40–73)
O2/TOTAL GAS SETTING VFR VENT: 60 %
P-R INTERVAL, ECG05: 134 MS
PCO2 BLD: 47.2 MMHG (ref 35–45)
PEEP RESPIRATORY: 7 CMH2O
PH BLD: 7.5 [PH] (ref 7.35–7.45)
PHOSPHATE SERPL-MCNC: 2 MG/DL (ref 2.5–4.9)
PHOSPHATE SERPL-MCNC: 2 MG/DL (ref 2.5–4.9)
PHOSPHATE SERPL-MCNC: 2.2 MG/DL (ref 2.5–4.9)
PHOSPHATE SERPL-MCNC: 2.3 MG/DL (ref 2.5–4.9)
PHOSPHATE SERPL-MCNC: 2.4 MG/DL (ref 2.5–4.9)
PHOSPHATE SERPL-MCNC: 2.6 MG/DL (ref 2.5–4.9)
PIP ISTAT,IPIP: 23
PLATELET # BLD AUTO: 427 K/UL (ref 135–420)
PMV BLD AUTO: 11.1 FL (ref 9.2–11.8)
PO2 BLD: 177 MMHG (ref 80–100)
POTASSIUM SERPL-SCNC: 3.2 MMOL/L (ref 3.5–5.5)
POTASSIUM SERPL-SCNC: 3.6 MMOL/L (ref 3.5–5.5)
POTASSIUM SERPL-SCNC: 3.7 MMOL/L (ref 3.5–5.5)
POTASSIUM SERPL-SCNC: 4.2 MMOL/L (ref 3.5–5.5)
Q-T INTERVAL, ECG07: 454 MS
QRS DURATION, ECG06: 80 MS
QTC CALCULATION (BEZET), ECG08: 460 MS
RBC # BLD AUTO: 2.87 M/UL (ref 4.2–5.3)
SAO2 % BLD: 100 % (ref 92–97)
SERVICE CMNT-IMP: ABNORMAL
SODIUM SERPL-SCNC: 148 MMOL/L (ref 136–145)
SODIUM SERPL-SCNC: 149 MMOL/L (ref 136–145)
SODIUM SERPL-SCNC: 152 MMOL/L (ref 136–145)
SODIUM SERPL-SCNC: 152 MMOL/L (ref 136–145)
SPECIMEN TYPE: ABNORMAL
TOTAL RESP. RATE, ITRR: 16
VENTILATION MODE VENT: ABNORMAL
VENTRICULAR RATE, ECG03: 62 BPM
VT SETTING VENT: 450 ML
WBC # BLD AUTO: 11.6 K/UL (ref 4.6–13.2)

## 2020-12-26 PROCEDURE — 36600 WITHDRAWAL OF ARTERIAL BLOOD: CPT

## 2020-12-26 PROCEDURE — 83735 ASSAY OF MAGNESIUM: CPT

## 2020-12-26 PROCEDURE — 94762 N-INVAS EAR/PLS OXIMTRY CONT: CPT

## 2020-12-26 PROCEDURE — 74011250637 HC RX REV CODE- 250/637: Performed by: INTERNAL MEDICINE

## 2020-12-26 PROCEDURE — 94400 HC END TIDAL CO2 RESPONSE CURVE: CPT

## 2020-12-26 PROCEDURE — 77030040392 HC DRSG OPTIFOAM MDII -A

## 2020-12-26 PROCEDURE — 84100 ASSAY OF PHOSPHORUS: CPT

## 2020-12-26 PROCEDURE — 36592 COLLECT BLOOD FROM PICC: CPT

## 2020-12-26 PROCEDURE — 74011636637 HC RX REV CODE- 636/637: Performed by: STUDENT IN AN ORGANIZED HEALTH CARE EDUCATION/TRAINING PROGRAM

## 2020-12-26 PROCEDURE — 85025 COMPLETE CBC W/AUTO DIFF WBC: CPT

## 2020-12-26 PROCEDURE — 82803 BLOOD GASES ANY COMBINATION: CPT

## 2020-12-26 PROCEDURE — 74011636637 HC RX REV CODE- 636/637: Performed by: INTERNAL MEDICINE

## 2020-12-26 PROCEDURE — 94003 VENT MGMT INPAT SUBQ DAY: CPT

## 2020-12-26 PROCEDURE — 80048 BASIC METABOLIC PNL TOTAL CA: CPT

## 2020-12-26 PROCEDURE — 74011000258 HC RX REV CODE- 258: Performed by: PHYSICIAN ASSISTANT

## 2020-12-26 PROCEDURE — 74011250637 HC RX REV CODE- 250/637: Performed by: NURSE PRACTITIONER

## 2020-12-26 PROCEDURE — 93005 ELECTROCARDIOGRAM TRACING: CPT

## 2020-12-26 PROCEDURE — 74011000250 HC RX REV CODE- 250: Performed by: INTERNAL MEDICINE

## 2020-12-26 PROCEDURE — 74011000250 HC RX REV CODE- 250: Performed by: PHYSICIAN ASSISTANT

## 2020-12-26 PROCEDURE — 65620000000 HC RM CCU GENERAL

## 2020-12-26 PROCEDURE — 82962 GLUCOSE BLOOD TEST: CPT

## 2020-12-26 PROCEDURE — 99291 CRITICAL CARE FIRST HOUR: CPT | Performed by: INTERNAL MEDICINE

## 2020-12-26 PROCEDURE — 2709999900 HC NON-CHARGEABLE SUPPLY

## 2020-12-26 PROCEDURE — 71045 X-RAY EXAM CHEST 1 VIEW: CPT

## 2020-12-26 PROCEDURE — 74011250636 HC RX REV CODE- 250/636: Performed by: PHYSICIAN ASSISTANT

## 2020-12-26 PROCEDURE — 74011250636 HC RX REV CODE- 250/636: Performed by: INTERNAL MEDICINE

## 2020-12-26 RX ORDER — POTASSIUM CHLORIDE 7.45 MG/ML
10 INJECTION INTRAVENOUS
Status: DISPENSED | OUTPATIENT
Start: 2020-12-26 | End: 2020-12-26

## 2020-12-26 RX ADMIN — TICAGRELOR 90 MG: 90 TABLET ORAL at 21:10

## 2020-12-26 RX ADMIN — FAMOTIDINE 20 MG: 10 INJECTION INTRAVENOUS at 21:10

## 2020-12-26 RX ADMIN — POTASSIUM CHLORIDE 10 MEQ: 7.46 INJECTION, SOLUTION INTRAVENOUS at 17:38

## 2020-12-26 RX ADMIN — ACETAMINOPHEN 650 MG: 325 TABLET ORAL at 10:39

## 2020-12-26 RX ADMIN — MEROPENEM 500 MG: 500 INJECTION, POWDER, FOR SOLUTION INTRAVENOUS at 03:02

## 2020-12-26 RX ADMIN — Medication 30 ML: at 08:56

## 2020-12-26 RX ADMIN — ASPIRIN 81 MG CHEWABLE TABLET 81 MG: 81 TABLET CHEWABLE at 08:56

## 2020-12-26 RX ADMIN — MEROPENEM 500 MG: 500 INJECTION, POWDER, FOR SOLUTION INTRAVENOUS at 15:33

## 2020-12-26 RX ADMIN — CHLORHEXIDINE GLUCONATE 0.12% ORAL RINSE 10 ML: 1.2 LIQUID ORAL at 21:10

## 2020-12-26 RX ADMIN — INSULIN GLARGINE 30 UNITS: 100 INJECTION, SOLUTION SUBCUTANEOUS at 08:57

## 2020-12-26 RX ADMIN — INSULIN LISPRO 9 UNITS: 100 INJECTION, SOLUTION INTRAVENOUS; SUBCUTANEOUS at 06:55

## 2020-12-26 RX ADMIN — SODIUM CHLORIDE 10 ML: 9 INJECTION, SOLUTION INTRAMUSCULAR; INTRAVENOUS; SUBCUTANEOUS at 06:00

## 2020-12-26 RX ADMIN — ATORVASTATIN CALCIUM 40 MG: 40 TABLET, FILM COATED ORAL at 22:00

## 2020-12-26 RX ADMIN — CHLORHEXIDINE GLUCONATE 0.12% ORAL RINSE 10 ML: 1.2 LIQUID ORAL at 08:47

## 2020-12-26 RX ADMIN — INSULIN LISPRO 5 UNITS: 100 INJECTION, SOLUTION INTRAVENOUS; SUBCUTANEOUS at 12:00

## 2020-12-26 RX ADMIN — HYDROCORTISONE SODIUM SUCCINATE 25 MG: 100 INJECTION, POWDER, FOR SOLUTION INTRAMUSCULAR; INTRAVENOUS at 21:10

## 2020-12-26 RX ADMIN — SODIUM CHLORIDE 10 ML: 9 INJECTION, SOLUTION INTRAMUSCULAR; INTRAVENOUS; SUBCUTANEOUS at 14:43

## 2020-12-26 RX ADMIN — INSULIN LISPRO 5 UNITS: 100 INJECTION, SOLUTION INTRAVENOUS; SUBCUTANEOUS at 18:00

## 2020-12-26 RX ADMIN — MEROPENEM 500 MG: 500 INJECTION, POWDER, FOR SOLUTION INTRAVENOUS at 08:57

## 2020-12-26 RX ADMIN — ACETAMINOPHEN 650 MG: 325 TABLET ORAL at 17:42

## 2020-12-26 RX ADMIN — INSULIN LISPRO 12 UNITS: 100 INJECTION, SOLUTION INTRAVENOUS; SUBCUTANEOUS at 14:43

## 2020-12-26 RX ADMIN — POTASSIUM CHLORIDE 10 MEQ: 7.46 INJECTION, SOLUTION INTRAVENOUS at 15:00

## 2020-12-26 RX ADMIN — Medication 50 MCG/HR: at 17:42

## 2020-12-26 RX ADMIN — INSULIN LISPRO 6 UNITS: 100 INJECTION, SOLUTION INTRAVENOUS; SUBCUTANEOUS at 00:00

## 2020-12-26 RX ADMIN — Medication 3 MG: at 21:10

## 2020-12-26 RX ADMIN — INSULIN LISPRO 5 UNITS: 100 INJECTION, SOLUTION INTRAVENOUS; SUBCUTANEOUS at 06:55

## 2020-12-26 RX ADMIN — POTASSIUM CHLORIDE 10 MEQ: 7.46 INJECTION, SOLUTION INTRAVENOUS at 12:52

## 2020-12-26 RX ADMIN — Medication 50 MCG/HR: at 01:53

## 2020-12-26 RX ADMIN — POTASSIUM CHLORIDE 10 MEQ: 7.46 INJECTION, SOLUTION INTRAVENOUS at 14:07

## 2020-12-26 RX ADMIN — SODIUM CHLORIDE 30 ML: 9 INJECTION, SOLUTION INTRAMUSCULAR; INTRAVENOUS; SUBCUTANEOUS at 22:00

## 2020-12-26 RX ADMIN — FAMOTIDINE 20 MG: 10 INJECTION INTRAVENOUS at 08:57

## 2020-12-26 RX ADMIN — INSULIN GLARGINE 15 UNITS: 100 INJECTION, SOLUTION SUBCUTANEOUS at 21:30

## 2020-12-26 RX ADMIN — TICAGRELOR 90 MG: 90 TABLET ORAL at 08:56

## 2020-12-26 RX ADMIN — INSULIN LISPRO 6 UNITS: 100 INJECTION, SOLUTION INTRAVENOUS; SUBCUTANEOUS at 18:43

## 2020-12-26 RX ADMIN — POLYETHYLENE GLYCOL 3350 17 G: 17 POWDER, FOR SOLUTION ORAL at 08:56

## 2020-12-26 RX ADMIN — HYDROCORTISONE SODIUM SUCCINATE 25 MG: 100 INJECTION, POWDER, FOR SOLUTION INTRAMUSCULAR; INTRAVENOUS at 08:57

## 2020-12-26 RX ADMIN — POTASSIUM CHLORIDE 10 MEQ: 7.46 INJECTION, SOLUTION INTRAVENOUS at 16:00

## 2020-12-26 RX ADMIN — INSULIN LISPRO 5 UNITS: 100 INJECTION, SOLUTION INTRAVENOUS; SUBCUTANEOUS at 00:36

## 2020-12-26 RX ADMIN — MEROPENEM 500 MG: 500 INJECTION, POWDER, FOR SOLUTION INTRAVENOUS at 21:10

## 2020-12-26 NOTE — PROGRESS NOTES
1930: Assumed care of patient after report with Fe Norton RN on orientation. Drips verified. Orders reviewed. 2225: Labs reviewed with Luis Angel THAKKAR. Sodium 155 - no change in current plan of care. Replacement ordered for potassium per protocol. 0730: Bedside and Verbal shift change report given to David Treviño RN (oncoming nurse) by Maryse Barajas RN (offgoing nurse). Report included the following information SBAR, Intake/Output, MAR, Recent Results and Cardiac Rhythm SA, SR, runs of VTach.

## 2020-12-26 NOTE — PROGRESS NOTES
[facility]  Pulmonary, Critical Care, and Sleep Medicine    Name: Norma Link MRN: [id number]   : 1955 Hospital: [address] [place] [address] Dr   Date: 2020        IMPRESSION:   · Acute on Chronic Hypoxic and Hypercapnic Respiratory Failure - Requiring mechanical ventilation, re-intubated () overnight 2/2 increased WOB and for airway protection. Concern for new aspiration. Extubated on  and reintubated on , 2/2 RRT on floor for \"unresponsiveness\". Likely aspirated. · Wide Complex Arrhythmia () - Appears isolated at this point Noted on bedside cardiac monitor. Lasted roughly 5-10mins with near code blue. Initially HR in the 60s, then dropped to 30s, pulse remained intact. Spontaneous resolution. E-lyte derangement vs Drug effect (pt was on 55mcg/hr Oliverio-Synephrine at the time) vs cardiac insult. Oliverio switched to Levo. Cardio paged and aware. () 8 beat run of V-Tach overnight, self-resolution. · Acute on Chronic Systolic Heart Failure - In the setting of severe ILD, Echo 12/10/20 EF 35-40%, diuresis per cards (Dr. Kristy Soliman)  · Shock - Septic with possible Cardiogenic component. Interval worsening s/p re-intubation on . Back on Levophed gtt (). Suspect multifactorial with component of sedation effect for current hypotension, in addition to cardiogenic shock. Source likely Aspiration PNA. Sputum Cx() (+) heavy E.Coli-ESBL. Recent Hx of E.coli ESBL. Leukocytosis improving (). Dobutamine D/C'd (). · Acute Encephalopathy - likely toxic/metabolic vs infectious vs hepatic in nature, ammonia wnl. · Acute flash pulmonary edema -  in setting of chronic heart failure - Likely multifactorial, in setting of emergent re-intubation, 2/2 above. Dobutamine D/C'd on  and restarted on , D/C'd ().  pro-BNP trending up on   · Severe pulmonary fibrosis - with extensive honeycombing and bronchiectasis as seen on CTA chest 20 - appears to be a new diagnosis  · Acute cystitis - (+)E. coli  · Lactic acidosis - initial 2.4, resolved  · Acute MI with anterolateral STEMI and Q waves - s/p ptca/stent to proximal/mid LAD using ARELY on DAPT on 12/11/20- Remains on Brilinta  · Severe dysphagia - failed MBS, poor candidate for G-tube 2/2 Brilinta per GI (Dr. Giovanni Perdomo)  · Hx of Hep C: + RNA viral load 4/4/2020  · Multiple liver masses - noted on CT scan 11/25/20  · Hx of COPD - on home O2 4L NC  · Hx of HTN  · Hx of DM  · Hx of dementia - unknown baseline, on aricept     Patient Active Problem List   Diagnosis Code    Diverticula of colon K57.30    Sepsis ([address] A41.9    Sepsis secondary to UTI ([address] A41.9, N39.0    DM hyperosmolarity type II, uncontrolled ([address] E11.00, E11.65    HTN (hypertension) I10    Febrile illness, acute R50.9    Gram-negative bacteremia R78.81    Diabetic neuropathy ([address] E11.40    Osteoarthritis of both knees M17.0    Acidosis E87.2    Respiratory failure ([address] J96.90    Shock ([address] R57.9    Hyperosmolar (nonketotic) coma ([address] E11.01    Acute UTI N39.0    Macrocytic anemia D53.9    STEMI (ST elevation myocardial infarction) ([address] I21.3    Acute on chronic systolic congestive heart failure (HCC) I50.23    Severe protein-calorie malnutrition (HCC) E43    Goals of care, counseling/discussion Z71.89    Dementia without behavioral disturbance (HCC) F03.90    Pulmonary fibrosis (HCC) J84.10      PLAN:   · Resp: Titrate FiO2 for SpO2 >94%. Daily CXR and ABG while intubated. Con't PRN duo-nebs. Optimize bronchial hygiene, with attention to suction and lavage if needed. Pt will require Gloria Stager if family wishes to continue full aggressive care. Noted mild bloody secretions in JACQUELINE tube - will continue to monitor. Poor dentition. · I/D: F/U BxCx and Sputum Cx. Con't ABX - Merrem. D/C'd Vanc. ID following. Trend Pro-sharon PRN. Anticaipate new aspiration event, Sputum (+) ESBL E.Coli. Trend temp & WBC curve. · Heme/Onc: Daily CBC. Monitor for active bleeding while on Brilinta and ASA. H/H currently stable. Check coags if bloody secretions persist.   · CVS: Cardiology following, no new recs. Con't Brilinta and ASA. Remains on Levophed gtt, but weaning down. Goal MAP >65mmHg. Consider resuming Lasix once e-lyte derangement has been reversed --> Currently Na+ remains >150. Trend BNP while diuresing. May require new A-Line pending clinical course. Keep CVL in place while critical. Brennen monitor for any further arrhythmias. · Metabolic: q4 BMP, Mag & Phos for now. Closely monitor Na+ trend. May require D5 infusion to bring down Na+. Trend e-lytes, replace PRN. · Renal: Trend renal indices. +means, keep for now for accurate I/Os. · Endocrine: POC glucose q6 and PRN. Lantus - 15u qHS, 30u qD, 5u q6. Closely monitor BS, currently improving without need for Insulin gtt. Con't wean stress dose steroids - 25mg q12. Will wean further once off Levophed gtt. · GI: NPO. SUP. Can resume TF at trickle and advance as tolerated. Con't H2O flushes 300cc q4. NGT in place. Will need PEG if family proceeds with aggressive care. Zofran PRN for N/V. BM regimen - Miralax qD --> increase to BID if no BM end of day. · Muscl/Skin: Wound care PRN. No active issues. · Neuro/Pain: Titrate sedation for RASS 0 to -1. Fentanyl & Versed gtt;  PRN Versed/Fentanyl. Underlying dementia, suspect worsening with critical events. · Poor overall prognosis  · Full Code  · Family updated on pt's current critical condition multiple times over the past 24 hrs - remains full code and family agrees to PEG tube (consent not yet obtained). · Plan for Trach/PEG next week - Will need to discuss with ENT and touch base with family again for consent. · Discussed Dr. Connor Holly     Subjective/Interval History: This patient has been seen and evaluated at the request of Dr. Navin Moses for acute on chronic hypoxic and hypercapnic respiratory failure.   Patient is a 72 y.o. female with a past medical history of COPD, CHF, HTN, DM, dementia, presented to [facility] with acute on chronic systolic heart failure, acute MI with anterolateral STEMI and Q waves s/p ptca/stent to proximal/mid LAD on 12/11/20. Pt was intubated and extubated on 12/17/20, transferred to . On 12/22/20, RRT was called - pt was tachypneic, using accessory muscles, obtunded, and hypotensive. Pt was subsequently intubated, started on levophed and transferred to the ICU emergently. Initial ABG - 7.40, 66.3, 64, 40.7. Dobutamine was restarted per cards.      Upon  initial evaluation, the patient  was vented,  hypotensive with SBP in the 70s, on levophed 4mcg via PIV, ETT suctioning noted to have copious amount of thick secretions (appeared to be tube feeds). R fem CVL and A line placed emergently. Levophed and neosynephrine gtt initiated. Labs, CXR and sedation ordered. Dr. Alfred Lowery called and updated family.      The patient is critically ill and can not provide additional history due to mechanical ventilation. 12/26/20:  - Noted 8 beat [place] [address] [facility] with spontaneous resolution overnight. E-lytes checked and were stable  - HD stable while on Levophed gtt, actively weaning down, currently on Levophed gtt 5mcg/min  - Now spiking fevers this morning - Tmax: 100.7 thus far; aleukocytosis   - Adequate UOP, +Pro  - Mild bloody secretions noted in JACQUELINE tube, no active oozing noted, will monitor.  - NA+ noted --> interval improvement, Na+ 152 this AM  · BID Lasix still held  · K+ continues to be replaced  · Labs remain q4, monitor K+ and Na+  · Continue H2O flushes - 300mL q4       - Sputum Cx (12/22) (+) ESBL E.Coli --> On Merrem, ID following. ROS:Review of systems not obtained due to patient factors.     Objective:   Vital Signs:    Visit Vitals  BP (!) 112/55   Pulse 79   Temp (!) 100.7 °F (38.2 °C)   Resp 16   Ht 5' 7\" (1.702 m)   Wt 62.3 kg (137 lb 5.6 oz)   SpO2 100%   Breastfeeding No   BMI 21.51 kg/m² O2 Device: Endotracheal tube, Ventilator   O2 Flow Rate (L/min): 40 l/min   Temp (24hrs), Av.1 °F (37.3 °C), Min:98.2 °F (36.8 °C), Max:100.8 °F (38.2 °C)       Intake/Output:   Last shift:       07 - 1900  In: 525 [I.V.:25]  Out: 200 [Urine:200]  Last 3 shifts: 1901 -  07  In: 3114.3 [I.V.:534.3]  Out: 2980 [Urine:2980]    Intake/Output Summary (Last 24 hours) at 2020 1307  Last data filed at 2020 1000  Gross per 24 hour   Intake 2830.7 ml   Output 1445 ml   Net 1385.7 ml      Physical Exam:     General:  Intubated, sedated, NAD    Head:  Normocephalic, without obvious abnormality, atraumatic. Eyes:  Conjunctivae/corneas clear. PERRL, EOMs intact. Back:   Symmetric, no curvature. ROM normal.   Lungs:   (+)coarse breath sounds, diminished bibasilar, mildly course throughout. Chest wall:  No tenderness or deformity. Heart:  Regular rate and rhythm, S1, S2 normal, no murmur, click, rub or gallop. Abdomen:   Soft, non-tender. Bowel sounds normal. No masses,  No organomegaly. Extremities: Extremities normal, atraumatic, no cyanosis, +1 edema BUE. + restraints   Pulses: 2+ and symmetric all extremities. Skin: Skin color, texture, turgor normal. No rashes or lesions. Normal cap refill.     Neurologic: Intermittently awake, doesn't follow commands, moves extremities spontaneously, (+)cough/gag reflex  DEVICES :  ETT, OGT, CVL, Pro            DATA:  Labs:  Recent Labs     20  0411 20  1000 20  0430   WBC 11.6 12.3 12.7   HGB 7.4* 7.8* 7.7*   HCT 25.6* 26.4* 26.0*   * 497* 461*     Recent Labs     20  0850 20  0411 20  0035 20  1000 20  1000 20  0430 20  0400   * 149* 152*   < > 157* 156* 147*   K 3.2* 3.7 4.2   < > 3.4* 3.1* 3.7    109 112*   < > 115* 115* 106   CO2 35* 36* 37*   < > 37* 40* 36*   * 280* 298*   < > 191* 273* 338*   BUN 47* 51* 52*   < > 62* 64* 73*   CREA 0.97 0. 98 0.95   < > 1.02 1.05 1.26   CA 8.9 8.9 9.1   < > 9.2 9.5 9.5   MG  --  3.0*  --   --  3.0* 3.0* 3.3*   PHOS 2.0* 2.4* 2.3*   < > 2.8 2.6 2.9   ALB  --   --   --   --  3.0*  --  2.4*   ALT  --   --   --   --  107*  --  186*   INR  --   --   --   --  1.2  --   --     < > = values in this interval not displayed. No results for input(s): PH, PCO2, PO2, HCO3, FIO2 in the last 72 hours. Lab Results   Component Value Date/Time    NT pro-BNP 4,065 (H) 12/25/2020 04:30 AM    NT pro-BNP 3,614 (H) 12/22/2020 09:55 AM    NT pro-BNP 2,099 (H) 12/15/2020 07:45 PM    NT pro-BNP 6,465 (H) 12/12/2020 06:20 AM    NT pro-BNP 5,307 (H) 12/11/2020 01:35 PM                                                          ECHO [12/10/20]:  · LV: Estimated LVEF is 35 - 40%. Visually measured ejection fraction. Normal cavity size and wall thickness. Moderately and segmentally reduced systolic function. Inconclusive left ventricular diastolic function. · AO: Mild aortic root dilatation; diameter is 3.8 cm      Imaging:     CXR [12/26/20]: FINDINGS:     Frontal view of the chest obtained at 0334 hours. ET tube tip projects 3 cm  above the chace. Feeding tube extends to at least the mid stomach, with tip not  included on the exam. The cardiomediastinal silhouette is within normal limits. Diffuse increased interstitial/vascular lung markings without change. This is a  chronic process with fibrosis seen on prior CT. No pneumothorax or pleural  effusion. Evaluation of the osseous structures is stable.       IMPRESSION:     Support devices as above. No acute, new findings. Stable bilateral interstitial lung process without significant change, most  compatible with patient's known pulmonary fibrosis      CXR [12/25/20]: FINDINGS:      Support lines/catheters: Tip of the chest tube projects 3.5 cm above the chace. The tip of the nasogastric tube is not visualized however the side-port is at the proximal gastric level.  Overlying external cardiac monitoring leads.     Lungs: Stable coarse reticular interstitial opacities with basilar and  peripheral predominance.       Cardiac silhouette and mediastinal contours: Normal.     Pleural spaces: No pneumothorax or pleural effusion.     Bones and soft tissues: Unremarkable.     IMPRESSION:     Appropriately positioned endotracheal tube. Chronic reticular interstitial disease with peripheral and basilar predominance  most likely representing fibrosis. [x]I have personally reviewed the patients radiographs  []Radiographs reviewed with radiologist   [x]No change from prior, tubes and lines in adequate position  []Improved   []Worsening    High complexity decision making was performed during the evaluation of this patient at high risk for decompensation with multiple organ involvement     Above mentioned total time spent on reviewing the case/medical record/data/notes/EMR/patient examination/documentation/coordinating care with nurse/consultants, exclusive of procedures with complex decision making performed and > 50% time spent in face to face evaluation. Ijeoma Blair PA-C   12/26/20    Pulmonary Critical Care Medicine  [facility]        [facility] Staff Addendum     I have independently evaluated the patient and reviewed the patient's chart. I have discussed the findings and care plan with ICU Care Team.      I agree with the above evaluation, assessment and recommendations along with the following comments and observations. Please also review my orders. Patient remains intubated. Has been intubated x 3 this admission. Will need to pursue PEG/Trach at this point. Patient appears to have poor neurologic baseline with pre-existing dementia. Patient will need heparin window off antiplatelet therapy for these procedures. Pt noted to have some minimal blood in cast tube today- monitor for now.      Hypernatremia slowly improving- can change BMP to Q 12    No recurrent slow- wide complex arrhythmia which was noted yesterday- favor electrolyte abnormality in the setting of administering phenylepherine IV pressor support. Continue to correct electrolyte abnormalities as needed. Continue Meropenum for ESBL - E. Coli Pneumonia              Complex decision making was made in the evaluation and management plans during this consultation. More than 50% of time was spent in counseling and coordination of care including review of data and discussion with other team members. Further recommendations and therapies pending clinical course.     Critical Care and time spent coordinating care, minus procedure time: 35 min    Mikhail Lipscomb DO, ANURDAHAP  Pulmonary, Sleep and Critical Care Medicine  3:07 PM

## 2020-12-26 NOTE — ROUTINE PROCESS
0715: Verbal/bedside shift report received from [facility] Fan. Care assumed at this time. 1905: Bedside and Verbal shift change report given to RN Monica (oncoming nurse) by RN Alanna Richey (offgoing nurse). Report included the following information SBAR, Intake/Output, Recent Results and Quality Measures.

## 2020-12-26 NOTE — PROGRESS NOTES
Cardiology  [address] [facility] [address] [facility]  Adal Lugo Juvencio  [phone]    PROGRESS NOTE    Aurelia Stanley [place]          12/26/2020   2:39 PM    Follow-up of CAD     Impression:   1. Acute respiratory failure- requiring mechanical ventilation- extubated  12/17/20. re intubated 12/22/20. 2. Hypotension- possible septic shock- in the setting aspirations pneumonia vs UTI. - on antibiotics. Off vasopressor support       3. Subacute MI- 12/10/20 -s/p LHC S/p ptca/stent to proximal/ mid LAD using ARELY.  LVEDP 8 mmHg. Normal PASP pressure with normal PCWP. Moderate to severe LV dysfunction. ekg 12/22/20  NSR w/o  acute ischemic changes. continue to monitor. Continue to trend Troponin.  Continue asa and brilinta   4. Acute Systolic Congestive heart Failure-recent echo with EF% 35%-40-better.  on lasix 40 mg bid   5. COPD, on home O2 4L NC   6. Hepatitis C ? -Per family member   9. DM2- medications per medical team   8. Dementia - failed swallow eval.  Per GI note - poor candidate at this time for G-tube due to not being able to stop Brilinta. Consider IR consult  for PEG tube      Plan:   1) Continue DAPT. May need trach+peg  2) Prognosis poor  3) Can use heparin drip if DAPT is interuppted. 4) Extubation per PCCM     Above mentioned treatment plan was discussed in detail with the patient and communicated with the hospital staff as well. Patient understands the plan and agrees. Interval Epic notes and radiographs independently reviewed. Labs reviewed and time marked.     Subjective: Patient doing fair.   Currently intubated    Objective:     Visit Vitals:  Visit Vitals  BP (!) 91/53   Pulse 88   Temp (!) 100.7 °F (38.2 °C)   Resp 16   Ht 5' 7\" (1.702 m)   Wt 62.3 kg (137 lb 5.6 oz)   SpO2 100%   Breastfeeding No   BMI 21.51 kg/m²       Intake/Output Summary (Last 24 hours)     Intake/Output Summary (Last 24 hours) at 12/26/2020 8079  Last data filed at 12/26/2020 1300  Gross per 24 hour Intake 3406.85 ml   Output 1510 ml   Net 1896.85 ml       Physical Exam:  GENERAL: mild distress, appears stated age  SKIN: no rash or abnormalities  LYMPHATIC: Cervical, supraclavicular, and axillary nodes normal.   HEENT: PERRLA, EOMI and Sclera clear, anicteric  NECK: Supple without nodes or mass. No thyromegaly or bruit  LUNG: clear to auscultation bilaterally  HEART: regular rate and rhythm, S1, S2 normal, no murmur, click, rub or gallop  BREAST normal appearance, no masses or tenderness  ABDOMEN: soft, non-tender.  Bowel sounds normal. No masses,  no organomegaly  EXTREMITIES:  extremities normal, atraumatic, no cyanosis or edema  NEURO: reflexes normal and symmetric  intubated    Current Facility-Administration Medications  Current Facility-Administered Medications   Medication Dose Route Frequency    potassium chloride 10 mEq in 100 ml IVPB  10 mEq IntraVENous Q1H    chlorhexidine (PERIDEX) 0.12 % mouthwash 10 mL  10 mL Oral Q12H    fentaNYL (PF) 900 mcg/30 ml infusion soln  0-200 mcg/hr IntraVENous TITRATE    midazolam (VERSED) injection 1-2 mg  1-2 mg IntraVENous Q10MIN PRN    fentaNYL citrate (PF) injection  mcg   mcg IntraVENous Q30MIN PRN    midazolam in normal saline (VERSED) 1 mg/mL infusion  1-5 mg/hr IntraVENous TITRATE    ELECTROLYTE REPLACEMENT PROTOCOL - Potassium Standard Dosing   1 Each Other PRN    ELECTROLYTE REPLACEMENT PROTOCOL - Magnesium   1 Each Other PRN    ELECTROLYTE REPLACEMENT PROTOCOL - Phosphorus  Standard Dosing  1 Each Other PRN    ELECTROLYTE REPLACEMENT PROTOCOL - Calcium   1 Each Other PRN    meropenem (MERREM) 500 mg in sterile water (preservative free) 10 mL IV syringe  500 mg IntraVENous Q6H    hydrocortisone Sod Succ (PF) (SOLU-CORTEF) injection 25 mg  25 mg IntraVENous Q12H    insulin glargine (LANTUS) injection 15 Units  15 Units SubCUTAneous QHS    DOBUTamine (DOBUTREX) 500 mg/250 mL (2,000 mcg/mL) infusion  0-10 mcg/kg/min IntraVENous TITRATE    NOREPINephrine (LEVOPHED) 8 mg in 5% dextrose 250mL (32 mcg/mL) infusion  0.5-50 mcg/min IntraVENous TITRATE    insulin glargine (LANTUS) injection 30 Units  30 Units SubCUTAneous DAILY    insulin lispro (HUMALOG) injection 5 Units  5 Units SubCUTAneous Q6H    [Held by provider] carvediloL (COREG) tablet 6.25 mg  6.25 mg Oral Q12H    [Held by provider] lisinopriL (PRINIVIL, ZESTRIL) tablet 5 mg  5 mg Oral DAILY    [Held by provider] furosemide (LASIX) injection 40 mg  40 mg IntraVENous Q12H    polyethylene glycol (MIRALAX) packet 17 g  17 g Per NG tube DAILY    [Held by provider] spironolactone (ALDACTONE) tablet 25 mg  25 mg Oral DAILY    multivit-folic acid-herbal 048 (WELLESSE PLUS) oral liquid 30 mL  30 mL Per NG tube DAILY    insulin lispro (HUMALOG) injection   SubCUTAneous Q6H    sodium chloride (NS) flush 5-40 mL  5-40 mL IntraVENous PRN    ticagrelor (BRILINTA) tablet 90 mg  90 mg Per NG tube BID    sodium chloride (NS) flush 5-40 mL  5-40 mL IntraVENous PRN    albuterol-ipratropium (DUO-NEB) 2.5 MG-0.5 MG/3 ML  3 mL Nebulization Q4H PRN    melatonin tablet 3 mg  3 mg Oral QHS    famotidine (PF) (PEPCID) 20 mg in 0.9% sodium chloride 10 mL injection  20 mg IntraVENous Q12H    sodium chloride (NS) flush 5-40 mL  5-40 mL IntraVENous Q8H    sodium chloride (NS) flush 5-40 mL  5-40 mL IntraVENous PRN    acetaminophen (TYLENOL) tablet 650 mg  650 mg Oral Q6H PRN    Or    acetaminophen (TYLENOL) suppository 650 mg  650 mg Rectal Q6H PRN    promethazine (PHENERGAN) tablet 12.5 mg  12.5 mg Oral Q6H PRN    Or    ondansetron (ZOFRAN) injection 4 mg  4 mg IntraVENous Q6H PRN    glucose chewable tablet 16 g  4 Tab Oral PRN    glucagon (GLUCAGEN) injection 1 mg  1 mg IntraMUSCular PRN    dextrose (D50W) injection syrg 12.5-25 g  25-50 mL IntraVENous PRN    aspirin chewable tablet 81 mg  81 mg Oral DAILY    atorvastatin (LIPITOR) tablet 40 mg  40 mg Oral QHS       No Known Allergies      Past Medical History  Past Medical History:   Diagnosis Date    Arthritis     Asthma     Chronic pain     BACK PAIN    Diabetes ([facility] [address]     Diabetic neuropathy (HCC)     Emphysema     Hepatitis C     Hypertension     Nervousness     Osteoarthritis of both knees      Social History  Social History     Socioeconomic History    Marital status: SINGLE     Spouse name: Not on file    Number of children: Not on file    Years of education: Not on file    Highest education level: Not on file   Occupational History    Not on file   Social Needs    Financial resource strain: Not on file    Food insecurity     Worry: Not on file     Inability: Not on file    Transportation needs     Medical: Not on file     Non-medical: Not on file   Tobacco Use    Smoking status: Current Every Day Smoker     Packs/day: 1.00   Substance and Sexual Activity    Alcohol use: Yes     Comment: socially    Drug use: Yes     Types: Cocaine    Sexual activity: Never   Lifestyle    Physical activity     Days per week: Not on file     Minutes per session: Not on file    Stress: Not on file   Relationships    Social connections     Talks on phone: Not on file     Gets together: Not on file     Attends [employer] service: Not on file     Active member of club or organization: Not on file     Attends meetings of clubs or organizations: Not on file     Relationship status: Not on file    Intimate partner violence     Fear of current or ex partner: Not on file     Emotionally abused: Not on file     Physically abused: Not on file     Forced sexual activity: Not on file   Other Topics Concern    Not on file   Social History Narrative    Not on file     Family History  Family History   Problem Relation Age of Onset    Diabetes Mother     Hypertension Mother     Obesity Mother     Alcohol abuse Father     High Cholesterol Father     Lung Disease Father     Stroke Father     Arthritis-osteo Sister     Depression Sister     Diabetes Sister     Hypertension Sister     Obesity Sister     Arthritis-osteo Brother     Diabetes Brother     Hypertension Brother     Obesity Brother        Constitutional: Negative for sweats  Skin: Negative for increasing thickness, none  HEENT: Negative for tinnitus  Eyes: Negative for blurred vision  Cardiovascular: Negative for No dyspnea on exertion  Respiratory: Negative for cough  Gastrointestinal: Negative for change in bowel habits  Genitourinary: Negative for negative  Musculoskeletal: Negative for arthralgias  Heme: Negative for bruising  Allergies: Negative for watery eyes  Neurological: Negative for memory problems  Psychiatric: Negative for  bipolar    Telemetry Findings:ST    Labs last 24 hours:  Recent Results (from the past 24 hour(s))   CARDIAC PANEL,(CK, CKMB & TROPONIN)    Collection Time: 12/25/20  4:01 PM   Result Value Ref Range    CK - MB 2.2 <3.6 ng/ml    CK-MB Index 6.5 (H) 0.0 - 4.0 %    CK 34 26 - 192 U/L    Troponin-I, QT 0.12 (H) 0.0 - 0.162 NG/ML   METABOLIC PANEL, BASIC    Collection Time: 12/25/20  4:01 PM   Result Value Ref Range    Sodium 152 (H) 136 - 145 mmol/L    Potassium 3.6 3.5 - 5.5 mmol/L    Chloride 112 (H) 100 - 111 mmol/L    CO2 38 (H) 21 - 32 mmol/L    Anion gap 2 (L) 3.0 - 18 mmol/L    Glucose 330 (H) 74 - 99 mg/dL    BUN 62 (H) 7.0 - 18 MG/DL    Creatinine 1.13 0.6 - 1.3 MG/DL    BUN/Creatinine ratio 55 (H) 12 - 20      GFR est AA 59 (L) >60 ml/min/1.73m2    GFR est non-AA 48 (L) >60 ml/min/1.73m2    Calcium 9.4 8.5 - 10.1 MG/DL   PHOSPHORUS    Collection Time: 12/25/20  4:01 PM   Result Value Ref Range    Phosphorus 2.9 2.5 - 4.9 MG/DL   GLUCOSE, POC    Collection Time: 12/25/20  5:07 PM   Result Value Ref Range    Glucose (POC) 319 (H) 70 - 069 mg/dL   METABOLIC PANEL, BASIC    Collection Time: 12/25/20  9:25 PM   Result Value Ref Range    Sodium 155 (H) 136 - 145 mmol/L    Potassium 3.5 3.5 - 5.5 mmol/L    Chloride 114 (H) 100 - 111 mmol/L CO2 38 (H) 21 - 32 mmol/L    Anion gap 3 3.0 - 18 mmol/L    Glucose 189 (H) 74 - 99 mg/dL    BUN 54 (H) 7.0 - 18 MG/DL    Creatinine 1.03 0.6 - 1.3 MG/DL    BUN/Creatinine ratio 52 (H) 12 - 20      GFR est AA >60 >60 ml/min/1.73m2    GFR est non-AA 54 (L) >60 ml/min/1.73m2    Calcium 9.3 8.5 - 10.1 MG/DL   PHOSPHORUS    Collection Time: 12/25/20  9:25 PM   Result Value Ref Range    Phosphorus 2.6 2.5 - 4.9 MG/DL   CARDIAC PANEL,(CK, CKMB & TROPONIN)    Collection Time: 12/25/20  9:25 PM   Result Value Ref Range    CK - MB 1.8 <3.6 ng/ml    CK-MB Index 5.6 (H) 0.0 - 4.0 %    CK 32 26 - 192 U/L    Troponin-I, QT 0.12 (H) 0.0 - 0.045 NG/ML   GLUCOSE, POC    Collection Time: 12/25/20 11:24 PM   Result Value Ref Range    Glucose (POC) 248 (H) 70 - 478 mg/dL   METABOLIC PANEL, BASIC    Collection Time: 12/26/20 12:35 AM   Result Value Ref Range    Sodium 152 (H) 136 - 145 mmol/L    Potassium 4.2 3.5 - 5.5 mmol/L    Chloride 112 (H) 100 - 111 mmol/L    CO2 37 (H) 21 - 32 mmol/L    Anion gap 3 3.0 - 18 mmol/L    Glucose 298 (H) 74 - 99 mg/dL    BUN 52 (H) 7.0 - 18 MG/DL    Creatinine 0.95 0.6 - 1.3 MG/DL    BUN/Creatinine ratio 55 (H) 12 - 20      GFR est AA >60 >60 ml/min/1.73m2    GFR est non-AA 59 (L) >60 ml/min/1.73m2    Calcium 9.1 8.5 - 10.1 MG/DL   PHOSPHORUS    Collection Time: 12/26/20 12:35 AM   Result Value Ref Range    Phosphorus 2.3 (L) 2.5 - 4.9 MG/DL   METABOLIC PANEL, BASIC    Collection Time: 12/26/20  4:11 AM   Result Value Ref Range    Sodium 149 (H) 136 - 145 mmol/L    Potassium 3.7 3.5 - 5.5 mmol/L    Chloride 109 100 - 111 mmol/L    CO2 36 (H) 21 - 32 mmol/L    Anion gap 4 3.0 - 18 mmol/L    Glucose 280 (H) 74 - 99 mg/dL    BUN 51 (H) 7.0 - 18 MG/DL    Creatinine 0.98 0.6 - 1.3 MG/DL    BUN/Creatinine ratio 52 (H) 12 - 20      GFR est AA >60 >60 ml/min/1.73m2    GFR est non-AA 57 (L) >60 ml/min/1.73m2    Calcium 8.9 8.5 - 10.1 MG/DL   MAGNESIUM    Collection Time: 12/26/20  4:11 AM   Result Value Ref Range    Magnesium 3.0 (H) 1.6 - 2.6 mg/dL   PHOSPHORUS    Collection Time: 12/26/20  4:11 AM   Result Value Ref Range    Phosphorus 2.4 (L) 2.5 - 4.9 MG/DL   CBC WITH AUTOMATED DIFF    Collection Time: 12/26/20  4:11 AM   Result Value Ref Range    WBC 11.6 4.6 - 13.2 K/uL    RBC 2.87 (L) 4.20 - 5.30 M/uL    HGB 7.4 (L) 12.0 - 16.0 g/dL    HCT 25.6 (L) 35.0 - 45.0 %    MCV 89.2 74.0 - 97.0 FL    MCH 25.8 24.0 - 34.0 PG    MCHC 28.9 (L) 31.0 - 37.0 g/dL    RDW 14.5 11.6 - 14.5 %    PLATELET 353 (H) 329 - 420 K/uL    MPV 11.1 9.2 - 11.8 FL    NEUTROPHILS 87 (H) 40 - 73 %    LYMPHOCYTES 11 (L) 21 - 52 %    MONOCYTES 2 (L) 3 - 10 %    EOSINOPHILS 0 0 - 5 %    BASOPHILS 0 0 - 2 %    ABS. NEUTROPHILS 10.0 (H) 1.8 - 8.0 K/UL    ABS. LYMPHOCYTES 1.3 0.9 - 3.6 K/UL    ABS. MONOCYTES 0.3 0.05 - 1.2 K/UL    ABS. EOSINOPHILS 0.0 0.0 - 0.4 K/UL    ABS. BASOPHILS 0.0 0.0 - 0.1 K/UL    DF AUTOMATED     POC G3    Collection Time: 12/26/20  4:43 AM   Result Value Ref Range    Device: VENT      FIO2 (POC) 60 %    pH (POC) 7.50 (H) 7.35 - 7.45      pCO2 (POC) 47.2 (H) 35.0 - 45.0 MMHG    pO2 (POC) 177 (H) 80 - 100 MMHG    HCO3 (POC) 37.2 (H) 22 - 26 MMOL/L    sO2 (POC) 100 (H) 92 - 97 %    Base excess (POC) 14 mmol/L    Mode ASSIST CONTROL      Tidal volume 450 ml    Set Rate 16 bpm    PEEP/CPAP (POC) 7 cmH2O    PIP (POC) 23      Allens test (POC) N/A      Inspiratory Time 0.90 sec    Total resp.  rate 16      Site RIGHT RADIAL      Specimen type (POC) ARTERIAL      Performed by Swapnil Fong    METABOLIC PANEL, BASIC    Collection Time: 12/26/20  8:50 AM   Result Value Ref Range    Sodium 152 (H) 136 - 145 mmol/L    Potassium 3.2 (L) 3.5 - 5.5 mmol/L    Chloride 111 100 - 111 mmol/L    CO2 35 (H) 21 - 32 mmol/L    Anion gap 6 3.0 - 18 mmol/L    Glucose 254 (H) 74 - 99 mg/dL    BUN 47 (H) 7.0 - 18 MG/DL    Creatinine 0.97 0.6 - 1.3 MG/DL    BUN/Creatinine ratio 48 (H) 12 - 20      GFR est AA >60 >60 ml/min/1.73m2    GFR est non-AA 58 (L) >60 ml/min/1.73m2    Calcium 8.9 8.5 - 10.1 MG/DL   PHOSPHORUS    Collection Time: 12/26/20  8:50 AM   Result Value Ref Range    Phosphorus 2.0 (L) 2.5 - 4.9 MG/DL   METABOLIC PANEL, BASIC    Collection Time: 12/26/20  1:00 PM   Result Value Ref Range    Sodium 148 (H) 136 - 145 mmol/L    Potassium 3.6 3.5 - 5.5 mmol/L    Chloride 109 100 - 111 mmol/L    CO2 36 (H) 21 - 32 mmol/L    Anion gap 3 3.0 - 18 mmol/L    Glucose 317 (H) 74 - 99 mg/dL    BUN 46 (H) 7.0 - 18 MG/DL    Creatinine 0.95 0.6 - 1.3 MG/DL    BUN/Creatinine ratio 48 (H) 12 - 20      GFR est AA >60 >60 ml/min/1.73m2    GFR est non-AA 59 (L) >60 ml/min/1.73m2    Calcium 8.8 8.5 - 10.1 MG/DL   PHOSPHORUS    Collection Time: 12/26/20  1:00 PM   Result Value Ref Range    Phosphorus 2.6 2.5 - 4.9 MG/DL          Kyaw Travis MD  12/26/2020  2:39 PM

## 2020-12-26 NOTE — PROGRESS NOTES
Problem: Pain  Goal: *Control of Pain  Outcome: Progressing Towards Goal  Goal: *PALLIATIVE CARE:  Alleviation of Pain  Outcome: Progressing Towards Goal     Problem: Patient Education: Go to Patient Education Activity  Goal: Patient/Family Education  Outcome: Progressing Towards Goal     Problem: Diabetes Self-Management  Goal: *Disease process and treatment process  Description: Define diabetes and identify own type of diabetes; list 3 options for treating diabetes. Outcome: Progressing Towards Goal  Goal: *Incorporating nutritional management into lifestyle  Description: Describe effect of type, amount and timing of food on blood glucose; list 3 methods for planning meals. Outcome: Progressing Towards Goal  Goal: *Incorporating physical activity into lifestyle  Description: State effect of exercise on blood glucose levels. Outcome: Progressing Towards Goal  Goal: *Developing strategies to promote health/change behavior  Description: Define the ABC's of diabetes; identify appropriate screenings, schedule and personal plan for screenings. Outcome: Progressing Towards Goal  Goal: *Using medications safely  Description: State effect of diabetes medications on diabetes; name diabetes medication taking, action and side effects. Outcome: Progressing Towards Goal  Goal: *Monitoring blood glucose, interpreting and using results  Description: Identify recommended blood glucose targets  and personal targets. Outcome: Progressing Towards Goal  Goal: *Prevention, detection, treatment of acute complications  Description: List symptoms of hyper- and hypoglycemia; describe how to treat low blood sugar and actions for lowering  high blood glucose level. Outcome: Progressing Towards Goal  Goal: *Prevention, detection and treatment of chronic complications  Description: Define the natural course of diabetes and describe the relationship of blood glucose levels to long term complications of diabetes.   Outcome: Progressing Towards Goal  Goal: *Developing strategies to address psychosocial issues  Description: Describe feelings about living with diabetes; identify support needed and support network  Outcome: Progressing Towards Goal  Goal: *Insulin pump training  Outcome: Progressing Towards Goal  Goal: *Sick day guidelines  Outcome: Progressing Towards Goal  Goal: *Patient Specific Goal (EDIT GOAL, INSERT TEXT)  Outcome: Progressing Towards Goal     Problem: Patient Education: Go to Patient Education Activity  Goal: Patient/Family Education  Outcome: Progressing Towards Goal     Problem: Pressure Injury - Risk of  Goal: *Prevention of pressure injury  Description: Document Pankaj Scale and appropriate interventions in the flowsheet. Outcome: Progressing Towards Goal  Note: Pressure Injury Interventions:  Sensory Interventions: Float heels, Keep linens dry and wrinkle-free, Maintain/enhance activity level, Pressure redistribution bed/mattress (bed type), Turn and reposition approx. every two hours (pillows and wedges if needed)    Moisture Interventions: Absorbent underpads, Apply protective barrier, creams and emollients, Limit adult briefs, Maintain skin hydration (lotion/cream), Minimize layers, Moisture barrier, Check for incontinence Q2 hours and as needed, Internal/External urinary devices    Activity Interventions: PT/OT evaluation, Pressure redistribution bed/mattress(bed type)    Mobility Interventions: Turn and reposition approx.  every two hours(pillow and wedges), Pressure redistribution bed/mattress (bed type), HOB 30 degrees or less, Float heels    Nutrition Interventions: Document food/fluid/supplement intake, Discuss nutritional consult with provider    Friction and Shear Interventions: Apply protective barrier, creams and emollients, Feet elevated on foot rest, HOB 30 degrees or less, Lift sheet, Lift team/patient mobility team, Minimize layers, Transferring/repositioning devices                Problem: Patient Education: Go to Patient Education Activity  Goal: Patient/Family Education  Outcome: Progressing Towards Goal     Problem: Falls - Risk of  Goal: *Absence of Falls  Description: Document Maura Hernandez Fall Risk and appropriate interventions in the flowsheet.   Outcome: Progressing Towards Goal  Note: Fall Risk Interventions:  Mobility Interventions: Bed/chair exit alarm, Communicate number of staff needed for ambulation/transfer, Strengthening exercises (ROM-active/passive)    Mentation Interventions: Adequate sleep, hydration, pain control, Bed/chair exit alarm, Evaluate medications/consider consulting pharmacy    Medication Interventions: Bed/chair exit alarm, Evaluate medications/consider consulting pharmacy    Elimination Interventions: Bed/chair exit alarm, Toileting schedule/hourly rounds              Problem: Patient Education: Go to Patient Education Activity  Goal: Patient/Family Education  Outcome: Progressing Towards Goal     Problem: Patient Education: Go to Patient Education Activity  Goal: Patient/Family Education  Outcome: Progressing Towards Goal     Problem: Heart Failure: Day 1  Goal: Off Pathway (Use only if patient is Off Pathway)  Outcome: Progressing Towards Goal  Goal: Activity/Safety  Outcome: Progressing Towards Goal  Goal: Consults, if ordered  Outcome: Progressing Towards Goal  Goal: Diagnostic Test/Procedures  Outcome: Progressing Towards Goal  Goal: Nutrition/Diet  Outcome: Progressing Towards Goal  Goal: Discharge Planning  Outcome: Progressing Towards Goal  Goal: Medications  Outcome: Progressing Towards Goal  Goal: Respiratory  Outcome: Progressing Towards Goal  Goal: Treatments/Interventions/Procedures  Outcome: Progressing Towards Goal  Goal: Psychosocial  Outcome: Progressing Towards Goal  Goal: *Oxygen saturation within defined limits  Outcome: Progressing Towards Goal  Goal: *Hemodynamically stable  Outcome: Progressing Towards Goal  Goal: *Optimal pain control at patient's stated goal  Outcome: Progressing Towards Goal  Goal: *Anxiety reduced or absent  Outcome: Progressing Towards Goal     Problem: Heart Failure: Day 2  Goal: Off Pathway (Use only if patient is Off Pathway)  Outcome: Progressing Towards Goal  Goal: Activity/Safety  Outcome: Progressing Towards Goal  Goal: Consults, if ordered  Outcome: Progressing Towards Goal  Goal: Diagnostic Test/Procedures  Outcome: Progressing Towards Goal  Goal: Nutrition/Diet  Outcome: Progressing Towards Goal  Goal: Discharge Planning  Outcome: Progressing Towards Goal  Goal: Medications  Outcome: Progressing Towards Goal  Goal: Respiratory  Outcome: Progressing Towards Goal  Goal: Treatments/Interventions/Procedures  Outcome: Progressing Towards Goal  Goal: Psychosocial  Outcome: Progressing Towards Goal  Goal: *Oxygen saturation within defined limits  Outcome: Progressing Towards Goal  Goal: *Hemodynamically stable  Outcome: Progressing Towards Goal  Goal: *Optimal pain control at patient's stated goal  Outcome: Progressing Towards Goal  Goal: *Anxiety reduced or absent  Outcome: Progressing Towards Goal  Goal: *Demonstrates progressive activity  Outcome: Progressing Towards Goal     Problem: Heart Failure: Day 3  Goal: Off Pathway (Use only if patient is Off Pathway)  Outcome: Progressing Towards Goal  Goal: Activity/Safety  Outcome: Progressing Towards Goal  Goal: Diagnostic Test/Procedures  Outcome: Progressing Towards Goal  Goal: Nutrition/Diet  Outcome: Progressing Towards Goal  Goal: Discharge Planning  Outcome: Progressing Towards Goal  Goal: Medications  Outcome: Progressing Towards Goal  Goal: Respiratory  Outcome: Progressing Towards Goal  Goal: Treatments/Interventions/Procedures  Outcome: Progressing Towards Goal  Goal: Psychosocial  Outcome: Progressing Towards Goal  Goal: *Oxygen saturation within defined limits  Outcome: Progressing Towards Goal  Goal: *Hemodynamically stable  Outcome: Progressing Towards Goal  Goal: *Optimal pain control at patient's stated goal  Outcome: Progressing Towards Goal  Goal: *Anxiety reduced or absent  Outcome: Progressing Towards Goal  Goal: *Demonstrates progressive activity  Outcome: Progressing Towards Goal     Problem: Heart Failure: Day 4  Goal: Off Pathway (Use only if patient is Off Pathway)  Outcome: Progressing Towards Goal  Goal: Activity/Safety  Outcome: Progressing Towards Goal  Goal: Diagnostic Test/Procedures  Outcome: Progressing Towards Goal  Goal: Nutrition/Diet  Outcome: Progressing Towards Goal  Goal: Discharge Planning  Outcome: Progressing Towards Goal  Goal: Medications  Outcome: Progressing Towards Goal  Goal: Respiratory  Outcome: Progressing Towards Goal  Goal: Treatments/Interventions/Procedures  Outcome: Progressing Towards Goal  Goal: Psychosocial  Outcome: Progressing Towards Goal  Goal: *Oxygen saturation within defined limits  Outcome: Progressing Towards Goal  Goal: *Hemodynamically stable  Outcome: Progressing Towards Goal  Goal: *Optimal pain control at patient's stated goal  Outcome: Progressing Towards Goal  Goal: *Anxiety reduced or absent  Outcome: Progressing Towards Goal  Goal: *Demonstrates progressive activity  Outcome: Progressing Towards Goal     Problem: Heart Failure: Day 5  Goal: Off Pathway (Use only if patient is Off Pathway)  Outcome: Progressing Towards Goal  Goal: Activity/Safety  Outcome: Progressing Towards Goal  Goal: Diagnostic Test/Procedures  Outcome: Progressing Towards Goal  Goal: Nutrition/Diet  Outcome: Progressing Towards Goal  Goal: Discharge Planning  Outcome: Progressing Towards Goal  Goal: Medications  Outcome: Progressing Towards Goal  Goal: Respiratory  Outcome: Progressing Towards Goal  Goal: Treatments/Interventions/Procedures  Outcome: Progressing Towards Goal  Goal: Psychosocial  Outcome: Progressing Towards Goal     Problem: Heart Failure: Discharge Outcomes  Goal: *Demonstrates ability to perform prescribed activity without shortness of breath or discomfort  Outcome: Progressing Towards Goal  Goal: *Left ventricular function assessment completed prior to or during stay, or planned for post-discharge  Outcome: Progressing Towards Goal  Goal: *ACEI prescribed if LVEF less than 40% and no contraindications or ARB prescribed  Outcome: Progressing Towards Goal  Goal: *Verbalizes understanding and describes prescribed diet  Outcome: Progressing Towards Goal  Goal: *Verbalizes understanding/describes prescribed medications  Outcome: Progressing Towards Goal  Goal: *Describes available resources and support systems  Description: (eg: Home Health, Palliative Care, Advanced Medical Directive)  Outcome: Progressing Towards Goal  Goal: *Describes smoking cessation resources  Outcome: Progressing Towards Goal  Goal: *Understands and describes signs and symptoms to report to providers(Stroke Metric)  Outcome: Progressing Towards Goal  Goal: *Describes/verbalizes understanding of follow-up/return appt  Description: (eg: to physicians, diabetes treatment coordinator, and other resources  Outcome: Progressing Towards Goal  Goal: *Describes importance of continuing daily weights and changes to report to physician  Outcome: [id number] [facility] Cameron Pak RN

## 2020-12-27 ENCOUNTER — APPOINTMENT (OUTPATIENT)
Dept: GENERAL RADIOLOGY | Age: 65
DRG: 003 | End: 2020-12-27
Attending: PHYSICIAN ASSISTANT
Payer: MEDICARE

## 2020-12-27 ENCOUNTER — APPOINTMENT (OUTPATIENT)
Dept: GENERAL RADIOLOGY | Age: 65
DRG: 003 | End: 2020-12-27
Attending: INTERNAL MEDICINE
Payer: MEDICARE

## 2020-12-27 LAB
ANION GAP SERPL CALC-SCNC: 6 MMOL/L (ref 3–18)
ANION GAP SERPL CALC-SCNC: 6 MMOL/L (ref 3–18)
ARTERIAL PATENCY WRIST A: YES
ATRIAL RATE: 79 BPM
BASE EXCESS BLD CALC-SCNC: 9 MMOL/L
BASOPHILS # BLD: 0 K/UL (ref 0–0.1)
BASOPHILS NFR BLD: 0 % (ref 0–2)
BDY SITE: ABNORMAL
BUN SERPL-MCNC: 28 MG/DL (ref 7–18)
BUN SERPL-MCNC: 35 MG/DL (ref 7–18)
BUN/CREAT SERPL: 47 (ref 12–20)
BUN/CREAT SERPL: 47 (ref 12–20)
CALCIUM SERPL-MCNC: 8.7 MG/DL (ref 8.5–10.1)
CALCIUM SERPL-MCNC: 8.7 MG/DL (ref 8.5–10.1)
CALCULATED P AXIS, ECG09: 33 DEGREES
CALCULATED R AXIS, ECG10: -37 DEGREES
CALCULATED T AXIS, ECG11: 43 DEGREES
CHLORIDE SERPL-SCNC: 110 MMOL/L (ref 100–111)
CHLORIDE SERPL-SCNC: 110 MMOL/L (ref 100–111)
CO2 SERPL-SCNC: 33 MMOL/L (ref 21–32)
CO2 SERPL-SCNC: 34 MMOL/L (ref 21–32)
CREAT SERPL-MCNC: 0.59 MG/DL (ref 0.6–1.3)
CREAT SERPL-MCNC: 0.74 MG/DL (ref 0.6–1.3)
DIAGNOSIS, 93000: NORMAL
DIFFERENTIAL METHOD BLD: ABNORMAL
EOSINOPHIL # BLD: 0 K/UL (ref 0–0.4)
EOSINOPHIL NFR BLD: 0 % (ref 0–5)
ERYTHROCYTE [DISTWIDTH] IN BLOOD BY AUTOMATED COUNT: 14.7 % (ref 11.6–14.5)
GAS FLOW.O2 O2 DELIVERY SYS: ABNORMAL L/MIN
GAS FLOW.O2 SETTING OXYMISER: 16 BPM
GLUCOSE BLD STRIP.AUTO-MCNC: 199 MG/DL (ref 70–110)
GLUCOSE BLD STRIP.AUTO-MCNC: 229 MG/DL (ref 70–110)
GLUCOSE BLD STRIP.AUTO-MCNC: 320 MG/DL (ref 70–110)
GLUCOSE SERPL-MCNC: 183 MG/DL (ref 74–99)
GLUCOSE SERPL-MCNC: 198 MG/DL (ref 74–99)
HCO3 BLD-SCNC: 33.7 MMOL/L (ref 22–26)
HCT VFR BLD AUTO: 24.7 % (ref 35–45)
HGB BLD-MCNC: 7.3 G/DL (ref 12–16)
INSPIRATION.DURATION SETTING TIME VENT: 0.9 SEC
LYMPHOCYTES # BLD: 1 K/UL (ref 0.9–3.6)
LYMPHOCYTES NFR BLD: 8 % (ref 21–52)
MAGNESIUM SERPL-MCNC: 2.7 MG/DL (ref 1.6–2.6)
MAGNESIUM SERPL-MCNC: 2.7 MG/DL (ref 1.6–2.6)
MAGNESIUM SERPL-MCNC: 2.9 MG/DL (ref 1.6–2.6)
MCH RBC QN AUTO: 26.3 PG (ref 24–34)
MCHC RBC AUTO-ENTMCNC: 29.6 G/DL (ref 31–37)
MCV RBC AUTO: 88.8 FL (ref 74–97)
MONOCYTES # BLD: 0.9 K/UL (ref 0.05–1.2)
MONOCYTES NFR BLD: 8 % (ref 3–10)
NEUTS SEG # BLD: 10.2 K/UL (ref 1.8–8)
NEUTS SEG NFR BLD: 84 % (ref 40–73)
O2/TOTAL GAS SETTING VFR VENT: 0.5 %
P-R INTERVAL, ECG05: 138 MS
PCO2 BLD: 45.4 MMHG (ref 35–45)
PEEP RESPIRATORY: 5 CMH2O
PH BLD: 7.48 [PH] (ref 7.35–7.45)
PHOSPHATE SERPL-MCNC: 2.6 MG/DL (ref 2.5–4.9)
PHOSPHATE SERPL-MCNC: 2.7 MG/DL (ref 2.5–4.9)
PHOSPHATE SERPL-MCNC: 2.9 MG/DL (ref 2.5–4.9)
PIP ISTAT,IPIP: 23
PLATELET # BLD AUTO: 426 K/UL (ref 135–420)
PMV BLD AUTO: 11 FL (ref 9.2–11.8)
PO2 BLD: 152 MMHG (ref 80–100)
POTASSIUM SERPL-SCNC: 3.2 MMOL/L (ref 3.5–5.5)
POTASSIUM SERPL-SCNC: 3.9 MMOL/L (ref 3.5–5.5)
Q-T INTERVAL, ECG07: 428 MS
QRS DURATION, ECG06: 94 MS
QTC CALCULATION (BEZET), ECG08: 490 MS
RBC # BLD AUTO: 2.78 M/UL (ref 4.2–5.3)
SAO2 % BLD: 99 % (ref 92–97)
SERVICE CMNT-IMP: ABNORMAL
SODIUM SERPL-SCNC: 149 MMOL/L (ref 136–145)
SODIUM SERPL-SCNC: 150 MMOL/L (ref 136–145)
SPECIMEN TYPE: ABNORMAL
TOTAL RESP. RATE, ITRR: 16
VENTILATION MODE VENT: ABNORMAL
VENTRICULAR RATE, ECG03: 79 BPM
VOLUME CONTROL PLUS IVLCP: YES
VT SETTING VENT: 450 ML
WBC # BLD AUTO: 12.1 K/UL (ref 4.6–13.2)

## 2020-12-27 PROCEDURE — 74018 RADEX ABDOMEN 1 VIEW: CPT

## 2020-12-27 PROCEDURE — 2709999900 HC NON-CHARGEABLE SUPPLY

## 2020-12-27 PROCEDURE — 71045 X-RAY EXAM CHEST 1 VIEW: CPT

## 2020-12-27 PROCEDURE — 74011250637 HC RX REV CODE- 250/637: Performed by: INTERNAL MEDICINE

## 2020-12-27 PROCEDURE — 36600 WITHDRAWAL OF ARTERIAL BLOOD: CPT

## 2020-12-27 PROCEDURE — 84100 ASSAY OF PHOSPHORUS: CPT

## 2020-12-27 PROCEDURE — 74011636637 HC RX REV CODE- 636/637: Performed by: STUDENT IN AN ORGANIZED HEALTH CARE EDUCATION/TRAINING PROGRAM

## 2020-12-27 PROCEDURE — 99233 SBSQ HOSP IP/OBS HIGH 50: CPT | Performed by: HOSPITALIST

## 2020-12-27 PROCEDURE — 65620000000 HC RM CCU GENERAL

## 2020-12-27 PROCEDURE — 74011000258 HC RX REV CODE- 258: Performed by: PHYSICIAN ASSISTANT

## 2020-12-27 PROCEDURE — 74011000250 HC RX REV CODE- 250: Performed by: PHYSICIAN ASSISTANT

## 2020-12-27 PROCEDURE — 74011636637 HC RX REV CODE- 636/637: Performed by: INTERNAL MEDICINE

## 2020-12-27 PROCEDURE — 74011250636 HC RX REV CODE- 250/636

## 2020-12-27 PROCEDURE — 80048 BASIC METABOLIC PNL TOTAL CA: CPT

## 2020-12-27 PROCEDURE — 94003 VENT MGMT INPAT SUBQ DAY: CPT

## 2020-12-27 PROCEDURE — 83735 ASSAY OF MAGNESIUM: CPT

## 2020-12-27 PROCEDURE — 99291 CRITICAL CARE FIRST HOUR: CPT | Performed by: INTERNAL MEDICINE

## 2020-12-27 PROCEDURE — 85025 COMPLETE CBC W/AUTO DIFF WBC: CPT

## 2020-12-27 PROCEDURE — 77030008771 HC TU NG SALEM SUMP -A

## 2020-12-27 PROCEDURE — 82803 BLOOD GASES ANY COMBINATION: CPT

## 2020-12-27 PROCEDURE — 82962 GLUCOSE BLOOD TEST: CPT

## 2020-12-27 PROCEDURE — 74011250636 HC RX REV CODE- 250/636: Performed by: PHYSICIAN ASSISTANT

## 2020-12-27 PROCEDURE — 94762 N-INVAS EAR/PLS OXIMTRY CONT: CPT

## 2020-12-27 PROCEDURE — 74011000250 HC RX REV CODE- 250: Performed by: INTERNAL MEDICINE

## 2020-12-27 PROCEDURE — 74011250636 HC RX REV CODE- 250/636: Performed by: INTERNAL MEDICINE

## 2020-12-27 PROCEDURE — 77030018798 HC PMP KT ENTRL FED COVD -A

## 2020-12-27 RX ORDER — POLYETHYLENE GLYCOL 3350 17 G/17G
17 POWDER, FOR SOLUTION ORAL DAILY
Status: DISCONTINUED | OUTPATIENT
Start: 2020-12-28 | End: 2020-12-29

## 2020-12-27 RX ORDER — POLYETHYLENE GLYCOL 3350 17 G/17G
34 POWDER, FOR SOLUTION ORAL 2 TIMES DAILY
Status: DISCONTINUED | OUTPATIENT
Start: 2020-12-27 | End: 2020-12-27

## 2020-12-27 RX ORDER — POTASSIUM CHLORIDE 14.9 MG/ML
INJECTION INTRAVENOUS
Status: COMPLETED
Start: 2020-12-27 | End: 2020-12-27

## 2020-12-27 RX ADMIN — INSULIN LISPRO 12 UNITS: 100 INJECTION, SOLUTION INTRAVENOUS; SUBCUTANEOUS at 06:41

## 2020-12-27 RX ADMIN — TICAGRELOR 90 MG: 90 TABLET ORAL at 09:20

## 2020-12-27 RX ADMIN — Medication 6 MCG/MIN: at 01:45

## 2020-12-27 RX ADMIN — INSULIN LISPRO 6 UNITS: 100 INJECTION, SOLUTION INTRAVENOUS; SUBCUTANEOUS at 13:05

## 2020-12-27 RX ADMIN — MEROPENEM 500 MG: 500 INJECTION, POWDER, FOR SOLUTION INTRAVENOUS at 09:19

## 2020-12-27 RX ADMIN — Medication 30 ML: at 09:19

## 2020-12-27 RX ADMIN — HYDROCORTISONE SODIUM SUCCINATE 25 MG: 100 INJECTION, POWDER, FOR SOLUTION INTRAMUSCULAR; INTRAVENOUS at 22:17

## 2020-12-27 RX ADMIN — MEROPENEM 500 MG: 500 INJECTION, POWDER, FOR SOLUTION INTRAVENOUS at 03:16

## 2020-12-27 RX ADMIN — INSULIN LISPRO 5 UNITS: 100 INJECTION, SOLUTION INTRAVENOUS; SUBCUTANEOUS at 06:41

## 2020-12-27 RX ADMIN — INSULIN LISPRO 3 UNITS: 100 INJECTION, SOLUTION INTRAVENOUS; SUBCUTANEOUS at 17:45

## 2020-12-27 RX ADMIN — POTASSIUM CHLORIDE 20 MEQ: 14.9 INJECTION, SOLUTION INTRAVENOUS at 19:57

## 2020-12-27 RX ADMIN — ASPIRIN 81 MG CHEWABLE TABLET 81 MG: 81 TABLET CHEWABLE at 09:20

## 2020-12-27 RX ADMIN — MEROPENEM 500 MG: 500 INJECTION, POWDER, FOR SOLUTION INTRAVENOUS at 22:16

## 2020-12-27 RX ADMIN — INSULIN GLARGINE 15 UNITS: 100 INJECTION, SOLUTION SUBCUTANEOUS at 22:18

## 2020-12-27 RX ADMIN — SODIUM CHLORIDE 20 ML: 9 INJECTION, SOLUTION INTRAMUSCULAR; INTRAVENOUS; SUBCUTANEOUS at 06:00

## 2020-12-27 RX ADMIN — INSULIN GLARGINE 30 UNITS: 100 INJECTION, SOLUTION SUBCUTANEOUS at 09:00

## 2020-12-27 RX ADMIN — HYDROCORTISONE SODIUM SUCCINATE 25 MG: 100 INJECTION, POWDER, FOR SOLUTION INTRAMUSCULAR; INTRAVENOUS at 09:19

## 2020-12-27 RX ADMIN — FAMOTIDINE 20 MG: 10 INJECTION INTRAVENOUS at 09:19

## 2020-12-27 RX ADMIN — CHLORHEXIDINE GLUCONATE 0.12% ORAL RINSE 10 ML: 1.2 LIQUID ORAL at 09:19

## 2020-12-27 RX ADMIN — MEROPENEM 500 MG: 500 INJECTION, POWDER, FOR SOLUTION INTRAVENOUS at 16:12

## 2020-12-27 RX ADMIN — TICAGRELOR 90 MG: 90 TABLET ORAL at 22:18

## 2020-12-27 RX ADMIN — Medication 5 MCG/MIN: at 07:41

## 2020-12-27 RX ADMIN — SODIUM CHLORIDE 10 ML: 9 INJECTION, SOLUTION INTRAMUSCULAR; INTRAVENOUS; SUBCUTANEOUS at 16:13

## 2020-12-27 RX ADMIN — FAMOTIDINE 20 MG: 10 INJECTION INTRAVENOUS at 22:17

## 2020-12-27 RX ADMIN — POLYETHYLENE GLYCOL 3350 17 G: 17 POWDER, FOR SOLUTION ORAL at 09:20

## 2020-12-27 RX ADMIN — MIDAZOLAM 2 MG/HR: 5 INJECTION, SOLUTION INTRAMUSCULAR; INTRAVENOUS at 07:39

## 2020-12-27 RX ADMIN — ATORVASTATIN CALCIUM 40 MG: 40 TABLET, FILM COATED ORAL at 22:18

## 2020-12-27 RX ADMIN — INSULIN LISPRO 5 UNITS: 100 INJECTION, SOLUTION INTRAVENOUS; SUBCUTANEOUS at 13:06

## 2020-12-27 RX ADMIN — CHLORHEXIDINE GLUCONATE 0.12% ORAL RINSE 10 ML: 1.2 LIQUID ORAL at 22:18

## 2020-12-27 RX ADMIN — INSULIN LISPRO 5 UNITS: 100 INJECTION, SOLUTION INTRAVENOUS; SUBCUTANEOUS at 17:46

## 2020-12-27 RX ADMIN — Medication 50 MCG/HR: at 11:03

## 2020-12-27 RX ADMIN — SODIUM CHLORIDE 40 ML: 9 INJECTION, SOLUTION INTRAMUSCULAR; INTRAVENOUS; SUBCUTANEOUS at 22:47

## 2020-12-27 NOTE — PROGRESS NOTES
Cardiology  [facility] [email or web address] [address] [facility]  Adal Lugo Juven[place]  [phone]    PROGRESS NOTE    Ava Marylee Starks          12/27/2020   3:13 PM    Follow-up of CAD     Impression:   1. Acute respiratory failure- requiring mechanical ventilation- extubated  12/17/20. re intubated 12/22/20. 2. Hypotension- possible septic shock- in the setting aspirations pneumonia vs UTI. - on antibiotics. Off vasopressor support       3. Subacute MI- 12/10/20 -s/p LHC S/p ptca/stent to proximal/ mid LAD using ARELY.  LVEDP 8 mmHg. Normal PASP pressure with normal PCWP. Moderate to severe LV dysfunction. ekg 12/22/20  NSR w/o  acute ischemic changes. continue to monitor. Continue to trend Troponin.  Continue asa and brilinta   4. Acute Systolic Congestive heart Failure-recent echo with EF% 35%-40-better.  on lasix 40 mg bid   5. COPD, on home O2 4L NC   6. Hepatitis C ? -Per family member   9. DM2- medications per medical team   8. Dementia - failed swallow eval.  Per GI note - poor candidate at this time for G-tube due to not being able to stop Brilinta. Consider IR consult  for PEG tube      Plan:   1) Continue DAPT. May need trach+peg  2) Prognosis poor  3) Can use heparin drip if DAPT is interuppted. 4) Extubation per PCCM     Above mentioned treatment plan was discussed in detail with the patient and communicated with the hospital staff as well. Patient understands the plan and agrees. Interval Epic notes and radiographs independently reviewed.  Labs reviewed and time marked.     Subjective: Patient doing fair.  Currently intubated    Objective:     Visit Vitals:  Visit Vitals  /65   Pulse 70   Temp 99.1 °F (37.3 °C)   Resp 16   Ht 5' 7\" (1.702 m)   Wt 63 kg (138 lb 14.2 oz)   SpO2 96%   Breastfeeding No   BMI 21.75 kg/m²       Intake/Output Summary (Last 24 hours)     Intake/Output Summary (Last 24 hours) at 12/27/2020 1513  Last data filed at 12/27/2020 1400  Gross per 24 hour   Intake 3207.54 ml   Output 1580 ml   Net 1627.54 ml       Physical Exam:  GENERAL: no distress, appears stated age  SKIN: Normal.  LYMPHATIC: Cervical, supraclavicular, and axillary nodes normal.   HEENT: PERRLA, EOMI and Sclera clear, anicteric  NECK: Supple without nodes or mass. No thyromegaly or bruit  LUNG: clear to auscultation bilaterally  HEART: regular rate and rhythm, S1, S2 normal, no murmur, click, rub or gallop  BREAST normal appearance, no masses or tenderness  ABDOMEN: soft, non-tender.  Bowel sounds normal. No masses,  no organomegaly  EXTREMITIES:  extremities normal, atraumatic, no cyanosis or edema  NEURO: RANJITH  reflexes normal and symmetric    Current Facility-Administration Medications  Current Facility-Administered Medications   Medication Dose Route Frequency    [START ON 12/28/2020] polyethylene glycol (MIRALAX) packet 17 g  17 g Per NG tube DAILY    chlorhexidine (PERIDEX) 0.12 % mouthwash 10 mL  10 mL Oral Q12H    fentaNYL (PF) 900 mcg/30 ml infusion soln  0-200 mcg/hr IntraVENous TITRATE    midazolam (VERSED) injection 1-2 mg  1-2 mg IntraVENous Q10MIN PRN    fentaNYL citrate (PF) injection  mcg   mcg IntraVENous Q30MIN PRN    midazolam in normal saline (VERSED) 1 mg/mL infusion  1-5 mg/hr IntraVENous TITRATE    ELECTROLYTE REPLACEMENT PROTOCOL - Potassium Standard Dosing   1 Each Other PRN    ELECTROLYTE REPLACEMENT PROTOCOL - Magnesium   1 Each Other PRN    ELECTROLYTE REPLACEMENT PROTOCOL - Phosphorus  Standard Dosing  1 Each Other PRN    ELECTROLYTE REPLACEMENT PROTOCOL - Calcium   1 Each Other PRN    meropenem (MERREM) 500 mg in sterile water (preservative free) 10 mL IV syringe  500 mg IntraVENous Q6H    hydrocortisone Sod Succ (PF) (SOLU-CORTEF) injection 25 mg  25 mg IntraVENous Q12H    insulin glargine (LANTUS) injection 15 Units  15 Units SubCUTAneous QHS    DOBUTamine (DOBUTREX) 500 mg/250 mL (2,000 mcg/mL) infusion  0-10 mcg/kg/min IntraVENous TITRATE    NOREPINephrine (LEVOPHED) 8 mg in 5% dextrose 250mL (32 mcg/mL) infusion  0.5-50 mcg/min IntraVENous TITRATE    insulin glargine (LANTUS) injection 30 Units  30 Units SubCUTAneous DAILY    insulin lispro (HUMALOG) injection 5 Units  5 Units SubCUTAneous Q6H    [Held by provider] carvediloL (COREG) tablet 6.25 mg  6.25 mg Oral Q12H    [Held by provider] lisinopriL (PRINIVIL, ZESTRIL) tablet 5 mg  5 mg Oral DAILY    [Held by provider] furosemide (LASIX) injection 40 mg  40 mg IntraVENous Q12H    [Held by provider] spironolactone (ALDACTONE) tablet 25 mg  25 mg Oral DAILY    multivit-folic acid-herbal 621 (WELLESSE PLUS) oral liquid 30 mL  30 mL Per NG tube DAILY    insulin lispro (HUMALOG) injection   SubCUTAneous Q6H    sodium chloride (NS) flush 5-40 mL  5-40 mL IntraVENous PRN    ticagrelor (BRILINTA) tablet 90 mg  90 mg Per NG tube BID    sodium chloride (NS) flush 5-40 mL  5-40 mL IntraVENous PRN    albuterol-ipratropium (DUO-NEB) 2.5 MG-0.5 MG/3 ML  3 mL Nebulization Q4H PRN    famotidine (PF) (PEPCID) 20 mg in 0.9% sodium chloride 10 mL injection  20 mg IntraVENous Q12H    sodium chloride (NS) flush 5-40 mL  5-40 mL IntraVENous Q8H    sodium chloride (NS) flush 5-40 mL  5-40 mL IntraVENous PRN    acetaminophen (TYLENOL) tablet 650 mg  650 mg Oral Q6H PRN    Or    acetaminophen (TYLENOL) suppository 650 mg  650 mg Rectal Q6H PRN    promethazine (PHENERGAN) tablet 12.5 mg  12.5 mg Oral Q6H PRN    Or    ondansetron (ZOFRAN) injection 4 mg  4 mg IntraVENous Q6H PRN    glucose chewable tablet 16 g  4 Tab Oral PRN    glucagon (GLUCAGEN) injection 1 mg  1 mg IntraMUSCular PRN    dextrose (D50W) injection syrg 12.5-25 g  25-50 mL IntraVENous PRN    aspirin chewable tablet 81 mg  81 mg Oral DAILY    atorvastatin (LIPITOR) tablet 40 mg  40 mg Oral QHS       No Known Allergies      Past Medical History  Past Medical History:   Diagnosis Date    Arthritis     Asthma     Chronic pain     BACK PAIN  Diabetes ([facility] [place] [facility]     Diabetic neuropathy ([facility] [place] [facility]     Emphysema     Hepatitis C     Hypertension     Nervousness     Osteoarthritis of both knees      Social History  Social History     Socioeconomic History    Marital status: SINGLE     Spouse name: Not on file    Number of children: Not on file    Years of education: Not on file    Highest education level: Not on file   Occupational History    Not on file   Social Needs    Financial resource strain: Not on file    Food insecurity     Worry: Not on file     Inability: Not on file   [employer] needs     Medical: Not on file     Non-medical: Not on file   Tobacco Use    Smoking status: Current Every Day Smoker     Packs/day: 1.00   Substance and Sexual Activity    Alcohol use: Yes     Comment: socially    Drug use: Yes     Types: Cocaine    Sexual activity: Never   Lifestyle    Physical activity     Days per week: Not on file     Minutes per session: Not on file    Stress: Not on file   Relationships    Social connections     Talks on phone: Not on file     Gets together: Not on file     Attends [employer] service: Not on file     Active member of club or organization: Not on file     Attends meetings of clubs or organizations: Not on file     Relationship status: Not on file    Intimate partner violence     Fear of current or ex partner: Not on file     Emotionally abused: Not on file     Physically abused: Not on file     Forced sexual activity: Not on file   Other Topics Concern    Not on file   Social History Narrative    Not on file     Family History  Family History   Problem Relation Age of Onset    Diabetes Mother     Hypertension Mother     Obesity Mother     Alcohol abuse Father     High Cholesterol Father     Lung Disease Father     Stroke Father    Trinh Shall Sister     Depression Sister     Diabetes Sister     Hypertension Sister     Obesity Sister     Arthritis-osteo Brother     Diabetes Brother     Hypertension Brother     Obesity Brother        Constitutional: Negative for fatigue  Skin: Negative for darkening color, none  HEENT: Negative for ear drainage  Eyes: Negative for foreign body sensation  Cardiovascular: Negative for No shortness of breath  Respiratory: Negative for negative  Gastrointestinal: Negative for change in bowel habits  Genitourinary: Negative for negative  Musculoskeletal: Negative for arthralgias  Heme: Negative for blood transfusions  Allergies: Negative for eye itching  Neurological: Negative for vertigo  Psychiatric: Negative for  behavior problems    Telemetry Findings:NSR    Labs last 24 hours:  Recent Results (from the past 24 hour(s))   PHOSPHORUS    Collection Time: 12/26/20  4:50 PM   Result Value Ref Range    Phosphorus 2.0 (L) 2.5 - 4.9 MG/DL   MAGNESIUM    Collection Time: 12/26/20  4:50 PM   Result Value Ref Range    Magnesium 2.9 (H) 1.6 - 2.6 mg/dL   GLUCOSE, POC    Collection Time: 12/26/20  6:42 PM   Result Value Ref Range    Glucose (POC) 200 (H) 70 - 110 mg/dL   PHOSPHORUS    Collection Time: 12/26/20  9:35 PM   Result Value Ref Range    Phosphorus 2.2 (L) 2.5 - 4.9 MG/DL   GLUCOSE, POC    Collection Time: 12/26/20 11:38 PM   Result Value Ref Range    Glucose (POC) 142 (H) 70 - 870 mg/dL   METABOLIC PANEL, BASIC    Collection Time: 12/27/20  2:15 AM   Result Value Ref Range    Sodium 149 (H) 136 - 145 mmol/L    Potassium 3.9 3.5 - 5.5 mmol/L    Chloride 110 100 - 111 mmol/L    CO2 33 (H) 21 - 32 mmol/L    Anion gap 6 3.0 - 18 mmol/L    Glucose 198 (H) 74 - 99 mg/dL    BUN 35 (H) 7.0 - 18 MG/DL    Creatinine 0.74 0.6 - 1.3 MG/DL    BUN/Creatinine ratio 47 (H) 12 - 20      GFR est AA >60 >60 ml/min/1.73m2    GFR est non-AA >60 >60 ml/min/1.73m2    Calcium 8.7 8.5 - 10.1 MG/DL   PHOSPHORUS    Collection Time: 12/27/20  2:15 AM   Result Value Ref Range    Phosphorus 2.7 2.5 - 4.9 MG/DL   MAGNESIUM    Collection Time: 12/27/20  2:15 AM   Result Value Ref Range    Magnesium 2.7 (H) 1.6 - 2.6 mg/dL   POC G3    Collection Time: 12/27/20  4:52 AM   Result Value Ref Range    Device: VENT      FIO2 (POC) 0.50 %    pH (POC) 7.48 (H) 7.35 - 7.45      pCO2 (POC) 45.4 (H) 35.0 - 45.0 MMHG    pO2 (POC) 152 (H) 80 - 100 MMHG    HCO3 (POC) 33.7 (H) 22 - 26 MMOL/L    sO2 (POC) 99 (H) 92 - 97 %    Base excess (POC) 9 mmol/L    Mode ASSIST CONTROL      Tidal volume 450 ml    Set Rate 16 bpm    PEEP/CPAP (POC) 5 cmH2O    PIP (POC) 23      Allens test (POC) YES      Inspiratory Time 0.90 sec    Total resp. rate 16      Site RIGHT RADIAL      Specimen type (POC) ARTERIAL      Performed by Ketan Hdez     Volume control plus YES     CBC WITH AUTOMATED DIFF    Collection Time: 12/27/20  5:45 AM   Result Value Ref Range    WBC 12.1 4.6 - 13.2 K/uL    RBC 2.78 (L) 4.20 - 5.30 M/uL    HGB 7.3 (L) 12.0 - 16.0 g/dL    HCT 24.7 (L) 35.0 - 45.0 %    MCV 88.8 74.0 - 97.0 FL    MCH 26.3 24.0 - 34.0 PG    MCHC 29.6 (L) 31.0 - 37.0 g/dL    RDW 14.7 (H) 11.6 - 14.5 %    PLATELET 036 (H) 287 - 420 K/uL    MPV 11.0 9.2 - 11.8 FL    NEUTROPHILS 84 (H) 40 - 73 %    LYMPHOCYTES 8 (L) 21 - 52 %    MONOCYTES 8 3 - 10 %    EOSINOPHILS 0 0 - 5 %    BASOPHILS 0 0 - 2 %    ABS. NEUTROPHILS 10.2 (H) 1.8 - 8.0 K/UL    ABS. LYMPHOCYTES 1.0 0.9 - 3.6 K/UL    ABS. MONOCYTES 0.9 0.05 - 1.2 K/UL    ABS. EOSINOPHILS 0.0 0.0 - 0.4 K/UL    ABS.  BASOPHILS 0.0 0.0 - 0.1 K/UL    DF AUTOMATED     PHOSPHORUS    Collection Time: 12/27/20  5:45 AM   Result Value Ref Range    Phosphorus 2.9 2.5 - 4.9 MG/DL   MAGNESIUM    Collection Time: 12/27/20  5:45 AM   Result Value Ref Range    Magnesium 2.9 (H) 1.6 - 2.6 mg/dL   GLUCOSE, POC    Collection Time: 12/27/20  6:35 AM   Result Value Ref Range    Glucose (POC) 320 (H) 70 - 110 mg/dL   GLUCOSE, POC    Collection Time: 12/27/20 11:30 AM   Result Value Ref Range    Glucose (POC) 229 (H) 70 - 110 mg/dL          Toño Ramirez MD  12/27/2020  3:13 PM

## 2020-12-27 NOTE — PROGRESS NOTES
Hospitalist Progress Note      Patient: Saad Bill MRN: [id number]  CSN: [id number]    YOB: 1955  Age: 72 y.o. Sex: female    DOA: 12/10/2020 LOS:  LOS: 17 days        72 y. o. female with a past medical history of COPD, CHF, HTN, DM, dementia, presented to [facility] with acute on chronic systolic heart failure, acute MI with anterolateral STEMI and Q waves s/p ptca/stent to proximal/mid LAD on 12/11/20. Pt was intubated and extubated on 12/17/20, transferred to . On 12/22/20, RRT was called - pt was tachypneic, using accessory muscles, obtunded, and hypotensive. Pt was subsequently intubated, started on levophed and transferred to the ICU emergently. Initial ABG - 7.40, 66.3, 64, 40.7. Dobutamine was restarted per cards.      Upon  initial evaluation, the patient  was vented,  hypotensive with SBP in the 70s, on levophed 4mcg via PIV, ETT suctioning noted to have copious amount of thick secretions (appeared to be tube feeds). R fem CVL and A line placed emergently. Levophed and neosynephrine gtt initiated. Labs, CXR and sedation ordered.      Yesterday, noted with 8 beat run of [facility] with spontaneous resolution overnight. lytes noted stable. HD stable while on Levophed gtt, actively weaning down, currently on Levophed gtt 5mcg/min. Now spiking fevers; no leukocytosis   Adequate UOP, +Pro. Mild bloody secretions noted in JACQUELINE tube, no active oozing noted. Hypernatremia, slight improvement. The patient is critically ill and can not provide additional history due to mechanical ventilation. Assessment/Plan     1. Acute on Chronic Hypoxic and Hypercapnic Respiratory Failure - on mechanical ventilation, re-intubated (12/24) overnight 2/2 increased WOB and for airway protection. Extubated on 12/17 and reintubated on 12/22, RRT on floor for \"unresponsiveness, \" likely aspiration. pccm to follow  2.  Wide Complex Arrhythmia (12/25) - Appears isolated at this point Noted on bedside cardiac monitor. Lasted roughly 5-10mins with near code blue. Initially HR in the 60s, then dropped to 30s, pulse remained intact. Spontaneous resolution. Oliverio switched to Levo. Cardiology following  3. (12/26) 8 beat run of V-Tach overnight, self-resolution. 4. Acute on Chronic Systolic CHF - Echo 54/09/03 EF 35-40%, diuresis per cards (Dr. Kristy Soliman)  5. Septic shock. on Levophed gtt (12/25). Source likely Aspiration PNA. Sputum Cx(12/22) (+) heavy E.Coli-ESBL. Recent Hx of E.coli ESBL. 6. Acute encephalopathy  7. Severe pulmonary fibrosis - with extensive honeycombing and bronchiectasis seen on CTA chest 12/16/20 - appears to be a new diagnosis  8. Acute cystitis - (+)E. coli  9. Lactic acidosis - resolved  10. Acute MI with anterolateral STEMI and Q waves - s/p ptca/stent to proximal/mid LAD using ARELY on DAPT on 12/11/20- on Brilinta  11. Severe dysphagia - failed MBS, poor candidate for G-tube 2/2 Brilinta per GI recs. 12. Hx of Hep C: + RNA viral load 4/4/2020  13. Multiple liver masses - noted on CT scan 11/25/20  14. Hx of COPD - on home O2 4L NC  15. Full code. cvt icu. Additional Notes:        Vital signs/Intake and Output:  Visit Vitals  /62   Pulse 74   Temp 97.9 °F (36.6 °C)   Resp 16   Ht 5' 7\" (1.702 m)   Wt 62.3 kg (137 lb 5.6 oz)   SpO2 98%   Breastfeeding No   BMI 21.51 kg/m²     Current Shift:  12/26 1901 - 12/27 0700  In: 823.5 [I.V.:113.5]  Out: 575 [Urine:575]  Last three shifts:  12/25 0701 - 12/26 1900  In: 5212.3 [I.V.:982.3]  Out: 2500 [Urine:2500]    Intubated, sedated, not responding to voice or light or light touch. Ncat. perrl +ET tube. +ogt. RRR  cta b.l anterior and infraaxillary fields  Soft nt nd nabs   clear urine in means. 1+ edema b.l UE.    Not opening eyes, not following commands  No rash        Medications Reviewed      Labs: Results:       Chemistry Recent Labs     12/27/20  0215 12/26/20  1300 12/26/20  0850 12/25/20  1000 12/25/20  1000 12/24/20  0400 12/24/20  0400   * 317* 254*   < > 191*   < > 338*   * 148* 152*   < > 157*   < > 147*   K 3.9 3.6 3.2*   < > 3.4*   < > 3.7    109 111   < > 115*   < > 106   CO2 33* 36* 35*   < > 37*   < > 36*   BUN 35* 46* 47*   < > 62*   < > 73*   CREA 0.74 0.95 0.97   < > 1.02   < > 1.26   CA 8.7 8.8 8.9   < > 9.2   < > 9.5   AGAP 6 3 6   < > 5   < > 5   BUCR 47* 48* 48*   < > 61*   < > 58*   AP  --   --   --   --  101  --  132*   TP  --   --   --   --  7.0  --  7.0   ALB  --   --   --   --  3.0*  --  2.4*   GLOB  --   --   --   --  4.0  --  4.6*   AGRAT  --   --   --   --  0.8  --  0.5*    < > = values in this interval not displayed. CBC w/Diff Recent Labs     12/26/20  0411 12/25/20  1000 12/25/20  0430   WBC 11.6 12.3 12.7   RBC 2.87* 2.97* 2.95*   HGB 7.4* 7.8* 7.7*   HCT 25.6* 26.4* 26.0*   * 497* 461*   GRANS 87* 85* 80*   LYMPH 11* 8* 11*   EOS 0 0 0      Cardiac Enzymes Recent Labs     12/25/20  2125 12/25/20  1601   CPK 32 34   CKND1 5.6* 6.5*      Coagulation Recent Labs     12/25/20  1000   PTP 15.1   INR 1.2   APTT 27.5       Lipid Panel No results found for: CHOL, CHOLPOCT, CHOLX, CHLST, CHOLV, 707805, HDL, HDLP, LDL, LDLC, DLDLP, 282935, VLDLC, VLDL, TGLX, TRIGL, TRIGP, TGLPOCT, CHHD, CHHDX   BNP No results for input(s): BNPP in the last 72 hours. Liver Enzymes Recent Labs     12/25/20  1000   TP 7.0   ALB 3.0*         Thyroid Studies Lab Results   Component Value Date/Time    TSH 2.92 12/10/2020 11:07 AM        Procedures/imaging: see electronic medical records for all procedures/Xrays and details which were not copied into this note but were reviewed prior to creation of Plan.

## 2020-12-27 NOTE — PROGRESS NOTES
Called [facility] ICU, spoke to Shamika. He requested bedside verbal report, will give report during transfer.

## 2020-12-27 NOTE — PROGRESS NOTES
Patient was transferred out of ICU and accepted by Dr. Josiah Patel. Follow up round. She remains stable. Continue current Rx.      Sarah Janet HOU

## 2020-12-27 NOTE — PROGRESS NOTES
07:15  Received pt from [facility], RN.   08:00  Pt. With copious, thick, yellow/green sputum. Suctioned by RT.    12:00  Pt. With loose BM. 16:30  NG tube removed. OG placed at 60cm at lip. Stat KUB ordered for verification. 17:00  Pt. With large loose BM  19:00  Bedside and Verbal shift change report given to [facility], RN (oncoming nurse) by Dora Cuenca RN (offgoing nurse). Report included the following information SBAR, Kardex, Procedure Summary, Intake/Output, MAR, Recent Results and Cardiac Rhythm NSR.

## 2020-12-27 NOTE — PROGRESS NOTES
[facility]  Pulmonary, Critical Care, and Sleep Medicine    Name: Bethel Leroy MRN: [id number]   : 1955 Hospital: [facility]   [facility]    IMPRESSION:   · Acute on Chronic Hypoxic and Hypercapnic Respiratory Failure - Requiring mechanical ventilation, re-intubated () overnight 2/2 increased WOB and for airway protection. Concern for new aspiration. Extubated on  and reintubated on , 2/2 RRT on floor for \"unresponsiveness\". Likely aspirated. · Wide Complex Arrhythmia () - Appears isolated at this point Noted on bedside cardiac monitor. Lasted roughly 5-10mins with near code blue. Initially HR in the 60s, then dropped to 30s, pulse remained intact. Spontaneous resolution. E-lyte derangement vs Drug effect (pt was on 55mcg/hr Oliverio-Synephrine at the time) vs cardiac insult. Oliverio switched to Levo. Cardio paged and aware. () 8 beat run of V-Tach overnight, self-resolution. · Acute on Chronic Systolic Heart Failure - In the setting of severe ILD, Echo 12/10/20 EF 35-40%, diuresis per cards (Dr. Franc Garcia)  · Shock - Septic with possible Cardiogenic component. Interval worsening s/p re-intubation on . Back on Levophed gtt (). Suspect multifactorial with component of sedation effect for current hypotension, in addition to cardiogenic shock. Source likely Aspiration PNA. Sputum Cx() (+) heavy E.Coli-ESBL. Recent Hx of E.coli ESBL. Leukocytosis improving (). Dobutamine D/C'd (). · Acute Encephalopathy - likely toxic/metabolic vs infectious vs hepatic in nature, ammonia wnl. · Acute flash pulmonary edema -  in setting of chronic heart failure - Likely multifactorial, in setting of emergent re-intubation, 2/2 above. Dobutamine D/C'd on  and restarted on , D/C'd ().  pro-BNP trending up on   · Severe pulmonary fibrosis - with extensive honeycombing and bronchiectasis as seen on CTA chest 12/16/20 - appears to be a new diagnosis  · Acute aspiration pneumonia with severe sepsis  · Acute cystitis - (+)E. coli  · Lactic acidosis - initial 2.4, resolved  · Acute MI with anterolateral STEMI and Q waves - s/p ptca/stent to proximal/mid LAD using ARELY on DAPT on 12/11/20- Remains on Brilinta  · Severe dysphagia - failed MBS, poor candidate for G-tube 2/2 Brilinta per GI (Dr. Shadia Kimble)  · Hx of Hep C: + RNA viral load 4/4/2020  · Multiple liver masses - noted on CT scan 11/25/20  · Hx of COPD - on home O2 4L NC  · Hx of HTN  · Hx of DM  · Hx of dementia - unknown baseline, on aricept     Patient Active Problem List   Diagnosis Code    Diverticula of colon K57.30    Sepsis ([address] A41.9    Sepsis secondary to UTI ([address] A41.9, N39.0    DM hyperosmolarity type II, uncontrolled ([address] E11.00, E11.65    HTN (hypertension) I10    Febrile illness, acute R50.9    Gram-negative bacteremia R78.81    Diabetic neuropathy ([address] E11.40    Osteoarthritis of both knees M17.0    Acidosis E87.2    Respiratory failure ([address] J96.90    Shock ([address] R57.9    Hyperosmolar (nonketotic) coma ([address] E11.01    Acute UTI N39.0    Macrocytic anemia D53.9    STEMI (ST elevation myocardial infarction) ([address] I21.3    Acute on chronic systolic congestive heart failure (HCC) I50.23    Severe protein-calorie malnutrition (HCC) E43    Goals of care, counseling/discussion Z71.89    Dementia without behavioral disturbance (HCC) F03.90    Pulmonary fibrosis (HCC) J84.10      PLAN:   · Resp: Titrate FiO2 for SpO2 >88%. Daily CXR and ABG while intubated. Con't PRN duo-nebs. Optimize bronchial hygiene, with attention to suction and lavage if needed. Pt will require Candance Elpidio if family wishes to continue full aggressive care (see below regarding pursuing futile care policy -- tracheostomy will not be reversed given degree of hypoxia and mental status). Continue oral care  · I/D: F/U BxCx and Sputum Cx. Con't ABX - Meropenem. ID following. Trend Pro-sharon PRN. This was secondary to aspiration and pt will continue to have recurrent aspiration despite PEG tube placement. Sputum (+) ESBL E.Coli. Trend temp & WBC curve. · Heme/Onc: Daily CBC. Monitor for active bleeding while on Brilinta and ASA. H/H currently stable. Check coags if bloody secretions recur. · CVS: Cardiology following, no new recs. Con't Brilinta and ASA. Remains on Levophed gtt, but weaning down. Goal MAP >65mmHg. Consider resuming Lasix once e-lyte derangement has been reversed --> Currently Na+ remains >150. Trend BNP while diuresing. May require new A-Line pending clinical course. Keep CVL in place while critical. Brennen monitor for any further arrhythmias. · Metabolic: q4 BMP, Mag & Phos for now. Closely monitor Na+ trend. May require D5 infusion to bring down Na+ -- will increase enteral free water first. Trend e-lytes, replace PRN. · Renal: Trend renal indices. +means, keep for now for accurate I/Os. · Endocrine: POC glucose q6 and PRN. Lantus - 15u qHS, 30u qD, 5u q6. Closely monitor BS, currently improving without need for Insulin gtt. Con't wean stress dose steroids - 25mg q12. Will wean further once off Levophed gtt. · GI: NPO. SUP. Continue TF advance as tolerated tomorrow. Con't H2O flushes 100cc q1h. NGT in place. Will need PEG if family proceeds with aggressive care (will not solve issue of recurrent aspiration). Zofran PRN for N/V. BM regimen - Miralax increased to BID -->will titrate to effect  · Muscl/Skin: Wound care PRN. No active issues. · Neuro/Pain: Titrate sedation for RASS 0 to -1. Fentanyl & Versed gtt;  PRN Versed/Fentanyl. Underlying dementia, suspect worsening with critical events.    · Full Code    · Very Very poor overall prognosis given overall functional status in the setting of a very high burden of acute and chronic diseases not limited to new pulmonary fibrosis, ischemic cardiomyopathy with cardiogenic shock, dysphagia and what appears to be severe dementia. I would advise pursuing [facility] futile care policy     Subjective/Interval History:   The patient is critically ill and can not provide additional history due to mechanical ventilation. 20  Pt seen and examined at bedside. No acute events overnight per nursing. Pt able to awaken but does not follow commands. Remains intubated and sedated. Recweiving tube feeds, nursing reports no issues with TF being suctioned from ETT --- only suctioning small to moderate green secretions. BM achieved today. ROS:Review of systems not obtained due to patient factors. HPI and Int Hx  This patient has been seen and evaluated at the request of Dr. Allison Moody for acute on chronic hypoxic and hypercapnic respiratory failure. Patient is a 72 y.o. female with a past medical history of COPD, CHF, HTN, DM, dementia, presented to [facility] with acute on chronic systolic heart failure, acute MI with anterolateral STEMI and Q waves s/p ptca/stent to proximal/mid LAD on 20. Pt was intubated and extubated on 20, transferred to . On 20, RRT was called - pt was tachypneic, using accessory muscles, obtunded, and hypotensive. Pt was subsequently intubated, started on levophed and transferred to the ICU emergently. Initial ABG - 7.40, 66.3, 64, 40.7.  Dobutamine was restarted per cards      Objective:   Vital Signs:    Visit Vitals  /69 (BP 1 Location: Left arm, BP Patient Position: Lying left side)   Pulse 62   Temp 99.1 °F (37.3 °C)   Resp 16   Ht 5' 7\" (1.702 m)   Wt 63 kg (138 lb 14.2 oz)   SpO2 93%   Breastfeeding No   BMI 21.75 kg/m²       O2 Device: Endotracheal tube, Ventilator   O2 Flow Rate (L/min): 40 l/min   Temp (24hrs), Av °F (37.8 °C), Min:97.9 °F (36.6 °C), Max:100.7 °F (38.2 °C)       Intake/Output:   Last shift:       07 -  1900  In: 984.4 [I.V.:104.4]  Out: 300 [Urine:300]  Last 3 shifts: 1901 -  0700  In: 4393 [I.V.:1192]  Out: 6419 [Urine:2570]    Intake/Output Summary (Last 24 hours) at 12/27/2020 1243  Last data filed at 12/27/2020 1200  Gross per 24 hour   Intake 3558.84 ml   Output 1705 ml   Net 1853.84 ml      Physical Exam:     General:  Intubated, sedated, NAD, arousable, does not follow commands   Head:  EENT:  Normocephalic, without obvious abnormality, atraumatic. Nasal bridege and septum midlline, no drainage, ETT and NGT in place in right nare   Eyes:  Conjunctivae/corneas clear. PERRL, EOMs intact. Back:   Symmetric, no curvature. ROM normal.   Lungs:   (+)coarse breath sounds, diminished bibasilar, mildly course throughout. Chest wall:  No tenderness or deformity. Heart:  Regular rate and rhythm, S1, S2 normal, no murmur, click, rub or gallop. Abdomen:   Soft, non-tender. Bowel sounds normal. No masses,  No organomegaly. Extremities: Extremities normal, atraumatic, no cyanosis, +1 edema BUE. + restraints   Pulses: 2+ and symmetric all extremities. Skin: Skin color, texture, turgor normal. No rashes or lesions. Normal cap refill.     Neurologic: Intermittently awake, doesn't follow commands, moves extremities spontaneously, (+)cough/gag reflex  DEVICES :  ETT, OGT, CVL, Pro            DATA:  Labs:  Recent Labs     12/27/20  0545 12/26/20  0411 12/25/20  1000   WBC 12.1 11.6 12.3   HGB 7.3* 7.4* 7.8*   HCT 24.7* 25.6* 26.4*   * 427* 497*     Recent Labs     12/27/20  0545 12/27/20  0215 12/26/20  2135 12/26/20  1650 12/26/20  1300 12/26/20  0850 12/25/20  1000 12/25/20  1000   NA  --  149*  --   --  148* 152*   < > 157*   K  --  3.9  --   --  3.6 3.2*   < > 3.4*   CL  --  110  --   --  109 111   < > 115*   CO2  --  33*  --   --  36* 35*   < > 37*   GLU  --  198*  --   --  317* 254*   < > 191*   BUN  --  35*  --   --  46* 47*   < > 62*   CREA  --  0.74  --   --  0.95 0.97   < > 1.02   CA  --  8.7  --   --  8.8 8.9   < > 9.2   MG 2.9* 2.7*  --  2.9*  --   --    < > 3.0*   PHOS 2.9 2.7 2.2* 2.0* 2.6 2.0*   < > 2. 8   ALB  --   --   --   --   --   --   --  3.0*   ALT  --   --   --   --   --   --   --  107*   INR  --   --   --   --   --   --   --  1.2    < > = values in this interval not displayed. No results for input(s): PH, PCO2, PO2, HCO3, FIO2 in the last 72 hours. Lab Results   Component Value Date/Time    NT pro-BNP 4,065 (H) 12/25/2020 04:30 AM    NT pro-BNP 3,614 (H) 12/22/2020 09:55 AM    NT pro-BNP 2,099 (H) 12/15/2020 07:45 PM    NT pro-BNP 6,465 (H) 12/12/2020 06:20 AM    NT pro-BNP 5,307 (H) 12/11/2020 01:35 PM                                                          ECHO [12/10/20]:  · LV: Estimated LVEF is 35 - 40%. Visually measured ejection fraction. Normal cavity size and wall thickness. Moderately and segmentally reduced systolic function. Inconclusive left ventricular diastolic function. · AO: Mild aortic root dilatation; diameter is 3.8 cm      Imaging:   XR Results (most recent):  Results from Hospital Encounter encounter on 12/10/20   XR CHEST PORT    Narrative AP CHEST, PORTABLE    INDICATION: Above. Intubated. COMPARISON: 12/26/2020. TECHNIQUE: Portable semi-upright AP chest radiograph is reviewed. FINDINGS:    Endotracheal tube and esophagogastric tube project in stable and satisfactory  position. Multiple EKG leads/wires and other catheter tubing overlie the  patient. Diffuse interstitial prominence/fibrotic changes and vascular prominence again  seen. There is more focally prominent increased opacity in the right perihilar  region may reflect superimposed focal airspace disease/pulmonary infiltrate  versus atelectasis. No evidence of pneumothorax. The costophrenic sulci appear  sharp. The cardiomediastinal silhouette is without significant interval change. Impression IMPRESSION:     Allowing for slight differences in technique, little interval change in aeration  compared to the preceding radiograph.  Diffuse interstitial prominence/fibrotic  changes and vascular prominence again seen. More focally prominent increased  opacity in the right perihilar region may reflect superimposed focal airspace  disease/pulmonary infiltrate versus atelectasis. CT Results (most recent):  Results from Hospital Encounter encounter on 12/10/20   CTA CHEST W OR W WO CONT    Narrative CT Pulmonary Angiogram    CPT CODE: 70071    CLINICAL: Shortness of breath, concern for PE.    COMPARISON: Current and previous plain films. TECHNICAL: Volumetric data acquisition performed through the chest with a  multislice scanner. Reconstructions were created in the axial, coronal, and  sagittal planes. Coronal and sagittal reconstructions were created using maximal  intensity projection methodology to maximize sensitivity for emboli. Bolus  timing was optimized for a pulmonary embolism evaluation. 100 cc of Isovue-370  were utilized for this examination. FINDINGS:  The patient is intubated. Endotracheal tube is in satisfactory position. There are no pulmonary emboli. The ascending aorta is prominent at 3.7 cm. There  are dense calcifications in the anterior surface of the ascending aorta. The lungs there are extensive coarse subpleural reticulations increasing in  confluence in severity in the bases. Honeycombing is present. There is traction  bronchiectasis extensively in the lower lung zones . There is groundglass  infiltrate and consolidation between the areas of honeycombing and traction  bronchiectasis  No pleural effusion or pneumothorax is apparent. There is a small pneumomediastinum evident. .  No mediastinal adenopathy or mass is evident. There is multichamber cardiac  enlargement. Sections in the upper abdomen reveal a inhomogeneous mass in the posterior right  lobe of the liver measuring 9 x 10 cm transaxially. There may be an additional  smaller lesion (2 cm) in the medial left lobe.       Impression IMPRESSION:    Negative for acute pulmonary embolism or acute aortic abnormality. Dilated ascending aorta to 3.7 cm. Advanced pulmonary fibrosis with traction bronchiectasis and honeycombing. The  appearance is suggestive of UIP. There are extensive groundglass and confluent infiltrates superimposed upon the  interstitial process in the lower lobes. Primary differential considerations for  this include pulmonary edema, pneumonia, aspiration, pulmonary hemorrhage, and  acute lung injury. There is a large inhomogeneous mass in the right lobe of the liver and possibly  a second smaller lesion. Differential includes primary and metastatic disease. Broad differential.  Minimal pneumomediastinum        All CT scans at this facility are performed using dose optimization technique as  appropriate to the performed exam, to include automated exposure control,  adjustment of the mA and/or kV according to patient's size (Including  appropriate matching for site-specific examinations), or use of iterative  reconstruction technique. [x]I have personally reviewed the patients radiographs  []Radiographs reviewed with radiologist   [x]No change from prior, tubes and lines in adequate position  []Improved   []Worsening    High complexity decision making was performed during the evaluation of this patient at high risk for decompensation with multiple organ involvement    Total of 36 min critical care time spent at bedside during the course of care providing evaluation,management and care decisions and ordering appropriate treatment related to critical care problems exclusive of procedures. The reason for providing this level of medical care for this critically ill patient was due a critical illness that impaired one or more vital organ systems such that there was a high probability of imminent or life threatening deterioration in the patients condition.  This care involved high complexity decision making to assess, manipulate, and support vital system functions, to treat this degree vital organ system failure and to prevent further life threatening deterioration of the patients condition. Above mentioned total time spent on reviewing the case/medical record/data/notes/EMR/patient examination/documentation/coordinating care with nurse/consultants, exclusive of procedures with complex decision making performed and > 50% time spent in face to face evaluation.       Maddie Bowen MD/MPH     Pulmonary, Critical Care Medicine  [address] [facility]

## 2020-12-28 ENCOUNTER — TELEPHONE (OUTPATIENT)
Dept: CARDIAC REHAB | Age: 65
End: 2020-12-28

## 2020-12-28 ENCOUNTER — APPOINTMENT (OUTPATIENT)
Dept: GENERAL RADIOLOGY | Age: 65
DRG: 003 | End: 2020-12-28
Attending: PHYSICIAN ASSISTANT
Payer: MEDICARE

## 2020-12-28 LAB
ANION GAP SERPL CALC-SCNC: 5 MMOL/L (ref 3–18)
ANION GAP SERPL CALC-SCNC: 6 MMOL/L (ref 3–18)
ARTERIAL PATENCY WRIST A: YES
BACTERIA SPEC CULT: NORMAL
BASE EXCESS BLD CALC-SCNC: 6 MMOL/L
BASOPHILS # BLD: 0 K/UL (ref 0–0.1)
BASOPHILS NFR BLD: 0 % (ref 0–2)
BDY SITE: ABNORMAL
BODY TEMPERATURE: 98.6
BUN SERPL-MCNC: 26 MG/DL (ref 7–18)
BUN SERPL-MCNC: 30 MG/DL (ref 7–18)
BUN/CREAT SERPL: 45 (ref 12–20)
BUN/CREAT SERPL: 53 (ref 12–20)
CALCIUM SERPL-MCNC: 8.4 MG/DL (ref 8.5–10.1)
CALCIUM SERPL-MCNC: 8.7 MG/DL (ref 8.5–10.1)
CHLORIDE SERPL-SCNC: 106 MMOL/L (ref 100–111)
CHLORIDE SERPL-SCNC: 110 MMOL/L (ref 100–111)
CO2 SERPL-SCNC: 29 MMOL/L (ref 21–32)
CO2 SERPL-SCNC: 31 MMOL/L (ref 21–32)
CREAT SERPL-MCNC: 0.49 MG/DL (ref 0.6–1.3)
CREAT SERPL-MCNC: 0.66 MG/DL (ref 0.6–1.3)
DIFFERENTIAL METHOD BLD: ABNORMAL
EOSINOPHIL # BLD: 0 K/UL (ref 0–0.4)
EOSINOPHIL NFR BLD: 0 % (ref 0–5)
ERYTHROCYTE [DISTWIDTH] IN BLOOD BY AUTOMATED COUNT: 14.5 % (ref 11.6–14.5)
GAS FLOW.O2 O2 DELIVERY SYS: ABNORMAL L/MIN
GAS FLOW.O2 SETTING OXYMISER: 16 BPM
GLUCOSE BLD STRIP.AUTO-MCNC: 163 MG/DL (ref 70–110)
GLUCOSE BLD STRIP.AUTO-MCNC: 261 MG/DL (ref 70–110)
GLUCOSE BLD STRIP.AUTO-MCNC: 274 MG/DL (ref 70–110)
GLUCOSE BLD STRIP.AUTO-MCNC: 300 MG/DL (ref 70–110)
GLUCOSE SERPL-MCNC: 178 MG/DL (ref 74–99)
GLUCOSE SERPL-MCNC: 264 MG/DL (ref 74–99)
HCO3 BLD-SCNC: 29.7 MMOL/L (ref 22–26)
HCT VFR BLD AUTO: 24.3 % (ref 35–45)
HGB BLD-MCNC: 7.1 G/DL (ref 12–16)
INSPIRATION.DURATION SETTING TIME VENT: 0.9 SEC
LYMPHOCYTES # BLD: 0.7 K/UL (ref 0.9–3.6)
LYMPHOCYTES NFR BLD: 7 % (ref 21–52)
MAGNESIUM SERPL-MCNC: 2.6 MG/DL (ref 1.6–2.6)
MAGNESIUM SERPL-MCNC: 2.7 MG/DL (ref 1.6–2.6)
MCH RBC QN AUTO: 25.7 PG (ref 24–34)
MCHC RBC AUTO-ENTMCNC: 29.2 G/DL (ref 31–37)
MCV RBC AUTO: 88 FL (ref 74–97)
MONOCYTES # BLD: 0.6 K/UL (ref 0.05–1.2)
MONOCYTES NFR BLD: 6 % (ref 3–10)
NEUTS SEG # BLD: 8.8 K/UL (ref 1.8–8)
NEUTS SEG NFR BLD: 87 % (ref 40–73)
O2/TOTAL GAS SETTING VFR VENT: 30 %
PCO2 BLD: 39.2 MMHG (ref 35–45)
PEEP RESPIRATORY: 5 CMH2O
PH BLD: 7.49 [PH] (ref 7.35–7.45)
PHOSPHATE SERPL-MCNC: 2.5 MG/DL (ref 2.5–4.9)
PHOSPHATE SERPL-MCNC: 2.9 MG/DL (ref 2.5–4.9)
PLATELET # BLD AUTO: 371 K/UL (ref 135–420)
PMV BLD AUTO: 11.3 FL (ref 9.2–11.8)
PO2 BLD: 94 MMHG (ref 80–100)
POTASSIUM SERPL-SCNC: 3.8 MMOL/L (ref 3.5–5.5)
POTASSIUM SERPL-SCNC: 3.8 MMOL/L (ref 3.5–5.5)
RBC # BLD AUTO: 2.76 M/UL (ref 4.2–5.3)
SAO2 % BLD: 98 % (ref 92–97)
SERVICE CMNT-IMP: ABNORMAL
SERVICE CMNT-IMP: NORMAL
SODIUM SERPL-SCNC: 142 MMOL/L (ref 136–145)
SODIUM SERPL-SCNC: 145 MMOL/L (ref 136–145)
SPECIMEN TYPE: ABNORMAL
TOTAL RESP. RATE, ITRR: 16
VENTILATION MODE VENT: ABNORMAL
VOLUME CONTROL PLUS IVLCP: YES
VT SETTING VENT: 450 ML
WBC # BLD AUTO: 10.1 K/UL (ref 4.6–13.2)

## 2020-12-28 PROCEDURE — 80048 BASIC METABOLIC PNL TOTAL CA: CPT

## 2020-12-28 PROCEDURE — 74011000250 HC RX REV CODE- 250: Performed by: INTERNAL MEDICINE

## 2020-12-28 PROCEDURE — 94003 VENT MGMT INPAT SUBQ DAY: CPT

## 2020-12-28 PROCEDURE — 84100 ASSAY OF PHOSPHORUS: CPT

## 2020-12-28 PROCEDURE — 99233 SBSQ HOSP IP/OBS HIGH 50: CPT | Performed by: INTERNAL MEDICINE

## 2020-12-28 PROCEDURE — 74011250636 HC RX REV CODE- 250/636: Performed by: INTERNAL MEDICINE

## 2020-12-28 PROCEDURE — 85025 COMPLETE CBC W/AUTO DIFF WBC: CPT

## 2020-12-28 PROCEDURE — 36600 WITHDRAWAL OF ARTERIAL BLOOD: CPT

## 2020-12-28 PROCEDURE — 74011636637 HC RX REV CODE- 636/637: Performed by: INTERNAL MEDICINE

## 2020-12-28 PROCEDURE — 65610000006 HC RM INTENSIVE CARE

## 2020-12-28 PROCEDURE — 99232 SBSQ HOSP IP/OBS MODERATE 35: CPT | Performed by: INTERNAL MEDICINE

## 2020-12-28 PROCEDURE — 74011250637 HC RX REV CODE- 250/637: Performed by: INTERNAL MEDICINE

## 2020-12-28 PROCEDURE — 77030018798 HC PMP KT ENTRL FED COVD -A

## 2020-12-28 PROCEDURE — 82803 BLOOD GASES ANY COMBINATION: CPT

## 2020-12-28 PROCEDURE — 83735 ASSAY OF MAGNESIUM: CPT

## 2020-12-28 PROCEDURE — 74011250636 HC RX REV CODE- 250/636: Performed by: NURSE PRACTITIONER

## 2020-12-28 PROCEDURE — 74011636637 HC RX REV CODE- 636/637: Performed by: STUDENT IN AN ORGANIZED HEALTH CARE EDUCATION/TRAINING PROGRAM

## 2020-12-28 PROCEDURE — 94400 HC END TIDAL CO2 RESPONSE CURVE: CPT

## 2020-12-28 PROCEDURE — 71045 X-RAY EXAM CHEST 1 VIEW: CPT

## 2020-12-28 PROCEDURE — 2709999900 HC NON-CHARGEABLE SUPPLY

## 2020-12-28 PROCEDURE — 74011000250 HC RX REV CODE- 250: Performed by: PHYSICIAN ASSISTANT

## 2020-12-28 PROCEDURE — 82962 GLUCOSE BLOOD TEST: CPT

## 2020-12-28 PROCEDURE — 74011250636 HC RX REV CODE- 250/636: Performed by: PHYSICIAN ASSISTANT

## 2020-12-28 PROCEDURE — 99291 CRITICAL CARE FIRST HOUR: CPT | Performed by: INTERNAL MEDICINE

## 2020-12-28 PROCEDURE — 94762 N-INVAS EAR/PLS OXIMTRY CONT: CPT

## 2020-12-28 PROCEDURE — 74011000258 HC RX REV CODE- 258: Performed by: PHYSICIAN ASSISTANT

## 2020-12-28 RX ORDER — POTASSIUM CHLORIDE 20 MEQ/1
20 TABLET, EXTENDED RELEASE ORAL
Status: COMPLETED | OUTPATIENT
Start: 2020-12-29 | End: 2020-12-29

## 2020-12-28 RX ORDER — INSULIN GLARGINE 100 [IU]/ML
20 INJECTION, SOLUTION SUBCUTANEOUS
Status: DISCONTINUED | OUTPATIENT
Start: 2020-12-28 | End: 2020-12-29

## 2020-12-28 RX ORDER — POTASSIUM CHLORIDE 14.9 MG/ML
20 INJECTION INTRAVENOUS ONCE
Status: COMPLETED | OUTPATIENT
Start: 2020-12-28 | End: 2020-12-28

## 2020-12-28 RX ORDER — DOBUTAMINE HYDROCHLORIDE 200 MG/100ML
5 INJECTION INTRAVENOUS CONTINUOUS
Status: DISCONTINUED | OUTPATIENT
Start: 2020-12-28 | End: 2020-12-31

## 2020-12-28 RX ORDER — INSULIN LISPRO 100 [IU]/ML
6 INJECTION, SOLUTION INTRAVENOUS; SUBCUTANEOUS EVERY 6 HOURS
Status: DISCONTINUED | OUTPATIENT
Start: 2020-12-28 | End: 2020-12-30

## 2020-12-28 RX ORDER — POTASSIUM CHLORIDE 20 MEQ/1
20 TABLET, EXTENDED RELEASE ORAL 2 TIMES DAILY
Status: DISCONTINUED | OUTPATIENT
Start: 2020-12-29 | End: 2020-12-28

## 2020-12-28 RX ADMIN — FAMOTIDINE 20 MG: 10 INJECTION INTRAVENOUS at 10:27

## 2020-12-28 RX ADMIN — Medication 75 MCG/HR: at 22:22

## 2020-12-28 RX ADMIN — ATORVASTATIN CALCIUM 40 MG: 40 TABLET, FILM COATED ORAL at 22:24

## 2020-12-28 RX ADMIN — MEROPENEM 500 MG: 500 INJECTION, POWDER, FOR SOLUTION INTRAVENOUS at 15:09

## 2020-12-28 RX ADMIN — MEROPENEM 500 MG: 500 INJECTION, POWDER, FOR SOLUTION INTRAVENOUS at 04:29

## 2020-12-28 RX ADMIN — INSULIN LISPRO 5 UNITS: 100 INJECTION, SOLUTION INTRAVENOUS; SUBCUTANEOUS at 06:25

## 2020-12-28 RX ADMIN — SODIUM CHLORIDE 10 ML: 9 INJECTION, SOLUTION INTRAMUSCULAR; INTRAVENOUS; SUBCUTANEOUS at 15:13

## 2020-12-28 RX ADMIN — INSULIN LISPRO 9 UNITS: 100 INJECTION, SOLUTION INTRAVENOUS; SUBCUTANEOUS at 18:22

## 2020-12-28 RX ADMIN — FAMOTIDINE 20 MG: 10 INJECTION INTRAVENOUS at 20:46

## 2020-12-28 RX ADMIN — MEROPENEM 500 MG: 500 INJECTION, POWDER, FOR SOLUTION INTRAVENOUS at 10:26

## 2020-12-28 RX ADMIN — Medication 50 MCG/HR: at 05:22

## 2020-12-28 RX ADMIN — TICAGRELOR 90 MG: 90 TABLET ORAL at 22:23

## 2020-12-28 RX ADMIN — INSULIN LISPRO 3 UNITS: 100 INJECTION, SOLUTION INTRAVENOUS; SUBCUTANEOUS at 00:06

## 2020-12-28 RX ADMIN — TICAGRELOR 90 MG: 90 TABLET ORAL at 10:27

## 2020-12-28 RX ADMIN — HYDROCORTISONE SODIUM SUCCINATE 25 MG: 100 INJECTION, POWDER, FOR SOLUTION INTRAMUSCULAR; INTRAVENOUS at 10:27

## 2020-12-28 RX ADMIN — ASPIRIN 81 MG CHEWABLE TABLET 81 MG: 81 TABLET CHEWABLE at 10:27

## 2020-12-28 RX ADMIN — MEROPENEM 500 MG: 500 INJECTION, POWDER, FOR SOLUTION INTRAVENOUS at 20:43

## 2020-12-28 RX ADMIN — POTASSIUM CHLORIDE 20 MEQ: 14.9 INJECTION, SOLUTION INTRAVENOUS at 10:29

## 2020-12-28 RX ADMIN — DOBUTAMINE HYDROCHLORIDE 5 MCG/KG/MIN: 200 INJECTION INTRAVENOUS at 10:29

## 2020-12-28 RX ADMIN — HYDROCORTISONE SODIUM SUCCINATE 25 MG: 100 INJECTION, POWDER, FOR SOLUTION INTRAMUSCULAR; INTRAVENOUS at 20:45

## 2020-12-28 RX ADMIN — Medication 30 ML: at 10:28

## 2020-12-28 RX ADMIN — INSULIN LISPRO 5 UNITS: 100 INJECTION, SOLUTION INTRAVENOUS; SUBCUTANEOUS at 11:46

## 2020-12-28 RX ADMIN — INSULIN LISPRO 5 UNITS: 100 INJECTION, SOLUTION INTRAVENOUS; SUBCUTANEOUS at 00:07

## 2020-12-28 RX ADMIN — SODIUM CHLORIDE 40 ML: 9 INJECTION, SOLUTION INTRAMUSCULAR; INTRAVENOUS; SUBCUTANEOUS at 06:00

## 2020-12-28 RX ADMIN — INSULIN GLARGINE 30 UNITS: 100 INJECTION, SOLUTION SUBCUTANEOUS at 10:27

## 2020-12-28 RX ADMIN — INSULIN LISPRO 9 UNITS: 100 INJECTION, SOLUTION INTRAVENOUS; SUBCUTANEOUS at 06:24

## 2020-12-28 RX ADMIN — INSULIN LISPRO 12 UNITS: 100 INJECTION, SOLUTION INTRAVENOUS; SUBCUTANEOUS at 11:46

## 2020-12-28 RX ADMIN — CHLORHEXIDINE GLUCONATE 0.12% ORAL RINSE 10 ML: 1.2 LIQUID ORAL at 10:28

## 2020-12-28 RX ADMIN — INSULIN GLARGINE 20 UNITS: 100 INJECTION, SOLUTION SUBCUTANEOUS at 22:24

## 2020-12-28 RX ADMIN — CHLORHEXIDINE GLUCONATE 0.12% ORAL RINSE 10 ML: 1.2 LIQUID ORAL at 20:46

## 2020-12-28 RX ADMIN — INSULIN LISPRO 6 UNITS: 100 INJECTION, SOLUTION INTRAVENOUS; SUBCUTANEOUS at 18:23

## 2020-12-28 NOTE — PROGRESS NOTES
Hospitalist Progress Note    Patient: Aurelia Holland Heart Age: 72 y.o. : 1955 MR#: [id number] SSN: [id number]  Date/Time: 2020 4:19 PM    DOA: 12/10/2020  PCP: Skyler Whiteside MD    Subjective:     Patient remains critically ill and cannot provide history. She remains on mechanical ventilation. She is currently in need of trach and PEG. Spoke with cardiology for stopping Brilinta for 2-3 days (she can be on heparin drip if DAPT is interrupted)  Cardiology started her on dobutamine today       ROS: unable to provide ROS due to ventilation dependent       Assessment/Plan:     1. Acute on Chronic Hypoxic and Hypercapnic Respiratory Failure - on mechanical ventilation, re-intubated () overnight 2/2 increased WOB and for airway protection. Extubated on  and reintubated on , RRT on floor for \"unresponsiveness, \" likely aspiration. pccm to follow  2. Wide Complex Arrhythmia () - Appears isolated at this point Noted on bedside cardiac monitor. Lasted roughly 5-10mins with near code blue. Initially HR in the 60s, then dropped to 30s, pulse remained intact. Spontaneous resolution. Oliverio switched to Levo. Cardiology following  3. () 8 beat run of V-Tach overnight, self-resolution. 4. Acute on Chronic Systolic CHF - Echo  EF 35-40%, diuresis per cards       Addition of dubutamine today  5. Septic shock. on Levophed gtt (). Source likely Aspiration PNA. Sputum Cx() (+) heavy E.Coli-ESBL. Recent Hx of E.coli ESBL. 6. Acute encephalopathy  7. Severe pulmonary fibrosis - with extensive honeycombing and bronchiectasis seen on CTA chest 20 - appears to be a new diagnosis  8. Acute cystitis - (+)E. coli  9. Lactic acidosis - resolved  10. Acute MI with anterolateral STEMI and Q waves - s/p ptca/stent to proximal/mid LAD using ARELY on DAPT on 20- on Brilinta  11. Severe dysphagia - failed MBS, poor candidate for G-tube 2/2 Brilinta per GI recs.          OK to stop Meryl Hernández for 2-3 days if needed  for PEG tube. Can use heparin drip if DAPT is interuppted. 12. Hx of Hep C: + RNA viral load 2020  13. Multiple liver masses - noted on CT scan 20  14. Hx of COPD - on home O2 4L NC  15. Full code. cvt icu.       Additional Notes:    Time spent >30 minutes    Case discussed with:  [x]Patient  []Family  [x]Nursing  [x]Case Management  DVT Prophylaxis:  []Lovenox  []Hep SQ  [x]SCDs  []Coumadin   []On Heparin gtt    Signed By: Amy Pizarro MD     2020 4:19 PM              Objective:   VS:   Visit Vitals  /60   Pulse 76   Temp 97.8 °F (36.6 °C)   Resp 23   Ht 5' 7\" (1.702 m)   Wt 66 kg (145 lb 8.1 oz)   SpO2 97%   Breastfeeding No   BMI 22.79 kg/m²      Tmax/24hrs: Temp (24hrs), Av.8 °F (36.6 °C), Min:97.6 °F (36.4 °C), Max:98.1 °F (36.7 °C)      Intake/Output Summary (Last 24 hours) at 2020 1619  Last data filed at 2020 1400  Gross per 24 hour   Intake 1242.62 ml   Output 1055 ml   Net 187.62 ml       Tele:   General:  frail, intubated  HEENT: PERRL, supple neck, no JVD, dry oral mucosa  Cardiovascular: S1S2 regular, no rub/gallop   Pulmonary: Air entry bilaterally, no wheezing, ++ crackle  GI:  Soft, non tender, non distended, +bs, no guarding   Extremities:  No pedal edema, +distal pulses appreciated   Neuro: sedated    Additional:       Current Facility-Administered Medications   Medication Dose Route Frequency    DOBUTamine (DOBUTREX) 500 mg/250 mL (2,000 mcg/mL) infusion  5 mcg/kg/min IntraVENous CONTINUOUS    insulin lispro (HUMALOG) injection 6 Units  6 Units SubCUTAneous Q6H    insulin glargine (LANTUS) injection 20 Units  20 Units SubCUTAneous QHS    polyethylene glycol (MIRALAX) packet 17 g  17 g Per NG tube DAILY    chlorhexidine (PERIDEX) 0.12 % mouthwash 10 mL  10 mL Oral Q12H    fentaNYL (PF) 900 mcg/30 ml infusion soln  0-200 mcg/hr IntraVENous TITRATE    midazolam (VERSED) injection 1-2 mg  1-2 mg IntraVENous Q10MIN PRN    fentaNYL citrate (PF) injection  mcg   mcg IntraVENous Q30MIN PRN    midazolam in normal saline (VERSED) 1 mg/mL infusion  1-5 mg/hr IntraVENous TITRATE    ELECTROLYTE REPLACEMENT PROTOCOL - Potassium Standard Dosing   1 Each Other PRN    ELECTROLYTE REPLACEMENT PROTOCOL - Magnesium   1 Each Other PRN    ELECTROLYTE REPLACEMENT PROTOCOL - Phosphorus  Standard Dosing  1 Each Other PRN    ELECTROLYTE REPLACEMENT PROTOCOL - Calcium   1 Each Other PRN    meropenem (MERREM) 500 mg in sterile water (preservative free) 10 mL IV syringe  500 mg IntraVENous Q6H    hydrocortisone Sod Succ (PF) (SOLU-CORTEF) injection 25 mg  25 mg IntraVENous Q12H    insulin glargine (LANTUS) injection 30 Units  30 Units SubCUTAneous DAILY    [Held by provider] carvediloL (COREG) tablet 6.25 mg  6.25 mg Oral Q12H    [Held by provider] lisinopriL (PRINIVIL, ZESTRIL) tablet 5 mg  5 mg Oral DAILY    [Held by provider] furosemide (LASIX) injection 40 mg  40 mg IntraVENous Q12H    [Held by provider] spironolactone (ALDACTONE) tablet 25 mg  25 mg Oral DAILY    multivit-folic acid-herbal 258 (WELLESSE PLUS) oral liquid 30 mL  30 mL Per NG tube DAILY    insulin lispro (HUMALOG) injection   SubCUTAneous Q6H    sodium chloride (NS) flush 5-40 mL  5-40 mL IntraVENous PRN    ticagrelor (BRILINTA) tablet 90 mg  90 mg Per NG tube BID    sodium chloride (NS) flush 5-40 mL  5-40 mL IntraVENous PRN    albuterol-ipratropium (DUO-NEB) 2.5 MG-0.5 MG/3 ML  3 mL Nebulization Q4H PRN    famotidine (PF) (PEPCID) 20 mg in 0.9% sodium chloride 10 mL injection  20 mg IntraVENous Q12H    sodium chloride (NS) flush 5-40 mL  5-40 mL IntraVENous Q8H    sodium chloride (NS) flush 5-40 mL  5-40 mL IntraVENous PRN    acetaminophen (TYLENOL) tablet 650 mg  650 mg Oral Q6H PRN    Or    acetaminophen (TYLENOL) suppository 650 mg  650 mg Rectal Q6H PRN    promethazine (PHENERGAN) tablet 12.5 mg  12.5 mg Oral Q6H PRN    Or    ondansetron [facility]) injection 4 mg  4 mg IntraVENous Q6H PRN    glucose chewable tablet 16 g  4 Tab Oral PRN    glucagon (GLUCAGEN) injection 1 mg  1 mg IntraMUSCular PRN    dextrose (D50W) injection syrg 12.5-25 g  25-50 mL IntraVENous PRN    aspirin chewable tablet 81 mg  81 mg Oral DAILY    atorvastatin (LIPITOR) tablet 40 mg  40 mg Oral QHS            Lab/Data Review:  Labs: Results:       Chemistry Recent Labs     12/28/20  0545 12/27/20  1735 12/27/20  0545 12/27/20  0215   * 183*  --  198*    150*  --  149*   K 3.8 3.2*  --  3.9    110  --  110   CO2 29 34*  --  33*   BUN 26* 28*  --  35*   CREA 0.49* 0.59*  --  0.74   BUCR 53* 47*  --  47*   AGAP 6 6  --  6   CA 8.7 8.7  --  8.7   PHOS 2.9 2.6 2.9 2.7     No results for input(s): TBIL, ALT, ALKP, TP, ALB, GLOB, AGRAT in the last 72 hours. No lab exists for component: SGOT   CBC w/Diff Recent Labs     12/28/20  0545 12/27/20  0545 12/26/20  0411   WBC 10.1 12.1 11.6   RBC 2.76* 2.78* 2.87*   HGB 7.1* 7.3* 7.4*   HCT 24.3* 24.7* 25.6*   MCV 88.0 88.8 89.2   MCH 25.7 26.3 25.8   MCHC 29.2* 29.6* 28.9*   RDW 14.5 14.7* 14.5    426* 427*   GRANS 87* 84* 87*   LYMPH 7* 8* 11*   EOS 0 0 0      Coagulation No results for input(s): PTP, INR, APTT, INREXT in the last 72 hours.     Iron/Ferritin No results found for: IRON, FE, TIBC, IBCT, PSAT, FERR    BNP    Cardiac Enzymes Lab Results   Component Value Date/Time    CK 32 12/25/2020 09:25 PM    CK - MB 1.8 12/25/2020 09:25 PM    CK-MB Index 5.6 (H) 12/25/2020 09:25 PM    Troponin-I, QT 0.12 (H) 12/25/2020 09:25 PM        Lactic Acid    Thyroid Studies          All Micro Results     Procedure Component Value Units Date/Time    CULTURE, BLOOD [[id number]] Collected: 12/23/20 1011    Order Status: Completed Specimen: Blood Updated: 12/28/20 0721     Special Requests: NO SPECIAL REQUESTS        Culture result: NO GROWTH 5 DAYS       CULTURE, BLOOD [[id number]] Collected: 12/22/20 1828    Order Status: Completed Specimen: Blood Updated: 12/28/20 0721     Special Requests: NO SPECIAL REQUESTS        Culture result: NO GROWTH 6 DAYS       CULTURE, RESPIRATORY/SPUTUM/BRONCH Daniel Monk STAIN [[id number]]  (Abnormal)  (Susceptibility) Collected: 12/22/20 1830    Order Status: Completed Specimen: Sputum,ET Suction Updated: 12/25/20 1050     Special Requests: NO SPECIAL REQUESTS        GRAM STAIN       RARE EPITHELIAL CELLS SEEN            3+ WBCS SEEN         4+ GRAM NEGATIVE RODS        Culture result:       HEAVY ESCHERICHIA COLI ** (EXTENDED SPECTRUM BETA LACTAMASE ) **                  NO NORMAL RESPIRATORY AILEEN ISOLATED          CULTURE, BLOOD [[id number]] Collected: 12/22/20 1130    Order Status: Canceled Specimen: Blood     CULTURE, BLOOD [[id number]] Collected: 12/16/20 1535    Order Status: Completed Specimen: Blood Updated: 12/22/20 0654     Special Requests: NO SPECIAL REQUESTS        Culture result: NO GROWTH 6 DAYS       CULTURE, BLOOD [[id number]] Collected: 12/16/20 0200    Order Status: Completed Specimen: Blood Updated: 12/22/20 0654     Special Requests: NO SPECIAL REQUESTS        Culture result: NO GROWTH 6 DAYS       CULTURE, RESPIRATORY/SPUTUM/BRONCH W GRAM STAIN [[id number]]  (Abnormal)  (Susceptibility) Collected: 12/16/20 0050    Order Status: Completed Specimen: Endotracheal aspirate Updated: 12/18/20 1529     Special Requests: NO SPECIAL REQUESTS        GRAM STAIN NO WBC'S SEEN         NO ORGANISMS SEEN        Culture result:       SCANT ESCHERICHIA COLI ** (EXTENDED SPECTRUM BETA LACTAMASE ) **                  SCANT NORMAL RESPIRATORY AILEEN          CULTURE, URINE [[id number]]  (Abnormal)  (Susceptibility) Collected: 12/14/20 1400    Order Status: Completed Specimen: Cath Urine Updated: 12/17/20 1200     Special Requests: NO SPECIAL REQUESTS        [place] Count --        >100,000  COLONIES/mL       Culture result: ESCHERICHIA COLI       CULTURE, BLOOD [[id number]] Collected: 12/11/20 [facility]    Order Status: Completed Specimen: Blood Updated: 12/17/20 0649     Special Requests: NO SPECIAL REQUESTS        Culture result: NO GROWTH 6 DAYS       CULTURE, URINE [[id number]]  (Abnormal) Collected: 12/11/20 1018    Order Status: Completed Specimen: Cath Urine Updated: 12/12/20 1609     Special Requests: NO SPECIAL REQUESTS        [place] Count --        1000  COLONIES/mL       Culture result: GRAM NEGATIVE RODS               Images:    CT (Most Recent). XRAY (Most Recent)      EKG No results found for this or any previous visit.      2D ECHO

## 2020-12-28 NOTE — PROGRESS NOTES
Spoke with pt's sister, Amisha Rousseau, ( pt has 7 siblings) and provided updated regarding disposition planning. Pt is from [address] [place] [address] and explained to pt's sister that if pt would need continued support with vent or trach, another LTC facility would need to be obtained. Shanti was agreeable. Pt currently is vent and sedated. PEG/trach being considered, per notes. Pt matched in cclink for [facility].  Patrick Horn, JEANNIE, [place]- [phone]

## 2020-12-28 NOTE — TELEPHONE ENCOUNTER
Cardiac Rehab screening complete. Due to patient's diagnosis of dementia and current state of being on vent, she is not a candidate for the program at this time.      Thank you,  Jenna Blanco Yes - the patient is able to be screened

## 2020-12-28 NOTE — PROGRESS NOTES
Progress Note  Pulmonary, Critical Care, and Sleep Medicine    Name: Hayes Santana MRN: [id number]   : 1955 Hospital: [facility]   Date: 2020        IMPRESSION:   · Acute on Chronic Hypoxic and Hypercapnic Respiratory Failure - Requiring mechanical ventilation, Extubated on  and reintubated on ,  RRT on floor for \"unresponsiveness\". Likely aspiration event  · Wide Complex Arrhythmia () -noted on bedside cardiac monitor. Lasted roughly 5-10 mins with Spontaneous resolution. E-lyte derangement vs Drug effect (pt was on 55mcg/hr Oliverio-Synephrine at the time) vs cardiac insult. · Acute on Chronic Systolic Heart Failure - superimposed on severe ILD, Echo 12/10/20 EF 35-40%, diuresis per cards (Dr. Yusuf Tijerina)  · Shock - Septic with possible Cardiogenic component. Interval worsening s/p re-intubation on . Back on Levophed gtt (). Suspect multifactorial with component of sedation effect for current hypotension, in addition to cardiogenic shock. Source likely Aspiration PNA. Sputum Cx() (+) heavy E.Coli-ESBL. Recent Hx of E.coli ESBL. Leukocytosis resolved. · Acute Encephalopathy - likely toxic/metabolic vs infectious vs hepatic in nature, ammonia wnl.   · Acute flash pulmonary edema -  in setting of chronic heart failure - Likely multifactorial, in setting of emergent re-intubation, 2/2 above.    · Severe pulmonary fibrosis - with extensive honeycombing and bronchiectasis as seen on CTA chest 20 - appears to be a new diagnosis but possibly UIP  · Acute aspiration pneumonia with severe sepsis  · Acute cystitis - (+)E. coli  · Lactic acidosis -  resolved  · Acute MI with anterolateral STEMI and Q waves - s/p ptca/stent to proximal/mid LAD using ARELY on DAPT on 20- Remains on Brilinta  · Severe dysphagia - failed MBS, poor candidate for G-tube 2/2 Brilinta per GI (Dr. Vimal Rodas)  · Hx of Hep C: + RNA viral load 2020  · Multiple liver masses - noted on CT scan 11/25/20  · Hx of COPD - on home O2 4L NC  · Hx of HTN  · Hx of DM  · Hx of dementia - unknown baseline, on aricept       PLAN:   · Continue vent support. Bronchial hygiene and aspiration precautions, VAP bundle. Mental status and inability to protect airway likely limiting factor for pt remaining off vent support. Unless mental status improves considerably would likely require trache. · Continue Meropenem per ID guidance  · Glycemic control suboptimal with Lantus and correctional scale insulin, defer to hospitalist  · Tube feeds  · Hypernatremia improving on water flushes, continue to monitor and adjust to response  · Per GI poor prognosis for PEG, still requires Brilinta  · Sedation and analgesia per protocol  · Prophylaxis issues per primary team  · Taper off stress steroids as tolerated  · Bowel regimen  · Titrate Dobutamine gtt     Subjective/Interval History:   I have reviewed the flowsheet and previous days notes. Reviewed interval history. Discussed management with nursing staff. Patient is a 72 y. o. female with a past medical history of COPD, CHF, HTN, DM, dementia, presented to [facility] with acute on chronic systolic heart failure, acute MI with anterolateral STEMI and Q waves s/p ptca/stent to proximal/mid LAD on 12/11/20. Pt was intubated and extubated on 12/17/20, transferred to . On 12/22/20, RRT was called - pt was tachypneic, using accessory muscles, obtunded, and hypotensive. Pt was subsequently intubated, started on levophed and transferred to the ICU emergently. Initial ABG - 7.40, 66.3, 64, 40.7. Dobutamine was restarted per cards       12/28/20  Pt on vent support. Awake and follows some commands  BP stable on Dobutamine gtt      ROS:Review of systems not obtained due to patient factors. Orders reviewed including medications. Changes made if indicated. Telemetry monitor reviewed at the bedside.   Objective:   Vital Signs:    Visit Vitals  BP (!) 111/54   Pulse 76   Temp 97.6 °F (36.4 °C)   Resp 19   Ht 5' 7\" (1.702 m)   Wt 66 kg (145 lb 8.1 oz)   SpO2 100%   Breastfeeding No   BMI 22.79 kg/m²       O2 Device: Ventilator   O2 Flow Rate (L/min): 40 l/min   Temp (24hrs), Av.1 °F (36.7 °C), Min:97.6 °F (36.4 °C), Max:99.2 °F (37.3 °C)       Intake/Output:   Last shift:       07 - 1900  In: 0   Out: 175 [Urine:175]  Last 3 shifts: 1901 -  07  In: 1690 [I.V.:739]  Out: 2255 [Urine:2255]    Intake/Output Summary (Last 24 hours) at 2020 1208  Last data filed at 2020 1150  Gross per 24 hour   Intake 1679.83 ml   Output 1180 ml   Net 499.83 ml        Physical Exam:    General:  Alert,  in no distress, appears stated age. Head:  Normocephalic, without obvious abnormality, atraumatic. Eyes:  ANicteric, PERRL,   Nose: Nares normal. Mucosa normal. No drainage or sinus tenderness. Throat: Lips, mucosa, and tongue normal. ETT in place   Neck: Supple, symmetrical, trachea midline, no adenopathy, thyroid: no enlargment/tenderness/nodules    Back:   Symmetric    Lungs:   Bilateral auscultation scattered rhonchi, no wheezes   Chest wall:  No tenderness or deformity. NO intercostal retractions   Heart:  Regular rate and rhythm, S1, S2 normal, no murmur, click, rub or gallop. Abdomen:   Soft, non-tender. Bowel sounds normal. No masses,  No organomegaly. NO paradoxical motion   Extremities: normal, atraumatic, no cyanosis, trace edema   Pulses: 2+ and symmetric all extremities. Skin: Skin color, texture, turgor normal. No rashes or lesions.  NO clubbing   Lymph nodes: Cervical, supraclavicular nodes normal.   Neurologic: Moves both feet on command      :        Devices:             Drips:    DATA:  Labs:  Recent Labs     20  0545 20  0545 20  0411   WBC 10.1 12.1 11.6   HGB 7.1* 7.3* 7.4*   HCT 24.3* 24.7* 25.6*    426* 427*     Recent Labs     20  0545 20  1735 20  0545 20  0215    150*  --  149*   K 3.8 3.2*  --  3.9    110  --  110   CO2 29 34*  --  33*   * 183*  --  198*   BUN 26* 28*  --  35*   CREA 0.49* 0.59*  --  0.74   CA 8.7 8.7  --  8.7   MG 2.7* 2.7* 2.9* 2.7*   PHOS 2.9 2.6 2.9 2.7     No results for input(s): PH, PCO2, PO2, HCO3, FIO2 in the last 72 hours. Imaging:  []        I have personally reviewed the patients radiographs and reports  []         []        No change from prior, tubes and lines in adequate position  []        Improved   []        Worsening  High complexity decision making was performed during this consultation and evaluation.  Critical care time exclusive of procedures spent managing the patient:   44   minutes    Julia Boyd MD

## 2020-12-28 NOTE — PROGRESS NOTES
Nutrition Assessment     Type and Reason for Visit: Reassess    Nutrition Recommendations/Plan:   - Continue tube feeding of Glucerna 1.5 at goal rate of 50 mL/hr with Prosource once daily and 100 mL/hr water flushes. Nutrition Assessment:  Intubated 12/25, receiving tube feeding at goal, held briefly overnight for 300 mL residual- not significant then resumed. Replaced with 20 mEq KCl and receiving miralax (BM today). Hypernatremia improved after water flushes increased per MD.    Malnutrition Assessment:  Malnutrition Status: Severe malnutrition     Estimated Daily Nutrient Needs:  Energy (kcal):  3987-7920  Protein (g):         Fluid (ml/day):  9772-5558    Nutrition Related Findings:  BM 12/28 (loose)      Current Nutrition Therapies:  DIET NPO  DIET NUTRITIONAL SUPPLEMENTS Breakfast; Prosource  DIET TUBE FEEDING     Current Tube Feeding (TF) Orders:   · Feeding Route: Orogastric  · Formula: Glucerna 1.5  · Schedule:Continuous    · Regimen: 50 mL/hr  · Additives/Modulars: Protein(prosource once daily)  · Water Flushes: 100 mL/hr (2400 mL)  · Current TF Orders Provides: 1860 kcal, 114 gm protein, 910 mL free water, 100% RDIs (goal)    Anthropometric Measures:  · Height:  5' 7\" (170.2 cm)  · Current Body Wt:  66 kg (145 lb 8.1 oz)  · BMI: 22.8    Nutrition Diagnosis:   · Inadequate oral intake related to impaired respiratory function as evidenced by NPO or clear liquid status due to medical condition, intubation      Nutrition Intervention:  Food and/or Nutrient Delivery: Continue NPO, Continue tube feeding, Vitamin supplement  Nutrition Education and Counseling: Education not indicated  Coordination of Nutrition Care: Continue to monitor while inpatient, Coordination of community care    Goals:  Nutritional needs will be met through adequate oral intake or nutrition support within the next 7 days.        Nutrition Monitoring and Evaluation:   Behavioral-Environmental Outcomes: None identified  Food/Nutrient Intake Outcomes: Diet advancement/tolerance, Enteral nutrition intake/tolerance, Vitamin/mineral intake  Physical Signs/Symptoms Outcomes: Biochemical data, GI status, Hemodynamic status, Nutrition focused physical findings    Discharge Planning:     Too soon to determine     Electronically signed by Glenda Hollingsworth RD, [address]  on 12/28/2020 at 1:49 PM    Contact Number: [phone]

## 2020-12-28 NOTE — PROGRESS NOTES
[facility]      CARDIOLOGY PROGRESS NOTE  RECS:      1. Acute respiratory failure- requiring mechanical ventilation- extubated  12/17/20. re intubated 12/22/20. 2. Sepsis- Sputum Cx(12/22) (+) heavy E.Coli-ESBL. on antibiotics. ID following   3. NSVT- on 12/25/20 . No recurrence will continue to monitor    4. Hypotension- possible septic shock- in the setting aspirations pneumonia vs UTI. - improved now with low BP's- Added low dose Dobutamine for Inotropic support     5. Subacute MI- 12/10/20 -s/p [facility] S/p ptca/stent to proximal/ mid LAD using ARELY.  LVEDP 8 mmHg. Normal PASP pressure with normal PCWP. Moderate to severe LV dysfunction. ekg no new ischemic changes suggestive of reinfarction or stent thrombosis. Continue asa and brilinta   6. Anemia- H&H dropping. Consider transfusion   7. Acute Systolic Congestive heart Failure-recent echo with EF% 35%-40. compensated at present. Lasix as needed. 8. COPD, on home O2 4L NC   9. Hepatitis C ? -Per family member   8. DM2- medications per medical team   11. Dementia - failed swallow eval.      Per GI note - poor candidate at this time for G-tube due to not being able to stop Brilinta. OK to stop Brillinta for 2-3 days if needed  for PEG tube. Can use heparin drip if DAPT is interuppted. Watch blood pressure as dobutamine is started.         Prognosis poor   Cardiac cath 12/11/20  Patient presented to [address] [place] [address] with late presentation anterior wall MI.  EF 35-40%. Patient was initially managed medically-- however developed acute pulmonary edema requiring intubation. Also troponin level increasing consistent with ongoing ischemia. S/p ptca/stent to proximal/ mid LAD using ARELY. LVEDP 8 mmHg. Normal PASP pressure with normal PCWP. Moderate to severe LV dysfunction.     Echo 12/10/20  · LV: Estimated LVEF is 35 - 40%. Visually measured ejection fraction. Normal cavity size and wall thickness.  Moderately and segmentally reduced systolic function. Inconclusive left ventricular diastolic function. · AO: Mild aortic root dilatation; diameter is 3.8 cm. ASSESSMENT:  Hospital Problems  Date Reviewed: 2/7/2018          Codes Class Noted POA    Goals of care, counseling/discussion ICD-10-CM: Z71.89  ICD-9-CM: V65.49  Unknown Unknown        Dementia without behavioral disturbance (HCC) ICD-10-CM: F03.90  ICD-9-CM: 294.20  Unknown Unknown        Pulmonary fibrosis ([facility] ICD-10-CM: J84.10  ICD-9-CM: 662  Unknown Unknown        Severe protein-calorie malnutrition ([facility] ICD-10-CM: E43  ICD-9-CM: 209  12/16/2020 Clinically Undetermined        Acute on chronic systolic congestive heart failure ([facility] ICD-10-CM: I50.23  ICD-9-CM: 428.23, 428.0  12/15/2020 Unknown        * (Principal) STEMI (ST elevation myocardial infarction) ([facility] ICD-10-CM: I21.3  ICD-9-CM: 410.90  12/10/2020 Unknown                SUBJECTIVE:  Intubated and sedated    OBJECTIVE:    VS:   Visit Vitals  BP (!) 92/58   Pulse 71   Temp 97.8 °F (36.6 °C)   Resp 16   Ht 5' 7\" (1.702 m)   Wt 66 kg (145 lb 8.1 oz)   SpO2 100%   Breastfeeding No   BMI 22.79 kg/m²         Intake/Output Summary (Last 24 hours) at 12/28/2020 0924  Last data filed at 12/28/2020 0678  Gross per 24 hour   Intake 1879.13 ml   Output 1140 ml   Net 739.13 ml     TELE: normal sinus rhythm    General: intubated, sedated  HENT: Normocephalic, atraumatic. Normal external eye.   Neck :  no JVD  Cardiac:  regular rate and rhythm, S1, S2 normal, no murmur, click, rub or gallop  Lungs: diminished to auscultation   Abdomen: Soft, nontender, no masses  Extremities:  No c/c/e, peripheral pulses present      Labs: Results:       Chemistry Recent Labs     12/28/20  0545 12/27/20  1735 12/27/20  0215 12/25/20  1000 12/25/20  1000   * 183* 198*   < > 191*    150* 149*   < > 157*   K 3.8 3.2* 3.9   < > 3.4*    110 110   < > 115*   CO2 29 34* 33*   < > 37*   BUN 26* 28* 35*   < > 62*   CREA 0.49* 0.59* 0.74 < > 1.02   CA 8.7 8.7 8.7   < > 9.2   AGAP 6 6 6   < > 5   BUCR 53* 47* 47*   < > 61*   AP  --   --   --   --  101   TP  --   --   --   --  7.0   ALB  --   --   --   --  3.0*   GLOB  --   --   --   --  4.0   AGRAT  --   --   --   --  0.8    < > = values in this interval not displayed. CBC w/Diff Recent Labs     12/28/20  0545 12/27/20  0545 12/26/20  0411   WBC 10.1 12.1 11.6   RBC 2.76* 2.78* 2.87*   HGB 7.1* 7.3* 7.4*   HCT 24.3* 24.7* 25.6*    426* 427*   GRANS 87* 84* 87*   LYMPH 7* 8* 11*   EOS 0 0 0      Cardiac Enzymes Recent Labs     12/25/20  2125 12/25/20  1601   CPK 32 34   CKND1 5.6* 6.5*      Coagulation Recent Labs     12/25/20  1000   PTP 15.1   INR 1.2   APTT 27.5       Lipid Panel No results found for: CHOL, CHOLPOCT, CHOLX, CHLST, CHOLV, 905827, HDL, HDLP, LDL, LDLC, DLDLP, 482387, VLDLC, VLDL, TGLX, TRIGL, TRIGP, TGLPOCT, CHHD, CHHDX   BNP No results for input(s): BNPP in the last 72 hours. Liver Enzymes Recent Labs     12/25/20  1000   TP 7.0   ALB 3.0*         Thyroid Studies Lab Results   Component Value Date/Time    TSH 2.92 12/10/2020 11:07 AM                  Lisa DAHL I have independently evaluated and examined the patient. All relevant labs and testing data are reviewed. Care plan discussed and updated after review.   Lian Galvan MD

## 2020-12-28 NOTE — DIABETES MGMT
Glycemic Control Plan of Care    T2DM with A1c of 8.3% (12/10/2020)  12/11: Patient intub and sedated. Noted patient came from [facility] with history of dementia. Home diabetes medications: Unverified  Lantus insulin 22 units daily    Patient was admitted to [facility] from [facility] on 12/10 with report of shortness of breath and altered mental state. Noted the following assessment:  Acute MI with antolateral STEMI  Status post PTCA/stent on 12/11  Acute respiratory failure, vent support  Acute flash pulmonary edema  Acute systolic heart failure exacerbation  Severe pulmonary fibrosis  Septic shock  Multiple liver masses  Poor prognosis    Noted per palliative care team: full code with full interventions    Transferred from ICU to CVT ICU on 12/27    Glycemic recommendation(s): increase the bedtime lantus insulin dose from 15 units to 20 units and increase sched lispro every 6 hours from 5 to 6 units starting today. Order obtained. Glycemic assessment:    NPO on TF Glucerna 1.5 at goal rate of 50 ml/hr  IV Solucortef 25 mg every 12 hours. POC BG 12/27: 320, 229, 199  POC BG 12/28 at time of review: 261, 300  Lab FBG 12/28: 178        Current A1C: 8.3% (12/10/2020) which is equivalent to estimated average blood glucose of 192 mg/dL during the past 2-3 months    Current hospital diabetes medications:  Basal lantus insulin 30 units daily every morning  Basal lantus insulin 20 units daily at bedtime  Scheduled lispro insulin 6 units every 6 hours  Correctional lispro insulin every 6 hours. Very resistant dose    Total daily dose insulin requirement previous day: 12/27:  Lantus: 45 units  Lispro: 36 units  TDD insulin: 81 units    Diet: NPO.  TF Glucerna at goal rate of 50 ml/hr    Goals:  Blood glucose will be within target range of  mg/dL by 12/30/2020    Education:   ___  Refer to Diabetes Education Record   _X__  Education not indicated at this time    Yana Culp RN [facility]  Pager: [phone]

## 2020-12-29 ENCOUNTER — APPOINTMENT (OUTPATIENT)
Dept: GENERAL RADIOLOGY | Age: 65
DRG: 003 | End: 2020-12-29
Attending: PHYSICIAN ASSISTANT
Payer: MEDICARE

## 2020-12-29 LAB
ANION GAP SERPL CALC-SCNC: 5 MMOL/L (ref 3–18)
BACTERIA SPEC CULT: NORMAL
BASOPHILS # BLD: 0 K/UL (ref 0–0.1)
BASOPHILS NFR BLD: 0 % (ref 0–2)
BUN SERPL-MCNC: 26 MG/DL (ref 7–18)
BUN/CREAT SERPL: 43 (ref 12–20)
CALCIUM SERPL-MCNC: 8.6 MG/DL (ref 8.5–10.1)
CHLORIDE SERPL-SCNC: 104 MMOL/L (ref 100–111)
CO2 SERPL-SCNC: 31 MMOL/L (ref 21–32)
CREAT SERPL-MCNC: 0.61 MG/DL (ref 0.6–1.3)
DIFFERENTIAL METHOD BLD: ABNORMAL
EOSINOPHIL # BLD: 0 K/UL (ref 0–0.4)
EOSINOPHIL NFR BLD: 0 % (ref 0–5)
ERYTHROCYTE [DISTWIDTH] IN BLOOD BY AUTOMATED COUNT: 14.4 % (ref 11.6–14.5)
GLUCOSE BLD STRIP.AUTO-MCNC: 139 MG/DL (ref 70–110)
GLUCOSE BLD STRIP.AUTO-MCNC: 211 MG/DL (ref 70–110)
GLUCOSE BLD STRIP.AUTO-MCNC: 246 MG/DL (ref 70–110)
GLUCOSE BLD STRIP.AUTO-MCNC: 268 MG/DL (ref 70–110)
GLUCOSE BLD STRIP.AUTO-MCNC: 276 MG/DL (ref 70–110)
GLUCOSE SERPL-MCNC: 231 MG/DL (ref 74–99)
HCT VFR BLD AUTO: 23.2 % (ref 35–45)
HGB BLD-MCNC: 7 G/DL (ref 12–16)
LYMPHOCYTES # BLD: 1.5 K/UL (ref 0.9–3.6)
LYMPHOCYTES NFR BLD: 12 % (ref 21–52)
MAGNESIUM SERPL-MCNC: 2.6 MG/DL (ref 1.6–2.6)
MCH RBC QN AUTO: 26.2 PG (ref 24–34)
MCHC RBC AUTO-ENTMCNC: 30.2 G/DL (ref 31–37)
MCV RBC AUTO: 86.9 FL (ref 74–97)
MONOCYTES # BLD: 0.1 K/UL (ref 0.05–1.2)
MONOCYTES NFR BLD: 0 % (ref 3–10)
NEUTS SEG # BLD: 11.5 K/UL (ref 1.8–8)
NEUTS SEG NFR BLD: 88 % (ref 40–73)
PHOSPHATE SERPL-MCNC: 2.6 MG/DL (ref 2.5–4.9)
PHOSPHATE SERPL-MCNC: 2.6 MG/DL (ref 2.5–4.9)
PLATELET # BLD AUTO: 407 K/UL (ref 135–420)
PMV BLD AUTO: 11.3 FL (ref 9.2–11.8)
POTASSIUM SERPL-SCNC: 4.2 MMOL/L (ref 3.5–5.5)
RBC # BLD AUTO: 2.67 M/UL (ref 4.2–5.3)
SERVICE CMNT-IMP: NORMAL
SODIUM SERPL-SCNC: 140 MMOL/L (ref 136–145)
WBC # BLD AUTO: 13.1 K/UL (ref 4.6–13.2)

## 2020-12-29 PROCEDURE — 94003 VENT MGMT INPAT SUBQ DAY: CPT

## 2020-12-29 PROCEDURE — 99232 SBSQ HOSP IP/OBS MODERATE 35: CPT | Performed by: HOSPITALIST

## 2020-12-29 PROCEDURE — 82962 GLUCOSE BLOOD TEST: CPT

## 2020-12-29 PROCEDURE — 74011250637 HC RX REV CODE- 250/637: Performed by: INTERNAL MEDICINE

## 2020-12-29 PROCEDURE — 84100 ASSAY OF PHOSPHORUS: CPT

## 2020-12-29 PROCEDURE — 99233 SBSQ HOSP IP/OBS HIGH 50: CPT | Performed by: INTERNAL MEDICINE

## 2020-12-29 PROCEDURE — 83735 ASSAY OF MAGNESIUM: CPT

## 2020-12-29 PROCEDURE — 65610000006 HC RM INTENSIVE CARE

## 2020-12-29 PROCEDURE — 74011636637 HC RX REV CODE- 636/637: Performed by: STUDENT IN AN ORGANIZED HEALTH CARE EDUCATION/TRAINING PROGRAM

## 2020-12-29 PROCEDURE — 77030040392 HC DRSG OPTIFOAM MDII -A

## 2020-12-29 PROCEDURE — 74011250636 HC RX REV CODE- 250/636: Performed by: INTERNAL MEDICINE

## 2020-12-29 PROCEDURE — 2709999900 HC NON-CHARGEABLE SUPPLY

## 2020-12-29 PROCEDURE — 74011000250 HC RX REV CODE- 250: Performed by: INTERNAL MEDICINE

## 2020-12-29 PROCEDURE — 74011000250 HC RX REV CODE- 250: Performed by: PHYSICIAN ASSISTANT

## 2020-12-29 PROCEDURE — 74011636637 HC RX REV CODE- 636/637: Performed by: PHYSICIAN ASSISTANT

## 2020-12-29 PROCEDURE — 94400 HC END TIDAL CO2 RESPONSE CURVE: CPT

## 2020-12-29 PROCEDURE — 74011636637 HC RX REV CODE- 636/637: Performed by: INTERNAL MEDICINE

## 2020-12-29 PROCEDURE — 74011000258 HC RX REV CODE- 258: Performed by: PHYSICIAN ASSISTANT

## 2020-12-29 PROCEDURE — 94762 N-INVAS EAR/PLS OXIMTRY CONT: CPT

## 2020-12-29 PROCEDURE — 71045 X-RAY EXAM CHEST 1 VIEW: CPT

## 2020-12-29 PROCEDURE — 36415 COLL VENOUS BLD VENIPUNCTURE: CPT

## 2020-12-29 PROCEDURE — 80048 BASIC METABOLIC PNL TOTAL CA: CPT

## 2020-12-29 PROCEDURE — 85025 COMPLETE CBC W/AUTO DIFF WBC: CPT

## 2020-12-29 PROCEDURE — 74011250636 HC RX REV CODE- 250/636: Performed by: PHYSICIAN ASSISTANT

## 2020-12-29 RX ORDER — POLYETHYLENE GLYCOL 3350 17 G/17G
17 POWDER, FOR SOLUTION ORAL AS NEEDED
Status: DISCONTINUED | OUTPATIENT
Start: 2020-12-29 | End: 2021-01-14 | Stop reason: HOSPADM

## 2020-12-29 RX ORDER — INSULIN GLARGINE 100 [IU]/ML
30 INJECTION, SOLUTION SUBCUTANEOUS
Status: DISCONTINUED | OUTPATIENT
Start: 2020-12-29 | End: 2021-01-06

## 2020-12-29 RX ORDER — SODIUM,POTASSIUM PHOSPHATES 280-250MG
1 POWDER IN PACKET (EA) ORAL 4 TIMES DAILY
Status: DISCONTINUED | OUTPATIENT
Start: 2020-12-29 | End: 2020-12-29

## 2020-12-29 RX ORDER — SODIUM,POTASSIUM PHOSPHATES 280-250MG
1 POWDER IN PACKET (EA) ORAL ONCE
Status: COMPLETED | OUTPATIENT
Start: 2020-12-29 | End: 2020-12-29

## 2020-12-29 RX ADMIN — INSULIN LISPRO 6 UNITS: 100 INJECTION, SOLUTION INTRAVENOUS; SUBCUTANEOUS at 05:11

## 2020-12-29 RX ADMIN — CHLORHEXIDINE GLUCONATE 0.12% ORAL RINSE 10 ML: 1.2 LIQUID ORAL at 08:29

## 2020-12-29 RX ADMIN — HYDROCORTISONE SODIUM SUCCINATE 25 MG: 100 INJECTION, POWDER, FOR SOLUTION INTRAMUSCULAR; INTRAVENOUS at 08:28

## 2020-12-29 RX ADMIN — MIDAZOLAM 3 MG/HR: 5 INJECTION, SOLUTION INTRAMUSCULAR; INTRAVENOUS at 07:18

## 2020-12-29 RX ADMIN — SODIUM CHLORIDE 10 ML: 9 INJECTION, SOLUTION INTRAMUSCULAR; INTRAVENOUS; SUBCUTANEOUS at 20:14

## 2020-12-29 RX ADMIN — INSULIN LISPRO 6 UNITS: 100 INJECTION, SOLUTION INTRAVENOUS; SUBCUTANEOUS at 00:59

## 2020-12-29 RX ADMIN — INSULIN LISPRO 6 UNITS: 100 INJECTION, SOLUTION INTRAVENOUS; SUBCUTANEOUS at 13:39

## 2020-12-29 RX ADMIN — SODIUM CHLORIDE 20 ML: 9 INJECTION, SOLUTION INTRAMUSCULAR; INTRAVENOUS; SUBCUTANEOUS at 05:16

## 2020-12-29 RX ADMIN — FAMOTIDINE 20 MG: 10 INJECTION INTRAVENOUS at 08:28

## 2020-12-29 RX ADMIN — ASPIRIN 81 MG CHEWABLE TABLET 81 MG: 81 TABLET CHEWABLE at 08:29

## 2020-12-29 RX ADMIN — SODIUM CHLORIDE 10 ML: 9 INJECTION, SOLUTION INTRAMUSCULAR; INTRAVENOUS; SUBCUTANEOUS at 13:40

## 2020-12-29 RX ADMIN — INSULIN LISPRO 6 UNITS: 100 INJECTION, SOLUTION INTRAVENOUS; SUBCUTANEOUS at 13:38

## 2020-12-29 RX ADMIN — POTASSIUM CHLORIDE 20 MEQ: 1500 TABLET, EXTENDED RELEASE ORAL at 01:04

## 2020-12-29 RX ADMIN — Medication 75 MCG/HR: at 22:38

## 2020-12-29 RX ADMIN — MEROPENEM 500 MG: 500 INJECTION, POWDER, FOR SOLUTION INTRAVENOUS at 20:14

## 2020-12-29 RX ADMIN — INSULIN GLARGINE 30 UNITS: 100 INJECTION, SOLUTION SUBCUTANEOUS at 08:28

## 2020-12-29 RX ADMIN — MEROPENEM 500 MG: 500 INJECTION, POWDER, FOR SOLUTION INTRAVENOUS at 03:36

## 2020-12-29 RX ADMIN — Medication 75 MCG/HR: at 10:00

## 2020-12-29 RX ADMIN — Medication 30 ML: at 08:28

## 2020-12-29 RX ADMIN — INSULIN LISPRO 9 UNITS: 100 INJECTION, SOLUTION INTRAVENOUS; SUBCUTANEOUS at 05:12

## 2020-12-29 RX ADMIN — INSULIN LISPRO 9 UNITS: 100 INJECTION, SOLUTION INTRAVENOUS; SUBCUTANEOUS at 18:00

## 2020-12-29 RX ADMIN — MEROPENEM 500 MG: 500 INJECTION, POWDER, FOR SOLUTION INTRAVENOUS at 14:26

## 2020-12-29 RX ADMIN — INSULIN GLARGINE 30 UNITS: 100 INJECTION, SOLUTION SUBCUTANEOUS at 20:16

## 2020-12-29 RX ADMIN — FAMOTIDINE 20 MG: 10 INJECTION INTRAVENOUS at 20:14

## 2020-12-29 RX ADMIN — CHLORHEXIDINE GLUCONATE 0.12% ORAL RINSE 10 ML: 1.2 LIQUID ORAL at 20:20

## 2020-12-29 RX ADMIN — ATORVASTATIN CALCIUM 40 MG: 40 TABLET, FILM COATED ORAL at 20:19

## 2020-12-29 RX ADMIN — TICAGRELOR 90 MG: 90 TABLET ORAL at 20:19

## 2020-12-29 RX ADMIN — INSULIN LISPRO 6 UNITS: 100 INJECTION, SOLUTION INTRAVENOUS; SUBCUTANEOUS at 18:00

## 2020-12-29 RX ADMIN — INSULIN LISPRO 9 UNITS: 100 INJECTION, SOLUTION INTRAVENOUS; SUBCUTANEOUS at 01:00

## 2020-12-29 RX ADMIN — MEROPENEM 500 MG: 500 INJECTION, POWDER, FOR SOLUTION INTRAVENOUS at 08:28

## 2020-12-29 RX ADMIN — POTASSIUM & SODIUM PHOSPHATES POWDER PACK 280-160-250 MG 1 PACKET: 280-160-250 PACK at 03:36

## 2020-12-29 RX ADMIN — TICAGRELOR 90 MG: 90 TABLET ORAL at 08:29

## 2020-12-29 NOTE — PROGRESS NOTES
[facility]      CARDIOLOGY PROGRESS NOTE  RECS:      1. Acute respiratory failure- requiring mechanical ventilation- extubated  12/17/20. re intubated 12/22/20. 2. Sepsis- Sputum Cx(12/22) (+) heavy E.Coli-ESBL. on antibiotics. ID following   3. Wide complex Arrhythmia on 12/25/20- No recurrence will continue to monitor. Monitor electrolytes    4. Hypotension- improved. Continue  low dose Dobutamine for Inotropic support     5. Subacute MI- 12/10/20 -s/p C S/p ptca/stent to proximal/ mid LAD using ARELY.  LVEDP 8 mmHg. Normal PASP pressure with normal PCWP. Moderate to severe LV dysfunction. ekg no new ischemic changes suggestive of reinfarction or stent thrombosis. Continue asa and brilinta   6. Anemia- H&H dropping. Consider transfusion   7. Acute Systolic Congestive heart Failure-recent echo with EF% 35%-40. compensated at present. Lasix as needed. 8. COPD, on home O2 4L NC   9. Hepatitis C ? -Per family member   8. DM2- medications per medical team   11. Dementia - failed swallow eval. Per GI note -poor candidate at this time for G-tube due to not being able to stop Brilinta. OK to stop Brillinta for 2-3 days if needed  for PEG tube. Can use heparin drip if DAPT is interuppted. Continue supportive care       Prognosis poor   Cardiac cath 12/11/20  Patient presented to [address] [place] [address] with late presentation anterior wall MI.  EF 35-40%. Patient was initially managed medically-- however developed acute pulmonary edema requiring intubation. Also troponin level increasing consistent with ongoing ischemia. S/p ptca/stent to proximal/ mid LAD using ARELY. LVEDP 8 mmHg. Normal PASP pressure with normal PCWP. Moderate to severe LV dysfunction.     Echo 12/10/20  · LV: Estimated LVEF is 35 - 40%. Visually measured ejection fraction. Normal cavity size and wall thickness. Moderately and segmentally reduced systolic function.  Inconclusive left ventricular diastolic function. · AO: Mild aortic root dilatation; diameter is 3.8 cm. ASSESSMENT:  Hospital Problems  Date Reviewed: 2/7/2018          Codes Class Noted POA    Goals of care, counseling/discussion ICD-10-CM: Z71.89  ICD-9-CM: V65.49  Unknown Unknown        Dementia without behavioral disturbance (HCC) ICD-10-CM: F03.90  ICD-9-CM: 294.20  Unknown Unknown        Pulmonary fibrosis ([facility] ICD-10-CM: J84.10  ICD-9-CM: 737  Unknown Unknown        Severe protein-calorie malnutrition ([facility] ICD-10-CM: E43  ICD-9-CM: 857  12/16/2020 Clinically Undetermined        Acute on chronic systolic congestive heart failure ([facility] ICD-10-CM: I50.23  ICD-9-CM: 428.23, 428.0  12/15/2020 Unknown        * (Principal) STEMI (ST elevation myocardial infarction) ([facility] ICD-10-CM: I21.3  ICD-9-CM: 410.90  12/10/2020 Unknown                SUBJECTIVE:  Intubated and sedated    OBJECTIVE:    VS:   Visit Vitals  /62   Pulse 86   Temp 98.1 °F (36.7 °C)   Resp 17   Ht 5' 7\" (1.702 m)   Wt 68.8 kg (151 lb 10.8 oz)   SpO2 99%   Breastfeeding No   BMI 23.76 kg/m²         Intake/Output Summary (Last 24 hours) at 12/29/2020 0842  Last data filed at 12/29/2020 0600  Gross per 24 hour   Intake 4575.23 ml   Output 1035 ml   Net 3540.23 ml     TELE: normal sinus rhythm/ NSVT     General: intubated, sedated  HENT: Normocephalic, atraumatic. Normal external eye. Neck :  no JVD  Cardiac:  regular rate and rhythm, S1, S2 normal, no murmur, click, rub or gallop  Lungs: harsh breath sounds b/l.    Abdomen: Soft, nontender, no masses  Extremities:  No c/c/e, peripheral pulses present      Labs: Results:       Chemistry Recent Labs     12/28/20  1815 12/28/20  0545 12/27/20  1735   * 178* 183*    145 150*   K 3.8 3.8 3.2*    110 110   CO2 31 29 34*   BUN 30* 26* 28*   CREA 0.66 0.49* 0.59*   CA 8.4* 8.7 8.7   AGAP 5 6 6   BUCR 45* 53* 47*      CBC w/Diff Recent Labs     12/29/20  0525 12/28/20  0545 12/27/20  0545   WBC 13.1 10.1 12.1   RBC 2.67* 2.76* 2.78*   HGB 7.0* 7.1* 7.3*   HCT 23.2* 24.3* 24.7*    371 426*   GRANS 88* 87* 84*   LYMPH 12* 7* 8*   EOS 0 0 0      Cardiac Enzymes No results for input(s): CPK, CKND1, NATHANIEL in the last 72 hours. No lab exists for component: CKRMB, TROIP   Coagulation No results for input(s): PTP, INR, APTT, INREXT, INREXT in the last 72 hours. Lipid Panel No results found for: CHOL, CHOLPOCT, CHOLX, CHLST, CHOLV, 749419, HDL, HDLP, LDL, LDLC, DLDLP, 130211, VLDLC, VLDL, TGLX, TRIGL, TRIGP, TGLPOCT, CHHD, CHHDX   BNP No results for input(s): BNPP in the last 72 hours. Liver Enzymes No results for input(s): TP, ALB, TBIL, AP in the last 72 hours. No lab exists for component: SGOT, GPT, DBIL   Thyroid Studies Lab Results   Component Value Date/Time    TSH 2.92 12/10/2020 11:07 AM                  Lisa DAHL   I have independently evaluated taken history and examined the patient. All relevant labs and testing data's are reviewed. Care plan discussed and updated after review.   Scarlett Claros MD

## 2020-12-29 NOTE — PROGRESS NOTES
TRANSFER - OUT REPORT:    Verbal report given to ESTEBAN Conroy(name) on Aurelia Mckee  being transferred to MICU(unit) for routine progression of care       Report consisted of patients Situation, Background, Assessment and   Recommendations(SBAR). Information from the following report(s) SBAR, Kardex, Procedure Summary, Intake/Output, MAR, Recent Results and Med Rec Status was reviewed with the receiving nurse. Lines:   Triple Lumen 12/22/20 Left;Proximal Femoral (Active)   Central Line Being Utilized Yes 12/28/20 2000   Criteria for Appropriate Use Hemodynamically unstable, requiring monitoring lines, vasopressors, or volume resuscitation 12/28/20 2000   Site Assessment Clean, dry, & intact 12/28/20 2000   Infiltration Assessment 0 12/28/20 2000   Affected Extremity/Extremities Color distal to insertion site pink (or appropriate for race); Pulses palpable;Range of motion performed 12/28/20 2000   Date of Last Dressing Change 12/24/20 12/28/20 2000   Dressing Status Clean, dry, & intact; Occlusive 12/28/20 2000   Dressing Type Disk with Chlorhexadine gluconate (CHG); Tape;Transparent 12/28/20 2000   Action Taken Open ports on tubing capped 12/28/20 2000   Proximal Hub Color/Line Status Brown 12/28/20 2000   Positive Blood Return (Medial Site) Yes 12/28/20 2000   Medial Hub Color/Line Status Blue 12/28/20 2000   Positive Blood Return (Lateral Site) Yes 12/28/20 2000   Distal Hub Color/Line Status White 12/28/20 2000   Positive Blood Return (Site #3) Yes 12/28/20 2000   External Catheter Length (cm) 0 centimeters 12/26/20 0400   Alcohol Cap Used Yes 12/28/20 2000        Opportunity for questions and clarification was provided.       Patient transported with:   Monitor  Registered Nurse

## 2020-12-29 NOTE — PROGRESS NOTES
attended the interdisciplinary rounds for Aurelia Burrell, who is a 72 y.o.,female. Patients Primary Language is: [place]. According to the patients EMR Jainism Affiliation is: [facility].     The reason the Patient came to the hospital is:   Patient Active Problem List    Diagnosis Date Noted    Gram-negative bacteremia 02/05/2015     Priority: 1 - One    Sepsis secondary to UTI ([address] 02/03/2015     Priority: 1 - One    DM hyperosmolarity type II, uncontrolled ([address] 02/03/2015     Priority: 1 - One    HTN (hypertension) 02/03/2015     Priority: 1 - One    Goals of care, counseling/discussion     Dementia without behavioral disturbance ([address]     Pulmonary fibrosis ([address]     Severe protein-calorie malnutrition ([address] 12/16/2020    Acute on chronic systolic congestive heart failure ([address] 12/15/2020    STEMI (ST elevation myocardial infarction) ([address] 12/10/2020    Acidosis 02/07/2018    Respiratory failure ([address] 02/07/2018    Shock ([address] 02/07/2018    Hyperosmolar (nonketotic) coma ([address] 02/07/2018    Acute UTI 02/07/2018    Macrocytic anemia 02/07/2018    Diabetic neuropathy (HCC)     Osteoarthritis of both knees     Sepsis ([address] 02/03/2015    Febrile illness, acute 02/03/2015    Diverticula of colon 09/21/2012        Plan:  Chaplains will continue to follow and will provide pastoral care on an as needed/requested basis.  recommends bedside caregivers page  on duty if patient shows signs of acute spiritual or emotional distress.     Chase Mesa  Board Certified [address] [place] [address]   [phone]

## 2020-12-29 NOTE — PROGRESS NOTES
Hospitalist Progress Note    Patient: Aurelia Collazo Age: 72 y.o. : 1955 MR#: [id number] SSN: [id number]  Date/Time: 2020 4:19 PM    DOA: 12/10/2020  PCP: Richard Sheffield MD    Subjective:     Patient remains critically ill and cannot provide history. She remains on mechanical ventilation. She is currently in need of trach and PEG. Spoke with cardiology for stopping Brilinta for 2-3 days (she can be on heparin drip if DAPT is interrupted)  Cardiology started her on dobutamine       ROS: unable to provide ROS due to ventilation dependent       Assessment/Plan:     1. Acute on Chronic Hypoxic and Hypercapnic Respiratory Failure - on mechanical ventilation, re-intubated () overnight 2/2 increased WOB and for airway protection. Extubated on  and reintubated on , RRT on floor for \"unresponsiveness, \" likely aspiration. pccm to follow  2. Wide Complex Arrhythmia () - Appears isolated at this point Noted on bedside cardiac monitor. Lasted roughly 5-10mins with near code blue. Initially HR in the 60s, then dropped to 30s, pulse remained intact. Spontaneous resolution. Oliverio switched to Levo. Cardiology following  3. () 8 beat run of V-Tach overnight, self-resolution. 4. Acute on Chronic Systolic CHF - Echo 90/95/84 EF 35-40%, diuresis per cards       Addition of dubutamine today  5. Septic shock. on Levophed gtt (). Source likely Aspiration PNA. Sputum Cx() (+) heavy E.Coli-ESBL. Recent Hx of E.coli ESBL. 6. Acute encephalopathy  7. Severe pulmonary fibrosis - with extensive honeycombing and bronchiectasis seen on CTA chest 20 - appears to be a new diagnosis  8. Acute cystitis - (+)E. coli  9. Lactic acidosis - resolved  10. Acute MI with anterolateral STEMI and Q waves - s/p ptca/stent to proximal/mid LAD using ARELY on DAPT on 20- on Brilinta  11. Severe dysphagia - failed MBS, poor candidate for G-tube 2/2 Brilinta per GI recs.          OK to stop Otoniel Ricketts for 2-3 days if needed  for PEG tube. Can use heparin drip if DAPT is interuppted. 12. Hx of Hep C: + RNA viral load 2020  13. Multiple liver masses - noted on CT scan 20  14. Hx of COPD - on home O2 4L NC   15. Full code. Will transfer back to ICU service since she remains intubated & critically ill. Additional Notes:    Time spent >30 minutes    Case discussed with:  [x]Patient  []Family  [x]Nursing  [x]Case Management  DVT Prophylaxis:  []Lovenox  []Hep SQ  [x]SCDs  []Coumadin   []On Heparin gtt    Signed By: Nicole Marino MD     2020 4:19 PM              Objective:   VS:   Visit Vitals  BP (!) 119/58   Pulse 74   Temp 98.4 °F (36.9 °C)   Resp 18   Ht 5' 7\" (1.702 m)   Wt 68.8 kg (151 lb 10.8 oz)   SpO2 100%   Breastfeeding No   BMI 23.76 kg/m²      Tmax/24hrs: Temp (24hrs), Av.3 °F (36.8 °C), Min:98 °F (36.7 °C), Max:98.6 °F (37 °C)      Intake/Output Summary (Last 24 hours) at 2020 1427  Last data filed at 2020 1200  Gross per 24 hour   Intake 2458. 21 ml   Output 860 ml   Net 1598.21 ml       Tele:   General:  frail, intubated  HEENT: PERRL, supple neck, no JVD, dry oral mucosa  Cardiovascular: S1S2 regular, no rub/gallop   Pulmonary: Air entry bilaterally, no wheezing, ++ crackle  GI:  Soft, non tender, non distended, +bs, no guarding   Extremities:  No pedal edema, +distal pulses appreciated   Neuro: sedated    Additional:       Current Facility-Administered Medications   Medication Dose Route Frequency    polyethylene glycol (MIRALAX) packet 17 g  17 g Per NG tube PRN    insulin glargine (LANTUS) injection 30 Units  30 Units SubCUTAneous QHS    DOBUTamine (DOBUTREX) 500 mg/250 mL (2,000 mcg/mL) infusion  5 mcg/kg/min IntraVENous CONTINUOUS    insulin lispro (HUMALOG) injection 6 Units  6 Units SubCUTAneous Q6H    chlorhexidine (PERIDEX) 0.12 % mouthwash 10 mL  10 mL Oral Q12H    fentaNYL (PF) 900 mcg/30 ml infusion soln  0-200 mcg/hr IntraVENous TITRATE    midazolam (VERSED) injection 1-2 mg  1-2 mg IntraVENous Q10MIN PRN    fentaNYL citrate (PF) injection  mcg   mcg IntraVENous Q30MIN PRN    midazolam in normal saline (VERSED) 1 mg/mL infusion  1-5 mg/hr IntraVENous TITRATE    ELECTROLYTE REPLACEMENT PROTOCOL - Potassium Standard Dosing   1 Each Other PRN    ELECTROLYTE REPLACEMENT PROTOCOL - Magnesium   1 Each Other PRN    ELECTROLYTE REPLACEMENT PROTOCOL - Phosphorus  Standard Dosing  1 Each Other PRN    ELECTROLYTE REPLACEMENT PROTOCOL - Calcium   1 Each Other PRN    meropenem (MERREM) 500 mg in sterile water (preservative free) 10 mL IV syringe  500 mg IntraVENous Q6H    insulin glargine (LANTUS) injection 30 Units  30 Units SubCUTAneous DAILY    [Held by provider] carvediloL (COREG) tablet 6.25 mg  6.25 mg Oral Q12H    [Held by provider] lisinopriL (PRINIVIL, ZESTRIL) tablet 5 mg  5 mg Oral DAILY    [Held by provider] furosemide (LASIX) injection 40 mg  40 mg IntraVENous Q12H    [Held by provider] spironolactone (ALDACTONE) tablet 25 mg  25 mg Oral DAILY    multivit-folic acid-herbal 403 (WELLESSE PLUS) oral liquid 30 mL  30 mL Per NG tube DAILY    insulin lispro (HUMALOG) injection   SubCUTAneous Q6H    sodium chloride (NS) flush 5-40 mL  5-40 mL IntraVENous PRN    ticagrelor (BRILINTA) tablet 90 mg  90 mg Per NG tube BID    sodium chloride (NS) flush 5-40 mL  5-40 mL IntraVENous PRN    albuterol-ipratropium (DUO-NEB) 2.5 MG-0.5 MG/3 ML  3 mL Nebulization Q4H PRN    famotidine (PF) (PEPCID) 20 mg in 0.9% sodium chloride 10 mL injection  20 mg IntraVENous Q12H    sodium chloride (NS) flush 5-40 mL  5-40 mL IntraVENous Q8H    sodium chloride (NS) flush 5-40 mL  5-40 mL IntraVENous PRN    acetaminophen (TYLENOL) tablet 650 mg  650 mg Oral Q6H PRN    Or    acetaminophen (TYLENOL) suppository 650 mg  650 mg Rectal Q6H PRN    promethazine (PHENERGAN) tablet 12.5 mg  12.5 mg Oral Q6H PRN    Or    ondansetron [facility]) injection 4 mg  4 mg IntraVENous Q6H PRN    glucose chewable tablet 16 g  4 Tab Oral PRN    glucagon (GLUCAGEN) injection 1 mg  1 mg IntraMUSCular PRN    dextrose (D50W) injection syrg 12.5-25 g  25-50 mL IntraVENous PRN    aspirin chewable tablet 81 mg  81 mg Oral DAILY    atorvastatin (LIPITOR) tablet 40 mg  40 mg Oral QHS            Lab/Data Review:  Labs: Results:       Chemistry Recent Labs     12/29/20  0525 12/29/20  0135 12/28/20  1815 12/28/20  0545   *  --  264* 178*     --  142 145   K 4.2  --  3.8 3.8     --  106 110   CO2 31  --  31 29   BUN 26*  --  30* 26*   CREA 0.61  --  0.66 0.49*   BUCR 43*  --  45* 53*   AGAP 5  --  5 6   CA 8.6  --  8.4* 8.7   PHOS 2.6 2.6 2.5 2.9     No results for input(s): TBIL, ALT, ALKP, TP, ALB, GLOB, AGRAT in the last 72 hours. No lab exists for component: SGOT   CBC w/Diff Recent Labs     12/29/20  0525 12/28/20  0545 12/27/20  0545   WBC 13.1 10.1 12.1   RBC 2.67* 2.76* 2.78*   HGB 7.0* 7.1* 7.3*   HCT 23.2* 24.3* 24.7*   MCV 86.9 88.0 88.8   MCH 26.2 25.7 26.3   MCHC 30.2* 29.2* 29.6*   RDW 14.4 14.5 14.7*    371 426*   GRANS 88* 87* 84*   LYMPH 12* 7* 8*   EOS 0 0 0      Coagulation No results for input(s): PTP, INR, APTT, INREXT, INREXT in the last 72 hours.     Iron/Ferritin No results found for: IRON, FE, TIBC, IBCT, PSAT, FERR    BNP    Cardiac Enzymes Lab Results   Component Value Date/Time    CK 32 12/25/2020 09:25 PM    CK - MB 1.8 12/25/2020 09:25 PM    CK-MB Index 5.6 (H) 12/25/2020 09:25 PM    Troponin-I, QT 0.12 (H) 12/25/2020 09:25 PM        Lactic Acid    Thyroid Studies          All Micro Results     Procedure Component Value Units Date/Time    CULTURE, BLOOD [[id number]] Collected: 12/23/20 1011    Order Status: Completed Specimen: Blood Updated: 12/29/20 0757     Special Requests: NO SPECIAL REQUESTS        Culture result: NO GROWTH 6 DAYS       CULTURE, BLOOD [[id number]] Collected: 12/22/20 6084    Order Status: Completed Specimen: Blood Updated: 12/28/20 0721     Special Requests: NO SPECIAL REQUESTS        Culture result: NO GROWTH 6 DAYS       CULTURE, RESPIRATORY/SPUTUM/BRONCH Myrle Loach STAIN [[id number]]  (Abnormal)  (Susceptibility) Collected: 12/22/20 1830    Order Status: Completed Specimen: Sputum,ET Suction Updated: 12/25/20 1050     Special Requests: NO SPECIAL REQUESTS        GRAM STAIN       RARE EPITHELIAL CELLS SEEN            3+ WBCS SEEN         4+ GRAM NEGATIVE RODS        Culture result:       HEAVY ESCHERICHIA COLI ** (EXTENDED SPECTRUM BETA LACTAMASE ) **                  NO NORMAL RESPIRATORY AILEEN ISOLATED          CULTURE, BLOOD [[id number]] Collected: 12/22/20 1130    Order Status: Canceled Specimen: Blood     CULTURE, BLOOD [[id number]] Collected: 12/16/20 1535    Order Status: Completed Specimen: Blood Updated: 12/22/20 0654     Special Requests: NO SPECIAL REQUESTS        Culture result: NO GROWTH 6 DAYS       CULTURE, BLOOD [[id number]] Collected: 12/16/20 0200    Order Status: Completed Specimen: Blood Updated: 12/22/20 0654     Special Requests: NO SPECIAL REQUESTS        Culture result: NO GROWTH 6 DAYS       CULTURE, RESPIRATORY/SPUTUM/BRONCH W GRAM STAIN [[id number]]  (Abnormal)  (Susceptibility) Collected: 12/16/20 0050    Order Status: Completed Specimen: Endotracheal aspirate Updated: 12/18/20 1529     Special Requests: NO SPECIAL REQUESTS        GRAM STAIN NO WBC'S SEEN         NO ORGANISMS SEEN        Culture result:       SCANT ESCHERICHIA COLI ** (EXTENDED SPECTRUM BETA LACTAMASE ) **                  SCANT NORMAL RESPIRATORY AILEEN          CULTURE, URINE [[id number]]  (Abnormal)  (Susceptibility) Collected: 12/14/20 1400    Order Status: Completed Specimen: Cath Urine Updated: 12/17/20 1200     Special Requests: NO SPECIAL REQUESTS        [place] Count --        >100,000  COLONIES/mL       Culture result: ESCHERICHIA COLI       CULTURE, BLOOD [[id number]] Collected: 12/11/20 1530    Order Status: Completed Specimen: Blood Updated: 12/17/20 0649     Special Requests: NO SPECIAL REQUESTS        Culture result: NO GROWTH 6 DAYS       CULTURE, URINE [[id number]]  (Abnormal) Collected: 12/11/20 1018    Order Status: Completed Specimen: Cath Urine Updated: 12/12/20 1609     Special Requests: NO SPECIAL REQUESTS        [place] Count --        1000  COLONIES/mL       Culture result: GRAM NEGATIVE RODS               Images:    CT (Most Recent). XRAY (Most Recent)      EKG No results found for this or any previous visit.      2D ECHO

## 2020-12-29 NOTE — PROGRESS NOTES
1930-Bedside and verbal report Gem Bella RN. Pt resting in bed on left side, NAD, NSR on the monitor, current vent settings PEEP 5, AC, FiO2 30%, drips verified with off*-going RN. 2000-Pt clean/dry, oral care performed, Is/0s recorded. 2100-NSR, no apparent pain, pt tracks me in the room, some command following, VSS. Urine output adequate. 2200-Pt thrashing her arms in bed, coughing over vent, pt suctioned and Fentanyl increase to 75 mcg/hr. Will cont to monitor. 2300-Pt resting, NAD, no apparent pain, no SOB noted on current vent settings, NSR, BP stable. Pt turned to left side. FMS intact, no leakage. 0030-, given coverage+basal insulin. NSR. VSS. Oral care performed. Pt turned. Suctioned. 0100-Labs drawn from blue port on Left Femoral triple lumen line per protocol. 0230-Pt agitated when I was attempting to perform oral care and change her gown and clean her of stool, Versed gtt increased to 3 mg/Hr. NSR, MAP stable. No change in vent settings. 0400-Pt resting, NAD, no SOB noted, no c/o pain, NSR on the monitor. Urine output good. 0600-Linen and gown changed, BS checked, labs drawn from white port on Left femoral line, dressing dates for 12/24, blue cap changed. No apparent distress. 0700-Bedside and verbal report given to CASIMIRO Caraballo RN.

## 2020-12-29 NOTE — PROGRESS NOTES
Spoke with Walt Guerrero from [address] [place] [address]. Plan for PICC line placement tomorrow 12/30 for early afternoon. Angio will try and call as soon as they have time slot open but in general it should be in the early afternoon. This RN will relay this information to RT and the next shift RN.

## 2020-12-29 NOTE — PROGRESS NOTES
[facility]: [phone])  [facility]: [phone]    Goals of care defined. Palliative team will sign off. Please consult team as needed, if appropriate. Thank you for the Palliative Medicine consult and allowing us to participate in the care of Ms. Anne Jaramillo.         CODE STATUS- FULL CODE/FULL INTERVENTIONS      Chelsie DICKEYN, RN, [facility]  Palliative Medicine Inpatient RN  Yariel Matthews 76: [phone])

## 2020-12-29 NOTE — PROGRESS NOTES
Nutrition Assessment     Type and Reason for Visit: Reassess    Nutrition Recommendations/Plan:   - Continue tube feeding of Glucerna 1.5 at goal rate of 50 mL/hr with Prosource once daily and 100 mL/hr water flushes. Nutrition Assessment:  Tolerating feeds at goal.    Malnutrition Assessment:  Malnutrition Status: Severe malnutrition     Estimated Daily Nutrient Needs:  Energy (kcal):  4883-6829  Protein (g):         Fluid (ml/day):  5981-8670    Nutrition Related Findings:   12/28      Current Nutrition Therapies:  DIET NPO  DIET NUTRITIONAL SUPPLEMENTS Breakfast; Prosource  DIET TUBE FEEDING    Current Tube Feeding (TF) Orders:   · Feeding Route: Orogastric  · Formula: Glucerna 1.5  · Schedule:Continuous    · Regimen: 50 mL/hr  · Additives/Modulars: Protein(prosource once daily)  · Water Flushes: 100 mL/hr (2400 mL)  · Current TF Orders Provides: 1860 kcal, 114 gm protein, 910 mL free water, 100% RDIs (goal)     Anthropometric Measures:  · Height:  5' 7\" (170.2 cm)  · Current Body Wt:  66 kg (145 lb 8.1 oz)  · BMI: 22.8    Nutrition Diagnosis:   · Inadequate oral intake related to impaired respiratory function as evidenced by NPO or clear liquid status due to medical condition, intubation      Nutrition Intervention:  Food and/or Nutrient Delivery: Continue NPO, Continue tube feeding, Vitamin supplement  Nutrition Education and Counseling: Education not indicated  Coordination of Nutrition Care: Continue to monitor while inpatient, Coordination of community care    Goals:  Nutritional needs will be met through adequate oral intake or nutrition support within the next 7 days.        Nutrition Monitoring and Evaluation:   Behavioral-Environmental Outcomes: None identified  Food/Nutrient Intake Outcomes: Diet advancement/tolerance, Enteral nutrition intake/tolerance, Vitamin/mineral intake  Physical Signs/Symptoms Outcomes: Biochemical data, GI status, Hemodynamic status, Nutrition focused physical findings    Discharge Planning:     Too soon to determine     Electronically signed by Lorin Calle RD, [id number] on 12/29/2020 at 8:53 AM    Contact Number: [phone]

## 2020-12-29 NOTE — PROGRESS NOTES
[facility]. Pulmonary, Critical Care, and Sleep Medicine    Name: Nicanor Smith MRN: [id number]   : 1955 Hospital: [address] [place] [address] Dr   Date: 2020  Admission Date: 12/10/2020     Chart and notes reviewed. Data reviewed. I have evaluated all findings. [x]I have reviewed the flowsheet and previous days notes. [x]The patient is unable to give any meaningful history or review of systems because the patient is:  [x]Intubated [x]Sedated   []Unresponsive      [x]The patient is critically ill on      [x]Mechanical ventilation []Pressors   []BiPAP []         Interval HPI:Patient is a 72 y. o. female with a past medical history of COPD, CHF, HTN, DM, dementia, presented to [facility] with acute on chronic systolic heart failure, acute MI with anterolateral STEMI and Q waves s/p ptca/stent to proximal/mid LAD on 20. Pt was intubated and extubated on 20, transferred to . On 20, RRT was called - pt was tachypneic, using accessory muscles, obtunded, and hypotensive. Pt was subsequently intubated, started on levophed and transferred to the ICU emergently. Initial ABG - 7.40, 66.3, 64, 40.7. Dobutamine was restarted per cards     20  Pt on vent support. Awakens and follows some commands  BP stable on Dobutamine gtt  PICC to be placed by IR tomorrow  Case discussed with patient's family [facility]) who consents to trach and peg. ROS:Review of systems not obtained due to patient factors. Events and notes from last 24 hours reviewed. Care plan discussed on multidisciplinary rounds.   Patient Active Problem List   Diagnosis Code    Diverticula of colon K57.30    Sepsis ([facility] A41.9    Sepsis secondary to UTI ([facility] A41.9, N39.0    DM hyperosmolarity type II, uncontrolled ([address] E11.00, E11.65    HTN (hypertension) I10    Febrile illness, acute R50.9    Gram-negative bacteremia R78.81    Diabetic neuropathy ([address] E11.40    Osteoarthritis of both knees M17.0    Acidosis E87.2    Respiratory failure (HCC) J96.90    Shock ([address] R57.9    Hyperosmolar (nonketotic) coma (HCC) E11.01    Acute UTI N39.0    Macrocytic anemia D53.9    STEMI (ST elevation myocardial infarction) (HCC) I21.3    Acute on chronic systolic congestive heart failure (HCC) I50.23    Severe protein-calorie malnutrition (HCC) E43    Goals of care, counseling/discussion Z71.89    Dementia without behavioral disturbance (HCC) F03.90    Pulmonary fibrosis (HCC) J84.10       Vital Signs:  Visit Vitals  BP (!) 119/58   Pulse 74   Temp 98.4 °F (36.9 °C)   Resp 18   Ht 5' 7\" (1.702 m)   Wt 68.8 kg (151 lb 10.8 oz)   SpO2 100%   Breastfeeding No   BMI 23.76 kg/m²       O2 Device: Ventilator   O2 Flow Rate (L/min): 40 l/min   Temp (24hrs), Av.3 °F (36.8 °C), Min:98 °F (36.7 °C), Max:98.6 °F (37 °C)       Intake/Output:   Last shift:       0701 -  190  In: -   Out: 75 [Urine:75]  Last 3 shifts: 1901 -  0700  In: 5578.3 [I.V.:843.3]  Out: 1665 [Urine:1665]    Intake/Output Summary (Last 24 hours) at 2020 1542  Last data filed at 2020 1200  Gross per 24 hour   Intake 2194.61 ml   Output 820 ml   Net 1374.61 ml        Ventilator Settings:  Ventilator Mode: Assist control, VC+  Respiratory Rate  Resp Rate Observed: 18  Back-Up Rate: 16  Insp Time (sec): 0.9 sec  I:E Ratio: 1:3.2  Ventilator Volumes  Vt Set (ml): 450 ml  Vt Exhaled (Machine Breath) (ml): 447 ml  Vt Spont (ml): 438 ml  Ve Observed (l/min): 7.5 l/min  Ventilator Pressures  Pressure Support (cm H2O): 8 cm H2O  PIP Observed (cm H2O): 20 cm H2O  Plateau Pressure (cm H2O): 21 cm H2O  MAP (cm H2O): 10  PEEP/VENT (cm H2O): 5 cm H20  Auto PEEP Observed (cm H2O): 0 cm H2O    Current Facility-Administered Medications   Medication Dose Route Frequency    insulin glargine (LANTUS) injection 30 Units  30 Units SubCUTAneous QHS    DOBUTamine (DOBUTREX) 500 mg/250 mL (2,000 mcg/mL) infusion  5 mcg/kg/min IntraVENous CONTINUOUS    insulin lispro (HUMALOG) injection 6 Units  6 Units SubCUTAneous Q6H    chlorhexidine (PERIDEX) 0.12 % mouthwash 10 mL  10 mL Oral Q12H    fentaNYL (PF) 900 mcg/30 ml infusion soln  0-200 mcg/hr IntraVENous TITRATE    midazolam in normal saline (VERSED) 1 mg/mL infusion  1-5 mg/hr IntraVENous TITRATE    meropenem (MERREM) 500 mg in sterile water (preservative free) 10 mL IV syringe  500 mg IntraVENous Q6H    insulin glargine (LANTUS) injection 30 Units  30 Units SubCUTAneous DAILY    [Held by provider] carvediloL (COREG) tablet 6.25 mg  6.25 mg Oral Q12H    [Held by provider] lisinopriL (PRINIVIL, ZESTRIL) tablet 5 mg  5 mg Oral DAILY    [Held by provider] furosemide (LASIX) injection 40 mg  40 mg IntraVENous Q12H    [Held by provider] spironolactone (ALDACTONE) tablet 25 mg  25 mg Oral DAILY    multivit-folic acid-herbal 886 (WELLESSE PLUS) oral liquid 30 mL  30 mL Per NG tube DAILY    insulin lispro (HUMALOG) injection   SubCUTAneous Q6H    ticagrelor (BRILINTA) tablet 90 mg  90 mg Per NG tube BID    famotidine (PF) (PEPCID) 20 mg in 0.9% sodium chloride 10 mL injection  20 mg IntraVENous Q12H    sodium chloride (NS) flush 5-40 mL  5-40 mL IntraVENous Q8H    aspirin chewable tablet 81 mg  81 mg Oral DAILY    atorvastatin (LIPITOR) tablet 40 mg  40 mg Oral QHS         Telemetry: []Sinus []A-flutter []Paced    []A-fib []Multiple PVCs                  Physical Exam:      General: Intubated/sedated; HEENT:  Anicteric sclerae; pink palpebral conjunctivae; mucosa moist  Resp: (+)scattered rhonchi.  Symmetrical chest expansion, no accessory muscle use; good airway entry; no rales/ wheezing/ noted  CV:  S1, S2 present; regular rate and rhythm  GI:  Abdomen soft, non-tender; (+) active bowel sounds  Extremities:  +2 pulses on all extremities; no edema/ cyanosis/ clubbing noted + restraints   Skin:  Warm; no rashes/ lesions noted, normal turgor/cap refill   Neurologic: Non-focal  Devices:  ETT, Pro, L fem CVL      DATA:  MAR reviewed and pertinent medications noted or modified as needed    Labs:  Recent Labs     12/29/20  0525 12/28/20  0545 12/27/20  0545   WBC 13.1 10.1 12.1   HGB 7.0* 7.1* 7.3*   HCT 23.2* 24.3* 24.7*    371 426*     Recent Labs     12/29/20  0525 12/29/20  0135 12/28/20  1815 12/28/20  0545     --  142 145   K 4.2  --  3.8 3.8     --  106 110   CO2 31  --  31 29   *  --  264* 178*   BUN 26*  --  30* 26*   CREA 0.61  --  0.66 0.49*   CA 8.6  --  8.4* 8.7   MG 2.6  --  2.6 2.7*   PHOS 2.6 2.6 2.5 2.9     No results for input(s): PH, PCO2, PO2, HCO3, FIO2 in the last 72 hours. Recent Labs     12/28/20  0423 12/27/20  0452   FIO2I 30 0.50   HCO3I 29.7* 33.7*   PCO2I 39.2 45.4*   PHI 7.49* 7.48*   PO2I 94 152*       Imaging:  [x]   I have personally reviewed the patients radiographs and reports  XR Results (most recent):  Results from Hospital Encounter encounter on 12/10/20   XR ABD PORT  1 V    Narrative Abdomen portable    HISTORY: Verification of OG tube    COMPARISON: 12/19/2020    FINDINGS: Tip of the enteric tube overlies the expected region of the distal  gastric body with the side-port at the level of the mid gastric body. Partially  visualized presumably venous catheter overlying the left pelvis. Partially  visualized endotracheal tube with the tip approximately 2 cm above chace. No  dilated bowel loops are seen. Diffusely coarsened interstitial opacities  disproportionately worse at the lung bases. Impression IMPRESSION:    Tip of the enteric tube is at the level of the distal stomach. CT Results (most recent):  Results from Hospital Encounter encounter on 12/10/20   CTA CHEST W OR W WO CONT    Narrative CT Pulmonary Angiogram    CPT CODE: 22783    CLINICAL: Shortness of breath, concern for PE.    COMPARISON: Current and previous plain films.     TECHNICAL: Volumetric data acquisition performed through the chest with a  multislice scanner. Reconstructions were created in the axial, coronal, and  sagittal planes. Coronal and sagittal reconstructions were created using maximal  intensity projection methodology to maximize sensitivity for emboli. Bolus  timing was optimized for a pulmonary embolism evaluation. 100 cc of Isovue-370  were utilized for this examination. FINDINGS:  The patient is intubated. Endotracheal tube is in satisfactory position. There are no pulmonary emboli. The ascending aorta is prominent at 3.7 cm. There  are dense calcifications in the anterior surface of the ascending aorta. The lungs there are extensive coarse subpleural reticulations increasing in  confluence in severity in the bases. Honeycombing is present. There is traction  bronchiectasis extensively in the lower lung zones . There is groundglass  infiltrate and consolidation between the areas of honeycombing and traction  bronchiectasis  No pleural effusion or pneumothorax is apparent. There is a small pneumomediastinum evident. .  No mediastinal adenopathy or mass is evident. There is multichamber cardiac  enlargement. Sections in the upper abdomen reveal a inhomogeneous mass in the posterior right  lobe of the liver measuring 9 x 10 cm transaxially. There may be an additional  smaller lesion (2 cm) in the medial left lobe. Impression IMPRESSION:    Negative for acute pulmonary embolism or acute aortic abnormality. Dilated ascending aorta to 3.7 cm. Advanced pulmonary fibrosis with traction bronchiectasis and honeycombing. The  appearance is suggestive of UIP. There are extensive groundglass and confluent infiltrates superimposed upon the  interstitial process in the lower lobes. Primary differential considerations for  this include pulmonary edema, pneumonia, aspiration, pulmonary hemorrhage, and  acute lung injury. There is a large inhomogeneous mass in the right lobe of the liver and possibly  a second smaller lesion. Differential includes primary and metastatic disease. Broad differential.  Minimal pneumomediastinum        All CT scans at this facility are performed using dose optimization technique as  appropriate to the performed exam, to include automated exposure control,  adjustment of the mA and/or kV according to patient's size (Including  appropriate matching for site-specific examinations), or use of iterative  reconstruction technique. IMPRESSION:   · Acute on Chronic Hypoxic and Hypercapnic Respiratory Failure - Requiring mechanical ventilation, Extubated on 12/17 and reintubated on 12/22,  RRT on floor for \"unresponsiveness\". Likely aspiration event  · Wide Complex Arrhythmia (12/25) -noted on bedside cardiac monitor. Lasted roughly 5-10 mins with Spontaneous resolution. E-lyte derangement vs Drug effect (pt was on 55mcg/hr Oliverio-Synephrine at the time) vs cardiac insult. · Acute on Chronic Systolic Heart Failure - superimposed on severe ILD, Echo 12/10/20 EF 35-40%, diuresis per cards (Dr. Carlos Alberto Zurita)  · Shock - Septic with possible Cardiogenic component. Interval worsening s/p re-intubation on 12/24. Back on Levophed gtt (12/25). Suspect multifactorial with component of sedation effect for current hypotension, in addition to cardiogenic shock. Source likely Aspiration PNA. Sputum Cx(12/22) (+) heavy E.Coli-ESBL. Recent Hx of E.coli ESBL. Leukocytosis resolved.      · Acute Encephalopathy - likely toxic/metabolic vs infectious vs hepatic in nature, ammonia wnl.   · Acute flash pulmonary edema -  in setting of chronic heart failure - Likely multifactorial, in setting of emergent re-intubation, 2/2 above.    · Severe pulmonary fibrosis - with extensive honeycombing and bronchiectasis as seen on CTA chest 12/16/20 - appears to be a new diagnosis but possibly UIP  · Acute aspiration pneumonia with severe sepsis  · Acute cystitis - (+)E. coli  · Lactic acidosis -  resolved  · Acute MI with anterolateral STEMI and Q waves - s/p ptca/stent to proximal/mid LAD using ARELY on DAPT on 12/11/20- Remains on Brilinta  · Severe dysphagia - failed MBS, poor candidate for G-tube 2/2 Brilinta per GI (Dr. Fatoumata Rinaldi)  · Hx of Hep C: + RNA viral load 4/4/2020  · Multiple liver masses - noted on CT scan 11/25/20  · Hx of COPD - on home O2 4L NC  · Hx of HTN  · Hx of DM  · Hx of dementia - unknown baseline, on aricept      Patient Active Problem List   Diagnosis Code    Diverticula of colon K57.30    Sepsis ([address] A41.9    Sepsis secondary to UTI ([address] A41.9, N39.0    DM hyperosmolarity type II, uncontrolled ([address] E11.00, E11.65    HTN (hypertension) I10    Febrile illness, acute R50.9    Gram-negative bacteremia R78.81    Diabetic neuropathy ([address] E11.40    Osteoarthritis of both knees M17.0    Acidosis E87.2    Respiratory failure ([address] J96.90    Shock ([address] R57.9    Hyperosmolar (nonketotic) coma ([address] E11.01    Acute UTI N39.0    Macrocytic anemia D53.9    STEMI (ST elevation myocardial infarction) ([address] I21.3    Acute on chronic systolic congestive heart failure (HCC) I50.23    Severe protein-calorie malnutrition (HCC) E43    Goals of care, counseling/discussion Z71.89    Dementia without behavioral disturbance (HCC) F03.90    Pulmonary fibrosis (HCC) J84.10        RECOMMENDATIONS:   · Continue vent support. Bronchial hygiene and aspiration precautions, VAP bundle. Mental status and inability to protect airway likely limiting factor for pt remaining off vent support. Unless mental status improves considerably would likely require trache.   · Continue Meropenem per ID guidance  · Glycemic control suboptimal with Lantus and correctional scale insulin, defer to hospitalist  · Tube feeds  · Hypernatremia improving on water flushes, continue to monitor and adjust to response  · Per GI poor prognosis for PEG, still requires Brilinta  · Sedation and analgesia per protocol  · Prophylaxis issues per primary team  · Taper off stress steroids as tolerated  · Bowel regimen  · Titrate Dobutamine gtt     Best practice :    Glycemic control  Mech Vent patients- VAP bundle, aim to keep peak plateau pressure 47-18OJ H2O  Sress ulcer prophylaxis. DVT prophylaxis. Need for Lines, means assessed. Restraints need. Palliative care evaluation. Lupillo Thomas PA-C  12/29/20  Pulmonary, Critical Care Medicine  [facility]         Pulmonary / Critical Care Physician:    Chart and note reviewed. Data reviewed. Seen on rounds earlier today. I have independently evaluated and examined the patient. I agree with the exam, assessment and plans outlined by BIJAN Harding.     In brief my My findings , evaluation and recommendations are as stated below:    Pt was a lateral transfer from the CVT ICU. She is currently stable on MV. She has failed extubation twice, has recurrent aspiration and severe dementia. No PEG tube or trach since she must be on Brilinta. However cards said it could be held for 2-3 days if needed. Will reach out to ENT and GI. She is not a candidate for extubation again in my opinion. Will discuss options with the patient's sister. Continue meropenem for ESBL in the sputum for now. Rest of details and diagnostic/treatment plans per APC note. I have personally reviewed all pertinent data including labs, imaging and recommendations of treatment team providers. Total of 45 min critical care time spent at bedside during the course of care providing evaluation,management and care decisions and ordering appropriate treatment related to critical care problems exclusive of procedures. The reason for providing this level of medical care for this critically ill patient was due a critical illness that impaired one or more vital organ systems such that there was a high probability of imminent or life threatening deterioration in the patients condition.  This care involved high complexity decision making to assess, manipulate, and support vital system functions, to treat this degree vital organ system failure and to prevent further life threatening deterioration of the patients condition.       Avery Jimenez MD  9:18 AM

## 2020-12-29 NOTE — DIABETES MGMT
GLYCEMIC CONTROL PLAN OF CARE     Assessment/Recommendations:  Lab glucose this am  mg/dl  Noted pt receiving steroids, hydrocortisone 25 mg every 12 hours  Recommend increasing bedtime lantus dose to 30 units every bedtime. Continue morning lantus and corrective insulin coverage as ordered. Will continue inpatient monitoring. Most recent blood glucose values:      Results for Jayson Abler (MRN [id number]) as of 12/29/2020 10:11   Ref. Range 12/28/2020 06:17 12/28/2020 11:45 12/28/2020 18:19 12/29/2020 00:58 12/29/2020 05:10   GLUCOSE,FAST - POC Latest Ref Range: 70 - 110 mg/dL 261 (H) 300 (H) 274 (H) 276 (H) 268 (H)     Current A1C of 8.3 % is equivalent to average blood glucose of 192 mg/dl over the past 2-3 months. Current hospital diabetes medications:   lantus 30 units every morning  lantus 20 units every bedtime  Lispro 6 units every 6 hours  Lispro corrective insulin coverage every 6 hours  Previous day's insulin requirements:   lantus 50 units. Lispro 54 units   Home diabetes medications:  Per pta med list.  lantus 22 units daily   Diet:    NPO. TF glucerna at 50 ml/hr  Education:  ____Refer to Diabetes Education Record             _x__Education not indicated at this time  Intubated. Sedated. History of dementia.     Honorio Valencia

## 2020-12-30 ENCOUNTER — APPOINTMENT (OUTPATIENT)
Dept: GENERAL RADIOLOGY | Age: 65
DRG: 003 | End: 2020-12-30
Attending: PHYSICIAN ASSISTANT
Payer: MEDICARE

## 2020-12-30 LAB
ANION GAP SERPL CALC-SCNC: 4 MMOL/L (ref 3–18)
ANION GAP SERPL CALC-SCNC: 5 MMOL/L (ref 3–18)
ARTERIAL PATENCY WRIST A: YES
BASE EXCESS BLD CALC-SCNC: 5 MMOL/L
BASOPHILS # BLD: 0 K/UL (ref 0–0.1)
BASOPHILS NFR BLD: 0 % (ref 0–2)
BDY SITE: ABNORMAL
BODY TEMPERATURE: 98.6
BUN SERPL-MCNC: 20 MG/DL (ref 7–18)
BUN SERPL-MCNC: 21 MG/DL (ref 7–18)
BUN/CREAT SERPL: 43 (ref 12–20)
BUN/CREAT SERPL: 45 (ref 12–20)
CALCIUM SERPL-MCNC: 8.3 MG/DL (ref 8.5–10.1)
CALCIUM SERPL-MCNC: 8.5 MG/DL (ref 8.5–10.1)
CHLORIDE SERPL-SCNC: 105 MMOL/L (ref 100–111)
CHLORIDE SERPL-SCNC: 106 MMOL/L (ref 100–111)
CO2 SERPL-SCNC: 32 MMOL/L (ref 21–32)
CO2 SERPL-SCNC: 32 MMOL/L (ref 21–32)
CREAT SERPL-MCNC: 0.44 MG/DL (ref 0.6–1.3)
CREAT SERPL-MCNC: 0.49 MG/DL (ref 0.6–1.3)
DIFFERENTIAL METHOD BLD: ABNORMAL
EOSINOPHIL # BLD: 0.2 K/UL (ref 0–0.4)
EOSINOPHIL NFR BLD: 1 % (ref 0–5)
ERYTHROCYTE [DISTWIDTH] IN BLOOD BY AUTOMATED COUNT: 14.3 % (ref 11.6–14.5)
GAS FLOW.O2 O2 DELIVERY SYS: ABNORMAL L/MIN
GAS FLOW.O2 SETTING OXYMISER: 16 BPM
GLUCOSE BLD STRIP.AUTO-MCNC: 139 MG/DL (ref 70–110)
GLUCOSE BLD STRIP.AUTO-MCNC: 141 MG/DL (ref 70–110)
GLUCOSE BLD STRIP.AUTO-MCNC: 185 MG/DL (ref 70–110)
GLUCOSE BLD STRIP.AUTO-MCNC: 203 MG/DL (ref 70–110)
GLUCOSE SERPL-MCNC: 120 MG/DL (ref 74–99)
GLUCOSE SERPL-MCNC: 123 MG/DL (ref 74–99)
HCO3 BLD-SCNC: 28.7 MMOL/L (ref 22–26)
HCT VFR BLD AUTO: 21.6 % (ref 35–45)
HCT VFR BLD AUTO: 24.3 % (ref 35–45)
HGB BLD-MCNC: 6.6 G/DL (ref 12–16)
HGB BLD-MCNC: 7.5 G/DL (ref 12–16)
HISTORY CHECKED?,CKHIST: NORMAL
INSPIRATION.DURATION SETTING TIME VENT: 0.9 SEC
LYMPHOCYTES # BLD: 1.3 K/UL (ref 0.9–3.6)
LYMPHOCYTES NFR BLD: 11 % (ref 21–52)
MAGNESIUM SERPL-MCNC: 2.2 MG/DL (ref 1.6–2.6)
MAGNESIUM SERPL-MCNC: 2.3 MG/DL (ref 1.6–2.6)
MCH RBC QN AUTO: 26.3 PG (ref 24–34)
MCHC RBC AUTO-ENTMCNC: 30.6 G/DL (ref 31–37)
MCV RBC AUTO: 86.1 FL (ref 74–97)
MONOCYTES # BLD: 0.7 K/UL (ref 0.05–1.2)
MONOCYTES NFR BLD: 5 % (ref 3–10)
NEUTS SEG # BLD: 9.8 K/UL (ref 1.8–8)
NEUTS SEG NFR BLD: 83 % (ref 40–73)
O2/TOTAL GAS SETTING VFR VENT: 28 %
PCO2 BLD: 38.6 MMHG (ref 35–45)
PEEP RESPIRATORY: 5 CMH2O
PH BLD: 7.48 [PH] (ref 7.35–7.45)
PHOSPHATE SERPL-MCNC: 2.7 MG/DL (ref 2.5–4.9)
PLATELET # BLD AUTO: 417 K/UL (ref 135–420)
PMV BLD AUTO: 10.7 FL (ref 9.2–11.8)
PO2 BLD: 66 MMHG (ref 80–100)
POTASSIUM SERPL-SCNC: 3.9 MMOL/L (ref 3.5–5.5)
POTASSIUM SERPL-SCNC: 4 MMOL/L (ref 3.5–5.5)
RBC # BLD AUTO: 2.51 M/UL (ref 4.2–5.3)
SAO2 % BLD: 94 % (ref 92–97)
SERVICE CMNT-IMP: ABNORMAL
SODIUM SERPL-SCNC: 141 MMOL/L (ref 136–145)
SODIUM SERPL-SCNC: 143 MMOL/L (ref 136–145)
SPECIMEN TYPE: ABNORMAL
TOTAL RESP. RATE, ITRR: 17
VENTILATION MODE VENT: ABNORMAL
VOLUME CONTROL PLUS IVLCP: YES
VT SETTING VENT: 450 ML
WBC # BLD AUTO: 12 K/UL (ref 4.6–13.2)

## 2020-12-30 PROCEDURE — 83735 ASSAY OF MAGNESIUM: CPT

## 2020-12-30 PROCEDURE — 74011250636 HC RX REV CODE- 250/636: Performed by: NURSE PRACTITIONER

## 2020-12-30 PROCEDURE — 85018 HEMOGLOBIN: CPT

## 2020-12-30 PROCEDURE — 85025 COMPLETE CBC W/AUTO DIFF WBC: CPT

## 2020-12-30 PROCEDURE — 80048 BASIC METABOLIC PNL TOTAL CA: CPT

## 2020-12-30 PROCEDURE — 99291 CRITICAL CARE FIRST HOUR: CPT | Performed by: INTERNAL MEDICINE

## 2020-12-30 PROCEDURE — 74011636637 HC RX REV CODE- 636/637: Performed by: INTERNAL MEDICINE

## 2020-12-30 PROCEDURE — 74011000258 HC RX REV CODE- 258: Performed by: PHYSICIAN ASSISTANT

## 2020-12-30 PROCEDURE — 36430 TRANSFUSION BLD/BLD COMPNT: CPT

## 2020-12-30 PROCEDURE — 71045 X-RAY EXAM CHEST 1 VIEW: CPT

## 2020-12-30 PROCEDURE — 36592 COLLECT BLOOD FROM PICC: CPT

## 2020-12-30 PROCEDURE — 74011250637 HC RX REV CODE- 250/637: Performed by: INTERNAL MEDICINE

## 2020-12-30 PROCEDURE — 74011000250 HC RX REV CODE- 250: Performed by: INTERNAL MEDICINE

## 2020-12-30 PROCEDURE — 82962 GLUCOSE BLOOD TEST: CPT

## 2020-12-30 PROCEDURE — 74011000250 HC RX REV CODE- 250: Performed by: PHYSICIAN ASSISTANT

## 2020-12-30 PROCEDURE — 2709999900 HC NON-CHARGEABLE SUPPLY

## 2020-12-30 PROCEDURE — 74011250637 HC RX REV CODE- 250/637: Performed by: NURSE PRACTITIONER

## 2020-12-30 PROCEDURE — 99233 SBSQ HOSP IP/OBS HIGH 50: CPT | Performed by: NURSE PRACTITIONER

## 2020-12-30 PROCEDURE — 94762 N-INVAS EAR/PLS OXIMTRY CONT: CPT

## 2020-12-30 PROCEDURE — 65610000006 HC RM INTENSIVE CARE

## 2020-12-30 PROCEDURE — 77030040392 HC DRSG OPTIFOAM MDII -A

## 2020-12-30 PROCEDURE — 86923 COMPATIBILITY TEST ELECTRIC: CPT

## 2020-12-30 PROCEDURE — 86900 BLOOD TYPING SEROLOGIC ABO: CPT

## 2020-12-30 PROCEDURE — 74011636637 HC RX REV CODE- 636/637: Performed by: STUDENT IN AN ORGANIZED HEALTH CARE EDUCATION/TRAINING PROGRAM

## 2020-12-30 PROCEDURE — 74011636637 HC RX REV CODE- 636/637: Performed by: PHYSICIAN ASSISTANT

## 2020-12-30 PROCEDURE — 36600 WITHDRAWAL OF ARTERIAL BLOOD: CPT

## 2020-12-30 PROCEDURE — 74011250636 HC RX REV CODE- 250/636: Performed by: INTERNAL MEDICINE

## 2020-12-30 PROCEDURE — 84100 ASSAY OF PHOSPHORUS: CPT

## 2020-12-30 PROCEDURE — 82803 BLOOD GASES ANY COMBINATION: CPT

## 2020-12-30 PROCEDURE — 74011250636 HC RX REV CODE- 250/636: Performed by: PHYSICIAN ASSISTANT

## 2020-12-30 PROCEDURE — 94003 VENT MGMT INPAT SUBQ DAY: CPT

## 2020-12-30 PROCEDURE — P9016 RBC LEUKOCYTES REDUCED: HCPCS

## 2020-12-30 RX ORDER — POTASSIUM CHLORIDE 750 MG/1
10 TABLET, FILM COATED, EXTENDED RELEASE ORAL ONCE
Status: COMPLETED | OUTPATIENT
Start: 2020-12-30 | End: 2020-12-30

## 2020-12-30 RX ORDER — SODIUM CHLORIDE 9 MG/ML
250 INJECTION, SOLUTION INTRAVENOUS AS NEEDED
Status: DISCONTINUED | OUTPATIENT
Start: 2020-12-30 | End: 2020-12-31 | Stop reason: ALTCHOICE

## 2020-12-30 RX ORDER — HEPARIN SODIUM 5000 [USP'U]/ML
5000 INJECTION, SOLUTION INTRAVENOUS; SUBCUTANEOUS EVERY 8 HOURS
Status: DISCONTINUED | OUTPATIENT
Start: 2020-12-30 | End: 2021-01-02

## 2020-12-30 RX ADMIN — INSULIN GLARGINE 30 UNITS: 100 INJECTION, SOLUTION SUBCUTANEOUS at 22:00

## 2020-12-30 RX ADMIN — FAMOTIDINE 20 MG: 10 INJECTION INTRAVENOUS at 21:50

## 2020-12-30 RX ADMIN — INSULIN LISPRO 6 UNITS: 100 INJECTION, SOLUTION INTRAVENOUS; SUBCUTANEOUS at 12:00

## 2020-12-30 RX ADMIN — TICAGRELOR 90 MG: 90 TABLET ORAL at 21:50

## 2020-12-30 RX ADMIN — HEPARIN SODIUM 5000 UNITS: 5000 INJECTION INTRAVENOUS; SUBCUTANEOUS at 18:00

## 2020-12-30 RX ADMIN — ACETAMINOPHEN 650 MG: 325 TABLET ORAL at 22:00

## 2020-12-30 RX ADMIN — CHLORHEXIDINE GLUCONATE 0.12% ORAL RINSE 10 ML: 1.2 LIQUID ORAL at 09:00

## 2020-12-30 RX ADMIN — INSULIN GLARGINE 30 UNITS: 100 INJECTION, SOLUTION SUBCUTANEOUS at 09:00

## 2020-12-30 RX ADMIN — DOBUTAMINE HYDROCHLORIDE 5 MCG/KG/MIN: 200 INJECTION INTRAVENOUS at 14:00

## 2020-12-30 RX ADMIN — ASPIRIN 81 MG CHEWABLE TABLET 81 MG: 81 TABLET CHEWABLE at 09:00

## 2020-12-30 RX ADMIN — FAMOTIDINE 20 MG: 10 INJECTION INTRAVENOUS at 09:00

## 2020-12-30 RX ADMIN — Medication 75 MCG/HR: at 11:00

## 2020-12-30 RX ADMIN — Medication 30 ML: at 09:00

## 2020-12-30 RX ADMIN — MEROPENEM 500 MG: 500 INJECTION, POWDER, FOR SOLUTION INTRAVENOUS at 21:50

## 2020-12-30 RX ADMIN — HEPARIN SODIUM 5000 UNITS: 5000 INJECTION INTRAVENOUS; SUBCUTANEOUS at 11:00

## 2020-12-30 RX ADMIN — ATORVASTATIN CALCIUM 40 MG: 40 TABLET, FILM COATED ORAL at 21:50

## 2020-12-30 RX ADMIN — SODIUM CHLORIDE 10 ML: 9 INJECTION, SOLUTION INTRAMUSCULAR; INTRAVENOUS; SUBCUTANEOUS at 22:00

## 2020-12-30 RX ADMIN — MEROPENEM 500 MG: 500 INJECTION, POWDER, FOR SOLUTION INTRAVENOUS at 15:00

## 2020-12-30 RX ADMIN — SODIUM CHLORIDE 10 ML: 9 INJECTION, SOLUTION INTRAMUSCULAR; INTRAVENOUS; SUBCUTANEOUS at 14:00

## 2020-12-30 RX ADMIN — MEROPENEM 500 MG: 500 INJECTION, POWDER, FOR SOLUTION INTRAVENOUS at 09:00

## 2020-12-30 RX ADMIN — MIDAZOLAM 3 MG/HR: 5 INJECTION, SOLUTION INTRAMUSCULAR; INTRAVENOUS at 23:20

## 2020-12-30 RX ADMIN — MEROPENEM 500 MG: 500 INJECTION, POWDER, FOR SOLUTION INTRAVENOUS at 01:22

## 2020-12-30 RX ADMIN — INSULIN LISPRO 3 UNITS: 100 INJECTION, SOLUTION INTRAVENOUS; SUBCUTANEOUS at 23:00

## 2020-12-30 RX ADMIN — TICAGRELOR 90 MG: 90 TABLET ORAL at 09:00

## 2020-12-30 RX ADMIN — SODIUM CHLORIDE 10 ML: 9 INJECTION, SOLUTION INTRAMUSCULAR; INTRAVENOUS; SUBCUTANEOUS at 01:22

## 2020-12-30 RX ADMIN — POTASSIUM CHLORIDE 10 MEQ: 750 TABLET, EXTENDED RELEASE ORAL at 08:00

## 2020-12-30 RX ADMIN — Medication 75 MCG/HR: at 23:20

## 2020-12-30 RX ADMIN — CHLORHEXIDINE GLUCONATE 0.12% ORAL RINSE 10 ML: 1.2 LIQUID ORAL at 21:50

## 2020-12-30 NOTE — PROGRESS NOTES
0715: Bedside and Verbal shift change report given to MIGNON Carranza RN (oncoming nurse) by Dev Gandara RN (offgoing nurse). Report included the following information SBAR, Intake/Output, MAR, Recent Results and Med Rec Status. 1930: Bedside and Verbal shift change report given to Panono (oncoming nurse) by Eva Reis RN (offgoing nurse). Report included the following information SBAR, Intake/Output, MAR, Recent Results and Med Rec Status.

## 2020-12-30 NOTE — DIABETES MGMT
GLYCEMIC CONTROL PLAN OF CARE     Assessment/Recommendations:  Lab glucose this am 123 mg/dl  Steroids were discontinued yesterday. Noted extra dose of lispro 6 units every 6 hours was discontinued. Recommend decreasing lantus dose to 20 units bid  Continue corrective insulin coverage as needed. Will continue inpatient monitoring. Most recent blood glucose values:      Results for Mely Pickett (MRN [id number]) as of 12/30/2020 11:24   Ref. Range 12/29/2020 11:09 12/29/2020 17:27 12/29/2020 23:04 12/30/2020 05:01 12/30/2020 11:04   GLUCOSE,FAST - POC Latest Ref Range: 70 - 110 mg/dL 246 (H) 211 (H) 139 (H) 141 (H) 203 (H)     Current A1C of 8.3 % is equivalent to average blood glucose of 192 mg/dl over the past 2-3 months. Current hospital diabetes medications:   lantus 30 units every morning  lantus 30 units every bedtime  Lispro 6 units every 6 hours  Lispro corrective insulin coverage every 6 hours  Previous day's insulin requirements:   lantus 60 units. Lispro 57 units   Home diabetes medications:  Per pta med list.  lantus 22 units daily   Diet:    NPO. TF glucerna at 50 ml/hr  Education:  ____Refer to Diabetes Education Record             _x__Education not indicated at this time  Intubated. Sedated. History of dementia.     [place] [facility]

## 2020-12-30 NOTE — PROGRESS NOTES
Pt did not get Q6 scheduled dose of lispro 6units at 0000 or 0600 to avoid hypoglycemia    2304 139mg/dL  0500  141mg/dL    0638 Patricia THAKKAR made aware of below H&H. PA will enter her own orders    Ref. Range 12/30/2020 05:15   HGB Latest Ref Range: 12.0 - 16.0 g/dL 6.6 (L)   HCT Latest Ref Range: 35.0 - 45.0 % 21.6 (L)     0645 Type and Screen collected and sent to lab.     0700 Bedside and Verbal shift change report given to Tiago Magallanes 44 (oncoming nurse) by Gretchen Simmons RN (offgoing nurse). Report included the following information SBAR, Kardex, ED Summary, Intake/Output, MAR and Recent Results.

## 2020-12-30 NOTE — CONSULTS
Interventional Radiology    Consulted to evaluate patient with dysphagia for placement of long term feeding conduit. Patient with dysphagia, failed speech study, and will be receiving trach. Intubated since 12/22    She is on Brillinta with cardiac cath 12/11 and a drug eluding stent. OK per cardiology to hold [address] for 3 days. Dry CT abdomen ordered to evaluate anatomy    Will tentatively set up pending CT for early next week (Tuesday 1/05/20). Hold Brillinta Saturday. If bridging with heparin gtt, will need to hold at midnight prior to procedure. PICC order noted - will plan to place 12/31.     Thank you,  Lorrie Heimlich, PA

## 2020-12-30 NOTE — PROGRESS NOTES
[facility]      CARDIOLOGY PROGRESS NOTE  RECS:      1. Acute respiratory failure- requiring mechanical ventilation- extubated  12/17/20. re intubated 12/22/20. 2. Sepsis- Sputum Cx(12/22) (+) heavy E.Coli-ESBL. on antibiotics. ID following   3. Wide complex Arrhythmia on 12/25/20- No recurrence will continue to monitor. Monitor electrolytes    4. Hypotension- improved. Continue  low dose Dobutamine for Inotropic support     5. Subacute MI- 12/10/20 -s/p C S/p ptca/stent to proximal/ mid LAD using ARELY.  LVEDP 8 mmHg. Normal PASP pressure with normal PCWP. Moderate to severe LV dysfunction. ekg no new ischemic changes suggestive of reinfarction or stent thrombosis. Continue asa and brilinta   6. Anemia- H&H dropping. Consider transfusion  Plans per [facility]  7. Acute Systolic Congestive heart Failure-recent echo with EF% 35%-40. compensated at present. Lasix as needed. 8. COPD, on home O2 4L NC   9. DM2- medications per medical team   10. Dementia - failed swallow eval. Per GI note -poor candidate at this time for G-tube due to not being able to stop Brilinta. OK to stop Brillinta for 2-3 days if needed  for PEG tube. Can use heparin drip if DAPT is interuppted. Continue supportive care       Prognosis poor   Cardiac cath 12/11/20  Patient presented to [address] [place] [address] with late presentation anterior wall MI.  EF 35-40%. Patient was initially managed medically-- however developed acute pulmonary edema requiring intubation. Also troponin level increasing consistent with ongoing ischemia. S/p ptca/stent to proximal/ mid LAD using ARELY. LVEDP 8 mmHg. Normal PASP pressure with normal PCWP. Moderate to severe LV dysfunction.     Echo 12/10/20  · LV: Estimated LVEF is 35 - 40%. Visually measured ejection fraction. Normal cavity size and wall thickness. Moderately and segmentally reduced systolic function. Inconclusive left ventricular diastolic function.   · AO: Mild aortic root dilatation; diameter is 3.8 cm. ASSESSMENT:  Hospital Problems  Date Reviewed: 2/7/2018          Codes Class Noted POA    Goals of care, counseling/discussion ICD-10-CM: Z71.89  ICD-9-CM: V65.49  Unknown Unknown        Dementia without behavioral disturbance (HCC) ICD-10-CM: F03.90  ICD-9-CM: 294.20  Unknown Unknown        Pulmonary fibrosis ([facility] ICD-10-CM: J84.10  ICD-9-CM: 169  Unknown Unknown        Severe protein-calorie malnutrition ([facility] ICD-10-CM: E43  ICD-9-CM: 619  12/16/2020 Clinically Undetermined        Acute on chronic systolic congestive heart failure ([facility] ICD-10-CM: I50.23  ICD-9-CM: 428.23, 428.0  12/15/2020 Unknown        * (Principal) STEMI (ST elevation myocardial infarction) ([facility] ICD-10-CM: I21.3  ICD-9-CM: 410.90  12/10/2020 Unknown                SUBJECTIVE:  Intubated     OBJECTIVE:    VS:   Visit Vitals  BP (!) 116/55   Pulse 84   Temp 99.1 °F (37.3 °C)   Resp 18   Ht 5' 7\" (1.702 m)   Wt 74.4 kg (164 lb 0.4 oz)   SpO2 100%   Breastfeeding No   BMI 25.69 kg/m²         Intake/Output Summary (Last 24 hours) at 12/30/2020 1141  Last data filed at 12/30/2020 0934  Gross per 24 hour   Intake 2485.83 ml   Output 933 ml   Net 1552.83 ml     TELE: normal sinus rhythm     General: intubated  HENT: Normocephalic, atraumatic. Normal external eye. Neck :  no JVD  Cardiac:  regular rate and rhythm, S1, S2 normal, no murmur, click, rub or gallop  Lungs: harsh breath sounds b/l.    Abdomen: Soft, nontender, no masses  Extremities:  No c/c/e, peripheral pulses present      Labs: Results:       Chemistry Recent Labs     12/30/20  0515 12/29/20  0525 12/28/20  1815   * 231* 264*    140 142   K 3.9 4.2 3.8    104 106   CO2 32 31 31   BUN 21* 26* 30*   CREA 0.49* 0.61 0.66   CA 8.5 8.6 8.4*   AGAP 4 5 5   BUCR 43* 43* 45*      CBC w/Diff Recent Labs     12/30/20  0515 12/29/20  0525 12/28/20  0545   WBC 12.0 13.1 10.1   RBC 2.51* 2.67* 2.76*   HGB 6.6* 7.0* 7.1*   HCT 21.6* 23.2* 24.3*    407 371   GRANS 83* 88* 87*   LYMPH 11* 12* 7*   EOS 1 0 0      Cardiac Enzymes No results for input(s): CPK, CKND1, NATHANIEL in the last 72 hours. No lab exists for component: CKRMB, TROIP   Coagulation No results for input(s): PTP, INR, APTT, INREXT, INREXT in the last 72 hours. Lipid Panel No results found for: CHOL, CHOLPOCT, CHOLX, CHLST, CHOLV, 059079, HDL, HDLP, LDL, LDLC, DLDLP, 726317, VLDLC, VLDL, TGLX, TRIGL, TRIGP, TGLPOCT, CHHD, CHHDX   BNP No results for input(s): BNPP in the last 72 hours. Liver Enzymes No results for input(s): TP, ALB, TBIL, AP in the last 72 hours. No lab exists for component: SGOT, GPT, DBIL   Thyroid Studies Lab Results   Component Value Date/Time    TSH 2.92 12/10/2020 11:07 AM                  Lisa DAHL I have independently evaluated taken history and examined the patient. All relevant labs and testing data's are reviewed. Care plan discussed and updated after review.   Mayela Pavon MD

## 2020-12-30 NOTE — PROGRESS NOTES
[facility]  ICU Progress Note      Name: Jacki Christianson   : 1955   MRN: [id number]   Date: 2020 9:00 AM     [x]I have reviewed the flowsheet and previous days notes. Events overnight reviewed and discussed with nursing staff. Vital signs and records reviewed. Interval HPI:  Patient is a 72 y. o. female with a past medical history of COPD, CHF, HTN, DM, dementia, presented to [facility] with acute on chronic systolic heart failure, acute MI with anterolateral STEMI and Q waves s/p ptca/stent to proximal/mid LAD on 20. Pt was intubated and extubated on 20, transferred to . On 20 patient was intubated and transferred to ICU for respiratory failure and cardiogenic +/- septic shock. Respiratory cultures on  with ESBL on meropenum. Ventilator day#9   No acute events overnight  On dobutamine   Tolerating TF  Follows simple commands   Afebrile               ROS:Review of systems not obtained due to patient factors. Vital Signs:    Visit Vitals  BP (!) 129/51   Pulse 89   Temp 99.3 °F (37.4 °C)   Resp 18   Ht 5' 7\" (1.702 m)   Wt 74.4 kg (164 lb 0.4 oz)   SpO2 98%   Breastfeeding No   BMI 25.69 kg/m²       O2 Device: Endotracheal tube, Ventilator   O2 Flow Rate (L/min): 40 l/min   Temp (24hrs), Av.8 °F (37.1 °C), Min:98.2 °F (36.8 °C), Max:99.3 °F (37.4 °C)       Intake/Output:   Last shift:      No intake/output data recorded.   Last 3 shifts:  1901 -  0700  In: 3325.2 [I.V.:575.2]  Out: 1468 [Urine:1468]    Intake/Output Summary (Last 24 hours) at 2020 0900  Last data filed at 2020 0600  Gross per 24 hour   Intake 1995.03 ml   Output 933 ml   Net 1062.03 ml       Ventilator Settings:  Mode Rate Tidal Volume Pressure FiO2 PEEP   Assist control, VC+   450 ml  8 cm H2O 28 % 5 cm H20     Peak airway pressure: 20 cm H2O    Minute ventilation: 7.72 l/min      ARDS network Guidelines:   Lung protective strategy and Plateau  Pressure goal < 30 cm H2O goals  Oxygenation Goals PaO2 55-80 mm Hg or SaO2 88-95%  PH goal 7.30-7.45    VAP bundle:  Reviewed. [place] tube to suction at 20-30 cm Hg. Maintain [place] tube with 5-10ml air every 4 hours  Routine oral care every 4 hours  Elevation of head > 45 degree  Daily sedation holiday and SBT evaluation starting at 6.00am.    Physical Exam:      General: Intubated/sedated; NAD, acyanotic   HEENT:  Anicteric sclerae; pink palpebral conjunctivae; mucosa moist  Resp: (+)scattered rhonchi.  Symmetrical chest expansion, no accessory muscle use; good airway entry; no rales/ wheezing/ noted  CV:  S1, S2 present; regular rate and rhythm  GI:  Abdomen soft, non-tender; (+) active bowel sounds  Extremities:  +2 pulses on all extremities; no edema/ cyanosis/ clubbing noted  Skin:  Warm; no rashes/ lesions noted, normal turgor/cap refill   Neurologic:  Non-focal  Devices:  ETT, Pro, L fem CVL, FMS    DATA:     Current Facility-Administered Medications   Medication Dose Route Frequency    potassium chloride SR (KLOR-CON 10) tablet 10 mEq  10 mEq Oral ONCE    0.9% sodium chloride infusion 250 mL  250 mL IntraVENous PRN    polyethylene glycol (MIRALAX) packet 17 g  17 g Per NG tube PRN    insulin glargine (LANTUS) injection 30 Units  30 Units SubCUTAneous QHS    DOBUTamine (DOBUTREX) 500 mg/250 mL (2,000 mcg/mL) infusion  5 mcg/kg/min IntraVENous CONTINUOUS    insulin lispro (HUMALOG) injection 6 Units  6 Units SubCUTAneous Q6H    chlorhexidine (PERIDEX) 0.12 % mouthwash 10 mL  10 mL Oral Q12H    fentaNYL (PF) 900 mcg/30 ml infusion soln  0-200 mcg/hr IntraVENous TITRATE    midazolam (VERSED) injection 1-2 mg  1-2 mg IntraVENous Q10MIN PRN    fentaNYL citrate (PF) injection  mcg   mcg IntraVENous Q30MIN PRN    midazolam in normal saline (VERSED) 1 mg/mL infusion  1-5 mg/hr IntraVENous TITRATE    ELECTROLYTE REPLACEMENT PROTOCOL - Potassium Standard Dosing   1 Each Other PRN    ELECTROLYTE REPLACEMENT PROTOCOL - Magnesium   1 Each Other PRN    ELECTROLYTE REPLACEMENT PROTOCOL - Phosphorus  Standard Dosing  1 Each Other PRN    ELECTROLYTE REPLACEMENT PROTOCOL - Calcium   1 Each Other PRN    meropenem (MERREM) 500 mg in sterile water (preservative free) 10 mL IV syringe  500 mg IntraVENous Q6H    insulin glargine (LANTUS) injection 30 Units  30 Units SubCUTAneous DAILY    [Held by provider] carvediloL (COREG) tablet 6.25 mg  6.25 mg Oral Q12H    [Held by provider] lisinopriL (PRINIVIL, ZESTRIL) tablet 5 mg  5 mg Oral DAILY    [Held by provider] furosemide (LASIX) injection 40 mg  40 mg IntraVENous Q12H    [Held by provider] spironolactone (ALDACTONE) tablet 25 mg  25 mg Oral DAILY    multivit-folic acid-herbal 426 (WELLESSE PLUS) oral liquid 30 mL  30 mL Per NG tube DAILY    insulin lispro (HUMALOG) injection   SubCUTAneous Q6H    sodium chloride (NS) flush 5-40 mL  5-40 mL IntraVENous PRN    ticagrelor (BRILINTA) tablet 90 mg  90 mg Per NG tube BID    sodium chloride (NS) flush 5-40 mL  5-40 mL IntraVENous PRN    albuterol-ipratropium (DUO-NEB) 2.5 MG-0.5 MG/3 ML  3 mL Nebulization Q4H PRN    famotidine (PF) (PEPCID) 20 mg in 0.9% sodium chloride 10 mL injection  20 mg IntraVENous Q12H    sodium chloride (NS) flush 5-40 mL  5-40 mL IntraVENous Q8H    sodium chloride (NS) flush 5-40 mL  5-40 mL IntraVENous PRN    acetaminophen (TYLENOL) tablet 650 mg  650 mg Oral Q6H PRN    Or    acetaminophen (TYLENOL) suppository 650 mg  650 mg Rectal Q6H PRN    promethazine (PHENERGAN) tablet 12.5 mg  12.5 mg Oral Q6H PRN    Or    ondansetron (ZOFRAN) injection 4 mg  4 mg IntraVENous Q6H PRN    glucose chewable tablet 16 g  4 Tab Oral PRN    glucagon (GLUCAGEN) injection 1 mg  1 mg IntraMUSCular PRN    dextrose (D50W) injection syrg 12.5-25 g  25-50 mL IntraVENous PRN    aspirin chewable tablet 81 mg  81 mg Oral DAILY    atorvastatin (LIPITOR) tablet 40 mg  40 mg Oral QHS         Labs: Results:       Chemistry Recent Labs     12/30/20  0515 12/29/20  0525 12/28/20  1815   * 231* 264*    140 142   K 3.9 4.2 3.8    104 106   CO2 32 31 31   BUN 21* 26* 30*   CREA 0.49* 0.61 0.66   CA 8.5 8.6 8.4*   AGAP 4 5 5   BUCR 43* 43* 45*      CBC w/Diff Recent Labs     12/30/20  0515 12/29/20  0525 12/28/20  0545   WBC 12.0 13.1 10.1   RBC 2.51* 2.67* 2.76*   HGB 6.6* 7.0* 7.1*   HCT 21.6* 23.2* 24.3*    407 371   GRANS 83* 88* 87*   LYMPH 11* 12* 7*   EOS 1 0 0      Coagulation No results for input(s): PTP, INR, APTT, INREXT in the last 72 hours. Liver Enzymes No results for input(s): TP, ALB, TBIL, AP in the last 72 hours. No lab exists for component: SGOT, GPT, DBIL   ABG Lab Results   Component Value Date/Time    PHI 7.48 (H) 12/30/2020 04:00 AM    PCO2I 38.6 12/30/2020 04:00 AM    PO2I 66 (L) 12/30/2020 04:00 AM    HCO3I 28.7 (H) 12/30/2020 04:00 AM    FIO2I 28 12/30/2020 04:00 AM      Microbiology No results for input(s): CULT in the last 72 hours. Telemetry: [x]Sinus []A-flutter []Paced    []A-fib []Multiple PVCs                  Imaging:  CXR Results  (Last 48 hours)               12/30/20 0619  XR CHEST PORT Final result    Impression:  Impression:       1. Tubes and lines as described above. 2. Bilateral infiltrates right side greater than left not significantly changed   in the one-day interval.           Narrative:  CHEST PORTABLE       CPT CODE: 68991       COMPARISON: One day earlier       INDICATIONS: Intubated. Infiltrates. FINDINGS: Endotracheal tube present with its tip 3 cm above the chace. Nasogastric tube present with its tip below the GE junction. Patchy interstitial   infiltrates are seen throughout the right lung. Interstitial infiltrates left   lung base. No pleural effusions. No significant osseous abnormalities. 12/29/20 0449  XR CHEST PORT Final result    Impression:  IMPRESSION:       Lines/tubes as above.        Perihilar and beyond interstitial/streaky opacities similar to prior. Narrative:  EXAMINATION: Chest single view       INDICATION: Intubation       COMPARISON: 12/28/2020       FINDINGS: Single frontal view the chest obtained. ETT tip 3 cm above chace. Esophagogastric tube tip below GE junction outside field-of-view. Mediastinal   silhouette normal in size. Minimal aortic arch calcifications. Prominent   perihilar and beyond interstitial/streaky opacities as well as thicker bandlike   opacities at the lung bases. No definite pneumothorax. No obvious acute osseous   findings. IMPRESSION:   · Acute on Chronic Hypoxic and Hypercapnic Respiratory Failure - Requiring mechanical ventilation, Extubated on 12/17 and reintubated on 12/22, Likely aspiration event  · Wide Complex Arrhythmia (12/25) -noted on bedside cardiac monitor. Lasted roughly 5-10 mins with Spontaneous resolution. E-lyte derangement vs Drug effect (pt was on 55mcg/hr Oliverio-Synephrine at the time) vs cardiac insult.    · Acute on Chronic Systolic Heart Failure - superimposed on severe ILD, Echo 12/10/20 EF 35-40%  · Combination septic/cardiogenic shock, improving   · Aspiration PNA. Sputum Cx(12/22) (+) heavy E.Coli-ESBL.  Recent Hx of E.coli ESBL.   · Acute Encephalopathy - likely toxic/metabolic vs infectious vs hepatic in nature, ammonia wnl.   · Severe pulmonary fibrosis - with extensive honeycombing and bronchiectasis as seen on CTA chest 12/16/20 - appears to be a new diagnosis but possibly UIP  · Acute aspiration pneumonia with severe sepsis  · UTI (+)E. coli  · Acute MI with anterolateral STEMI and Q waves - s/p ptca/stent to proximal/mid LAD using ARELY on DAPT on 12/11/20- Remains on Brilinta  · Severe dysphagia - failed MBS, poor candidate for G-tube 2/2 Brilinta per GI (Dr. Vimal Rodas)  · Hx of Hep C: + RNA viral load 4/4/2020  · Multiple liver masses - noted on CT scan 11/25/20  · COPD - on home O2 4L NC  · DM  · Dementia - unknown baseline, on aricept    RECOMMENDATIONS:   Resp:   -Titrate FiO2/ supp O2 for SpO2 >90%  -Family is agreeable to trach, if unable to extubate will consult ENT vs general surgery  -Vent settings: ACVC RR 16,, fio2 28/PEEP 5, Ppl 20 cm/h20  I/D:   -Afebrile  -On meropenum since 12/25 for Ecoli (ESBL), 5-7 day course   Hem/Onc:   -Daily CBC; H/H, and plts are stable  -Will transfuse unit of blood to maintain Hgb >7  CVS:   -Continue DAPT  -Appreciate cardiology recs  -EF 35-40 %  -Tolerating dobutamine gtt, will d/c if hemodynamics permit   -Tolerating statin   Metabolic:   -Daily BMP; monitor e-lytes; replace PRN  Renal:   -Trend Renal indices,Pro to BSD,   Endocrine:   -Continue lantus BID with SSI  GI:   -SUP  -Tolerating TF  Musc/Skin:   -No acute issues, wound care  Neuro:   -Versed and fentanyl gtt for sedation, goal RASS 0 to -1  PPX  -SUP ppx  -Start SQH for DVT ppx   Will need to discuss with family for trach and PEG, if so will engage GI and ENT   Discussed in Interdisciplinary Rounds     Updated patient's sister (Ms. Jai Ryder) on need for trach and PEG for LTACH. She verbalized understanding in regards to updates        The patient is: [x] acutely ill Risk of deterioration: [] moderate    [] critically ill  [] high     []See my orders for details    My assessment/plan was discussed with:  [x]Nursing []PT/OT    [x]Respiratory therapy [x]    []Family []     Triston Anderson NP    Pulmonary / Critical Care Physician:     Chart and note reviewed. Data reviewed. Seen on rounds earlier today. I have independently evaluated and examined the patient. I agree with the exam, assessment and plans outlined by BIJAN Harding.      In brief my My findings , evaluation and recommendations are as stated below:     Pt remains stable on MV. No acute events. On minimal vent settings. Not a candidate for extubation given 2 episodes of re-intubation. Will discuss PEG and trach.  Will discuss d/c of dobutamine with cards. Complete 7 days of Merrem. Consult ENT and IR. Okay to hold brilinta for 3 days per cards.      Rest of details and diagnostic/treatment plans per APC note. I have personally reviewed all pertinent data including labs, imaging and recommendations of treatment team providers. Total of 45 min critical care time spent at bedside during the course of care providing evaluation,management and care decisions and ordering appropriate treatment related to critical care problems exclusive of procedures. The reason for providing this level of medical care for this critically ill patient was due a critical illness that impaired one or more vital organ systems such that there was a high probability of imminent or life threatening deterioration in the patients condition. This care involved high complexity decision making to assess, manipulate, and support vital system functions, to treat this degree vital organ system failure and to prevent further life threatening deterioration of the patients condition.     Viola Londono MD  Critical Care Medicine

## 2020-12-31 ENCOUNTER — HOSPITAL ENCOUNTER (OUTPATIENT)
Dept: INTERVENTIONAL RADIOLOGY/VASCULAR | Age: 65
Discharge: HOME | DRG: 003 | End: 2020-12-31
Attending: PHYSICIAN ASSISTANT | Admitting: INTERNAL MEDICINE
Payer: MEDICARE

## 2020-12-31 ENCOUNTER — APPOINTMENT (OUTPATIENT)
Dept: GENERAL RADIOLOGY | Age: 65
DRG: 003 | End: 2020-12-31
Attending: PHYSICIAN ASSISTANT
Payer: MEDICARE

## 2020-12-31 ENCOUNTER — APPOINTMENT (OUTPATIENT)
Dept: CT IMAGING | Age: 65
DRG: 003 | End: 2020-12-31
Attending: PHYSICIAN ASSISTANT
Payer: MEDICARE

## 2020-12-31 LAB
ABO + RH BLD: NORMAL
ANION GAP SERPL CALC-SCNC: 2 MMOL/L (ref 3–18)
APPEARANCE UR: CLEAR
ARTERIAL PATENCY WRIST A: YES
BACTERIA URNS QL MICRO: NEGATIVE /HPF
BASE EXCESS BLD CALC-SCNC: 7 MMOL/L
BASOPHILS # BLD: 0 K/UL (ref 0–0.06)
BASOPHILS NFR BLD: 0 % (ref 0–3)
BDY SITE: ABNORMAL
BILIRUB UR QL: NEGATIVE
BLD PROD TYP BPU: NORMAL
BLOOD GROUP ANTIBODIES SERPL: NORMAL
BODY TEMPERATURE: 98.6
BPU ID: NORMAL
BUN SERPL-MCNC: 22 MG/DL (ref 7–18)
BUN/CREAT SERPL: 46 (ref 12–20)
CALCIUM SERPL-MCNC: 8.4 MG/DL (ref 8.5–10.1)
CALLED TO:,BCALL1: NORMAL
CHLORIDE SERPL-SCNC: 106 MMOL/L (ref 100–111)
CO2 SERPL-SCNC: 31 MMOL/L (ref 21–32)
COLOR UR: ABNORMAL
CREAT SERPL-MCNC: 0.48 MG/DL (ref 0.6–1.3)
CROSSMATCH RESULT,%XM: NORMAL
DIFFERENTIAL METHOD BLD: ABNORMAL
EOSINOPHIL # BLD: 0 K/UL (ref 0–0.4)
EOSINOPHIL NFR BLD: 0 % (ref 0–5)
EPITH CASTS URNS QL MICRO: NORMAL /LPF (ref 0–5)
ERYTHROCYTE [DISTWIDTH] IN BLOOD BY AUTOMATED COUNT: 14.8 % (ref 11.6–14.5)
GAS FLOW.O2 O2 DELIVERY SYS: ABNORMAL L/MIN
GAS FLOW.O2 SETTING OXYMISER: 16 BPM
GLUCOSE BLD STRIP.AUTO-MCNC: 131 MG/DL (ref 70–110)
GLUCOSE BLD STRIP.AUTO-MCNC: 144 MG/DL (ref 70–110)
GLUCOSE BLD STRIP.AUTO-MCNC: 200 MG/DL (ref 70–110)
GLUCOSE SERPL-MCNC: 201 MG/DL (ref 74–99)
GLUCOSE UR STRIP.AUTO-MCNC: NEGATIVE MG/DL
HCO3 BLD-SCNC: 30.9 MMOL/L (ref 22–26)
HCT VFR BLD AUTO: 25.6 % (ref 35–45)
HGB BLD-MCNC: 7.8 G/DL (ref 12–16)
HGB UR QL STRIP: NEGATIVE
INSPIRATION.DURATION SETTING TIME VENT: 0.9 SEC
KETONES UR QL STRIP.AUTO: NEGATIVE MG/DL
LEUKOCYTE ESTERASE UR QL STRIP.AUTO: ABNORMAL
LYMPHOCYTES # BLD: 0.6 K/UL (ref 0.8–3.5)
LYMPHOCYTES NFR BLD: 6 % (ref 20–51)
MAGNESIUM SERPL-MCNC: 2.1 MG/DL (ref 1.6–2.6)
MCH RBC QN AUTO: 26.3 PG (ref 24–34)
MCHC RBC AUTO-ENTMCNC: 30.5 G/DL (ref 31–37)
MCV RBC AUTO: 86.2 FL (ref 74–97)
METAMYELOCYTES NFR BLD MANUAL: 1 %
MONOCYTES # BLD: 0.1 K/UL (ref 0–1)
MONOCYTES NFR BLD: 1 % (ref 2–9)
MYELOCYTES NFR BLD MANUAL: 4 %
NEUTS BAND NFR BLD MANUAL: 3 % (ref 0–5)
NEUTS SEG # BLD: 8.4 K/UL (ref 1.8–8)
NEUTS SEG NFR BLD: 85 % (ref 42–75)
NITRITE UR QL STRIP.AUTO: NEGATIVE
O2/TOTAL GAS SETTING VFR VENT: 28 %
PCO2 BLD: 41.7 MMHG (ref 35–45)
PEEP RESPIRATORY: 5 CMH2O
PH BLD: 7.48 [PH] (ref 7.35–7.45)
PH UR STRIP: 7.5 [PH] (ref 5–8)
PHOSPHATE SERPL-MCNC: 2.8 MG/DL (ref 2.5–4.9)
PLATELET # BLD AUTO: 395 K/UL (ref 135–420)
PLATELET COMMENTS,PCOM: ABNORMAL
PMV BLD AUTO: 10.5 FL (ref 9.2–11.8)
PO2 BLD: 73 MMHG (ref 80–100)
POTASSIUM SERPL-SCNC: 4.1 MMOL/L (ref 3.5–5.5)
PROT UR STRIP-MCNC: NEGATIVE MG/DL
RBC # BLD AUTO: 2.97 M/UL (ref 4.2–5.3)
RBC #/AREA URNS HPF: NORMAL /HPF (ref 0–5)
RBC MORPH BLD: ABNORMAL
RBC MORPH BLD: ABNORMAL
SAO2 % BLD: 95 % (ref 92–97)
SERVICE CMNT-IMP: ABNORMAL
SODIUM SERPL-SCNC: 139 MMOL/L (ref 136–145)
SP GR UR REFRACTOMETRY: 1.02 (ref 1–1.03)
SPECIMEN EXP DATE BLD: NORMAL
SPECIMEN TYPE: ABNORMAL
STATUS OF UNIT,%ST: NORMAL
TOTAL RESP. RATE, ITRR: 16
UNIT DIVISION, %UDIV: 0
UROBILINOGEN UR QL STRIP.AUTO: 2 EU/DL (ref 0.2–1)
VENTILATION MODE VENT: ABNORMAL
VOLUME CONTROL PLUS IVLCP: YES
VT SETTING VENT: 450 ML
WBC # BLD AUTO: 9.5 K/UL (ref 4.6–13.2)
WBC URNS QL MICRO: NORMAL /HPF (ref 0–4)

## 2020-12-31 PROCEDURE — 87070 CULTURE OTHR SPECIMN AEROBIC: CPT

## 2020-12-31 PROCEDURE — 80048 BASIC METABOLIC PNL TOTAL CA: CPT

## 2020-12-31 PROCEDURE — 81001 URINALYSIS AUTO W/SCOPE: CPT

## 2020-12-31 PROCEDURE — 36600 WITHDRAWAL OF ARTERIAL BLOOD: CPT

## 2020-12-31 PROCEDURE — 74011250636 HC RX REV CODE- 250/636: Performed by: INTERNAL MEDICINE

## 2020-12-31 PROCEDURE — 74011636637 HC RX REV CODE- 636/637: Performed by: INTERNAL MEDICINE

## 2020-12-31 PROCEDURE — 74011000258 HC RX REV CODE- 258: Performed by: PHYSICIAN ASSISTANT

## 2020-12-31 PROCEDURE — 82962 GLUCOSE BLOOD TEST: CPT

## 2020-12-31 PROCEDURE — 74011000250 HC RX REV CODE- 250: Performed by: INTERNAL MEDICINE

## 2020-12-31 PROCEDURE — 87186 SC STD MICRODIL/AGAR DIL: CPT

## 2020-12-31 PROCEDURE — 74011636637 HC RX REV CODE- 636/637: Performed by: PHYSICIAN ASSISTANT

## 2020-12-31 PROCEDURE — 82803 BLOOD GASES ANY COMBINATION: CPT

## 2020-12-31 PROCEDURE — 87086 URINE CULTURE/COLONY COUNT: CPT

## 2020-12-31 PROCEDURE — 87040 BLOOD CULTURE FOR BACTERIA: CPT

## 2020-12-31 PROCEDURE — 2709999900 HC NON-CHARGEABLE SUPPLY

## 2020-12-31 PROCEDURE — 76937 US GUIDE VASCULAR ACCESS: CPT

## 2020-12-31 PROCEDURE — 65610000006 HC RM INTENSIVE CARE

## 2020-12-31 PROCEDURE — 36592 COLLECT BLOOD FROM PICC: CPT

## 2020-12-31 PROCEDURE — 99291 CRITICAL CARE FIRST HOUR: CPT | Performed by: INTERNAL MEDICINE

## 2020-12-31 PROCEDURE — 74011000250 HC RX REV CODE- 250: Performed by: PHYSICIAN ASSISTANT

## 2020-12-31 PROCEDURE — 99233 SBSQ HOSP IP/OBS HIGH 50: CPT | Performed by: INTERNAL MEDICINE

## 2020-12-31 PROCEDURE — 94003 VENT MGMT INPAT SUBQ DAY: CPT

## 2020-12-31 PROCEDURE — 74011636637 HC RX REV CODE- 636/637: Performed by: STUDENT IN AN ORGANIZED HEALTH CARE EDUCATION/TRAINING PROGRAM

## 2020-12-31 PROCEDURE — 74011250637 HC RX REV CODE- 250/637: Performed by: INTERNAL MEDICINE

## 2020-12-31 PROCEDURE — 71045 X-RAY EXAM CHEST 1 VIEW: CPT

## 2020-12-31 PROCEDURE — 85025 COMPLETE CBC W/AUTO DIFF WBC: CPT

## 2020-12-31 PROCEDURE — 84100 ASSAY OF PHOSPHORUS: CPT

## 2020-12-31 PROCEDURE — 83735 ASSAY OF MAGNESIUM: CPT

## 2020-12-31 PROCEDURE — 74011250636 HC RX REV CODE- 250/636: Performed by: NURSE PRACTITIONER

## 2020-12-31 PROCEDURE — 87077 CULTURE AEROBIC IDENTIFY: CPT

## 2020-12-31 PROCEDURE — 94762 N-INVAS EAR/PLS OXIMTRY CONT: CPT

## 2020-12-31 PROCEDURE — 74011250636 HC RX REV CODE- 250/636: Performed by: PHYSICIAN ASSISTANT

## 2020-12-31 RX ORDER — CLOPIDOGREL BISULFATE 75 MG/1
75 TABLET ORAL DAILY
Status: DISCONTINUED | OUTPATIENT
Start: 2020-12-31 | End: 2021-01-08

## 2020-12-31 RX ADMIN — MIDAZOLAM HYDROCHLORIDE 2 MG: 2 INJECTION, SOLUTION INTRAMUSCULAR; INTRAVENOUS at 16:30

## 2020-12-31 RX ADMIN — MEROPENEM 500 MG: 500 INJECTION, POWDER, FOR SOLUTION INTRAVENOUS at 09:00

## 2020-12-31 RX ADMIN — SODIUM CHLORIDE 10 ML: 9 INJECTION, SOLUTION INTRAMUSCULAR; INTRAVENOUS; SUBCUTANEOUS at 06:50

## 2020-12-31 RX ADMIN — ATORVASTATIN CALCIUM 40 MG: 40 TABLET, FILM COATED ORAL at 21:41

## 2020-12-31 RX ADMIN — CHLORHEXIDINE GLUCONATE 0.12% ORAL RINSE 10 ML: 1.2 LIQUID ORAL at 09:00

## 2020-12-31 RX ADMIN — MEROPENEM 500 MG: 500 INJECTION, POWDER, FOR SOLUTION INTRAVENOUS at 15:00

## 2020-12-31 RX ADMIN — SODIUM CHLORIDE 10 ML: 9 INJECTION, SOLUTION INTRAMUSCULAR; INTRAVENOUS; SUBCUTANEOUS at 14:00

## 2020-12-31 RX ADMIN — INSULIN GLARGINE 30 UNITS: 100 INJECTION, SOLUTION SUBCUTANEOUS at 22:00

## 2020-12-31 RX ADMIN — ASPIRIN 81 MG CHEWABLE TABLET 81 MG: 81 TABLET CHEWABLE at 09:00

## 2020-12-31 RX ADMIN — HEPARIN SODIUM 5000 UNITS: 5000 INJECTION INTRAVENOUS; SUBCUTANEOUS at 03:30

## 2020-12-31 RX ADMIN — FAMOTIDINE 20 MG: 10 INJECTION INTRAVENOUS at 09:00

## 2020-12-31 RX ADMIN — INSULIN GLARGINE 30 UNITS: 100 INJECTION, SOLUTION SUBCUTANEOUS at 09:00

## 2020-12-31 RX ADMIN — MIDAZOLAM 3 MG/HR: 5 INJECTION, SOLUTION INTRAMUSCULAR; INTRAVENOUS at 22:56

## 2020-12-31 RX ADMIN — FAMOTIDINE 20 MG: 10 INJECTION INTRAVENOUS at 21:36

## 2020-12-31 RX ADMIN — Medication 75 MCG/HR: at 22:50

## 2020-12-31 RX ADMIN — INSULIN LISPRO 6 UNITS: 100 INJECTION, SOLUTION INTRAVENOUS; SUBCUTANEOUS at 06:50

## 2020-12-31 RX ADMIN — CHLORHEXIDINE GLUCONATE 0.12% ORAL RINSE 10 ML: 1.2 LIQUID ORAL at 21:36

## 2020-12-31 RX ADMIN — SODIUM CHLORIDE 10 ML: 9 INJECTION, SOLUTION INTRAMUSCULAR; INTRAVENOUS; SUBCUTANEOUS at 21:39

## 2020-12-31 RX ADMIN — MEROPENEM 500 MG: 500 INJECTION, POWDER, FOR SOLUTION INTRAVENOUS at 03:30

## 2020-12-31 RX ADMIN — HEPARIN SODIUM 5000 UNITS: 5000 INJECTION INTRAVENOUS; SUBCUTANEOUS at 11:00

## 2020-12-31 RX ADMIN — Medication 30 ML: at 09:00

## 2020-12-31 RX ADMIN — MEROPENEM 500 MG: 500 INJECTION, POWDER, FOR SOLUTION INTRAVENOUS at 21:39

## 2020-12-31 RX ADMIN — Medication 75 MCG/HR: at 10:00

## 2020-12-31 RX ADMIN — CLOPIDOGREL BISULFATE 75 MG: 75 TABLET ORAL at 09:00

## 2020-12-31 RX ADMIN — HEPARIN SODIUM 5000 UNITS: 5000 INJECTION INTRAVENOUS; SUBCUTANEOUS at 21:35

## 2020-12-31 RX ADMIN — INSULIN LISPRO 6 UNITS: 100 INJECTION, SOLUTION INTRAVENOUS; SUBCUTANEOUS at 12:00

## 2020-12-31 RX ADMIN — Medication 10 ML: at 21:41

## 2020-12-31 NOTE — PROGRESS NOTES
Problem: Pain  Goal: *Control of Pain  Outcome: Progressing Towards Goal  Goal: *PALLIATIVE CARE:  Alleviation of Pain  Outcome: Progressing Towards Goal     Problem: Patient Education: Go to Patient Education Activity  Goal: Patient/Family Education  Outcome: Progressing Towards Goal     Problem: Diabetes Self-Management  Goal: *Disease process and treatment process  Description: Define diabetes and identify own type of diabetes; list 3 options for treating diabetes. Outcome: Progressing Towards Goal  Goal: *Developing strategies to promote health/change behavior  Description: Define the ABC's of diabetes; identify appropriate screenings, schedule and personal plan for screenings. Outcome: Progressing Towards Goal  Goal: *Using medications safely  Description: State effect of diabetes medications on diabetes; name diabetes medication taking, action and side effects. Outcome: Progressing Towards Goal  Goal: *Monitoring blood glucose, interpreting and using results  Description: Identify recommended blood glucose targets  and personal targets. Outcome: Progressing Towards Goal  Goal: *Prevention, detection, treatment of acute complications  Description: List symptoms of hyper- and hypoglycemia; describe how to treat low blood sugar and actions for lowering  high blood glucose level. Outcome: Progressing Towards Goal     Problem: Pressure Injury - Risk of  Goal: *Prevention of pressure injury  Description: Document Pankaj Scale and appropriate interventions in the flowsheet.   Outcome: Progressing Towards Goal  Note: Pressure Injury Interventions:  Sensory Interventions: Assess changes in LOC, Avoid rigorous massage over bony prominences, Check visual cues for pain, Discuss PT/OT consult with provider, Keep linens dry and wrinkle-free, Maintain/enhance activity level, Minimize linen layers, Monitor skin under medical devices, Pad between skin to skin, Pressure redistribution bed/mattress (bed type), Turn and reposition approx. every two hours (pillows and wedges if needed)    Moisture Interventions: Absorbent underpads, Apply protective barrier, creams and emollients, Check for incontinence Q2 hours and as needed, Internal/External fecal devices, Internal/External urinary devices, Limit adult briefs, Maintain skin hydration (lotion/cream), Minimize layers    Activity Interventions: Pressure redistribution bed/mattress(bed type)    Mobility Interventions: HOB 30 degrees or less, Pressure redistribution bed/mattress (bed type), Turn and reposition approx. every two hours(pillow and wedges)    Nutrition Interventions: Document food/fluid/supplement intake, Discuss nutritional consult with provider    Friction and Shear Interventions: Apply protective barrier, creams and emollients, Foam dressings/transparent film/skin sealants, HOB 30 degrees or less, Lift sheet, Lift team/patient mobility team, Minimize layers, Transfer aides:transfer board/Rocio lift/ceiling lift, Transferring/repositioning devices                Problem: Falls - Risk of  Goal: *Absence of Falls  Description: Document Young Fall Risk and appropriate interventions in the flowsheet.   Outcome: Progressing Towards Goal  Note: Fall Risk Interventions:  Mobility Interventions: Communicate number of staff needed for ambulation/transfer, Strengthening exercises (ROM-active/passive)    Mentation Interventions: Adequate sleep, hydration, pain control, Door open when patient unattended, Evaluate medications/consider consulting pharmacy, Update white board    Medication Interventions: Bed/chair exit alarm, Evaluate medications/consider consulting pharmacy    Elimination Interventions: Bed/chair exit alarm, Call light in reach, Toileting schedule/hourly rounds    History of Falls Interventions: Bed/chair exit alarm, Evaluate medications/consider consulting pharmacy, Vital signs minimum Q4HRs X 24 hrs (comment for end date)         Problem: Patient Education: Go to Patient Education Activity  Goal: Patient/Family Education  Outcome: Progressing Towards Goal     Problem: Patient Education: Go to Patient Education Activity  Goal: Patient/Family Education  Outcome: Progressing Towards Goal

## 2020-12-31 NOTE — PROGRESS NOTES
Discharge planning    Reviewed chart. Patient remains intubated, on vent, and sedated. CM will monitor for need for LTACH placement. Patient previously LTC at [address] [place] [address].      ANTHONY Correa, RN  Pager # [phone]  Care Manager

## 2020-12-31 NOTE — PROGRESS NOTES
attended the interdisciplinary rounds for Aurelia Villa, who is a 72 y.o.,female. Patients Primary Language is: [place]. According to the patients EMR [employer] Affiliation is: [facility].     The reason the Patient came to the hospital is:   Patient Active Problem List    Diagnosis Date Noted    Gram-negative bacteremia 02/05/2015     Priority: 1 - One    Sepsis secondary to UTI ([address] 02/03/2015     Priority: 1 - One    DM hyperosmolarity type II, uncontrolled ([address] 02/03/2015     Priority: 1 - One    HTN (hypertension) 02/03/2015     Priority: 1 - One    Goals of care, counseling/discussion     Dementia without behavioral disturbance ([address]     Pulmonary fibrosis ([address]     Severe protein-calorie malnutrition ([address] 12/16/2020    Acute on chronic systolic congestive heart failure ([address] 12/15/2020    STEMI (ST elevation myocardial infarction) ([address] 12/10/2020    Acidosis 02/07/2018    Respiratory failure ([address] 02/07/2018    Shock ([address] 02/07/2018    Hyperosmolar (nonketotic) coma ([address] 02/07/2018    Acute UTI 02/07/2018    Macrocytic anemia 02/07/2018    Diabetic neuropathy (HCC)     Osteoarthritis of both knees     Sepsis ([address] 02/03/2015    Febrile illness, acute 02/03/2015    Diverticula of colon 09/21/2012          Plan:  Chaplains will continue to follow and will provide pastoral care on an as needed/requested basis.  recommends bedside caregivers page  on duty if patient shows signs of acute spiritual or emotional distress.     [address] [facility] [place]  Board Certified [address] [facility]   [phone]

## 2020-12-31 NOTE — PROCEDURES
INTERVENTIONAL RADIOLOGY POST PICC LINE NOTE     December 31, 2020       4:27 PM     Preoperative Diagnosis:   IV Access    Postoperative Diagnosis:  Same. :  Juanis Strong PA-C    Assistant:  None. Attending Physician: Dr. Harjeet Orourke    Type of Anesthesia:  1% Lidocaine local    Procedure/Description: right basilic  upper extremity PICC Line. Findings:  right basilic 5 Green[place]ic catheter placed. Tip at SVC/RA junction. OK to use. Length 38 cm. Estimated blood Loss: Minimal    Specimen Removed: None    Drains: None     Complications:  None. Condition:  Stable.     Discharge Plan:  continue present therapy

## 2020-12-31 NOTE — PROGRESS NOTES
[facility]      CARDIOLOGY PROGRESS NOTE  RECS:      1. Acute respiratory failure- requiring mechanical ventilation- extubated  12/17/20. re intubated 12/22/20. Continue supportive care  2. Sepsis- Sputum Cx(12/22) (+) heavy E.Coli-ESBL. on antibiotics. ID following   3. Wide complex Arrhythmia on 12/25/20- No recurrence will continue to monitor. Monitor electrolytes    4. Hypotension- improved. Continue  low dose Dobutamine for Inotropic support     5. Subacute MI- 12/10/20 -s/p [facility] S/p ptca/stent to proximal/ mid LAD using ARLEY.    Moderate to severe LV dysfunction. ekg no new ischemic changes suggestive of reinfarction or stent thrombosis. Continue asa. I have discontinued Brilinta and change to Plavix as she is planned to have PEG tube placement next week  6. Anemia- H&H dropping. S/p transfusion. 7. Acute Systolic Congestive heart Failure-recent echo with EF% 35%-40. compensated at present. Lasix as needed. 8. COPD,   9. Dementia - failed swallow eval. Per GI note -poor candidate at this time for G-tube due to not being able to stop Brilinta. OK to stop Brillinta for 2-3 days if needed  for PEG tube. Can use heparin drip if DAPT is interuppted. Continue supportive care       Prognosis poor   Cardiac cath 12/11/20  Patient presented to [address] [place] [address] with late presentation anterior wall MI.  EF 35-40%. Patient was initially managed medically-- however developed acute pulmonary edema requiring intubation. Also troponin level increasing consistent with ongoing ischemia. S/p ptca/stent to proximal/ mid LAD using ARELY. LVEDP 8 mmHg. Normal PASP pressure with normal PCWP. Moderate to severe LV dysfunction.     Echo 12/10/20  · LV: Estimated LVEF is 35 - 40%. Visually measured ejection fraction. Normal cavity size and wall thickness. Moderately and segmentally reduced systolic function. Inconclusive left ventricular diastolic function.   · AO: Mild aortic root dilatation; diameter is 3.8 cm. ASSESSMENT:  Hospital Problems  Date Reviewed: 2/7/2018          Codes Class Noted POA    Goals of care, counseling/discussion ICD-10-CM: Z71.89  ICD-9-CM: V65.49  Unknown Unknown        Dementia without behavioral disturbance (HCC) ICD-10-CM: F03.90  ICD-9-CM: 294.20  Unknown Unknown        Pulmonary fibrosis ([facility] ICD-10-CM: J84.10  ICD-9-CM: 491  Unknown Unknown        Severe protein-calorie malnutrition ([facility] ICD-10-CM: E43  ICD-9-CM: 369  12/16/2020 Clinically Undetermined        Acute on chronic systolic congestive heart failure ([facility] ICD-10-CM: I50.23  ICD-9-CM: 428.23, 428.0  12/15/2020 Unknown        * (Principal) STEMI (ST elevation myocardial infarction) ([facility] ICD-10-CM: I21.3  ICD-9-CM: 410.90  12/10/2020 Unknown                SUBJECTIVE:  Intubated     OBJECTIVE:    VS:   Visit Vitals  BP (!) 145/69   Pulse 91   Temp 99.9 °F (37.7 °C)   Resp 18   Ht 5' 7\" (1.702 m)   Wt 70.4 kg (155 lb 3.3 oz)   SpO2 98%   Breastfeeding No   BMI 24.31 kg/m²         Intake/Output Summary (Last 24 hours) at 12/31/2020 0855  Last data filed at 12/31/2020 0700  Gross per 24 hour   Intake 1676.1 ml   Output 600 ml   Net 1076.1 ml     TELE: normal sinus rhythm     General: intubated  HENT: Normocephalic, atraumatic. Normal external eye. Neck :  no JVD  Cardiac:  regular rate and rhythm, S1, S2 normal, no murmur, click, rub or gallop  Lungs: harsh breath sounds b/l.    Abdomen: Soft, nontender, no masses  Extremities:  No c/c/e, peripheral pulses present      Labs: Results:       Chemistry Recent Labs     12/31/20  0445 12/30/20  1600 12/30/20  0515   * 120* 123*    143 141   K 4.1 4.0 3.9    106 105   CO2 31 32 32   BUN 22* 20* 21*   CREA 0.48* 0.44* 0.49*   CA 8.4* 8.3* 8.5   AGAP 2* 5 4   BUCR 46* 45* 43*      CBC w/Diff Recent Labs     12/31/20  0445 12/30/20  1600 12/30/20  0515 12/29/20  0525   WBC 9.5  --  12.0 13.1   RBC 2.97*  --  2.51* 2.67*   HGB 7.8* 7.5* 6.6* 7.0*   HCT 25.6* 24.3* 21.6* 23.2*     --  417 407   GRANS 85*  --  83* 88*   LYMPH 6*  --  11* 12*   EOS 0  --  1 0      Cardiac Enzymes No results for input(s): CPK, CKND1, NATHANIEL in the last 72 hours. No lab exists for component: CKRMB, TROIP   Coagulation No results for input(s): PTP, INR, APTT, INREXT, INREXT in the last 72 hours. Lipid Panel No results found for: CHOL, CHOLPOCT, CHOLX, CHLST, CHOLV, 273716, HDL, HDLP, LDL, LDLC, DLDLP, 584313, VLDLC, VLDL, TGLX, TRIGL, TRIGP, TGLPOCT, CHHD, CHHDX   BNP No results for input(s): BNPP in the last 72 hours. Liver Enzymes No results for input(s): TP, ALB, TBIL, AP in the last 72 hours.     No lab exists for component: SGOT, GPT, DBIL   Thyroid Studies Lab Results   Component Value Date/Time    TSH 2.92 12/10/2020 11:07 AM                  Luis Spann MD

## 2020-12-31 NOTE — PROGRESS NOTES
Interventional Radiology    Patient seen in follow up for gastrostomy placement. Started on Plavix today instead of Brilinta. This is a 5 day hold prior to gastrostomy placement. Please hold Plavix if IR G tube is desired for next week. Defer to primary team and cardiology for blood thinner decisions, but a heparin drip is easy to hold from a procedure standpoint and will only need to be stopped for 4 hours prior to gastrostomy placement. Message sent to ICU NP Priscilla Garcia.     Thank you,  BIJAN Antony

## 2020-12-31 NOTE — PROGRESS NOTES
Patient was found to have urinated around PurWick external urinary catheter and soaked chux pad beneath her. Unmeasurable urine output. Replaced PurWick due to stool present. Will continue to monitor urine output.

## 2020-12-31 NOTE — PROGRESS NOTES
0700: Bedside and Verbal shift change report given to [facility], RN (oncoming nurse) by Vitaliy Murray RN (offgoing nurse). Report included the following information SBAR, Intake/Output, MAR, Recent Results and Med Rec Status. 1910: Bedside and Verbal shift change report given to ESTEBAN Quinones (oncoming nurse) by [facility], RN (offgoing nurse). Report included the following information SBAR, Procedure Summary, Intake/Output, MAR, Recent Results and Med Rec Status.

## 2020-12-31 NOTE — ROUTINE PROCESS
Bedside and Verbal shift change report given to [facility], RN (oncoming nurse) by Edda Alex RN (offgoing nurse). Report included the following information SBAR, Procedure Summary, Intake/Output, MAR, Recent Results and Med Rec Status

## 2020-12-31 NOTE — PROGRESS NOTES
[address] [place] [address] [facility]  ICU Progress Note      Name: Richelle Abdi   : 1955   MRN: [id number]   Date: 2020 9:00 AM     [x]I have reviewed the flowsheet and previous days notes. Events overnight reviewed and discussed with nursing staff. Vital signs and records reviewed. Interval HPI:  Patient is a 72 y. o. female with a past medical history of COPD, CHF, HTN, DM, dementia, presented to [facility] with acute on chronic systolic heart failure, acute MI with anterolateral STEMI and Q waves s/p ptca/stent to proximal/mid LAD on 20. Pt was intubated and extubated on 20, transferred to . On 20 patient was intubated and transferred to ICU for respiratory failure and cardiogenic +/- septic shock. Respiratory cultures on  with ESBL on Merrem. Dr. Juventino Tong and IR have been consulted for trach/PEG. Ventilator day# 10  · No acute events overnight  · Dobutamine titrated off. Hemodynamics stable   · Tolerating TF  · Mentation waxes and wanes. No command following this AM    · Persistent, low grade fevers overnight. Will obtain pan cx. · Brilinta d/c and Plavix started by Cardiology, as this will aid in an easier transition for trach/PEG   · For PICC line insertion today               ROS:Review of systems not obtained due to patient factors. Vital Signs:    Visit Vitals  BP (!) 145/69   Pulse 93   Temp 99.9 °F (37.7 °C)   Resp 16   Ht 5' 7\" (1.702 m)   Wt 70.4 kg (155 lb 3.3 oz)   SpO2 99%   Breastfeeding No   BMI 24.31 kg/m²       O2 Device: Endotracheal tube   O2 Flow Rate (L/min): 40 l/min   Temp (24hrs), Av.5 °F (37.5 °C), Min:99 °F (37.2 °C), Max:100.6 °F (38.1 °C)       Intake/Output:   Last shift:      No intake/output data recorded.   Last 3 shifts:  1901 -  0700  In: 2910.2 [I.V.:410.2]  Out: 1213 [Urine:713; Drains:500]    Intake/Output Summary (Last 24 hours) at 2020 0743  Last data filed at 2020 0700  Gross per 24 hour   Intake 1856.9 ml   Output 650 ml   Net 1206.9 ml       Ventilator Settings:  Mode Rate Tidal Volume Pressure FiO2 PEEP   Assist control, VC+   450 ml  8 cm H2O 28 % 5 cm H20     Peak airway pressure: 23 cm H2O    Minute ventilation: 7.4 l/min      ARDS network Guidelines:   Lung protective strategy and Plateau  Pressure goal < 30 cm H2O goals  Oxygenation Goals PaO2 55-80 mm Hg or SaO2 88-95%  PH goal 7.30-7.45    VAP bundle:  Reviewed. Giovanna tube to suction at 20-30 cm Hg. Maintain Dukes tube with 5-10ml air every 4 hours  Routine oral care every 4 hours  Elevation of head > 45 degree  Daily sedation holiday and SBT evaluation starting at 6.00am.    Physical Exam:      General: Intubated/sedated; NAD, acyanotic   HEENT:  Anicteric sclerae; pink palpebral conjunctivae; mucosa moist  Resp: (+) rhonchi b/l. Symmetrical chest expansion, no accessory muscle use; good airway entry; no rales/ wheezing/ noted  CV:  S1, S2 present; regular rate and rhythm  GI:  Abdomen soft, non-tender; (+) active bowel sounds  Extremities:  +2 pulses on all extremities; +2 LUE/+1 RUE edema/ No cyanosis/ clubbing noted  Skin:  Warm; no rashes/ lesions noted, normal turgor/cap refill. Scattered hyperpigmentation on abd   Neurologic:  Mentation waxes and wanes. + encephalopathic.  No command following   Devices:  ETT, Purewick, L fem CVL, FMS    DATA:     Current Facility-Administered Medications   Medication Dose Route Frequency    0.9% sodium chloride infusion 250 mL  250 mL IntraVENous PRN    heparin (porcine) injection 5,000 Units  5,000 Units SubCUTAneous Q8H    polyethylene glycol (MIRALAX) packet 17 g  17 g Per NG tube PRN    insulin glargine (LANTUS) injection 30 Units  30 Units SubCUTAneous QHS    DOBUTamine (DOBUTREX) 500 mg/250 mL (2,000 mcg/mL) infusion  5 mcg/kg/min IntraVENous CONTINUOUS    chlorhexidine (PERIDEX) 0.12 % mouthwash 10 mL  10 mL Oral Q12H    fentaNYL (PF) 900 mcg/30 ml infusion soln  0-200 mcg/hr IntraVENous TITRATE    midazolam (VERSED) injection 1-2 mg  1-2 mg IntraVENous Q10MIN PRN    fentaNYL citrate (PF) injection  mcg   mcg IntraVENous Q30MIN PRN    midazolam in normal saline (VERSED) 1 mg/mL infusion  1-5 mg/hr IntraVENous TITRATE    ELECTROLYTE REPLACEMENT PROTOCOL - Potassium Standard Dosing   1 Each Other PRN    ELECTROLYTE REPLACEMENT PROTOCOL - Magnesium   1 Each Other PRN    ELECTROLYTE REPLACEMENT PROTOCOL - Phosphorus  Standard Dosing  1 Each Other PRN    ELECTROLYTE REPLACEMENT PROTOCOL - Calcium   1 Each Other PRN    meropenem (MERREM) 500 mg in sterile water (preservative free) 10 mL IV syringe  500 mg IntraVENous Q6H    insulin glargine (LANTUS) injection 30 Units  30 Units SubCUTAneous DAILY    [Held by provider] carvediloL (COREG) tablet 6.25 mg  6.25 mg Oral Q12H    [Held by provider] lisinopriL (PRINIVIL, ZESTRIL) tablet 5 mg  5 mg Oral DAILY    [Held by provider] furosemide (LASIX) injection 40 mg  40 mg IntraVENous Q12H    [Held by provider] spironolactone (ALDACTONE) tablet 25 mg  25 mg Oral DAILY    multivit-folic acid-herbal 216 (WELLESSE PLUS) oral liquid 30 mL  30 mL Per NG tube DAILY    insulin lispro (HUMALOG) injection   SubCUTAneous Q6H    sodium chloride (NS) flush 5-40 mL  5-40 mL IntraVENous PRN    ticagrelor (BRILINTA) tablet 90 mg  90 mg Per NG tube BID    sodium chloride (NS) flush 5-40 mL  5-40 mL IntraVENous PRN    albuterol-ipratropium (DUO-NEB) 2.5 MG-0.5 MG/3 ML  3 mL Nebulization Q4H PRN    famotidine (PF) (PEPCID) 20 mg in 0.9% sodium chloride 10 mL injection  20 mg IntraVENous Q12H    sodium chloride (NS) flush 5-40 mL  5-40 mL IntraVENous Q8H    sodium chloride (NS) flush 5-40 mL  5-40 mL IntraVENous PRN    acetaminophen (TYLENOL) tablet 650 mg  650 mg Oral Q6H PRN    Or    acetaminophen (TYLENOL) suppository 650 mg  650 mg Rectal Q6H PRN    promethazine (PHENERGAN) tablet 12.5 mg  12.5 mg Oral Q6H PRN    Or    ondansetron (ZOFRAN) injection 4 mg 4 mg IntraVENous Q6H PRN    glucose chewable tablet 16 g  4 Tab Oral PRN    glucagon (GLUCAGEN) injection 1 mg  1 mg IntraMUSCular PRN    dextrose (D50W) injection syrg 12.5-25 g  25-50 mL IntraVENous PRN    aspirin chewable tablet 81 mg  81 mg Oral DAILY    atorvastatin (LIPITOR) tablet 40 mg  40 mg Oral QHS         Labs: Results:       Chemistry Recent Labs     12/31/20 0445 12/30/20  1600 12/30/20  0515   * 120* 123*    143 141   K 4.1 4.0 3.9    106 105   CO2 31 32 32   BUN 22* 20* 21*   CREA 0.48* 0.44* 0.49*   CA 8.4* 8.3* 8.5   AGAP 2* 5 4   BUCR 46* 45* 43*      CBC w/Diff Recent Labs     12/31/20 0445 12/30/20  1600 12/30/20  0515 12/29/20  0525   WBC 9.5  --  12.0 13.1   RBC 2.97*  --  2.51* 2.67*   HGB 7.8* 7.5* 6.6* 7.0*   HCT 25.6* 24.3* 21.6* 23.2*     --  417 407   GRANS 85*  --  83* 88*   LYMPH 6*  --  11* 12*   EOS 0  --  1 0      Coagulation No results for input(s): PTP, INR, APTT, INREXT, INREXT in the last 72 hours. Liver Enzymes No results for input(s): TP, ALB, TBIL, AP in the last 72 hours. No lab exists for component: SGOT, GPT, DBIL   ABG Lab Results   Component Value Date/Time    PHI 7.48 (H) 12/31/2020 03:28 AM    PCO2I 41.7 12/31/2020 03:28 AM    PO2I 73 (L) 12/31/2020 03:28 AM    HCO3I 30.9 (H) 12/31/2020 03:28 AM    FIO2I 28 12/31/2020 03:28 AM      Microbiology No results for input(s): CULT in the last 72 hours. Telemetry: [x]Sinus []A-flutter []Paced    []A-fib []Multiple PVCs                  Imaging:  CXR Results  (Last 48 hours)               12/30/20 0619  XR CHEST PORT Final result    Impression:  Impression:       1. Tubes and lines as described above. 2. Bilateral infiltrates right side greater than left not significantly changed   in the one-day interval.           Narrative:  CHEST PORTABLE       CPT CODE: 82434       COMPARISON: One day earlier       INDICATIONS: Intubated. Infiltrates.        FINDINGS: Endotracheal tube present with its tip 3 cm above the chace. Nasogastric tube present with its tip below the GE junction. Patchy interstitial   infiltrates are seen throughout the right lung. Interstitial infiltrates left   lung base. No pleural effusions. No significant osseous abnormalities. IMPRESSION:   · Acute on Chronic Hypoxic and Hypercapnic Respiratory Failure - Requiring mechanical ventilation, Extubated on 12/17 and reintubated on 12/22, Likely aspiration event. Will require trach placement if there is no improvement. Dr. Lio Altamirano consulted. · Wide Complex Arrhythmia (12/25) -noted on bedside cardiac monitor. Lasted roughly 5-10 mins with Spontaneous resolution. E-lyte derangement vs Drug effect (pt was on 55mcg/hr Oliverio-Synephrine at the time) vs cardiac insult.    · Acute on Chronic Systolic Heart Failure - superimposed on severe ILD, Echo 12/10/20 EF 35-40%  · Combination septic/cardiogenic shock, improving   · Aspiration PNA. Sputum Cx(12/22) (+) heavy E.Coli-ESBL. Recent Hx of E.coli ESBL.   · Acute Encephalopathy - likely toxic/metabolic vs infectious vs hepatic in nature, ammonia wnl.   · Severe pulmonary fibrosis - with extensive honeycombing and bronchiectasis as seen on CTA chest 12/16/20 - appears to be a new diagnosis but possibly UIP  · Acute aspiration pneumonia with severe sepsis  · UTI (+)E. coli  · Acute MI with anterolateral STEMI and Q waves - s/p ptca/stent to proximal/mid LAD using ARELY on DAPT on 12/11/20- Remains on Brilinta  · Severe dysphagia - failed MBS. Will place PEG tube early next week with IR.   · Hx of Hep C: + RNA viral load 4/4/2020  · Multiple liver masses - noted on CT scan 11/25/20  · COPD - on home O2 4L NC  · DM  · Dementia - unknown baseline, on aricept    RECOMMENDATIONS:   Resp:   -Titrate FiO2/ supp O2 for SpO2 >90%  -Family is agreeable to trach, if unable to extubate. Dr. Lio Altamirano consulted.  (Kris Galas will be placed on hold 2-3 days prior to insertion)  -Aspiration precautions   I/D:   -Persistent low-grade fevers. Obtain pan cx   -Aleukocyotosis  -On Merrem since 12/25 for Ecoli (ESBL), 5-7 day course   Hem/Onc:   -Daily CBC; H/H, and plts are stable  -Will transfuse unit of blood to maintain Hgb >7  CVS:   -Brilinta changed to Plavix due to planned trach/PEG tube placement next week  -Appreciate cardiology recs  -EF 35-40 %  -Dobutamine gtt titrated off, hemodynamics stable   -Tolerating statin   Metabolic:   -Daily BMP; monitor e-lytes; replace PRN  Renal:    -Trend Renal indices, Purewick   Endocrine:   -Continue lantus BID with SSI  GI:   -SUP  -Tolerating TF  -Will require PEG tube. IR consult placed. Will likely be placed next week. Will need CT A/P to evaluate anatomy 2/2 hepatic lobe mass. Musc/Skin:   -No acute issues, wound care  Neuro:   -Versed and fentanyl gtt for sedation, goal RASS 0 to -1  PPX  -SUP ppx  -SQH for DVT ppx   ACCESS: Scheduled for PICC line placement today   Family agreeable to trach and PEG, ENT consulted (Dr. Rossana Mcdermott)  Discussed in Interdisciplinary Rounds     Updated patient's sister (Ms. Luz Elena Desai) on need for trach and PEG for LTACH. She verbalized understanding in regards to updates        The patient is: [x] acutely ill Risk of deterioration: [] moderate    [] critically ill  [] high     []See my orders for details    My assessment/plan was discussed with:  [x]Nursing []PT/OT    [x]Respiratory therapy [x]    []Family []     Ever Ray, AGACNP-BC  12/31/20  Pulmonary, [address] [facility]       Pulmonary / Critical Care Physician:     Chart and note reviewed. Data reviewed. Seen on rounds earlier today. I have independently evaluated and examined the patient. I agree with the exam, assessment and plans outlined by BIJAN Harding.      In brief my My findings , evaluation and recommendations are as stated below:     Pt remains stable. No acute changes. Going for PICC line today. Planning for trach and PEG Monday/tuesday. Stop dobutamine. Redraw blood Cx due to fever overnight. Continue merrem for ESBL in the sputum.      Rest of details and diagnostic/treatment plans per APC note. I have personally reviewed all pertinent data including labs, imaging and recommendations of treatment team providers.     Total of 40 min critical care time spent at bedside during the course of care providing evaluation,management and care decisions and ordering appropriate treatment related to critical care problems exclusive of procedures. The reason for providing this level of medical care for this critically ill patient was due a critical illness that impaired one or more vital organ systems such that there was a high probability of imminent or life threatening deterioration in the patients condition.  This care involved high complexity decision making to assess, manipulate, and support vital system functions, to treat this degree vital organ system failure and to prevent further life threatening deterioration of the patients condition.     Darylene Alosa, MD  Critical Care Medicine

## 2021-01-01 ENCOUNTER — APPOINTMENT (OUTPATIENT)
Dept: GENERAL RADIOLOGY | Age: 66
DRG: 003 | End: 2021-01-01
Attending: PHYSICIAN ASSISTANT
Payer: MEDICARE

## 2021-01-01 LAB
ANION GAP SERPL CALC-SCNC: 3 MMOL/L (ref 3–18)
ARTERIAL PATENCY WRIST A: YES
BASE EXCESS BLD CALC-SCNC: 5 MMOL/L
BASOPHILS # BLD: 0 K/UL (ref 0–0.06)
BASOPHILS NFR BLD: 0 % (ref 0–3)
BDY SITE: ABNORMAL
BODY TEMPERATURE: 37.2
BUN SERPL-MCNC: 17 MG/DL (ref 7–18)
BUN/CREAT SERPL: 43 (ref 12–20)
CALCIUM SERPL-MCNC: 8.3 MG/DL (ref 8.5–10.1)
CHLORIDE SERPL-SCNC: 106 MMOL/L (ref 100–111)
CO2 SERPL-SCNC: 30 MMOL/L (ref 21–32)
COVID-19 RAPID TEST, COVR: NOT DETECTED
CREAT SERPL-MCNC: 0.4 MG/DL (ref 0.6–1.3)
DIFFERENTIAL METHOD BLD: ABNORMAL
EOSINOPHIL # BLD: 0 K/UL (ref 0–0.4)
EOSINOPHIL NFR BLD: 0 % (ref 0–5)
ERYTHROCYTE [DISTWIDTH] IN BLOOD BY AUTOMATED COUNT: 15.1 % (ref 11.6–14.5)
GAS FLOW.O2 O2 DELIVERY SYS: ABNORMAL L/MIN
GAS FLOW.O2 SETTING OXYMISER: 16 BPM
GLUCOSE BLD STRIP.AUTO-MCNC: 132 MG/DL (ref 70–110)
GLUCOSE BLD STRIP.AUTO-MCNC: 137 MG/DL (ref 70–110)
GLUCOSE BLD STRIP.AUTO-MCNC: 160 MG/DL (ref 70–110)
GLUCOSE BLD STRIP.AUTO-MCNC: 163 MG/DL (ref 70–110)
GLUCOSE BLD STRIP.AUTO-MCNC: 204 MG/DL (ref 70–110)
GLUCOSE SERPL-MCNC: 139 MG/DL (ref 74–99)
HCO3 BLD-SCNC: 28.5 MMOL/L (ref 22–26)
HCT VFR BLD AUTO: 25.1 % (ref 35–45)
HGB BLD-MCNC: 7.6 G/DL (ref 12–16)
INSPIRATION.DURATION SETTING TIME VENT: 0.9 SEC
LYMPHOCYTES # BLD: 0.3 K/UL (ref 0.8–3.5)
LYMPHOCYTES NFR BLD: 3 % (ref 20–51)
MAGNESIUM SERPL-MCNC: 2.1 MG/DL (ref 1.6–2.6)
MCH RBC QN AUTO: 26.1 PG (ref 24–34)
MCHC RBC AUTO-ENTMCNC: 30.3 G/DL (ref 31–37)
MCV RBC AUTO: 86.3 FL (ref 74–97)
MONOCYTES # BLD: 0 K/UL (ref 0–1)
MONOCYTES NFR BLD: 0 % (ref 2–9)
NEUTS BAND NFR BLD MANUAL: 3 % (ref 0–5)
NEUTS SEG # BLD: 9.2 K/UL (ref 1.8–8)
NEUTS SEG NFR BLD: 94 % (ref 42–75)
O2/TOTAL GAS SETTING VFR VENT: 28 %
PCO2 BLD: 38.7 MMHG (ref 35–45)
PEEP RESPIRATORY: 5 CMH2O
PH BLD: 7.48 [PH] (ref 7.35–7.45)
PHOSPHATE SERPL-MCNC: 3.2 MG/DL (ref 2.5–4.9)
PIP ISTAT,IPIP: 24
PLATELET # BLD AUTO: 434 K/UL (ref 135–420)
PLATELET COMMENTS,PCOM: ABNORMAL
PMV BLD AUTO: 10.4 FL (ref 9.2–11.8)
PO2 BLD: 64 MMHG (ref 80–100)
POTASSIUM SERPL-SCNC: 4.5 MMOL/L (ref 3.5–5.5)
RBC # BLD AUTO: 2.91 M/UL (ref 4.2–5.3)
RBC MORPH BLD: ABNORMAL
RBC MORPH BLD: ABNORMAL
SAO2 % BLD: 93 % (ref 92–97)
SERVICE CMNT-IMP: ABNORMAL
SODIUM SERPL-SCNC: 139 MMOL/L (ref 136–145)
SOURCE, COVRS: NORMAL
SPECIMEN TYPE, XMCV1T: NORMAL
SPECIMEN TYPE: ABNORMAL
TOTAL RESP. RATE, ITRR: 19
VENTILATION MODE VENT: ABNORMAL
VOLUME CONTROL PLUS IVLCP: YES
VT SETTING VENT: 450 ML
WBC # BLD AUTO: 9.5 K/UL (ref 4.6–13.2)

## 2021-01-01 PROCEDURE — 74011250636 HC RX REV CODE- 250/636: Performed by: NURSE PRACTITIONER

## 2021-01-01 PROCEDURE — 36600 WITHDRAWAL OF ARTERIAL BLOOD: CPT

## 2021-01-01 PROCEDURE — 80048 BASIC METABOLIC PNL TOTAL CA: CPT

## 2021-01-01 PROCEDURE — 99233 SBSQ HOSP IP/OBS HIGH 50: CPT | Performed by: INTERNAL MEDICINE

## 2021-01-01 PROCEDURE — 99291 CRITICAL CARE FIRST HOUR: CPT | Performed by: INTERNAL MEDICINE

## 2021-01-01 PROCEDURE — 85025 COMPLETE CBC W/AUTO DIFF WBC: CPT

## 2021-01-01 PROCEDURE — 94003 VENT MGMT INPAT SUBQ DAY: CPT

## 2021-01-01 PROCEDURE — 74011000258 HC RX REV CODE- 258: Performed by: PHYSICIAN ASSISTANT

## 2021-01-01 PROCEDURE — 74011000250 HC RX REV CODE- 250: Performed by: PHYSICIAN ASSISTANT

## 2021-01-01 PROCEDURE — 74011250636 HC RX REV CODE- 250/636: Performed by: PHYSICIAN ASSISTANT

## 2021-01-01 PROCEDURE — 82962 GLUCOSE BLOOD TEST: CPT

## 2021-01-01 PROCEDURE — 65610000006 HC RM INTENSIVE CARE

## 2021-01-01 PROCEDURE — 94762 N-INVAS EAR/PLS OXIMTRY CONT: CPT

## 2021-01-01 PROCEDURE — 71045 X-RAY EXAM CHEST 1 VIEW: CPT

## 2021-01-01 PROCEDURE — 74011250637 HC RX REV CODE- 250/637: Performed by: INTERNAL MEDICINE

## 2021-01-01 PROCEDURE — 74011636637 HC RX REV CODE- 636/637: Performed by: STUDENT IN AN ORGANIZED HEALTH CARE EDUCATION/TRAINING PROGRAM

## 2021-01-01 PROCEDURE — 2709999900 HC NON-CHARGEABLE SUPPLY

## 2021-01-01 PROCEDURE — 84100 ASSAY OF PHOSPHORUS: CPT

## 2021-01-01 PROCEDURE — 74011636637 HC RX REV CODE- 636/637: Performed by: PHYSICIAN ASSISTANT

## 2021-01-01 PROCEDURE — 82803 BLOOD GASES ANY COMBINATION: CPT

## 2021-01-01 PROCEDURE — 77030040831 HC BAG URINE DRNG MDII -A

## 2021-01-01 PROCEDURE — 36592 COLLECT BLOOD FROM PICC: CPT

## 2021-01-01 PROCEDURE — 74011000250 HC RX REV CODE- 250: Performed by: INTERNAL MEDICINE

## 2021-01-01 PROCEDURE — 77030038269 HC DRN EXT URIN PURWCK BARD -A

## 2021-01-01 PROCEDURE — 74011250636 HC RX REV CODE- 250/636: Performed by: INTERNAL MEDICINE

## 2021-01-01 PROCEDURE — 83735 ASSAY OF MAGNESIUM: CPT

## 2021-01-01 PROCEDURE — 87635 SARS-COV-2 COVID-19 AMP PRB: CPT

## 2021-01-01 PROCEDURE — 74011636637 HC RX REV CODE- 636/637: Performed by: INTERNAL MEDICINE

## 2021-01-01 PROCEDURE — 74011250637 HC RX REV CODE- 250/637: Performed by: NURSE PRACTITIONER

## 2021-01-01 RX ADMIN — SODIUM CHLORIDE 20 ML: 9 INJECTION, SOLUTION INTRAMUSCULAR; INTRAVENOUS; SUBCUTANEOUS at 06:00

## 2021-01-01 RX ADMIN — ACETAMINOPHEN 650 MG: 325 TABLET ORAL at 00:02

## 2021-01-01 RX ADMIN — MEROPENEM 500 MG: 500 INJECTION, POWDER, FOR SOLUTION INTRAVENOUS at 09:46

## 2021-01-01 RX ADMIN — HEPARIN SODIUM 5000 UNITS: 5000 INJECTION INTRAVENOUS; SUBCUTANEOUS at 13:11

## 2021-01-01 RX ADMIN — MEROPENEM 500 MG: 500 INJECTION, POWDER, FOR SOLUTION INTRAVENOUS at 16:57

## 2021-01-01 RX ADMIN — CHLORHEXIDINE GLUCONATE 0.12% ORAL RINSE 10 ML: 1.2 LIQUID ORAL at 09:39

## 2021-01-01 RX ADMIN — HEPARIN SODIUM 5000 UNITS: 5000 INJECTION INTRAVENOUS; SUBCUTANEOUS at 05:42

## 2021-01-01 RX ADMIN — INSULIN GLARGINE 30 UNITS: 100 INJECTION, SOLUTION SUBCUTANEOUS at 09:39

## 2021-01-01 RX ADMIN — SODIUM CHLORIDE 10 ML: 9 INJECTION, SOLUTION INTRAMUSCULAR; INTRAVENOUS; SUBCUTANEOUS at 14:00

## 2021-01-01 RX ADMIN — INSULIN LISPRO 2 UNITS: 100 INJECTION, SOLUTION INTRAVENOUS; SUBCUTANEOUS at 18:07

## 2021-01-01 RX ADMIN — MEROPENEM 500 MG: 500 INJECTION, POWDER, FOR SOLUTION INTRAVENOUS at 20:30

## 2021-01-01 RX ADMIN — INSULIN LISPRO 6 UNITS: 100 INJECTION, SOLUTION INTRAVENOUS; SUBCUTANEOUS at 13:11

## 2021-01-01 RX ADMIN — Medication 30 ML: at 02:46

## 2021-01-01 RX ADMIN — INSULIN GLARGINE 30 UNITS: 100 INJECTION, SOLUTION SUBCUTANEOUS at 21:37

## 2021-01-01 RX ADMIN — MIDAZOLAM 3 MG/HR: 5 INJECTION, SOLUTION INTRAMUSCULAR; INTRAVENOUS at 20:50

## 2021-01-01 RX ADMIN — CHLORHEXIDINE GLUCONATE 0.12% ORAL RINSE 10 ML: 1.2 LIQUID ORAL at 20:30

## 2021-01-01 RX ADMIN — HEPARIN SODIUM 5000 UNITS: 5000 INJECTION INTRAVENOUS; SUBCUTANEOUS at 20:30

## 2021-01-01 RX ADMIN — ASPIRIN 81 MG CHEWABLE TABLET 81 MG: 81 TABLET CHEWABLE at 09:45

## 2021-01-01 RX ADMIN — FAMOTIDINE 20 MG: 10 INJECTION INTRAVENOUS at 20:30

## 2021-01-01 RX ADMIN — MEROPENEM 500 MG: 500 INJECTION, POWDER, FOR SOLUTION INTRAVENOUS at 02:38

## 2021-01-01 RX ADMIN — SODIUM CHLORIDE 10 ML: 9 INJECTION, SOLUTION INTRAMUSCULAR; INTRAVENOUS; SUBCUTANEOUS at 21:30

## 2021-01-01 RX ADMIN — Medication 75 MCG/HR: at 09:37

## 2021-01-01 RX ADMIN — Medication 30 ML: at 09:46

## 2021-01-01 RX ADMIN — ATORVASTATIN CALCIUM 40 MG: 40 TABLET, FILM COATED ORAL at 21:30

## 2021-01-01 RX ADMIN — FAMOTIDINE 20 MG: 10 INJECTION INTRAVENOUS at 09:46

## 2021-01-01 RX ADMIN — INSULIN LISPRO 3 UNITS: 100 INJECTION, SOLUTION INTRAVENOUS; SUBCUTANEOUS at 06:49

## 2021-01-01 RX ADMIN — Medication 75 MCG/HR: at 20:50

## 2021-01-01 NOTE — PROGRESS NOTES
Start of wean no plan for extubation pending trach on monday 01/01/21 1452   Weaning Parameters   Spontaneous Breathing Trial Complete No (Comments)   Resp Rate Observed 31   Ve 9.1      RSBI 97

## 2021-01-01 NOTE — PROGRESS NOTES
12/31/20 1943   Patient Observations   Pulse (Heart Rate) (!) 105   Resp Rate 15   O2 Sat (%) 100 %   Airway - Continuous Aspiration of Subglottic Secretions (JACQUELINE) Tube 12/25/20 Oral   Placement Date/Time: 12/25/20 0228   Number of Attempts: 1  Location: Oral  Placement Verified: Auscultation;BBS  Airway Types: Endotracheal, cuffed  Airway Tube Size: 8 mm   Insertion Depth (cm) 25 cm   Line Michael Lips   Side Secured Device; Left   Cuff Pressure   (MOV)   Site Assessment Clean, dry, & intact   Suction on Yes   Respiratory   Patient on Vent Yes - If patient is on vent, add Doc Flowsheet Ventilator (). Respiratory Pattern Regular   Chest/Tracheal Assessment Chest expansion, symmetrical   Airway Clearance   Suction ET Tube   Suction Device Inline suction catheter   Suction Catheter Size 14 Fr   Sputum Method Obtained Endotracheal   Sputum Amount Moderate   Sputum Consistency Thick   Vent Settings   FIO2 (%) 28 %   SpO2/FIO2 Ratio 357.14   CMV Rate Set 16   Back-Up Rate 16   Vt Set (ml) 450 ml   PEEP/VENT (cm H2O) 5 cm H20   Insp Time (sec) 0.9 sec   Insp Rise Time % 50 %   Flow Trigger 3   Ventilator Measurements   Resp Rate Observed 15   Vt Exhaled (Machine Breath) (ml) 450 ml   Ve Observed (l/min) 10 l/min   PIP Observed (cm H2O) 27 cm H2O   Plateau Pressure (cm H2O) 25 cm H2O   MAP (cm H2O) 12   I:E Ratio Actual 1:3.2   Safety & Alarms   Circuit Temperature 98.6 °F (37 °C)   Backup Mode Checked/Apnea Yes   Pressure Max 40 cm H2O   Ve Min 2   Ve Max 20   Vt Min 200 ml   Vt Max 1000 ml   RR Max 40   Ambu Bag Yes   Ambu Mask Yes   Age Specific Ventilator Associated Pneumonia Bundle   Patient Age Group Adult   Vent Method/Mode   Ventilation Method Conventional   Ventilator Mode Assist control;VC+   Pulmonary Toilet   Pulmonary Toilet H. O.B elevated;Suction     Check completed and tolerated well. Patient found on above settings with the above results. No resp distress noted.

## 2021-01-01 NOTE — ROUTINE PROCESS
1915: Verbal/bedside report received from [facility]. Care assumed at this time. 1915: Bedside and Verbal shift change report given to ESTEBAN Mcpherson (oncoming nurse) by ESTEBAN Armstrong (offgoing nurse). Report included the following information SBAR, Intake/Output, Recent Results and Quality Measures.

## 2021-01-01 NOTE — CONSULTS
Consult received. Chart reviewed. Low grade fevers despite broad spectrum abx. No definitive evidence of new bacterial infection. D/D- ?drug fever versus non infectious etiologies such as DVT. R/o covid infection. Ok to d/c meropenem after pm dose. D/w dr. Bandar Leong.   Full consult to follow

## 2021-01-01 NOTE — PROGRESS NOTES
Results for Greg Perry (MRN [id number]) as of 1/1/2021 03:56   Ref. Range 1/1/2021 03:49   pH (POC) Latest Ref Range: 7.35 - 7.45   7.48 (H)   pCO2 (POC) Latest Ref Range: 35.0 - 45.0 MMHG 38.7   pO2 (POC) Latest Ref Range: 80 - 100 MMHG 64 (L)   HCO3 (POC) Latest Ref Range: 22 - 26 MMOL/L 28.5 (H)   sO2 (POC) Latest Ref Range: 92 - 97 % 93   Base excess (POC) Latest Units: mmol/L 5   FIO2 (POC) Latest Units: % 28   Patient temp. Latest Units:   37.2   Specimen type (POC) Latest Units:   ARTERIAL   Set Rate Latest Units: bpm 16   Site Latest Units:   RIGHT RADIAL   Device: Latest Units:   VENT   Total resp.  rate Latest Units:   19   Mode Latest Units:   ASSIST CONTROL   Tidal volume Latest Units: ml 450   Volume control plus Latest Units:   YES   PIP (POC) Latest Units:   24   PEEP/CPAP (POC) Latest Units: cmH2O 5   Allens test (POC) Latest Units:   YES   Inspiratory Time Latest Units: sec 0.9

## 2021-01-01 NOTE — PROGRESS NOTES
Intermittent fevers noted, telemetry with SR overnight. Continue supportive measures. Possible trach/PEG soon. I saw, examined, and evaluated the patient. I personally reviewed the patient's labs, tests, vitals, orders, medications, updated history, and other providers assessments. I personally agree with the findings as stated and the plan as documented. Jairo Gordillo MD    [facility]      CARDIOLOGY PROGRESS NOTE  RECS:  1. Acute respiratory failure- requiring mechanical ventilation- extubated  12/17/20. re intubated 12/22/20. Continue supportive care  2. Sepsis- Sputum Cx(12/22) (+) heavy E.Coli-ESBL. on antibiotics. ID following   3. Wide complex Arrhythmia on 12/25/20- No recurrence will continue to monitor. Monitor electrolytes    4. Hypotension- improved. Dobutamine D/C - b/p stable    5. Subacute MI- 12/10/20 -s/p [facility] S/p ptca/stent to proximal/ mid LAD using ARELY.    Moderate to severe LV dysfunction. ekg no new ischemic changes suggestive of reinfarction or stent thrombosis. Continue asa. I have discontinued Brilinta and change to Plavix as she is planned to have PEG tube placement next week  6. Anemia- H&H Stable. S/p transfusion. 7. Acute Systolic Congestive heart Failure-recent echo with EF% 35%-40. Compensated at present. Lasix as needed. 8. COPD,   9. Dementia - failed swallow eval. Per GI note -poor candidate at this time for G-tube due to not being able to stop Brilinta. OK to stop Brillinta for 2-3 days if needed  for PEG tube. Can use heparin drip if DAPT is interuppted. Continue supportive care   Prognosis poor     Cardiac cath 12/11/20  Patient presented to [address] [place] [address] with late presentation anterior wall MI.  EF 35-40%. Patient was initially managed medically-- however developed acute pulmonary edema requiring intubation. Also troponin level increasing consistent with ongoing ischemia.   S/p ptca/stent to proximal/ mid LAD using ARELY.  LVEDP 8 mmHg. Normal PASP pressure with normal PCWP. Moderate to severe LV dysfunction.     Echo 12/10/20  · LV: Estimated LVEF is 35 - 40%. Visually measured ejection fraction. Normal cavity size and wall thickness. Moderately and segmentally reduced systolic function. Inconclusive left ventricular diastolic function. · AO: Mild aortic root dilatation; diameter is 3.8 cm. ASSESSMENT:  Hospital Problems  Date Reviewed: 2/7/2018          Codes Class Noted POA    Goals of care, counseling/discussion ICD-10-CM: Z71.89  ICD-9-CM: V65.49  Unknown Unknown        Dementia without behavioral disturbance ([facility] ICD-10-CM: F03.90  ICD-9-CM: 294.20  Unknown Unknown        Pulmonary fibrosis ([facility] ICD-10-CM: J84.10  ICD-9-CM: 512  Unknown Unknown        Severe protein-calorie malnutrition ([facility] ICD-10-CM: E43  ICD-9-CM: 545  12/16/2020 Clinically Undetermined        Acute on chronic systolic congestive heart failure ([facility] ICD-10-CM: I50.23  ICD-9-CM: 428.23, 428.0  12/15/2020 Unknown        * (Principal) STEMI (ST elevation myocardial infarction) ([facility] ICD-10-CM: I21.3  ICD-9-CM: 410.90  12/10/2020 Unknown                SUBJECTIVE:  Sedated, intubated    OBJECTIVE:    VS:   Visit Vitals  /70   Pulse 92   Temp 99 °F (37.2 °C)   Resp 16   Ht 5' 7\" (1.702 m)   Wt 70.9 kg (156 lb 4.9 oz)   SpO2 97%   Breastfeeding No   BMI 24.48 kg/m²         Intake/Output Summary (Last 24 hours) at 1/1/2021 0940  Last data filed at 1/1/2021 0700  Gross per 24 hour   Intake 1296.5 ml   Output 600 ml   Net 696.5 ml     TELE: normal sinus rhythm    General: chronically ill and Intubated and sedated  HENT: Normocephalic, atraumatic. Normal external eye.   Neck :  no JVD  Cardiac:  regular rate and rhythm, S1, S2 normal, no murmur, click, rub or gallop  Lungs: scattered rhonchi b/l  Abdomen: Soft, nontender, no masses  Extremities:  No c/c/e, peripheral pulses present      Labs: Results:       Chemistry Recent Labs     01/01/21  0244 12/31/20  0445 12/30/20  1600   * 201* 120*    139 143   K 4.5 4.1 4.0    106 106   CO2 30 31 32   BUN 17 22* 20*   CREA 0.40* 0.48* 0.44*   CA 8.3* 8.4* 8.3*   AGAP 3 2* 5   BUCR 43* 46* 45*      CBC w/Diff Recent Labs     01/01/21  0244 12/31/20  0445 12/30/20  1600 12/30/20  0515   WBC 9.5 9.5  --  12.0   RBC 2.91* 2.97*  --  2.51*   HGB 7.6* 7.8* 7.5* 6.6*   HCT 25.1* 25.6* 24.3* 21.6*   * 395  --  417   GRANS 94* 85*  --  83*   LYMPH 3* 6*  --  11*   EOS 0 0  --  1      Cardiac Enzymes No results for input(s): CPK, CKND1, NATHANIEL in the last 72 hours. No lab exists for component: CKRMB, TROIP   Coagulation No results for input(s): PTP, INR, APTT, INREXT in the last 72 hours. Lipid Panel No results found for: CHOL, CHOLPOCT, CHOLX, CHLST, CHOLV, 393319, HDL, HDLP, LDL, LDLC, DLDLP, 503117, VLDLC, VLDL, TGLX, TRIGL, TRIGP, TGLPOCT, CHHD, CHHDX   BNP No results for input(s): BNPP in the last 72 hours. Liver Enzymes No results for input(s): TP, ALB, TBIL, AP in the last 72 hours.     No lab exists for component: SGOT, GPT, DBIL   Thyroid Studies Lab Results   Component Value Date/Time    TSH 2.92 12/10/2020 11:07 AM              Gato Julio NP

## 2021-01-01 NOTE — PROGRESS NOTES
1935 Bedside and Verbal shift change report given to edmundo rn (oncoming nurse) by Rich Cuevas rn(offgoing nurse). Report included the following information SBAR, Kardex and Recent Results. Side rails up. Hob elevated 30 .  2000 ASSESSMENT COMPLETED ORAL and external cath care provided. 0000 reassessment completed. Oral and bobby care completed. 0002 tylenol 650 mfg for temp. [place] notifid temp 100.9 and tylenol given, patient had urine, sputum and blood culture from cvl sent on day, and from picc this pm. No new orders received. 0155 resting in bed. wll continue to monitor. 0400 reassessment completed, oral and external cath care provided . 0730 Bedside and Verbal shift change report given to quintin jackson (oncoming nurse) by edmundo rn(offgoing nurse). Report included the following information SBAR, Kardex and Recent Results. Side rails up. Hob elevated 30 degrees. Call bell within reach.

## 2021-01-01 NOTE — PROGRESS NOTES
Problem: Pain  Goal: *Control of Pain  Outcome: Progressing Towards Goal     Problem: Diabetes Self-Management  Goal: *Disease process and treatment process  Description: Define diabetes and identify own type of diabetes; list 3 options for treating diabetes. Outcome: Progressing Towards Goal  Goal: *Using medications safely  Description: State effect of diabetes medications on diabetes; name diabetes medication taking, action and side effects. Outcome: Progressing Towards Goal  Goal: *Monitoring blood glucose, interpreting and using results  Description: Identify recommended blood glucose targets  and personal targets. Outcome: Progressing Towards Goal  Goal: *Prevention, detection, treatment of acute complications  Description: List symptoms of hyper- and hypoglycemia; describe how to treat low blood sugar and actions for lowering  high blood glucose level. Outcome: Progressing Towards Goal     Problem: Patient Education: Go to Patient Education Activity  Goal: Patient/Family Education  Outcome: Progressing Towards Goal     Problem: Pressure Injury - Risk of  Goal: *Prevention of pressure injury  Description: Document Pnakaj Scale and appropriate interventions in the flowsheet. Outcome: Progressing Towards Goal  Note: Pressure Injury Interventions:  Sensory Interventions: Assess need for specialty bed, Assess changes in LOC, Avoid rigorous massage over bony prominences, Keep linens dry and wrinkle-free, Maintain/enhance activity level, Minimize linen layers, Monitor skin under medical devices, Pad between skin to skin, Pressure redistribution bed/mattress (bed type), Turn and reposition approx.  every two hours (pillows and wedges if needed)    Moisture Interventions: Apply protective barrier, creams and emollients, Absorbent underpads, Check for incontinence Q2 hours and as needed, Internal/External urinary devices, Internal/External fecal devices, Maintain skin hydration (lotion/cream), Minimize layers    Activity Interventions: Pressure redistribution bed/mattress(bed type), Assess need for specialty bed    Mobility Interventions: HOB 30 degrees or less, Float heels, Chair cushion, Assess need for specialty bed, Pressure redistribution bed/mattress (bed type), Turn and reposition approx. every two hours(pillow and wedges)    Nutrition Interventions: Document food/fluid/supplement intake, Discuss nutritional consult with provider    Friction and Shear Interventions: Transferring/repositioning devices, HOB 30 degrees or less, Foam dressings/transparent film/skin sealants, Apply protective barrier, creams and emollients                Problem: Falls - Risk of  Goal: *Absence of Falls  Description: Document Young Fall Risk and appropriate interventions in the flowsheet. Outcome: Progressing Towards Goal  Note: Fall Risk Interventions:  Mobility Interventions: Communicate number of staff needed for ambulation/transfer    Mentation Interventions: Adequate sleep, hydration, pain control, Bed/chair exit alarm, Door open when patient unattended, Evaluate medications/consider consulting pharmacy, More frequent rounding    Medication Interventions: Evaluate medications/consider consulting pharmacy    Elimination Interventions:  Toileting schedule/hourly rounds    History of Falls Interventions: Evaluate medications/consider consulting pharmacy, Door open when patient unattended, Room close to nurse's station         Problem: Heart Failure: Day 5  Goal: Medications  Outcome: Progressing Towards Goal  Goal: Respiratory  Outcome: Progressing Towards Goal     Problem: Non-Violent Restraints  Goal: *Removal from restraints as soon as assessed to be safe  Outcome: Progressing Towards Goal  Goal: *No harm/injury to patient while restraints in use  Outcome: Progressing Towards Goal  Goal: *Patient's dignity will be maintained  Outcome: Progressing Towards Goal  Goal: Non-violent Restaints:Standard Interventions  Outcome: Progressing Towards Goal  Goal: Non-violent Restraints:Patient Interventions  Outcome: Progressing Towards Goal     Problem: Ventilator Management  Goal: *Adequate oxygenation and ventilation  Outcome: Progressing Towards Goal  Goal: *Patient maintains clear airway/free of aspiration  Outcome: Progressing Towards Goal  Goal: *Absence of infection signs and symptoms  Outcome: Progressing Towards Goal     Problem: Risk for Spread of Infection  Goal: Prevent transmission of infectious organism to others  Description: Prevent the transmission of infectious organisms to other patients, staff members, and visitors.   Outcome: Progressing Towards Goal     Problem: Injury - Risk of, Adverse Drug Event  Goal: *Absence of adverse drug events  Outcome: Progressing Towards Goal  Goal: *Absence of medication errors  Outcome: Progressing Towards Goal

## 2021-01-01 NOTE — PROGRESS NOTES
01/01/21 3250   Patient Observations   Pulse (Heart Rate) 98   Resp Rate 16   O2 Sat (%) 100 %   Airway - Continuous Aspiration of Subglottic Secretions (JACQUELINE) Tube 12/25/20 Oral   Placement Date/Time: 12/25/20 0228   Number of Attempts: 1  Location: Oral  Placement Verified: Auscultation;BBS  Airway Types: Endotracheal, cuffed  Airway Tube Size: 8 mm   Insertion Depth (cm) 25 cm   Line Michael Lips   Side Secured Device; Centered   Cuff Pressure   (MOV)   Site Assessment Clean, dry, & intact   Suction on Yes   Respiratory   Patient on Vent Yes - If patient is on vent, add Doc Flowsheet Ventilator (). Respiratory Pattern Regular   Chest/Tracheal Assessment Chest expansion, symmetrical   Airway Clearance   Suction ET Tube   Suction Device Inline suction catheter   Suction Catheter Size 14 Fr   Sputum Method Obtained Endotracheal   Sputum Amount Moderate   Sputum Color/Odor Tan   Sputum Consistency Thick   Vent Settings   FIO2 (%) 28 %   SpO2/FIO2 Ratio 357.14   CMV Rate Set 16   Back-Up Rate 16   Vt Set (ml) 450 ml   PEEP/VENT (cm H2O) 5 cm H20   Insp Time (sec) 0.9 sec   Insp Rise Time % 50 %   Flow Trigger 3   Ventilator Measurements   Resp Rate Observed 16   Vt Exhaled (Machine Breath) (ml) 434 ml   Ve Observed (l/min) 9 l/min   PIP Observed (cm H2O) 23 cm H2O   MAP (cm H2O) 10   I:E Ratio Actual 1:2   Safety & Alarms   Circuit Temperature 96.3 °F (35.7 °C)   Backup Mode Checked/Apnea Yes   Pressure Max 40 cm H2O   Ve Min 2   Ve Max 20   Vt Min 200 ml   Vt Max 1000 ml   RR Max 40   Ambu Bag Yes   Ambu Mask Yes   Age Specific Ventilator Associated Pneumonia Bundle   Patient Age Group Adult   Vent Method/Mode   Ventilation Method Conventional   Ventilator Mode Assist control;VC+   Pulmonary Toilet   Pulmonary Toilet Suction;H. O.B elevated     Check completed and tolerated well. Patient found on above settings with the above results. No resp distress noted.

## 2021-01-01 NOTE — PROGRESS NOTES
Wean completed     01/01/21 1620   Weaning Parameters   Spontaneous Breathing Trial Complete Yes   Resp Rate Observed 30   Ve 9.1      RSBI 96

## 2021-01-01 NOTE — PROGRESS NOTES
[facility]  ICU Progress Note      Name: Raghav Cardoso   : 1955   MRN: [id number]   Date: 2021 9:00 AM     [x]I have reviewed the flowsheet and previous days notes. Events overnight reviewed and discussed with nursing staff. Vital signs and records reviewed. Interval HPI:  Patient is a 72 y. o. female with a past medical history of COPD, CHF, HTN, DM, dementia, presented to [facility] with acute on chronic systolic heart failure, acute MI with anterolateral STEMI and Q waves s/p ptca/stent to proximal/mid LAD on 20. Pt was intubated and extubated on 20, transferred to . On 20 patient was intubated and transferred to ICU for respiratory failure and cardiogenic +/- septic shock. Respiratory cultures on  with ESBL on Merrem. Dr. Steffi Sandhoff and IR have been consulted for trach/PEG. Ventilator day# 11  · No acute events overnight  · Tolerating TF  · Mentation waxes and wanes. No command following this AM    · Continues to have nocturnal fevers. · Brilinta d/c and Plavix started by Cardiology, as this will aid in an easier transition for trach/PEG   · S/p PICC line yesterday. ROS:Review of systems not obtained due to patient factors. Vital Signs:    Visit Vitals  /70   Pulse 99   Temp 99 °F (37.2 °C)   Resp 17   Ht 5' 7\" (1.702 m)   Wt 70.9 kg (156 lb 4.9 oz)   SpO2 100%   Breastfeeding No   BMI 24.48 kg/m²       O2 Device: Endotracheal tube, Heated, Humidifier, Ventilator   O2 Flow Rate (L/min): 40 l/min   Temp (24hrs), Av.2 °F (37.9 °C), Min:98.4 °F (36.9 °C), Max:101.3 °F (38.5 °C)       Intake/Output:   Last shift:      No intake/output data recorded.   Last 3 shifts:  1901 -  0700  In: 2418 [I.V.:388]  Out: 900 [Urine:500; Drains:400]    Intake/Output Summary (Last 24 hours) at 2021 1026  Last data filed at 2021 0700  Gross per 24 hour   Intake 1296.5 ml   Output 600 ml   Net 696.5 ml       Ventilator Settings:  Mode Rate Tidal Volume Pressure FiO2 PEEP   Assist control, VC+   450 ml  8 cm H2O 28 % 5 cm H20     Peak airway pressure: 24 cm H2O    Minute ventilation: 9.7 l/min      ARDS network Guidelines:   Lung protective strategy and Plateau  Pressure goal < 30 cm H2O goals  Oxygenation Goals PaO2 55-80 mm Hg or SaO2 88-95%  PH goal 7.30-7.45    VAP bundle:  Reviewed. [place] tube to suction at 20-30 cm Hg. Maintain [place] tube with 5-10ml air every 4 hours  Routine oral care every 4 hours  Elevation of head > 45 degree  Daily sedation holiday and SBT evaluation starting at 6.00am.    Physical Exam:      General: Intubated/sedated; NAD, acyanotic   HEENT:  Anicteric sclerae; pink palpebral conjunctivae; mucosa moist, ETT  Resp: (+) rhonchi b/l. Symmetrical chest expansion, no accessory muscle use; good airway entry; no rales/ wheezing/ noted  CV:  S1, S2 present; regular rate and rhythm  GI:  Abdomen soft, non-tender; (+) active bowel sounds  Extremities:  +2 pulses on all extremities; +2 LUE/+1 RUE edema/ No cyanosis/ clubbing noted  Skin:  Warm; no rashes/ lesions noted, normal turgor/cap refill. Scattered hyperpigmentation on abd   Neurologic:  Mentation waxes and wanes. + encephalopathic.  No command following   Devices:  ETT, Purewick, FMS    DATA:     Current Facility-Administered Medications   Medication Dose Route Frequency    [Held by provider] clopidogreL (PLAVIX) tablet 75 mg  75 mg Oral DAILY    heparin (porcine) injection 5,000 Units  5,000 Units SubCUTAneous Q8H    polyethylene glycol (MIRALAX) packet 17 g  17 g Per NG tube PRN    insulin glargine (LANTUS) injection 30 Units  30 Units SubCUTAneous QHS    chlorhexidine (PERIDEX) 0.12 % mouthwash 10 mL  10 mL Oral Q12H    fentaNYL (PF) 900 mcg/30 ml infusion soln  0-200 mcg/hr IntraVENous TITRATE    midazolam (VERSED) injection 1-2 mg  1-2 mg IntraVENous Q10MIN PRN    fentaNYL citrate (PF) injection  mcg   mcg IntraVENous Q30MIN PRN  midazolam in normal saline (VERSED) 1 mg/mL infusion  1-5 mg/hr IntraVENous TITRATE    ELECTROLYTE REPLACEMENT PROTOCOL - Potassium Standard Dosing   1 Each Other PRN    ELECTROLYTE REPLACEMENT PROTOCOL - Magnesium   1 Each Other PRN    ELECTROLYTE REPLACEMENT PROTOCOL - Phosphorus  Standard Dosing  1 Each Other PRN    ELECTROLYTE REPLACEMENT PROTOCOL - Calcium   1 Each Other PRN    meropenem (MERREM) 500 mg in sterile water (preservative free) 10 mL IV syringe  500 mg IntraVENous Q6H    insulin glargine (LANTUS) injection 30 Units  30 Units SubCUTAneous DAILY    [Held by provider] carvediloL (COREG) tablet 6.25 mg  6.25 mg Oral Q12H    [Held by provider] lisinopriL (PRINIVIL, ZESTRIL) tablet 5 mg  5 mg Oral DAILY    [Held by provider] furosemide (LASIX) injection 40 mg  40 mg IntraVENous Q12H    [Held by provider] spironolactone (ALDACTONE) tablet 25 mg  25 mg Oral DAILY    multivit-folic acid-herbal 620 (WELLESSE PLUS) oral liquid 30 mL  30 mL Per NG tube DAILY    insulin lispro (HUMALOG) injection   SubCUTAneous Q6H    albuterol-ipratropium (DUO-NEB) 2.5 MG-0.5 MG/3 ML  3 mL Nebulization Q4H PRN    famotidine (PF) (PEPCID) 20 mg in 0.9% sodium chloride 10 mL injection  20 mg IntraVENous Q12H    sodium chloride (NS) flush 5-40 mL  5-40 mL IntraVENous Q8H    sodium chloride (NS) flush 5-40 mL  5-40 mL IntraVENous PRN    acetaminophen (TYLENOL) tablet 650 mg  650 mg Oral Q6H PRN    Or    acetaminophen (TYLENOL) suppository 650 mg  650 mg Rectal Q6H PRN    promethazine (PHENERGAN) tablet 12.5 mg  12.5 mg Oral Q6H PRN    Or    ondansetron (ZOFRAN) injection 4 mg  4 mg IntraVENous Q6H PRN    glucose chewable tablet 16 g  4 Tab Oral PRN    glucagon (GLUCAGEN) injection 1 mg  1 mg IntraMUSCular PRN    dextrose (D50W) injection syrg 12.5-25 g  25-50 mL IntraVENous PRN    aspirin chewable tablet 81 mg  81 mg Oral DAILY    atorvastatin (LIPITOR) tablet 40 mg  40 mg Oral QHS         Labs: Results:       Chemistry Recent Labs     01/01/21  0244 12/31/20  0445 12/30/20  1600   * 201* 120*    139 143   K 4.5 4.1 4.0    106 106   CO2 30 31 32   BUN 17 22* 20*   CREA 0.40* 0.48* 0.44*   CA 8.3* 8.4* 8.3*   AGAP 3 2* 5   BUCR 43* 46* 45*      CBC w/Diff Recent Labs     01/01/21  0244 12/31/20  0445 12/30/20  1600 12/30/20  0515   WBC 9.5 9.5  --  12.0   RBC 2.91* 2.97*  --  2.51*   HGB 7.6* 7.8* 7.5* 6.6*   HCT 25.1* 25.6* 24.3* 21.6*   * 395  --  417   GRANS 94* 85*  --  83*   LYMPH 3* 6*  --  11*   EOS 0 0  --  1      Coagulation No results for input(s): PTP, INR, APTT, INREXT, INREXT in the last 72 hours. Liver Enzymes No results for input(s): TP, ALB, TBIL, AP in the last 72 hours. No lab exists for component: SGOT, GPT, DBIL   ABG Lab Results   Component Value Date/Time    PHI 7.48 (H) 01/01/2021 03:49 AM    PCO2I 38.7 01/01/2021 03:49 AM    PO2I 64 (L) 01/01/2021 03:49 AM    HCO3I 28.5 (H) 01/01/2021 03:49 AM    FIO2I 28 01/01/2021 03:49 AM      Microbiology Recent Labs     12/31/20  2204 12/31/20  1430 12/31/20  1100   CULT NO GROWTH AFTER 7 HOURS NO GROWTH AFTER 16 HOURS PENDING  NO GROWTH AFTER 19 HOURS          Telemetry: [x]Sinus []A-flutter []Paced    []A-fib []Multiple PVCs                  Imaging:  CXR Results  (Last 48 hours)               01/01/21 0125  XR CHEST PORT Final result    Impression:  Impression:        1. Support lines and tubes are as described. 2. Similar radiographic appearance of bilateral interstitial and airspace   opacities, consistent with known chronic interstitial lung disease. Superimposed   infiltrate/edema would be difficult to exclude with certainty                               Narrative:  AP CHEST, PORTABLE       INDICATION: Endotracheal tube placement       COMPARISON: Chest x-ray 12/31/2020       FINDINGS:  EKG leads overlie the patient. Endotracheal tube tip projects 4.1 cm   above the chace.  NG tube courses below left hemidiaphragm, distal tip is beyond   the field-of-view. Right arm PICC line tip projects at the mid to lower SVC. Cardiac silhouette is stable. Atherosclerosis noted. Similar bilateral   interstitial and airspace opacities. No obvious pleural effusion or   pneumothorax. No acute osseous abnormalities are identified. 12/31/20 0557  XR CHEST PORT Final result    Impression:  IMPRESSION:   Unchanged bilateral interstitial and airspace opacities. Suspect either   progression of patient's known pulmonary fibrosis or background of pulmonary   fibrosis with superimposed acute interstitial and airspace opacities. Narrative:  EXAM: XR CHEST PORT       INDICATION: 72 years Female. intubated. ADDITIONAL HISTORY: None. TECHNIQUE: Frontal view of the chest.       COMPARISON: 12/30/2020 at 0542 hours       FINDINGS:       Endotracheal tube with tip 2.1 cm proximal to chace. Enteric tube extending to   level the stomach, beyond the field-of-view, with side port beyond the GE   junction. The cardiac silhouette is at the upper limits of normal in size. New well-defined density over the medial right suprahilar region is thought to   likely reflect external artifact. Bilateral interstitial and airspace opacities   are present in the bilateral lungs, without significant interval change. Bibasilar bronchiectasis. Background of reticular opacities which are most   pronounced at the bilateral lung bases. No definite evidence of pleural effusion or pneumothorax, given supine   positioning. There is no evidence of acute osseous abnormality. IMPRESSION:   · Acute on Chronic Hypoxic and Hypercapnic Respiratory Failure - Requiring mechanical ventilation, Extubated on 12/17 and reintubated on 12/22, Likely aspiration event. Will require trach placement if there is no improvement. Dr. Solange Anguiano consulted.    · Wide Complex Arrhythmia (12/25) -noted on bedside cardiac monitor. Lasted roughly 5-10 mins with Spontaneous resolution. E-lyte derangement vs Drug effect (pt was on 55mcg/hr Oliverio-Synephrine at the time) vs cardiac insult.    · Acute on Chronic Systolic Heart Failure - superimposed on severe ILD, Echo 12/10/20 EF 35-40%  · Combination septic/cardiogenic shock, improving   · Aspiration PNA. Sputum Cx(12/22) (+) heavy E.Coli-ESBL. Recent Hx of E.coli ESBL.   · Acute Encephalopathy - likely toxic/metabolic vs infectious vs hepatic in nature, ammonia wnl.   · Severe pulmonary fibrosis - with extensive honeycombing and bronchiectasis as seen on CTA chest 12/16/20 - appears to be a new diagnosis but possibly UIP  · Acute aspiration pneumonia with severe sepsis  · UTI (+)E. coli  · Acute MI with anterolateral STEMI and Q waves - s/p ptca/stent to proximal/mid LAD using ARELY on DAPT on 12/11/20- Remains on Brilinta  · Severe dysphagia - failed MBS. Will place PEG tube early next week with IR.   · Hx of Hep C: + RNA viral load 4/4/2020  · Multiple liver masses - noted on CT scan 11/25/20  · COPD - on home O2 4L NC  · DM  · Dementia - unknown baseline, on aricept    RECOMMENDATIONS:   Resp:   -Titrate FiO2/ supp O2 for SpO2 >90%  -Family is agreeable to trach. [place] tentatively scheduled for Monday  -Aspiration precautions   I/D:   -Persistent fevers, no clinical signs of sepsis. Will consult ID  -Aleukocyotosis  -On Merrem since 12/25 for Ecoli (ESBL), 5-7 day course   Hem/Onc:   -Daily CBC; H/H, and plts are stable  -Will transfuse unit of blood to maintain Hgb >7  CVS:   -Brilinta changed to Plavix. Hold plavix until procedures done  -Appreciate cardiology recs  -EF 35-40 %  -Dobutamine gtt titrated off, hemodynamics stable   -Tolerating statin   Metabolic:   -Daily BMP; monitor e-lytes; replace PRN  Renal:    -Trend Renal indices, Purewick   Endocrine:   -Continue lantus BID with SSI  GI:   -SUP  -Tolerating TF  -Will require PEG tube. IR consult placed.  Will likely be placed next week. Awating CT abdomen per radiology.   Musc/Skin:   -No acute issues, wound care  Neuro:   -Versed and fentanyl gtt for sedation, goal RASS 0 to -1  PPX  -SUP ppx  -SQH for DVT ppx   ACCESS: Scheduled for PICC line placement today   Family agreeable to trach and PEG, ENT consulted (Dr. Lio Altamirano)  Discussed in Interdisciplinary Rounds            The patient is: [x] acutely ill Risk of deterioration: [] moderate    [] critically ill  [] high     []See my orders for details    My assessment/plan was discussed with:  [x]Nursing []PT/OT    [x]Respiratory therapy [x]    []Family []     Cc time 40 min    Paris Briggs MD  [address]

## 2021-01-02 ENCOUNTER — APPOINTMENT (OUTPATIENT)
Dept: CT IMAGING | Age: 66
DRG: 003 | End: 2021-01-02
Attending: PHYSICIAN ASSISTANT
Payer: MEDICARE

## 2021-01-02 ENCOUNTER — APPOINTMENT (OUTPATIENT)
Dept: GENERAL RADIOLOGY | Age: 66
DRG: 003 | End: 2021-01-02
Attending: PHYSICIAN ASSISTANT
Payer: MEDICARE

## 2021-01-02 LAB
ANION GAP SERPL CALC-SCNC: 6 MMOL/L (ref 3–18)
ARTERIAL PATENCY WRIST A: YES
BACTERIA SPEC CULT: NORMAL
BASE EXCESS BLD CALC-SCNC: 5 MMOL/L
BASOPHILS # BLD: 0 K/UL (ref 0–0.1)
BASOPHILS NFR BLD: 0 % (ref 0–2)
BDY SITE: ABNORMAL
BODY TEMPERATURE: 37.4
BUN SERPL-MCNC: 17 MG/DL (ref 7–18)
BUN/CREAT SERPL: 45 (ref 12–20)
CALCIUM SERPL-MCNC: 8.4 MG/DL (ref 8.5–10.1)
CHLORIDE SERPL-SCNC: 105 MMOL/L (ref 100–111)
CO2 SERPL-SCNC: 28 MMOL/L (ref 21–32)
CREAT SERPL-MCNC: 0.38 MG/DL (ref 0.6–1.3)
DIFFERENTIAL METHOD BLD: ABNORMAL
EOSINOPHIL # BLD: 0.1 K/UL (ref 0–0.4)
EOSINOPHIL NFR BLD: 2 % (ref 0–5)
ERYTHROCYTE [DISTWIDTH] IN BLOOD BY AUTOMATED COUNT: 15.4 % (ref 11.6–14.5)
GAS FLOW.O2 O2 DELIVERY SYS: ABNORMAL L/MIN
GAS FLOW.O2 SETTING OXYMISER: 16 BPM
GLUCOSE BLD STRIP.AUTO-MCNC: 141 MG/DL (ref 70–110)
GLUCOSE BLD STRIP.AUTO-MCNC: 160 MG/DL (ref 70–110)
GLUCOSE BLD STRIP.AUTO-MCNC: 165 MG/DL (ref 70–110)
GLUCOSE SERPL-MCNC: 143 MG/DL (ref 74–99)
HCO3 BLD-SCNC: 28.6 MMOL/L (ref 22–26)
HCT VFR BLD AUTO: 23.8 % (ref 35–45)
HGB BLD-MCNC: 7.4 G/DL (ref 12–16)
INSPIRATION.DURATION SETTING TIME VENT: 0.9 SEC
LYMPHOCYTES # BLD: 1 K/UL (ref 0.9–3.6)
LYMPHOCYTES NFR BLD: 13 % (ref 21–52)
MAGNESIUM SERPL-MCNC: 2 MG/DL (ref 1.6–2.6)
MCH RBC QN AUTO: 27 PG (ref 24–34)
MCHC RBC AUTO-ENTMCNC: 31.1 G/DL (ref 31–37)
MCV RBC AUTO: 86.9 FL (ref 74–97)
MONOCYTES # BLD: 0.4 K/UL (ref 0.05–1.2)
MONOCYTES NFR BLD: 5 % (ref 3–10)
NEUTS SEG # BLD: 6.3 K/UL (ref 1.8–8)
NEUTS SEG NFR BLD: 80 % (ref 40–73)
O2/TOTAL GAS SETTING VFR VENT: 28 %
PCO2 BLD: 42.3 MMHG (ref 35–45)
PEEP RESPIRATORY: 5 CMH2O
PH BLD: 7.44 [PH] (ref 7.35–7.45)
PHOSPHATE SERPL-MCNC: 3 MG/DL (ref 2.5–4.9)
PIP ISTAT,IPIP: 23
PLATELET # BLD AUTO: 455 K/UL (ref 135–420)
PMV BLD AUTO: 10.3 FL (ref 9.2–11.8)
PO2 BLD: 87 MMHG (ref 80–100)
POTASSIUM SERPL-SCNC: 4.6 MMOL/L (ref 3.5–5.5)
RBC # BLD AUTO: 2.74 M/UL (ref 4.2–5.3)
SAO2 % BLD: 97 % (ref 92–97)
SERVICE CMNT-IMP: ABNORMAL
SERVICE CMNT-IMP: NORMAL
SODIUM SERPL-SCNC: 139 MMOL/L (ref 136–145)
SPECIMEN TYPE: ABNORMAL
TOTAL RESP. RATE, ITRR: 16
VENTILATION MODE VENT: ABNORMAL
VOLUME CONTROL PLUS IVLCP: YES
VT SETTING VENT: 450 ML
WBC # BLD AUTO: 7.9 K/UL (ref 4.6–13.2)

## 2021-01-02 PROCEDURE — 2709999900 HC NON-CHARGEABLE SUPPLY

## 2021-01-02 PROCEDURE — 74011250636 HC RX REV CODE- 250/636: Performed by: INTERNAL MEDICINE

## 2021-01-02 PROCEDURE — 74011250636 HC RX REV CODE- 250/636: Performed by: NURSE PRACTITIONER

## 2021-01-02 PROCEDURE — 74011250637 HC RX REV CODE- 250/637: Performed by: NURSE PRACTITIONER

## 2021-01-02 PROCEDURE — 82803 BLOOD GASES ANY COMBINATION: CPT

## 2021-01-02 PROCEDURE — 83735 ASSAY OF MAGNESIUM: CPT

## 2021-01-02 PROCEDURE — 74011636637 HC RX REV CODE- 636/637: Performed by: STUDENT IN AN ORGANIZED HEALTH CARE EDUCATION/TRAINING PROGRAM

## 2021-01-02 PROCEDURE — 36600 WITHDRAWAL OF ARTERIAL BLOOD: CPT

## 2021-01-02 PROCEDURE — 85025 COMPLETE CBC W/AUTO DIFF WBC: CPT

## 2021-01-02 PROCEDURE — 82962 GLUCOSE BLOOD TEST: CPT

## 2021-01-02 PROCEDURE — 65610000006 HC RM INTENSIVE CARE

## 2021-01-02 PROCEDURE — 74011000258 HC RX REV CODE- 258: Performed by: PHYSICIAN ASSISTANT

## 2021-01-02 PROCEDURE — 99232 SBSQ HOSP IP/OBS MODERATE 35: CPT | Performed by: INTERNAL MEDICINE

## 2021-01-02 PROCEDURE — 74011250637 HC RX REV CODE- 250/637: Performed by: INTERNAL MEDICINE

## 2021-01-02 PROCEDURE — 94003 VENT MGMT INPAT SUBQ DAY: CPT

## 2021-01-02 PROCEDURE — 99291 CRITICAL CARE FIRST HOUR: CPT | Performed by: INTERNAL MEDICINE

## 2021-01-02 PROCEDURE — 74011000250 HC RX REV CODE- 250: Performed by: PHYSICIAN ASSISTANT

## 2021-01-02 PROCEDURE — 74011000250 HC RX REV CODE- 250: Performed by: INTERNAL MEDICINE

## 2021-01-02 PROCEDURE — 74011636637 HC RX REV CODE- 636/637: Performed by: PHYSICIAN ASSISTANT

## 2021-01-02 PROCEDURE — 84100 ASSAY OF PHOSPHORUS: CPT

## 2021-01-02 PROCEDURE — 94762 N-INVAS EAR/PLS OXIMTRY CONT: CPT

## 2021-01-02 PROCEDURE — 71045 X-RAY EXAM CHEST 1 VIEW: CPT

## 2021-01-02 PROCEDURE — 74011636637 HC RX REV CODE- 636/637: Performed by: INTERNAL MEDICINE

## 2021-01-02 PROCEDURE — 85730 THROMBOPLASTIN TIME PARTIAL: CPT

## 2021-01-02 PROCEDURE — 74011250636 HC RX REV CODE- 250/636: Performed by: PHYSICIAN ASSISTANT

## 2021-01-02 PROCEDURE — 80048 BASIC METABOLIC PNL TOTAL CA: CPT

## 2021-01-02 RX ORDER — HEPARIN SODIUM 10000 [USP'U]/100ML
12-25 INJECTION, SOLUTION INTRAVENOUS
Status: DISCONTINUED | OUTPATIENT
Start: 2021-01-02 | End: 2021-01-10

## 2021-01-02 RX ADMIN — CHLORHEXIDINE GLUCONATE 0.12% ORAL RINSE 10 ML: 1.2 LIQUID ORAL at 21:08

## 2021-01-02 RX ADMIN — Medication 75 MCG/HR: at 08:58

## 2021-01-02 RX ADMIN — ACETAMINOPHEN 650 MG: 325 TABLET ORAL at 03:00

## 2021-01-02 RX ADMIN — FAMOTIDINE 20 MG: 10 INJECTION INTRAVENOUS at 21:08

## 2021-01-02 RX ADMIN — SODIUM CHLORIDE 10 ML: 9 INJECTION, SOLUTION INTRAMUSCULAR; INTRAVENOUS; SUBCUTANEOUS at 13:00

## 2021-01-02 RX ADMIN — MIDAZOLAM 3 MG/HR: 5 INJECTION, SOLUTION INTRAMUSCULAR; INTRAVENOUS at 05:00

## 2021-01-02 RX ADMIN — CHLORHEXIDINE GLUCONATE 0.12% ORAL RINSE 10 ML: 1.2 LIQUID ORAL at 09:04

## 2021-01-02 RX ADMIN — SODIUM CHLORIDE 10 ML: 9 INJECTION, SOLUTION INTRAMUSCULAR; INTRAVENOUS; SUBCUTANEOUS at 22:00

## 2021-01-02 RX ADMIN — SODIUM CHLORIDE 10 ML: 9 INJECTION, SOLUTION INTRAMUSCULAR; INTRAVENOUS; SUBCUTANEOUS at 05:00

## 2021-01-02 RX ADMIN — HEPARIN SODIUM 5000 UNITS: 5000 INJECTION INTRAVENOUS; SUBCUTANEOUS at 05:00

## 2021-01-02 RX ADMIN — Medication 30 ML: at 09:04

## 2021-01-02 RX ADMIN — HEPARIN SODIUM 5000 UNITS: 5000 INJECTION INTRAVENOUS; SUBCUTANEOUS at 13:00

## 2021-01-02 RX ADMIN — INSULIN GLARGINE 30 UNITS: 100 INJECTION, SOLUTION SUBCUTANEOUS at 21:08

## 2021-01-02 RX ADMIN — MEROPENEM 500 MG: 500 INJECTION, POWDER, FOR SOLUTION INTRAVENOUS at 03:00

## 2021-01-02 RX ADMIN — INSULIN LISPRO 3 UNITS: 100 INJECTION, SOLUTION INTRAVENOUS; SUBCUTANEOUS at 05:00

## 2021-01-02 RX ADMIN — MEROPENEM 500 MG: 500 INJECTION, POWDER, FOR SOLUTION INTRAVENOUS at 09:04

## 2021-01-02 RX ADMIN — ATORVASTATIN CALCIUM 40 MG: 40 TABLET, FILM COATED ORAL at 21:08

## 2021-01-02 RX ADMIN — FAMOTIDINE 20 MG: 10 INJECTION INTRAVENOUS at 09:04

## 2021-01-02 RX ADMIN — INSULIN GLARGINE 30 UNITS: 100 INJECTION, SOLUTION SUBCUTANEOUS at 09:04

## 2021-01-02 RX ADMIN — Medication 75 MCG/HR: at 21:05

## 2021-01-02 RX ADMIN — HEPARIN SODIUM AND DEXTROSE 12 UNITS/KG/HR: 10000; 5 INJECTION INTRAVENOUS at 18:09

## 2021-01-02 RX ADMIN — INSULIN LISPRO 3 UNITS: 100 INJECTION, SOLUTION INTRAVENOUS; SUBCUTANEOUS at 11:16

## 2021-01-02 NOTE — PROGRESS NOTES
01/01/21 1926   Patient Observations   Pulse (Heart Rate) 97   Resp Rate 16   O2 Sat (%) 100 %   ETCO2 (mmHg) 38 mmHg   Airway - Continuous Aspiration of Subglottic Secretions (JACQUELINE) Tube 12/25/20 Oral   Placement Date/Time: 12/25/20 0228   Number of Attempts: 1  Location: Oral  Placement Verified: Auscultation;BBS  Airway Types: Endotracheal, cuffed  Airway Tube Size: 8 mm   Insertion Depth (cm) 25 cm   Line Michael Lips   Side Secured Device; Right   Cuff Pressure   (MOV)   Site Assessment Clean, dry, & intact   Suction on Yes   Respiratory   Patient on Vent Yes - If patient is on vent, add Doc Flowsheet Ventilator (). Chest/Tracheal Assessment Chest expansion, symmetrical   Airway Clearance   Suction ET Tube   Suction Device Inline suction catheter   Suction Catheter Size 14 Fr   Sputum Method Obtained Endotracheal   Sputum Amount Small   Sputum Consistency Thick   Vent Settings   FIO2 (%) 28 %   SpO2/FIO2 Ratio 357.14   CMV Rate Set 16   Back-Up Rate 16   Vt Set (ml) 450 ml   PEEP/VENT (cm H2O) 5 cm H20   Insp Time (sec) 0.9 sec   Insp Rise Time % 50 %   Flow Trigger 3   Ventilator Measurements   Resp Rate Observed 16   Vt Exhaled (Machine Breath) (ml) 450 ml   Ve Observed (l/min) 7.2 l/min   PIP Observed (cm H2O) 21 cm H2O   Plateau Pressure (cm H2O) 19 cm H2O   MAP (cm H2O) 9.3   I:E Ratio Actual 1:3.2   Static Compliance (ml/cm H20) 32 ml/cm H20   Safety & Alarms   Circuit Temperature 98.8 °F (37.1 °C)   Backup Mode Checked/Apnea Yes   Pressure Max 40 cm H2O   Ve Min 2   Ve Max 20   Vt Min 200 ml   Vt Max 800 ml   RR Max 40   Ambu Bag Yes   Ambu Mask Yes   ETCO2/TCM Min 20 mmHg   ETCO2/TCM Max 50 mmHg   Age Specific Ventilator Associated Pneumonia Bundle   Patient Age Group Adult   Vent Method/Mode   Ventilation Method Conventional   Ventilator Mode Assist control;VC+   Pulmonary Toilet   Pulmonary Toilet Cough and deep breath;H. O.B elevated;Suction     Check completed and tolerated well.   Patient found on above settings with the above results. No resp distress noted.

## 2021-01-02 NOTE — PROGRESS NOTES
01/02/21 0351   Patient Observations   Pulse (Heart Rate) 100   Resp Rate 16   O2 Sat (%) 100 %   Airway - Continuous Aspiration of Subglottic Secretions (JACQUELINE) Tube 12/25/20 Oral   Placement Date/Time: 12/25/20 0228   Number of Attempts: 1  Location: Oral  Placement Verified: Auscultation;BBS  Airway Types: Endotracheal, cuffed  Airway Tube Size: 8 mm   Insertion Depth (cm) 25 cm   Line Michael Lips   Side Secured Device; Left   Cuff Pressure   (MOV)   Site Assessment Clean, dry, & intact   Suction on Yes   Respiratory   Patient on Vent Yes - If patient is on vent, add Doc Flowsheet Ventilator (). Chest/Tracheal Assessment Chest expansion, symmetrical   Breath Sounds Bilateral Diminished   Airway Clearance   Suction ET Tube   Suction Device Inline suction catheter   Suction Catheter Size 14 Fr   Sputum Method Obtained Endotracheal   Sputum Amount Moderate   Sputum Consistency Thick   Vent Settings   FIO2 (%) 28 %   SpO2/FIO2 Ratio 357.14   CMV Rate Set 16   Back-Up Rate 16   Vt Set (ml) 450 ml   PEEP/VENT (cm H2O) 5 cm H20   Insp Time (sec) 0.9 sec   Insp Rise Time % 50 %   Flow Trigger 3   Ventilator Measurements   Resp Rate Observed 16   Vt Exhaled (Machine Breath) (ml) 471 ml   Ve Observed (l/min) 7.6 l/min   PIP Observed (cm H2O) 23 cm H2O   MAP (cm H2O) 9.5   I:E Ratio Actual 1:3.2   Safety & Alarms   Circuit Temperature 98.6 °F (37 °C)   Backup Mode Checked/Apnea Yes   Pressure Max 40 cm H2O   Ve Min 2   Ve Max 20   Vt Min 200 ml   Vt Max 800 ml   RR Max 40   Ambu Bag Yes   Ambu Mask Yes   ETCO2/TCM Min 20 mmHg   ETCO2/TCM Max 50 mmHg   Age Specific Ventilator Associated Pneumonia Bundle   Patient Age Group Adult   Vent Method/Mode   Ventilation Method Conventional   Ventilator Mode Assist control;VC+   $$ Ventilator Subsequent Subsequent Vent Day   Pulmonary Toilet   Pulmonary Toilet H. O.B elevated;Suction;Cough and deep breath     Check completed and tolerated well.   Patient found on above settings with the above results. No resp distress noted.

## 2021-01-02 NOTE — CONSULTS
[facility]    Name:  Betito Staton  MR#:   [id number]  :  1955  ACCOUNT #:  [de-identified]  DATE OF SERVICE:  2021    REASON FOR CONSULTATION:  Ventilator dependency. HISTORY OF PRESENT ILLNESS:  The patient is a 70-year-old female with a past medical history significant for COPD, congestive heart failure, hypertension, diabetes mellitus, dementia, who [facility] with chronic systolic heart failure, acute MI with anterolateral STEMI. The patient has had a history of a stent placement on 2020, was intubated, subsequently extubated on 2020, transferred to [address] [place] [address]; however, on 2020, the patient was reintubated and transferred to ICU for respiratory failure and cardiogenic shock. The patient is unable to be weaned since that time. ENT consultation requested for evaluation regarding the above. Apparently, discussion was given to family regarding placement of PEG and trach; family agreeable. I have been unable to contact them. In any event, ENT consultation requested for evaluation. The patient has also had some difficulties with aspiration. Furthermore, she has had acute encephalopathy, significant dysphagia, history of hepatitis C, COPD, and dementia. ALLERGIES:  DENIED. MEDICATIONS:  Current medication use includes DuoNeb, aspirin, Lipitor, carvedilol, Peridex, Plavix. She was recently on Brilinta which was discontinued. The patient also has had use of Pepcid, fentanyl, Lasix, lisinopril, Merrem, Versed, spironolactone. PAST MEDICAL HISTORY:  Significant for the above as well as arthritis, asthma, chronic back pain, history of diabetes, diabetic neuropathy, emphysema, osteoarthritis. PAST SURGICAL HISTORY:  Includes a tubal ligation, history of a carpal tunnel release as well as back surgery in  as well as the above-mentioned cardiac stent placement.     SOCIAL HISTORY:  The patient smokes one pack of cigarettes per day. Does admit to cocaine use. Admits to social alcohol intake. FAMILY HISTORY:  Noncontributory. The patient has two siblings which I believe are sisters. REVIEW OF SYSTEMS:  Otherwise, noncontributory. PHYSICAL EXAMINATION:  GENERAL:  Reveals a well-developed, well-nourished, intubated 40-year-old female. VITAL SIGNS:  Height of 5 feet 7 inches, weight of 156 with a BMI of 24.5. HEENT:  Ear exam reveals normal-appearing tympanic membranes. The oral cavity, oropharynx reveals the patient's ET tubing in place. The intranasal exam reveals no evidence of any mucopurulent discharge. NECK:  Supple. Nontender. No masses are palpable. CHEST:  Distant breath sounds. HEART:  S1, S2. No murmur audible. EXTREMITIES:  Within normal limits. IMPRESSION AND PLAN:  1. Ventilator dependency. The patient has been on chronic ventilator almost since admission with a short period of extubation approximately 2 weeks prior. Due to the patient's issues, I do feel that it may be reasonable for placement of a tracheostomy, especially in regards to placement once the patient's medical issues resolve. I have attempted to contact the patient's sister. I was unable to reach out sister. We will continue to make efforts to do so. The patient is tentatively on the operating room schedule for Monday, 01/04/2021, in morning hours. We will proceed with the above assuming consent is obtained. 2.  Anticoagulation. Plan:  The patient presently on aspirin, is also on Plavix, was on Brilinta for her underlying cardiac issues. Plavix has been held for approximately 3 days' time. The patient also to undergo placement of a PEG tube, I believe, on 01/05/2021, with discontinuation of Plavix may allow for ease of procedure.   The patient already at an increased risk of postoperative bleeding simply because the tracheotomy is not an open and closed incision where the wound needs to granulate, significant raw edges of the wound may cause some significant bleeding in the postoperative period. 3.  History of hepatitis C. Plan:  The patient with history of hepatitis C with some liver masses and we are aware. 4.  Anemia. Plan:  The patient with a hemoglobin of 7.4. The patient with a normal platelet count. Also with a PT of 15.1 with PTT of 27.5. Platelet function is probably altered from the Plavix. We will proceed with the above surgery as described above. Thank you for the consult.       MD CONNOR [email or web address]  D:  01/02/2021 8:49  T:  01/02/2021 13:43  JOB #:  [id number]

## 2021-01-02 NOTE — PROGRESS NOTES
01/01/21 2338   Patient Observations   Pulse (Heart Rate) 100   Resp Rate 16   O2 Sat (%) 100 %   Airway - Continuous Aspiration of Subglottic Secretions (JACQUELINE) Tube 12/25/20 Oral   Placement Date/Time: 12/25/20 0228   Number of Attempts: 1  Location: Oral  Placement Verified: Auscultation;BBS  Airway Types: Endotracheal, cuffed  Airway Tube Size: 8 mm   Insertion Depth (cm) 25 cm   Line Michael Lips   Side Secured Device; Right   Cuff Pressure   (MOV)   Site Assessment Clean, dry, & intact   Suction on Yes   Respiratory   Patient on Vent Yes - If patient is on vent, add Doc Flowsheet Ventilator (). Respiratory Pattern Regular   Chest/Tracheal Assessment Chest expansion, symmetrical   Vent Settings   FIO2 (%) 28 %   SpO2/FIO2 Ratio 357.14   CMV Rate Set 16   Back-Up Rate 16   Vt Set (ml) 450 ml   PEEP/VENT (cm H2O) 5 cm H20   Insp Time (sec) 0.9 sec   Insp Rise Time % 50 %   Flow Trigger 3   Ventilator Measurements   Resp Rate Observed 16   Vt Exhaled (Machine Breath) (ml) 456 ml   Ve Observed (l/min) 7.3 l/min   PIP Observed (cm H2O) 24 cm H2O   MAP (cm H2O) 9.5   I:E Ratio Actual 1:3.2   Safety & Alarms   Circuit Temperature 98.6 °F (37 °C)   Backup Mode Checked/Apnea Yes   Pressure Max 40 cm H2O   Ve Min 2   Ve Max 20   Vt Min 200 ml   Vt Max 800 ml   RR Max 40   Ambu Bag Yes   Ambu Mask Yes   Age Specific Ventilator Associated Pneumonia Bundle   Patient Age Group Adult   Vent Method/Mode   Ventilation Method Conventional   Ventilator Mode Assist control;VC+   Pulmonary Toilet   Pulmonary Toilet H. O.B elevated     Check completed and tolerated well. Patient found on above settings with the above results. No resp distress noted.

## 2021-01-02 NOTE — PROGRESS NOTES
01/02/21 1556   Weaning Parameters   Spontaneous Breathing Trial Complete Yes  (pt,s RSBI high after 5  minutes)

## 2021-01-02 NOTE — PROGRESS NOTES
[facility]  ICU Progress Note      Name: Garry Landon   : 1955   MRN: [id number]   Date: 2021 9:00 AM     [x]I have reviewed the flowsheet and previous days notes. Events overnight reviewed and discussed with nursing staff. Vital signs and records reviewed. Interval HPI:  Patient is a 72 y. o. female with a past medical history of COPD, CHF, HTN, DM, dementia, presented to [facility] with acute on chronic systolic heart failure, acute MI with anterolateral STEMI and Q waves s/p ptca/stent to proximal/mid LAD on 20. Pt was intubated and extubated on 20, transferred to . On 20 patient was intubated and transferred to ICU for respiratory failure and cardiogenic +/- septic shock. Respiratory cultures on  with ESBL on Merrem. Dr. Tracy Signs and IR have been consulted for trach/PEG. Ventilator day# 12  · Continues to have nocturnal fevers. Tolerating TF  · Mentation waxes and wanes. Not command following this AM, RASS -3  · Brilinta d/c and Plavix started by Cardiology, as this will aid in an easier transition for trach/PEG; can be discontinued before scheduled procedures; heparin can be used if necessary   · Hold anticoagulation as needed for procedures  · COVID19 NAAT from 2021 came back negative  · Discontinued Merem per ID recs, patient completed 8 day course of it              ROS:Review of systems not obtained due to patient factors. Vital Signs:    Visit Vitals  /65   Pulse 89   Temp 99 °F (37.2 °C)   Resp 16   Ht 5' 7\" (1.702 m)   Wt 70.9 kg (156 lb 4.9 oz)   SpO2 100%   Breastfeeding No   BMI 24.48 kg/m²       O2 Device: Endotracheal tube, Ventilator   O2 Flow Rate (L/min): 40 l/min   Temp (24hrs), Av.8 °F (37.7 °C), Min:99 °F (37.2 °C), Max:100.6 °F (38.1 °C)       Intake/Output:   Last shift:      No intake/output data recorded.   Last 3 shifts:  190 0700  In: 3222.5 [I.V.:412.5]  Out: 5731 [Urine:1025]    Intake/Output Summary (Last 24 hours) at 1/2/2021 0954  Last data filed at 1/2/2021 0600  Gross per 24 hour   Intake 1855.5 ml   Output 500 ml   Net 1355.5 ml       Ventilator Settings:  Mode Rate Tidal Volume Pressure FiO2 PEEP   Assist control, VC+   500 ml(8cc per kg)  8 cm H2O 28 % 5 cm H20     Peak airway pressure: 25 cm H2O    Minute ventilation: 8 l/min      ARDS network Guidelines:   Lung protective strategy and Plateau  Pressure goal < 30 cm H2O goals  Oxygenation Goals PaO2 55-80 mm Hg or SaO2 88-95%  PH goal 7.30-7.45    VAP bundle:  Reviewed. [place] tube to suction at 20-30 cm Hg. Maintain [place] tube with 5-10ml air every 4 hours  Routine oral care every 4 hours  Elevation of head > 45 degree  Daily sedation holiday and SBT evaluation starting at 6.00am.    Physical Exam:      General: Intubated/sedated; NAD, acyanotic   HEENT:  Anicteric sclerae; pink palpebral conjunctivae; mucosa moist, ETT  Resp: (+) rhonchi b/l. Symmetrical chest expansion, no accessory muscle use; good airway entry; no rales/ wheezing/ noted  CV:  S1, S2 present; regular rate and rhythm  GI:  Abdomen soft, non-tender; (+) active bowel sounds  Extremities:  +2 pulses on all extremities; +1 BUE edema/ No cyanosis/ clubbing noted  Skin:  Warm; no rashes/ lesions noted, normal turgor/cap refill. Scattered hyperpigmentation on abd   Neurologic:  RASS -3.  Opening eyes spontaneously, No command following  Devices:  ETT, Purewick, FMS    DATA:     Current Facility-Administered Medications   Medication Dose Route Frequency    [Held by provider] clopidogreL (PLAVIX) tablet 75 mg  75 mg Oral DAILY    heparin (porcine) injection 5,000 Units  5,000 Units SubCUTAneous Q8H    polyethylene glycol (MIRALAX) packet 17 g  17 g Per NG tube PRN    insulin glargine (LANTUS) injection 30 Units  30 Units SubCUTAneous QHS    chlorhexidine (PERIDEX) 0.12 % mouthwash 10 mL  10 mL Oral Q12H    fentaNYL (PF) 900 mcg/30 ml infusion soln 0-200 mcg/hr IntraVENous TITRATE    midazolam (VERSED) injection 1-2 mg  1-2 mg IntraVENous Q10MIN PRN    fentaNYL citrate (PF) injection  mcg   mcg IntraVENous Q30MIN PRN    midazolam in normal saline (VERSED) 1 mg/mL infusion  1-5 mg/hr IntraVENous TITRATE    ELECTROLYTE REPLACEMENT PROTOCOL - Potassium Standard Dosing   1 Each Other PRN    ELECTROLYTE REPLACEMENT PROTOCOL - Magnesium   1 Each Other PRN    ELECTROLYTE REPLACEMENT PROTOCOL - Phosphorus  Standard Dosing  1 Each Other PRN    ELECTROLYTE REPLACEMENT PROTOCOL - Calcium   1 Each Other PRN    meropenem (MERREM) 500 mg in sterile water (preservative free) 10 mL IV syringe  500 mg IntraVENous Q6H    insulin glargine (LANTUS) injection 30 Units  30 Units SubCUTAneous DAILY    [Held by provider] carvediloL (COREG) tablet 6.25 mg  6.25 mg Oral Q12H    [Held by provider] lisinopriL (PRINIVIL, ZESTRIL) tablet 5 mg  5 mg Oral DAILY    [Held by provider] furosemide (LASIX) injection 40 mg  40 mg IntraVENous Q12H    [Held by provider] spironolactone (ALDACTONE) tablet 25 mg  25 mg Oral DAILY    multivit-folic acid-herbal 245 (WELLESSE PLUS) oral liquid 30 mL  30 mL Per NG tube DAILY    insulin lispro (HUMALOG) injection   SubCUTAneous Q6H    albuterol-ipratropium (DUO-NEB) 2.5 MG-0.5 MG/3 ML  3 mL Nebulization Q4H PRN    famotidine (PF) (PEPCID) 20 mg in 0.9% sodium chloride 10 mL injection  20 mg IntraVENous Q12H    sodium chloride (NS) flush 5-40 mL  5-40 mL IntraVENous Q8H    sodium chloride (NS) flush 5-40 mL  5-40 mL IntraVENous PRN    acetaminophen (TYLENOL) tablet 650 mg  650 mg Oral Q6H PRN    Or    acetaminophen (TYLENOL) suppository 650 mg  650 mg Rectal Q6H PRN    promethazine (PHENERGAN) tablet 12.5 mg  12.5 mg Oral Q6H PRN    Or    ondansetron (ZOFRAN) injection 4 mg  4 mg IntraVENous Q6H PRN    glucose chewable tablet 16 g  4 Tab Oral PRN    glucagon (GLUCAGEN) injection 1 mg  1 mg IntraMUSCular PRN    dextrose (D50W) injection syrg 12.5-25 g  25-50 mL IntraVENous PRN    [Held by provider] aspirin chewable tablet 81 mg  81 mg Oral DAILY    atorvastatin (LIPITOR) tablet 40 mg  40 mg Oral QHS         Labs: Results:       Chemistry Recent Labs     01/02/21  0334 01/01/21  0244 12/31/20  0445   * 139* 201*    139 139   K 4.6 4.5 4.1    106 106   CO2 28 30 31   BUN 17 17 22*   CREA 0.38* 0.40* 0.48*   CA 8.4* 8.3* 8.4*   AGAP 6 3 2*   BUCR 45* 43* 46*      CBC w/Diff Recent Labs     01/02/21 0334 01/01/21  0244 12/31/20  0445   WBC 7.9 9.5 9.5   RBC 2.74* 2.91* 2.97*   HGB 7.4* 7.6* 7.8*   HCT 23.8* 25.1* 25.6*   * 434* 395   GRANS 80* 94* 85*   LYMPH 13* 3* 6*   EOS 2 0 0      Coagulation No results for input(s): PTP, INR, APTT, INREXT, INREXT in the last 72 hours. Liver Enzymes No results for input(s): TP, ALB, TBIL, AP in the last 72 hours. No lab exists for component: SGOT, GPT, DBIL   ABG Lab Results   Component Value Date/Time    PHI 7.44 01/02/2021 04:42 AM    PCO2I 42.3 01/02/2021 04:42 AM    PO2I 87 01/02/2021 04:42 AM    HCO3I 28.6 (H) 01/02/2021 04:42 AM    FIO2I 28 01/02/2021 04:42 AM      Microbiology Recent Labs     12/31/20  2204 12/31/20  1430 12/31/20  1100   CULT NO GROWTH 1 DAY NO GROWTH 2 DAYS LIGHT NORMAL RESPIRATORY AILEEN  CHECKING FOR POSSIBLE RARE GRAM NEGATIVE RODS*  NO GROWTH 2 DAYS          Telemetry: [x]Sinus []A-flutter []Paced    []A-fib []Multiple PVCs                  Imaging:  CXR Results  (Last 48 hours)               01/02/21 0155  XR CHEST PORT Final result    Impression:  IMPRESSION:       1. Support devices as above. No pneumothorax or new findings. 2. Stable chronic interstitial lung process in this patient with pulmonary   fibrosis. Superimposed infection or edema would be difficult to fully exclude. Narrative:  PORTABLE CHEST RADIOGRAPH        CPT CODE: 75680        INDICATION: Intubated. COMPARISON: 1/1/2021.        FINDINGS: Frontal view of the chest obtained at 0044 hours. Right PICC line tip projects   over cavoatrial junction. ET tube tip projects 3.3 cm above the chace. Feeding   tube extends below the diaphragm with tip not included on the exam. The   cardiomediastinal silhouette is within normal limits. Bilateral increased   interstitial lung markings diffusely without significant change. No   pneumothorax. Evaluation of the osseous structures is stable. 01/01/21 0125  XR CHEST PORT Final result    Impression:  Impression:        1. Support lines and tubes are as described. 2. Similar radiographic appearance of bilateral interstitial and airspace   opacities, consistent with known chronic interstitial lung disease. Superimposed   infiltrate/edema would be difficult to exclude with certainty                               Narrative:  AP CHEST, PORTABLE       INDICATION: Endotracheal tube placement       COMPARISON: Chest x-ray 12/31/2020       FINDINGS:  EKG leads overlie the patient. Endotracheal tube tip projects 4.1 cm   above the chace. NG tube courses below left hemidiaphragm, distal tip is beyond   the field-of-view. Right arm PICC line tip projects at the mid to lower SVC. Cardiac silhouette is stable. Atherosclerosis noted. Similar bilateral   interstitial and airspace opacities. No obvious pleural effusion or   pneumothorax. No acute osseous abnormalities are identified. IMPRESSION:   · Acute on Chronic Hypoxic and Hypercapnic Respiratory Failure - Requiring mechanical ventilation, Extubated on 12/17 and reintubated on 12/22, Likely aspiration event. Will require trach placement if there is no improvement. Dr. Alia Alvarez consulted. · Wide Complex Arrhythmia (12/25) -noted on bedside cardiac monitor. Lasted roughly 5-10 mins with Spontaneous resolution.  E-lyte derangement vs Drug effect (pt was on 55mcg/hr Oliverio-Synephrine at the time) vs cardiac insult.    · Acute on Chronic Systolic Heart Failure - superimposed on severe ILD, Echo 12/10/20 EF 35-40%  · Combination septic/cardiogenic shock, improving   · Aspiration PNA. Sputum Cx(12/22) (+) heavy E.Coli-ESBL. Recent Hx of E.coli ESBL.   · Acute Encephalopathy - likely toxic/metabolic vs infectious vs hepatic in nature, ammonia wnl.   · Severe pulmonary fibrosis - with extensive honeycombing and bronchiectasis as seen on CTA chest 12/16/20 - appears to be a new diagnosis but possibly UIP  · Acute aspiration pneumonia with severe sepsis  · UTI (+)E. coli  · Acute MI with anterolateral STEMI and Q waves - s/p ptca/stent to proximal/mid LAD using ARELY on DAPT on 12/11/20- Remains on Brilinta  · Severe dysphagia - failed MBS. Will place PEG tube early next week with IR.   · Hx of Hep C: + RNA viral load 4/4/2020  · Multiple liver masses - noted on CT scan 11/25/20  · COPD - on home O2 4L NC  · DM  · Dementia - unknown baseline, on aricept    RECOMMENDATIONS:   Resp:   -Titrate FiO2/ supp O2 for SpO2 >90%  -Family is agreeable to trach. Eva Peaks tentatively scheduled for Monday  -Aspiration precautions   I/D:   -Persistent fevers, no clinical signs of sepsis. Will consult ID  -Aleukocyotosis  -Per ID, discontinued Merrem started 12/25 for Ecoli (ESBL)   -Negative for COVID19 on 1/1/2021  Hem/Onc:   -Daily CBC; H/H, and plts are stable  -Will transfuse unit of blood to maintain Hgb >7  CVS:   -Brilinta changed to Plavix.  Hold plavix until procedures done  -Appreciate cardiology recs: Plan for Eva Peaks / Peg - may stop Plavix for 2-3 days if needed  for PEG tube.  Can use heparin drip if DAPT is interuppted  -EF 35-40 % on 12/10/20, the day before stent placement  -Proximal LAD angioplasty and stenting on 12/11/2020 for 95% stenotic lesion.  -Hemodynamics stable off pressors  -Tolerating statin   Metabolic:   -Daily BMP; monitor e-lytes; replace PRN  Renal:    -Trend Renal indices, Purewick   Endocrine:   -Continue lantus BID with SSI  GI: -SUP  -Tolerating TF   -Bowel regimen: Miralax PRN  -Will require PEG tube. IR consult placed. Will likely be placed next week. Awating CT abdomen per radiology. Musc/Skin:   -No acute issues, wound care  Neuro:   -Versed and fentanyl gtt for sedation, goal RASS 0 to -1  PPX  -SUP ppx  -SQH for DVT ppx   ACCESS: Scheduled for PICC line placement today   Family agreeable to trach and PEG, ENT consulted (Dr. Arely Mendosa)  Discussed in Interdisciplinary Rounds            The patient is: [x] acutely ill Risk of deterioration: [] moderate    [] critically ill  [] high     []See my orders for details    My assessment/plan was discussed with:  [x]Nursing []PT/OT    [x]Respiratory therapy [x]    []Family []       Eulogio Kwon MD    Pulmonary / Critical Care Physician:    Chart and note reviewed. Data reviewed. Seen on rounds earlier today. I have independently evaluated and examined the patient. I agree with the exam, assessment and plans outlined by  In brief my My findings , evaluation and recommendations are as stated below:    Pt remains stable on MV. Agitated with sedation hold. Okay per cards to start heparin gtt instead of plavix for pending Trach and G-tube early next week. Continue TFs and supportive care. Rest of details and diagnostic/treatment plans per APC note. I have personally reviewed all pertinent data including labs, imaging and recommendations of treatment team providers. Total of 35  min critical care time spent at bedside during the course of care providing evaluation,management and care decisions and ordering appropriate treatment related to critical care problems exclusive of procedures. The reason for providing this level of medical care for this critically ill patient was due a critical illness that impaired one or more vital organ systems such that there was a high probability of imminent or life threatening deterioration in the patients condition.  This care involved high complexity decision making to assess, manipulate, and support vital system functions, to treat this degree vital organ system failure and to prevent further life threatening deterioration of the patients condition.       Vivica Cogan, MD  9:03 AM

## 2021-01-02 NOTE — PROGRESS NOTES
Remains on ventilator, possible Trach/PEG soon, should be ok to proceed and hold plavix when date set. Will plan to followup on Monday. I saw, examined, and evaluated the patient. I personally reviewed the patient's labs, tests, vitals, orders, medications, updated history, and other providers assessments. I personally agree with the findings as stated and the plan as documented. Valentin Manzo MD      CARDIOLOGY PROGRESS NOTE  RECS:  1. Acute respiratory failure- requiring mechanical ventilation- extubated  12/17/20. re intubated 12/22/20.  Continue supportive care  2. Sepsis- Sputum Cx(12/22) (+) heavy E.Coli-ESBL. on antibiotics. ID following   3. Wide complex Arrhythmia on 12/25/20- No recurrence will continue to monitor.  Monitor electrolytes    4. Hypotension- improved. Dobutamine D/C - b/p stable    5. Subacute MI- 12/10/20 -s/p [facility] S/p ptca/stent to proximal/ mid LAD using ARELY.    Moderate to severe LV dysfunction. ekg no new ischemic changes suggestive of reinfarction or stent thrombosis.  Continue asa.  I have discontinued Brilinta and change to Plavix as she is planned to have PEG tube placement next week  6. Anemia- H&H Stable.  S/p transfusion. 7. Acute Systolic Congestive heart Failure-recent echo with EF% 35%-40. Compensated at present. Lasix as needed. 8. COPD,   9. Dementia - failed swallow eval.  Plan for Margarito Mussel / Peg - may stop Plavix for 2-3 days if needed  for PEG tube.  Can use heparin drip if DAPT is interuppted.         Cardiac cath 12/11/20  Patient presented to [address] [place] [address] with late presentation anterior wall MI.  EF 35-40%. Patient was initially managed medically-- however developed acute pulmonary edema requiring intubation. Also troponin level increasing consistent with ongoing ischemia. S/p ptca/stent to proximal/ mid LAD using ARELY. LVEDP 8 mmHg. Normal PASP pressure with normal PCWP.   Moderate to severe LV dysfunction.     Echo 12/10/20  · LV: Estimated LVEF is 35 - 40%. Visually measured ejection fraction. Normal cavity size and wall thickness. Moderately and segmentally reduced systolic function. Inconclusive left ventricular diastolic function. · AO: Mild aortic root dilatation; diameter is 3.8 cm.     ASSESSMENT:  Hospital Problems  Date Reviewed: 2/7/2018          Codes Class Noted POA    Goals of care, counseling/discussion ICD-10-CM: Z71.89  ICD-9-CM: V65.49  Unknown Unknown        Dementia without behavioral disturbance (HCC) ICD-10-CM: F03.90  ICD-9-CM: 294.20  Unknown Unknown        Pulmonary fibrosis ([facility] ICD-10-CM: J84.10  ICD-9-CM: 570  Unknown Unknown        Severe protein-calorie malnutrition ([facility] ICD-10-CM: E43  ICD-9-CM: 418  12/16/2020 Clinically Undetermined        Acute on chronic systolic congestive heart failure ([facility] ICD-10-CM: V68.70  ICD-9-CM: 428.23, 428.0  12/15/2020 Unknown        * (Principal) STEMI (ST elevation myocardial infarction) ([facility] ICD-10-CM: I21.3  ICD-9-CM: 410.90  12/10/2020 Unknown                SUBJECTIVE:  Intubated / sedated    OBJECTIVE:    VS:   Visit Vitals  /65   Pulse 94   Temp 99 °F (37.2 °C)   Resp 16   Ht 5' 7\" (1.702 m)   Wt 70.9 kg (156 lb 4.9 oz)   SpO2 99%   Breastfeeding No   BMI 24.48 kg/m²         Intake/Output Summary (Last 24 hours) at 1/2/2021 0829  Last data filed at 1/2/2021 0600  Gross per 24 hour   Intake 1911 ml   Output 500 ml   Net 1411 ml     TELE: normal sinus rhythm    General: chronically ill and intubated and sedated  HENT: Normocephalic, atraumatic. Normal external eye.   Neck :  no JVD  Cardiac:  regular rate and rhythm, S1, S2 normal, no murmur, click, rub or gallop  Lungs: clear to auscultation bilaterally  Abdomen: Soft, nontender, no masses  Extremities:  No c/c/e, peripheral pulses present      Labs: Results:       Chemistry Recent Labs     01/02/21  0334 01/01/21  0244 12/31/20  0445   * 139* 201*    139 139   K 4.6 4.5 4.1    106 106   CO2 28 30 31   BUN 17 17 22*   CREA 0.38* 0.40* 0.48*   CA 8.4* 8.3* 8.4*   AGAP 6 3 2*   BUCR 45* 43* 46*      CBC w/Diff Recent Labs     01/02/21  0334 01/01/21  0244 12/31/20  0445   WBC 7.9 9.5 9.5   RBC 2.74* 2.91* 2.97*   HGB 7.4* 7.6* 7.8*   HCT 23.8* 25.1* 25.6*   * 434* 395   GRANS 80* 94* 85*   LYMPH 13* 3* 6*   EOS 2 0 0      Cardiac Enzymes No results for input(s): CPK, CKND1, NATHANIEL in the last 72 hours. No lab exists for component: CKRMB, TROIP   Coagulation No results for input(s): PTP, INR, APTT, INREXT in the last 72 hours. Lipid Panel No results found for: CHOL, CHOLPOCT, CHOLX, CHLST, CHOLV, [id number], HDL, HDLP, LDL, LDLC, DLDLP, [id number], VLDLC, VLDL, TGLX, TRIGL, TRIGP, TGLPOCT, CHHD, CHHDX   BNP No results for input(s): BNPP in the last 72 hours. Liver Enzymes No results for input(s): TP, ALB, TBIL, AP in the last 72 hours.     No lab exists for component: SGOT, GPT, DBIL   Thyroid Studies Lab Results   Component Value Date/Time    TSH 2.92 12/10/2020 11:07 AM              Gato Arriaga NP

## 2021-01-03 ENCOUNTER — APPOINTMENT (OUTPATIENT)
Dept: VASCULAR SURGERY | Age: 66
DRG: 003 | End: 2021-01-03
Attending: STUDENT IN AN ORGANIZED HEALTH CARE EDUCATION/TRAINING PROGRAM
Payer: MEDICARE

## 2021-01-03 ENCOUNTER — APPOINTMENT (OUTPATIENT)
Dept: GENERAL RADIOLOGY | Age: 66
DRG: 003 | End: 2021-01-03
Attending: PHYSICIAN ASSISTANT
Payer: MEDICARE

## 2021-01-03 ENCOUNTER — APPOINTMENT (OUTPATIENT)
Dept: CT IMAGING | Age: 66
DRG: 003 | End: 2021-01-03
Attending: NURSE PRACTITIONER
Payer: MEDICARE

## 2021-01-03 LAB
ANION GAP SERPL CALC-SCNC: 5 MMOL/L (ref 3–18)
ANION GAP SERPL CALC-SCNC: 5 MMOL/L (ref 3–18)
ANION GAP SERPL CALC-SCNC: 6 MMOL/L (ref 3–18)
APTT PPP: 104.4 SEC (ref 23–36.4)
ARTERIAL PATENCY WRIST A: YES
BASE EXCESS BLD CALC-SCNC: 4 MMOL/L
BASOPHILS # BLD: 0 K/UL (ref 0–0.1)
BASOPHILS NFR BLD: 0 % (ref 0–2)
BDY SITE: ABNORMAL
BODY TEMPERATURE: 37.7
BUN SERPL-MCNC: 15 MG/DL (ref 7–18)
BUN SERPL-MCNC: 16 MG/DL (ref 7–18)
BUN SERPL-MCNC: 16 MG/DL (ref 7–18)
BUN/CREAT SERPL: 40 (ref 12–20)
BUN/CREAT SERPL: 47 (ref 12–20)
BUN/CREAT SERPL: 48 (ref 12–20)
CALCIUM SERPL-MCNC: 8.6 MG/DL (ref 8.5–10.1)
CALCIUM SERPL-MCNC: 8.7 MG/DL (ref 8.5–10.1)
CALCIUM SERPL-MCNC: 9 MG/DL (ref 8.5–10.1)
CHLORIDE SERPL-SCNC: 104 MMOL/L (ref 100–111)
CHLORIDE SERPL-SCNC: 105 MMOL/L (ref 100–111)
CHLORIDE SERPL-SCNC: 106 MMOL/L (ref 100–111)
CO2 SERPL-SCNC: 28 MMOL/L (ref 21–32)
CO2 SERPL-SCNC: 29 MMOL/L (ref 21–32)
CO2 SERPL-SCNC: 29 MMOL/L (ref 21–32)
CREAT SERPL-MCNC: 0.32 MG/DL (ref 0.6–1.3)
CREAT SERPL-MCNC: 0.33 MG/DL (ref 0.6–1.3)
CREAT SERPL-MCNC: 0.4 MG/DL (ref 0.6–1.3)
DIFFERENTIAL METHOD BLD: ABNORMAL
EOSINOPHIL # BLD: 0.2 K/UL (ref 0–0.4)
EOSINOPHIL NFR BLD: 2 % (ref 0–5)
ERYTHROCYTE [DISTWIDTH] IN BLOOD BY AUTOMATED COUNT: 15.1 % (ref 11.6–14.5)
GAS FLOW.O2 O2 DELIVERY SYS: ABNORMAL L/MIN
GAS FLOW.O2 SETTING OXYMISER: 14 BPM
GLUCOSE BLD STRIP.AUTO-MCNC: 146 MG/DL (ref 70–110)
GLUCOSE BLD STRIP.AUTO-MCNC: 169 MG/DL (ref 70–110)
GLUCOSE BLD STRIP.AUTO-MCNC: 177 MG/DL (ref 70–110)
GLUCOSE SERPL-MCNC: 117 MG/DL (ref 74–99)
GLUCOSE SERPL-MCNC: 141 MG/DL (ref 74–99)
GLUCOSE SERPL-MCNC: 151 MG/DL (ref 74–99)
HCO3 BLD-SCNC: 28.4 MMOL/L (ref 22–26)
HCT VFR BLD AUTO: 22.8 % (ref 35–45)
HGB BLD-MCNC: 7.1 G/DL (ref 12–16)
INSPIRATION.DURATION SETTING TIME VENT: 0.9 SEC
LYMPHOCYTES # BLD: 0.9 K/UL (ref 0.9–3.6)
LYMPHOCYTES NFR BLD: 13 % (ref 21–52)
MAGNESIUM SERPL-MCNC: 2 MG/DL (ref 1.6–2.6)
MAGNESIUM SERPL-MCNC: 2.1 MG/DL (ref 1.6–2.6)
MAGNESIUM SERPL-MCNC: 2.1 MG/DL (ref 1.6–2.6)
MCH RBC QN AUTO: 26.9 PG (ref 24–34)
MCHC RBC AUTO-ENTMCNC: 31.1 G/DL (ref 31–37)
MCV RBC AUTO: 86.4 FL (ref 74–97)
MONOCYTES # BLD: 0.5 K/UL (ref 0.05–1.2)
MONOCYTES NFR BLD: 7 % (ref 3–10)
NEUTS SEG # BLD: 5.3 K/UL (ref 1.8–8)
NEUTS SEG NFR BLD: 78 % (ref 40–73)
O2/TOTAL GAS SETTING VFR VENT: 28 %
PCO2 BLD: 43.3 MMHG (ref 35–45)
PEEP RESPIRATORY: 5 CMH2O
PH BLD: 7.43 [PH] (ref 7.35–7.45)
PHOSPHATE SERPL-MCNC: 3.5 MG/DL (ref 2.5–4.9)
PIP ISTAT,IPIP: 28
PLATELET # BLD AUTO: 444 K/UL (ref 135–420)
PMV BLD AUTO: 9.8 FL (ref 9.2–11.8)
PO2 BLD: 101 MMHG (ref 80–100)
POTASSIUM SERPL-SCNC: 4.3 MMOL/L (ref 3.5–5.5)
POTASSIUM SERPL-SCNC: 4.4 MMOL/L (ref 3.5–5.5)
POTASSIUM SERPL-SCNC: 4.6 MMOL/L (ref 3.5–5.5)
RBC # BLD AUTO: 2.64 M/UL (ref 4.2–5.3)
SAO2 % BLD: 98 % (ref 92–97)
SERVICE CMNT-IMP: ABNORMAL
SODIUM SERPL-SCNC: 138 MMOL/L (ref 136–145)
SODIUM SERPL-SCNC: 139 MMOL/L (ref 136–145)
SODIUM SERPL-SCNC: 140 MMOL/L (ref 136–145)
SPECIMEN TYPE: ABNORMAL
TOTAL RESP. RATE, ITRR: 14
VENTILATION MODE VENT: ABNORMAL
VOLUME CONTROL PLUS IVLCP: YES
VT SETTING VENT: 500 ML
WBC # BLD AUTO: 6.9 K/UL (ref 4.6–13.2)

## 2021-01-03 PROCEDURE — 74011636637 HC RX REV CODE- 636/637: Performed by: STUDENT IN AN ORGANIZED HEALTH CARE EDUCATION/TRAINING PROGRAM

## 2021-01-03 PROCEDURE — 74011000250 HC RX REV CODE- 250: Performed by: PHYSICIAN ASSISTANT

## 2021-01-03 PROCEDURE — 99291 CRITICAL CARE FIRST HOUR: CPT | Performed by: INTERNAL MEDICINE

## 2021-01-03 PROCEDURE — 65610000006 HC RM INTENSIVE CARE

## 2021-01-03 PROCEDURE — 74011000250 HC RX REV CODE- 250: Performed by: INTERNAL MEDICINE

## 2021-01-03 PROCEDURE — 85025 COMPLETE CBC W/AUTO DIFF WBC: CPT

## 2021-01-03 PROCEDURE — 80048 BASIC METABOLIC PNL TOTAL CA: CPT

## 2021-01-03 PROCEDURE — 74011250636 HC RX REV CODE- 250/636: Performed by: INTERNAL MEDICINE

## 2021-01-03 PROCEDURE — 82962 GLUCOSE BLOOD TEST: CPT

## 2021-01-03 PROCEDURE — 94762 N-INVAS EAR/PLS OXIMTRY CONT: CPT

## 2021-01-03 PROCEDURE — 94003 VENT MGMT INPAT SUBQ DAY: CPT

## 2021-01-03 PROCEDURE — 74011250636 HC RX REV CODE- 250/636: Performed by: PHYSICIAN ASSISTANT

## 2021-01-03 PROCEDURE — 74150 CT ABDOMEN W/O CONTRAST: CPT

## 2021-01-03 PROCEDURE — 36600 WITHDRAWAL OF ARTERIAL BLOOD: CPT

## 2021-01-03 PROCEDURE — 83735 ASSAY OF MAGNESIUM: CPT

## 2021-01-03 PROCEDURE — 36592 COLLECT BLOOD FROM PICC: CPT

## 2021-01-03 PROCEDURE — 74011000258 HC RX REV CODE- 258: Performed by: PHYSICIAN ASSISTANT

## 2021-01-03 PROCEDURE — 93970 EXTREMITY STUDY: CPT

## 2021-01-03 PROCEDURE — 74011636637 HC RX REV CODE- 636/637: Performed by: INTERNAL MEDICINE

## 2021-01-03 PROCEDURE — 74011636637 HC RX REV CODE- 636/637: Performed by: PHYSICIAN ASSISTANT

## 2021-01-03 PROCEDURE — 74011250636 HC RX REV CODE- 250/636: Performed by: STUDENT IN AN ORGANIZED HEALTH CARE EDUCATION/TRAINING PROGRAM

## 2021-01-03 PROCEDURE — 84100 ASSAY OF PHOSPHORUS: CPT

## 2021-01-03 PROCEDURE — 71045 X-RAY EXAM CHEST 1 VIEW: CPT

## 2021-01-03 PROCEDURE — 74011250637 HC RX REV CODE- 250/637: Performed by: INTERNAL MEDICINE

## 2021-01-03 PROCEDURE — 82803 BLOOD GASES ANY COMBINATION: CPT

## 2021-01-03 RX ORDER — FUROSEMIDE 10 MG/ML
20 INJECTION INTRAMUSCULAR; INTRAVENOUS EVERY 12 HOURS
Status: DISCONTINUED | OUTPATIENT
Start: 2021-01-03 | End: 2021-01-05

## 2021-01-03 RX ADMIN — INSULIN GLARGINE 30 UNITS: 100 INJECTION, SOLUTION SUBCUTANEOUS at 08:28

## 2021-01-03 RX ADMIN — MIDAZOLAM 3 MG/HR: 5 INJECTION, SOLUTION INTRAMUSCULAR; INTRAVENOUS at 16:22

## 2021-01-03 RX ADMIN — CHLORHEXIDINE GLUCONATE 0.12% ORAL RINSE 10 ML: 1.2 LIQUID ORAL at 08:28

## 2021-01-03 RX ADMIN — FUROSEMIDE 20 MG: 10 INJECTION, SOLUTION INTRAMUSCULAR; INTRAVENOUS at 23:19

## 2021-01-03 RX ADMIN — SODIUM CHLORIDE 10 ML: 9 INJECTION, SOLUTION INTRAMUSCULAR; INTRAVENOUS; SUBCUTANEOUS at 06:00

## 2021-01-03 RX ADMIN — CHLORHEXIDINE GLUCONATE 0.12% ORAL RINSE 10 ML: 1.2 LIQUID ORAL at 23:19

## 2021-01-03 RX ADMIN — INSULIN LISPRO 3 UNITS: 100 INJECTION, SOLUTION INTRAVENOUS; SUBCUTANEOUS at 13:21

## 2021-01-03 RX ADMIN — SODIUM CHLORIDE 10 ML: 9 INJECTION, SOLUTION INTRAMUSCULAR; INTRAVENOUS; SUBCUTANEOUS at 22:00

## 2021-01-03 RX ADMIN — ATORVASTATIN CALCIUM 40 MG: 40 TABLET, FILM COATED ORAL at 23:19

## 2021-01-03 RX ADMIN — FAMOTIDINE 20 MG: 10 INJECTION INTRAVENOUS at 23:19

## 2021-01-03 RX ADMIN — INSULIN GLARGINE 30 UNITS: 100 INJECTION, SOLUTION SUBCUTANEOUS at 23:19

## 2021-01-03 RX ADMIN — INSULIN LISPRO 3 UNITS: 100 INJECTION, SOLUTION INTRAVENOUS; SUBCUTANEOUS at 00:49

## 2021-01-03 RX ADMIN — INSULIN LISPRO 3 UNITS: 100 INJECTION, SOLUTION INTRAVENOUS; SUBCUTANEOUS at 23:53

## 2021-01-03 RX ADMIN — Medication 75 MCG/HR: at 20:27

## 2021-01-03 RX ADMIN — Medication 30 ML: at 08:29

## 2021-01-03 RX ADMIN — SODIUM CHLORIDE 10 ML: 9 INJECTION, SOLUTION INTRAMUSCULAR; INTRAVENOUS; SUBCUTANEOUS at 13:23

## 2021-01-03 RX ADMIN — Medication 75 MCG/HR: at 08:32

## 2021-01-03 RX ADMIN — FAMOTIDINE 20 MG: 10 INJECTION INTRAVENOUS at 08:29

## 2021-01-03 NOTE — ROUTINE PROCESS
1740: Talked with patient's sister and gave her an update on patient's condition. Patient's sister, Ulises Lee, gave consent for tracheotomy by Dr. Solange Anguiano, witnessed by Ricardo Morley RN.

## 2021-01-03 NOTE — PROGRESS NOTES
[facility]  ICU Progress Note      Name: Narcisa Vora   : 1955   MRN: [id number]   Date: 1/3/2021 9:00 AM     [x]I have reviewed the flowsheet and previous days notes. Events overnight reviewed and discussed with nursing staff. Vital signs and records reviewed. Interval HPI:  Patient is a 72 y. o. female with a past medical history of COPD, CHF, HTN, DM, dementia, presented to [facility] with acute on chronic systolic heart failure, acute MI with anterolateral STEMI and Q waves s/p ptca/stent to proximal/mid LAD on 20. Pt was intubated and extubated on 20, transferred to . On 20 patient was intubated and transferred to ICU for respiratory failure and cardiogenic +/- septic shock. Respiratory cultures on  with ESBL on Merrem. Dr. Katie Aguilar and IR have been consulted for trach/PEG. Ventilator day# 13  · Continues to have nocturnal fevers. Tolerating TF  · Mentation waxes and wanes. Not command following this AM, RASS -3  · Brilinta d/c and Plavix started by Cardiology, as this will aid in an easier transition for trach/PEG; can be discontinued before scheduled procedures; heparin can be used if necessary   · Hold anticoagulation as needed for procedures  · COVID19 NAAT from 2021 came back negative  · More than usual respiratory secretions and mucus  · Hemoglobin slowly continuously trending downward              ROS:Review of systems not obtained due to patient factors. Vital Signs:    Visit Vitals  /72   Pulse 100   Temp 99.3 °F (37.4 °C)   Resp 15   Ht 5' 7\" (1.702 m)   Wt 73 kg (160 lb 15 oz)   SpO2 100%   Breastfeeding No   BMI 25.21 kg/m²       O2 Device: Ventilator   O2 Flow Rate (L/min): 40 l/min   Temp (24hrs), Av.4 °F (37.4 °C), Min:98.4 °F (36.9 °C), Max:100.2 °F (37.9 °C)       Intake/Output:   Last shift:      No intake/output data recorded.   Last 3 shifts:  1901 -  0700  In: 2771 [I.V.:261]  Out: 1950 [Urine:1950]    Intake/Output Summary (Last 24 hours) at 1/3/2021 0947  Last data filed at 1/3/2021 0400  Gross per 24 hour   Intake 1393.98 ml   Output 1600 ml   Net -206.02 ml       Ventilator Settings:  Mode Rate Tidal Volume Pressure FiO2 PEEP   Assist control, VC+   500 ml  8 cm H2O 28 % 5 cm H20     Peak airway pressure: 24 cm H2O    Minute ventilation: 7.1 l/min      ARDS network Guidelines:   Lung protective strategy and Plateau  Pressure goal < 30 cm H2O goals  Oxygenation Goals PaO2 55-80 mm Hg or SaO2 88-95%  PH goal 7.30-7.45    VAP bundle:  Reviewed. [place] tube to suction at 20-30 cm Hg. Maintain [place] tube with 5-10ml air every 4 hours  Routine oral care every 4 hours  Elevation of head > 45 degree  Daily sedation holiday and SBT evaluation starting at 6.00am.    Physical Exam:      General: Intubated/sedated; NAD, acyanotic   HEENT:  Anicteric sclerae; pink palpebral conjunctivae; mucosa moist, ETT  Resp: (+) rhonchi b/l. Symmetrical chest expansion; good airway entry;   CV:  S1, S2 present; regular rate and rhythm  GI:  Abdomen soft, non-tender; (+) active bowel sounds  Extremities:  +2 pulses on all extremities; +1 BUE edema/ No cyanosis/ clubbing noted  Skin:  Warm; no rashes/ lesions noted, normal turgor/cap refill. Scattered hyperpigmentation on abd   Neurologic:  RASS -3.  Opening eyes spontaneously, No command following  Devices:  ETT, Purewick, FMS     DATA:     Current Facility-Administered Medications   Medication Dose Route Frequency    heparin 25,000 units in D5W 250 ml infusion  12-25 Units/kg/hr IntraVENous TITRATE    [Held by provider] clopidogreL (PLAVIX) tablet 75 mg  75 mg Oral DAILY    polyethylene glycol (MIRALAX) packet 17 g  17 g Per NG tube PRN    insulin glargine (LANTUS) injection 30 Units  30 Units SubCUTAneous QHS    chlorhexidine (PERIDEX) 0.12 % mouthwash 10 mL  10 mL Oral Q12H    fentaNYL (PF) 900 mcg/30 ml infusion soln  0-200 mcg/hr IntraVENous TITRATE    midazolam (VERSED) injection 1-2 mg  1-2 mg IntraVENous Q10MIN PRN    fentaNYL citrate (PF) injection  mcg   mcg IntraVENous Q30MIN PRN    midazolam in normal saline (VERSED) 1 mg/mL infusion  1-5 mg/hr IntraVENous TITRATE    ELECTROLYTE REPLACEMENT PROTOCOL - Potassium Standard Dosing   1 Each Other PRN    ELECTROLYTE REPLACEMENT PROTOCOL - Magnesium   1 Each Other PRN    ELECTROLYTE REPLACEMENT PROTOCOL - Phosphorus  Standard Dosing  1 Each Other PRN    ELECTROLYTE REPLACEMENT PROTOCOL - Calcium   1 Each Other PRN    insulin glargine (LANTUS) injection 30 Units  30 Units SubCUTAneous DAILY    [Held by provider] carvediloL (COREG) tablet 6.25 mg  6.25 mg Oral Q12H    [Held by provider] lisinopriL (PRINIVIL, ZESTRIL) tablet 5 mg  5 mg Oral DAILY    [Held by provider] furosemide (LASIX) injection 40 mg  40 mg IntraVENous Q12H    [Held by provider] spironolactone (ALDACTONE) tablet 25 mg  25 mg Oral DAILY    multivit-folic acid-herbal 177 (WELLESSE PLUS) oral liquid 30 mL  30 mL Per NG tube DAILY    insulin lispro (HUMALOG) injection   SubCUTAneous Q6H    albuterol-ipratropium (DUO-NEB) 2.5 MG-0.5 MG/3 ML  3 mL Nebulization Q4H PRN    famotidine (PF) (PEPCID) 20 mg in 0.9% sodium chloride 10 mL injection  20 mg IntraVENous Q12H    sodium chloride (NS) flush 5-40 mL  5-40 mL IntraVENous Q8H    sodium chloride (NS) flush 5-40 mL  5-40 mL IntraVENous PRN    acetaminophen (TYLENOL) tablet 650 mg  650 mg Oral Q6H PRN    Or    acetaminophen (TYLENOL) suppository 650 mg  650 mg Rectal Q6H PRN    promethazine (PHENERGAN) tablet 12.5 mg  12.5 mg Oral Q6H PRN    Or    ondansetron (ZOFRAN) injection 4 mg  4 mg IntraVENous Q6H PRN    glucose chewable tablet 16 g  4 Tab Oral PRN    glucagon (GLUCAGEN) injection 1 mg  1 mg IntraMUSCular PRN    dextrose (D50W) injection syrg 12.5-25 g  25-50 mL IntraVENous PRN    [Held by provider] aspirin chewable tablet 81 mg  81 mg Oral DAILY    atorvastatin (LIPITOR) tablet 40 mg  40 mg Oral QHS         Labs: Results:       Chemistry Recent Labs     01/03/21  0316 01/02/21  2348 01/02/21  0334   * 151* 143*    138 139   K 4.4 4.3 4.6    104 105   CO2 29 29 28   BUN 16 16 17   CREA 0.33* 0.40* 0.38*   CA 9.0 8.7 8.4*   AGAP 5 5 6   BUCR 48* 40* 45*      CBC w/Diff Recent Labs     01/03/21 0316 01/02/21  0334 01/01/21  0244   WBC 6.9 7.9 9.5   RBC 2.64* 2.74* 2.91*   HGB 7.1* 7.4* 7.6*   HCT 22.8* 23.8* 25.1*   * 455* 434*   GRANS 78* 80* 94*   LYMPH 13* 13* 3*   EOS 2 2 0      Coagulation Recent Labs     01/02/21 2348   APTT 104.4*       Liver Enzymes No results for input(s): TP, ALB, TBIL, AP in the last 72 hours. No lab exists for component: SGOT, GPT, DBIL   ABG Lab Results   Component Value Date/Time    PHI 7.43 01/03/2021 03:28 AM    PCO2I 43.3 01/03/2021 03:28 AM    PO2I 101 (H) 01/03/2021 03:28 AM    HCO3I 28.4 (H) 01/03/2021 03:28 AM    FIO2I 28 01/03/2021 03:28 AM      Microbiology Recent Labs     12/31/20  2204 12/31/20  1430 12/31/20  1200   CULT NO GROWTH 2 DAYS NO GROWTH 3 DAYS No growth (<1,000 CFU/ML)          Telemetry: [x]Sinus []A-flutter []Paced    []A-fib []Multiple PVCs                  Imaging:  CXR Results  (Last 48 hours)               01/03/21 0254  XR CHEST PORT Final result    Impression:  IMPRESSION:        No significant interval change compared to the radiograph of 1/2/2021. Narrative:  AP CHEST, PORTABLE       INDICATION: Above. Intubated. STEMI.       COMPARISON: 1/2/2021. TECHNIQUE: Portable semi-upright AP chest radiograph is reviewed. FINDINGS:       Endotracheal tube and right upper extremity PICC line project in stable and   satisfactory position. Esophagogastric tube projects extending below the left   hemidiaphragm before it extends beyond the field-of-view. EKG leads/wires and   other catheter tubing overlie the patient.         Extensive predominantly interstitial opacities again seen bilaterally in keeping   with chronic fibrotic changes, further assessed on prior CT scan of 12/16/2020. No evidence of pneumothorax. The costophrenic sulci appear sharp. The cardiomediastinal silhouette is without significant interval change. 01/02/21 0155  XR CHEST PORT Final result    Impression:  IMPRESSION:       1. Support devices as above. No pneumothorax or new findings. 2. Stable chronic interstitial lung process in this patient with pulmonary   fibrosis. Superimposed infection or edema would be difficult to fully exclude. Narrative:  PORTABLE CHEST RADIOGRAPH        CPT CODE: 47942        INDICATION: Intubated. COMPARISON: 1/1/2021. FINDINGS:       Frontal view of the chest obtained at 0044 hours. Right PICC line tip projects   over cavoatrial junction. ET tube tip projects 3.3 cm above the chace. Feeding   tube extends below the diaphragm with tip not included on the exam. The   cardiomediastinal silhouette is within normal limits. Bilateral increased   interstitial lung markings diffusely without significant change. No   pneumothorax. Evaluation of the osseous structures is stable. IMPRESSION:   · Acute on Chronic Hypoxic and Hypercapnic Respiratory Failure - Requiring mechanical ventilation, Extubated on 12/17 and reintubated on 12/22, Likely aspiration event. Will require trach placement if there is no improvement. Dr. Ad Granado consulted. · Wide Complex Arrhythmia (12/25) -noted on bedside cardiac monitor. Lasted roughly 5-10 mins with Spontaneous resolution. E-lyte derangement vs Drug effect (pt was on 55mcg/hr Oliverio-Synephrine at the time) vs cardiac insult.    · Acute on Chronic Systolic Heart Failure - superimposed on severe ILD, Echo 12/10/20 EF 35-40%  · Combination septic/cardiogenic shock, improving   · Aspiration PNA. Sputum Cx(12/22) (+) heavy E.Coli-ESBL.  Recent Hx of E.coli ESBL.   · Acute Encephalopathy - likely toxic/metabolic vs infectious vs hepatic in nature, ammonia wnl.   · Severe pulmonary fibrosis - with extensive honeycombing and bronchiectasis as seen on CTA chest 12/16/20 - appears to be a new diagnosis but possibly UIP  · Acute aspiration pneumonia with severe sepsis  · UTI (+)E. coli  · Acute MI with anterolateral STEMI and Q waves - s/p ptca/stent to proximal/mid LAD using ARELY on DAPT on 12/11/20- Remains on Brilinta  · Severe dysphagia - failed MBS. Will place PEG tube early next week with IR.   · Hx of Hep C: + RNA viral load 4/4/2020  · Multiple liver masses - noted on CT scan 11/25/20  · COPD - on home O2 4L NC  · DM  · Dementia - unknown baseline, on aricept    RECOMMENDATIONS:   Resp:   -Titrate FiO2/ supp O2 for SpO2 >90%  -Family is agreeable to trach. Donovan Simmonds tentatively scheduled for Monday  -Aspiration precautions   I/D:   -ID consulted for intermittent fevers, appreciate recs  -Aleukocyotosis  -Per ID, discontinued Merrem on 1/2/21 started 12/25 for Ecoli (ESBL)   -Negative for COVID19 on 1/1/2021  Hem/Onc:   -Daily CBC; H/H, and plts are stable  -Will transfuse unit of blood to maintain Hgb >7  CVS:   -Changed to Heparin for DVT PPX, will hold for procedures  -Appreciate cardiology recs: Plan for Donovan Simmonds / Peg - may stop Plavix for 2-3 days if needed  for PEG tube.  Can use heparin drip if DAPT is interuppted  -EF 35-40 % on 12/10/20, the day before stent placement  -Proximal LAD angioplasty and stenting on 12/11/2020 for 95% stenotic lesion.  -Hemodynamics stable off pressors  -Tolerating statin   -Resume lisinopril  -Resumed lasix for worsening respiratory sounds, and BUE and BLE pitting edema  -BUE and BLE PVL's ordered, f/u results  Metabolic:   -Daily BMP; monitor e-lytes; replace PRN  Renal:    -Trend Renal indices, Purewick   Endocrine:   -Continue lantus BID with SSI  GI:   -SUP  -Tolerating TF   -Bowel regimen: Miralax PRN  -Will require PEG tube. IR consult placed.  Will likely be placed next week. Awating CT abdomen per radiology. Musc/Skin:   -No acute issues, wound care  Neuro:   -Versed and fentanyl gtt for sedation, goal RASS 0 to -1  PPX  -SUP ppx  -SQH for DVT ppx   ACCESS: Scheduled for PICC line placement today   Family agreeable to trach and PEG, ENT consulted (Dr. Luiz Han)  Discussed in Interdisciplinary Rounds            The patient is: [x] acutely ill Risk of deterioration: [] moderate    [] critically ill  [] high     []See my orders for details    My assessment/plan was discussed with:  [x]Nursing []PT/OT    [x]Respiratory therapy [x]    []Family []       Man Zuniga MD    Pulmonary / Critical Care Physician:     Chart and note reviewed. Data reviewed. Seen on rounds earlier today. I have independently evaluated and examined the patient. I agree with the exam, assessment and plans outlined by  In brief my My findings , evaluation and recommendations are as stated below:     Pt remains stable on MV. Heparin gtt  instead of plavix for pending Trach and G-tube early next week. Continue TFs and supportive care. CT abdomen today per IR.      Rest of details and diagnostic/treatment plans per APC note. I have personally reviewed all pertinent data including labs, imaging and recommendations of treatment team providers. Total of 35  min critical care time spent at bedside during the course of care providing evaluation,management and care decisions and ordering appropriate treatment related to critical care problems exclusive of procedures. The reason for providing this level of medical care for this critically ill patient was due a critical illness that impaired one or more vital organ systems such that there was a high probability of imminent or life threatening deterioration in the patients condition.  This care involved high complexity decision making to assess, manipulate, and support vital system functions, to treat this degree vital organ system failure and to prevent further life threatening deterioration of the patients condition.

## 2021-01-03 NOTE — PROGRESS NOTES
VENTILATOR CARE PLAN    Problem: Ventilator Management  Goal: *Adequate oxygenation/ ventilation/ and extubation      Patient:        Jad Vasquez     72 y.o.   female     1/3/2021  4:33 PM  Patient Active Problem List   Diagnosis Code    Diverticula of colon K57.30    Sepsis ([facility] [address] A41.9    Sepsis secondary to UTI ([facility] [address] A41.9, N39.0    DM hyperosmolarity type II, uncontrolled ([facility] [address] E11.00, E11.65    HTN (hypertension) I10    Febrile illness, acute R50.9    Gram-negative bacteremia R78.81    Diabetic neuropathy ([address] E11.40    Osteoarthritis of both knees M17.0    Acidosis E87.2    Respiratory failure ([facility] [address] J96.90    Shock ([facility] [address] R57.9    Hyperosmolar (nonketotic) coma ([facility] [address] E11.01    Acute UTI N39.0    Macrocytic anemia D53.9    STEMI (ST elevation myocardial infarction) ([facility]) I21.3    Acute on chronic systolic congestive heart failure (HCC) I50.23    Severe protein-calorie malnutrition (HCC) E43    Goals of care, counseling/discussion Z71.89    Dementia without behavioral disturbance ([facility]) F03.90    Pulmonary fibrosis ([facility]) J84.10       STEMI (ST elevation myocardial infarction) ([facility] [address] [I21.3]    Reason patient intubated: Air way protection second to AMS    Ventilator day: 9    Ventilator settings: a/c vc 12/500/40 5    ETT Size/Placement: 8.0 25 lip     Wean Screen Pass (Yes or No): Wean Screen Reason for Failure:  Duration of Weaning Trial:  Additional Comments:        PLAN OF CARE: trach and peg       GOAL: wean LTACH      OUTCOME:     ABG:  Date:1/3/2021  Lab Results   Component Value Date/Time    PHI 7.43 01/03/2021 03:28 AM    PCO2I 43.3 01/03/2021 03:28 AM    PO2I 101 (H) 01/03/2021 03:28 AM    HCO3I 28.4 (H) 01/03/2021 03:28 AM    FIO2I 28 01/03/2021 03:28 AM       Chest X-ray:  Date:1/3/2021  Results from Hospital Encounter encounter on 12/10/20   XR CHEST PORT    Narrative AP CHEST, PORTABLE    INDICATION: Above. Intubated. STEMI.    COMPARISON: 1/2/2021.     TECHNIQUE: Portable semi-upright AP chest radiograph is reviewed. FINDINGS:    Endotracheal tube and right upper extremity PICC line project in stable and  satisfactory position. Esophagogastric tube projects extending below the left  hemidiaphragm before it extends beyond the field-of-view. EKG leads/wires and  other catheter tubing overlie the patient. Extensive predominantly interstitial opacities again seen bilaterally in keeping  with chronic fibrotic changes, further assessed on prior CT scan of 12/16/2020. No evidence of pneumothorax. The costophrenic sulci appear sharp. The cardiomediastinal silhouette is without significant interval change. Impression IMPRESSION:     No significant interval change compared to the radiograph of 1/2/2021.        Lab Test:  Date:1/3/2021  WBC:   Lab Results   Component Value Date/Time    WBC 6.9 01/03/2021 03:16 AM   HGB:   Lab Results   Component Value Date/Time    HGB 7.1 (L) 01/03/2021 03:16 AM    PLTS:   Lab Results   Component Value Date/Time    PLATELET 326 (H) 17/57/6028 03:16 AM         Vital Signs:     Patient Vitals for the past 8 hrs:   Temp Pulse Resp BP SpO2   01/03/21 1537  93 17  100 %   01/03/21 1500 98.8 °F (37.1 °C) 84 14  100 %   01/03/21 1400 99 °F (37.2 °C) 88 14 120/73 100 %   01/03/21 1306  85 16  100 %   01/03/21 1300 99.1 °F (37.3 °C) 88 19  100 %   01/03/21 1200 99.1 °F (37.3 °C) 88 14 109/72 100 %   01/03/21 1100 99.5 °F (37.5 °C) 98 15 (!) 143/77 100 %   01/03/21 1000 99.3 °F (37.4 °C) 93 14 (!) 142/74 100 %   01/03/21 0900 99.5 °F (37.5 °C) 87 14 131/72 100 %   01/03/21 0850  100 15  100 %

## 2021-01-04 ENCOUNTER — APPOINTMENT (OUTPATIENT)
Dept: GENERAL RADIOLOGY | Age: 66
DRG: 003 | End: 2021-01-04
Attending: INTERNAL MEDICINE
Payer: MEDICARE

## 2021-01-04 ENCOUNTER — ANESTHESIA EVENT (OUTPATIENT)
Dept: SURGERY | Age: 66
DRG: 003 | End: 2021-01-04
Payer: MEDICARE

## 2021-01-04 ENCOUNTER — ANESTHESIA (OUTPATIENT)
Dept: SURGERY | Age: 66
DRG: 003 | End: 2021-01-04
Payer: MEDICARE

## 2021-01-04 ENCOUNTER — APPOINTMENT (OUTPATIENT)
Dept: GENERAL RADIOLOGY | Age: 66
DRG: 003 | End: 2021-01-04
Attending: PHYSICIAN ASSISTANT
Payer: MEDICARE

## 2021-01-04 LAB
ANION GAP SERPL CALC-SCNC: 5 MMOL/L (ref 3–18)
ANION GAP SERPL CALC-SCNC: 6 MMOL/L (ref 3–18)
APTT PPP: 26.5 SEC (ref 23–36.4)
ARTERIAL PATENCY WRIST A: ABNORMAL
BACTERIA SPEC CULT: ABNORMAL
BACTERIA SPEC CULT: ABNORMAL
BASE EXCESS BLD CALC-SCNC: 6 MMOL/L
BASOPHILS # BLD: 0 K/UL (ref 0–0.1)
BASOPHILS NFR BLD: 0 % (ref 0–2)
BDY SITE: ABNORMAL
BODY TEMPERATURE: 37.4
BUN SERPL-MCNC: 17 MG/DL (ref 7–18)
BUN SERPL-MCNC: 18 MG/DL (ref 7–18)
BUN/CREAT SERPL: 52 (ref 12–20)
BUN/CREAT SERPL: 55 (ref 12–20)
CALCIUM SERPL-MCNC: 9 MG/DL (ref 8.5–10.1)
CALCIUM SERPL-MCNC: 9.1 MG/DL (ref 8.5–10.1)
CHLORIDE SERPL-SCNC: 104 MMOL/L (ref 100–111)
CHLORIDE SERPL-SCNC: 104 MMOL/L (ref 100–111)
CO2 SERPL-SCNC: 30 MMOL/L (ref 21–32)
CO2 SERPL-SCNC: 30 MMOL/L (ref 21–32)
CREAT SERPL-MCNC: 0.33 MG/DL (ref 0.6–1.3)
CREAT SERPL-MCNC: 0.33 MG/DL (ref 0.6–1.3)
DIFFERENTIAL METHOD BLD: ABNORMAL
EOSINOPHIL # BLD: 0.2 K/UL (ref 0–0.4)
EOSINOPHIL NFR BLD: 3 % (ref 0–5)
ERYTHROCYTE [DISTWIDTH] IN BLOOD BY AUTOMATED COUNT: 15.3 % (ref 11.6–14.5)
GAS FLOW.O2 O2 DELIVERY SYS: ABNORMAL L/MIN
GAS FLOW.O2 SETTING OXYMISER: 14 BPM
GLUCOSE BLD STRIP.AUTO-MCNC: 125 MG/DL (ref 70–110)
GLUCOSE BLD STRIP.AUTO-MCNC: 69 MG/DL (ref 70–110)
GLUCOSE BLD STRIP.AUTO-MCNC: 70 MG/DL (ref 70–110)
GLUCOSE BLD STRIP.AUTO-MCNC: 88 MG/DL (ref 70–110)
GLUCOSE BLD STRIP.AUTO-MCNC: 94 MG/DL (ref 70–110)
GLUCOSE SERPL-MCNC: 120 MG/DL (ref 74–99)
GLUCOSE SERPL-MCNC: 79 MG/DL (ref 74–99)
GRAM STN SPEC: ABNORMAL
HCO3 BLD-SCNC: 29.7 MMOL/L (ref 22–26)
HCT VFR BLD AUTO: 23.3 % (ref 35–45)
HGB BLD-MCNC: 7 G/DL (ref 12–16)
INSPIRATION.DURATION SETTING TIME VENT: 0.9 SEC
LYMPHOCYTES # BLD: 1.3 K/UL (ref 0.9–3.6)
LYMPHOCYTES NFR BLD: 21 % (ref 21–52)
MAGNESIUM SERPL-MCNC: 1.9 MG/DL (ref 1.6–2.6)
MAGNESIUM SERPL-MCNC: 1.9 MG/DL (ref 1.6–2.6)
MCH RBC QN AUTO: 25.9 PG (ref 24–34)
MCHC RBC AUTO-ENTMCNC: 30 G/DL (ref 31–37)
MCV RBC AUTO: 86.3 FL (ref 74–97)
MONOCYTES # BLD: 0.1 K/UL (ref 0.05–1.2)
MONOCYTES NFR BLD: 1 % (ref 3–10)
NEUTS SEG # BLD: 4.7 K/UL (ref 1.8–8)
NEUTS SEG NFR BLD: 75 % (ref 40–73)
O2/TOTAL GAS SETTING VFR VENT: 28 %
PCO2 BLD: 41.4 MMHG (ref 35–45)
PEEP RESPIRATORY: 5 CMH2O
PH BLD: 7.47 [PH] (ref 7.35–7.45)
PHOSPHATE SERPL-MCNC: 3.6 MG/DL (ref 2.5–4.9)
PIP ISTAT,IPIP: 25
PLATELET # BLD AUTO: 496 K/UL (ref 135–420)
PMV BLD AUTO: 9.8 FL (ref 9.2–11.8)
PO2 BLD: 107 MMHG (ref 80–100)
POTASSIUM SERPL-SCNC: 4.1 MMOL/L (ref 3.5–5.5)
POTASSIUM SERPL-SCNC: 4.2 MMOL/L (ref 3.5–5.5)
RBC # BLD AUTO: 2.7 M/UL (ref 4.2–5.3)
SAO2 % BLD: 98 % (ref 92–97)
SERVICE CMNT-IMP: ABNORMAL
SERVICE CMNT-IMP: ABNORMAL
SODIUM SERPL-SCNC: 139 MMOL/L (ref 136–145)
SODIUM SERPL-SCNC: 140 MMOL/L (ref 136–145)
SPECIMEN TYPE: ABNORMAL
TOTAL RESP. RATE, ITRR: 14
VENTILATION MODE VENT: ABNORMAL
VT SETTING VENT: 500 ML
WBC # BLD AUTO: 6.3 K/UL (ref 4.6–13.2)

## 2021-01-04 PROCEDURE — 74011636637 HC RX REV CODE- 636/637: Performed by: STUDENT IN AN ORGANIZED HEALTH CARE EDUCATION/TRAINING PROGRAM

## 2021-01-04 PROCEDURE — 77030031753 HC SHR ENDO COAG HARM J&J -E: Performed by: OTOLARYNGOLOGY

## 2021-01-04 PROCEDURE — 00320 ANES ALL PX NECK NOS 1YR/>: CPT | Performed by: ANESTHESIOLOGY

## 2021-01-04 PROCEDURE — 36592 COLLECT BLOOD FROM PICC: CPT

## 2021-01-04 PROCEDURE — 65610000006 HC RM INTENSIVE CARE

## 2021-01-04 PROCEDURE — 85025 COMPLETE CBC W/AUTO DIFF WBC: CPT

## 2021-01-04 PROCEDURE — 77030014008 HC SPNG HEMSTAT J&J -C: Performed by: OTOLARYNGOLOGY

## 2021-01-04 PROCEDURE — 36600 WITHDRAWAL OF ARTERIAL BLOOD: CPT

## 2021-01-04 PROCEDURE — 83735 ASSAY OF MAGNESIUM: CPT

## 2021-01-04 PROCEDURE — 71045 X-RAY EXAM CHEST 1 VIEW: CPT

## 2021-01-04 PROCEDURE — 99232 SBSQ HOSP IP/OBS MODERATE 35: CPT | Performed by: INTERNAL MEDICINE

## 2021-01-04 PROCEDURE — 82803 BLOOD GASES ANY COMBINATION: CPT

## 2021-01-04 PROCEDURE — 74011000258 HC RX REV CODE- 258: Performed by: PHYSICIAN ASSISTANT

## 2021-01-04 PROCEDURE — 77030008808 HC TU TRACH UNCUF SIMS -B

## 2021-01-04 PROCEDURE — 74011250637 HC RX REV CODE- 250/637: Performed by: INTERNAL MEDICINE

## 2021-01-04 PROCEDURE — 74011250636 HC RX REV CODE- 250/636: Performed by: STUDENT IN AN ORGANIZED HEALTH CARE EDUCATION/TRAINING PROGRAM

## 2021-01-04 PROCEDURE — 74018 RADEX ABDOMEN 1 VIEW: CPT

## 2021-01-04 PROCEDURE — 74011000250 HC RX REV CODE- 250: Performed by: PHYSICIAN ASSISTANT

## 2021-01-04 PROCEDURE — 77030002996 HC SUT SLK J&J -A: Performed by: OTOLARYNGOLOGY

## 2021-01-04 PROCEDURE — 76060000033 HC ANESTHESIA 1 TO 1.5 HR: Performed by: OTOLARYNGOLOGY

## 2021-01-04 PROCEDURE — 2709999900 HC NON-CHARGEABLE SUPPLY: Performed by: OTOLARYNGOLOGY

## 2021-01-04 PROCEDURE — 74011000250 HC RX REV CODE- 250: Performed by: OTOLARYNGOLOGY

## 2021-01-04 PROCEDURE — 94003 VENT MGMT INPAT SUBQ DAY: CPT

## 2021-01-04 PROCEDURE — 82962 GLUCOSE BLOOD TEST: CPT

## 2021-01-04 PROCEDURE — 77030040356 HC CORD BPLR FRCP COVD -A: Performed by: OTOLARYNGOLOGY

## 2021-01-04 PROCEDURE — 77030008808 HC TU TRACH UNCUF SIMS -B: Performed by: OTOLARYNGOLOGY

## 2021-01-04 PROCEDURE — 02HV33Z INSERTION OF INFUSION DEVICE INTO SUPERIOR VENA CAVA, PERCUTANEOUS APPROACH: ICD-10-PCS | Performed by: RADIOLOGY

## 2021-01-04 PROCEDURE — 77030006999

## 2021-01-04 PROCEDURE — 76010000149 HC OR TIME 1 TO 1.5 HR: Performed by: OTOLARYNGOLOGY

## 2021-01-04 PROCEDURE — 77030018390 HC SPNG HEMSTAT2 J&J -B: Performed by: OTOLARYNGOLOGY

## 2021-01-04 PROCEDURE — 74011636637 HC RX REV CODE- 636/637: Performed by: PHYSICIAN ASSISTANT

## 2021-01-04 PROCEDURE — 74011250636 HC RX REV CODE- 250/636: Performed by: INTERNAL MEDICINE

## 2021-01-04 PROCEDURE — 74011250636 HC RX REV CODE- 250/636: Performed by: PHYSICIAN ASSISTANT

## 2021-01-04 PROCEDURE — 84100 ASSAY OF PHOSPHORUS: CPT

## 2021-01-04 PROCEDURE — 80048 BASIC METABOLIC PNL TOTAL CA: CPT

## 2021-01-04 PROCEDURE — 77030008462 HC STPLR SKN PROX J&J -A: Performed by: OTOLARYNGOLOGY

## 2021-01-04 PROCEDURE — 85730 THROMBOPLASTIN TIME PARTIAL: CPT

## 2021-01-04 PROCEDURE — 74011000250 HC RX REV CODE- 250: Performed by: INTERNAL MEDICINE

## 2021-01-04 PROCEDURE — 0B110F4 BYPASS TRACHEA TO CUTANEOUS WITH TRACHEOSTOMY DEVICE, OPEN APPROACH: ICD-10-PCS | Performed by: OTOLARYNGOLOGY

## 2021-01-04 PROCEDURE — 99291 CRITICAL CARE FIRST HOUR: CPT | Performed by: INTERNAL MEDICINE

## 2021-01-04 PROCEDURE — 77030011267 HC ELECTRD BLD COVD -A: Performed by: OTOLARYNGOLOGY

## 2021-01-04 PROCEDURE — 74011000250 HC RX REV CODE- 250: Performed by: NURSE ANESTHETIST, CERTIFIED REGISTERED

## 2021-01-04 RX ORDER — ROCURONIUM BROMIDE 10 MG/ML
INJECTION, SOLUTION INTRAVENOUS AS NEEDED
Status: DISCONTINUED | OUTPATIENT
Start: 2021-01-04 | End: 2021-01-04 | Stop reason: HOSPADM

## 2021-01-04 RX ORDER — OXYCODONE HCL 5 MG/5 ML
10 SOLUTION, ORAL ORAL EVERY 8 HOURS
Status: DISCONTINUED | OUTPATIENT
Start: 2021-01-04 | End: 2021-01-06

## 2021-01-04 RX ORDER — DIAZEPAM 5 MG/1
10 TABLET ORAL 3 TIMES DAILY
Status: DISCONTINUED | OUTPATIENT
Start: 2021-01-04 | End: 2021-01-06

## 2021-01-04 RX ADMIN — INSULIN GLARGINE 30 UNITS: 100 INJECTION, SOLUTION SUBCUTANEOUS at 21:04

## 2021-01-04 RX ADMIN — DIAZEPAM 10 MG: 5 TABLET ORAL at 21:03

## 2021-01-04 RX ADMIN — SODIUM CHLORIDE 10 ML: 9 INJECTION, SOLUTION INTRAMUSCULAR; INTRAVENOUS; SUBCUTANEOUS at 06:00

## 2021-01-04 RX ADMIN — SODIUM CHLORIDE 10 ML: 9 INJECTION, SOLUTION INTRAMUSCULAR; INTRAVENOUS; SUBCUTANEOUS at 14:00

## 2021-01-04 RX ADMIN — FUROSEMIDE 20 MG: 10 INJECTION, SOLUTION INTRAMUSCULAR; INTRAVENOUS at 20:42

## 2021-01-04 RX ADMIN — LISINOPRIL 5 MG: 5 TABLET ORAL at 08:00

## 2021-01-04 RX ADMIN — SODIUM CHLORIDE 10 ML: 9 INJECTION, SOLUTION INTRAMUSCULAR; INTRAVENOUS; SUBCUTANEOUS at 21:03

## 2021-01-04 RX ADMIN — LANSOPRAZOLE 30 MG: KIT at 11:30

## 2021-01-04 RX ADMIN — FAMOTIDINE 20 MG: 10 INJECTION INTRAVENOUS at 08:00

## 2021-01-04 RX ADMIN — FUROSEMIDE 20 MG: 10 INJECTION, SOLUTION INTRAMUSCULAR; INTRAVENOUS at 08:00

## 2021-01-04 RX ADMIN — OXYCODONE HYDROCHLORIDE 10 MG: 5 SOLUTION ORAL at 11:30

## 2021-01-04 RX ADMIN — ATORVASTATIN CALCIUM 40 MG: 40 TABLET, FILM COATED ORAL at 21:03

## 2021-01-04 RX ADMIN — ROCURONIUM BROMIDE 10 MG: 50 INJECTION INTRAVENOUS at 10:16

## 2021-01-04 RX ADMIN — DIAZEPAM 10 MG: 5 TABLET ORAL at 11:30

## 2021-01-04 RX ADMIN — INSULIN GLARGINE 30 UNITS: 100 INJECTION, SOLUTION SUBCUTANEOUS at 08:30

## 2021-01-04 RX ADMIN — OXYCODONE HYDROCHLORIDE 10 MG: 5 SOLUTION ORAL at 21:03

## 2021-01-04 RX ADMIN — Medication 75 MCG/HR: at 08:20

## 2021-01-04 RX ADMIN — CHLORHEXIDINE GLUCONATE 0.12% ORAL RINSE 10 ML: 1.2 LIQUID ORAL at 08:00

## 2021-01-04 RX ADMIN — DIAZEPAM 10 MG: 5 TABLET ORAL at 16:00

## 2021-01-04 RX ADMIN — CHLORHEXIDINE GLUCONATE 0.12% ORAL RINSE 10 ML: 1.2 LIQUID ORAL at 20:42

## 2021-01-04 NOTE — PROGRESS NOTES
Nutrition Assessment     Type and Reason for Visit: Reassess    Nutrition Recommendations/Plan:   - Once NGT placement confirmed, resume tube feeding of Glucerna 1.5 at goal rate of 50 mL/hr with Prosource once daily and 100 mL q 4 hour water flushes. Nutrition Assessment:  Tolerating feeds at goal via OGT then held at midnight and OGT removed for trach placement today. Plan to replace with NGT after procedure today. Hypoglycemia on lantus while feeds held- to resume at goal. Loose stool via FMS (100 mL x last 24 hours) and plan to remove per MD. Permanent enteral access placement needed prior to discharge.     Malnutrition Assessment:  Malnutrition Status: Severe malnutrition     Estimated Daily Nutrient Needs:  Energy (kcal):  1973-2593  Protein (g):         Fluid (ml/day):  1872-9351    Nutrition Related Findings:  Loose stool via FMS (100 mL x last 24 hours)      Current Nutrition Therapies:  DIET NUTRITIONAL SUPPLEMENTS Breakfast; Prosource  DIET TUBE FEEDING  DIET NPO     Current Tube Feeding (TF) Orders:   · Feeding Route: Nasogastric  · Formula: Glucerna 1.5  · Schedule:Continuous    · Regimen: 50 mL/hr (held since midnight)  · Additives/Modulars: Protein(prosource once daily)  · Water Flushes: 100 mL q 4 hours (600 mL)- held  · Current TF Orders Provides: not applicable, held for procedure  · Goal TF Orders Provides: 1860 kcal, 114 gm protein, 910 mL free water, 100% RDIs       Anthropometric Measures:  · Height:  5' 7\" (170.2 cm)  · Current Body Wt:  73 kg (160 lb 15 oz)  · BMI: 25.2    Nutrition Diagnosis:   · Inadequate oral intake related to impaired respiratory function as evidenced by NPO or clear liquid status due to medical condition, intubation      Nutrition Intervention:  Food and/or Nutrient Delivery: Continue tube feeding  Nutrition Education and Counseling: Education not indicated  Coordination of Nutrition Care: Continue to monitor while inpatient, Interdisciplinary rounds    Goals:  Nutritional needs will be met through adequate oral intake or nutrition support within the next 7 days.        Nutrition Monitoring and Evaluation:   Behavioral-Environmental Outcomes: None identified  Food/Nutrient Intake Outcomes: Enteral nutrition intake/tolerance  Physical Signs/Symptoms Outcomes: Biochemical data, GI status, Diarrhea, Nutrition focused physical findings    Discharge Planning:    Enteral nutrition     Electronically signed by Hien Miller RD, CNSC on 1/4/2021 at 2:37 PM    Contact Number: [phone]

## 2021-01-04 NOTE — PROGRESS NOTES
[facility]  ICU Progress Note      Name: Nathanel Dakins   : 1955   MRN: [id number]   Date: 2021 9:00 AM     [x]I have reviewed the flowsheet and previous days notes. Events overnight reviewed and discussed with nursing staff. Vital signs and records reviewed. Interval HPI:  Patient is a 72 y. o. female with a past medical history of COPD, CHF, HTN, DM, dementia, presented to [facility] with acute on chronic systolic heart failure, acute MI with anterolateral STEMI and Q waves s/p ptca/stent to proximal/mid LAD on 20. Pt was intubated and extubated on 20, transferred to . On 20 patient was intubated and transferred to ICU for respiratory failure and cardiogenic +/- septic shock. Respiratory cultures on  with ESBL on Merrem. Dr. Yordan Mendosa and IR have been consulted for trach/PEG.  2021, Dr. Yordan Mendosa took the patient to the OR for a tracheostomy. · Ventilator day# 14  · Tolerating TF  · Mentation waxes and wanes. Not command following this AM, opening eyes spontaneously, withdrawing from pain, RASS -3  · Hold anticoagulation as needed for procedures  · COVID19 NAAT from 2021 came back negative   · Hemoglobin slowly continuously trending downward        · ABG showed metabolic alkalosis  · ENT to place trach today        ROS:Review of systems not obtained due to patient factors. Vital Signs:    Visit Vitals  /65   Pulse 87   Temp 99.1 °F (37.3 °C)   Resp 14   Ht 5' 7\" (1.702 m)   Wt 73 kg (160 lb 15 oz)   SpO2 100%   Breastfeeding No   BMI 25.21 kg/m²       O2 Device: Endotracheal tube, Ventilator   O2 Flow Rate (L/min): 40 l/min   Temp (24hrs), Av.2 °F (37.3 °C), Min:98.8 °F (37.1 °C), Max:99.5 °F (37.5 °C)       Intake/Output:   Last shift:      No intake/output data recorded.   Last 3 shifts:  1901 - 01/04 0700  In: 2245 [I.V.:395]  Out: 2450 [Urine:2350; Drains:100]    Intake/Output Summary (Last 24 hours) at 2021 1023  Last data filed at 1/4/2021 0500  Gross per 24 hour   Intake 1196.09 ml   Output 1500 ml   Net -303.91 ml       Ventilator Settings:  Mode Rate Tidal Volume Pressure FiO2 PEEP   Assist control, VC+   500 ml  8 cm H2O 28 % 5 cm H20     Peak airway pressure: 24 cm H2O    Minute ventilation: 7.03 l/min      ARDS network Guidelines:   Lung protective strategy and Plateau  Pressure goal < 30 cm H2O goals  Oxygenation Goals PaO2 55-80 mm Hg or SaO2 88-95%  PH goal 7.30-7.45    VAP bundle:  Reviewed. Gates tube to suction at 20-30 cm Hg. Maintain Gates tube with 5-10ml air every 4 hours  Routine oral care every 4 hours  Elevation of head > 45 degree  Daily sedation holiday and SBT evaluation starting at 6.00am.    Physical Exam:      General: Intubated/sedated; NAD, acyanotic   HEENT:  Anicteric sclerae; pink palpebral conjunctivae; mucosa moist, ETT  Resp: (+) rhonchi b/l. Symmetrical chest expansion; good airway entry;   CV:  S1, S2 present; regular rate and rhythm  GI:  Abdomen soft, non-tender; (+) active bowel sounds  Extremities:  +2 pulses on all extremities  Skin:  Warm; no rashes/ lesions noted, normal turgor/cap refill. Neurologic:  RASS -3.  Opening eyes spontaneously, No command following, withdrawing from pain  Devices:  ETT, Purewick, FMS     DATA:     Current Facility-Administered Medications   Medication Dose Route Frequency    furosemide (LASIX) injection 20 mg  20 mg IntraVENous Q12H    [Held by provider] heparin 25,000 units in D5W 250 ml infusion  12-25 Units/kg/hr IntraVENous TITRATE    [Held by provider] clopidogreL (PLAVIX) tablet 75 mg  75 mg Oral DAILY    polyethylene glycol (MIRALAX) packet 17 g  17 g Per NG tube PRN    insulin glargine (LANTUS) injection 30 Units  30 Units SubCUTAneous QHS    chlorhexidine (PERIDEX) 0.12 % mouthwash 10 mL  10 mL Oral Q12H    fentaNYL (PF) 900 mcg/30 ml infusion soln  0-200 mcg/hr IntraVENous TITRATE    midazolam (VERSED) injection 1-2 mg  1-2 mg IntraVENous Q10MIN PRN    fentaNYL citrate (PF) injection  mcg   mcg IntraVENous Q30MIN PRN    midazolam in normal saline (VERSED) 1 mg/mL infusion  1-5 mg/hr IntraVENous TITRATE    ELECTROLYTE REPLACEMENT PROTOCOL - Potassium Standard Dosing   1 Each Other PRN    ELECTROLYTE REPLACEMENT PROTOCOL - Magnesium   1 Each Other PRN    ELECTROLYTE REPLACEMENT PROTOCOL - Phosphorus  Standard Dosing  1 Each Other PRN    ELECTROLYTE REPLACEMENT PROTOCOL - Calcium   1 Each Other PRN    insulin glargine (LANTUS) injection 30 Units  30 Units SubCUTAneous DAILY    [Held by provider] carvediloL (COREG) tablet 6.25 mg  6.25 mg Oral Q12H    lisinopriL (PRINIVIL, ZESTRIL) tablet 5 mg  5 mg Oral DAILY    [Held by provider] spironolactone (ALDACTONE) tablet 25 mg  25 mg Oral DAILY    multivit-folic acid-herbal 925 (WELLESSE PLUS) oral liquid 30 mL  30 mL Per NG tube DAILY    insulin lispro (HUMALOG) injection   SubCUTAneous Q6H    albuterol-ipratropium (DUO-NEB) 2.5 MG-0.5 MG/3 ML  3 mL Nebulization Q4H PRN    famotidine (PF) (PEPCID) 20 mg in 0.9% sodium chloride 10 mL injection  20 mg IntraVENous Q12H    sodium chloride (NS) flush 5-40 mL  5-40 mL IntraVENous Q8H    sodium chloride (NS) flush 5-40 mL  5-40 mL IntraVENous PRN    acetaminophen (TYLENOL) tablet 650 mg  650 mg Oral Q6H PRN    Or    acetaminophen (TYLENOL) suppository 650 mg  650 mg Rectal Q6H PRN    promethazine (PHENERGAN) tablet 12.5 mg  12.5 mg Oral Q6H PRN    Or    ondansetron (ZOFRAN) injection 4 mg  4 mg IntraVENous Q6H PRN    glucose chewable tablet 16 g  4 Tab Oral PRN    glucagon (GLUCAGEN) injection 1 mg  1 mg IntraMUSCular PRN    dextrose (D50W) injection syrg 12.5-25 g  25-50 mL IntraVENous PRN    [Held by provider] aspirin chewable tablet 81 mg  81 mg Oral DAILY    atorvastatin (LIPITOR) tablet 40 mg  40 mg Oral QHS         Labs: Results:       Chemistry Recent Labs     01/04/21  0333 01/03/21  1500 01/03/21  0316   * 141* 117*    140 139   K 4.2 4.6 4.4    106 105   CO2 30 28 29   BUN 17 15 16   CREA 0.33* 0.32* 0.33*   CA 9.0 8.6 9.0   AGAP 5 6 5   BUCR 52* 47* 48*      CBC w/Diff Recent Labs     01/04/21  0333 01/03/21  0316 01/02/21  0334   WBC 6.3 6.9 7.9   RBC 2.70* 2.64* 2.74*   HGB 7.0* 7.1* 7.4*   HCT 23.3* 22.8* 23.8*   * 444* 455*   GRANS 75* 78* 80*   LYMPH 21 13* 13*   EOS 3 2 2      Coagulation Recent Labs     01/04/21  0920 01/02/21  2348   APTT 26.5 104.4*       Liver Enzymes No results for input(s): TP, ALB, TBIL, AP in the last 72 hours. No lab exists for component: SGOT, GPT, DBIL   ABG Lab Results   Component Value Date/Time    PHI 7.47 (H) 01/04/2021 03:38 AM    PCO2I 41.4 01/04/2021 03:38 AM    PO2I 107 (H) 01/04/2021 03:38 AM    HCO3I 29.7 (H) 01/04/2021 03:38 AM    FIO2I 28 01/04/2021 03:38 AM      Microbiology No results for input(s): CULT in the last 72 hours. Telemetry: [x]Sinus []A-flutter []Paced    []A-fib []Multiple PVCs                  Imaging:  CXR Results  (Last 48 hours)               01/04/21 0613  XR CHEST PORT Final result    Impression:  IMPRESSION:       The right PICC has been pulled back and is looped in the region of the   brachiocephalic vein and origin of the right jugular vein. Recommend   repositioning. Endotracheal tube tip is above the chace. No significant change bilateral lung infiltrates. Narrative:  Portable Chest       CPT CODE: 36986       HISTORY: Intubation. FINDINGS:        Compared with 1/3/2021. Endotracheal tube tip is above the chace. Enteric tube courses below the   diaphragm tip not visualized. Multiple wires overlie the patient. The right PICC has been retracted and is looped at the base of the jugular vein   tip at the brachiocephalic region. Heart size and mediastinal contours within normal limits. There are streaky and   patchy lung densities which are relatively stable. No effusion.  No pneumothorax           01/03/21 0254  XR CHEST PORT Final result    Impression:  IMPRESSION:        No significant interval change compared to the radiograph of 1/2/2021. Narrative:  AP CHEST, PORTABLE       INDICATION: Above. Intubated. STEMI.       COMPARISON: 1/2/2021. TECHNIQUE: Portable semi-upright AP chest radiograph is reviewed. FINDINGS:       Endotracheal tube and right upper extremity PICC line project in stable and   satisfactory position. Esophagogastric tube projects extending below the left   hemidiaphragm before it extends beyond the field-of-view. EKG leads/wires and   other catheter tubing overlie the patient. Extensive predominantly interstitial opacities again seen bilaterally in keeping   with chronic fibrotic changes, further assessed on prior CT scan of 12/16/2020. No evidence of pneumothorax. The costophrenic sulci appear sharp. The cardiomediastinal silhouette is without significant interval change. IMPRESSION:   · Acute on Chronic Hypoxic and Hypercapnic Respiratory Failure - Requiring mechanical ventilation, Extubated on 12/17 and reintubated on 12/22, Likely aspiration event. Will require trach placement if there is no improvement. Dr. Dina Estrada consulted. · Wide Complex Arrhythmia (12/25) -noted on bedside cardiac monitor. Lasted roughly 5-10 mins with Spontaneous resolution. E-lyte derangement vs Drug effect (pt was on 55mcg/hr Oliverio-Synephrine at the time) vs cardiac insult.    · Acute on Chronic Systolic Heart Failure - superimposed on severe ILD, Echo 12/10/20 EF 35-40%  · Combination septic/cardiogenic shock, improving   · Aspiration PNA. Sputum Cx(12/22) (+) heavy E.Coli-ESBL.  Recent Hx of E.coli ESBL.   · Acute Encephalopathy - likely toxic/metabolic vs infectious vs hepatic in nature, ammonia wnl.   · Severe pulmonary fibrosis - with extensive honeycombing and bronchiectasis as seen on CTA chest 12/16/20 - appears to be a new diagnosis but possibly UIP  · Acute aspiration pneumonia with severe sepsis  · UTI (+)E. coli  · Acute MI with anterolateral STEMI and Q waves - s/p ptca/stent to proximal/mid LAD using ARELY on DAPT on 12/11/20- Remains on Brilinta  · Severe dysphagia - failed MBS. Will place PEG tube early next week with IR.   · Hx of Hep C: + RNA viral load 4/4/2020  · Multiple liver masses - noted on CT scan 11/25/20  · COPD - on home O2 4L NC  · DM  · Dementia - unknown baseline, on aricept    RECOMMENDATIONS:   Resp:   -Titrate FiO2/ supp O2 for SpO2 >90%  -Aspiration precautions   -OR for Trach today  I/D:   -ID consulted for intermittent fevers, appreciate recs  -Aleukocyotosis  -Per ID, discontinued Merrem on 1/2/21 started 12/25 for Ecoli (ESBL)   -Negative for COVID19 on 1/1/2021  Hem/Onc:   -Daily CBC; H&H downtrending  -Will transfuse unit of blood to maintain Hgb >7  CVS:   -Changed to Heparin for DVT PPX, will hold for procedures  -Appreciate cardiology recs: Plan for [place] / Peg - may stop Plavix for 2-3 days if needed  for PEG tube.  Can use heparin drip if DAPT is interuppted  -EF 35-40 % on 12/10/20, the day before stent placement  -Proximal LAD angioplasty and stenting on 12/11/2020 for 95% stenotic lesion.  -Hemodynamics stable off pressors  -Tolerating statin   -Continue lisinopril  -continue lasix   -BUE and BLE PVL's ordered, f/u results  Metabolic:   -Daily BMP; monitor e-lytes; replace PRN  Renal:    -Trend Renal indices, Purewick   Endocrine:   -Continue lantus BID with SSI  GI:   -SUP  -Tolerating TF   -Bowel regimen: Miralax PRN  -Will require PEG tube. IR consult placed. Will likely be placed this week.    -CT Abd from 1/3/21: gastric antrum contiguous with anterior peritoneum, enlarging hepatic masses consistent with likely malignancy or mets, RLL consolidation with air bronchograms consistent with atelectasis and possible airspace disease   -Remove FMS  Musc/Skin:   -No acute issues, wound care  Neuro:   -Versed and fentanyl gtt for sedation, goal RASS 0 to -1  -Transition to enteral meds; fiona and valium  -Wean sedation  PPX  -SUP ppx  -heparin drip, hold for procedures   ACCESS: Scheduled for PICC line placement today   Family agreeable to trach and PEG, ENT consulted (Dr. Solo Delgado)  Discussed in Interdisciplinary Rounds            The patient is: [x] acutely ill Risk of deterioration: [] moderate    [] critically ill  [x] high     []See my orders for details    My assessment/plan was discussed with:  [x]Nursing []PT/OT    [x]Respiratory therapy [x]    []Family []       Yasmeen Corrales MD  PGY-1  [facility]      Pulmonary / Critical Care Physician:  Late entry for DOS 1/4/21  I saw and evaluated the patient, performing the key elements of the service. I discussed the findings, assessment and plan with the resident and agree with the resident's findings and plan as documented in the resident's note. Total of 31 min critical care time spent at bedside (personally) during the course of care providing evaluation,management and care decisions and ordering appropriate treatment related to critical care problems exclusive of procedures. The reason for providing this level of medical care for this critically ill patient was due a critical illness that impaired one or more vital organ systems such that there was a high probability of imminent or life threatening deterioration in the patients condition. This care involved high complexity decision making to assess, manipulate, and support vital system functions, to treat this degree vital organ system failure and to prevent further life threatening deterioration of the patients condition. This time was independent of other practitioners.     40-year-old female with a past medical history of COPD, CHF, hypertension, dementia, diabetes, hepatitis C, presented to DR. MARREROKATE JOHNSON as a transfer from [facility] for chest pain found to have acute STEMI. Patient underwent cardiac intervention on 12/11/2020, however developed worsening hypoxic respiratory failure afterwards, intubated postprocedure and then extubated on 12/17/2020. Patient then developed worsening dyspnea, tachypnea, was found to be obtunded, reintubated on 12/22/2020. Patient was then transferred to the ICU. Patient found unresponsive, likely aspirated, extubated on 12/26. Unfortunately patient's mental status remains poor, likely toxic/metabolic versus hepatic in nature, so patient was reintubated after RRT on 12/28. Since patient's mental status remains poor, patient also has significant deconditioning from critical illness myopathy/polyneuropathy, patient underwent tracheostomy placement today. We consulted interventional radiology for G-tube placement. Infectious diseases was following the patient for aspiration pneumonia, signed off today after completion of antibiotics. Patient has multiple other issues including cardiogenic shock which has resolved, ongoing encephalopathy, has developed pulmonary fibrosis, likely IPF given progression from 2018 to this year, patient also has hepatic mass for which will need follow-up if patient survives. Also of note patient does have underlying severe ischemic cardiomyopathy with LVEF of 20%. I have advised that you pursue [facility] futile care policy, however other providers thought it would be appropriate to offer patient tracheostomy and PEG tube placement, cardiology then deemed that patient could come off of antiplatelet therapy and go on to heparin drip temporarily, so these were offered. G-tube plan for Friday. After placement of this, we will attempt to place patient to LTAC when possible.     Very Very poor overall prognosis given overall functional status in the setting of a very high burden of acute and chronic diseases not limited to new pulmonary fibrosis, ischemic cardiomyopathy with cardiogenic shock, dysphagia and what appears to be severe dementia      Jerry Osorio MD/MPH     Pulmonary, Critical Care Medicine  [facility]

## 2021-01-04 NOTE — ROUTINE PROCESS
TRANSFER - OUT REPORT: 
 
Information was left at the bedside Aurelia Mccauley  being transferred to ICU(unit) for routine post - op . PT WAS RECEIVED BY RESPIRATORY THERAPY AND PLACED BACK ON THE VENT. TRACH INFO WAS GIVEN. PT WAS CONNECTED BACK TO BEDSIDE MONITOR WITH ALL ALARMS ACTIVATED. BEDSIDE RN WAS AWARE OF PT'S RETURN AND MADE EYE CONTACT BUT NEVER CAME TO THE ROOM. Lines: PICC Double Lumen 73/41/07 Right;Basilic (Active) Central Line Being Utilized Yes 01/04/21 0400 Criteria for Appropriate Use Limited/no vessel suitable for conventional peripheral access 01/04/21 0400 Site Assessment Clean, dry, & intact 01/04/21 0400 Phlebitis Assessment 0 01/04/21 0400 Infiltration Assessment 0 01/04/21 0400 Date of Last Dressing Change 01/01/21 01/03/21 1600 Dressing Status Clean, dry, & intact 01/04/21 0400 Action Taken Open ports on tubing capped 01/04/21 0400 Dressing Type Disk with Chlorhexadine gluconate (CHG); Transparent;Tape 01/04/21 0400 Hub Color/Line Status Purple;Patent; Flushed; Infusing 01/04/21 0400 Positive Blood Return (Site #1) Yes 01/04/21 0400 Hub Color/Line Status Red;Patent; Flushed; Infusing 01/04/21 0400 Positive Blood Return (Site #2) Yes 01/04/21 0400 Alcohol Cap Used Yes 01/04/21 0400 Patient transported with: 
RN 
MONITOR 
CRNA AMBU BAG 
TRACH INFORMATION/ OBTURATOR

## 2021-01-04 NOTE — DIABETES MGMT
GLYCEMIC CONTROL PLAN OF CARE     Assessment/Recommendations:  Lab glucose this am 120 mg/dl  Pt was NPO this am for trach placement  Continue basal and corrective insulin coverage as ordered. TF should be resumed after procedure  Will continue inpatient monitoring. Most recent blood glucose values:      Results for Belén Calhoun (MRN [id number]) as of 1/4/2021 10:56   Ref. Range 1/2/2021 17:14 1/3/2021 11:53 1/3/2021 17:17 1/3/2021 23:49 1/4/2021 06:13   GLUCOSE,FAST - POC Latest Ref Range: 70 - 110 mg/dL 141 (H) 177 (H) 146 (H) 169 (H) 94       Current A1C of 8.3 % is equivalent to average blood glucose of 192 mg/dl over the past 2-3 months. Current hospital diabetes medications:   lantus 30 units every morning  lantus 30 units every bedtime  Lispro corrective insulin coverage every 6 hours  Previous day's insulin requirements:   lantus 60 units. Lispro 9 units   Home diabetes medications:  Per pta med list.  lantus 22 units daily   Diet:    NPO. TF glucerna at 50 ml/hr  Education:  ____Refer to Diabetes Education Record             _x__Education not indicated at this time  Intubated. Sedated. History of dementia.     Brandie Kelley [facility]

## 2021-01-04 NOTE — BRIEF OP NOTE
Brief Postoperative Note    Patient: Jacki Christianson  YOB: 1955  MRN: [id number]    Date of Procedure: 1/4/2021     Pre-Op Diagnosis: vent dependency    Post-Op Diagnosis: Same as preoperative diagnosis.       Procedure(s):  TRACHEOSTOMY    Surgeon(s):  Arleen Carney MD    Surgical Assistant: Surg Asst-1: Jonn Vasques    Anesthesia: General     Estimated Blood Loss (mL): Minimal    Complications: None    Specimens: * No specimens in log *     Implants: * No implants in log *    Drains:   Fecal Management (Active)   Stool Consistency Semi-liquid 01/04/21 0400   Position of Indicator Line Visible 01/04/21 0400   Signal Indicator Bubble Appropriate 01/04/21 0400   Skin Assessment of the Anal Area Unremarkable 01/04/21 0400   Tube Irrigated No 01/04/21 0400   Irrigation Volume (mL) 0 01/04/21 0400   Drainage Bag Level (mL) 200 01/04/21 0400   Output (ml) 0 ml 01/04/21 0400       External Female Catheter 12/30/20 (Active)   Site Assessment Clean, dry, & intact 01/04/21 0400   Repositioned Yes 01/04/21 0400   Perineal Care Yes 01/04/21 0400   Wick Changed Yes 01/04/21 0400   Suction Canister/Tubing Changed No 01/04/21 0400   Urine Output (mL) 100 ml 01/04/21 0500       [REMOVED] Nasogastric Tube 12/11/20 (Removed)   Site Assessment Clean, dry, & intact 12/18/20 0400   Securement Device Tape 12/26/20 0000   G Port Status Infusing 12/18/20 0400   External Insertion Michael (cms) 65 cms 12/14/20 1600   Action Taken Placement verified (comment) 12/18/20 0400   Drainage Description Green 12/16/20 1600   Gastric Residual (mL) 50 ml 12/18/20 0400   Tube Feeding/Formula Options Glucerna 1.5 12/17/20 2000   Tube Feeding/Verify Rate (mL/hr) 50 12/18/20 0400   Water Flush Volume (mL) 100 mL 12/18/20 0400   Intake (ml) 150 ml 12/17/20 1900   Medication Volume 50 ml 12/17/20 2100       [REMOVED] Nasogastric Tube 12/18/20 (Removed)   Site Assessment Clean, dry, & intact 12/27/20 1600   Securement Device Adhesive-based hicks 12/27/20 1600   G Port Status Infusing 12/27/20 1600   External Insertion Michael (cms) 58 cms 12/27/20 1600   Action Taken Placement verified (comment); Feed set changed 12/27/20 1600   Drainage Description Tan 12/27/20 0400   Gastric Residual (mL) 65 ml 12/27/20 1600   Tube Feeding/Formula Options Glucerna 1.5 12/27/20 1600   Modular Nutrients Protein liquid 12/27/20 1600   Tube Feeding/Verify Rate (mL/hr) 50 12/27/20 1600   Water Flush Volume (mL) 330 mL 12/27/20 1600   Intake (ml) 360 ml 12/27/20 1600   Medication Volume 30 ml 12/27/20 1600   Drainage Chamber Level (ml) 0 ml 12/26/20 0700   Output (ml) 0 ml 12/26/20 0700       [REMOVED] Orogastric Tube 12/27/20 (Removed)   Site Assessment Clean, dry, & intact 01/04/21 0400   Securement Device Tape 01/04/21 0400   G Port Status Clamped 01/04/21 0400   External Insertion Michael (cms) 60 cms 01/04/21 0400   Action Taken Placement verified (comment) 01/04/21 0000   Drainage Description Tan 01/03/21 1600   Gastric Residual (mL) 0 ml 01/02/21 0800   Tube Feeding/Formula Options Glucerna 1.5 01/03/21 2000   Modular Nutrients Protein liquid 01/03/21 1200   Tube Feeding/Verify Rate (mL/hr) 50 01/03/21 2300   Water Flush Volume (mL) 50 mL 01/04/21 0000   Intake (ml) 50 ml 01/03/21 2300   Medication Volume 60 ml 01/02/21 0800   Drainage Chamber Level (ml) 0 ml 12/30/20 0400   Output (ml) 0 ml 12/30/20 0400       Findings: trach in ring #3    Electronically Signed by Coral Mejias MD on 1/4/2021 at 11:01 AM

## 2021-01-04 NOTE — ANESTHESIA POSTPROCEDURE EVALUATION
Procedure(s):  TRACHEOSTOMY. general    Anesthesia Post Evaluation      Multimodal analgesia: multimodal analgesia used between 6 hours prior to anesthesia start to PACU discharge  Patient location during evaluation: ICU  Patient participation: complete - patient cannot participate  Pain management: adequate  Airway patency: patent  Anesthetic complications: no  Cardiovascular status: acceptable  Respiratory status: acceptable  Hydration status: acceptable    Final Post Anesthesia Temperature Assessment:  Normothermia (36.0-37.5 degrees C)      INITIAL Post-op Vital signs:   Vitals Value Taken Time   /86 01/04/21 1100   Temp 36.1 °C (97 °F) 01/04/21 1100   Pulse 91 01/04/21 1135   Resp 12 01/04/21 1135   SpO2 100 % 01/04/21 1135   Vitals shown include unvalidated device data.

## 2021-01-04 NOTE — PROGRESS NOTES
Discharge planning    Reviewed chart. Patient remains on vent and trach procedure this am. Plan is for peg soon per MD.  Patient will need LTACH placement. CM will continue to assist with transitional needs.     ANTHONY Delgado, RN  Pager # [phone]  Care Manager

## 2021-01-04 NOTE — PROGRESS NOTES
Interventional Radiology      73 yo female with h/o COPD, HTN, CHF, DM, and dementia was admitted for an acute MI, STEMI s/p stent on 12/11/20, intubated/extubated on 12/17/20,  re-intubated on 12/17/20 and admitted to the ICU on 12/22/20 for respiratory failure and cardiogenic +/- septic shock and is s/p tracheostomy today. Pt requires a gastrostomy tube to maintain nutritional/caloric requirements. CT images reviewed by Dr Bonnie Melvin for gastrostomy tube placement. Gastrostomy tube placement planned for Friday, 1/8/20, as IR schedule allows to allow trach site to heal/mature. NGT Thursday for gastrostomy placement Friday. If tube feeds are initiated, please hold at midnight on Thursday. Continue to hold Plavix. Last dose on 12/31/20. Continue to hold Brilinta. Last dose on 12/30/20. Continue to hold aspirin 81. Last dose on 1/1/20. Will obtain consents for procedure and sedation from pt's sister.       BIJAN Cintron

## 2021-01-04 NOTE — CONSULTS
Infectious Disease Consultation Note        Reason:esbl pneumonia, persistent fever    Current abx Prior abx   Meropenem 12/25-1/2   Vancomycin 12/22-12/25  Piperacillin/tazobactam 12/22  Ceftriaxone 12/18-12/22     Lines:       Assessment :      72 y. o. female with a past medical history of COPD, CHF, HTN, uncontrolled type II DM (last hemoglobin A1c 8.3 on 12/10/2020),  dementia, presented to [facility] with acute on chronic systolic heart failure, acute MI with anterolateral STEMI and Q waves s/p ptca/stent to proximal/mid LAD on 12/11/20. Clinical presentation consistent with septic shock-developed after admission secondary to aspiration pneumonia. Sputum culture 12/22+ for ESBL E. Coli. Etiology of fevers 12/31-1/1 despite broad-spectrum antibiotics not entirely clear. Improving hypoxia argues against worsening pneumonia as a cause of fever. No evidence of DVT noted on venous duplex 1/3  Negative Covid test 1/1 argues against covid infection. Risk of giving broad-spectrum antibiotics outweigh the benefit. Hence meropenem discontinued on 1/2. Improved fevers and no leukocytosis off antibiotics argues against bacterial infection as a cause of recent fever. Recommendations:    1. Hold further antibiotics  2. Weaning from vent per ICU team  3. Follow-up cardiology recommendation    Will sign off. Please call if any new questions or concerns. Thank you for consultation request. Above plan was discussed in details with patient,  and dr Kacey Mario. Please call me if any further questions or concerns. Will continue to participate in the care of this patient. HPI:    72 y. o. female with a past medical history of COPD, CHF, HTN, uncontrolled type II DM (last hemoglobin A1c 8.3 on 12/10/2020),  dementia, presented to [facility] with acute on chronic systolic heart failure, acute MI with anterolateral STEMI and Q waves s/p ptca/stent to proximal/mid LAD on 12/11/20.  Pt was intubated and extubated on 12/17/20, transferred to . On 12/22/20 patient was intubated and transferred to ICU for respiratory failure and cardiogenic +/- septic shock. Respiratory cultures on 12/22 with ESBL. Was started on meropenem on 12/25 for aspiration pneumonia. Patient was noted to have fevers with T-max 100.9 on 12/31. Etiology of fever was not entirely clear. I was consulted for further recommendations. Patient is intubated. Unable to obtain detailed review of system        Past Medical History:   Diagnosis Date    Arthritis     Asthma     Chronic pain     BACK PAIN    Diabetes ([facility]     Diabetic neuropathy ([facility]     Emphysema     Hepatitis C     Hypertension     Nervousness     Osteoarthritis of both knees        Past Surgical History:   Procedure Laterality Date    HX CARPAL TUNNEL RELEASE Right 8/31/09    Dr. Sofia Pierre         home Medication List    Details   folic acid (FOLVITE) 1 mg tablet 1 Tab by Per NG tube route daily. Qty: 30 Tab, Refills: 0      insulin glargine (LANTUS) 100 unit/mL injection lantus 22 units subq daily  Qty: 1 Vial, Refills: 1      magnesium oxide (MAG-OX) 400 mg tablet Take 1 Tab by mouth two (2) times a day. Qty: 60 Tab, Refills: 1      tamsulosin (FLOMAX) 0.4 mg capsule Take 1 tab by mouth daily until kidney stone passes. Qty: 10 Cap, Refills: 0      amLODIPine (NORVASC) 5 mg tablet Take 5 mg by mouth daily. Indications: HYPERTENSION      aspirin (ASPIRIN) 325 mg tablet Take 325 mg by mouth daily. VITAMIN E (FORMULA E 400 PO) Take 1 Tab by mouth daily. losartan (COZAAR) 50 mg tablet Take  by mouth daily. Indications: HYPERTENSION      Melatonin 300 mcg Tab Take  by mouth.        cyanocobalamin (VITAMIN B-12) 1,500 mcg TbER Take 1 Tab by mouth.                Current Facility-Administered Medications   Medication Dose Route Frequency    furosemide (LASIX) injection 20 mg  20 mg IntraVENous Q12H    [Held by provider] heparin 25,000 units in D5W 250 ml infusion  12-25 Units/kg/hr IntraVENous TITRATE    [Held by provider] clopidogreL (PLAVIX) tablet 75 mg  75 mg Oral DAILY    polyethylene glycol (MIRALAX) packet 17 g  17 g Per NG tube PRN    insulin glargine (LANTUS) injection 30 Units  30 Units SubCUTAneous QHS    chlorhexidine (PERIDEX) 0.12 % mouthwash 10 mL  10 mL Oral Q12H    fentaNYL (PF) 900 mcg/30 ml infusion soln  0-200 mcg/hr IntraVENous TITRATE    midazolam (VERSED) injection 1-2 mg  1-2 mg IntraVENous Q10MIN PRN    fentaNYL citrate (PF) injection  mcg   mcg IntraVENous Q30MIN PRN    midazolam in normal saline (VERSED) 1 mg/mL infusion  1-5 mg/hr IntraVENous TITRATE    ELECTROLYTE REPLACEMENT PROTOCOL - Potassium Standard Dosing   1 Each Other PRN    ELECTROLYTE REPLACEMENT PROTOCOL - Magnesium   1 Each Other PRN    ELECTROLYTE REPLACEMENT PROTOCOL - Phosphorus  Standard Dosing  1 Each Other PRN    ELECTROLYTE REPLACEMENT PROTOCOL - Calcium   1 Each Other PRN    insulin glargine (LANTUS) injection 30 Units  30 Units SubCUTAneous DAILY    [Held by provider] carvediloL (COREG) tablet 6.25 mg  6.25 mg Oral Q12H    lisinopriL (PRINIVIL, ZESTRIL) tablet 5 mg  5 mg Oral DAILY    [Held by provider] spironolactone (ALDACTONE) tablet 25 mg  25 mg Oral DAILY    multivit-folic acid-herbal 913 (WELLESSE PLUS) oral liquid 30 mL  30 mL Per NG tube DAILY    insulin lispro (HUMALOG) injection   SubCUTAneous Q6H    albuterol-ipratropium (DUO-NEB) 2.5 MG-0.5 MG/3 ML  3 mL Nebulization Q4H PRN    famotidine (PF) (PEPCID) 20 mg in 0.9% sodium chloride 10 mL injection  20 mg IntraVENous Q12H    sodium chloride (NS) flush 5-40 mL  5-40 mL IntraVENous Q8H    sodium chloride (NS) flush 5-40 mL  5-40 mL IntraVENous PRN    acetaminophen (TYLENOL) tablet 650 mg  650 mg Oral Q6H PRN    Or    acetaminophen (TYLENOL) suppository 650 mg  650 mg Rectal Q6H PRN    promethazine (PHENERGAN) tablet 12.5 mg  12.5 mg Oral Q6H PRN    Or    [facility]ron [facility] injection 4 mg  4 mg IntraVENous Q6H PRN    glucose chewable tablet 16 g  4 Tab Oral PRN    glucagon (GLUCAGEN) injection 1 mg  1 mg IntraMUSCular PRN    dextrose (D50W) injection syrg 12.5-25 g  25-50 mL IntraVENous PRN    [Held by provider] aspirin chewable tablet 81 mg  81 mg Oral DAILY    atorvastatin (LIPITOR) tablet 40 mg  40 mg Oral QHS       Allergies: Patient has no known allergies.     Family History   Problem Relation Age of Onset    Diabetes Mother     Hypertension Mother     Obesity Mother     Alcohol abuse Father     High Cholesterol Father     Lung Disease Father     Stroke Father     Arthritis-osteo Sister     Depression Sister     Diabetes Sister     Hypertension Sister     Obesity Sister     Arthritis-osteo Brother     Diabetes Brother     Hypertension Brother     Obesity Brother      Social History     Socioeconomic History    Marital status: SINGLE     Spouse name: Not on file    Number of children: Not on file    Years of education: Not on file    Highest education level: Not on file   Occupational History    Not on file   Social Needs    Financial resource strain: Not on file    Food insecurity     Worry: Not on file     Inability: Not on file    Transportation needs     Medical: Not on file     Non-medical: Not on file   Tobacco Use    Smoking status: Current Every Day Smoker     Packs/day: 1.00   Substance and Sexual Activity    Alcohol use: Yes     Comment: socially    Drug use: Yes     Types: Cocaine    Sexual activity: Never   Lifestyle    Physical activity     Days per week: Not on file     Minutes per session: Not on file    Stress: Not on file   Relationships    Social connections     Talks on phone: Not on file     Gets together: Not on file     Attends Druze service: Not on file     Active member of club or organization: Not on file     Attends meetings of clubs or organizations: Not on file     Relationship status: Not on file   [address] [place] [address] Intimate partner violence     Fear of current or ex partner: Not on file     Emotionally abused: Not on file     Physically abused: Not on file     Forced sexual activity: Not on file   Other Topics Concern    Not on file   Social History Narrative    Not on file     Social History     Tobacco Use   Smoking Status Current Every Day Smoker    Packs/day: 1.00        Temp (24hrs), Av.2 °F (37.3 °C), Min:98.8 °F (37.1 °C), Max:99.5 °F (37.5 °C)    Visit Vitals  /65   Pulse 87   Temp 99.1 °F (37.3 °C)   Resp 14   Ht 5' 7\" (1.702 m)   Wt 73 kg (160 lb 15 oz)   SpO2 100%   Breastfeeding No   BMI 25.21 kg/m²       ROS: Unable to obtain since intubated    Physical Exam:    General: Intubated/sedated; NAD, acyanotic   HEENT:  Anicteric sclerae; pink palpebral conjunctivae; mucosa moist, ETT  Resp: (+) rhonchi b/l. Symmetrical chest expansion; good airway entry;   CV:  S1, S2 present; regular rate and rhythm  GI:  Abdomen soft, non-tender; (+) active bowel sounds  Extremities:  +2 pulses on all extremities; +1 BUE edema/ No cyanosis/ clubbing noted  Skin:  Warm; no rashes/ lesions noted, normal turgor/cap refill. Scattered hyperpigmentation on abd   Neurologic:  RASS -3. Opening eyes spontaneously, No command following  Devices:  ETT, Purewick, FMS    Labs: Results:   Chemistry Recent Labs     21  0333 21  1500 21  0316   * 141* 117*    140 139   K 4.2 4.6 4.4    106 105   CO2 30 28 29   BUN 17 15 16   CREA 0.33* 0.32* 0.33*   CA 9.0 8.6 9.0   AGAP 5 6 5   BUCR 52* 47* 48*      CBC w/Diff Recent Labs     21  0333 21  0316 21  0334   WBC 6.3 6.9 7.9   RBC 2.70* 2.64* 2.74*   HGB 7.0* 7.1* 7.4*   HCT 23.3* 22.8* 23.8*   * 444* 455*   GRANS 75* 78* 80*   LYMPH 21 13* 13*   EOS 3 2 2      Microbiology No results for input(s): CULT in the last 72 hours.        RADIOLOGY:    All available imaging studies/reports in [facility] for this admission were reviewed    Dr. Mckayla Stein, Infectious Disease Specialist  [phone]  January 4, 2021  9:25 AM

## 2021-01-04 NOTE — PROGRESS NOTES
[facility]      CARDIOLOGY PROGRESS NOTE  RECS:      1. Acute respiratory failure- requiring mechanical ventilation- extubated  12/17/20. re intubated 12/22/20. S/p tracheostomy 1/4/20 Continue supportive care  2. Sepsis- Sputum Cx(12/22) (+) heavy E.Coli-ESBL. on antibiotics. ID following   3. Wide complex Arrhythmia on 12/25/20- No recurrence will continue to monitor. Monitor electrolytes    4. Hypotension- improved. Dobutamine D/C - b/p stable    5. Subacute MI- 12/10/20 -s/p [facility] S/p ptca/stent to proximal/ mid LAD using ARELY.    Moderate to severe LV dysfunction. ekg no new ischemic changes suggestive of reinfarction or stent thrombosis. Continue asa. Brilinta was changed to Plavix. planned to have PEG tube placement tomorrow continue to hold Plavix for procedure. Patient on heparin drip  6. Anemia- H&H Stable. S/p transfusion. 7. Acute Systolic Congestive heart Failure-recent echo with EF% 35%-40. Compensated at present. Lasix as needed. 8. COPD,   9. Dementia - failed swallow eval. Gastrostomy tube placement planned for tomorrow by IR     Continue supportive care    Prognosis poor   Resume DAPT post PEG tube tomorrow. Cardiac cath 12/11/20  Patient presented to [address] [place] [address] with late presentation anterior wall MI.  EF 35-40%. Patient was initially managed medically-- however developed acute pulmonary edema requiring intubation. Also troponin level increasing consistent with ongoing ischemia. S/p ptca/stent to proximal/ mid LAD using ARELY. LVEDP 8 mmHg. Normal PASP pressure with normal PCWP. Moderate to severe LV dysfunction.     Echo 12/10/20  · LV: Estimated LVEF is 35 - 40%. Visually measured ejection fraction. Normal cavity size and wall thickness. Moderately and segmentally reduced systolic function. Inconclusive left ventricular diastolic function. · AO: Mild aortic root dilatation; diameter is 3.8 cm.     ASSESSMENT:  Hospital Problems  Date Reviewed: 2/7/2018          Codes Class Noted POA    Goals of care, counseling/discussion ICD-10-CM: Z71.89  ICD-9-CM: V65.49  Unknown Unknown        Dementia without behavioral disturbance ([facility] ICD-10-CM: F03.90  ICD-9-CM: 294.20  Unknown Unknown        Pulmonary fibrosis ([facility] ICD-10-CM: J84.10  ICD-9-CM: 456  Unknown Unknown        Severe protein-calorie malnutrition ([facility] ICD-10-CM: E43  ICD-9-CM: 799  12/16/2020 Clinically Undetermined        Acute on chronic systolic congestive heart failure ([facility] ICD-10-CM: I50.23  ICD-9-CM: 428.23, 428.0  12/15/2020 Unknown        * (Principal) STEMI (ST elevation myocardial infarction) ([facility] ICD-10-CM: I21.3  ICD-9-CM: 410.90  12/10/2020 Unknown                SUBJECTIVE:  Sedated, intubated    OBJECTIVE:    VS:   Visit Vitals  /69   Pulse 85   Temp 97 °F (36.1 °C)   Resp 14   Ht 5' 7\" (1.702 m)   Wt 73 kg (160 lb 15 oz)   SpO2 100%   Breastfeeding No   BMI 25.21 kg/m²         Intake/Output Summary (Last 24 hours) at 1/4/2021 1400  Last data filed at 1/4/2021 0500  Gross per 24 hour   Intake 838.89 ml   Output 1100 ml   Net -261.11 ml     TELE: normal sinus rhythm    General: chronically ill and Intubated and sedated  HENT: Normocephalic, atraumatic. Normal external eye.   Neck :  no JVD  Cardiac:  regular rate and rhythm, S1, S2 normal, no murmur, click, rub or gallop  Lungs: scattered rhonchi b/l  Abdomen: Soft, nontender, no masses  Extremities:  No c/c/e, peripheral pulses present      Labs: Results:       Chemistry Recent Labs     01/04/21  0333 01/03/21  1500 01/03/21  0316   * 141* 117*    140 139   K 4.2 4.6 4.4    106 105   CO2 30 28 29   BUN 17 15 16   CREA 0.33* 0.32* 0.33*   CA 9.0 8.6 9.0   AGAP 5 6 5   BUCR 52* 47* 48*      CBC w/Diff Recent Labs     01/04/21  0333 01/03/21  0316 01/02/21 0334   WBC 6.3 6.9 7.9   RBC 2.70* 2.64* 2.74*   HGB 7.0* 7.1* 7.4*   HCT 23.3* 22.8* 23.8*   * 444* 455*   GRANS 75* 78* 80*   LYMPH 21 13* 13*   EOS 3 2 2      Cardiac Enzymes No results for input(s): CPK, CKND1, NATHANIEL in the last 72 hours. No lab exists for component: CKRMB, TROIP   Coagulation Recent Labs     01/04/21  0920 01/02/21  2348   APTT 26.5 104.4*       Lipid Panel No results found for: CHOL, CHOLPOCT, CHOLX, CHLST, CHOLV, 649454, HDL, HDLP, LDL, LDLC, DLDLP, 188944, VLDLC, VLDL, TGLX, TRIGL, TRIGP, TGLPOCT, CHHD, CHHDX   BNP No results for input(s): BNPP in the last 72 hours. Liver Enzymes No results for input(s): TP, ALB, TBIL, AP in the last 72 hours. No lab exists for component: SGOT, GPT, DBIL   Thyroid Studies Lab Results   Component Value Date/Time    TSH 2.92 12/10/2020 11:07 AM              NABILA Trimble   Supervised    I have independently evaluated and examined the patient. All relevant labs and testing data's are reviewed. Care plan discussed and updated after review.     Natalya Drew MD

## 2021-01-04 NOTE — ANESTHESIA PREPROCEDURE EVALUATION
Relevant Problems   No relevant active problems       Anesthetic History   No history of anesthetic complications            Review of Systems / Medical History  Patient summary reviewed and pertinent labs reviewed    Pulmonary    COPD: moderate        Asthma        Neuro/Psych   Within defined limits           Cardiovascular    Hypertension          Past MI and CAD    Exercise tolerance: >4 METS     GI/Hepatic/Renal           Liver disease     Endo/Other    Diabetes         Other Findings              Physical Exam    Airway  Mallampati: III  TM Distance: 4 - 6 cm  Neck ROM: normal range of motion   Mouth opening: Normal     Cardiovascular  Regular rate and rhythm,  S1 and S2 normal,  no murmur, click, rub, or gallop  Rhythm: regular  Rate: normal         Dental    Dentition: Poor dentition     Pulmonary  Breath sounds clear to auscultation               Abdominal  GI exam deferred       Other Findings            Anesthetic Plan    ASA: 4  Anesthesia type: general          Induction: Inhalational  Anesthetic plan and risks discussed with: Patient

## 2021-01-04 NOTE — ROUTINE PROCESS
TRANSFER - IN REPORT: 
 
Patient information reviewed on Ava Kym Kehr  being received from icu(unit) for ordered procedure Report consisted of patients Situation, Background, Assessment and  
Recommendations(SBAR). Information from the following report(s) Pre Procedure Checklist was reviewed Freida Zazueta Assessment completed upon patients arrival to unit and care assumed.

## 2021-01-05 LAB
ANION GAP SERPL CALC-SCNC: 6 MMOL/L (ref 3–18)
ARTERIAL PATENCY WRIST A: ABNORMAL
BASE EXCESS BLD CALC-SCNC: 3 MMOL/L
BASOPHILS # BLD: 0 K/UL (ref 0–0.1)
BASOPHILS NFR BLD: 0 % (ref 0–2)
BDY SITE: ABNORMAL
BUN SERPL-MCNC: 18 MG/DL (ref 7–18)
BUN/CREAT SERPL: 46 (ref 12–20)
CALCIUM SERPL-MCNC: 9.2 MG/DL (ref 8.5–10.1)
CHLORIDE SERPL-SCNC: 104 MMOL/L (ref 100–111)
CO2 SERPL-SCNC: 29 MMOL/L (ref 21–32)
CREAT SERPL-MCNC: 0.39 MG/DL (ref 0.6–1.3)
DIFFERENTIAL METHOD BLD: ABNORMAL
EOSINOPHIL # BLD: 0.1 K/UL (ref 0–0.4)
EOSINOPHIL NFR BLD: 2 % (ref 0–5)
ERYTHROCYTE [DISTWIDTH] IN BLOOD BY AUTOMATED COUNT: 15.3 % (ref 11.6–14.5)
GAS FLOW.O2 O2 DELIVERY SYS: ABNORMAL L/MIN
GAS FLOW.O2 SETTING OXYMISER: 14 BPM
GLUCOSE BLD STRIP.AUTO-MCNC: 129 MG/DL (ref 70–110)
GLUCOSE BLD STRIP.AUTO-MCNC: 132 MG/DL (ref 70–110)
GLUCOSE BLD STRIP.AUTO-MCNC: 175 MG/DL (ref 70–110)
GLUCOSE BLD STRIP.AUTO-MCNC: 181 MG/DL (ref 70–110)
GLUCOSE SERPL-MCNC: 141 MG/DL (ref 74–99)
HCO3 BLD-SCNC: 27.2 MMOL/L (ref 22–26)
HCT VFR BLD AUTO: 23.3 % (ref 35–45)
HGB BLD-MCNC: 7.2 G/DL (ref 12–16)
INSPIRATION.DURATION SETTING TIME VENT: 0.9 SEC
LYMPHOCYTES # BLD: 1.1 K/UL (ref 0.9–3.6)
LYMPHOCYTES NFR BLD: 16 % (ref 21–52)
MAGNESIUM SERPL-MCNC: 2 MG/DL (ref 1.6–2.6)
MCH RBC QN AUTO: 26.4 PG (ref 24–34)
MCHC RBC AUTO-ENTMCNC: 30.9 G/DL (ref 31–37)
MCV RBC AUTO: 85.3 FL (ref 74–97)
MONOCYTES # BLD: 0.6 K/UL (ref 0.05–1.2)
MONOCYTES NFR BLD: 8 % (ref 3–10)
NEUTS SEG # BLD: 5.1 K/UL (ref 1.8–8)
NEUTS SEG NFR BLD: 74 % (ref 40–73)
O2/TOTAL GAS SETTING VFR VENT: 28 %
PCO2 BLD: 37.9 MMHG (ref 35–45)
PEEP RESPIRATORY: 5 CMH2O
PH BLD: 7.47 [PH] (ref 7.35–7.45)
PHOSPHATE SERPL-MCNC: 4.1 MG/DL (ref 2.5–4.9)
PIP ISTAT,IPIP: 32
PLATELET # BLD AUTO: 526 K/UL (ref 135–420)
PMV BLD AUTO: 9.6 FL (ref 9.2–11.8)
PO2 BLD: 71 MMHG (ref 80–100)
POTASSIUM SERPL-SCNC: 4 MMOL/L (ref 3.5–5.5)
RBC # BLD AUTO: 2.73 M/UL (ref 4.2–5.3)
SAO2 % BLD: 95 % (ref 92–97)
SERVICE CMNT-IMP: ABNORMAL
SODIUM SERPL-SCNC: 139 MMOL/L (ref 136–145)
SPECIMEN TYPE: ABNORMAL
TOTAL RESP. RATE, ITRR: 18
VENTILATION MODE VENT: ABNORMAL
VT SETTING VENT: 500 ML
WBC # BLD AUTO: 6.9 K/UL (ref 4.6–13.2)

## 2021-01-05 PROCEDURE — 36600 WITHDRAWAL OF ARTERIAL BLOOD: CPT

## 2021-01-05 PROCEDURE — 74011250637 HC RX REV CODE- 250/637: Performed by: INTERNAL MEDICINE

## 2021-01-05 PROCEDURE — 74011250636 HC RX REV CODE- 250/636: Performed by: PHYSICIAN ASSISTANT

## 2021-01-05 PROCEDURE — 74011250637 HC RX REV CODE- 250/637: Performed by: NURSE PRACTITIONER

## 2021-01-05 PROCEDURE — 94762 N-INVAS EAR/PLS OXIMTRY CONT: CPT

## 2021-01-05 PROCEDURE — 80048 BASIC METABOLIC PNL TOTAL CA: CPT

## 2021-01-05 PROCEDURE — 85025 COMPLETE CBC W/AUTO DIFF WBC: CPT

## 2021-01-05 PROCEDURE — 82803 BLOOD GASES ANY COMBINATION: CPT

## 2021-01-05 PROCEDURE — 84100 ASSAY OF PHOSPHORUS: CPT

## 2021-01-05 PROCEDURE — 74011000250 HC RX REV CODE- 250: Performed by: PHYSICIAN ASSISTANT

## 2021-01-05 PROCEDURE — 99291 CRITICAL CARE FIRST HOUR: CPT | Performed by: INTERNAL MEDICINE

## 2021-01-05 PROCEDURE — 99233 SBSQ HOSP IP/OBS HIGH 50: CPT | Performed by: INTERNAL MEDICINE

## 2021-01-05 PROCEDURE — 74011636637 HC RX REV CODE- 636/637: Performed by: INTERNAL MEDICINE

## 2021-01-05 PROCEDURE — 83735 ASSAY OF MAGNESIUM: CPT

## 2021-01-05 PROCEDURE — 94003 VENT MGMT INPAT SUBQ DAY: CPT

## 2021-01-05 PROCEDURE — 36592 COLLECT BLOOD FROM PICC: CPT

## 2021-01-05 PROCEDURE — 82962 GLUCOSE BLOOD TEST: CPT

## 2021-01-05 PROCEDURE — 65610000006 HC RM INTENSIVE CARE

## 2021-01-05 PROCEDURE — 74011636637 HC RX REV CODE- 636/637: Performed by: STUDENT IN AN ORGANIZED HEALTH CARE EDUCATION/TRAINING PROGRAM

## 2021-01-05 PROCEDURE — 74011250636 HC RX REV CODE- 250/636: Performed by: STUDENT IN AN ORGANIZED HEALTH CARE EDUCATION/TRAINING PROGRAM

## 2021-01-05 PROCEDURE — 74011636637 HC RX REV CODE- 636/637: Performed by: PHYSICIAN ASSISTANT

## 2021-01-05 RX ORDER — ASPIRIN 325 MG
325 TABLET ORAL ONCE
Status: COMPLETED | OUTPATIENT
Start: 2021-01-05 | End: 2021-01-05

## 2021-01-05 RX ORDER — FUROSEMIDE 20 MG/1
20 TABLET ORAL DAILY
Status: DISCONTINUED | OUTPATIENT
Start: 2021-01-06 | End: 2021-01-14 | Stop reason: HOSPADM

## 2021-01-05 RX ORDER — DOCUSATE SODIUM 50 MG/5ML
100 LIQUID ORAL DAILY
Status: DISCONTINUED | OUTPATIENT
Start: 2021-01-05 | End: 2021-01-14 | Stop reason: HOSPADM

## 2021-01-05 RX ADMIN — OXYCODONE HYDROCHLORIDE 10 MG: 5 SOLUTION ORAL at 05:06

## 2021-01-05 RX ADMIN — Medication 30 ML: at 08:00

## 2021-01-05 RX ADMIN — ATORVASTATIN CALCIUM 40 MG: 40 TABLET, FILM COATED ORAL at 21:40

## 2021-01-05 RX ADMIN — FENTANYL CITRATE 100 MCG: 50 INJECTION INTRAMUSCULAR; INTRAVENOUS at 20:35

## 2021-01-05 RX ADMIN — DIAZEPAM 10 MG: 5 TABLET ORAL at 08:00

## 2021-01-05 RX ADMIN — SODIUM CHLORIDE 10 ML: 9 INJECTION, SOLUTION INTRAMUSCULAR; INTRAVENOUS; SUBCUTANEOUS at 05:07

## 2021-01-05 RX ADMIN — ASPIRIN 325 MG ORAL TABLET 325 MG: 325 PILL ORAL at 11:52

## 2021-01-05 RX ADMIN — CHLORHEXIDINE GLUCONATE 0.12% ORAL RINSE 10 ML: 1.2 LIQUID ORAL at 08:00

## 2021-01-05 RX ADMIN — FENTANYL CITRATE 100 MCG: 50 INJECTION INTRAMUSCULAR; INTRAVENOUS at 11:54

## 2021-01-05 RX ADMIN — INSULIN GLARGINE 30 UNITS: 100 INJECTION, SOLUTION SUBCUTANEOUS at 21:41

## 2021-01-05 RX ADMIN — INSULIN GLARGINE 30 UNITS: 100 INJECTION, SOLUTION SUBCUTANEOUS at 08:00

## 2021-01-05 RX ADMIN — CHLORHEXIDINE GLUCONATE 0.12% ORAL RINSE 10 ML: 1.2 LIQUID ORAL at 20:37

## 2021-01-05 RX ADMIN — DIAZEPAM 10 MG: 5 TABLET ORAL at 16:25

## 2021-01-05 RX ADMIN — SODIUM CHLORIDE 10 ML: 9 INJECTION, SOLUTION INTRAMUSCULAR; INTRAVENOUS; SUBCUTANEOUS at 14:00

## 2021-01-05 RX ADMIN — INSULIN LISPRO 3 UNITS: 100 INJECTION, SOLUTION INTRAVENOUS; SUBCUTANEOUS at 05:09

## 2021-01-05 RX ADMIN — OXYCODONE HYDROCHLORIDE 10 MG: 5 SOLUTION ORAL at 16:25

## 2021-01-05 RX ADMIN — DOCUSATE SODIUM 100 MG: 50 LIQUID ORAL at 11:52

## 2021-01-05 RX ADMIN — LISINOPRIL 5 MG: 5 TABLET ORAL at 08:00

## 2021-01-05 RX ADMIN — ACETAMINOPHEN 650 MG: 325 TABLET ORAL at 12:08

## 2021-01-05 RX ADMIN — OXYCODONE HYDROCHLORIDE 10 MG: 5 SOLUTION ORAL at 21:41

## 2021-01-05 RX ADMIN — FUROSEMIDE 20 MG: 10 INJECTION, SOLUTION INTRAMUSCULAR; INTRAVENOUS at 08:00

## 2021-01-05 RX ADMIN — DIAZEPAM 10 MG: 5 TABLET ORAL at 21:41

## 2021-01-05 RX ADMIN — LANSOPRAZOLE 30 MG: KIT at 07:30

## 2021-01-05 RX ADMIN — SODIUM CHLORIDE 10 ML: 9 INJECTION, SOLUTION INTRAMUSCULAR; INTRAVENOUS; SUBCUTANEOUS at 21:43

## 2021-01-05 RX ADMIN — INSULIN LISPRO 3 UNITS: 100 INJECTION, SOLUTION INTRAVENOUS; SUBCUTANEOUS at 12:03

## 2021-01-05 NOTE — PROGRESS NOTES
[facility]  ICU Progress Note      Name: Nathanel Dakins   : 1955   MRN: [id number]   Date: 2021 9:00 AM     [x]I have reviewed the flowsheet and previous days notes. Events overnight reviewed and discussed with nursing staff. Vital signs and records reviewed. Interval HPI:  Patient is a 72 y. o. female with a past medical history of COPD, CHF, HTN, DM, dementia, presented to [facility] with acute on chronic systolic heart failure, acute MI with anterolateral STEMI and Q waves s/p ptca/stent to proximal/mid LAD on 20. Pt was intubated and extubated on 20, transferred to . On 20 patient was intubated and transferred to ICU for respiratory failure and cardiogenic +/- septic shock. Respiratory cultures on  with ESBL on Merrem. Dr. Yordan Mendosa and IR have been consulted for trach/PEG. She is S/p trach 21 and will receive PEG tube in IR 20 after Plavix washout/time for new trach site to heal.       · Ventilator day# 15  · Tolerating TF  · Mentation waxes and wanes. Not command following this AM, opening eyes spontaneously, withdrawing from pain   ·  Start IV heparin if okay with ENT. Will contact Dr. Yordan Mendosa. Start aspirin per orders as discussed with Dr. Elizbeth Pallas by cardiology. · Hold anticoagulation as needed for procedures. PEG insertion Friday in IR scheduled. · COVID19 NAAT from 2021 came back negative   · Hemoglobin stable      · ABG showed metabolic alkalosis          ROS:Review of systems not obtained due to patient factors. Vital Signs:    Visit Vitals  /63   Pulse (!) 103   Temp 98 °F (36.7 °C)   Resp 17   Ht 5' 7\" (1.702 m)   Wt 72.6 kg (160 lb 0.9 oz)   SpO2 100%   Breastfeeding No   BMI 25.07 kg/m²       O2 Device: Ventilator, Tracheostomy   O2 Flow Rate (L/min): 40 l/min   Temp (24hrs), Av.9 °F (36.6 °C), Min:97 °F (36.1 °C), Max:98.4 °F (36.9 °C)       Intake/Output:   Last shift:      No intake/output data recorded.   Last 3 shifts: 01/03 1901 - 01/05 0700  In: 1801.5 [I.V.:101.5]  Out: 8686 [Urine:2425; Drains:100]    Intake/Output Summary (Last 24 hours) at 1/5/2021 0913  Last data filed at 1/5/2021 0700  Gross per 24 hour   Intake 1402 ml   Output 1325 ml   Net 77 ml       Ventilator Settings:  Mode Rate Tidal Volume Pressure FiO2 PEEP   Assist control, SIMV, VC+, Pressure support   500 ml  8 cm H2O 28 % 5 cm H20     Peak airway pressure: 16 cm H2O    Minute ventilation: 8.2 l/min      ARDS network Guidelines:   Lung protective strategy and Plateau  Pressure goal < 30 cm H2O goals  Oxygenation Goals PaO2 55-80 mm Hg or SaO2 88-95%  PH goal 7.30-7.45    VAP bundle:  Reviewed. Tyrrell tube to suction at 20-30 cm Hg. Maintain Tyrrell tube with 5-10ml air every 4 hours  Routine oral care every 4 hours  Elevation of head > 45 degree  Daily sedation holiday and SBT evaluation starting at 6.00am.    Physical Exam:      General: Intubated/sedated; NAD, acyanotic   HEENT:  Anicteric sclerae; pink palpebral conjunctivae; mucosa moist, ETT  Resp: (+) coarse rhonchi b/l. Symmetrical chest expansion; good airway entry;   CV:  S1, S2 present; regular rate and rhythm (tachycardic, low 100's)   GI:  Abdomen soft, non-tender; (+) active bowel sounds  Extremities:  +2 pulses on all extremities  Skin:  Warm; no rashes/ lesions noted, normal turgor/cap refill.    Neurologic:   Opening eyes spontaneously, No command following, withdrawing from pain  Devices:  Shayna Pennington PICC RUE     DATA:     Current Facility-Administered Medications   Medication Dose Route Frequency    oxyCODONE (ROXICODONE) 5 mg/5 mL oral solution 10 mg  10 mg Oral Q8H    diazePAM (VALIUM) tablet 10 mg  10 mg Oral TID    lansoprazole (PREVACID) 3 mg/mL oral suspension 30 mg  30 mg Per G Tube ACB    furosemide (LASIX) injection 20 mg  20 mg IntraVENous Q12H    [Held by provider] heparin 25,000 units in D5W 250 ml infusion  12-25 Units/kg/hr IntraVENous TITRATE    [Held by provider] clopidogreL (PLAVIX) tablet 75 mg  75 mg Oral DAILY    polyethylene glycol (MIRALAX) packet 17 g  17 g Per NG tube PRN    insulin glargine (LANTUS) injection 30 Units  30 Units SubCUTAneous QHS    chlorhexidine (PERIDEX) 0.12 % mouthwash 10 mL  10 mL Oral Q12H    fentaNYL (PF) 900 mcg/30 ml infusion soln  0-200 mcg/hr IntraVENous TITRATE    midazolam (VERSED) injection 1-2 mg  1-2 mg IntraVENous Q10MIN PRN    fentaNYL citrate (PF) injection  mcg   mcg IntraVENous Q30MIN PRN    midazolam in normal saline (VERSED) 1 mg/mL infusion  1-5 mg/hr IntraVENous TITRATE    ELECTROLYTE REPLACEMENT PROTOCOL - Potassium Standard Dosing   1 Each Other PRN    ELECTROLYTE REPLACEMENT PROTOCOL - Magnesium   1 Each Other PRN    ELECTROLYTE REPLACEMENT PROTOCOL - Phosphorus  Standard Dosing  1 Each Other PRN    ELECTROLYTE REPLACEMENT PROTOCOL - Calcium   1 Each Other PRN    insulin glargine (LANTUS) injection 30 Units  30 Units SubCUTAneous DAILY    [Held by provider] carvediloL (COREG) tablet 6.25 mg  6.25 mg Oral Q12H    lisinopriL (PRINIVIL, ZESTRIL) tablet 5 mg  5 mg Oral DAILY    [Held by provider] spironolactone (ALDACTONE) tablet 25 mg  25 mg Oral DAILY    multivit-folic acid-herbal 169 (WELLESSE PLUS) oral liquid 30 mL  30 mL Per NG tube DAILY    insulin lispro (HUMALOG) injection   SubCUTAneous Q6H    albuterol-ipratropium (DUO-NEB) 2.5 MG-0.5 MG/3 ML  3 mL Nebulization Q4H PRN    sodium chloride (NS) flush 5-40 mL  5-40 mL IntraVENous Q8H    sodium chloride (NS) flush 5-40 mL  5-40 mL IntraVENous PRN    acetaminophen (TYLENOL) tablet 650 mg  650 mg Oral Q6H PRN    Or    acetaminophen (TYLENOL) suppository 650 mg  650 mg Rectal Q6H PRN    promethazine (PHENERGAN) tablet 12.5 mg  12.5 mg Oral Q6H PRN    Or    ondansetron (ZOFRAN) injection 4 mg  4 mg IntraVENous Q6H PRN    glucose chewable tablet 16 g  4 Tab Oral PRN    glucagon (GLUCAGEN) injection 1 mg  1 mg IntraMUSCular PRN  dextrose (D50W) injection syrg 12.5-25 g  25-50 mL IntraVENous PRN    [Held by provider] aspirin chewable tablet 81 mg  81 mg Oral DAILY    atorvastatin (LIPITOR) tablet 40 mg  40 mg Oral QHS         Labs: Results:       Chemistry Recent Labs     01/05/21  0300 01/04/21  1611 01/04/21  0333   * 79 120*    140 139   K 4.0 4.1 4.2    104 104   CO2 29 30 30   BUN 18 18 17   CREA 0.39* 0.33* 0.33*   CA 9.2 9.1 9.0   AGAP 6 6 5   BUCR 46* 55* 52*      CBC w/Diff Recent Labs     01/05/21  0300 01/04/21  0333 01/03/21  0316   WBC 6.9 6.3 6.9   RBC 2.73* 2.70* 2.64*   HGB 7.2* 7.0* 7.1*   HCT 23.3* 23.3* 22.8*   * 496* 444*   GRANS 74* 75* 78*   LYMPH 16* 21 13*   EOS 2 3 2      Coagulation Recent Labs     01/04/21  0920 01/02/21  2348   APTT 26.5 104.4*       Liver Enzymes No results for input(s): TP, ALB, TBIL, AP in the last 72 hours. No lab exists for component: SGOT, GPT, DBIL   ABG Lab Results   Component Value Date/Time    PHI 7.47 (H) 01/05/2021 04:13 AM    PCO2I 37.9 01/05/2021 04:13 AM    PO2I 71 (L) 01/05/2021 04:13 AM    HCO3I 27.2 (H) 01/05/2021 04:13 AM    FIO2I 28 01/05/2021 04:13 AM      Microbiology No results for input(s): CULT in the last 72 hours. Telemetry: [x]Sinus []A-flutter []Paced    []A-fib []Multiple PVCs                  Imaging:  CXR Results  (Last 48 hours)               01/04/21 0613  XR CHEST PORT Final result    Impression:  IMPRESSION:       The right PICC has been pulled back and is looped in the region of the   brachiocephalic vein and origin of the right jugular vein. Recommend   repositioning. Endotracheal tube tip is above the chace. No significant change bilateral lung infiltrates. Narrative:  Portable Chest       CPT CODE: 43871       HISTORY: Intubation. FINDINGS:        Compared with 1/3/2021. Endotracheal tube tip is above the chace. Enteric tube courses below the   diaphragm tip not visualized.  Multiple wires overlie the patient. The right PICC has been retracted and is looped at the base of the jugular vein   tip at the brachiocephalic region. Heart size and mediastinal contours within normal limits. There are streaky and   patchy lung densities which are relatively stable. No effusion. No pneumothorax                       IMPRESSION:   · Acute on Chronic Hypoxic and Hypercapnic Respiratory Failure - Requiring mechanical ventilation, Extubated on 12/17 and reintubated on 12/22, Likely aspiration event. Trach placed 1/4/21, Dr. Gibson Pump   · Wide Complex Arrhythmia (12/25) -noted on bedside cardiac monitor. Lasted roughly 5-10 mins with Spontaneous resolution. E-lyte derangement vs Drug effect (pt was on 55mcg/hr Oliverio-Synephrine at the time) vs cardiac insult.    · Acute on Chronic Systolic Heart Failure - superimposed on severe ILD, Echo 12/10/20 EF 35-40%  · Combination septic/cardiogenic shock, improving   · Aspiration PNA. Sputum Cx(12/22) (+) heavy E.Coli-ESBL. Recent Hx of E.coli ESBL.   · Acute Encephalopathy - likely toxic/metabolic vs infectious vs hepatic in nature, ammonia wnl.   · Severe pulmonary fibrosis - with extensive honeycombing and bronchiectasis as seen on CTA chest 12/16/20 - appears to be a new diagnosis but possibly UIP  · Acute aspiration pneumonia with severe sepsis  · UTI (+)E. coli  · Acute MI with anterolateral STEMI and Q waves - s/p ptca/stent to proximal/mid LAD using ARELY on DAPT on 12/11/20- Remains on Brilinta  · Severe dysphagia - failed MBS.  Will place PEG tube 1/8/21 with IR.   · Critical illness myopathy/polyneuropathy  · Hx of Hep C: + RNA viral load 4/4/2020  · Multiple liver masses - noted on CT scan 11/25/20  · COPD - on home O2 4L NC  · DM  · Dementia - unknown baseline, on aricept    RECOMMENDATIONS:   Resp:   -Titrate FiO2/ supp O2 for SpO2 >90%  -Aspiration precautions   -S/p trach 1/4/21, Dr. Gibson Pump  -Chaparro Ku   -Aggressive pulmonary hygiene   I/D:   -ID consulted for intermittent fevers, appreciate recs  -Aleukocyotosis  -Per ID, discontinued Merrem on 1/2/21 started 12/25 for Ecoli (ESBL)   -Negative for COVID19 on 1/1/2021  Hem/Onc:   -Daily CBC; H&H stable   -Will transfuse unit of blood to maintain Hgb >7  CVS:   -Appreciate cardiology recs: Peg needed - Plavix held in preparation for insertion with IR 1/8/21 . Can use heparin drip if DAPT is interrupted. ASA has been restarted today and discussed with IR by cardiology. Wll contact Dr. Cheri Hudson in regards to starting heparin gtt since trach was just placed yesterday. -EF 35-40 % on 12/10/20, the day before stent placement  -Proximal LAD angioplasty and stenting on 12/11/2020 for 95% stenotic lesion.  -Hemodynamics stable off pressors  -Tolerating statin   -Continue lisinopril  -continue lasix. Changed to PO today. -BUE and BLE PVL's negative   Metabolic:   -Daily BMP; monitor e-lytes; replace PRN  Renal:    -Trend Renal indices, Purewick   Endocrine:   -Continue lantus BID with SSI  GI:   -SUP  -Tolerating TF   -Bowel regimen: Miralax PRN  -Will require PEG tube. IR consult placed. Will likely be placed this week.    -CT Abd from 1/3/21: gastric antrum contiguous with anterior peritoneum, enlarging hepatic masses consistent with likely malignancy or mets, RLL consolidation with air bronchograms consistent with atelectasis and possible airspace disease   Musc/Skin:   -No acute issues, wound care  Neuro:   -Off sedation   -Versed and fentanyl gtt for sedation, goal RASS 0 to -1  -Transition to enteral meds; fiona and valium  PPX  -SUP ppx  -heparin drip, hold for procedures   Discussed in Interdisciplinary Rounds            The patient is: [x] acutely ill Risk of deterioration: [] moderate    [] critically ill  [x] high     []See my orders for details    My assessment/plan was discussed with:  [x]Nursing []PT/OT    [x]Respiratory therapy [x]Dr. Day Vidal    []Family []       Sebastián Silva AGACNP-BC  01/05/21  Pulmonary, Critical Care Medicine  [facility]         Attending Note:  Late entry for date of service 1/5/2021  I saw and evaluated the patient, performing the key elements of the service. I discussed the findings, assessment and plan with the NP and agree with the NP's findings and plan as documented in the NP's note above. All edits and changes made above or are mentioned in my attending note which was independently assessed as well as written. Total of 30 min critical care time spent at bedside (personally) during the course of care providing evaluation,management and care decisions and ordering appropriate treatment related to critical care problems exclusive of procedures. The reason for providing this level of medical care for this critically ill patient was due a critical illness that impaired one or more vital organ systems such that there was a high probability of imminent or life threatening deterioration in the patients condition. This care involved high complexity decision making to assess, manipulate, and support vital system functions, to treat this degree vital organ system failure and to prevent further life threatening deterioration of the patients condition. This time was independent of other practitioners. 63-year-old female with a past medical history of COPD, CHF, hypertension, dementia, diabetes, hepatitis C, [facility] for chest pain found to have acute STEMI. Patient underwent cardiac intervention on 12/11/2020, however developed worsening hypoxic respiratory failure afterwards, intubated postprocedure and then extubated on 12/17/2020. Patient then developed worsening dyspnea, tachypnea, was found to be obtunded, reintubated on 12/22/2020. Patient was then transferred to the ICU. Patient found unresponsive, likely aspirated, extubated on 12/26.   Unfortunately patient's mental status remains poor, likely toxic/metabolic versus hepatic in nature, so patient was reintubated after RRT on 12/28. Since patient's mental status remains poor, patient also has significant deconditioning from critical illness myopathy/polyneuropathy, patient underwent tracheostomy placement today. We consulted interventional radiology for G-tube placement. Infectious diseases was following the patient for aspiration pneumonia, signed off today after completion of antibiotics. Patient has multiple other issues including cardiogenic shock which has resolved, ongoing encephalopathy, has developed pulmonary fibrosis, likely IPF given progression from 2018 to this year, patient also has hepatic mass for which will need follow-up if patient survives. Also of note patient does have underlying severe ischemic cardiomyopathy with LVEF of 20%. I have advised that other providers pursue [facility] futile care policy, however other providers thought it would be appropriate to offer patient tracheostomy and PEG tube placement, cardiology then deemed that patient could come off of antiplatelet therapy and go on to heparin drip temporarily, so these were offered. G-tube plan for Friday. After placement of this, we will attempt to place patient to LTAC when possible. Discussed with Cardiology, restarted pt on ASA and heparin gtt per clearance from IR until procedure.   Also of note, pt unable to tolerate SBT this morning.     Very Very poor overall prognosis given overall functional status in the setting of a very high burden of acute and chronic diseases not limited to new pulmonary fibrosis, ischemic cardiomyopathy with cardiogenic shock, dysphagia and what appears to be severe dementia       Ewelina Villegas MD/MPH     Pulmonary, Critical Care Medicine  [facility]

## 2021-01-05 NOTE — PROGRESS NOTES
Problem: Pain  Goal: *Control of Pain  Outcome: Progressing Towards Goal  Goal: *PALLIATIVE CARE:  Alleviation of Pain  Outcome: Progressing Towards Goal     Problem: Patient Education: Go to Patient Education Activity  Goal: Patient/Family Education  Outcome: Progressing Towards Goal     Problem: Diabetes Self-Management  Goal: *Disease process and treatment process  Description: Define diabetes and identify own type of diabetes; list 3 options for treating diabetes. Outcome: Progressing Towards Goal  Goal: *Incorporating nutritional management into lifestyle  Description: Describe effect of type, amount and timing of food on blood glucose; list 3 methods for planning meals. Outcome: Progressing Towards Goal  Goal: *Incorporating physical activity into lifestyle  Description: State effect of exercise on blood glucose levels. Outcome: Progressing Towards Goal  Goal: *Developing strategies to promote health/change behavior  Description: Define the ABC's of diabetes; identify appropriate screenings, schedule and personal plan for screenings. Outcome: Progressing Towards Goal  Goal: *Using medications safely  Description: State effect of diabetes medications on diabetes; name diabetes medication taking, action and side effects. Outcome: Progressing Towards Goal  Goal: *Monitoring blood glucose, interpreting and using results  Description: Identify recommended blood glucose targets  and personal targets. Outcome: Progressing Towards Goal  Goal: *Prevention, detection, treatment of acute complications  Description: List symptoms of hyper- and hypoglycemia; describe how to treat low blood sugar and actions for lowering  high blood glucose level.   Outcome: Progressing Towards Goal  Goal: *Developing strategies to address psychosocial issues  Description: Describe feelings about living with diabetes; identify support needed and support network  Outcome: Progressing Towards Goal     Problem: Patient Education: Go to Patient Education Activity  Goal: Patient/Family Education  Outcome: Progressing Towards Goal     Problem: Pressure Injury - Risk of  Goal: *Prevention of pressure injury  Description: Document Pankaj Scale and appropriate interventions in the flowsheet. Outcome: Progressing Towards Goal  Note: Pressure Injury Interventions:  Sensory Interventions: Assess changes in LOC, Assess need for specialty bed, Avoid rigorous massage over bony prominences, Turn and reposition approx. every two hours (pillows and wedges if needed), Use 30-degree side-lying position, Minimize linen layers, Maintain/enhance activity level, Keep linens dry and wrinkle-free    Moisture Interventions: Absorbent underpads, Apply protective barrier, creams and emollients, Minimize layers, Maintain skin hydration (lotion/cream)    Activity Interventions: Assess need for specialty bed, Pressure redistribution bed/mattress(bed type)    Mobility Interventions: Assess need for specialty bed, HOB 30 degrees or less, Pressure redistribution bed/mattress (bed type), Turn and reposition approx. every two hours(pillow and wedges)    Nutrition Interventions: Document food/fluid/supplement intake    Friction and Shear Interventions: Apply protective barrier, creams and emollients, Foam dressings/transparent film/skin sealants, HOB 30 degrees or less, Minimize layers                Problem: Patient Education: Go to Patient Education Activity  Goal: Patient/Family Education  Outcome: Progressing Towards Goal     Problem: Falls - Risk of  Goal: *Absence of Falls  Description: Document Young Fall Risk and appropriate interventions in the flowsheet.   Outcome: Progressing Towards Goal  Note: Fall Risk Interventions:  Mobility Interventions: Bed/chair exit alarm    Mentation Interventions: Adequate sleep, hydration, pain control, Bed/chair exit alarm, Door open when patient unattended, Reorient patient, Room close to nurse's station, Update white board    Medication Interventions: Bed/chair exit alarm    Elimination Interventions: Bed/chair exit alarm, Toileting schedule/hourly rounds    History of Falls Interventions: Bed/chair exit alarm, Room close to nurse's station         Problem: Patient Education: Go to Patient Education Activity  Goal: Patient/Family Education  Outcome: Progressing Towards Goal     Problem: Patient Education: Go to Patient Education Activity  Goal: Patient/Family Education  Outcome: Progressing Towards Goal     Problem: Heart Failure: Day 1  Goal: Activity/Safety  Outcome: Progressing Towards Goal  Goal: Consults, if ordered  Outcome: Progressing Towards Goal  Goal: Diagnostic Test/Procedures  Outcome: Progressing Towards Goal  Goal: Nutrition/Diet  Outcome: Progressing Towards Goal  Goal: Discharge Planning  Outcome: Progressing Towards Goal  Goal: Medications  Outcome: Progressing Towards Goal  Goal: Respiratory  Outcome: Progressing Towards Goal  Goal: Treatments/Interventions/Procedures  Outcome: Progressing Towards Goal  Goal: Psychosocial  Outcome: Progressing Towards Goal  Goal: *Oxygen saturation within defined limits  Outcome: Progressing Towards Goal  Goal: *Hemodynamically stable  Outcome: Progressing Towards Goal  Goal: *Optimal pain control at patient's stated goal  Outcome: Progressing Towards Goal  Goal: *Anxiety reduced or absent  Outcome: Progressing Towards Goal     Problem: Heart Failure: Day 2  Goal: Activity/Safety  Outcome: Progressing Towards Goal  Goal: Consults, if ordered  Outcome: Progressing Towards Goal  Goal: Diagnostic Test/Procedures  Outcome: Progressing Towards Goal  Goal: Nutrition/Diet  Outcome: Progressing Towards Goal  Goal: Discharge Planning  Outcome: Progressing Towards Goal  Goal: Medications  Outcome: Progressing Towards Goal  Goal: Respiratory  Outcome: Progressing Towards Goal  Goal: Treatments/Interventions/Procedures  Outcome: Progressing Towards Goal  Goal: Psychosocial  Outcome: Progressing Towards Goal  Goal: *Oxygen saturation within defined limits  Outcome: Progressing Towards Goal  Goal: *Hemodynamically stable  Outcome: Progressing Towards Goal  Goal: *Optimal pain control at patient's stated goal  Outcome: Progressing Towards Goal  Goal: *Anxiety reduced or absent  Outcome: Progressing Towards Goal  Goal: *Demonstrates progressive activity  Outcome: Progressing Towards Goal     Problem: Heart Failure: Day 3  Goal: Activity/Safety  Outcome: Progressing Towards Goal  Goal: Diagnostic Test/Procedures  Outcome: Progressing Towards Goal  Goal: Nutrition/Diet  Outcome: Progressing Towards Goal  Goal: Discharge Planning  Outcome: Progressing Towards Goal  Goal: Medications  Outcome: Progressing Towards Goal  Goal: Respiratory  Outcome: Progressing Towards Goal  Goal: Treatments/Interventions/Procedures  Outcome: Progressing Towards Goal  Goal: Psychosocial  Outcome: Progressing Towards Goal  Goal: *Oxygen saturation within defined limits  Outcome: Progressing Towards Goal  Goal: *Hemodynamically stable  Outcome: Progressing Towards Goal  Goal: *Optimal pain control at patient's stated goal  Outcome: Progressing Towards Goal  Goal: *Anxiety reduced or absent  Outcome: Progressing Towards Goal  Goal: *Demonstrates progressive activity  Outcome: Progressing Towards Goal     Problem: Heart Failure: Day 4  Goal: Activity/Safety  Outcome: Progressing Towards Goal  Goal: Diagnostic Test/Procedures  Outcome: Progressing Towards Goal  Goal: Nutrition/Diet  Outcome: Progressing Towards Goal  Goal: Discharge Planning  Outcome: Progressing Towards Goal  Goal: Medications  Outcome: Progressing Towards Goal  Goal: Respiratory  Outcome: Progressing Towards Goal  Goal: Treatments/Interventions/Procedures  Outcome: Progressing Towards Goal  Goal: Psychosocial  Outcome: Progressing Towards Goal  Goal: *Oxygen saturation within defined limits  Outcome: Progressing Towards Goal  Goal: *Hemodynamically stable  Outcome: Progressing  Towards Goal  Goal: *Optimal pain control at patient's stated goal  Outcome: Progressing Towards Goal  Goal: *Anxiety reduced or absent  Outcome: Progressing Towards Goal  Goal: *Demonstrates progressive activity  Outcome: Progressing Towards Goal     Problem: Heart Failure: Day 5  Goal: Activity/Safety  Outcome: Progressing Towards Goal  Goal: Diagnostic Test/Procedures  Outcome: Progressing Towards Goal  Goal: Nutrition/Diet  Outcome: Progressing Towards Goal  Goal: Discharge Planning  Outcome: Progressing Towards Goal  Goal: Medications  Outcome: Progressing Towards Goal  Goal: Respiratory  Outcome: Progressing Towards Goal  Goal: Treatments/Interventions/Procedures  Outcome: Progressing Towards Goal  Goal: Psychosocial  Outcome: Progressing Towards Goal     Problem: Heart Failure: Discharge Outcomes  Goal: *Demonstrates ability to perform prescribed activity without shortness of breath or discomfort  Outcome: Progressing Towards Goal  Goal: *Left ventricular function assessment completed prior to or during stay, or planned for post-discharge  Outcome: Progressing Towards Goal  Goal: *ACEI prescribed if LVEF less than 40% and no contraindications or ARB prescribed  Outcome: Progressing Towards Goal  Goal: *Verbalizes understanding and describes prescribed diet  Outcome: Progressing Towards Goal  Goal: *Verbalizes understanding/describes prescribed medications  Outcome: Progressing Towards Goal  Goal: *Describes available resources and support systems  Description: (eg: Home Health, Palliative Care, Advanced Medical Directive)  Outcome: Progressing Towards Goal  Goal: *Describes smoking cessation resources  Outcome: Progressing Towards Goal  Goal: *Understands and describes signs and symptoms to report to providers(Stroke Metric)  Outcome: Progressing Towards Goal  Goal: *Describes/verbalizes understanding of follow-up/return appt  Description: (eg: to physicians, diabetes treatment coordinator, and other resources  Outcome: Progressing Towards Goal  Goal: *Describes importance of continuing daily weights and changes to report to physician  Outcome: Progressing Towards Goal

## 2021-01-05 NOTE — DIABETES MGMT
GLYCEMIC CONTROL PLAN OF CARE     Assessment/Recommendations:  Lab glucose this am 141 mg/dl  Continue basal and corrective insulin coverage as ordered. Pt no longer on steroids, may need to decrease lantus dose. Will continue inpatient monitoring. Most recent blood glucose values:        Results for El Ferguson (MRN [id number]) as of 1/5/2021 10:06   Ref. Range 1/4/2021 11:17 1/4/2021 11:20 1/4/2021 17:13 1/4/2021 23:12 1/5/2021 05:08   GLUCOSE,FAST - POC Latest Ref Range: 70 - 110 mg/dL 69 (L) 70 88 125 (H) 181 (H)       Current A1C of 8.3 % is equivalent to average blood glucose of 192 mg/dl over the past 2-3 months. Current hospital diabetes medications:   lantus 30 units every morning  lantus 30 units every bedtime  Lispro corrective insulin coverage every 6 hours  Previous day's insulin requirements:   lantus 60 units. Lispro 0 units   Home diabetes medications:  Per pta med list.  lantus 22 units daily   Diet:    NPO. TF glucerna at 50 ml/hr  Education:  ____Refer to Diabetes Education Record             _x__Education not indicated at this time  Intubated. Sedated. History of dementia.     Honorio Patel

## 2021-01-05 NOTE — ROUTINE PROCESS
Bedside and Verbal shift change report given to Enrico Hummel RN (oncoming nurse) by Holden Hitchcock RN (offgoing nurse). Report included the following information SBAR, Kardex, ED Summary, Intake/Output, MAR, Recent Results, Med Rec Status and Cardiac Rhythm NSR-Sinus Tach.

## 2021-01-05 NOTE — PROGRESS NOTES
Discharge planning    This writer called and spoke with sister Day, concerning discharge planning. Shanti is in agreement with LTACH placement. Shanti request that CM call sister Peraza and provide update on discharge plan. . CM called and left a voice message. She would like patient to go to [facility], [facility].. CM placed updated clinicals in cc[facility]link and [facility]. CM will continue to assist with transitional needs.      ANTHONY Crenshaw, RN  Pager # [phone]  Care Manager

## 2021-01-05 NOTE — PROGRESS NOTES
[facility]      CARDIOLOGY PROGRESS NOTE  RECS:      1. Acute respiratory failure- requiring mechanical ventilation- extubated  12/17/20. re intubated 12/22/20. S/p tracheostomy 1/4/20 Continue supportive care  2. Sepsis- Sputum Cx(12/22) (+) heavy E.Coli-ESBL. on antibiotics. ID following   3. Wide complex Arrhythmia on 12/25/20- No recurrence will continue to monitor. Monitor electrolytes    4. Hypotension- improved. Dobutamine D/C - b/p stable    5. Subacute MI- 12/10/20 -s/p [facility] S/p ptca/stent to proximal/ mid LAD using ARELY.    Moderate to severe LV dysfunction. ekg no new ischemic changes suggestive of reinfarction or stent thrombosis. 6. Anemia- H&H Stable. S/p transfusion. 7. Acute Systolic Congestive heart Failure-recent echo with EF% 35%-40. Compensated at present. Lasix as needed. 8. COPD,   9. Dementia - failed swallow eval. Gastrostomy tube placement planned for tomorrow by IR     Continue supportive care  Discussed with Dr. Lefty Montes and PEG tube is not scheduled for couple more days per ICU team.  Start IV heparin if okay with ENT. Start aspirin per orders as discussed with Dr. Francisco Suazo. He will hold heparin for few hours before the PEG tube. Change Lasix to oral route. Follow-up electrolytes and if possible, will unhold Aldactone also. Cardiac cath 12/11/20  Patient presented to [address] [place] [address] with late presentation anterior wall MI.  EF 35-40%. Patient was initially managed medically-- however developed acute pulmonary edema requiring intubation. Also troponin level increasing consistent with ongoing ischemia. S/p ptca/stent to proximal/ mid LAD using ARELY. LVEDP 8 mmHg. Normal PASP pressure with normal PCWP. Moderate to severe LV dysfunction.     Echo 12/10/20  · LV: Estimated LVEF is 35 - 40%. Visually measured ejection fraction. Normal cavity size and wall thickness. Moderately and segmentally reduced systolic function.  Inconclusive left ventricular diastolic function. · AO: Mild aortic root dilatation; diameter is 3.8 cm. ASSESSMENT:  Hospital Problems  Date Reviewed: 2/7/2018          Codes Class Noted POA    Goals of care, counseling/discussion ICD-10-CM: Z71.89  ICD-9-CM: V65.49  Unknown Unknown        Dementia without behavioral disturbance ([facility] ICD-10-CM: F03.90  ICD-9-CM: 294.20  Unknown Unknown        Pulmonary fibrosis ([facility] ICD-10-CM: J84.10  ICD-9-CM: 858  Unknown Unknown        Severe protein-calorie malnutrition ([facility] ICD-10-CM: E43  ICD-9-CM: 078  12/16/2020 Clinically Undetermined        Acute on chronic systolic congestive heart failure ([facility] ICD-10-CM: N00.24  ICD-9-CM: 428.23, 428.0  12/15/2020 Unknown        * (Principal) STEMI (ST elevation myocardial infarction) ([facility] ICD-10-CM: I21.3  ICD-9-CM: 410.90  12/10/2020 Unknown                SUBJECTIVE:  Sedated, intubated    OBJECTIVE:    VS:   Visit Vitals  /65   Pulse (!) 102   Temp 98 °F (36.7 °C)   Resp 22   Ht 5' 7\" (1.702 m)   Wt 72.6 kg (160 lb 0.9 oz)   SpO2 98%   Breastfeeding No   BMI 25.07 kg/m²         Intake/Output Summary (Last 24 hours) at 1/5/2021 1043  Last data filed at 1/5/2021 0900  Gross per 24 hour   Intake 1602 ml   Output 1325 ml   Net 277 ml     TELE: normal sinus rhythm    General: chronically ill and Intubated and sedated  HENT: Normocephalic, atraumatic. Normal external eye.   Neck :  no JVD  Cardiac:  regular rate and rhythm, S1, S2 normal, no murmur, click, rub or gallop  Lungs: scattered rhonchi b/l  Abdomen: Soft, nontender, no masses  Extremities:  No c/c/e, peripheral pulses present      Labs: Results:       Chemistry Recent Labs     01/05/21  0300 01/04/21  1611 01/04/21  0333   * 79 120*    140 139   K 4.0 4.1 4.2    104 104   CO2 29 30 30   BUN 18 18 17   CREA 0.39* 0.33* 0.33*   CA 9.2 9.1 9.0   AGAP 6 6 5   BUCR 46* 55* 52*      CBC w/Diff Recent Labs     01/05/21  0300 01/04/21  0333 01/03/21  0316   WBC 6.9 6.3 6. 9   RBC 2.73* 2.70* 2.64*   HGB 7.2* 7.0* 7.1*   HCT 23.3* 23.3* 22.8*   * 496* 444*   GRANS 74* 75* 78*   LYMPH 16* 21 13*   EOS 2 3 2      Cardiac Enzymes No results for input(s): CPK, CKND1, NATHANIEL in the last 72 hours. No lab exists for component: CKRMB, TROIP   Coagulation Recent Labs     01/04/21  0920 01/02/21  2348   APTT 26.5 104.4*       Lipid Panel No results found for: CHOL, CHOLPOCT, CHOLX, CHLST, CHOLV, 136481, HDL, HDLP, LDL, LDLC, DLDLP, 677313, VLDLC, VLDL, TGLX, TRIGL, TRIGP, TGLPOCT, CHHD, CHHDX   BNP No results for input(s): BNPP in the last 72 hours. Liver Enzymes No results for input(s): TP, ALB, TBIL, AP in the last 72 hours.     No lab exists for component: SGOT, GPT, DBIL   Thyroid Studies Lab Results   Component Value Date/Time    TSH 2.92 12/10/2020 11:07 AM              Georgia Ramos MD

## 2021-01-05 NOTE — PROGRESS NOTES
attended the interdisciplinary rounds for Aurelia Santos Patient, who is a 72 y.o.,female. Patients Primary Language is: [place]. According to the patients EMR Hoahaoism Affiliation is: [facility].     The reason the Patient came to the hospital is:   Patient Active Problem List    Diagnosis Date Noted    Gram-negative bacteremia 02/05/2015     Priority: 1 - One    Sepsis secondary to UTI ([address] 02/03/2015     Priority: 1 - One    DM hyperosmolarity type II, uncontrolled ([address] 02/03/2015     Priority: 1 - One    HTN (hypertension) 02/03/2015     Priority: 1 - One    Goals of care, counseling/discussion     Dementia without behavioral disturbance ([address]     Pulmonary fibrosis ([address]     Severe protein-calorie malnutrition ([address] 12/16/2020    Acute on chronic systolic congestive heart failure ([address] 12/15/2020    STEMI (ST elevation myocardial infarction) ([address] 12/10/2020    Acidosis 02/07/2018    Respiratory failure ([address] 02/07/2018    Shock ([address] 02/07/2018    Hyperosmolar (nonketotic) coma ([address] 02/07/2018    Acute UTI 02/07/2018    Macrocytic anemia 02/07/2018    Diabetic neuropathy (HCC)     Osteoarthritis of both knees     Sepsis ([address] 02/03/2015    Febrile illness, acute 02/03/2015    Diverticula of colon 09/21/2012          Plan:  Chaplains will continue to follow and will provide pastoral care on an as needed/requested basis.  recommends bedside caregivers page  on duty if patient shows signs of acute spiritual or emotional distress.     Sigrid Bermudez  Board Certified [facility]   [phone]

## 2021-01-05 NOTE — OP NOTES
[address] [place] [address]   OPERATIVE REPORT    Name:  Pratibha Rizvi  MR#:   [id number]  :  1955  ACCOUNT #:  [de-identified]  DATE OF SERVICE:  2021    PREOPERATIVE DIAGNOSIS:  Ventilator dependency. POSTOPERATIVE DIAGNOSIS:  Ventilator dependency. PROCEDURE PERFORMED:  Skin flap tracheotomy. SURGEON:  Guero Colby MD    ASSISTANT:  None. ANESTHESIA:  General endotracheal anesthesia. COMPLICATIONS:  None. SPECIMENS REMOVED:  none    IMPLANTS:  A #8 Portex trach tube. ESTIMATED BLOOD LOSS:  Minimal.    DRAINS:  None. OPERATIVE FINDINGS:  A #8 Portex trach tube placed in tracheal ring #3. Skin flap performed. PROCEDURE:  The patient was brought to the operating room, placed supine on the operating room table. General endotracheal anesthesia was given per Anesthesiology Department. Once the patient was sufficiently anesthetized, the anterior skin was prepped and draped in the usual sterile fashion. 1% lidocaine with epinephrine was injected along the anterior trachea. No difficulties encountered at this point. A standard tracheotomy incision was then made approximately one-half fingerbreadths above the sternal notch. The incision was carried down through the skin and subcutaneous tissue. A lower skin flap was then created defatting the lower skin flap to facilitate the placement of the skin down to the trachea during the placement of the skin flap tracheostomy. Once this was complete, the strap muscles were opened in the midline, taken down to the level of the thyroid isthmus. Anterior jugular vein was isolated, noted to be crossing the midline. This was ligated with 2-0 silk suture and Harmonic scalpel. This was retracted laterally and inferiorly. The thyroid isthmus was then ligated with the use of the harmonic scalpel. The pretracheal fascia was then cleaned off the trachea itself.   The cricoid was grasped with a cricoid hook, elevated at the depths of the neck.  An inferior trapdoor incision was made in tracheal ring #3 and sutured to the skin. Skin flap tracheotomy was performed. This was done with great care. At this point, the patient's cuff was deflated, and the ET tube was withdrawn by Anesthesia under direct visualization to a point just above the tracheostomy site itself. Once this was complete, tracheal dilator was placed, trachea dilated, and a #8 Portex trach tube was then placed. Good air exchange noted. The flap was now sutured to the skin. Surgicel was used and packed around the tracheotomy site itself to prevent any potential bleeding. Once the above was complete, attention was now directed toward the skin. The trach was sutured to the skin with 3-0 silk. Tracheal straps were placed. The patient was then taken from the operating room to the ICU in stable condition after correct needle and sponge count were obtained.       MD CONNOR [id number]  D:  01/04/2021 11:14  T:  01/04/2021 22:57  JOB #:  [id number]

## 2021-01-05 NOTE — PROGRESS NOTES
01/05/21 0851   Weaning Parameters   Spontaneous Breathing Trial Complete No (Comments)  (pt placed in simv to facilitate spont breathing)

## 2021-01-05 NOTE — PROGRESS NOTES
Discharge planning    This writer called and spoke with Joe Vizcarra, sister, concerning discharge planning. Shanti is in agreement with LTACH placement. Shanti request that CM call Alfred Renee, sister and provide update on discharge plan. . CM called and left a voice message. She would like patient to go to [facility], [facility].. CM placed updated clinicals in cc/link and [facility] for LTACH placement. Will need LTSS screening. CM will continue to assist with transitional needs.      NOBLE CurtisN, RN  Pager # [phone]  Care Manager Yes...

## 2021-01-06 LAB
ANION GAP SERPL CALC-SCNC: 3 MMOL/L (ref 3–18)
BACTERIA SPEC CULT: NORMAL
BACTERIA SPEC CULT: NORMAL
BASOPHILS # BLD: 0 K/UL (ref 0–0.1)
BASOPHILS NFR BLD: 0 % (ref 0–2)
BUN SERPL-MCNC: 20 MG/DL (ref 7–18)
BUN/CREAT SERPL: 54 (ref 12–20)
CALCIUM SERPL-MCNC: 8.7 MG/DL (ref 8.5–10.1)
CHLORIDE SERPL-SCNC: 105 MMOL/L (ref 100–111)
CO2 SERPL-SCNC: 31 MMOL/L (ref 21–32)
CREAT SERPL-MCNC: 0.37 MG/DL (ref 0.6–1.3)
DIFFERENTIAL METHOD BLD: ABNORMAL
EOSINOPHIL # BLD: 0.2 K/UL (ref 0–0.4)
EOSINOPHIL NFR BLD: 2 % (ref 0–5)
ERYTHROCYTE [DISTWIDTH] IN BLOOD BY AUTOMATED COUNT: 15.4 % (ref 11.6–14.5)
GLUCOSE BLD STRIP.AUTO-MCNC: 158 MG/DL (ref 70–110)
GLUCOSE BLD STRIP.AUTO-MCNC: 71 MG/DL (ref 70–110)
GLUCOSE BLD STRIP.AUTO-MCNC: 80 MG/DL (ref 70–110)
GLUCOSE BLD STRIP.AUTO-MCNC: 80 MG/DL (ref 70–110)
GLUCOSE SERPL-MCNC: 101 MG/DL (ref 74–99)
HCT VFR BLD AUTO: 22.7 % (ref 35–45)
HGB BLD-MCNC: 6.9 G/DL (ref 12–16)
HISTORY CHECKED?,CKHIST: NORMAL
LYMPHOCYTES # BLD: 0.9 K/UL (ref 0.9–3.6)
LYMPHOCYTES NFR BLD: 12 % (ref 21–52)
MAGNESIUM SERPL-MCNC: 2.1 MG/DL (ref 1.6–2.6)
MCH RBC QN AUTO: 25.9 PG (ref 24–34)
MCHC RBC AUTO-ENTMCNC: 30.4 G/DL (ref 31–37)
MCV RBC AUTO: 85.3 FL (ref 74–97)
MONOCYTES # BLD: 0.7 K/UL (ref 0.05–1.2)
MONOCYTES NFR BLD: 9 % (ref 3–10)
NEUTS SEG # BLD: 6.1 K/UL (ref 1.8–8)
NEUTS SEG NFR BLD: 77 % (ref 40–73)
PHOSPHATE SERPL-MCNC: 3.6 MG/DL (ref 2.5–4.9)
PLATELET # BLD AUTO: 538 K/UL (ref 135–420)
PMV BLD AUTO: 9.1 FL (ref 9.2–11.8)
POTASSIUM SERPL-SCNC: 3.6 MMOL/L (ref 3.5–5.5)
POTASSIUM SERPL-SCNC: 3.9 MMOL/L (ref 3.5–5.5)
RBC # BLD AUTO: 2.66 M/UL (ref 4.2–5.3)
SERVICE CMNT-IMP: NORMAL
SERVICE CMNT-IMP: NORMAL
SODIUM SERPL-SCNC: 139 MMOL/L (ref 136–145)
WBC # BLD AUTO: 7.9 K/UL (ref 4.6–13.2)

## 2021-01-06 PROCEDURE — 74011250636 HC RX REV CODE- 250/636

## 2021-01-06 PROCEDURE — 86920 COMPATIBILITY TEST SPIN: CPT

## 2021-01-06 PROCEDURE — 74011636637 HC RX REV CODE- 636/637: Performed by: NURSE PRACTITIONER

## 2021-01-06 PROCEDURE — 82962 GLUCOSE BLOOD TEST: CPT

## 2021-01-06 PROCEDURE — P9016 RBC LEUKOCYTES REDUCED: HCPCS

## 2021-01-06 PROCEDURE — 86850 RBC ANTIBODY SCREEN: CPT

## 2021-01-06 PROCEDURE — 86860 RBC ANTIBODY ELUTION: CPT

## 2021-01-06 PROCEDURE — 74011250637 HC RX REV CODE- 250/637: Performed by: INTERNAL MEDICINE

## 2021-01-06 PROCEDURE — 99291 CRITICAL CARE FIRST HOUR: CPT | Performed by: INTERNAL MEDICINE

## 2021-01-06 PROCEDURE — 85025 COMPLETE CBC W/AUTO DIFF WBC: CPT

## 2021-01-06 PROCEDURE — 74011000250 HC RX REV CODE- 250: Performed by: NURSE PRACTITIONER

## 2021-01-06 PROCEDURE — 86870 RBC ANTIBODY IDENTIFICATION: CPT

## 2021-01-06 PROCEDURE — 86901 BLOOD TYPING SEROLOGIC RH(D): CPT

## 2021-01-06 PROCEDURE — 94003 VENT MGMT INPAT SUBQ DAY: CPT

## 2021-01-06 PROCEDURE — 86922 COMPATIBILITY TEST ANTIGLOB: CPT

## 2021-01-06 PROCEDURE — 74011250636 HC RX REV CODE- 250/636: Performed by: PHYSICIAN ASSISTANT

## 2021-01-06 PROCEDURE — 74011636637 HC RX REV CODE- 636/637: Performed by: STUDENT IN AN ORGANIZED HEALTH CARE EDUCATION/TRAINING PROGRAM

## 2021-01-06 PROCEDURE — 86921 COMPATIBILITY TEST INCUBATE: CPT

## 2021-01-06 PROCEDURE — 83735 ASSAY OF MAGNESIUM: CPT

## 2021-01-06 PROCEDURE — 74011250637 HC RX REV CODE- 250/637: Performed by: NURSE PRACTITIONER

## 2021-01-06 PROCEDURE — 84100 ASSAY OF PHOSPHORUS: CPT

## 2021-01-06 PROCEDURE — 99233 SBSQ HOSP IP/OBS HIGH 50: CPT | Performed by: INTERNAL MEDICINE

## 2021-01-06 PROCEDURE — 86880 COOMBS TEST DIRECT: CPT

## 2021-01-06 PROCEDURE — 36430 TRANSFUSION BLD/BLD COMPNT: CPT

## 2021-01-06 PROCEDURE — 74011000250 HC RX REV CODE- 250: Performed by: PHYSICIAN ASSISTANT

## 2021-01-06 PROCEDURE — 80048 BASIC METABOLIC PNL TOTAL CA: CPT

## 2021-01-06 PROCEDURE — P9047 ALBUMIN (HUMAN), 25%, 50ML: HCPCS

## 2021-01-06 PROCEDURE — 86900 BLOOD TYPING SEROLOGIC ABO: CPT

## 2021-01-06 PROCEDURE — 74011250637 HC RX REV CODE- 250/637: Performed by: PHYSICIAN ASSISTANT

## 2021-01-06 PROCEDURE — 65610000006 HC RM INTENSIVE CARE

## 2021-01-06 RX ORDER — OXYCODONE HCL 5 MG/5 ML
20 SOLUTION, ORAL ORAL EVERY 8 HOURS
Status: DISCONTINUED | OUTPATIENT
Start: 2021-01-06 | End: 2021-01-12

## 2021-01-06 RX ORDER — SODIUM CHLORIDE 9 MG/ML
250 INJECTION, SOLUTION INTRAVENOUS AS NEEDED
Status: DISCONTINUED | OUTPATIENT
Start: 2021-01-06 | End: 2021-01-14 | Stop reason: ALTCHOICE

## 2021-01-06 RX ORDER — INSULIN GLARGINE 100 [IU]/ML
20 INJECTION, SOLUTION SUBCUTANEOUS DAILY
Status: DISCONTINUED | OUTPATIENT
Start: 2021-01-07 | End: 2021-01-07

## 2021-01-06 RX ORDER — DIAZEPAM 5 MG/1
20 TABLET ORAL 3 TIMES DAILY
Status: DISCONTINUED | OUTPATIENT
Start: 2021-01-06 | End: 2021-01-10

## 2021-01-06 RX ORDER — ALBUMIN HUMAN 250 G/1000ML
12.5 SOLUTION INTRAVENOUS ONCE
Status: COMPLETED | OUTPATIENT
Start: 2021-01-07 | End: 2021-01-06

## 2021-01-06 RX ORDER — POTASSIUM CHLORIDE 7.45 MG/ML
10 INJECTION INTRAVENOUS
Status: DISCONTINUED | OUTPATIENT
Start: 2021-01-06 | End: 2021-01-06

## 2021-01-06 RX ORDER — ALBUMIN HUMAN 250 G/1000ML
SOLUTION INTRAVENOUS
Status: COMPLETED
Start: 2021-01-06 | End: 2021-01-06

## 2021-01-06 RX ORDER — INSULIN GLARGINE 100 [IU]/ML
20 INJECTION, SOLUTION SUBCUTANEOUS
Status: DISCONTINUED | OUTPATIENT
Start: 2021-01-07 | End: 2021-01-07

## 2021-01-06 RX ORDER — INSULIN GLARGINE 100 [IU]/ML
20 INJECTION, SOLUTION SUBCUTANEOUS
Status: DISCONTINUED | OUTPATIENT
Start: 2021-01-06 | End: 2021-01-06

## 2021-01-06 RX ORDER — INSULIN GLARGINE 100 [IU]/ML
10 INJECTION, SOLUTION SUBCUTANEOUS ONCE
Status: COMPLETED | OUTPATIENT
Start: 2021-01-06 | End: 2021-01-06

## 2021-01-06 RX ORDER — POTASSIUM CHLORIDE 29.8 MG/ML
20 INJECTION INTRAVENOUS ONCE
Status: COMPLETED | OUTPATIENT
Start: 2021-01-06 | End: 2021-01-06

## 2021-01-06 RX ADMIN — OXYCODONE HYDROCHLORIDE 10 MG: 5 SOLUTION ORAL at 06:00

## 2021-01-06 RX ADMIN — DIAZEPAM 10 MG: 5 TABLET ORAL at 08:54

## 2021-01-06 RX ADMIN — DIAZEPAM 20 MG: 5 TABLET ORAL at 22:08

## 2021-01-06 RX ADMIN — OXYCODONE HYDROCHLORIDE 20 MG: 5 SOLUTION ORAL at 22:08

## 2021-01-06 RX ADMIN — LANSOPRAZOLE 30 MG: KIT at 08:54

## 2021-01-06 RX ADMIN — ALBUMIN (HUMAN) 12.5 G: 0.25 INJECTION, SOLUTION INTRAVENOUS at 23:47

## 2021-01-06 RX ADMIN — INSULIN GLARGINE 30 UNITS: 100 INJECTION, SOLUTION SUBCUTANEOUS at 09:02

## 2021-01-06 RX ADMIN — DEXTROSE MONOHYDRATE 25 G: 25 INJECTION, SOLUTION INTRAVENOUS at 18:06

## 2021-01-06 RX ADMIN — FENTANYL CITRATE 100 MCG: 50 INJECTION INTRAMUSCULAR; INTRAVENOUS at 01:30

## 2021-01-06 RX ADMIN — SODIUM CHLORIDE 10 ML: 9 INJECTION, SOLUTION INTRAMUSCULAR; INTRAVENOUS; SUBCUTANEOUS at 22:00

## 2021-01-06 RX ADMIN — POTASSIUM CHLORIDE 10 MEQ: 7.46 INJECTION, SOLUTION INTRAVENOUS at 06:00

## 2021-01-06 RX ADMIN — CHLORHEXIDINE GLUCONATE 0.12% ORAL RINSE 10 ML: 1.2 LIQUID ORAL at 20:53

## 2021-01-06 RX ADMIN — OXYCODONE HYDROCHLORIDE 20 MG: 5 SOLUTION ORAL at 16:28

## 2021-01-06 RX ADMIN — SODIUM CHLORIDE 10 ML: 9 INJECTION, SOLUTION INTRAMUSCULAR; INTRAVENOUS; SUBCUTANEOUS at 06:00

## 2021-01-06 RX ADMIN — DOCUSATE SODIUM 100 MG: 50 LIQUID ORAL at 08:54

## 2021-01-06 RX ADMIN — POTASSIUM BICARBONATE 10 MEQ: 391 TABLET, EFFERVESCENT ORAL at 16:28

## 2021-01-06 RX ADMIN — FUROSEMIDE 20 MG: 20 TABLET ORAL at 09:06

## 2021-01-06 RX ADMIN — DEXTROSE MONOHYDRATE 25 G: 25 INJECTION, SOLUTION INTRAVENOUS at 23:56

## 2021-01-06 RX ADMIN — CHLORHEXIDINE GLUCONATE 0.12% ORAL RINSE 10 ML: 1.2 LIQUID ORAL at 08:54

## 2021-01-06 RX ADMIN — LISINOPRIL 5 MG: 5 TABLET ORAL at 08:54

## 2021-01-06 RX ADMIN — POTASSIUM CHLORIDE 10 MEQ: 7.46 INJECTION, SOLUTION INTRAVENOUS at 05:00

## 2021-01-06 RX ADMIN — DEXTROSE MONOHYDRATE 25 G: 25 INJECTION, SOLUTION INTRAVENOUS at 12:10

## 2021-01-06 RX ADMIN — ALBUMIN HUMAN 12.5 G: 250 SOLUTION INTRAVENOUS at 23:47

## 2021-01-06 RX ADMIN — Medication 30 ML: at 08:54

## 2021-01-06 RX ADMIN — INSULIN GLARGINE 10 UNITS: 100 INJECTION, SOLUTION SUBCUTANEOUS at 22:12

## 2021-01-06 RX ADMIN — ACETAMINOPHEN 650 MG: 325 TABLET ORAL at 18:03

## 2021-01-06 RX ADMIN — DIAZEPAM 20 MG: 5 TABLET ORAL at 16:28

## 2021-01-06 RX ADMIN — POTASSIUM CHLORIDE 20 MEQ: 400 INJECTION, SOLUTION INTRAVENOUS at 07:20

## 2021-01-06 RX ADMIN — SODIUM CHLORIDE 10 ML: 9 INJECTION, SOLUTION INTRAMUSCULAR; INTRAVENOUS; SUBCUTANEOUS at 14:00

## 2021-01-06 RX ADMIN — ATORVASTATIN CALCIUM 40 MG: 40 TABLET, FILM COATED ORAL at 22:11

## 2021-01-06 RX ADMIN — ASPIRIN 81 MG CHEWABLE TABLET 81 MG: 81 TABLET CHEWABLE at 08:54

## 2021-01-06 NOTE — DIABETES MGMT
GLYCEMIC CONTROL PLAN OF CARE     Assessment/Recommendations:  Lab glucose this am 101 mg/dl  TF on hold for peg placement today  Recommend decreasing lantus dose to 20 units every 12 hours. Continue corrective insulin coverage as needed. Will continue inpatient monitoring. Most recent blood glucose values:      Results for Francisca Antonio (MRN [id number]) as of 1/6/2021 09:36   Ref. Range 1/4/2021 23:12 1/5/2021 05:08 1/5/2021 12:02 1/5/2021 18:35 1/5/2021 23:07   GLUCOSE,FAST - POC Latest Ref Range: 70 - 110 mg/dL 125 (H) 181 (H) 175 (H) 132 (H) 129 (H)       Current A1C of 8.3 % is equivalent to average blood glucose of 192 mg/dl over the past 2-3 months. Current hospital diabetes medications:   lantus 30 units every morning  lantus 30 units every bedtime  Lispro corrective insulin coverage every 6 hours  Previous day's insulin requirements:   lantus 60 units. Lispro 6 units   Home diabetes medications:  Per pta med list.  lantus 22 units daily   Diet:    NPO. TF glucerna at 50 ml/hr. On hold. Education:  ____Refer to Diabetes Education Record             _x__Education not indicated at this time  Intubated. Sedated. History of dementia.     Honorio Pang

## 2021-01-06 NOTE — PROGRESS NOTES
Discharge planning    LTSS successfully submitted. Form ID # :[id number]      ANTHONY Holm, RN  Pager # [phone]  Care Manager

## 2021-01-06 NOTE — PROGRESS NOTES
[facility]      CARDIOLOGY PROGRESS NOTE  RECS:      1. Acute respiratory failure- requiring mechanical ventilation- extubated  12/17/20. re intubated 12/22/20. S/p tracheostomy 1/4/20 Continue supportive care  2. Sepsis- Sputum Cx(12/22) (+) heavy E.Coli-ESBL. on antibiotics. ID following   3. Wide complex Arrhythmia on 12/25/20- No recurrence will continue to monitor. Monitor electrolytes    4. Hypotension- improved. Dobutamine D/C - b/p stable    5. Subacute MI- 12/10/20 -s/p C S/p ptca/stent to proximal/ mid LAD using ARELY.    Moderate to severe LV dysfunction. ekg no new ischemic changes suggestive of reinfarction or stent thrombosis. Aspirin started and heparin will also be started after PEG tube today when allowed by IR. 6. Anemia- H&H Stable. S/p transfusion. 7. Acute Systolic Congestive heart Failure-recent echo with EF% 35%-40. Compensated at present. Lasix as needed. 8. COPD,   9. Dementia - failed swallow eval. Gastrostomy tube placement planned for tomorrow by IR     Continue supportive care  Prognosis is guarded. Cardiac cath 12/11/20  Patient presented to [address] [place] [address] with late presentation anterior wall MI.  EF 35-40%. Patient was initially managed medically-- however developed acute pulmonary edema requiring intubation. Also troponin level increasing consistent with ongoing ischemia. S/p ptca/stent to proximal/ mid LAD using ARELY. LVEDP 8 mmHg. Normal PASP pressure with normal PCWP. Moderate to severe LV dysfunction.     Echo 12/10/20  · LV: Estimated LVEF is 35 - 40%. Visually measured ejection fraction. Normal cavity size and wall thickness. Moderately and segmentally reduced systolic function. Inconclusive left ventricular diastolic function. · AO: Mild aortic root dilatation; diameter is 3.8 cm.     ASSESSMENT:  Hospital Problems  Date Reviewed: 2/7/2018          Codes Class Noted POA    Goals of care, counseling/discussion ICD-10-CM: Z71.89  ICD-9-CM: V65.49  Unknown Unknown        Dementia without behavioral disturbance ([facility] ICD-10-CM: F03.90  ICD-9-CM: 294.20  Unknown Unknown        Pulmonary fibrosis ([facility] ICD-10-CM: J84.10  ICD-9-CM: 906  Unknown Unknown        Severe protein-calorie malnutrition ([facility] ICD-10-CM: E43  ICD-9-CM: 670  12/16/2020 Clinically Undetermined        Acute on chronic systolic congestive heart failure (HCC) ICD-10-CM: C31.64  ICD-9-CM: 428.23, 428.0  12/15/2020 Unknown        * (Principal) STEMI (ST elevation myocardial infarction) ([facility] ICD-10-CM: I21.3  ICD-9-CM: 410.90  12/10/2020 Unknown                SUBJECTIVE:  Sedated, intubated  Status post tracheostomy    OBJECTIVE:    VS:   Visit Vitals  /63   Pulse 96   Temp 99.9 °F (37.7 °C)   Resp 17   Ht 5' 7\" (1.702 m)   Wt 72.6 kg (160 lb 0.9 oz)   SpO2 100%   Breastfeeding No   BMI 25.07 kg/m²         Intake/Output Summary (Last 24 hours) at 1/6/2021 1159  Last data filed at 1/6/2021 0800  Gross per 24 hour   Intake 950 ml   Output 1250 ml   Net -300 ml     TELE: normal sinus rhythm    General: chronically ill and Intubated and sedated, status post tracheostomy  HENT: Normocephalic, atraumatic. Normal external eye.   Neck :  no JVD  Cardiac:  regular rate and rhythm, S1, S2 normal, no murmur, click, rub or gallop  Lungs: scattered rhonchi b/l  Abdomen: Soft, nontender, no masses  Extremities:  No c/c/e, peripheral pulses present      Labs: Results:       Chemistry Recent Labs     01/06/21  0339 01/05/21  0300 01/04/21  1611   * 141* 79    139 140   K 3.6 4.0 4.1    104 104   CO2 31 29 30   BUN 20* 18 18   CREA 0.37* 0.39* 0.33*   CA 8.7 9.2 9.1   AGAP 3 6 6   BUCR 54* 46* 55*      CBC w/Diff Recent Labs     01/06/21  0339 01/05/21  0300 01/04/21  0333   WBC 7.9 6.9 6.3   RBC 2.66* 2.73* 2.70*   HGB 6.9* 7.2* 7.0*   HCT 22.7* 23.3* 23.3*   * 526* 496*   GRANS 77* 74* 75*   LYMPH 12* 16* 21   EOS 2 2 3      Cardiac Enzymes No results for input(s): CPK, CKND1, NATHANIEL in the last 72 hours. No lab exists for component: CKRMB, TROIP   Coagulation Recent Labs     01/04/21  0920   APTT 26.5       Lipid Panel No results found for: CHOL, CHOLPOCT, CHOLX, CHLST, CHOLV, 364252, HDL, HDLP, LDL, LDLC, DLDLP, 460144, VLDLC, VLDL, TGLX, TRIGL, TRIGP, TGLPOCT, CHHD, CHHDX   BNP No results for input(s): BNPP in the last 72 hours. Liver Enzymes No results for input(s): TP, ALB, TBIL, AP in the last 72 hours.     No lab exists for component: SGOT, GPT, DBIL   Thyroid Studies Lab Results   Component Value Date/Time    TSH 2.92 12/10/2020 11:07 AM              Vera Coleman MD

## 2021-01-06 NOTE — PROGRESS NOTES
Problem: Pain  Goal: *Control of Pain  Outcome: Progressing Towards Goal  Goal: *PALLIATIVE CARE:  Alleviation of Pain  Outcome: Progressing Towards Goal     Problem: Patient Education: Go to Patient Education Activity  Goal: Patient/Family Education  Outcome: Progressing Towards Goal     Problem: Diabetes Self-Management  Goal: *Disease process and treatment process  Description: Define diabetes and identify own type of diabetes; list 3 options for treating diabetes. Outcome: Progressing Towards Goal  Goal: *Incorporating nutritional management into lifestyle  Description: Describe effect of type, amount and timing of food on blood glucose; list 3 methods for planning meals. Outcome: Progressing Towards Goal  Goal: *Incorporating physical activity into lifestyle  Description: State effect of exercise on blood glucose levels. Outcome: Progressing Towards Goal  Goal: *Developing strategies to promote health/change behavior  Description: Define the ABC's of diabetes; identify appropriate screenings, schedule and personal plan for screenings. Outcome: Progressing Towards Goal  Goal: *Using medications safely  Description: State effect of diabetes medications on diabetes; name diabetes medication taking, action and side effects. Outcome: Progressing Towards Goal  Goal: *Monitoring blood glucose, interpreting and using results  Description: Identify recommended blood glucose targets  and personal targets. Outcome: Progressing Towards Goal  Goal: *Prevention, detection, treatment of acute complications  Description: List symptoms of hyper- and hypoglycemia; describe how to treat low blood sugar and actions for lowering  high blood glucose level.   Outcome: Progressing Towards Goal  Goal: *Developing strategies to address psychosocial issues  Description: Describe feelings about living with diabetes; identify support needed and support network  Outcome: Progressing Towards Goal     Problem: Patient Education: Go to Patient Education Activity  Goal: Patient/Family Education  Outcome: Progressing Towards Goal     Problem: Pressure Injury - Risk of  Goal: *Prevention of pressure injury  Description: Document Pankaj Scale and appropriate interventions in the flowsheet. Outcome: Progressing Towards Goal  Note: Pressure Injury Interventions:  Sensory Interventions: Assess changes in LOC, Assess need for specialty bed, Check visual cues for pain, Discuss PT/OT consult with provider, Turn and reposition approx. every two hours (pillows and wedges if needed)    Moisture Interventions: Absorbent underpads, Apply protective barrier, creams and emollients, Check for incontinence Q2 hours and as needed    Activity Interventions: Assess need for specialty bed, Pressure redistribution bed/mattress(bed type), PT/OT evaluation    Mobility Interventions: Assess need for specialty bed, HOB 30 degrees or less, Pressure redistribution bed/mattress (bed type), PT/OT evaluation, Turn and reposition approx. every two hours(pillow and wedges)    Nutrition Interventions: Document food/fluid/supplement intake, Discuss nutritional consult with provider    Friction and Shear Interventions: Apply protective barrier, creams and emollients, Foam dressings/transparent film/skin sealants, HOB 30 degrees or less, Lift sheet                Problem: Patient Education: Go to Patient Education Activity  Goal: Patient/Family Education  Outcome: Progressing Towards Goal     Problem: Falls - Risk of  Goal: *Absence of Falls  Description: Document Young Fall Risk and appropriate interventions in the flowsheet.   Outcome: Progressing Towards Goal  Note: Fall Risk Interventions:  Mobility Interventions: Bed/chair exit alarm, Communicate number of staff needed for ambulation/transfer    Mentation Interventions: Adequate sleep, hydration, pain control, Bed/chair exit alarm, Update white board    Medication Interventions: Evaluate medications/consider consulting pharmacy, Bed/chair exit alarm    Elimination Interventions: Bed/chair exit alarm, Toileting schedule/hourly rounds    History of Falls Interventions: Bed/chair exit alarm, Evaluate medications/consider consulting pharmacy         Problem: Patient Education: Go to Patient Education Activity  Goal: Patient/Family Education  Outcome: Progressing Towards Goal     Problem: Patient Education: Go to Patient Education Activity  Goal: Patient/Family Education  Outcome: Progressing Towards Goal     Problem: Heart Failure: Day 1  Goal: Activity/Safety  Outcome: Progressing Towards Goal  Goal: Consults, if ordered  Outcome: Progressing Towards Goal  Goal: Diagnostic Test/Procedures  Outcome: Progressing Towards Goal  Goal: Nutrition/Diet  Outcome: Progressing Towards Goal  Goal: Discharge Planning  Outcome: Progressing Towards Goal  Goal: Medications  Outcome: Progressing Towards Goal  Goal: Respiratory  Outcome: Progressing Towards Goal  Goal: Treatments/Interventions/Procedures  Outcome: Progressing Towards Goal  Goal: Psychosocial  Outcome: Progressing Towards Goal  Goal: *Oxygen saturation within defined limits  Outcome: Progressing Towards Goal  Goal: *Hemodynamically stable  Outcome: Progressing Towards Goal  Goal: *Optimal pain control at patient's stated goal  Outcome: Progressing Towards Goal  Goal: *Anxiety reduced or absent  Outcome: Progressing Towards Goal     Problem: Heart Failure: Day 2  Goal: Activity/Safety  Outcome: Progressing Towards Goal  Goal: Consults, if ordered  Outcome: Progressing Towards Goal  Goal: Diagnostic Test/Procedures  Outcome: Progressing Towards Goal  Goal: Nutrition/Diet  Outcome: Progressing Towards Goal  Goal: Discharge Planning  Outcome: Progressing Towards Goal  Goal: Medications  Outcome: Progressing Towards Goal  Goal: Respiratory  Outcome: Progressing Towards Goal  Goal: Treatments/Interventions/Procedures  Outcome: Progressing Towards Goal  Goal: Psychosocial  Outcome: Progressing Towards Goal  Goal: *Oxygen saturation within defined limits  Outcome: Progressing Towards Goal  Goal: *Hemodynamically stable  Outcome: Progressing Towards Goal  Goal: *Optimal pain control at patient's stated goal  Outcome: Progressing Towards Goal  Goal: *Anxiety reduced or absent  Outcome: Progressing Towards Goal  Goal: *Demonstrates progressive activity  Outcome: Progressing Towards Goal     Problem: Heart Failure: Day 3  Goal: Activity/Safety  Outcome: Progressing Towards Goal  Goal: Diagnostic Test/Procedures  Outcome: Progressing Towards Goal  Goal: Nutrition/Diet  Outcome: Progressing Towards Goal  Goal: Discharge Planning  Outcome: Progressing Towards Goal  Goal: Medications  Outcome: Progressing Towards Goal  Goal: Respiratory  Outcome: Progressing Towards Goal  Goal: Treatments/Interventions/Procedures  Outcome: Progressing Towards Goal  Goal: Psychosocial  Outcome: Progressing Towards Goal  Goal: *Oxygen saturation within defined limits  Outcome: Progressing Towards Goal  Goal: *Hemodynamically stable  Outcome: Progressing Towards Goal  Goal: *Optimal pain control at patient's stated goal  Outcome: Progressing Towards Goal  Goal: *Anxiety reduced or absent  Outcome: Progressing Towards Goal  Goal: *Demonstrates progressive activity  Outcome: Progressing Towards Goal     Problem: Heart Failure: Day 4  Goal: Activity/Safety  Outcome: Progressing Towards Goal  Goal: Diagnostic Test/Procedures  Outcome: Progressing Towards Goal  Goal: Nutrition/Diet  Outcome: Progressing Towards Goal  Goal: Discharge Planning  Outcome: Progressing Towards Goal  Goal: Medications  Outcome: Progressing Towards Goal  Goal: Respiratory  Outcome: Progressing Towards Goal  Goal: Treatments/Interventions/Procedures  Outcome: Progressing Towards Goal  Goal: Psychosocial  Outcome: Progressing Towards Goal  Goal: *Oxygen saturation within defined limits  Outcome: Progressing Towards Goal  Goal: *Hemodynamically stable  Outcome: Progressing Towards Goal  Goal: *Optimal pain control at patient's stated goal  Outcome: Progressing Towards Goal  Goal: *Anxiety reduced or absent  Outcome: Progressing Towards Goal  Goal: *Demonstrates progressive activity  Outcome: Progressing Towards Goal     Problem: Heart Failure: Day 5  Goal: Activity/Safety  Outcome: Progressing Towards Goal  Goal: Diagnostic Test/Procedures  Outcome: Progressing Towards Goal  Goal: Nutrition/Diet  Outcome: Progressing Towards Goal  Goal: Discharge Planning  Outcome: Progressing Towards Goal  Goal: Medications  Outcome: Progressing Towards Goal  Goal: Respiratory  Outcome: Progressing Towards Goal  Goal: Treatments/Interventions/Procedures  Outcome: Progressing Towards Goal  Goal: Psychosocial  Outcome: Progressing Towards Goal     Problem: Heart Failure: Discharge Outcomes  Goal: *Demonstrates ability to perform prescribed activity without shortness of breath or discomfort  Outcome: Progressing Towards Goal  Goal: *Left ventricular function assessment completed prior to or during stay, or planned for post-discharge  Outcome: Progressing Towards Goal  Goal: *ACEI prescribed if LVEF less than 40% and no contraindications or ARB prescribed  Outcome: Progressing Towards Goal  Goal: *Verbalizes understanding and describes prescribed diet  Outcome: Progressing Towards Goal  Goal: *Verbalizes understanding/describes prescribed medications  Outcome: Progressing Towards Goal  Goal: *Describes available resources and support systems  Description: (eg: Home Health, Palliative Care, [employer] Directive)  Outcome: Progressing Towards Goal  Goal: *Describes smoking cessation resources  Outcome: Progressing Towards Goal  Goal: *Understands and describes signs and symptoms to report to providers(Stroke Metric)  Outcome: Progressing Towards Goal  Goal: *Describes/verbalizes understanding of follow-up/return appt  Description: (eg: to physicians, diabetes treatment coordinator, and other resources  Outcome: Progressing Towards Goal  Goal: *Describes importance of continuing daily weights and changes to report to physician  Outcome: Progressing Towards Goal

## 2021-01-06 NOTE — PROGRESS NOTES
[facility]  ICU Progress Note      Name: Gemma Garcia   : 1955   MRN: [id number]   Date: 2021 9:00 AM     [x]I have reviewed the flowsheet and previous days notes. Events overnight reviewed and discussed with nursing staff. Vital signs and records reviewed. Interval HPI:  Patient is a 72 y. o. female with a past medical history of COPD, CHF, HTN, DM, dementia, presented to [facility] with acute on chronic systolic heart failure, acute MI with anterolateral STEMI and Q waves s/p ptca/stent to proximal/mid LAD on 20. Pt was intubated and extubated on 20, transferred to . On 20 patient was intubated and transferred to ICU for respiratory failure and cardiogenic +/- septic shock. Respiratory cultures on  with ESBL on Merrem. Dr. Estefanía Walters and IR have been consulted for trach/PEG. She is S/p trach 21 and will receive PEG tube in IR today. · Ventilator day# 16  · No acute issues overnight. Need for IV sedation pushes overnight. Increased scheduled Valium and Roxicodone. Decreased Lantus. ·  PEG tube insertion scheduled with IR today  · Tolerating TF  · Mentation waxes and wanes. Not command following this AM, opening eyes spontaneously, withdrawing from pain   · COVID19 NAAT from 2021 came back negative   · Hemoglobin low this AM. H&H 6.9/22.7. Will transfuse 1 PRBC   · Potassium replaced           ROS:Review of systems not obtained due to patient factors. Vital Signs:    Visit Vitals  /71   Pulse 99   Temp 99.9 °F (37.7 °C)   Resp 17   Ht 5' 7\" (1.702 m)   Wt 72.6 kg (160 lb 0.9 oz)   SpO2 100%   Breastfeeding No   BMI 25.07 kg/m²       O2 Device: Tracheostomy, Ventilator   O2 Flow Rate (L/min): 40 l/min   Temp (24hrs), Av.4 °F (37.4 °C), Min:98.1 °F (36.7 °C), Max:100.2 °F (37.9 °C)       Intake/Output:   Last shift:      No intake/output data recorded.   Last 3 shifts: 1901 -  0700  In:  [I.V.:52]  Out:  [Urine:2075]    Intake/Output Summary (Last 24 hours) at 1/6/2021 0928  Last data filed at 1/6/2021 0700  Gross per 24 hour   Intake 1000 ml   Output 1250 ml   Net -250 ml       Ventilator Settings:  Mode Rate Tidal Volume Pressure FiO2 PEEP   SIMV   500 ml  8 cm H2O 28 % 5 cm H20     Peak airway pressure: 23 cm H2O    Minute ventilation: 7.4 l/min      ARDS network Guidelines:   Lung protective strategy and Plateau  Pressure goal < 30 cm H2O goals  Oxygenation Goals PaO2 55-80 mm Hg or SaO2 88-95%  PH goal 7.30-7.45    VAP bundle:  Reviewed. [place] tube to suction at 20-30 cm Hg. Maintain [place] tube with 5-10ml air every 4 hours  Routine oral care every 4 hours  Elevation of head > 45 degree  Daily sedation holiday and SBT evaluation starting at 6.00am.    Physical Exam:      General: Intubated/sedated; NAD, acyanotic   HEENT:  Anicteric sclerae; pink palpebral conjunctivae; mucosa moist, ETT  Resp: (+) coarse rhonchi b/l. Symmetrical chest expansion; good airway entry;   CV:  S1, S2 present; regular rate and rhythm   GI:  Abdomen soft, non-tender; (+) active bowel sounds  Extremities:  +2 pulses on all extremities  Skin:  Warm; no rashes/ lesions noted, normal turgor/cap refill.    Neurologic:   Opening eyes spontaneously, No command following, moving extremities spontaneously   Devices:  Christoph Portage, PICC RUE     DATA:     Current Facility-Administered Medications   Medication Dose Route Frequency    0.9% sodium chloride infusion 250 mL  250 mL IntraVENous PRN    docusate (COLACE) 50 mg/5 mL oral liquid 100 mg  100 mg Oral DAILY    furosemide (LASIX) tablet 20 mg  20 mg Oral DAILY    oxyCODONE (ROXICODONE) 5 mg/5 mL oral solution 10 mg  10 mg Oral Q8H    diazePAM (VALIUM) tablet 10 mg  10 mg Oral TID    lansoprazole (PREVACID) 3 mg/mL oral suspension 30 mg  30 mg Per G Tube ACB    heparin 25,000 units in D5W 250 ml infusion  12-25 Units/kg/hr IntraVENous TITRATE    [Held by provider] clopidogreL (PLAVIX) tablet 75 mg  75 mg Oral DAILY    polyethylene glycol (MIRALAX) packet 17 g  17 g Per NG tube PRN    insulin glargine (LANTUS) injection 30 Units  30 Units SubCUTAneous QHS    chlorhexidine (PERIDEX) 0.12 % mouthwash 10 mL  10 mL Oral Q12H    midazolam (VERSED) injection 1-2 mg  1-2 mg IntraVENous Q10MIN PRN    fentaNYL citrate (PF) injection  mcg   mcg IntraVENous Q30MIN PRN    ELECTROLYTE REPLACEMENT PROTOCOL - Potassium Standard Dosing   1 Each Other PRN    ELECTROLYTE REPLACEMENT PROTOCOL - Magnesium   1 Each Other PRN    ELECTROLYTE REPLACEMENT PROTOCOL - Phosphorus  Standard Dosing  1 Each Other PRN    ELECTROLYTE REPLACEMENT PROTOCOL - Calcium   1 Each Other PRN    insulin glargine (LANTUS) injection 30 Units  30 Units SubCUTAneous DAILY    [Held by provider] carvediloL (COREG) tablet 6.25 mg  6.25 mg Oral Q12H    lisinopriL (PRINIVIL, ZESTRIL) tablet 5 mg  5 mg Oral DAILY    [Held by provider] spironolactone (ALDACTONE) tablet 25 mg  25 mg Oral DAILY    multivit-folic acid-herbal 439 (WELLESSE PLUS) oral liquid 30 mL  30 mL Per NG tube DAILY    insulin lispro (HUMALOG) injection   SubCUTAneous Q6H    albuterol-ipratropium (DUO-NEB) 2.5 MG-0.5 MG/3 ML  3 mL Nebulization Q4H PRN    sodium chloride (NS) flush 5-40 mL  5-40 mL IntraVENous Q8H    sodium chloride (NS) flush 5-40 mL  5-40 mL IntraVENous PRN    acetaminophen (TYLENOL) tablet 650 mg  650 mg Oral Q6H PRN    Or    acetaminophen (TYLENOL) suppository 650 mg  650 mg Rectal Q6H PRN    promethazine (PHENERGAN) tablet 12.5 mg  12.5 mg Oral Q6H PRN    Or    ondansetron (ZOFRAN) injection 4 mg  4 mg IntraVENous Q6H PRN    glucose chewable tablet 16 g  4 Tab Oral PRN    glucagon (GLUCAGEN) injection 1 mg  1 mg IntraMUSCular PRN    dextrose (D50W) injection syrg 12.5-25 g  25-50 mL IntraVENous PRN    aspirin chewable tablet 81 mg  81 mg Oral DAILY    atorvastatin (LIPITOR) tablet 40 mg  40 mg Oral QHS Labs: Results:       Chemistry Recent Labs     01/06/21  0339 01/05/21  0300 01/04/21  1611   * 141* 79    139 140   K 3.6 4.0 4.1    104 104   CO2 31 29 30   BUN 20* 18 18   CREA 0.37* 0.39* 0.33*   CA 8.7 9.2 9.1   AGAP 3 6 6   BUCR 54* 46* 55*      CBC w/Diff Recent Labs     01/06/21  0339 01/05/21  0300 01/04/21  0333   WBC 7.9 6.9 6.3   RBC 2.66* 2.73* 2.70*   HGB 6.9* 7.2* 7.0*   HCT 22.7* 23.3* 23.3*   * 526* 496*   GRANS 77* 74* 75*   LYMPH 12* 16* 21   EOS 2 2 3      Coagulation Recent Labs     01/04/21  0920   APTT 26.5       Liver Enzymes No results for input(s): TP, ALB, TBIL, AP in the last 72 hours. No lab exists for component: SGOT, GPT, DBIL   ABG No results found for: PH, PHI, PCO2, PCO2I, PO2, PO2I, HCO3, HCO3I, FIO2, FIO2I   Microbiology No results for input(s): CULT in the last 72 hours. Telemetry: [x]Sinus []A-flutter []Paced    []A-fib []Multiple PVCs                  Imaging:  CXR Results  (Last 48 hours)    None                IMPRESSION:   · Acute on Chronic Hypoxic and Hypercapnic Respiratory Failure - Requiring mechanical ventilation, Extubated on 12/17 and reintubated on 12/22, Likely aspiration event. Trach placed 1/4/21, Dr. Charisse Moore   · Wide Complex Arrhythmia (12/25) -noted on bedside cardiac monitor. Lasted roughly 5-10 mins with Spontaneous resolution. E-lyte derangement vs Drug effect (pt was on 55mcg/hr Oliverio-Synephrine at the time) vs cardiac insult.    · Acute on Chronic Systolic Heart Failure - superimposed on severe ILD, Echo 12/10/20 EF 35-40%  · Combination septic/cardiogenic shock, improving   · Aspiration PNA. Sputum Cx(12/22) (+) heavy E.Coli-ESBL.  Recent Hx of E.coli ESBL.   · Acute Encephalopathy - likely toxic/metabolic vs infectious vs hepatic in nature, ammonia wnl.   · Severe pulmonary fibrosis - with extensive honeycombing and bronchiectasis as seen on CTA chest 12/16/20 - appears to be a new diagnosis but possibly UIP  · Acute aspiration pneumonia with severe sepsis  · UTI (+)E. coli  · Acute MI with anterolateral STEMI and Q waves - s/p ptca/stent to proximal/mid LAD using ARELY on DAPT on 12/11/20- Remains on Brilinta  · Severe dysphagia - failed MBS. Will place PEG tube today with IR.   · Critical illness myopathy/polyneuropathy  · Hx of Hep C: + RNA viral load 4/4/2020  · Multiple liver masses - noted on CT scan 11/25/20  · COPD - on home O2 4L NC  · DM  · Dementia - unknown baseline, on aricept    RECOMMENDATIONS:   Resp:   -Titrate FiO2/ supp O2 for SpO2 >90%  -Aspiration precautions   -S/p trach 1/4/21, Dr. Arely Mendosa  -Brianna Nunez   -Aggressive pulmonary hygiene   I/D:   -ID consulted for intermittent fevers, appreciate recs  -Aleukocyotosis  -Per ID, discontinued Merrem on 1/2/21 started 12/25 for Ecoli (ESBL)   -Negative for COVID19 on 1/1/2021  Hem/Onc:   -Daily CBC;   -H&H low today. Will transfuse 1 PRBC    -Will transfuse unit of blood to maintain Hgb >7  CVS:   -Appreciate cardiology recs: Peg needed - Plavix held in preparation for insertion of PEG today . ASA has been restarted today and discussed with IR by cardiology. -EF 35-40 % on 12/10/20, the day before stent placement  -Proximal LAD angioplasty and stenting on 12/11/2020 for 95% stenotic lesion.  -Hemodynamics stable off pressors  -Tolerating statin   -Continue lisinopril  -continue lasix. Changed to PO today.    -BUE and BLE PVL's negative   Metabolic:   -Daily BMP; monitor e-lytes; replace PRN  Renal:    -Trend Renal indices, Purewick   Endocrine:   -Continue lantus BID with SSI  GI:   -SUP  -Tolerating TF   -Bowel regimen: Miralax PRN  -PEG tube insertion today with IR    -CT Abd from 1/3/21: gastric antrum contiguous with anterior peritoneum, enlarging hepatic masses consistent with likely malignancy or mets, RLL consolidation with air bronchograms consistent with atelectasis and possible airspace disease   Musc/Skin:   -No acute issues, wound care  Neuro:   -Off sedation   -Versed and fentanyl gtt for sedation, goal RASS 0 to -1  -Transition to enteral meds; fiona and valium. Doses of fiona and valium increased today. PPX  -SUP ppx    Discussed in Interdisciplinary Rounds            The patient is: [x] acutely ill Risk of deterioration: [] moderate    [] critically ill  [x] high     []See my orders for details    My assessment/plan was discussed with:  [x]Nursing []PT/OT    [x]Respiratory therapy [x]Dr. Gladis López    []Family []       Michael Egan, AGACNP-BC  01/06/21  Pulmonary, [address] [facility]       Attending Note:  Late entry for DOS 1/6/21  I saw and evaluated the patient, performing the key elements of the service. I discussed the findings, assessment and plan with the NP and agree with the NP's findings and plan as documented in the NP's note above. All edits and changes made above or are mentioned in my attending note which was independently assessed as well as written. Total of 30 min critical care time spent at bedside (personally) during the course of care providing evaluation,management and care decisions and ordering appropriate treatment related to critical care problems exclusive of procedures. The reason for providing this level of medical care for this critically ill patient was due a critical illness that impaired one or more vital organ systems such that there was a high probability of imminent or life threatening deterioration in the patients condition. This care involved high complexity decision making to assess, manipulate, and support vital system functions, to treat this degree vital organ system failure and to prevent further life threatening deterioration of the patients condition. This time was independent of other practitioners.     80-year-old female with a past medical history of COPD, CHF, hypertension, dementia, diabetes, hepatitis C, presented to [facility] as a transfer from [facility] for chest pain found to have acute STEMI.  Patient underwent cardiac intervention on 12/11/2020, however developed worsening hypoxic respiratory failure afterwards, intubated postprocedure and then extubated on 12/17/2020.  Patient then developed worsening dyspnea, tachypnea, was found to be obtunded, reintubated on 12/22/2020. Elke Salazar was then transferred to the ICU.  Patient found unresponsive, likely aspirated, extubated on 12/26.  Unfortunately patient's mental status remains poor, likely toxic/metabolic versus hepatic in nature, so patient was reintubated after RRT on 12/28. Bruno Pedrito patient's mental status remains poor, patient also has significant deconditioning from critical illness myopathy/polyneuropathy, patient underwent tracheostomy placement Monday.  We consulted interventional radiology for G-tube placement -- pending placement, maybe next week.  Infectious diseases was following the patient for aspiration pneumonia, signed off earlier this week after completion of antibiotics.  Patient has multiple other issues including cardiogenic shock which has resolved, ongoing encephalopathy, has developed pulmonary fibrosis, likely IPF given progression from 2018 to this year, patient also has hepatic mass for which will need follow-up if patient survives. Allison Reyna of note patient does have underlying severe ischemic cardiomyopathy with LVEF of 20%.  I have advised that other providers pursue [facility] futile care policy, however other providers thought it would be appropriate to offer patient tracheostomy and PEG tube placement, cardiology then deemed that patient could come off of antiplatelet therapy and go on to heparin drip temporarily, so these were offered.  G-tube plan for Friday.  After placement of this, we will attempt to place patient to LTAC when possible. Discussed with Cardiology, restarted pt on ASA and heparin gtt per clearance from IR until procedure.   Also of note, pt unable to tolerate SBT this morning -- placed in SIMV.     Very Very poor overall prognosis given overall functional status in the setting of a very high burden of acute and chronic diseases not limited to new pulmonary fibrosis, ischemic cardiomyopathy with cardiogenic shock, dysphagia and what appears to be dementia      Lizz Sahu MD/MPH     Pulmonary, Critical Care Medicine  [facility]

## 2021-01-07 LAB
ANION GAP SERPL CALC-SCNC: 5 MMOL/L (ref 3–18)
APTT PPP: 32.4 SEC (ref 23–36.4)
BASOPHILS # BLD: 0 K/UL (ref 0–0.1)
BASOPHILS NFR BLD: 0 % (ref 0–2)
BUN SERPL-MCNC: 15 MG/DL (ref 7–18)
BUN/CREAT SERPL: 41 (ref 12–20)
CALCIUM SERPL-MCNC: 9.4 MG/DL (ref 8.5–10.1)
CHLORIDE SERPL-SCNC: 105 MMOL/L (ref 100–111)
CO2 SERPL-SCNC: 30 MMOL/L (ref 21–32)
CREAT SERPL-MCNC: 0.37 MG/DL (ref 0.6–1.3)
DIFFERENTIAL METHOD BLD: ABNORMAL
EOSINOPHIL # BLD: 0.2 K/UL (ref 0–0.4)
EOSINOPHIL NFR BLD: 3 % (ref 0–5)
ERYTHROCYTE [DISTWIDTH] IN BLOOD BY AUTOMATED COUNT: 15 % (ref 11.6–14.5)
GLUCOSE BLD STRIP.AUTO-MCNC: 110 MG/DL (ref 70–110)
GLUCOSE BLD STRIP.AUTO-MCNC: 196 MG/DL (ref 70–110)
GLUCOSE BLD STRIP.AUTO-MCNC: 91 MG/DL (ref 70–110)
GLUCOSE SERPL-MCNC: 95 MG/DL (ref 74–99)
HCT VFR BLD AUTO: 25.7 % (ref 35–45)
HGB BLD-MCNC: 8 G/DL (ref 12–16)
LYMPHOCYTES # BLD: 1.2 K/UL (ref 0.9–3.6)
LYMPHOCYTES NFR BLD: 14 % (ref 21–52)
MAGNESIUM SERPL-MCNC: 2.1 MG/DL (ref 1.6–2.6)
MCH RBC QN AUTO: 26.9 PG (ref 24–34)
MCHC RBC AUTO-ENTMCNC: 31.1 G/DL (ref 31–37)
MCV RBC AUTO: 86.5 FL (ref 74–97)
MONOCYTES # BLD: 0.7 K/UL (ref 0.05–1.2)
MONOCYTES NFR BLD: 9 % (ref 3–10)
NEUTS SEG # BLD: 6.2 K/UL (ref 1.8–8)
NEUTS SEG NFR BLD: 74 % (ref 40–73)
PHOSPHATE SERPL-MCNC: 4.1 MG/DL (ref 2.5–4.9)
PLATELET # BLD AUTO: 517 K/UL (ref 135–420)
PMV BLD AUTO: 9 FL (ref 9.2–11.8)
POTASSIUM SERPL-SCNC: 3.8 MMOL/L (ref 3.5–5.5)
POTASSIUM SERPL-SCNC: 4.1 MMOL/L (ref 3.5–5.5)
RBC # BLD AUTO: 2.97 M/UL (ref 4.2–5.3)
SODIUM SERPL-SCNC: 140 MMOL/L (ref 136–145)
WBC # BLD AUTO: 8.3 K/UL (ref 4.6–13.2)

## 2021-01-07 PROCEDURE — 65610000006 HC RM INTENSIVE CARE

## 2021-01-07 PROCEDURE — 74011636637 HC RX REV CODE- 636/637: Performed by: INTERNAL MEDICINE

## 2021-01-07 PROCEDURE — 85730 THROMBOPLASTIN TIME PARTIAL: CPT

## 2021-01-07 PROCEDURE — 94003 VENT MGMT INPAT SUBQ DAY: CPT

## 2021-01-07 PROCEDURE — 85025 COMPLETE CBC W/AUTO DIFF WBC: CPT

## 2021-01-07 PROCEDURE — 74011250637 HC RX REV CODE- 250/637: Performed by: INTERNAL MEDICINE

## 2021-01-07 PROCEDURE — 77030018798 HC PMP KT ENTRL FED COVD -A

## 2021-01-07 PROCEDURE — 83735 ASSAY OF MAGNESIUM: CPT

## 2021-01-07 PROCEDURE — 74011250636 HC RX REV CODE- 250/636: Performed by: PHYSICIAN ASSISTANT

## 2021-01-07 PROCEDURE — 77030041174 HC STOOL COL SYS DIGNSHLD BARD -C

## 2021-01-07 PROCEDURE — 2709999900 HC NON-CHARGEABLE SUPPLY

## 2021-01-07 PROCEDURE — 99232 SBSQ HOSP IP/OBS MODERATE 35: CPT | Performed by: INTERNAL MEDICINE

## 2021-01-07 PROCEDURE — 74011000250 HC RX REV CODE- 250: Performed by: PHYSICIAN ASSISTANT

## 2021-01-07 PROCEDURE — 84100 ASSAY OF PHOSPHORUS: CPT

## 2021-01-07 PROCEDURE — 74011250636 HC RX REV CODE- 250/636: Performed by: NURSE PRACTITIONER

## 2021-01-07 PROCEDURE — 82962 GLUCOSE BLOOD TEST: CPT

## 2021-01-07 PROCEDURE — 99291 CRITICAL CARE FIRST HOUR: CPT | Performed by: INTERNAL MEDICINE

## 2021-01-07 PROCEDURE — 84132 ASSAY OF SERUM POTASSIUM: CPT

## 2021-01-07 RX ORDER — INSULIN GLARGINE 100 [IU]/ML
20 INJECTION, SOLUTION SUBCUTANEOUS
Status: DISCONTINUED | OUTPATIENT
Start: 2021-01-07 | End: 2021-01-14 | Stop reason: HOSPADM

## 2021-01-07 RX ORDER — POTASSIUM CHLORIDE 29.8 MG/ML
20 INJECTION INTRAVENOUS ONCE
Status: COMPLETED | OUTPATIENT
Start: 2021-01-07 | End: 2021-01-07

## 2021-01-07 RX ORDER — DEXTROSE MONOHYDRATE AND SODIUM CHLORIDE 5; .45 G/100ML; G/100ML
10 INJECTION, SOLUTION INTRAVENOUS CONTINUOUS
Status: DISCONTINUED | OUTPATIENT
Start: 2021-01-07 | End: 2021-01-14 | Stop reason: HOSPADM

## 2021-01-07 RX ADMIN — OXYCODONE HYDROCHLORIDE 20 MG: 5 SOLUTION ORAL at 05:32

## 2021-01-07 RX ADMIN — NALOXEGOL OXALATE 25 MG: 25 TABLET, FILM COATED ORAL at 11:07

## 2021-01-07 RX ADMIN — DIAZEPAM 20 MG: 5 TABLET ORAL at 17:28

## 2021-01-07 RX ADMIN — OXYCODONE HYDROCHLORIDE 20 MG: 5 SOLUTION ORAL at 14:50

## 2021-01-07 RX ADMIN — Medication 30 ML: at 11:07

## 2021-01-07 RX ADMIN — DIAZEPAM 20 MG: 5 TABLET ORAL at 22:45

## 2021-01-07 RX ADMIN — INSULIN LISPRO 3 UNITS: 100 INJECTION, SOLUTION INTRAVENOUS; SUBCUTANEOUS at 17:34

## 2021-01-07 RX ADMIN — SODIUM CHLORIDE 10 ML: 9 INJECTION, SOLUTION INTRAMUSCULAR; INTRAVENOUS; SUBCUTANEOUS at 22:45

## 2021-01-07 RX ADMIN — OXYCODONE HYDROCHLORIDE 20 MG: 5 SOLUTION ORAL at 22:45

## 2021-01-07 RX ADMIN — CHLORHEXIDINE GLUCONATE 0.12% ORAL RINSE 10 ML: 1.2 LIQUID ORAL at 09:43

## 2021-01-07 RX ADMIN — ATORVASTATIN CALCIUM 40 MG: 40 TABLET, FILM COATED ORAL at 22:45

## 2021-01-07 RX ADMIN — ASPIRIN 81 MG CHEWABLE TABLET 81 MG: 81 TABLET CHEWABLE at 11:05

## 2021-01-07 RX ADMIN — DIAZEPAM 20 MG: 5 TABLET ORAL at 11:06

## 2021-01-07 RX ADMIN — LISINOPRIL 5 MG: 5 TABLET ORAL at 11:08

## 2021-01-07 RX ADMIN — SODIUM CHLORIDE 10 ML: 9 INJECTION, SOLUTION INTRAMUSCULAR; INTRAVENOUS; SUBCUTANEOUS at 06:00

## 2021-01-07 RX ADMIN — FUROSEMIDE 20 MG: 20 TABLET ORAL at 11:07

## 2021-01-07 RX ADMIN — DOCUSATE SODIUM 100 MG: 50 LIQUID ORAL at 11:06

## 2021-01-07 RX ADMIN — SODIUM CHLORIDE 10 ML: 9 INJECTION, SOLUTION INTRAMUSCULAR; INTRAVENOUS; SUBCUTANEOUS at 14:51

## 2021-01-07 RX ADMIN — CHLORHEXIDINE GLUCONATE 0.12% ORAL RINSE 10 ML: 1.2 LIQUID ORAL at 22:45

## 2021-01-07 RX ADMIN — HEPARIN SODIUM AND DEXTROSE 12 UNITS/KG/HR: 10000; 5 INJECTION INTRAVENOUS at 10:54

## 2021-01-07 RX ADMIN — POTASSIUM CHLORIDE 20 MEQ: 400 INJECTION, SOLUTION INTRAVENOUS at 04:14

## 2021-01-07 NOTE — DIABETES MGMT
GLYCEMIC CONTROL PLAN OF CARE     Assessment/Recommendations:  Lab glucose this am 95 mg/dl  Noted lantus dose decreased to 20 units daily. Time changed to HS. Continue corrective insulin coverage as needed. Will continue inpatient monitoring. Most recent blood glucose values:        Results for Tom Olea (MRN [id number]) as of 1/7/2021 09:58   Ref. Range 1/6/2021 11:14 1/6/2021 12:49 1/6/2021 17:48 1/6/2021 23:46 1/7/2021 05:20   GLUCOSE,FAST - POC Latest Ref Range: 70 - 110 mg/dL 71 158 (H) 80 80 91     Current A1C of 8.3 % is equivalent to average blood glucose of 192 mg/dl over the past 2-3 months. Current hospital diabetes medications:   lantus 20 units every 12 hours. Lispro corrective insulin coverage every 6 hours  Previous day's insulin requirements:   lantus 40 units. Lispro 0 units   Home diabetes medications:  Per pta med list.  lantus 22 units daily   Diet:    NPO. TF glucerna at 50 ml/hr. On hold. Education:  ____Refer to Diabetes Education Record             _x__Education not indicated at this time  Intubated. Sedated. History of dementia.     Honorio Lazo

## 2021-01-07 NOTE — PROGRESS NOTES
Spoke with Marco Hernández at [facility] regarding possible need for LTACH. Marco Hernández will review; updated clinicals sent.  JEANNIE Dobbins, [facility]- [phone]

## 2021-01-07 NOTE — PROGRESS NOTES
attended the interdisciplinary rounds for Aurelia Rico, who is a 72 y.o.,female. Patients Primary Language is: [place]. According to the patients EMR Episcopal Affiliation is: [facility].     The reason the Patient came to the hospital is:   Patient Active Problem List    Diagnosis Date Noted    Gram-negative bacteremia 02/05/2015     Priority: 1 - One    Sepsis secondary to UTI ([address] 02/03/2015     Priority: 1 - One    DM hyperosmolarity type II, uncontrolled ([address] 02/03/2015     Priority: 1 - One    HTN (hypertension) 02/03/2015     Priority: 1 - One    Goals of care, counseling/discussion     Dementia without behavioral disturbance ([address]     Pulmonary fibrosis ([address]     Severe protein-calorie malnutrition ([address] 12/16/2020    Acute on chronic systolic congestive heart failure ([address] 12/15/2020    STEMI (ST elevation myocardial infarction) ([address] 12/10/2020    Acidosis 02/07/2018    Respiratory failure ([address] 02/07/2018    Shock ([address] 02/07/2018    Hyperosmolar (nonketotic) coma ([address] 02/07/2018    Acute UTI 02/07/2018    Macrocytic anemia 02/07/2018    Diabetic neuropathy (HCC)     Osteoarthritis of both knees     Sepsis ([address] 02/03/2015    Febrile illness, acute 02/03/2015    Diverticula of colon 09/21/2012          Plan:  Chaplains will continue to follow and will provide pastoral care on an as needed/requested basis.  recommends bedside caregivers page  on duty if patient shows signs of acute spiritual or emotional distress.     [address] [facility]  Board Certified [address] [place] [address]   [phone]

## 2021-01-07 NOTE — PROGRESS NOTES
[facility]      CARDIOLOGY PROGRESS NOTE  RECS:      1. Acute respiratory failure- requiring mechanical ventilation- extubated  12/17/20. re intubated 12/22/20. S/p tracheostomy 1/4/20 Continue supportive care  2. Sepsis- Sputum Cx(12/22) (+) heavy E.Coli-ESBL. on antibiotics. ID following   3. Wide complex Arrhythmia on 12/25/20- No recurrence will continue to monitor. Monitor electrolytes    4. Hypotension- intermittent low BP- hold ACEi and BB  5. Subacute MI- 12/10/20 -s/p LHC S/p ptca/stent to proximal/ mid LAD using ARELY.  Moderate to severe LV dysfunction. ekg no new ischemic changes suggestive of reinfarction or stent thrombosis. Plavix on hold for upcoming procedure/PEG tube. On aspirin and heparin. 6. Anemia- H&H Stable. S/p transfusion. 7. Acute Systolic Congestive heart Failure-recent echo with EF% 35%-40. Compensated at present. Lasix apo daily. 8. COPD,   9. Dementia - failed swallow eval. Gastrostomy tube placement planned for 1/8/21  by IR. Continue supportive care- on heparin drip- awaiting PEG placement. Resume DAPT post procedure  Prognosis is guarded. Cardiac cath 12/11/20  Patient presented to [address] [place] [address] with late presentation anterior wall MI.  EF 35-40%. Patient was initially managed medically-- however developed acute pulmonary edema requiring intubation. Also troponin level increasing consistent with ongoing ischemia. S/p ptca/stent to proximal/ mid LAD using ARELY. LVEDP 8 mmHg. Normal PASP pressure with normal PCWP. Moderate to severe LV dysfunction.     Echo 12/10/20  · LV: Estimated LVEF is 35 - 40%. Visually measured ejection fraction. Normal cavity size and wall thickness. Moderately and segmentally reduced systolic function. Inconclusive left ventricular diastolic function. · AO: Mild aortic root dilatation; diameter is 3.8 cm.     ASSESSMENT:  Hospital Problems  Date Reviewed: 2/7/2018          Codes Class Noted POA Goals of care, counseling/discussion ICD-10-CM: Z71.89  ICD-9-CM: V65.49  Unknown Unknown        Dementia without behavioral disturbance (HCC) ICD-10-CM: F03.90  ICD-9-CM: 294.20  Unknown Unknown        Pulmonary fibrosis ([facility] ICD-10-CM: J84.10  ICD-9-CM: 258  Unknown Unknown        Severe protein-calorie malnutrition ([facility] ICD-10-CM: E43  ICD-9-CM: 394  12/16/2020 Clinically Undetermined        Acute on chronic systolic congestive heart failure ([facility] ICD-10-CM: I50.23  ICD-9-CM: 428.23, 428.0  12/15/2020 Unknown        * (Principal) STEMI (ST elevation myocardial infarction) ([place] [address].) ICD-10-CM: I21.3  ICD-9-CM: 410.90  12/10/2020 Unknown                SUBJECTIVE:  Sedated, intubated  Status post tracheostomy    OBJECTIVE:    VS:   Visit Vitals  BP (!) 104/59   Pulse 84   Temp 97.6 °F (36.4 °C)   Resp 11   Ht 5' 7\" (1.702 m)   Wt 72.7 kg (160 lb 4.4 oz)   SpO2 100%   Breastfeeding No   BMI 25.10 kg/m²         Intake/Output Summary (Last 24 hours) at 1/7/2021 1138  Last data filed at 1/7/2021 0500  Gross per 24 hour   Intake 754.2 ml   Output 900 ml   Net -145.8 ml     TELE: normal sinus rhythm    General: chronically ill and Intubated and sedated, status post tracheostomy  HENT: Normocephalic, atraumatic. Normal external eye.   Neck :  no JVD  Cardiac:  regular rate and rhythm, S1, S2 normal, no murmur, click, rub or gallop  Lungs: scattered rhonchi b/l  Abdomen: Soft, nontender, no masses  Extremities:  No c/c/e, peripheral pulses present      Labs: Results:       Chemistry Recent Labs     01/07/21  0306 01/06/21  1049 01/06/21  0339 01/05/21  0300   GLU 95  --  101* 141*     --  139 139   K 3.8 3.9 3.6 4.0     --  105 104   CO2 30  --  31 29   BUN 15  --  20* 18   CREA 0.37*  --  0.37* 0.39*   CA 9.4  --  8.7 9.2   AGAP 5  --  3 6   BUCR 41*  --  54* 46*      CBC w/Diff Recent Labs     01/07/21  0306 01/06/21  0339 01/05/21  0300   WBC 8.3 7.9 6.9   RBC 2.97* 2.66* 2.73*   HGB 8.0* 6.9* 7.2*   HCT 25.7* 22.7* 23.3*   * 538* 526*   GRANS 74* 77* 74*   LYMPH 14* 12* 16*   EOS 3 2 2      Cardiac Enzymes No results for input(s): CPK, CKND1, NATHANIEL in the last 72 hours. No lab exists for component: CKRMB, TROIP   Coagulation No results for input(s): PTP, INR, APTT, INREXT, INREXT in the last 72 hours. Lipid Panel No results found for: CHOL, CHOLPOCT, CHOLX, CHLST, CHOLV, 148139, HDL, HDLP, LDL, LDLC, DLDLP, 901332, VLDLC, VLDL, TGLX, TRIGL, TRIGP, TGLPOCT, CHHD, CHHDX   BNP No results for input(s): BNPP in the last 72 hours. Liver Enzymes No results for input(s): TP, ALB, TBIL, AP in the last 72 hours. No lab exists for component: SGOT, GPT, DBIL   Thyroid Studies Lab Results   Component Value Date/Time    TSH 2.92 12/10/2020 11:07 AM              [facility], NP-C supervised    I have independently evaluated and examined the patient. All relevant labs and testing data's are reviewed. Care plan discussed and updated after review.     Natalya Drew MD

## 2021-01-07 NOTE — PROGRESS NOTES
Nutrition Assessment     Type and Reason for Visit: Reassess    Nutrition Recommendations/Plan:   - Change to bolus tube feeding regimen- provide 400 mL bolus of Glucerna 1.5 over one hour using pump three times daily with 100 mL free water flush before and after each bolus with prosource once daily. Hold feeds past midnight for procedure tomorrow and resume via G tube once able to use enteral access per MD.  - IVF per MD.      Nutrition Assessment:  Patient s/p trach placement 1/4 and OGT removed, replaced with NGT and feedings resumed at goal rate of 50 mL/hr then held at midnight on 1/6 for possible procedure- PEG placement postponed until tomorrow with plan to transition to bolus regimen and hold feeds at midnight per MD. Receiving bowel regimen (colace, movantik), lasix, 20 mEq K, MVI, lantus changed to once daily in the evening to prevent hypoglycemia and IVF with dextrose started. Malnutrition Assessment:  Malnutrition Status: Severe malnutrition     Estimated Daily Nutrient Needs:  Energy (kcal):  7861-6009  Protein (g):         Fluid (ml/day):  1853-0436    Nutrition Related Findings:  BM 1/5      Additional Caloric Sources: D5 1/2NS at 25 mL/hr (30 gm dextrose, 102 kcal per day)     Current Nutrition Therapies:  DIET NUTRITIONAL SUPPLEMENTS Breakfast; Prosource  DIET NPO  DIET TUBE FEEDING Please provide 400 mL bolus over one hour using pump 3 times daily plus 100 mL water flush before and after each bolus. Bolus schedule: 10:00, 14:00 and 18:00. Please hold feeds at midnight, NPO on 1/8 for procedure.      Current Tube Feeding (TF) Orders:   · Feeding Route: Nasogastric  · Formula: Glucerna 1.5  · Schedule:Continuous    · Regimen: 50 mL/hr (held since midnight 1/6)  · Additives/Modulars: Protein(prosource once daily)  · Water Flushes: 100 mL q 4 hours (600 mL)- held  · Current TF Orders Provides: not applicable, held for procedure  · Goal TF Orders Provides: 1860 kcal, 114 gm protein, 910 mL free water, 100% RDIs       Anthropometric Measures:  · Height:  5' 7\" (170.2 cm)  · Current Body Wt:  73 kg (160 lb 15 oz)  · BMI: 25.2    Nutrition Diagnosis:   · Inadequate oral intake related to impaired respiratory function, swallowing difficulty as evidenced by NPO or clear liquid status due to medical condition(s/p trach on ventilator)      Nutrition Intervention:  Food and/or Nutrient Delivery: Modify tube feeding, IV fluid delivery, Vitamin supplement  Nutrition Education and Counseling: Education not indicated  Coordination of Nutrition Care: Continue to monitor while inpatient, Interdisciplinary rounds    Goals:  Nutritional needs will be met through adequate oral intake or nutrition support within the next 7 days.        Nutrition Monitoring and Evaluation:   Behavioral-Environmental Outcomes: None identified  Food/Nutrient Intake Outcomes: Enteral nutrition intake/tolerance, Vitamin/mineral intake, IVF intake  Physical Signs/Symptoms Outcomes: Biochemical data, GI status, Fluid status or edema, Nutrition focused physical findings    Discharge Planning:    Enteral nutrition     Electronically signed by Shelby Lujan RD, [facility] on 1/7/2021 at 3:21 PM    Contact Number: [phone]

## 2021-01-07 NOTE — PROGRESS NOTES
Bedside and Verbal shift change report given to Alberto Silverman (oncoming nurse) by Nica Madsen (offgoing nurse). Report included the following information SBAR, Kardex, Intake/Output, MAR, Recent Results, Cardiac Rhythm SR/ST, Alarm Parameters  and Quality Measures.

## 2021-01-07 NOTE — PROGRESS NOTES
[facility]. Pulmonary, Critical Care, and Sleep Medicine    Name: Becky Sharma MRN: [id number]   : 1955 Hospital: [address] [place] [address] Dr   Date: 2021  Admission Date: 12/10/2020     Chart and notes reviewed. Data reviewed. I have evaluated all findings. [x]I have reviewed the flowsheet and previous days notes. []The patient is unable to give any meaningful history or review of systems because the patient is:  []Intubated []Sedated   []Unresponsive      [x]The patient is critically ill on      [x]Mechanical ventilation []Pressors   []BiPAP []         Interval HPI:Patient is a 72 y. o. female with a past medical history of COPD, CHF, HTN, DM, dementia, presented to [facility] with acute on chronic systolic heart failure, acute MI with anterolateral STEMI and Q waves s/p ptca/stent to proximal/mid LAD on 20. Pt was intubated and extubated on 20, transferred to . On 20 patient was intubated and transferred to ICU for respiratory failure and cardiogenic +/- septic shock. Respiratory cultures on  with ESBL on Merrem. Dr. Paige Field and IR have been consulted for trach/PEG. She is S/p trach 21 and will receive PEG tube by IR today. Subjective 21  Hospital Day:28  Vent Day: 17  Overnight events: No significant events noted overnight  Mentation/Activity: opens eyes spontaneously, withdraws to pain  Respiratory/ Secretions: stable  Hemodynamics: H/H stable  Need for procedures: PEG              ROS:Review of systems not obtained due to patient factors. Events and notes from last 24 hours reviewed. Care plan discussed on multidisciplinary rounds.   Patient Active Problem List   Diagnosis Code    Diverticula of colon K57.30    Sepsis ([facility] A41.9    Sepsis secondary to UTI ([facility] A41.9, N39.0    DM hyperosmolarity type II, uncontrolled ([facility] E11.00, E11.65    HTN (hypertension) I10    Febrile illness, acute R50.9    Gram-negative bacteremia R78.81  Diabetic neuropathy ([facility]) E11.40    Osteoarthritis of both knees M17.0    Acidosis E87.2    Respiratory failure ([facility]) J96.90    Shock ([address] R57.9    Hyperosmolar (nonketotic) coma ([facility]) E11.01    Acute UTI N39.0    Macrocytic anemia D53.9    STEMI (ST elevation myocardial infarction) ([facility]) I21.3    Acute on chronic systolic congestive heart failure ([facility]) I50.23    Severe protein-calorie malnutrition ([facility]) E43    Goals of care, counseling/discussion Z71.89    Dementia without behavioral disturbance ([facility]) F03.90    Pulmonary fibrosis ([facility]) J84.10       Vital Signs:  Visit Vitals  /61   Pulse 97   Temp 98.4 °F (36.9 °C)   Resp 10   Ht 5' 7\" (1.702 m)   Wt 72.6 kg (160 lb 0.9 oz)   SpO2 97%   Breastfeeding No   BMI 25.07 kg/m²       O2 Device: Tracheostomy, Ventilator   O2 Flow Rate (L/min): 40 l/min   Temp (24hrs), Av.4 °F (36.9 °C), Min:97.4 °F (36.3 °C), Max:101.2 °F (38.4 °C)       Intake/Output:   Last shift:      No intake/output data recorded.   Last 3 shifts:  1901 -  0700  In: 1154.2 [I.V.:50]  Out: 1550 [Urine:1550]    Intake/Output Summary (Last 24 hours) at 2021 0743  Last data filed at 2021 0500  Gross per 24 hour   Intake 804.2 ml   Output 900 ml   Net -95.8 ml        Ventilator Settings:  Ventilator Mode: SIMV, VC+  Respiratory Rate  Resp Rate Observed: 30  Back-Up Rate: 10  Insp Time (sec): 0.9 sec  I:E Ratio: 1;5  Ventilator Volumes  Vt Set (ml): 500 ml  Vt Exhaled (Machine Breath) (ml): 500 ml  Vt Spont (ml): 508 ml  Ve Observed (l/min): 5.5 l/min  Ventilator Pressures  Pressure Support (cm H2O): 8 cm H2O  PIP Observed (cm H2O): 27 cm H2O  Plateau Pressure (cm H2O): 21 cm H2O  MAP (cm H2O): 9.3  PEEP/VENT (cm H2O): 5 cm H20  Auto PEEP Observed (cm H2O): 0 cm H2O    Current Facility-Administered Medications   Medication Dose Route Frequency    diazePAM (VALIUM) tablet 20 mg  20 mg Oral TID    oxyCODONE (ROXICODONE) 5 mg/5 mL oral solution 20 mg  20 mg Oral Q8H    insulin glargine (LANTUS) injection 20 Units  20 Units SubCUTAneous DAILY    naloxegoL (MOVANTIK) tablet 25 mg  25 mg Oral ACB    insulin glargine (LANTUS) injection 20 Units  20 Units SubCUTAneous QHS    docusate (COLACE) 50 mg/5 mL oral liquid 100 mg  100 mg Oral DAILY    furosemide (LASIX) tablet 20 mg  20 mg Oral DAILY    lansoprazole (PREVACID) 3 mg/mL oral suspension 30 mg  30 mg Per G Tube ACB    heparin 25,000 units in D5W 250 ml infusion  12-25 Units/kg/hr IntraVENous TITRATE    [Held by provider] clopidogreL (PLAVIX) tablet 75 mg  75 mg Oral DAILY    chlorhexidine (PERIDEX) 0.12 % mouthwash 10 mL  10 mL Oral Q12H    [Held by provider] carvediloL (COREG) tablet 6.25 mg  6.25 mg Oral Q12H    lisinopriL (PRINIVIL, ZESTRIL) tablet 5 mg  5 mg Oral DAILY    [Held by provider] spironolactone (ALDACTONE) tablet 25 mg  25 mg Oral DAILY    multivit-folic acid-herbal 172 (WELLESSE PLUS) oral liquid 30 mL  30 mL Per NG tube DAILY    insulin lispro (HUMALOG) injection   SubCUTAneous Q6H    sodium chloride (NS) flush 5-40 mL  5-40 mL IntraVENous Q8H    aspirin chewable tablet 81 mg  81 mg Oral DAILY    atorvastatin (LIPITOR) tablet 40 mg  40 mg Oral QHS         Telemetry: [x]Sinus []A-flutter []Paced    []A-fib []Multiple PVCs                  Physical Exam:      General: Intubated/sedated; NAD, acyanotic   HEENT:  Anicteric sclerae; pink palpebral conjunctivae; mucosa moist, ETT  Resp: (+) coarse rhonchi b/l. Symmetrical chest expansion; good airway entry;   CV:  S1, S2 present; regular rate and rhythm   GI:  Abdomen soft, non-tender; (+) active bowel sounds  Extremities:  +2 pulses on all extremities  Skin:  Warm; no rashes/ lesions noted, normal turgor/cap refill.    Neurologic:   Opening eyes spontaneously, No command following, moving extremities spontaneously   Kirill Red PICC RUE      DATA:  MAR reviewed and pertinent medications noted or modified as needed    Labs:  Recent Labs     01/07/21  0306 01/06/21  0339 01/05/21  0300   WBC 8.3 7.9 6.9   HGB 8.0* 6.9* 7.2*   HCT 25.7* 22.7* 23.3*   * 538* 526*     Recent Labs     01/07/21  0306 01/06/21  1049 01/06/21  0339 01/05/21  0300     --  139 139   K 3.8 3.9 3.6 4.0     --  105 104   CO2 30  --  31 29   GLU 95  --  101* 141*   BUN 15  --  20* 18   CREA 0.37*  --  0.37* 0.39*   CA 9.4  --  8.7 9.2   MG 2.1  --  2.1 2.0   PHOS 4.1  --  3.6 4.1     No results for input(s): PH, PCO2, PO2, HCO3, FIO2 in the last 72 hours. Recent Labs     01/05/21  0413   FIO2I 28   HCO3I 27.2*   PCO2I 37.9   PHI 7.47*   PO2I 71*       Imaging:  [x]   I have personally reviewed the patients radiographs and reports  XR Results (most recent):  Results from Hospital Encounter encounter on 12/10/20   XR ABD (KUB)    Narrative Abdomen/KUB    CPT CODE: 38521    HISTORY: Tube placement. COMPARISON: 1/3/2021. FINDINGS:    Enteric tube tip at the mid body of the stomach. Stomach is nondilated. No  dilated loops of small bowel or colon. Wires overlie the patient. Left basilar  infiltrate. .      Impression IMPRESSION:    Enteric tube tip at the mid body of the stomach. .       CT Results (most recent):  Results from Hospital Encounter encounter on 12/10/20   CT ABD WO CONT    Narrative CT ABDOMEN, NONCONTRAST    CPT CODE: 21685    INDICATION: Pretreatment planning for possible gastrostomy tube. COMPARISON: The partially visualized portion of the upper abdomen on pulmonary  CTA 12/16/2020. Prior abdominal CT 2/4/2018. Report is noted in the electronic  medical record (care everywhere) of contrast enhanced abdominal pelvic CT  performed elsewhere 11/25/2020, images from which are not available at this  time. Reference right upper quadrant ultrasound 5/29/2020. TECHNIQUE: Without administration of intravenous or oral contrast, serial axial  CT images through the abdomen were helically acquired.   Additional coronal and  sagittal reformation images were also performed.    All CT scans at this facility are performed using dose optimization technique as  appropriate to the performed exam, to include automated exposure control,  adjustment of the mA and/or kV according to patient's size (Including  appropriate matching for site-specific examinations), or use of iterative  reconstruction technique.    FINDINGS:    Assessment of the solid and hollow viscera and vascular structures of the  abdomen is limited by lack of contrast.    The visualized portions of lung bases demonstrate bibasilar consolidations in  the lower lobes, right greater than left with air bronchograms in the right  lower lobe consolidation, compatible with multisegmental right lower lobe  atelectasis which could be with or without superimposed airspace disease. There  is bronchiectasis in the lower lungs bilaterally and extensive fibrotic changes  again seen.    Esophagogastric tube tip extends to the level of the mid stomach. The gastric  antrum demonstrates contiguity with the anterior abdominal peritoneum deep to  the left rectus muscle, likely would be amenable to percutaneous gastrostomy  tube placement, interventional radiology consultation might also be helpful in  this regard.    Multiple calcific gallstones in the gallbladder consistent with cholelithiasis.    Multiple splenic calcifications, likely granulomata.    Large heterogeneous in attenuation mass centered laterally in the right hepatic  lobe, contours poorly defined for accurate measurement due to lack of IV  contrast and streak artifacts from the patient's arms down at sides but  approximately 11 cm, further increased in size compared to prior studies,  consistent with malignancy which could be primary or metastatic. Additional  hypoattenuating mass farther inferiorly in the right hepatic lobe centered  medially measures approximately 2 cm. A third hepatic mass described in the left  hepatic lobe on prior  contrast-enhanced CT is somewhat less well defined on the  current study which is limited by lack of IV contrast.    No evidence of hydronephrosis/hydroureter. No evidence of nephrolithiasis or  calculi in the upper abdominal portions of ureters. Assessment of the renal  parenchyma otherwise is limited by lack of IV contrast without large contour  deforming mass identified. Within the limitation of non-contrast technique, the unenhanced pancreas and  adrenal glands appear grossly unremarkable. Visualized portions of bowel are non-dilated without evidence of bowel  obstruction. Arterial calcifications noted in the abdominal aorta and coronary arteries. No  evidence of abdominal aortic aneurysm. No definite evidence of pathologic lymph node enlargement is seen. No definite fluid collection/abscess identified. Pelvic viscera and pelvic portions of abdominal viscera are inferior to the  field of view on abdominal CT. On review of bone windows, no acute fractures or destructive bone lesions are  seen. Degenerative changes and osteopenia noted. Impression Impression:     The gastric antrum demonstrates contiguity with the anterior peritoneum just  deep to the left rectus muscle as above. Known enlarging multiple hepatic masses again seen, assessment limited by lack  of IV contrast, consistent with malignancy, differential considerations could  include multifocal primary malignancy or metastases as noted on prior studies. Right lower lobe consolidation contains air bronchograms consistent with  multisegmental atelectasis which could be with or without superimposed airspace  disease. Otherwise limited noncontrast CT scan of the upper abdomen with other nonacute  findings as above. IMPRESSION:   · Acute on Chronic Hypoxic and Hypercapnic Respiratory Failure - Requiring mechanical ventilation, Extubated on 12/17 and reintubated on 12/22, Likely aspiration event. Trach placed 1/4/21, Dr. Melania Edge   · Wide Complex Arrhythmia (12/25) -noted on bedside cardiac monitor. Lasted roughly 5-10 mins with Spontaneous resolution. E-lyte derangement vs Drug effect (pt was on 55mcg/hr Oliverio-Synephrine at the time) vs cardiac insult.    · Acute on Chronic Systolic Heart Failure - superimposed on severe ILD, Echo 12/10/20 EF 35-40%  · Combination septic/cardiogenic shock, improving   · Aspiration PNA. Sputum Cx(12/22) (+) heavy E.Coli-ESBL. Recent Hx of E.coli ESBL.   · Acute Encephalopathy - likely toxic/metabolic vs infectious vs hepatic in nature, ammonia wnl.   · Severe pulmonary fibrosis - with extensive honeycombing and bronchiectasis as seen on CTA chest 12/16/20 - appears to be a new diagnosis but possibly UIP  · Acute aspiration pneumonia with severe sepsis  · UTI (+)E. coli  · Acute MI with anterolateral STEMI and Q waves - s/p ptca/stent to proximal/mid LAD using ARELY on DAPT on 12/11/20- Remains on Brilinta  · Severe dysphagia - failed MBS.  Will place PEG tube today with IR.   · Critical illness myopathy/polyneuropathy  · Hx of Hep C: + RNA viral load 4/4/2020  · Multiple liver masses - noted on CT scan 11/25/20  · COPD - on home O2 4L NC  · DM  · Dementia - unknown baseline, on aricept      Patient Active Problem List   Diagnosis Code    Diverticula of colon K57.30    Sepsis ([address] A41.9    Sepsis secondary to UTI ([address] A41.9, N39.0    DM hyperosmolarity type II, uncontrolled ([address] E11.00, E11.65    HTN (hypertension) I10    Febrile illness, acute R50.9    Gram-negative bacteremia R78.81    Diabetic neuropathy ([address] E11.40    Osteoarthritis of both knees M17.0    Acidosis E87.2    Respiratory failure ([address] J96.90    Shock ([address] R57.9    Hyperosmolar (nonketotic) coma ([address] E11.01    Acute UTI N39.0    Macrocytic anemia D53.9    STEMI (ST elevation myocardial infarction) (HCC) I21.3    Acute on chronic systolic congestive heart failure (HCC) I50.23    Severe protein-calorie malnutrition ([facility]CC) E43   • Goals of care, counseling/discussion Z71.89   • Dementia without behavioral disturbance (HCC) F03.90   • Pulmonary fibrosis (HCC) J84.10        RECOMMENDATIONS:   Resp:   -Titrate FiO2/ supp O2 for SpO2 >90%  -Aspiration precautions   -S/p trach 1/4/21, Dr. Pino  -Trach Care   -Aggressive pulmonary hygiene   I/D:   -ID consulted for intermittent fevers, appreciate recs  -Aleukocyotosis  -Per ID, discontinued Merrem on 1/2/21 started 12/25 for Ecoli (ESBL)   -Negative for COVID19 on 1/1/2021  Hem/Onc:   -Daily CBC;   -H&H low today. Will transfuse 1 PRBC    -Will transfuse unit of blood to maintain Hgb >7  CVS:   -Appreciate cardiology recs: Peg needed - Plavix held in preparation for insertion of PEG today . ASA has been restarted today and discussed with IR by cardiology.   -EF 35-40 % on 12/10/20, the day before stent placement  -Proximal LAD angioplasty and stenting on 12/11/2020 for 95% stenotic lesion.  -Hemodynamics stable off pressors  -Tolerating statin   -Continue lisinopril  -continue lasix. Changed to PO today.   -BUE and BLE PVL's negative   Metabolic:   -Daily BMP; monitor e-lytes; replace PRN  Renal:    -Trend Renal indices, Purewick   Endocrine:   -Continue lantus BID with SSI  GI:   -SUP  -Tolerating TF   -Bowel regimen: Miralax PRN  -PEG tube insertion today with IR    -CT Abd from 1/3/21: gastric antrum contiguous with anterior peritoneum, enlarging hepatic masses consistent with likely malignancy or mets, RLL consolidation with air bronchograms consistent with atelectasis and possible airspace disease   Musc/Skin:   -No acute issues, wound care  Neuro:   -Off sedation   -Versed and fentanyl gtt for sedation, goal RASS 0 to -1  -Transition to enteral meds; fiona and valium. Doses of fiona and valium increased today.   PPX  -SUP ppx     Discussed in Interdisciplinary Rounds      Best practice :    Glycemic control  IHI ICU bundles:   Pro Bundle Followed  and Vent Bundle Followed, Vent Day 9    [facility] Vent patients- VAP bundle, aim to keep peak plateau pressure 49-54CH H2O  Sress ulcer prophylaxis. DVT prophylaxis. Need for Lines, means assessed. Restraints need. Palliative care evaluation. Sarah Sevilla PA-C  01/07/21  Pulmonary, Critical Care Medicine  [facility]         Late entry for DOS 1/7/21  Attending Note:  I saw and evaluated the patient, performing the key elements of the service. I discussed the findings, assessment and plan with the PA and agree with the PA's findings and plan as documented in the PA's note. All edits and changes made above or are mentioned in my my attending note which was independently assessed as well as written. Total of 30 min critical care time spent at bedside (personally) during the course of care providing evaluation,management and care decisions and ordering appropriate treatment related to critical care problems exclusive of procedures. The reason for providing this level of medical care for this critically ill patient was due a critical illness that impaired one or more vital organ systems such that there was a high probability of imminent or life threatening deterioration in the patients condition. This care involved high complexity decision making to assess, manipulate, and support vital system functions, to treat this degree vital organ system failure and to prevent further life threatening deterioration of the patients condition. This time was independent of other practitioners.     70-year-old female with a past medical history of COPD, CHF, hypertension, dementia, diabetes, hepatitis C, [facility] from [facility] for chest pain found to have acute STEMI.  Patient underwent cardiac intervention on 12/11/2020, however developed worsening hypoxic respiratory failure afterwards, intubated postprocedure and then extubated on 12/17/2020.  Patient then developed worsening dyspnea, tachypnea, was found to be obtunded, reintubated on 12/22/2020.  Patient was then transferred to the ICU.  Patient found unresponsive, likely aspirated, extubated on 12/26.  Unfortunately patient's mental status remains poor, likely toxic/metabolic versus hepatic in nature, so patient was reintubated after RRT on 12/28. Antonia Rene patient's mental status remains poor, patient also has significant deconditioning from critical illness myopathy/polyneuropathy, patient underwent tracheostomy placement Monday.  We consulted interventional radiology for G-tube placement -- pending placement, maybe next week.  Infectious diseases was following the patient for aspiration pneumonia, signed off earlier this week after completion of antibiotics.  Patient has multiple other issues including cardiogenic shock which has resolved, ongoing encephalopathy, has developed pulmonary fibrosis, likely IPF given progression from 2018 to this year, patient also has hepatic mass for which will need follow-up if patient survives. Nae Grounds of note patient does have underlying severe ischemic cardiomyopathy with LVEF of 20%.  I have advised that other providers pursue [facility] futile care policy, however other providers thought it would be appropriate to offer patient tracheostomy and PEG tube placement, cardiology then deemed that patient could come off of antiplatelet therapy and go on to heparin drip temporarily, so these were offered.  G-tube plan for Friday.  After placement of this, we will attempt to place patient to LTAC when possible.  Discussed with Cardiology, restarted pt on ASA and heparin gtt per clearance from IR until procedure.  Also of note, pt unable to tolerate SBT this morning -- placed in SIMV.     Very Very poor overall prognosis given overall functional status in the setting of a very high burden of acute and chronic diseases not limited to new pulmonary fibrosis, ischemic cardiomyopathy with cardiogenic shock, dysphagia and what appears to be dementia      James Mcdermott MD/MPH     Pulmonary, Critical Care Medicine  [facility]

## 2021-01-07 NOTE — ROUTINE PROCESS
Bedside and Verbal shift change report given to Tess Gambino RN (oncoming nurse) by Kaylee Moffett RN (offgoing nurse). Report included the following information SBAR, Kardex, Intake/Output, Recent Results and Med Rec Status. Pt is also pending PEG tube placement today.

## 2021-01-07 NOTE — PROGRESS NOTES
Problem: Pain  Goal: *Control of Pain  Outcome: Progressing Towards Goal  Goal: *PALLIATIVE CARE:  Alleviation of Pain  Outcome: Progressing Towards Goal     Problem: Patient Education: Go to Patient Education Activity  Goal: Patient/Family Education  Outcome: Progressing Towards Goal     Problem: Diabetes Self-Management  Goal: *Disease process and treatment process  Description: Define diabetes and identify own type of diabetes; list 3 options for treating diabetes. Outcome: Progressing Towards Goal  Goal: *Incorporating nutritional management into lifestyle  Description: Describe effect of type, amount and timing of food on blood glucose; list 3 methods for planning meals. Outcome: Progressing Towards Goal  Goal: *Incorporating physical activity into lifestyle  Description: State effect of exercise on blood glucose levels. Outcome: Progressing Towards Goal  Goal: *Developing strategies to promote health/change behavior  Description: Define the ABC's of diabetes; identify appropriate screenings, schedule and personal plan for screenings. Outcome: Progressing Towards Goal  Goal: *Using medications safely  Description: State effect of diabetes medications on diabetes; name diabetes medication taking, action and side effects. Outcome: Progressing Towards Goal  Goal: *Monitoring blood glucose, interpreting and using results  Description: Identify recommended blood glucose targets  and personal targets. Outcome: Progressing Towards Goal  Goal: *Prevention, detection, treatment of acute complications  Description: List symptoms of hyper- and hypoglycemia; describe how to treat low blood sugar and actions for lowering  high blood glucose level.   Outcome: Progressing Towards Goal  Goal: *Developing strategies to address psychosocial issues  Description: Describe feelings about living with diabetes; identify support needed and support network  Outcome: Progressing Towards Goal     Problem: Patient Education: Go to Patient Education Activity  Goal: Patient/Family Education  Outcome: Progressing Towards Goal     Problem: Pressure Injury - Risk of  Goal: *Prevention of pressure injury  Description: Document Pankaj Scale and appropriate interventions in the flowsheet. Outcome: Progressing Towards Goal  Note: Pressure Injury Interventions:  Sensory Interventions: Assess changes in LOC, Float heels, Keep linens dry and wrinkle-free    Moisture Interventions: Absorbent underpads, Apply protective barrier, creams and emollients, Check for incontinence Q2 hours and as needed    Activity Interventions: Assess need for specialty bed, Increase time out of bed, Pressure redistribution bed/mattress(bed type)    Mobility Interventions: Assess need for specialty bed, Float heels, HOB 30 degrees or less, Pressure redistribution bed/mattress (bed type)    Nutrition Interventions: Document food/fluid/supplement intake, Offer support with meals,snacks and hydration    Friction and Shear Interventions: Apply protective barrier, creams and emollients, Feet elevated on foot rest, Foam dressings/transparent film/skin sealants, HOB 30 degrees or less                Problem: Patient Education: Go to Patient Education Activity  Goal: Patient/Family Education  Outcome: Progressing Towards Goal     Problem: Falls - Risk of  Goal: *Absence of Falls  Description: Document Young Fall Risk and appropriate interventions in the flowsheet.   Outcome: Progressing Towards Goal  Note: Fall Risk Interventions:  Mobility Interventions: Bed/chair exit alarm    Mentation Interventions: Adequate sleep, hydration, pain control, Door open when patient unattended, Evaluate medications/consider consulting pharmacy    Medication Interventions: Evaluate medications/consider consulting pharmacy, Patient to call before getting OOB    Elimination Interventions: Bed/chair exit alarm, Patient to call for help with toileting needs, Toileting schedule/hourly rounds    History of Falls Interventions: Door open when patient unattended, Evaluate medications/consider consulting pharmacy, Room close to nurse's station         Problem: Patient Education: Go to Patient Education Activity  Goal: Patient/Family Education  Outcome: Progressing Towards Goal     Problem: Patient Education: Go to Patient Education Activity  Goal: Patient/Family Education  Outcome: Progressing Towards Goal     Problem: Heart Failure: Day 1  Goal: Activity/Safety  Outcome: Progressing Towards Goal  Goal: Consults, if ordered  Outcome: Progressing Towards Goal  Goal: Diagnostic Test/Procedures  Outcome: Progressing Towards Goal  Goal: Nutrition/Diet  Outcome: Progressing Towards Goal  Goal: Discharge Planning  Outcome: Progressing Towards Goal  Goal: Medications  Outcome: Progressing Towards Goal  Goal: Respiratory  Outcome: Progressing Towards Goal  Goal: Treatments/Interventions/Procedures  Outcome: Progressing Towards Goal  Goal: Psychosocial  Outcome: Progressing Towards Goal  Goal: *Oxygen saturation within defined limits  Outcome: Progressing Towards Goal  Goal: *Hemodynamically stable  Outcome: Progressing Towards Goal  Goal: *Optimal pain control at patient's stated goal  Outcome: Progressing Towards Goal  Goal: *Anxiety reduced or absent  Outcome: Progressing Towards Goal     Problem: Heart Failure: Day 2  Goal: Activity/Safety  Outcome: Progressing Towards Goal  Goal: Consults, if ordered  Outcome: Progressing Towards Goal  Goal: Diagnostic Test/Procedures  Outcome: Progressing Towards Goal  Goal: Nutrition/Diet  Outcome: Progressing Towards Goal  Goal: Discharge Planning  Outcome: Progressing Towards Goal  Goal: Medications  Outcome: Progressing Towards Goal  Goal: Respiratory  Outcome: Progressing Towards Goal  Goal: Treatments/Interventions/Procedures  Outcome: Progressing Towards Goal  Goal: Psychosocial  Outcome: Progressing Towards Goal  Goal: *Oxygen saturation within defined limits  Outcome: Progressing Towards Goal  Goal: *Hemodynamically stable  Outcome: Progressing Towards Goal  Goal: *Optimal pain control at patient's stated goal  Outcome: Progressing Towards Goal  Goal: *Anxiety reduced or absent  Outcome: Progressing Towards Goal  Goal: *Demonstrates progressive activity  Outcome: Progressing Towards Goal     Problem: Heart Failure: Day 3  Goal: Activity/Safety  Outcome: Progressing Towards Goal  Goal: Diagnostic Test/Procedures  Outcome: Progressing Towards Goal  Goal: Nutrition/Diet  Outcome: Progressing Towards Goal  Goal: Discharge Planning  Outcome: Progressing Towards Goal  Goal: Medications  Outcome: Progressing Towards Goal  Goal: Respiratory  Outcome: Progressing Towards Goal  Goal: Treatments/Interventions/Procedures  Outcome: Progressing Towards Goal  Goal: Psychosocial  Outcome: Progressing Towards Goal  Goal: *Oxygen saturation within defined limits  Outcome: Progressing Towards Goal  Goal: *Hemodynamically stable  Outcome: Progressing Towards Goal  Goal: *Optimal pain control at patient's stated goal  Outcome: Progressing Towards Goal  Goal: *Anxiety reduced or absent  Outcome: Progressing Towards Goal  Goal: *Demonstrates progressive activity  Outcome: Progressing Towards Goal     Problem: Heart Failure: Day 4  Goal: Activity/Safety  Outcome: Progressing Towards Goal  Goal: Diagnostic Test/Procedures  Outcome: Progressing Towards Goal  Goal: Nutrition/Diet  Outcome: Progressing Towards Goal  Goal: Discharge Planning  Outcome: Progressing Towards Goal  Goal: Medications  Outcome: Progressing Towards Goal  Goal: Respiratory  Outcome: Progressing Towards Goal  Goal: Treatments/Interventions/Procedures  Outcome: Progressing Towards Goal  Goal: Psychosocial  Outcome: Progressing Towards Goal  Goal: *Oxygen saturation within defined limits  Outcome: Progressing Towards Goal  Goal: *Hemodynamically stable  Outcome: Progressing Towards Goal  Goal: *Optimal pain control at patient's stated goal  Outcome: Progressing Towards Goal  Goal: *Anxiety reduced or absent  Outcome: Progressing Towards Goal  Goal: *Demonstrates progressive activity  Outcome: Progressing Towards Goal     Problem: Heart Failure: Day 5  Goal: Activity/Safety  Outcome: Progressing Towards Goal  Goal: Diagnostic Test/Procedures  Outcome: Progressing Towards Goal  Goal: Nutrition/Diet  Outcome: Progressing Towards Goal  Goal: Discharge Planning  Outcome: Progressing Towards Goal  Goal: Medications  Outcome: Progressing Towards Goal  Goal: Respiratory  Outcome: Progressing Towards Goal  Goal: Treatments/Interventions/Procedures  Outcome: Progressing Towards Goal  Goal: Psychosocial  Outcome: Progressing Towards Goal     Problem: Heart Failure: Discharge Outcomes  Goal: *Demonstrates ability to perform prescribed activity without shortness of breath or discomfort  Outcome: Progressing Towards Goal  Goal: *Left ventricular function assessment completed prior to or during stay, or planned for post-discharge  Outcome: Progressing Towards Goal  Goal: *ACEI prescribed if LVEF less than 40% and no contraindications or ARB prescribed  Outcome: Progressing Towards Goal  Goal: *Verbalizes understanding and describes prescribed diet  Outcome: Progressing Towards Goal  Goal: *Verbalizes understanding/describes prescribed medications  Outcome: Progressing Towards Goal  Goal: *Describes available resources and support systems  Description: (eg: Home Health, Palliative Care, Advanced Medical Directive)  Outcome: Progressing Towards Goal  Goal: *Describes smoking cessation resources  Outcome: Progressing Towards Goal  Goal: *Understands and describes signs and symptoms to report to providers(Stroke Metric)  Outcome: Progressing Towards Goal  Goal: *Describes/verbalizes understanding of follow-up/return appt  Description: (eg: to physicians, diabetes treatment coordinator, and other resources  Outcome: Progressing Towards Goal  Goal: *Describes importance of continuing daily weights and changes to report to physician  Outcome: Progressing Towards Goal

## 2021-01-08 ENCOUNTER — HOSPITAL ENCOUNTER (OUTPATIENT)
Dept: INTERVENTIONAL RADIOLOGY/VASCULAR | Age: 66
Discharge: HOME | DRG: 003 | End: 2021-01-08
Attending: PHYSICIAN ASSISTANT | Admitting: INTERNAL MEDICINE
Payer: MEDICARE

## 2021-01-08 VITALS
HEART RATE: 87 BPM | SYSTOLIC BLOOD PRESSURE: 144 MMHG | OXYGEN SATURATION: 100 % | RESPIRATION RATE: 11 BRPM | DIASTOLIC BLOOD PRESSURE: 66 MMHG

## 2021-01-08 LAB
ANION GAP SERPL CALC-SCNC: 2 MMOL/L (ref 3–18)
ANION GAP SERPL CALC-SCNC: 5 MMOL/L (ref 3–18)
BACTERIA SPEC CULT: NORMAL
BASOPHILS # BLD: 0 K/UL (ref 0–0.1)
BASOPHILS NFR BLD: 0 % (ref 0–2)
BUN SERPL-MCNC: 16 MG/DL (ref 7–18)
BUN SERPL-MCNC: 17 MG/DL (ref 7–18)
BUN/CREAT SERPL: 47 (ref 12–20)
BUN/CREAT SERPL: 52 (ref 12–20)
CALCIUM SERPL-MCNC: 9.1 MG/DL (ref 8.5–10.1)
CALCIUM SERPL-MCNC: 9.4 MG/DL (ref 8.5–10.1)
CHLORIDE SERPL-SCNC: 104 MMOL/L (ref 100–111)
CHLORIDE SERPL-SCNC: 105 MMOL/L (ref 100–111)
CO2 SERPL-SCNC: 29 MMOL/L (ref 21–32)
CO2 SERPL-SCNC: 31 MMOL/L (ref 21–32)
CREAT SERPL-MCNC: 0.31 MG/DL (ref 0.6–1.3)
CREAT SERPL-MCNC: 0.36 MG/DL (ref 0.6–1.3)
DIFFERENTIAL METHOD BLD: ABNORMAL
EOSINOPHIL # BLD: 0.2 K/UL (ref 0–0.4)
EOSINOPHIL NFR BLD: 4 % (ref 0–5)
ERYTHROCYTE [DISTWIDTH] IN BLOOD BY AUTOMATED COUNT: 15.2 % (ref 11.6–14.5)
GLUCOSE BLD STRIP.AUTO-MCNC: 121 MG/DL (ref 70–110)
GLUCOSE BLD STRIP.AUTO-MCNC: 175 MG/DL (ref 70–110)
GLUCOSE SERPL-MCNC: 120 MG/DL (ref 74–99)
GLUCOSE SERPL-MCNC: 134 MG/DL (ref 74–99)
HCT VFR BLD AUTO: 25.2 % (ref 35–45)
HGB BLD-MCNC: 7.8 G/DL (ref 12–16)
LYMPHOCYTES # BLD: 0.8 K/UL (ref 0.9–3.6)
LYMPHOCYTES NFR BLD: 13 % (ref 21–52)
MAGNESIUM SERPL-MCNC: 1.9 MG/DL (ref 1.6–2.6)
MAGNESIUM SERPL-MCNC: 2.1 MG/DL (ref 1.6–2.6)
MCH RBC QN AUTO: 27 PG (ref 24–34)
MCHC RBC AUTO-ENTMCNC: 31 G/DL (ref 31–37)
MCV RBC AUTO: 87.2 FL (ref 74–97)
MONOCYTES # BLD: 0.5 K/UL (ref 0.05–1.2)
MONOCYTES NFR BLD: 8 % (ref 3–10)
NEUTS SEG # BLD: 4.7 K/UL (ref 1.8–8)
NEUTS SEG NFR BLD: 75 % (ref 40–73)
PHOSPHATE SERPL-MCNC: 3.6 MG/DL (ref 2.5–4.9)
PLATELET # BLD AUTO: 476 K/UL (ref 135–420)
PMV BLD AUTO: 9.2 FL (ref 9.2–11.8)
POTASSIUM SERPL-SCNC: 3.9 MMOL/L (ref 3.5–5.5)
POTASSIUM SERPL-SCNC: 4.4 MMOL/L (ref 3.5–5.5)
RBC # BLD AUTO: 2.89 M/UL (ref 4.2–5.3)
SERVICE CMNT-IMP: NORMAL
SODIUM SERPL-SCNC: 138 MMOL/L (ref 136–145)
SODIUM SERPL-SCNC: 138 MMOL/L (ref 136–145)
WBC # BLD AUTO: 6.3 K/UL (ref 4.6–13.2)

## 2021-01-08 PROCEDURE — 74011000250 HC RX REV CODE- 250: Performed by: RADIOLOGY

## 2021-01-08 PROCEDURE — 74011250637 HC RX REV CODE- 250/637: Performed by: INTERNAL MEDICINE

## 2021-01-08 PROCEDURE — 2709999900 HC NON-CHARGEABLE SUPPLY

## 2021-01-08 PROCEDURE — 99291 CRITICAL CARE FIRST HOUR: CPT | Performed by: INTERNAL MEDICINE

## 2021-01-08 PROCEDURE — 3E0G76Z INTRODUCTION OF NUTRITIONAL SUBSTANCE INTO UPPER GI, VIA NATURAL OR ARTIFICIAL OPENING: ICD-10-PCS | Performed by: INTERNAL MEDICINE

## 2021-01-08 PROCEDURE — 74011636637 HC RX REV CODE- 636/637: Performed by: INTERNAL MEDICINE

## 2021-01-08 PROCEDURE — 94003 VENT MGMT INPAT SUBQ DAY: CPT

## 2021-01-08 PROCEDURE — 83735 ASSAY OF MAGNESIUM: CPT

## 2021-01-08 PROCEDURE — 77002 NEEDLE LOCALIZATION BY XRAY: CPT

## 2021-01-08 PROCEDURE — 74011000250 HC RX REV CODE- 250: Performed by: PHYSICIAN ASSISTANT

## 2021-01-08 PROCEDURE — 85025 COMPLETE CBC W/AUTO DIFF WBC: CPT

## 2021-01-08 PROCEDURE — 74011250636 HC RX REV CODE- 250/636: Performed by: RADIOLOGY

## 2021-01-08 PROCEDURE — 80048 BASIC METABOLIC PNL TOTAL CA: CPT

## 2021-01-08 PROCEDURE — 74011000636 HC RX REV CODE- 636: Performed by: RADIOLOGY

## 2021-01-08 PROCEDURE — 82962 GLUCOSE BLOOD TEST: CPT

## 2021-01-08 PROCEDURE — 0DH63UZ INSERTION OF FEEDING DEVICE INTO STOMACH, PERCUTANEOUS APPROACH: ICD-10-PCS | Performed by: RADIOLOGY

## 2021-01-08 PROCEDURE — 99232 SBSQ HOSP IP/OBS MODERATE 35: CPT | Performed by: HOSPITALIST

## 2021-01-08 PROCEDURE — 74011000258 HC RX REV CODE- 258: Performed by: INTERNAL MEDICINE

## 2021-01-08 PROCEDURE — 74011250636 HC RX REV CODE- 250/636: Performed by: NURSE PRACTITIONER

## 2021-01-08 PROCEDURE — 84100 ASSAY OF PHOSPHORUS: CPT

## 2021-01-08 PROCEDURE — 94762 N-INVAS EAR/PLS OXIMTRY CONT: CPT

## 2021-01-08 PROCEDURE — 74011250636 HC RX REV CODE- 250/636: Performed by: PHYSICIAN ASSISTANT

## 2021-01-08 PROCEDURE — 65620000000 HC RM CCU GENERAL

## 2021-01-08 PROCEDURE — 77030038269 HC DRN EXT URIN PURWCK BARD -A

## 2021-01-08 RX ORDER — CEFAZOLIN SODIUM 2 G/50ML
2 SOLUTION INTRAVENOUS ONCE
Status: COMPLETED | OUTPATIENT
Start: 2021-01-08 | End: 2021-01-08

## 2021-01-08 RX ORDER — MIDAZOLAM HYDROCHLORIDE 1 MG/ML
.5-2 INJECTION, SOLUTION INTRAMUSCULAR; INTRAVENOUS
Status: DISCONTINUED | OUTPATIENT
Start: 2021-01-08 | End: 2021-01-08 | Stop reason: HOSPADM

## 2021-01-08 RX ORDER — CLOPIDOGREL BISULFATE 75 MG/1
75 TABLET ORAL DAILY
Status: DISCONTINUED | OUTPATIENT
Start: 2021-01-09 | End: 2021-01-14 | Stop reason: HOSPADM

## 2021-01-08 RX ORDER — LIDOCAINE HYDROCHLORIDE 10 MG/ML
30 INJECTION, SOLUTION EPIDURAL; INFILTRATION; INTRACAUDAL; PERINEURAL ONCE
Status: COMPLETED | OUTPATIENT
Start: 2021-01-08 | End: 2021-01-08

## 2021-01-08 RX ORDER — GUAIFENESIN 100 MG/5ML
81 LIQUID (ML) ORAL DAILY
Status: DISCONTINUED | OUTPATIENT
Start: 2021-01-09 | End: 2021-01-14 | Stop reason: HOSPADM

## 2021-01-08 RX ORDER — CARVEDILOL 6.25 MG/1
6.25 TABLET ORAL EVERY 12 HOURS
Status: DISCONTINUED | OUTPATIENT
Start: 2021-01-08 | End: 2021-01-09

## 2021-01-08 RX ORDER — LIDOCAINE HYDROCHLORIDE 10 MG/ML
INJECTION, SOLUTION EPIDURAL; INFILTRATION; INTRACAUDAL; PERINEURAL
Status: DISPENSED
Start: 2021-01-08 | End: 2021-01-08

## 2021-01-08 RX ORDER — FENTANYL CITRATE 50 UG/ML
12.5-5 INJECTION, SOLUTION INTRAMUSCULAR; INTRAVENOUS
Status: DISCONTINUED | OUTPATIENT
Start: 2021-01-08 | End: 2021-01-08

## 2021-01-08 RX ADMIN — OXYCODONE HYDROCHLORIDE 20 MG: 5 SOLUTION ORAL at 14:56

## 2021-01-08 RX ADMIN — MIDAZOLAM 1 MG: 1 INJECTION INTRAMUSCULAR; INTRAVENOUS at 11:20

## 2021-01-08 RX ADMIN — CHLORHEXIDINE GLUCONATE 0.12% ORAL RINSE 10 ML: 1.2 LIQUID ORAL at 21:47

## 2021-01-08 RX ADMIN — INSULIN GLARGINE 20 UNITS: 100 INJECTION, SOLUTION SUBCUTANEOUS at 23:12

## 2021-01-08 RX ADMIN — CHLORHEXIDINE GLUCONATE 0.12% ORAL RINSE 10 ML: 1.2 LIQUID ORAL at 09:13

## 2021-01-08 RX ADMIN — MIDAZOLAM 1 MG: 1 INJECTION INTRAMUSCULAR; INTRAVENOUS at 11:30

## 2021-01-08 RX ADMIN — SODIUM CHLORIDE 10 ML: 9 INJECTION, SOLUTION INTRAMUSCULAR; INTRAVENOUS; SUBCUTANEOUS at 06:05

## 2021-01-08 RX ADMIN — INSULIN LISPRO 3 UNITS: 100 INJECTION, SOLUTION INTRAVENOUS; SUBCUTANEOUS at 02:16

## 2021-01-08 RX ADMIN — GLUCAGON HYDROCHLORIDE 1 MG: KIT at 11:20

## 2021-01-08 RX ADMIN — LIDOCAINE HYDROCHLORIDE 30 ML: 10 INJECTION, SOLUTION EPIDURAL; INFILTRATION; INTRACAUDAL; PERINEURAL at 11:20

## 2021-01-08 RX ADMIN — CEFAZOLIN SODIUM 2 G: 2 SOLUTION INTRAVENOUS at 11:20

## 2021-01-08 RX ADMIN — ATORVASTATIN CALCIUM 40 MG: 40 TABLET, FILM COATED ORAL at 21:48

## 2021-01-08 RX ADMIN — IOHEXOL 50 ML: 300 INJECTION, SOLUTION INTRAVENOUS at 11:32

## 2021-01-08 RX ADMIN — HEPARIN SODIUM AND DEXTROSE 16 UNITS/KG/HR: 10000; 5 INJECTION INTRAVENOUS at 18:08

## 2021-01-08 RX ADMIN — SODIUM CHLORIDE 10 ML: 9 INJECTION, SOLUTION INTRAMUSCULAR; INTRAVENOUS; SUBCUTANEOUS at 21:48

## 2021-01-08 RX ADMIN — SODIUM CHLORIDE 10 ML: 9 INJECTION, SOLUTION INTRAMUSCULAR; INTRAVENOUS; SUBCUTANEOUS at 14:58

## 2021-01-08 RX ADMIN — DEXTROSE MONOHYDRATE AND SODIUM CHLORIDE 25 ML/HR: 5; .45 INJECTION, SOLUTION INTRAVENOUS at 16:58

## 2021-01-08 RX ADMIN — DIAZEPAM 20 MG: 5 TABLET ORAL at 16:21

## 2021-01-08 RX ADMIN — FENTANYL CITRATE 50 MCG: 50 INJECTION, SOLUTION INTRAMUSCULAR; INTRAVENOUS at 11:30

## 2021-01-08 RX ADMIN — DIAZEPAM 20 MG: 5 TABLET ORAL at 21:48

## 2021-01-08 RX ADMIN — OXYCODONE HYDROCHLORIDE 20 MG: 5 SOLUTION ORAL at 06:04

## 2021-01-08 RX ADMIN — OXYCODONE HYDROCHLORIDE 20 MG: 5 SOLUTION ORAL at 21:47

## 2021-01-08 RX ADMIN — FENTANYL CITRATE 50 MCG: 50 INJECTION, SOLUTION INTRAMUSCULAR; INTRAVENOUS at 11:20

## 2021-01-08 NOTE — ROUTINE PROCESS
TRANSFER - OUT REPORT:    Verbal report given to Fede Frances RN on R Benja Velazquez 80  being transferred to CV/ICU for routine progression of care       Report consisted of patients Situation, Background, Assessment and   Recommendations(SBAR). Information from the following report(s) SBAR, Procedure Summary, Intake/Output, MAR and Recent Results was reviewed with the receiving nurse. Lines:   PICC Double Lumen 98/82/29 Right;Basilic (Active)   Central Line Being Utilized Yes 01/08/21 1120   Criteria for Appropriate Use Limited/no vessel suitable for conventional peripheral access 01/08/21 1120   Site Assessment Clean, dry, & intact 01/08/21 1120   Phlebitis Assessment 0 01/08/21 1120   Infiltration Assessment 0 01/08/21 1120   Date of Last Dressing Change 01/01/21 01/06/21 2000   Dressing Status Clean, dry, & intact 01/08/21 1120   Action Taken Open ports on tubing capped 01/06/21 2000   Dressing Type Disk with Chlorhexadine gluconate (CHG); Transparent 01/06/21 2000   Hub Color/Line Status Purple;Patent;Capped 01/06/21 2000   Positive Blood Return (Site #1) Yes 01/06/21 2000   Hub Color/Line Status Red;Patent;Capped 01/06/21 2000   Positive Blood Return (Site #2) Yes 01/06/21 2000   Alcohol Cap Used Yes 01/06/21 2000        Opportunity for questions and clarification was provided. Patient transported with:   Monitor  O2 @ vent liters  Registered Nurse  Tech    Patient transported to unit with RT and ICU primary nurse present. No distress noted. Patient tolerated procedure well.

## 2021-01-08 NOTE — DIABETES MGMT
Glycemic Control Plan of Care    T2DM with A1c of 8.3% (12/10/2020)  12/11: Patient intub and sedated. Noted patient came from [facility] with history of dementia. Home diabetes medications: Unverified  Lantus insulin 22 units daily    Patient was admitted to [facility] from [facility] on 12/10 with report of shortness of breath and altered mental state. Noted the following assessment:  Acute MI with antolateral STEMI  Status post PTCA/stent on 12/11  Acute respiratory failure, vent support  Acute flash pulmonary edema  Acute systolic heart failure exacerbation  Severe pulmonary fibrosis  Septic shock  Multiple liver masses  Poor prognosis    Noted per palliative care team: full code with full interventions    01/08: Patient seen in CVT ICU. Status post gastrostomy today. Plan to initiate TF via PEG 24 hrs post insertion. Noted DCP: SNF at [facility]     Glycemic recommendation(s): basal lantus insulin daily at bedtime and sliding scale lispro as ordered. Glycemic assessment:    NPO on TF Glucerna 1.5 at goal rate of 50 ml/hr  IV Solucortef 25 mg every 12 hours. POC BG 12/27: 320, 229, 199. Noted Lantus at bedtime not given. POC BG 12/28 at time of review: 261, 300  Lab FBG 12/28: 178        Current A1C: 8.3% (12/10/2020) which is equivalent to estimated average blood glucose of 192 mg/dL during the past 2-3 months    Current hospital diabetes medications:  Basal lantus insulin 20 units daily at bedtime  Correctional lispro insulin every 6 hours. Very resistant dose    Total daily dose insulin requirement previous day: 01/07:  Lantus: None  Lispro: 3 units  TDD insulin: 3 units    Diet: NPO. Status post PEG TF placement and plan to start feeding after 24 hours.  Patient is currently on IVF D5 1/2 cont infusion at 25 ml/hr    Goals:  Blood glucose will be within target range of  mg/dL by 01/11/2021    Education:   ___  Refer to Diabetes Education Record   _X__  Education not indicated at this time    Tootie Marinelli RN [facility]  Pager: [phone]

## 2021-01-08 NOTE — PROGRESS NOTES
Start heparin drip at last therapeutic dose 6 hours post PEG insertion. OK to administer medication and water through NGT, utilize PEG 24 hours post insertion.  DAPT therapy OK to resume tomorrow per Enrique Bloom MD.

## 2021-01-08 NOTE — PROGRESS NOTES
Discharge planning    LTSS successfully processed. . Form ID # :[id number]. Faxed a copy to [facility] to Ms. Harley Lowe. Discharge plan is for [facility] placement.  will continue to assist with transitional needs.      ANTHONY Hollins, RN  Pager # [phone]  Care Manager

## 2021-01-08 NOTE — PROGRESS NOTES
1155- Pt transferred in from interventional radiology s/p PEG tube insertion. Pt placed on monitor. PEG tube clamped, site clean, dry and intact. 1800- Per IR restart heparin 6 hours after procedure. Heparin started. 1900- Report given to ESTEBAN Gill.

## 2021-01-08 NOTE — ROUTINE PROCESS
Bedside and Verbal shift change report given to Ronny Atkins RN (oncoming nurse) by Gabino Melgar RN (offgoing nurse). Report included the following information SBAR, Intake/Output, Recent Results and Med Rec Status. Heparin gtt & tube feeds held since midnight for PEG placement in IR today.

## 2021-01-08 NOTE — ROUTINE PROCESS
TRANSFER - OUT REPORT: 
 
Verbal report given to Vesta Mulligan RN on Aurelia Santos Patient  being transferred to CVT ICU. Report consisted of patients Situation, Background, Assessment and  
Recommendations(SBAR). Opportunity for questions and clarification was provided. Patient transported with: 
Chart contents Ring in biohazard bag taped to the back of face sheet Monitor Registered Nurse Respiratory therapist 
Transport ventilator & O2 
 
 
 
 
SITUATION:  
? Code Status: Full Code 
? Reason for Admission: STEMI (ST elevation myocardial infarction) ([facility] [I21.3] ? Hospital day: 34 
? Problem List:  
   
Hospital Problems  Date Reviewed: 2/7/2018 Codes Class Noted POA Goals of care, counseling/discussion ICD-10-CM: Z71.89 ICD-9-CM: V65.49  Unknown Unknown Dementia without behavioral disturbance (HCC) ICD-10-CM: F03.90 ICD-9-CM: 294.20  Unknown Unknown Pulmonary fibrosis ([facility] ICD-10-CM: J84.10 ICD-9-CM: 279  Unknown Unknown Severe protein-calorie malnutrition ([facility] ICD-10-CM: H35 ICD-9-CM: 310  12/16/2020 Clinically Undetermined Acute on chronic systolic congestive heart failure (HCC) ICD-10-CM: W81.51 ICD-9-CM: 428.23, 428.0  12/15/2020 Unknown * (Principal) STEMI (ST elevation myocardial infarction) ([facility] ICD-10-CM: I21.3 ICD-9-CM: 410.90  12/10/2020 Unknown BACKGROUND:  
 Past Medical History:  
Past Medical History:  
Diagnosis Date  Arthritis  Asthma  Chronic pain BACK PAIN  
 Diabetes ([facility]  Diabetic neuropathy ([facility]  Emphysema  Hepatitis C   
 Hypertension  Nervousness  Osteoarthritis of both knees Patient taking anticoagulants: heparin drip on hold for IR procedure today (PEG placement) ASSESSMENT:  
 
? Patient has Central Line: RUE PICC ? Patient has Pro Cath: no   
 
? Last Vitals: 
  
Vitals:  
 01/08/21 0924 01/08/21 1000 BP:  (!) 104/57 Pulse: 79 81    
 Resp: 10 10    
Temp:      
SpO2: 100% 100%    
Weight:      
Height:      
 
 
? IV and DRAINS (will only show if present) 
 
 
Airway - Tracheostomy Tube 01/04/21 Portex-Site Assessment: Clean, dry, & intact 
Nasogastric Tube 01/04/21-Site Assessment: Clean, dry, & intact 
External Female Catheter 01/05/21-Site Assessment: Clean, dry, & intact 
 
PICC Double Lumen 12/31/20 Right;Basilic-Site Assessment: Clean, dry, & intact 
 
Central Line Being Utilized Yes  
Criteria for Appropriate Use Limited/no vessel suitable for conventional peripheral access  
Site Assessment Clean, dry, & intact  
Phlebitis Assessment 0  
Infiltration Assessment 0  
Date of Last Dressing Change 01/01/21  
Dressing Status Clean, dry, & intact  
Action Taken Open ports on tubing capped  
Dressing Type Disk with Chlorhexadine gluconate (CHG)  
Hub Color/Line Status Purple  
Positive Blood Return (Site #1) Yes  
Hub Color/Line Status Red  
Positive Blood Return (Site #2) Yes  
Alcohol Cap Used Yes  
  
 
 
 
? LAB RESULTS 
  
Recent Labs  
  01/08/21 
0450 01/07/21 
0306 01/06/21 
0339  
WBC 6.3 8.3 7.9  
HGB 7.8* 8.0* 6.9*  
HCT 25.2* 25.7* 22.7*  
* 517* 538*  
 
  
Recent Labs  
  01/08/21 
0450 01/07/21 
1104 01/07/21 
0306 01/06/21 
0339  
  --  140 139  
K 4.4 4.1 3.8 3.6  
*  --  95 101*  
BUN 17  --  15 20*  
CREA 0.36*  --  0.37* 0.37*  
CA 9.1  --  9.4 8.7  
MG 2.1  --  2.1 2.1

## 2021-01-08 NOTE — PROGRESS NOTES
Preprocedure Assessment      Today 1/8/2021     Indication/Symptoms:   Aurelia Kelley is a 72 y.o. female with dysphagia and tracheostomy here for a  Gastrostomy placement    The H & P and/or progress notes and any available imaging were reviewed. The risks, indications and possible alternatives to the procedure, including doing nothing, were discussed and informed consent was obtained. Physical Exam:      Heart:   RRR   Lungs:   Normal work of breathing via tracheostomy on ventilation    The patient is an appropriate candidate to undergo the planned procedure and sedation.     Naveed Galdamez, PA

## 2021-01-08 NOTE — PROGRESS NOTES
[employer] [facility]. Pulmonary, Critical Care, and Sleep Medicine    Name: Raghav Cardoso MRN: [id number]   : 1955 Hospital: [address] [place] [address] Dr   Date: 2021  Admission Date: 12/10/2020     Chart and notes reviewed. Data reviewed. I have evaluated all findings. [x]I have reviewed the flowsheet and previous days notes. []The patient is unable to give any meaningful history or review of systems because the patient is:  []Intubated []Sedated   []Unresponsive      [x]The patient is critically ill on      [x]Mechanical ventilation []Pressors   []BiPAP []         Interval HPI:Patient is a 72 y. o. female with a past medical history of COPD, CHF, HTN, DM, dementia, presented to [facility] with acute on chronic systolic heart failure, acute MI with anterolateral STEMI and Q waves s/p ptca/stent to proximal/mid LAD on 20. Pt was intubated and extubated on 20, transferred to . On 20 patient was intubated and transferred to ICU for respiratory failure and cardiogenic +/- septic shock. Respiratory cultures on  with ESBL on Merrem. Dr. Steffi Sandhoff and IR have been consulted for trach/PEG. She is S/p trach 21 and planning for PEG placement by IR on 21. Subjective 21  Hospital Day:29  Vent Day: 18  Overnight events: No significant events noted overnight  Mentation/Activity: opens eyes spontaneously, withdraws from pain  Respiratory/ Secretions: stable  Hemodynamics: stable anemia, stable off pressors currently, holding anti-hypertensives for low BP overnight  Need for procedures: PEG              ROS:Review of systems not obtained due to patient factors. Events and notes from last 24 hours reviewed. Care plan discussed on multidisciplinary rounds.   Patient Active Problem List   Diagnosis Code    Diverticula of colon K57.30    Sepsis ([facility] A41.9    Sepsis secondary to UTI ([facility] A41.9, N39.0    DM hyperosmolarity type II, uncontrolled ([facility] E11.00, E11.65  HTN (hypertension) I10    Febrile illness, acute R50.9    Gram-negative bacteremia R78.81    Diabetic neuropathy ([facility] E11.40    Osteoarthritis of both knees M17.0    Acidosis E87.2    Respiratory failure ([facility]) J96.90    Shock ([facility] R57.9    Hyperosmolar (nonketotic) coma ([facility]) E11.01    Acute UTI N39.0    Macrocytic anemia D53.9    STEMI (ST elevation myocardial infarction) ([facility]) I21.3    Acute on chronic systolic congestive heart failure ([facility]) I50.23    Severe protein-calorie malnutrition ([facility]) E43    Goals of care, counseling/discussion Z71.89    Dementia without behavioral disturbance ([facility]) F03.90    Pulmonary fibrosis ([facility]) J84.10       Vital Signs:  Visit Vitals  /61   Pulse 82   Temp 99 °F (37.2 °C)   Resp 10   Ht 5' 7\" (1.702 m)   Wt 72.7 kg (160 lb 4.4 oz)   SpO2 100%   Breastfeeding No   BMI 25.10 kg/m²       O2 Device: Tracheostomy, Ventilator   O2 Flow Rate (L/min): 40 l/min   Temp (24hrs), Av.3 °F (36.8 °C), Min:97.5 °F (36.4 °C), Max:99 °F (37.2 °C)       Intake/Output:   Last shift:      No intake/output data recorded.   Last 3 shifts:  1901 -  0700  In: 562.7 [I.V.:58.5]  Out: 500 [Urine:500]    Intake/Output Summary (Last 24 hours) at 2021 1043  Last data filed at 2021 0605  Gross per 24 hour   Intake 58.5 ml   Output 400 ml   Net -341.5 ml        Ventilator Settings:  Ventilator Mode: SIMV  Respiratory Rate  Resp Rate Observed: 30  Back-Up Rate: 10  Insp Time (sec): 0.9 sec  I:E Ratio: 1:5.7  Ventilator Volumes  Vt Set (ml): 500 ml  Vt Exhaled (Machine Breath) (ml): 479 ml  Vt Spont (ml): 508 ml  Ve Observed (l/min): 4.6 l/min  Ventilator Pressures  Pressure Support (cm H2O): 8 cm H2O  PIP Observed (cm H2O): 26 cm H2O  Plateau Pressure (cm H2O): 20 cm H2O  MAP (cm H2O): 8.2  PEEP/VENT (cm H2O): 5 cm H20  Auto PEEP Observed (cm H2O): 0 cm H2O    Current Facility-Administered Medications   Medication Dose Route Frequency    dextrose 5 % - 0.45% NaCl infusion  25 mL/hr IntraVENous CONTINUOUS    insulin glargine (LANTUS) injection 20 Units  20 Units SubCUTAneous QHS    diazePAM (VALIUM) tablet 20 mg  20 mg Oral TID    oxyCODONE (ROXICODONE) 5 mg/5 mL oral solution 20 mg  20 mg Oral Q8H    naloxegoL (MOVANTIK) tablet 25 mg  25 mg Oral ACB    docusate (COLACE) 50 mg/5 mL oral liquid 100 mg  100 mg Oral DAILY    furosemide (LASIX) tablet 20 mg  20 mg Oral DAILY    lansoprazole (PREVACID) 3 mg/mL oral suspension 30 mg  30 mg Per G Tube ACB    heparin 25,000 units in D5W 250 ml infusion  12-25 Units/kg/hr IntraVENous TITRATE    [Held by provider] clopidogreL (PLAVIX) tablet 75 mg  75 mg Oral DAILY    chlorhexidine (PERIDEX) 0.12 % mouthwash 10 mL  10 mL Oral Q12H    [Held by provider] carvediloL (COREG) tablet 6.25 mg  6.25 mg Oral Q12H    [Held by provider] lisinopriL (PRINIVIL, ZESTRIL) tablet 5 mg  5 mg Oral DAILY    [Held by provider] spironolactone (ALDACTONE) tablet 25 mg  25 mg Oral DAILY    multivit-folic acid-herbal 887 (WELLESSE PLUS) oral liquid 30 mL  30 mL Per NG tube DAILY    insulin lispro (HUMALOG) injection   SubCUTAneous Q6H    sodium chloride (NS) flush 5-40 mL  5-40 mL IntraVENous Q8H    aspirin chewable tablet 81 mg  81 mg Oral DAILY    atorvastatin (LIPITOR) tablet 40 mg  40 mg Oral QHS         Telemetry: [x]Sinus []A-flutter []Paced    []A-fib []Multiple PVCs                  Physical Exam:      General: Intubated/sedated; NAD, acyanotic   HEENT:  Anicteric sclerae; pink palpebral conjunctivae; mucosa moist, ETT   Resp: (+) coarse rhonchi b/l. Symmetrical chest expansion; good airway entry;   CV:  S1, S2 present; regular rate and rhythm   GI:  Abdomen soft, non-tender; (+) active bowel sounds  Extremities:  +2 pulses on all extremities  Skin:  Warm; no rashes/ lesions noted, normal turgor/cap refill.    Neurologic:   Opening eyes spontaneously, No command following, withdraws from pain  Magali Donohue, PIC RUE      DATA:  MAR reviewed and pertinent medications noted or modified as needed    Labs:  Recent Labs     01/08/21  0450 01/07/21  0306 01/06/21  0339   WBC 6.3 8.3 7.9   HGB 7.8* 8.0* 6.9*   HCT 25.2* 25.7* 22.7*   * 517* 538*     Recent Labs     01/08/21  0450 01/07/21  1104 01/07/21  0306 01/06/21  0339 01/06/21  0339     --  140  --  139   K 4.4 4.1 3.8   < > 3.6     --  105  --  105   CO2 31  --  30  --  31   *  --  95  --  101*   BUN 17  --  15  --  20*   CREA 0.36*  --  0.37*  --  0.37*   CA 9.1  --  9.4  --  8.7   MG 2.1  --  2.1  --  2.1   PHOS 3.6  --  4.1  --  3.6    < > = values in this interval not displayed. No results for input(s): PH, PCO2, PO2, HCO3, FIO2 in the last 72 hours. No results for input(s): FIO2I, IFO2, HCO3I, IHCO3, HCOPOC, PCO2I, PCOPOC, IPHI, PHI, PHPOC, PO2I, PO2POC in the last 72 hours. No lab exists for component: IPOC2    Imaging:  [x]   I have personally reviewed the patients radiographs and reports  XR Results (most recent):  Results from Hospital Encounter encounter on 12/10/20   XR ABD (KUB)    Narrative Abdomen/KUB    CPT CODE: 99344    HISTORY: Tube placement. COMPARISON: 1/3/2021. FINDINGS:    Enteric tube tip at the mid body of the stomach. Stomach is nondilated. No  dilated loops of small bowel or colon. Wires overlie the patient. Left basilar  infiltrate. .      Impression IMPRESSION:    Enteric tube tip at the mid body of the stomach. .       CT Results (most recent):  Results from Hospital Encounter encounter on 12/10/20   CT ABD WO CONT    Narrative CT ABDOMEN, NONCONTRAST    CPT CODE: 92144    INDICATION: Pretreatment planning for possible gastrostomy tube. COMPARISON: The partially visualized portion of the upper abdomen on pulmonary  CTA 12/16/2020. Prior abdominal CT 2/4/2018.  Report is noted in the electronic  medical record (care everywhere) of contrast enhanced abdominal pelvic CT  performed elsewhere 11/25/2020, images from which are not available at this  time. Reference right upper quadrant ultrasound 5/29/2020. TECHNIQUE: Without administration of intravenous or oral contrast, serial axial  CT images through the abdomen were helically acquired. Additional coronal and  sagittal reformation images were also performed. All CT scans at this facility are performed using dose optimization technique as  appropriate to the performed exam, to include automated exposure control,  adjustment of the mA and/or kV according to patient's size (Including  appropriate matching for site-specific examinations), or use of iterative  reconstruction technique. FINDINGS:    Assessment of the solid and hollow viscera and vascular structures of the  abdomen is limited by lack of contrast.    The visualized portions of lung bases demonstrate bibasilar consolidations in  the lower lobes, right greater than left with air bronchograms in the right  lower lobe consolidation, compatible with multisegmental right lower lobe  atelectasis which could be with or without superimposed airspace disease. There  is bronchiectasis in the lower lungs bilaterally and extensive fibrotic changes  again seen. Esophagogastric tube tip extends to the level of the mid stomach. The gastric  antrum demonstrates contiguity with the anterior abdominal peritoneum deep to  the left rectus muscle, likely would be amenable to percutaneous gastrostomy  tube placement, interventional radiology consultation might also be helpful in  this regard. Multiple calcific gallstones in the gallbladder consistent with cholelithiasis. Multiple splenic calcifications, likely granulomata.     Large heterogeneous in attenuation mass centered laterally in the right hepatic  lobe, contours poorly defined for accurate measurement due to lack of IV  contrast and streak artifacts from the patient's arms down at sides but  approximately 11 cm, further increased in size compared to prior studies,  consistent with malignancy which could be primary or metastatic. Additional  hypoattenuating mass farther inferiorly in the right hepatic lobe centered  medially measures approximately 2 cm. A third hepatic mass described in the left  hepatic lobe on prior contrast-enhanced CT is somewhat less well defined on the  current study which is limited by lack of IV contrast.    No evidence of hydronephrosis/hydroureter. No evidence of nephrolithiasis or  calculi in the upper abdominal portions of ureters. Assessment of the renal  parenchyma otherwise is limited by lack of IV contrast without large contour  deforming mass identified. Within the limitation of non-contrast technique, the unenhanced pancreas and  adrenal glands appear grossly unremarkable. Visualized portions of bowel are non-dilated without evidence of bowel  obstruction. Arterial calcifications noted in the abdominal aorta and coronary arteries. No  evidence of abdominal aortic aneurysm. No definite evidence of pathologic lymph node enlargement is seen. No definite fluid collection/abscess identified. Pelvic viscera and pelvic portions of abdominal viscera are inferior to the  field of view on abdominal CT. On review of bone windows, no acute fractures or destructive bone lesions are  seen. Degenerative changes and osteopenia noted. Impression Impression:     The gastric antrum demonstrates contiguity with the anterior peritoneum just  deep to the left rectus muscle as above. Known enlarging multiple hepatic masses again seen, assessment limited by lack  of IV contrast, consistent with malignancy, differential considerations could  include multifocal primary malignancy or metastases as noted on prior studies. Right lower lobe consolidation contains air bronchograms consistent with  multisegmental atelectasis which could be with or without superimposed airspace  disease.      Otherwise limited noncontrast CT scan of the upper abdomen with other nonacute  findings as above. IMPRESSION:   · Acute on Chronic Hypoxic and Hypercapnic Respiratory Failure - Requiring mechanical ventilation, Extubated on 12/17 and reintubated on 12/22, Likely aspiration event. Trach placed 1/4/21, Dr. Cheri Hudson   · Wide Complex Arrhythmia (12/25) -noted on bedside cardiac monitor. Lasted roughly 5-10 mins with Spontaneous resolution. E-lyte derangement vs Drug effect (pt was on 55mcg/hr Oliverio-Synephrine at the time) vs cardiac insult.    · Acute on Chronic Systolic Heart Failure - superimposed on severe ILD, Echo 12/10/20 EF 35-40%  · Combination septic/cardiogenic shock, improving   · Aspiration PNA. Sputum Cx(12/22) (+) heavy E.Coli-ESBL. Recent Hx of E.coli ESBL.   · Acute Encephalopathy - likely toxic/metabolic vs infectious vs hepatic in nature, ammonia wnl.   · Severe pulmonary fibrosis - with extensive honeycombing and bronchiectasis as seen on CTA chest 12/16/20 - appears to be a new diagnosis but possibly UIP  · Acute aspiration pneumonia with severe sepsis  · UTI (+)E. coli  · Acute MI with anterolateral STEMI and Q waves - s/p ptca/stent to proximal/mid LAD using ARELY on DAPT on 12/11/20- Remains on Brilinta  · Severe dysphagia - failed MBS.  Will place PEG tube today with IR.   · Critical illness myopathy/polyneuropathy  · Hx of Hep C: + RNA viral load 4/4/2020  · Multiple liver masses - noted on CT scan 11/25/20  · COPD - on home O2 4L NC  · DM  · Dementia - unknown baseline, on aricept      Patient Active Problem List   Diagnosis Code    Diverticula of colon K57.30    Sepsis ([address] A41.9    Sepsis secondary to UTI ([address] A41.9, N39.0    DM hyperosmolarity type II, uncontrolled ([address] E11.00, E11.65    HTN (hypertension) I10    Febrile illness, acute R50.9    Gram-negative bacteremia R78.81    Diabetic neuropathy ([address] E11.40    Osteoarthritis of both knees M17.0    Acidosis E87.2    Respiratory failure ([facility] J96.90    Shock ([facility] [address] R57.9    Hyperosmolar (nonketotic) coma ([facility]) E11.01    Acute UTI N39.0    Macrocytic anemia D53.9    STEMI (ST elevation myocardial infarction) ([facility]) I21.3    Acute on chronic systolic congestive heart failure ([facility]) I50.23    Severe protein-calorie malnutrition ([facility]) E43    Goals of care, counseling/discussion Z71.89    Dementia without behavioral disturbance ([facility]) F03.90    Pulmonary fibrosis ([facility]) J84.10        RECOMMENDATIONS:   Resp:   -Titrate FiO2/ supp O2 for SpO2 >90%  -Aspiration precautions   -S/p trach 1/4/21, Dr. Ela Gardiner  -[facility]   -Aggressive pulmonary hygiene   I/D:   -ID consulted for intermittent fevers, appreciate recs  -Aleukocyotosis  -Per ID, discontinued Merrem on 1/2/21 started 12/25 for Ecoli (ESBL)   -Negative for COVID19 on 1/1/2021  Hem/Onc:   -Daily CBC;   -H&H low today.  Will transfuse 1 PRBC    -Will transfuse unit of blood to maintain Hgb >7  CVS:   -Appreciate cardiology recs: Peg needed - Heparin held at midnight for procedure  -EF 35-40 % on 12/10/20, the day before stent placement  -Proximal LAD angioplasty and stenting on 12/11/2020 for 95% stenotic lesion.  -Hemodynamics stable off pressors, holding anti-hypertensives for soft BP's  -Tolerating statin   -Continue lisinopril  -continue PO Lasix  -BUE and BLE PVL's negative   Metabolic:   -Daily BMP; monitor e-lytes; replace PRN  Renal:    -Trend Renal indices, Purewick   Endocrine:   -Continue lantus BID with SSI  GI:   -SUP  -Tolerating TF   -Bowel regimen: Miralax PRN  -Planning for PEG today with IR  -CT Abd from 1/3/21: gastric antrum contiguous with anterior peritoneum, enlarging hepatic masses consistent with likely malignancy or mets, RLL consolidation with air bronchograms consistent with atelectasis and possible airspace disease   Musc/Skin:   -No acute issues, wound care  Neuro:   -Off sedation   -Versed and fentanyl gtt for sedation, goal RASS 0 to -1  -PO Laura and Valium  PPX  -SUP ppx   Discussed in Interdisciplinary Rounds    [facility] :    Glycemic control  IHI ICU bundles: Means Bundle Followed and Vent Bundle Followed, Vent Day 9    [facility] Vent patients- VAP bundle, aim to keep peak plateau pressure 34-34JB H2O  Sress ulcer prophylaxis. DVT prophylaxis. Need for Lines, means assessed. Restraints need. Palliative care evaluation. Eulogio Kwon MD  01/08/21  Pulmonary, Critical Care Medicine  [facility]           Late entry for DOS 1/8/21  I saw and evaluated the patient, performing the key elements of the service. I discussed the findings, assessment and plan with the resident and agree with the resident's findings and plan as documented in the resident's note. Total of 30 min critical care time spent at bedside (personally) during the course of care providing evaluation,management and care decisions and ordering appropriate treatment related to critical care problems exclusive of procedures. The reason for providing this level of medical care for this critically ill patient was due a critical illness that impaired one or more vital organ systems such that there was a high probability of imminent or life threatening deterioration in the patients condition. This care involved high complexity decision making to assess, manipulate, and support vital system functions, to treat this degree vital organ system failure and to prevent further life threatening deterioration of the patients condition. This time was independent of other practitioners.     70-year-old female with a past medical history of COPD, CHF, hypertension, dementia, diabetes, hepatitis C, presented to [facility] as a transfer from [facility] for chest pain found to have acute STEMI.  Patient underwent cardiac intervention on 12/11/2020, however developed worsening hypoxic respiratory failure afterwards, intubated postprocedure and then extubated on 12/17/2020.  Patient then developed worsening dyspnea, tachypnea, was found to be obtunded, reintubated on 12/22/2020.  Patient was then transferred to the ICU.  Patient found unresponsive, likely aspirated, extubated on 12/26.  Unfortunately patient's mental status remains poor, likely toxic/metabolic versus hepatic in nature, so patient was reintubated after RRT on 12/28. [facility] patient's mental status remains poor, patient also has significant deconditioning from critical illness myopathy/polyneuropathy, patient underwent tracheostomy placement Monday.  We consulted interventional radiology for G-tube placement -- pending placement, maybe next week.  Infectious diseases was following the patient for aspiration pneumonia, signed off earlier this week after completion of antibiotics.  Patient has multiple other issues including cardiogenic shock which has resolved, ongoing encephalopathy, has developed pulmonary fibrosis, likely IPF given progression from 2018 to this year, patient also has hepatic mass for which will need follow-up if patient survives. Ltanya Mems of note patient does have underlying severe ischemic cardiomyopathy with LVEF of 20%.  I have advised that other providers pursue [facility] futile care policy, however other providers thought it would be appropriate to offer patient tracheostomy and PEG tube placement, cardiology then deemed that patient could come off of antiplatelet therapy and go on to heparin drip temporarily, so these were offered.  G-tube by IR was done today, tolerated well. Will restart heparin gtt.   Now we will attempt to place patient to LTAC when possible.  Also of note, pt unable to tolerate SBT this morning -- placed in SIMV.     Very Very poor overall prognosis given overall functional status in the setting of a very high burden of acute and chronic diseases not limited to new pulmonary fibrosis, ischemic cardiomyopathy, dysphagia and chronic encephalopathy      Jerry Osorio MD/MPH     Pulmonary, Critical Care Medicine  [facility]

## 2021-01-09 LAB
ANION GAP SERPL CALC-SCNC: 5 MMOL/L (ref 3–18)
APTT PPP: 152.6 SEC (ref 23–36.4)
APTT PPP: 41 SEC (ref 23–36.4)
APTT PPP: 94.5 SEC (ref 23–36.4)
ARTERIAL PATENCY WRIST A: YES
BASE EXCESS BLD CALC-SCNC: 4 MMOL/L
BASOPHILS # BLD: 0 K/UL (ref 0–0.1)
BASOPHILS NFR BLD: 0 % (ref 0–2)
BDY SITE: ABNORMAL
BUN SERPL-MCNC: 11 MG/DL (ref 7–18)
BUN/CREAT SERPL: 37 (ref 12–20)
CALCIUM SERPL-MCNC: 9.4 MG/DL (ref 8.5–10.1)
CHLORIDE SERPL-SCNC: 103 MMOL/L (ref 100–111)
CO2 SERPL-SCNC: 29 MMOL/L (ref 21–32)
CREAT SERPL-MCNC: 0.3 MG/DL (ref 0.6–1.3)
DIFFERENTIAL METHOD BLD: ABNORMAL
EOSINOPHIL # BLD: 0.1 K/UL (ref 0–0.4)
EOSINOPHIL NFR BLD: 1 % (ref 0–5)
ERYTHROCYTE [DISTWIDTH] IN BLOOD BY AUTOMATED COUNT: 15.2 % (ref 11.6–14.5)
GAS FLOW.O2 O2 DELIVERY SYS: ABNORMAL L/MIN
GAS FLOW.O2 SETTING OXYMISER: 10 BPM
GLUCOSE BLD STRIP.AUTO-MCNC: 148 MG/DL (ref 70–110)
GLUCOSE BLD STRIP.AUTO-MCNC: 161 MG/DL (ref 70–110)
GLUCOSE BLD STRIP.AUTO-MCNC: 181 MG/DL (ref 70–110)
GLUCOSE BLD STRIP.AUTO-MCNC: 184 MG/DL (ref 70–110)
GLUCOSE SERPL-MCNC: 122 MG/DL (ref 74–99)
HCO3 BLD-SCNC: 27.7 MMOL/L (ref 22–26)
HCT VFR BLD AUTO: 26.9 % (ref 35–45)
HGB BLD-MCNC: 8.3 G/DL (ref 12–16)
INSPIRATION.DURATION SETTING TIME VENT: 0.9 SEC
LYMPHOCYTES # BLD: 0.8 K/UL (ref 0.9–3.6)
LYMPHOCYTES NFR BLD: 12 % (ref 21–52)
MAGNESIUM SERPL-MCNC: 1.8 MG/DL (ref 1.6–2.6)
MCH RBC QN AUTO: 26.3 PG (ref 24–34)
MCHC RBC AUTO-ENTMCNC: 30.9 G/DL (ref 31–37)
MCV RBC AUTO: 85.4 FL (ref 74–97)
MONOCYTES # BLD: 0.5 K/UL (ref 0.05–1.2)
MONOCYTES NFR BLD: 8 % (ref 3–10)
NEUTS SEG # BLD: 5.2 K/UL (ref 1.8–8)
NEUTS SEG NFR BLD: 79 % (ref 40–73)
O2/TOTAL GAS SETTING VFR VENT: 28 %
PCO2 BLD: 39.2 MMHG (ref 35–45)
PEEP RESPIRATORY: 5 CMH2O
PH BLD: 7.46 [PH] (ref 7.35–7.45)
PHOSPHATE SERPL-MCNC: 4.3 MG/DL (ref 2.5–4.9)
PLATELET # BLD AUTO: 508 K/UL (ref 135–420)
PMV BLD AUTO: 9.2 FL (ref 9.2–11.8)
PO2 BLD: 92 MMHG (ref 80–100)
POTASSIUM SERPL-SCNC: 4.1 MMOL/L (ref 3.5–5.5)
PRESSURE SUPPORT SETTING VENT: 8 CMH2O
RBC # BLD AUTO: 3.15 M/UL (ref 4.2–5.3)
SAO2 % BLD: 98 % (ref 92–97)
SERVICE CMNT-IMP: ABNORMAL
SODIUM SERPL-SCNC: 137 MMOL/L (ref 136–145)
SPECIMEN TYPE: ABNORMAL
TOTAL RESP. RATE, ITRR: 24
VENTILATION MODE VENT: ABNORMAL
VOLUME CONTROL PLUS IVLCP: YES
VT SETTING VENT: 500 ML
WBC # BLD AUTO: 6.6 K/UL (ref 4.6–13.2)

## 2021-01-09 PROCEDURE — 36600 WITHDRAWAL OF ARTERIAL BLOOD: CPT

## 2021-01-09 PROCEDURE — 99291 CRITICAL CARE FIRST HOUR: CPT | Performed by: INTERNAL MEDICINE

## 2021-01-09 PROCEDURE — 85025 COMPLETE CBC W/AUTO DIFF WBC: CPT

## 2021-01-09 PROCEDURE — 84100 ASSAY OF PHOSPHORUS: CPT

## 2021-01-09 PROCEDURE — 74011250636 HC RX REV CODE- 250/636: Performed by: HOSPITALIST

## 2021-01-09 PROCEDURE — 74011000250 HC RX REV CODE- 250: Performed by: PHYSICIAN ASSISTANT

## 2021-01-09 PROCEDURE — 85730 THROMBOPLASTIN TIME PARTIAL: CPT

## 2021-01-09 PROCEDURE — 82803 BLOOD GASES ANY COMBINATION: CPT

## 2021-01-09 PROCEDURE — 74011250636 HC RX REV CODE- 250/636: Performed by: NURSE PRACTITIONER

## 2021-01-09 PROCEDURE — 94003 VENT MGMT INPAT SUBQ DAY: CPT

## 2021-01-09 PROCEDURE — 74011250637 HC RX REV CODE- 250/637: Performed by: RADIOLOGY

## 2021-01-09 PROCEDURE — 80048 BASIC METABOLIC PNL TOTAL CA: CPT

## 2021-01-09 PROCEDURE — 2709999900 HC NON-CHARGEABLE SUPPLY

## 2021-01-09 PROCEDURE — 83735 ASSAY OF MAGNESIUM: CPT

## 2021-01-09 PROCEDURE — 74011250637 HC RX REV CODE- 250/637: Performed by: INTERNAL MEDICINE

## 2021-01-09 PROCEDURE — 74011636637 HC RX REV CODE- 636/637: Performed by: INTERNAL MEDICINE

## 2021-01-09 PROCEDURE — 74011250637 HC RX REV CODE- 250/637: Performed by: NURSE PRACTITIONER

## 2021-01-09 PROCEDURE — 65620000000 HC RM CCU GENERAL

## 2021-01-09 PROCEDURE — 77030018836 HC SOL IRR NACL ICUM -A

## 2021-01-09 PROCEDURE — 77030038269 HC DRN EXT URIN PURWCK BARD -A

## 2021-01-09 PROCEDURE — 82962 GLUCOSE BLOOD TEST: CPT

## 2021-01-09 PROCEDURE — 99233 SBSQ HOSP IP/OBS HIGH 50: CPT | Performed by: INTERNAL MEDICINE

## 2021-01-09 RX ORDER — HEPARIN SODIUM 1000 [USP'U]/ML
80 INJECTION, SOLUTION INTRAVENOUS; SUBCUTANEOUS ONCE
Status: COMPLETED | OUTPATIENT
Start: 2021-01-09 | End: 2021-01-09

## 2021-01-09 RX ORDER — MAGNESIUM SULFATE 1 G/100ML
1 INJECTION INTRAVENOUS ONCE
Status: COMPLETED | OUTPATIENT
Start: 2021-01-09 | End: 2021-01-09

## 2021-01-09 RX ORDER — CARVEDILOL 6.25 MG/1
6.25 TABLET ORAL EVERY 12 HOURS
Status: DISCONTINUED | OUTPATIENT
Start: 2021-01-09 | End: 2021-01-14 | Stop reason: HOSPADM

## 2021-01-09 RX ORDER — NYSTATIN 100000 [USP'U]/ML
500000 SUSPENSION ORAL 4 TIMES DAILY
Status: DISCONTINUED | OUTPATIENT
Start: 2021-01-09 | End: 2021-01-14 | Stop reason: HOSPADM

## 2021-01-09 RX ADMIN — HEPARIN SODIUM AND DEXTROSE 17 UNITS/KG/HR: 10000; 5 INJECTION INTRAVENOUS at 16:34

## 2021-01-09 RX ADMIN — Medication 30 ML: at 10:19

## 2021-01-09 RX ADMIN — HEPARIN SODIUM 5820 UNITS: 1000 INJECTION INTRAVENOUS; SUBCUTANEOUS at 02:28

## 2021-01-09 RX ADMIN — CARVEDILOL 6.25 MG: 6.25 TABLET, FILM COATED ORAL at 21:34

## 2021-01-09 RX ADMIN — INSULIN GLARGINE 20 UNITS: 100 INJECTION, SOLUTION SUBCUTANEOUS at 23:37

## 2021-01-09 RX ADMIN — NYSTATIN 500000 UNITS: 100000 SUSPENSION ORAL at 17:34

## 2021-01-09 RX ADMIN — SODIUM CHLORIDE 10 ML: 9 INJECTION, SOLUTION INTRAMUSCULAR; INTRAVENOUS; SUBCUTANEOUS at 21:34

## 2021-01-09 RX ADMIN — NYSTATIN 500000 UNITS: 100000 SUSPENSION ORAL at 21:33

## 2021-01-09 RX ADMIN — MAGNESIUM SULFATE 1 G: 1 INJECTION INTRAVENOUS at 10:17

## 2021-01-09 RX ADMIN — SODIUM CHLORIDE 10 ML: 9 INJECTION, SOLUTION INTRAMUSCULAR; INTRAVENOUS; SUBCUTANEOUS at 06:33

## 2021-01-09 RX ADMIN — ASPIRIN 81 MG: 81 TABLET, CHEWABLE ORAL at 10:19

## 2021-01-09 RX ADMIN — DIAZEPAM 20 MG: 5 TABLET ORAL at 21:34

## 2021-01-09 RX ADMIN — OXYCODONE HYDROCHLORIDE 20 MG: 5 SOLUTION ORAL at 21:34

## 2021-01-09 RX ADMIN — OXYCODONE HYDROCHLORIDE 20 MG: 5 SOLUTION ORAL at 06:41

## 2021-01-09 RX ADMIN — INSULIN LISPRO 3 UNITS: 100 INJECTION, SOLUTION INTRAVENOUS; SUBCUTANEOUS at 23:38

## 2021-01-09 RX ADMIN — OXYCODONE HYDROCHLORIDE 20 MG: 5 SOLUTION ORAL at 15:03

## 2021-01-09 RX ADMIN — CHLORHEXIDINE GLUCONATE 0.12% ORAL RINSE 10 ML: 1.2 LIQUID ORAL at 10:20

## 2021-01-09 RX ADMIN — NALOXEGOL OXALATE 25 MG: 25 TABLET, FILM COATED ORAL at 10:19

## 2021-01-09 RX ADMIN — SODIUM CHLORIDE 10 ML: 9 INJECTION, SOLUTION INTRAMUSCULAR; INTRAVENOUS; SUBCUTANEOUS at 15:04

## 2021-01-09 RX ADMIN — INSULIN LISPRO 3 UNITS: 100 INJECTION, SOLUTION INTRAVENOUS; SUBCUTANEOUS at 12:11

## 2021-01-09 RX ADMIN — LANSOPRAZOLE 30 MG: KIT at 10:20

## 2021-01-09 RX ADMIN — FUROSEMIDE 20 MG: 20 TABLET ORAL at 10:19

## 2021-01-09 RX ADMIN — DOCUSATE SODIUM 100 MG: 50 LIQUID ORAL at 10:20

## 2021-01-09 RX ADMIN — CHLORHEXIDINE GLUCONATE 0.12% ORAL RINSE 10 ML: 1.2 LIQUID ORAL at 21:34

## 2021-01-09 RX ADMIN — CLOPIDOGREL BISULFATE 75 MG: 75 TABLET ORAL at 15:00

## 2021-01-09 RX ADMIN — ATORVASTATIN CALCIUM 40 MG: 40 TABLET, FILM COATED ORAL at 21:33

## 2021-01-09 RX ADMIN — INSULIN LISPRO 3 UNITS: 100 INJECTION, SOLUTION INTRAVENOUS; SUBCUTANEOUS at 17:36

## 2021-01-09 NOTE — PROGRESS NOTES
[facility], [address] Michelle Lugo [phone] [facility]  Fax: [phone]      CARDIOLOGY PROGRESS NOTE  RECS:  1. Acute respiratory failure- requiring mechanical ventilation- extubated  12/17/20. re intubated 12/22/20. S/p tracheostomy 1/4/20 Continue supportive care  2. Sepsis- Sputum Cx(12/22) (+) heavy E.Coli-ESBL. on antibiotics. ID following   3. Wide complex Arrhythmia on 12/25/20- No recurrence will continue to monitor. Monitor electrolytes    4. Hypertension - Carvedilol 6.25 mg bid. 5. Subacute MI- 12/10/20 -s/p [facility] S/p ptca/stent to proximal/ mid LAD using ARELY.  Moderate to severe LV dysfunction. ekg no new ischemic changes suggestive of reinfarction or stent thrombosis. S/p PEG tube. Continue aspirin and heparin. 6. Anemia- H&H Stable.  S/p transfusion. 7. Acute Systolic Congestive heart Failure-recent echo with EF% 35%-40. Compensated at present. Lasix apo daily. 8. COPD,   9. Dementia - failed swallow eval. Gastrostomy tube placement planned for 1/8/21  by IR.             Cardiac cath 12/11/20  Patient presented to [address] [place] [address] with late presentation anterior wall MI.  EF 35-40%. Patient was initially managed medically-- however developed acute pulmonary edema requiring intubation. Also troponin level increasing consistent with ongoing ischemia. S/p ptca/stent to proximal/ mid LAD using ARELY. LVEDP 8 mmHg. Normal PASP pressure with normal PCWP. Moderate to severe LV dysfunction.     Echo 12/10/20  · LV: Estimated LVEF is 35 - 40%. Visually measured ejection fraction. Normal cavity size and wall thickness. Moderately and segmentally reduced systolic function. Inconclusive left ventricular diastolic function. · AO: Mild aortic root dilatation; diameter is 3.8 cm.     ASSESSMENT:  Hospital Problems  Date Reviewed: 2/7/2018          Codes Class Noted POA    Goals of care, counseling/discussion ICD-10-CM: Z71.89  ICD-9-CM: V65.49  Unknown Unknown        Dementia without behavioral disturbance ([facility] ICD-10-CM: F03.90  ICD-9-CM: 294.20  Unknown Unknown        Pulmonary fibrosis ([facility] ICD-10-CM: J84.10  ICD-9-CM: 310  Unknown Unknown        Severe protein-calorie malnutrition ([facility] ICD-10-CM: E43  ICD-9-CM: 756  12/16/2020 Clinically Undetermined        Acute on chronic systolic congestive heart failure (HCC) ICD-10-CM: I50.23  ICD-9-CM: 428.23, 428.0  12/15/2020 Unknown        * (Principal) STEMI (ST elevation myocardial infarction) ([facility] ICD-10-CM: I21.3  ICD-9-CM: 410.90  12/10/2020 Unknown                SUBJECTIVE:  Intubated. Sedated. FiO2 28%. PEEP 5. S/p PEG. OBJECTIVE:    VS:   Visit Vitals  BP (!) 156/77   Pulse 87   Temp 99.9 °F (37.7 °C)   Resp 21   Ht 5' 7\" (1.702 m)   Wt 66.8 kg (147 lb 4.3 oz)   SpO2 100%   Breastfeeding No   BMI 23.07 kg/m²         Intake/Output Summary (Last 24 hours) at 1/9/2021 1534  Last data filed at 1/9/2021 1500  Gross per 24 hour   Intake 2214.91 ml   Output 1010 ml   Net 1204.91 ml     TELE: normal sinus rhythm    General: No acute distress  HENT: Normocephalic, atraumatic. Neck :  Supple  Cardiac:  Normal S1/S2  Chest/Lungs:Clear to auscultation  Abdomen: Soft  Extremities:  No edema      Labs: Results:       Chemistry Recent Labs     01/09/21  0545 01/08/21  1542 01/08/21  0450 01/07/21  0306 01/07/21  0306   * 120* 134*  --  95    138 138  --  140   K 4.1 3.9 4.4   < > 3.8    104 105  --  105   CO2 29 29 31  --  30   BUN 11 16 17  --  15   CREA 0.30* 0.31* 0.36*  --  0.37*   CA 9.4 9.4 9.1  --  9.4   MG 1.8 1.9 2.1  --  2.1   PHOS 4.3  --  3.6  --  4.1   AGAP 5 5 2*  --  5   BUCR 37* 52* 47*  --  41*    < > = values in this interval not displayed.       CBC w/Diff Recent Labs     01/09/21  0545 01/08/21  0450 01/07/21  0306   WBC 6.6 6.3 8.3   RBC 3.15* 2.89* 2.97*   HGB 8.3* 7.8* 8.0*   HCT 26.9* 25.2* 25.7*   * 476* 517*   GRANS 79* 75* 74* LYMPH 12* 13* 14*   EOS 1 4 3      Cardiac Enzymes No results for input(s): CPK, CKND1, NATHANIEL in the last 72 hours. No lab exists for component: CKRMB, TROIP   Coagulation Recent Labs     01/09/21  0855 01/09/21  0020   APTT 152.6* 41.0*       Lipid Panel No results found for: CHOL, CHOLPOCT, CHOLX, CHLST, CHOLV, 606258, HDL, HDLP, LDL, LDLC, DLDLP, 122810, VLDLC, VLDL, TGLX, TRIGL, TRIGP, TGLPOCT, CHHD, CHHDX   BNP No results for input(s): BNPP in the last 72 hours. Liver Enzymes No results for input(s): TP, ALB, TBIL, AP in the last 72 hours.     No lab exists for component: SGOT, GPT, DBIL   Digoxin    Thyroid Studies Lab Results   Component Value Date/Time    TSH 2.92 12/10/2020 11:07 AM              Rayshawn Davis MD   Pager # [phone] 12-Jun-2018

## 2021-01-09 NOTE — PROGRESS NOTES
[facility] Hospitalist [facility]  Progress Note    Patient: Lian Hotter Age: 72 y.o. : 1955 MR#: [id number] SSN: [id number]  Date/Time: 2021     Subjective:     Patient seen and evaluated, lying in bed is critically ill on mechanical ventilation. No acute overnight chagnes per discussion with nursing. Mag replaced. Plavix on Hold  Remains on Heparin drip    Interval HPI  Patient is a 72 y. o. female with a past medical history of COPD, CHF, HTN, DM, dementia, presented to [facility] with acute on chronic systolic heart failure, acute MI with anterolateral STEMI and Q waves s/p ptca/stent to proximal/mid LAD on 20. Pt was intubated and extubated on 20, transferred to . On 20 patient was intubated and transferred to ICU for respiratory failure and cardiogenic +/- septic shock. Respiratory cultures on  with ESBL on Merrem. Dr. Dina Estrada and IR have been consulted for trach/PEG. She is S/p trach 21 and planning for PEG placement by IR on 21. Assessment/Plan:     · Acute on Chronic Hypoxic and Hypercapnic Respiratory Failure - Requiring mechanical ventilation, Extubated on  and reintubated on , Likely aspiration event. Trach placed 21, Dr. Pollack Keep on board  · Wide Complex Arrhythmia () -noted on bedside cardiac monitor. Lasted roughly 5-10 mins with Spontaneous resolution. E-lyte derangement vs Drug effect (pt was on 55mcg/hr Oliverio-Synephrine at the time) vs cardiac insult.    · Acute on Chronic Systolic Heart Failure - superimposed on severe ILD, Echo 12/10/20 EF 35-40%  · Combination septic/cardiogenic shock, improving   · Aspiration PNA. Sputum Cx() (+) heavy E.Coli-ESBL.  Recent Hx of E.coli ESBL.   · Acute Encephalopathy - likely toxic/metabolic vs infectious vs hepatic in nature, ammonia wnl.   · Severe pulmonary fibrosis - with extensive honeycombing and bronchiectasis as seen on CTA chest 20 - appears to be a new diagnosis but possibly UIP  · Acute aspiration pneumonia with severe sepsis  · UTI (+)E. coli  · Acute MI with anterolateral STEMI and Q waves - s/p ptca/stent to proximal/mid LAD using ARELY on DAPT on 20- Remains on Brilinta  · Severe dysphagia - failed MBS. Will place PEG tube today with IR.   · Critical illness myopathy/polyneuropathy  · Hx of Hep C: + RNA viral load 2020  · Multiple liver masses - noted on CT scan 20  · COPD - on home O2 4L NC  · DM  · Dementia - unknown baseline, on aricept   · Prognosis guarded  · Full code          Case discussed with:  []Patient  []Family  [x]Nursing  []Case Management  DVT Prophylaxis:  []Lovenox  []Hep SQ  [x]SCDs  []Coumadin   [x]On Heparin gtt    Objective:   VS:   Visit Vitals  /75   Pulse 89   Temp 99.9 °F (37.7 °C)   Resp 15   Ht 5' 7\" (1.702 m)   Wt 66.8 kg (147 lb 4.3 oz)   SpO2 100%   Breastfeeding No   BMI 23.07 kg/m²      Tmax/24hrs: Temp (24hrs), Av.6 °F (37.6 °C), Min:98.2 °F (36.8 °C), Max:100.9 °F (38.3 °C)  IOBRIEF    Intake/Output Summary (Last 24 hours) at 2021 1200  Last data filed at 2021 1043  Gross per 24 hour   Intake 1043.83 ml   Output 800 ml   Net 243.83 ml     Generalintubated/sedated, NAD  Pulmonary: Decreased breath sounds bilaterally; + trach  Cardiovascular: Regular rate and Rhythm. GI:  Soft, Non distended, Non tender. + Bowel sounds.+ PEG  Extremities:  No edema, cyanosis, clubbing. No calf tenderness.    Neurologic: Opens eyes spontaneously, does not follow commands    Medications:   Current Facility-Administered Medications   Medication Dose Route Frequency    aspirin chewable tablet 81 mg  81 mg Oral DAILY    clopidogreL (PLAVIX) tablet 75 mg  75 mg Oral DAILY    [Held by provider] carvediloL (COREG) tablet 6.25 mg  6.25 mg Oral Q12H    dextrose 5 % - 0.45% NaCl infusion  25 mL/hr IntraVENous CONTINUOUS    insulin glargine (LANTUS) injection 20 Units  20 Units SubCUTAneous QHS    0.9% sodium chloride infusion 250 mL  250 mL IntraVENous PRN    diazePAM (VALIUM) tablet 20 mg  20 mg Oral TID    oxyCODONE (ROXICODONE) 5 mg/5 mL oral solution 20 mg  20 mg Oral Q8H    naloxegoL (MOVANTIK) tablet 25 mg  25 mg Oral ACB    docusate (COLACE) 50 mg/5 mL oral liquid 100 mg  100 mg Oral DAILY    furosemide (LASIX) tablet 20 mg  20 mg Oral DAILY    lansoprazole (PREVACID) 3 mg/mL oral suspension 30 mg  30 mg Per G Tube ACB    heparin 25,000 units in D5W 250 ml infusion  12-25 Units/kg/hr IntraVENous TITRATE    polyethylene glycol (MIRALAX) packet 17 g  17 g Per NG tube PRN    chlorhexidine (PERIDEX) 0.12 % mouthwash 10 mL  10 mL Oral Q12H    ELECTROLYTE REPLACEMENT PROTOCOL - Potassium Standard Dosing   1 Each Other PRN    ELECTROLYTE REPLACEMENT PROTOCOL - Magnesium   1 Each Other PRN    ELECTROLYTE REPLACEMENT PROTOCOL - Phosphorus  Standard Dosing  1 Each Other PRN    ELECTROLYTE REPLACEMENT PROTOCOL - Calcium   1 Each Other PRN    [Held by provider] lisinopriL (PRINIVIL, ZESTRIL) tablet 5 mg  5 mg Oral DAILY    [Held by provider] spironolactone (ALDACTONE) tablet 25 mg  25 mg Oral DAILY    multivit-folic acid-herbal 209 (WELLESSE PLUS) oral liquid 30 mL  30 mL Per NG tube DAILY    insulin lispro (HUMALOG) injection   SubCUTAneous Q6H    albuterol-ipratropium (DUO-NEB) 2.5 MG-0.5 MG/3 ML  3 mL Nebulization Q4H PRN    sodium chloride (NS) flush 5-40 mL  5-40 mL IntraVENous Q8H    sodium chloride (NS) flush 5-40 mL  5-40 mL IntraVENous PRN    acetaminophen (TYLENOL) tablet 650 mg  650 mg Oral Q6H PRN    Or    acetaminophen (TYLENOL) suppository 650 mg  650 mg Rectal Q6H PRN    promethazine (PHENERGAN) tablet 12.5 mg  12.5 mg Oral Q6H PRN    Or    ondansetron (ZOFRAN) injection 4 mg  4 mg IntraVENous Q6H PRN    glucose chewable tablet 16 g  4 Tab Oral PRN    glucagon (GLUCAGEN) injection 1 mg  1 mg IntraMUSCular PRN    dextrose (D50W) injection syrg 12.5-25 g  25-50 mL IntraVENous PRN    atorvastatin (LIPITOR) tablet 40 mg  40 mg Oral QHS       Imaging:   XR Results (most recent):  Results from Hospital Encounter encounter on 12/10/20   XR ABD (KUB)    Narrative Abdomen/KUB    CPT CODE: 33078    HISTORY: Tube placement. COMPARISON: 1/3/2021. FINDINGS:    Enteric tube tip at the mid body of the stomach. Stomach is nondilated. No  dilated loops of small bowel or colon. Wires overlie the patient. Left basilar  infiltrate. .      Impression IMPRESSION:    Enteric tube tip at the mid body of the stomach. .        CT Results (most recent):  Results from Hospital Encounter encounter on 12/10/20   CT ABD WO CONT    Narrative CT ABDOMEN, NONCONTRAST    CPT CODE: 16184    INDICATION: Pretreatment planning for possible gastrostomy tube. COMPARISON: The partially visualized portion of the upper abdomen on pulmonary  CTA 12/16/2020. Prior abdominal CT 2/4/2018. Report is noted in the electronic  medical record (care everywhere) of contrast enhanced abdominal pelvic CT  performed elsewhere 11/25/2020, images from which are not available at this  time. Reference right upper quadrant ultrasound 5/29/2020. TECHNIQUE: Without administration of intravenous or oral contrast, serial axial  CT images through the abdomen were helically acquired. Additional coronal and  sagittal reformation images were also performed. All CT scans at this facility are performed using dose optimization technique as  appropriate to the performed exam, to include automated exposure control,  adjustment of the mA and/or kV according to patient's size (Including  appropriate matching for site-specific examinations), or use of iterative  reconstruction technique.     FINDINGS:    Assessment of the solid and hollow viscera and vascular structures of the  abdomen is limited by lack of contrast.    The visualized portions of lung bases demonstrate bibasilar consolidations in  the lower lobes, right greater than left with air bronchograms in the right  lower lobe consolidation, compatible with multisegmental right lower lobe  atelectasis which could be with or without superimposed airspace disease. There  is bronchiectasis in the lower lungs bilaterally and extensive fibrotic changes  again seen. Esophagogastric tube tip extends to the level of the mid stomach. The gastric  antrum demonstrates contiguity with the anterior abdominal peritoneum deep to  the left rectus muscle, likely would be amenable to percutaneous gastrostomy  tube placement, interventional radiology consultation might also be helpful in  this regard. Multiple calcific gallstones in the gallbladder consistent with cholelithiasis. Multiple splenic calcifications, likely granulomata. Large heterogeneous in attenuation mass centered laterally in the right hepatic  lobe, contours poorly defined for accurate measurement due to lack of IV  contrast and streak artifacts from the patient's arms down at sides but  approximately 11 cm, further increased in size compared to prior studies,  consistent with malignancy which could be primary or metastatic. Additional  hypoattenuating mass farther inferiorly in the right hepatic lobe centered  medially measures approximately 2 cm. A third hepatic mass described in the left  hepatic lobe on prior contrast-enhanced CT is somewhat less well defined on the  current study which is limited by lack of IV contrast.    No evidence of hydronephrosis/hydroureter. No evidence of nephrolithiasis or  calculi in the upper abdominal portions of ureters. Assessment of the renal  parenchyma otherwise is limited by lack of IV contrast without large contour  deforming mass identified. Within the limitation of non-contrast technique, the unenhanced pancreas and  adrenal glands appear grossly unremarkable. Visualized portions of bowel are non-dilated without evidence of bowel  obstruction.         Arterial calcifications noted in the abdominal aorta and coronary arteries. No  evidence of abdominal aortic aneurysm. No definite evidence of pathologic lymph node enlargement is seen. No definite fluid collection/abscess identified. Pelvic viscera and pelvic portions of abdominal viscera are inferior to the  field of view on abdominal CT. On review of bone windows, no acute fractures or destructive bone lesions are  seen. Degenerative changes and osteopenia noted. Impression Impression:     The gastric antrum demonstrates contiguity with the anterior peritoneum just  deep to the left rectus muscle as above. Known enlarging multiple hepatic masses again seen, assessment limited by lack  of IV contrast, consistent with malignancy, differential considerations could  include multifocal primary malignancy or metastases as noted on prior studies. Right lower lobe consolidation contains air bronchograms consistent with  multisegmental atelectasis which could be with or without superimposed airspace  disease. Otherwise limited noncontrast CT scan of the upper abdomen with other nonacute  findings as above. 12/10/20   ECHO ADULT COMPLETE 12/10/2020 12/10/2020    Narrative · LV: Estimated LVEF is 35 - 40%. Visually measured ejection fraction. Normal cavity size and wall thickness. Moderately and segmentally reduced   systolic function. Inconclusive left ventricular diastolic function. · AO: Mild aortic root dilatation; diameter is 3.8 cm.         Signed by: Betty Espinoza MD        Labs:    Recent Results (from the past 48 hour(s))   GLUCOSE, POC    Collection Time: 01/07/21 12:29 PM   Result Value Ref Range    Glucose (POC) 110 70 - 110 mg/dL   GLUCOSE, POC    Collection Time: 01/07/21  5:33 PM   Result Value Ref Range    Glucose (POC) 196 (H) 70 - 110 mg/dL   PTT    Collection Time: 01/07/21  5:43 PM   Result Value Ref Range    aPTT 32.4 23.0 - 36.4 SEC   GLUCOSE, POC    Collection Time: 01/08/21  2:14 AM   Result Value Ref Range    Glucose (POC) 175 (H) 70 - 110 mg/dL   CBC WITH AUTOMATED DIFF    Collection Time: 01/08/21  4:50 AM   Result Value Ref Range    WBC 6.3 4.6 - 13.2 K/uL    RBC 2.89 (L) 4.20 - 5.30 M/uL    HGB 7.8 (L) 12.0 - 16.0 g/dL    HCT 25.2 (L) 35.0 - 45.0 %    MCV 87.2 74.0 - 97.0 FL    MCH 27.0 24.0 - 34.0 PG    MCHC 31.0 31.0 - 37.0 g/dL    RDW 15.2 (H) 11.6 - 14.5 %    PLATELET 532 (H) 641 - 420 K/uL    MPV 9.2 9.2 - 11.8 FL    NEUTROPHILS 75 (H) 40 - 73 %    LYMPHOCYTES 13 (L) 21 - 52 %    MONOCYTES 8 3 - 10 %    EOSINOPHILS 4 0 - 5 %    BASOPHILS 0 0 - 2 %    ABS. NEUTROPHILS 4.7 1.8 - 8.0 K/UL    ABS. LYMPHOCYTES 0.8 (L) 0.9 - 3.6 K/UL    ABS. MONOCYTES 0.5 0.05 - 1.2 K/UL    ABS. EOSINOPHILS 0.2 0.0 - 0.4 K/UL    ABS.  BASOPHILS 0.0 0.0 - 0.1 K/UL    DF AUTOMATED     METABOLIC PANEL, BASIC    Collection Time: 01/08/21  4:50 AM   Result Value Ref Range    Sodium 138 136 - 145 mmol/L    Potassium 4.4 3.5 - 5.5 mmol/L    Chloride 105 100 - 111 mmol/L    CO2 31 21 - 32 mmol/L    Anion gap 2 (L) 3.0 - 18 mmol/L    Glucose 134 (H) 74 - 99 mg/dL    BUN 17 7.0 - 18 MG/DL    Creatinine 0.36 (L) 0.6 - 1.3 MG/DL    BUN/Creatinine ratio 47 (H) 12 - 20      GFR est AA >60 >60 ml/min/1.73m2    GFR est non-AA >60 >60 ml/min/1.73m2    Calcium 9.1 8.5 - 10.1 MG/DL   MAGNESIUM    Collection Time: 01/08/21  4:50 AM   Result Value Ref Range    Magnesium 2.1 1.6 - 2.6 mg/dL   PHOSPHORUS    Collection Time: 01/08/21  4:50 AM   Result Value Ref Range    Phosphorus 3.6 2.5 - 4.9 MG/DL   METABOLIC PANEL, BASIC    Collection Time: 01/08/21  3:42 PM   Result Value Ref Range    Sodium 138 136 - 145 mmol/L    Potassium 3.9 3.5 - 5.5 mmol/L    Chloride 104 100 - 111 mmol/L    CO2 29 21 - 32 mmol/L    Anion gap 5 3.0 - 18 mmol/L    Glucose 120 (H) 74 - 99 mg/dL    BUN 16 7.0 - 18 MG/DL    Creatinine 0.31 (L) 0.6 - 1.3 MG/DL    BUN/Creatinine ratio 52 (H) 12 - 20      GFR est AA >60 >60 ml/min/1.73m2    GFR est non-AA >60 >60 ml/min/1.73m2    Calcium 9.4 8.5 - 10.1 MG/DL   MAGNESIUM    Collection Time: 01/08/21  3:42 PM   Result Value Ref Range    Magnesium 1.9 1.6 - 2.6 mg/dL   GLUCOSE, POC    Collection Time: 01/08/21 11:11 PM   Result Value Ref Range    Glucose (POC) 121 (H) 70 - 110 mg/dL   PTT    Collection Time: 01/09/21 12:20 AM   Result Value Ref Range    aPTT 41.0 (H) 23.0 - 36.4 SEC   POC G3    Collection Time: 01/09/21  4:33 AM   Result Value Ref Range    Device: VENT      FIO2 (POC) 28 %    pH (POC) 7.46 (H) 7.35 - 7.45      pCO2 (POC) 39.2 35.0 - 45.0 MMHG    pO2 (POC) 92 80 - 100 MMHG    HCO3 (POC) 27.7 (H) 22 - 26 MMOL/L    sO2 (POC) 98 (H) 92 - 97 %    Base excess (POC) 4 mmol/L    Mode SIMV      Tidal volume 500 ml    Set Rate 10 bpm    PEEP/CPAP (POC) 5 cmH2O    Pressure support 8 cmH2O    Allens test (POC) YES      Inspiratory Time 0.90 sec    Total resp. rate 24      Site RIGHT RADIAL      Specimen type (POC) ARTERIAL      Performed by Jevon Villalta     Volume control plus YES     CBC WITH AUTOMATED DIFF    Collection Time: 01/09/21  5:45 AM   Result Value Ref Range    WBC 6.6 4.6 - 13.2 K/uL    RBC 3.15 (L) 4.20 - 5.30 M/uL    HGB 8.3 (L) 12.0 - 16.0 g/dL    HCT 26.9 (L) 35.0 - 45.0 %    MCV 85.4 74.0 - 97.0 FL    MCH 26.3 24.0 - 34.0 PG    MCHC 30.9 (L) 31.0 - 37.0 g/dL    RDW 15.2 (H) 11.6 - 14.5 %    PLATELET 025 (H) 071 - 420 K/uL    MPV 9.2 9.2 - 11.8 FL    NEUTROPHILS 79 (H) 40 - 73 %    LYMPHOCYTES 12 (L) 21 - 52 %    MONOCYTES 8 3 - 10 %    EOSINOPHILS 1 0 - 5 %    BASOPHILS 0 0 - 2 %    ABS. NEUTROPHILS 5.2 1.8 - 8.0 K/UL    ABS. LYMPHOCYTES 0.8 (L) 0.9 - 3.6 K/UL    ABS. MONOCYTES 0.5 0.05 - 1.2 K/UL    ABS. EOSINOPHILS 0.1 0.0 - 0.4 K/UL    ABS.  BASOPHILS 0.0 0.0 - 0.1 K/UL    DF AUTOMATED     PHOSPHORUS    Collection Time: 01/09/21  5:45 AM   Result Value Ref Range    Phosphorus 4.3 2.5 - 4.9 MG/DL   MAGNESIUM    Collection Time: 01/09/21  5:45 AM   Result Value Ref Range    Magnesium 1.8 1.6 - 2.6 mg/dL   METABOLIC PANEL, BASIC    Collection Time: 01/09/21  5:45 AM   Result Value Ref Range    Sodium 137 136 - 145 mmol/L    Potassium 4.1 3.5 - 5.5 mmol/L    Chloride 103 100 - 111 mmol/L    CO2 29 21 - 32 mmol/L    Anion gap 5 3.0 - 18 mmol/L    Glucose 122 (H) 74 - 99 mg/dL    BUN 11 7.0 - 18 MG/DL    Creatinine 0.30 (L) 0.6 - 1.3 MG/DL    BUN/Creatinine ratio 37 (H) 12 - 20      GFR est AA >60 >60 ml/min/1.73m2    GFR est non-AA >60 >60 ml/min/1.73m2    Calcium 9.4 8.5 - 10.1 MG/DL   GLUCOSE, POC    Collection Time: 01/09/21  6:31 AM   Result Value Ref Range    Glucose (POC) 148 (H) 70 - 110 mg/dL   PTT    Collection Time: 01/09/21  8:55 AM   Result Value Ref Range    aPTT 152.6 (HH) 23.0 - 36.4 SEC       Signed By: Jamilah Camarillo MD     January 9, 2021      I spent 35 minutes with the patient in face-to-face consultation, of which greater than 50% was spent in counseling and coordination of care as described above    Disclaimer: Sections of this note are dictated using utilizing voice recognition software. Minor typographical errors may be present. If questions arise, please do not hesitate to contact me or call our department.

## 2021-01-09 NOTE — PROGRESS NOTES
1915-Received report/assumed care of pt.    2000-Pt assessment done. Pt withdraws to pain and will open her eyes to voice, does not follow commands at this time. 2148-Scheduled medications given. 0020-PTT drawn and sent to lab. 0234-Heparin bolus of 80 units/kg x 1 dose given and heparin gtt increased by 4 units/kg/hr per protocol based on PTT result of 41.

## 2021-01-09 NOTE — PROGRESS NOTES
[facility]  ICU Progress Note      Name: Clementina Sen   : 1955   MRN: [id number]   Date: 2021 9:14 AM     [x]I have reviewed the flowsheet and previous days notes. Events overnight reviewed and discussed with nursing staff. Vital signs and records reviewed. Interval HPI    Patient is a 72 y. o. female with a past medical history of COPD, CHF, HTN, DM, dementia, admitted for [facility] with acute on chronic systolic heart failure, acute MI with anterolateral STEMI and Q waves s/p ptca/stent to proximal/mid LAD on 20. Pt was intubated and extubated on 20, transferred to . On 20 patient was intubated and transferred to ICU for respiratory failure and cardiogenic +/- septic shock. Respiratory cultures on  with ESBL on Merrem. Dr. Solange Anguiano and IR have been consulted for trach/PEG. She is S/p trach  and s/p PEG .     Subjective 21  Hospital Day:29  Vent Day: 18  Overnight events: No significant events noted overnight  Mentation/Activity: opens eyes spontaneously, withdraws to pain  Respiratory/ Secretions: stable  Hemodynamics: H/H stable  Need for procedures:No               ROS:Review of systems not obtained due to patient factors.     Vital Signs:    Visit Vitals  BP (!) 146/73   Pulse 87   Temp 99.5 °F (37.5 °C)   Resp 19   Ht 5' 7\" (1.702 m)   Wt 66.8 kg (147 lb 4.3 oz)   SpO2 100%   Breastfeeding No   BMI 23.07 kg/m²       O2 Device: Tracheostomy, Ventilator   O2 Flow Rate (L/min): 40 l/min   Temp (24hrs), Av.4 °F (37.4 °C), Min:98.2 °F (36.8 °C), Max:100.9 °F (38.3 °C)       Intake/Output:   Last shift:      701 - 1900  In: 114.6 [I.V.:114.6]  Out: 60   Last 3 shifts: 1901 -  07  In: 577.7 [P.O.:80; I.V.:497.7]  Out: 1085 [Urine:1085]    Intake/Output Summary (Last 24 hours) at 2021 0914  Last data filed at 2021 0800  Gross per 24 hour   Intake 633.83 ml   Output 660 ml   Net -26.17 ml       Ventilator Settings:  Mode Rate Tidal Volume Pressure FiO2 PEEP   SIMV, Pressure support, VC+   500 ml  8 cm H2O 28 % 5 cm H20     Peak airway pressure: 27 cm H2O    Minute ventilation: 7.12 l/min      ARDS network Guidelines:   Lung protective strategy and Plateau  Pressure goal < 30 cm H2O goals  Oxygenation Goals PaO2 55-80 mm Hg or SaO2 88-95%  PH goal 7.30-7.45    VAP bundle:  Reviewed. Giovanna tube to suction at 20-30 cm Hg. Maintain [place] tube with 5-10ml air every 4 hours  Routine oral care every 4 hours  Elevation of head > 45 degree  Daily sedation holiday and SBT evaluation starting at 6.00am.    Physical Exam:               General:sedated; NAD, acyanotic   HEENT:  Anicteric sclerae; pink palpebral conjunctivae; mucosa moist, Trach in situ (CDI)  Resp: (+) coarse rhonchi b/l. Symmetrical chest expansion; good airway entry;   CV:  S1, S2 present; regular rate and rhythm   GI:  Abdomen soft, non-tender; (+) active bowel sounds. PEG in SITU  Extremities:  +2 pulses on all extremities  Skin:  Warm; no rashes/ lesions noted, normal turgor/cap refill.    Neurologic:   Opening eyes spontaneously, No command following, moving extremities spontaneously   Devices:  Trach, Purewick, PICC RUE, PEG, FMS      DATA:     Current Facility-Administered Medications   Medication Dose Route Frequency    aspirin chewable tablet 81 mg  81 mg Oral DAILY    clopidogreL (PLAVIX) tablet 75 mg  75 mg Oral DAILY    [Held by provider] carvediloL (COREG) tablet 6.25 mg  6.25 mg Oral Q12H    dextrose 5 % - 0.45% NaCl infusion  25 mL/hr IntraVENous CONTINUOUS    insulin glargine (LANTUS) injection 20 Units  20 Units SubCUTAneous QHS    0.9% sodium chloride infusion 250 mL  250 mL IntraVENous PRN    diazePAM (VALIUM) tablet 20 mg  20 mg Oral TID    oxyCODONE (ROXICODONE) 5 mg/5 mL oral solution 20 mg  20 mg Oral Q8H    naloxegoL (MOVANTIK) tablet 25 mg  25 mg Oral ACB    docusate (COLACE) 50 mg/5 mL oral liquid 100 mg  100 mg Oral DAILY    furosemide (LASIX) tablet 20 mg  20 mg Oral DAILY    lansoprazole (PREVACID) 3 mg/mL oral suspension 30 mg  30 mg Per G Tube ACB    heparin 25,000 units in D5W 250 ml infusion  12-25 Units/kg/hr IntraVENous TITRATE    polyethylene glycol (MIRALAX) packet 17 g  17 g Per NG tube PRN    chlorhexidine (PERIDEX) 0.12 % mouthwash 10 mL  10 mL Oral Q12H    ELECTROLYTE REPLACEMENT PROTOCOL - Potassium Standard Dosing   1 Each Other PRN    ELECTROLYTE REPLACEMENT PROTOCOL - Magnesium   1 Each Other PRN    ELECTROLYTE REPLACEMENT PROTOCOL - Phosphorus  Standard Dosing  1 Each Other PRN    ELECTROLYTE REPLACEMENT PROTOCOL - Calcium   1 Each Other PRN    [Held by provider] lisinopriL (PRINIVIL, ZESTRIL) tablet 5 mg  5 mg Oral DAILY    [Held by provider] spironolactone (ALDACTONE) tablet 25 mg  25 mg Oral DAILY    multivit-folic acid-herbal 507 (WELLESSE PLUS) oral liquid 30 mL  30 mL Per NG tube DAILY    insulin lispro (HUMALOG) injection   SubCUTAneous Q6H    albuterol-ipratropium (DUO-NEB) 2.5 MG-0.5 MG/3 ML  3 mL Nebulization Q4H PRN    sodium chloride (NS) flush 5-40 mL  5-40 mL IntraVENous Q8H    sodium chloride (NS) flush 5-40 mL  5-40 mL IntraVENous PRN    acetaminophen (TYLENOL) tablet 650 mg  650 mg Oral Q6H PRN    Or    acetaminophen (TYLENOL) suppository 650 mg  650 mg Rectal Q6H PRN    promethazine (PHENERGAN) tablet 12.5 mg  12.5 mg Oral Q6H PRN    Or    ondansetron (ZOFRAN) injection 4 mg  4 mg IntraVENous Q6H PRN    glucose chewable tablet 16 g  4 Tab Oral PRN    glucagon (GLUCAGEN) injection 1 mg  1 mg IntraMUSCular PRN    dextrose (D50W) injection syrg 12.5-25 g  25-50 mL IntraVENous PRN    atorvastatin (LIPITOR) tablet 40 mg  40 mg Oral QHS         Labs: Results:       Chemistry Recent Labs     01/09/21  0545 01/08/21  1542 01/08/21  0450   * 120* 134*    138 138   K 4.1 3.9 4.4    104 105   CO2 29 29 31   BUN 11 16 17   CREA 0.30* 0.31* 0.36*   CA 9.4 9.4 9.1   AGAP 5 5 2*   BUCR 37* 52* 47*      CBC w/Diff Recent Labs     01/09/21  0545 01/08/21  0450 01/07/21  0306   WBC 6.6 6.3 8.3   RBC 3.15* 2.89* 2.97*   HGB 8.3* 7.8* 8.0*   HCT 26.9* 25.2* 25.7*   * 476* 517*   GRANS 79* 75* 74*   LYMPH 12* 13* 14*   EOS 1 4 3      Coagulation Recent Labs     01/09/21  0020 01/07/21  1743   APTT 41.0* 32.4       Liver Enzymes No results for input(s): TP, ALB, TBIL, AP in the last 72 hours. No lab exists for component: SGOT, GPT, DBIL   ABG Lab Results   Component Value Date/Time    PHI 7.46 (H) 01/09/2021 04:33 AM    PCO2I 39.2 01/09/2021 04:33 AM    PO2I 92 01/09/2021 04:33 AM    HCO3I 27.7 (H) 01/09/2021 04:33 AM    FIO2I 28 01/09/2021 04:33 AM      Microbiology No results for input(s): CULT in the last 72 hours. Telemetry: [x]Sinus []A-flutter []Paced    []A-fib []Multiple PVCs                  Imaging:    CXR Results  (Last 48 hours)    None          IMPRESSION:   · Acute on Chronic Hypoxic and Hypercapnic Respiratory Failure - Requiring mechanical ventilation, Extubated on 12/17 and reintubated on 12/22, Likely aspiration event. Trach placed 1/4/21, Dr. Breezy Baldwin   · Wide Complex Arrhythmia (12/25) -noted on bedside cardiac monitor. Lasted roughly 5-10 mins with Spontaneous resolution. E-lyte derangement vs Drug effect (pt was on 55mcg/hr Oliverio-Synephrine at the time) vs cardiac insult.    · Acute on Chronic Systolic Heart Failure - superimposed on severe ILD, Echo 12/10/20 EF 35-40%  · Combination septic/cardiogenic shock- currently resolved  · Aspiration PNA. Sputum Cx(12/22) (+) heavy E.Coli-ESBL. Recent Hx of E.coli ESBL.   · Acute Encephalopathy - likely toxic/metabolic vs infectious vs hepatic in nature, ammonia wnl.   · Severe pulmonary fibrosis - with extensive honeycombing and bronchiectasis as seen on CTA chest 12/16/20 - appears to be a new diagnosis but possibly UIP  · UTI (+)E.  Coli- ESBL  · Acute MI with anterolateral STEMI and Q waves - s/p ptca/stent to proximal/mid LAD using ARELY on DAPT on 12/11/20- Remains on Brilinta  · Severe dysphagia - failed MBS s/p PEG, 1/8/2021  · Critical illness myopathy/polyneuropathy  ·  Hep C: + RNA viral load 4/4/2020  · Multiple liver masses - noted on CT scan 11/25/20 - etiology unknown at this time; concerning for neoplasm  · COPD - on home O2 4L NC  · DM  · Dementia - unknown baseline, on aricept   · S/P Trach 1/4/21   RECOMMENDATIONS:   Resp:   -Titrate FiO2/ supp O2 for SpO2 >90%  -Aspiration precautions   -S/p trach 1/4/21, Dr. Eugene Jimenez  -Quique Delgado   -Aggressive pulmonary hygiene   -Please continue daily SBT   -Currently tolerating SIMV  -Delta on ETCO2 and ABG is around 15   I/D:   -Aleukocyotosis  -Per ID,s/p Merrem  for Ecoli (ESBL)   -Negative for COVID19 on 1/1/2021  Hem/Onc:   -Daily CBC;   -Will transfuse unit of blood to maintain Hgb >7   CVS:   -Continue DAPT   -EF 35-40 % on 12/10/20, the day before stent placement  -Proximal LAD angioplasty and stenting on 12/11/2020 for 95% stenotic lesion.  -Hemodynamics stable off pressors  -Tolerating statin   -Recommend restarting ACEi and BB  -continue lasix  -BUE and BLE PVL's negative   -Will discuss with cardiology if heparin gtt can be stopped now that DAPT has been resumed   Metabolic:   -Daily BMP; monitor e-lytes; replace PRN  replace Mg  Renal:    -Trend Renal indices, Purewick   Endocrine:   -Continue lantus QHS with SSI   GI:   -SUP  -Restart Feeds via PEG 24 hours post placement   -Bowel regimen: Miralax PRN with scheduled docusate   -PEG tube insertion today with IR    -CT Abd from 1/3/21: gastric antrum contiguous with anterior peritoneum, enlarging hepatic masses consistent with likely malignancy or mets  Musc/Skin:   -No acute issues, wound care  Neuro:   -Off sedation   -Transition to enteral meds; fiona and valium. Can consider interval decrease in sedation and analgesia as patient tolerates   PPX  -SUP ppx  -Currently on heparin gtt, no additional DVT ppx needed. Once heparin gtt is stopped will need to resume DVT ppx heparin vs lovenox     Will need to work with case management for dispo      Discussed in Interdisciplinary Rounds        The patient is: [x] acutely ill Risk of deterioration: [] moderate    [] critically ill  [] high     [x]See my orders for details    My assessment/plan was discussed with:  [x]Nursing []PT/OT    [x]Respiratory therapy [x]    []Family []     Alyssa Solis, NP       [facility] Staff Addendum     I have independently evaluated the patient and reviewed the patient's chart. I have discussed the findings and care plan with [facility] ICU Care Team.      I agree with the above evaluation, assessment and recommendations along with the following comments and observations. Please also review my orders. Patient s/p trach 1/4/21 and PEG 1/8/21, resting comfortably at this time. Remains on Vent. Has FMS and pur wick. Heparin on hold earlier today for elevated PTT. Plan to start Plavix later today as long as PEG functioning properly. Discussed with ICU bedside nurse this morning. No heparin window due to ARELY placement earlier this admission    PLT count is elevated in the 500 range    Has PICC line in RUE- looks good and functioning properly    No electrolyte issues this morning    H/O ESBL- E. Coli: Last Meropenum dose 1/2/21 - Low grade temp overnight with normal WBC- not unexpected in this clinical setting. Monitor for now. No CXR this morning    Anticipate need for LTAC placement          Complex decision making was made in the evaluation and management plans during this consultation. More than 50% of time was spent in counseling and coordination of care including review of data and discussion with other team members. Further recommendations and therapies pending clinical course.     Critical Care and time spent coordinating care: physical exam, chart review, documentation, discussion with care team, minus procedure time: 35 min    Heidy Mathews DO, FCCP  Pulmonary, Sleep and Critical Care Medicine  10:57 AM

## 2021-01-09 NOTE — PROGRESS NOTES
07:15  Received pt from Baledv Grewal, ESTEBAN. Pt. Somnolent. Opens eyes to voice/touch. Moves upper extremities but very weak. Focuses with eyes but not tracking. Ventilator via trache SIMV Tv 500, PEEP 5, rate of 10, Fi02 28%. Trache plate sutured in place. Sp02 95%. Pt with G Tube to drainage bag of brown gastric fluid. NG to left nare at 65 cm and clamped. FMS tube in situ. Pure wik for urinary incontinence. Heel boots pinky. LE.    09:00  Glyn Severs, Pulmonology at bedside. 10:25  Dr. Ferrer Call at bedside. Will hold plavix until she has conferred with IR. Held 09:00 dose of valium as pt somnolent. Remaining meds crushed and given via G tube and then clamped. 11:50  Dr. Lyle Wiggins at bedside  14:00  G-tube residual 25 ml greenish liquid. Pt. With copious secretions around trache tube. Cleansed and re-dressed with 1:1 Hydrogen peroxide and sterile water and drain sponge. Tongue with whitish covering. Phoned Glyn Severs, NP for request for scopalamine patch and nystatin for mouth. 16:00  Pt. Tolerated tube feed. 19:00  Bedside and Verbal shift change report given to Baldev Grewal RN (oncoming nurse) by Neal Steiner RN (offgoing nurse). Report included the following information SBAR, Kardex, Procedure Summary, Intake/Output, MAR, Recent Results and Cardiac Rhythm NSR.

## 2021-01-09 NOTE — PROGRESS NOTES
[facility]  Progress Note    Patient: Nikki Paget Age: 72 y.o. : 1955 MR#: [id number] SSN: [id number]  Date/Time: 2021     Subjective:     Patient seen and evaluated, lying in bed is critically ill on mechanical ventilation. Interval HPI  Patient is a 72 y. o. female with a past medical history of COPD, CHF, HTN, DM, dementia, presented to [facility] with acute on chronic systolic heart failure, acute MI with anterolateral STEMI and Q waves s/p ptca/stent to proximal/mid LAD on 20. Pt was intubated and extubated on 20, transferred to . On 20 patient was intubated and transferred to ICU for respiratory failure and cardiogenic +/- septic shock. Respiratory cultures on  with ESBL on Merrem. Dr. Zoya Whyte and IR have been consulted for trach/PEG. She is S/p trach 21 and planning for PEG placement by IR on 21. Assessment/Plan:     · Acute on Chronic Hypoxic and Hypercapnic Respiratory Failure - Requiring mechanical ventilation, Extubated on  and reintubated on , Likely aspiration event. Trach placed 21, Dr. Desiree Fleming on board  · Wide Complex Arrhythmia () -noted on bedside cardiac monitor. Lasted roughly 5-10 mins with Spontaneous resolution. E-lyte derangement vs Drug effect (pt was on 55mcg/hr Oliverio-Synephrine at the time) vs cardiac insult.    · Acute on Chronic Systolic Heart Failure - superimposed on severe ILD, Echo 12/10/20 EF 35-40%  · Combination septic/cardiogenic shock, improving   · Aspiration PNA. Sputum Cx() (+) heavy E.Coli-ESBL.  Recent Hx of E.coli ESBL.   · Acute Encephalopathy - likely toxic/metabolic vs infectious vs hepatic in nature, ammonia wnl.   · Severe pulmonary fibrosis - with extensive honeycombing and bronchiectasis as seen on CTA chest 20 - appears to be a new diagnosis but possibly UIP  · Acute aspiration pneumonia with severe sepsis  · UTI (+)E. coli  · Acute MI with anterolateral STEMI and Q waves - s/p ptca/stent to proximal/mid LAD using ARELY on DAPT on 20- Remains on Brilinta  · Severe dysphagia - failed MBS. Will place PEG tube today with IR.   · Critical illness myopathy/polyneuropathy  · Hx of Hep C: + RNA viral load 2020  · Multiple liver masses - noted on CT scan 20  · COPD - on home O2 4L NC  · DM  · Dementia - unknown baseline, on aricept   · Prognosis guarded  · Full code          Case discussed with:  []Patient  []Family  []Nursing  []Case Management  DVT Prophylaxis:  []Lovenox  []Hep SQ  []SCDs  []Coumadin   []On Heparin gtt    Objective:   VS:   Visit Vitals  /76   Pulse 80   Temp 98.2 °F (36.8 °C)   Resp 13   Ht 5' 7\" (1.702 m)   Wt 72.7 kg (160 lb 4.4 oz)   SpO2 100%   Breastfeeding No   BMI 25.10 kg/m²      Tmax/24hrs: Temp (24hrs), Av.2 °F (36.8 °C), Min:98.1 °F (36.7 °C), Max:98.3 °F (36.8 °C)  IOBRIEF    Intake/Output Summary (Last 24 hours) at 2021  Last data filed at 2021 1900  Gross per 24 hour   Intake 199.47 ml   Output 585 ml   Net -385.53 ml     Generalintubated/sedated, NAD  Pulmonary: Decreased breath sounds bilaterally  Cardiovascular: Regular rate and Rhythm. GI:  Soft, Non distended, Non tender. + Bowel sounds. Extremities:  No edema, cyanosis, clubbing. No calf tenderness.    Neurologic: Opens eyes spontaneously, does not follow commands    Additional:    Medications:   Current Facility-Administered Medications   Medication Dose Route Frequency    [START ON 2021] aspirin chewable tablet 81 mg  81 mg Oral DAILY    [START ON 2021] clopidogreL (PLAVIX) tablet 75 mg  75 mg Oral DAILY    [Held by provider] carvediloL (COREG) tablet 6.25 mg  6.25 mg Oral Q12H    dextrose 5 % - 0.45% NaCl infusion  25 mL/hr IntraVENous CONTINUOUS    insulin glargine (LANTUS) injection 20 Units  20 Units SubCUTAneous QHS    0.9% sodium chloride infusion 250 mL  250 mL IntraVENous PRN    diazePAM (VALIUM) tablet 20 mg  20 mg Oral TID    oxyCODONE (ROXICODONE) 5 mg/5 mL oral solution 20 mg  20 mg Oral Q8H    naloxegoL (MOVANTIK) tablet 25 mg  25 mg Oral ACB    docusate (COLACE) 50 mg/5 mL oral liquid 100 mg  100 mg Oral DAILY    furosemide (LASIX) tablet 20 mg  20 mg Oral DAILY    lansoprazole (PREVACID) 3 mg/mL oral suspension 30 mg  30 mg Per G Tube ACB    heparin 25,000 units in D5W 250 ml infusion  12-25 Units/kg/hr IntraVENous TITRATE    polyethylene glycol (MIRALAX) packet 17 g  17 g Per NG tube PRN    chlorhexidine (PERIDEX) 0.12 % mouthwash 10 mL  10 mL Oral Q12H    ELECTROLYTE REPLACEMENT PROTOCOL - Potassium Standard Dosing   1 Each Other PRN    ELECTROLYTE REPLACEMENT PROTOCOL - Magnesium   1 Each Other PRN    ELECTROLYTE REPLACEMENT PROTOCOL - Phosphorus  Standard Dosing  1 Each Other PRN    ELECTROLYTE REPLACEMENT PROTOCOL - Calcium   1 Each Other PRN    [Held by provider] lisinopriL (PRINIVIL, ZESTRIL) tablet 5 mg  5 mg Oral DAILY    [Held by provider] spironolactone (ALDACTONE) tablet 25 mg  25 mg Oral DAILY    multivit-folic acid-herbal 809 (WELLESSE PLUS) oral liquid 30 mL  30 mL Per NG tube DAILY    insulin lispro (HUMALOG) injection   SubCUTAneous Q6H    albuterol-ipratropium (DUO-NEB) 2.5 MG-0.5 MG/3 ML  3 mL Nebulization Q4H PRN    sodium chloride (NS) flush 5-40 mL  5-40 mL IntraVENous Q8H    sodium chloride (NS) flush 5-40 mL  5-40 mL IntraVENous PRN    acetaminophen (TYLENOL) tablet 650 mg  650 mg Oral Q6H PRN    Or    acetaminophen (TYLENOL) suppository 650 mg  650 mg Rectal Q6H PRN    promethazine (PHENERGAN) tablet 12.5 mg  12.5 mg Oral Q6H PRN    Or    ondansetron (ZOFRAN) injection 4 mg  4 mg IntraVENous Q6H PRN    glucose chewable tablet 16 g  4 Tab Oral PRN    glucagon (GLUCAGEN) injection 1 mg  1 mg IntraMUSCular PRN    dextrose (D50W) injection syrg 12.5-25 g  25-50 mL IntraVENous PRN    atorvastatin (LIPITOR) tablet 40 mg  40 mg Oral QHS     Facility-Administered Medications Ordered in Other Encounters   Medication Dose Route Frequency    lidocaine (PF) (XYLOCAINE) 10 mg/mL (1 %) injection           Imaging:   XR Results (most recent):  Results from Hospital Encounter encounter on 12/10/20   XR ABD (KUB)    Narrative Abdomen/KUB    CPT CODE: 80906    HISTORY: Tube placement. COMPARISON: 1/3/2021. FINDINGS:    Enteric tube tip at the mid body of the stomach. Stomach is nondilated. No  dilated loops of small bowel or colon. Wires overlie the patient. Left basilar  infiltrate. .      Impression IMPRESSION:    Enteric tube tip at the mid body of the stomach. .        CT Results (most recent):  Results from Hospital Encounter encounter on 12/10/20   CT ABD WO CONT    Narrative CT ABDOMEN, NONCONTRAST    CPT CODE: 32426    INDICATION: Pretreatment planning for possible gastrostomy tube. COMPARISON: The partially visualized portion of the upper abdomen on pulmonary  CTA 12/16/2020. Prior abdominal CT 2/4/2018. Report is noted in the electronic  medical record (care everywhere) of contrast enhanced abdominal pelvic CT  performed elsewhere 11/25/2020, images from which are not available at this  time. Reference right upper quadrant ultrasound 5/29/2020. TECHNIQUE: Without administration of intravenous or oral contrast, serial axial  CT images through the abdomen were helically acquired. Additional coronal and  sagittal reformation images were also performed. All CT scans at this facility are performed using dose optimization technique as  appropriate to the performed exam, to include automated exposure control,  adjustment of the mA and/or kV according to patient's size (Including  appropriate matching for site-specific examinations), or use of iterative  reconstruction technique.     FINDINGS:    Assessment of the solid and hollow viscera and vascular structures of the  abdomen is limited by lack of contrast.    The visualized portions of lung bases demonstrate bibasilar consolidations in  the lower lobes, right greater than left with air bronchograms in the right  lower lobe consolidation, compatible with multisegmental right lower lobe  atelectasis which could be with or without superimposed airspace disease. There  is bronchiectasis in the lower lungs bilaterally and extensive fibrotic changes  again seen. Esophagogastric tube tip extends to the level of the mid stomach. The gastric  antrum demonstrates contiguity with the anterior abdominal peritoneum deep to  the left rectus muscle, likely would be amenable to percutaneous gastrostomy  tube placement, interventional radiology consultation might also be helpful in  this regard. Multiple calcific gallstones in the gallbladder consistent with cholelithiasis. Multiple splenic calcifications, likely granulomata. Large heterogeneous in attenuation mass centered laterally in the right hepatic  lobe, contours poorly defined for accurate measurement due to lack of IV  contrast and streak artifacts from the patient's arms down at sides but  approximately 11 cm, further increased in size compared to prior studies,  consistent with malignancy which could be primary or metastatic. Additional  hypoattenuating mass farther inferiorly in the right hepatic lobe centered  medially measures approximately 2 cm. A third hepatic mass described in the left  hepatic lobe on prior contrast-enhanced CT is somewhat less well defined on the  current study which is limited by lack of IV contrast.    No evidence of hydronephrosis/hydroureter. No evidence of nephrolithiasis or  calculi in the upper abdominal portions of ureters. Assessment of the renal  parenchyma otherwise is limited by lack of IV contrast without large contour  deforming mass identified. Within the limitation of non-contrast technique, the unenhanced pancreas and  adrenal glands appear grossly unremarkable.      Visualized portions of bowel are non-dilated without evidence of bowel  obstruction. Arterial calcifications noted in the abdominal aorta and coronary arteries. No  evidence of abdominal aortic aneurysm. No definite evidence of pathologic lymph node enlargement is seen. No definite fluid collection/abscess identified. Pelvic viscera and pelvic portions of abdominal viscera are inferior to the  field of view on abdominal CT. On review of bone windows, no acute fractures or destructive bone lesions are  seen. Degenerative changes and osteopenia noted. Impression Impression:     The gastric antrum demonstrates contiguity with the anterior peritoneum just  deep to the left rectus muscle as above. Known enlarging multiple hepatic masses again seen, assessment limited by lack  of IV contrast, consistent with malignancy, differential considerations could  include multifocal primary malignancy or metastases as noted on prior studies. Right lower lobe consolidation contains air bronchograms consistent with  multisegmental atelectasis which could be with or without superimposed airspace  disease. Otherwise limited noncontrast CT scan of the upper abdomen with other nonacute  findings as above. 12/10/20   ECHO ADULT COMPLETE 12/10/2020 12/10/2020    Narrative · LV: Estimated LVEF is 35 - 40%. Visually measured ejection fraction. Normal cavity size and wall thickness. Moderately and segmentally reduced   systolic function. Inconclusive left ventricular diastolic function. · AO: Mild aortic root dilatation; diameter is 3.8 cm.         Signed by: Eliezer Egan MD        Labs:    Recent Results (from the past 48 hour(s))   GLUCOSE, POC    Collection Time: 01/06/21 11:46 PM   Result Value Ref Range    Glucose (POC) 80 70 - 110 mg/dL   CBC WITH AUTOMATED DIFF    Collection Time: 01/07/21  3:06 AM   Result Value Ref Range    WBC 8.3 4.6 - 13.2 K/uL    RBC 2.97 (L) 4.20 - 5.30 M/uL    HGB 8.0 (L) 12.0 - 16.0 g/dL    HCT 25.7 (L) 35.0 - 45.0 %    MCV 86.5 74.0 - 97.0 FL    MCH 26.9 24.0 - 34.0 PG    MCHC 31.1 31.0 - 37.0 g/dL    RDW 15.0 (H) 11.6 - 14.5 %    PLATELET 414 (H) 121 - 420 K/uL    MPV 9.0 (L) 9.2 - 11.8 FL    NEUTROPHILS 74 (H) 40 - 73 %    LYMPHOCYTES 14 (L) 21 - 52 %    MONOCYTES 9 3 - 10 %    EOSINOPHILS 3 0 - 5 %    BASOPHILS 0 0 - 2 %    ABS. NEUTROPHILS 6.2 1.8 - 8.0 K/UL    ABS. LYMPHOCYTES 1.2 0.9 - 3.6 K/UL    ABS. MONOCYTES 0.7 0.05 - 1.2 K/UL    ABS. EOSINOPHILS 0.2 0.0 - 0.4 K/UL    ABS.  BASOPHILS 0.0 0.0 - 0.1 K/UL    DF AUTOMATED     METABOLIC PANEL, BASIC    Collection Time: 01/07/21  3:06 AM   Result Value Ref Range    Sodium 140 136 - 145 mmol/L    Potassium 3.8 3.5 - 5.5 mmol/L    Chloride 105 100 - 111 mmol/L    CO2 30 21 - 32 mmol/L    Anion gap 5 3.0 - 18 mmol/L    Glucose 95 74 - 99 mg/dL    BUN 15 7.0 - 18 MG/DL    Creatinine 0.37 (L) 0.6 - 1.3 MG/DL    BUN/Creatinine ratio 41 (H) 12 - 20      GFR est AA >60 >60 ml/min/1.73m2    GFR est non-AA >60 >60 ml/min/1.73m2    Calcium 9.4 8.5 - 10.1 MG/DL   MAGNESIUM    Collection Time: 01/07/21  3:06 AM   Result Value Ref Range    Magnesium 2.1 1.6 - 2.6 mg/dL   PHOSPHORUS    Collection Time: 01/07/21  3:06 AM   Result Value Ref Range    Phosphorus 4.1 2.5 - 4.9 MG/DL   GLUCOSE, POC    Collection Time: 01/07/21  5:20 AM   Result Value Ref Range    Glucose (POC) 91 70 - 110 mg/dL   POTASSIUM    Collection Time: 01/07/21 11:04 AM   Result Value Ref Range    Potassium 4.1 3.5 - 5.5 mmol/L   GLUCOSE, POC    Collection Time: 01/07/21 12:29 PM   Result Value Ref Range    Glucose (POC) 110 70 - 110 mg/dL   GLUCOSE, POC    Collection Time: 01/07/21  5:33 PM   Result Value Ref Range    Glucose (POC) 196 (H) 70 - 110 mg/dL   PTT    Collection Time: 01/07/21  5:43 PM   Result Value Ref Range    aPTT 32.4 23.0 - 36.4 SEC   GLUCOSE, POC    Collection Time: 01/08/21  2:14 AM   Result Value Ref Range    Glucose (POC) 175 (H) 70 - 110 mg/dL   CBC WITH AUTOMATED DIFF Collection Time: 01/08/21  4:50 AM   Result Value Ref Range    WBC 6.3 4.6 - 13.2 K/uL    RBC 2.89 (L) 4.20 - 5.30 M/uL    HGB 7.8 (L) 12.0 - 16.0 g/dL    HCT 25.2 (L) 35.0 - 45.0 %    MCV 87.2 74.0 - 97.0 FL    MCH 27.0 24.0 - 34.0 PG    MCHC 31.0 31.0 - 37.0 g/dL    RDW 15.2 (H) 11.6 - 14.5 %    PLATELET 081 (H) 368 - 420 K/uL    MPV 9.2 9.2 - 11.8 FL    NEUTROPHILS 75 (H) 40 - 73 %    LYMPHOCYTES 13 (L) 21 - 52 %    MONOCYTES 8 3 - 10 %    EOSINOPHILS 4 0 - 5 %    BASOPHILS 0 0 - 2 %    ABS. NEUTROPHILS 4.7 1.8 - 8.0 K/UL    ABS. LYMPHOCYTES 0.8 (L) 0.9 - 3.6 K/UL    ABS. MONOCYTES 0.5 0.05 - 1.2 K/UL    ABS. EOSINOPHILS 0.2 0.0 - 0.4 K/UL    ABS.  BASOPHILS 0.0 0.0 - 0.1 K/UL    DF AUTOMATED     METABOLIC PANEL, BASIC    Collection Time: 01/08/21  4:50 AM   Result Value Ref Range    Sodium 138 136 - 145 mmol/L    Potassium 4.4 3.5 - 5.5 mmol/L    Chloride 105 100 - 111 mmol/L    CO2 31 21 - 32 mmol/L    Anion gap 2 (L) 3.0 - 18 mmol/L    Glucose 134 (H) 74 - 99 mg/dL    BUN 17 7.0 - 18 MG/DL    Creatinine 0.36 (L) 0.6 - 1.3 MG/DL    BUN/Creatinine ratio 47 (H) 12 - 20      GFR est AA >60 >60 ml/min/1.73m2    GFR est non-AA >60 >60 ml/min/1.73m2    Calcium 9.1 8.5 - 10.1 MG/DL   MAGNESIUM    Collection Time: 01/08/21  4:50 AM   Result Value Ref Range    Magnesium 2.1 1.6 - 2.6 mg/dL   PHOSPHORUS    Collection Time: 01/08/21  4:50 AM   Result Value Ref Range    Phosphorus 3.6 2.5 - 4.9 MG/DL   METABOLIC PANEL, BASIC    Collection Time: 01/08/21  3:42 PM   Result Value Ref Range    Sodium 138 136 - 145 mmol/L    Potassium 3.9 3.5 - 5.5 mmol/L    Chloride 104 100 - 111 mmol/L    CO2 29 21 - 32 mmol/L    Anion gap 5 3.0 - 18 mmol/L    Glucose 120 (H) 74 - 99 mg/dL    BUN 16 7.0 - 18 MG/DL    Creatinine 0.31 (L) 0.6 - 1.3 MG/DL    BUN/Creatinine ratio 52 (H) 12 - 20      GFR est AA >60 >60 ml/min/1.73m2    GFR est non-AA >60 >60 ml/min/1.73m2    Calcium 9.4 8.5 - 10.1 MG/DL   MAGNESIUM    Collection Time: 01/08/21 3:42 PM   Result Value Ref Range    Magnesium 1.9 1.6 - 2.6 mg/dL       Signed By: Seven Mejia MD     January 8, 2021      I spent 35 minutes with the patient in face-to-face consultation, of which greater than 50% was spent in counseling and coordination of care as described above    Disclaimer: Sections of this note are dictated using utilizing voice recognition software. Minor typographical errors may be present. If questions arise, please do not hesitate to contact me or call our department.

## 2021-01-10 ENCOUNTER — APPOINTMENT (OUTPATIENT)
Dept: GENERAL RADIOLOGY | Age: 66
DRG: 003 | End: 2021-01-10
Attending: PHYSICIAN ASSISTANT
Payer: MEDICARE

## 2021-01-10 LAB
ABO + RH BLD: NORMAL
ANION GAP SERPL CALC-SCNC: 5 MMOL/L (ref 3–18)
APTT PPP: 89.2 SEC (ref 23–36.4)
ARTERIAL PATENCY WRIST A: YES
BASE EXCESS BLD CALC-SCNC: 5 MMOL/L
BASOPHILS # BLD: 0 K/UL (ref 0–0.1)
BASOPHILS NFR BLD: 0 % (ref 0–2)
BDY SITE: ABNORMAL
BLD PROD TYP BPU: NORMAL
BLD PROD TYP BPU: NORMAL
BLOOD GROUP ANTIBODIES SERPL: NORMAL
BLOOD GROUP ANTIBODIES SERPL: NORMAL
BPU ID: NORMAL
BPU ID: NORMAL
BUN SERPL-MCNC: 13 MG/DL (ref 7–18)
BUN/CREAT SERPL: 39 (ref 12–20)
CALCIUM SERPL-MCNC: 9 MG/DL (ref 8.5–10.1)
CALLED TO:,BCALL1: NORMAL
CHLORIDE SERPL-SCNC: 100 MMOL/L (ref 100–111)
CO2 SERPL-SCNC: 31 MMOL/L (ref 21–32)
CREAT SERPL-MCNC: 0.33 MG/DL (ref 0.6–1.3)
CROSSMATCH RESULT,%XM: NORMAL
CROSSMATCH RESULT,%XM: NORMAL
DIFFERENTIAL METHOD BLD: ABNORMAL
EOSINOPHIL # BLD: 0.4 K/UL (ref 0–0.4)
EOSINOPHIL NFR BLD: 6 % (ref 0–5)
ERYTHROCYTE [DISTWIDTH] IN BLOOD BY AUTOMATED COUNT: 15 % (ref 11.6–14.5)
GAS FLOW.O2 O2 DELIVERY SYS: ABNORMAL L/MIN
GAS FLOW.O2 SETTING OXYMISER: 10 BPM
GLUCOSE BLD STRIP.AUTO-MCNC: 156 MG/DL (ref 70–110)
GLUCOSE BLD STRIP.AUTO-MCNC: 160 MG/DL (ref 70–110)
GLUCOSE BLD STRIP.AUTO-MCNC: 196 MG/DL (ref 70–110)
GLUCOSE SERPL-MCNC: 148 MG/DL (ref 74–99)
HCO3 BLD-SCNC: 29.6 MMOL/L (ref 22–26)
HCT VFR BLD AUTO: 23.9 % (ref 35–45)
HCT VFR BLD AUTO: 24.1 % (ref 35–45)
HGB BLD-MCNC: 7.4 G/DL (ref 12–16)
HGB BLD-MCNC: 7.4 G/DL (ref 12–16)
INSPIRATION.DURATION SETTING TIME VENT: 0.9 SEC
LYMPHOCYTES # BLD: 1.1 K/UL (ref 0.9–3.6)
LYMPHOCYTES NFR BLD: 18 % (ref 21–52)
MAGNESIUM SERPL-MCNC: 1.7 MG/DL (ref 1.6–2.6)
MCH RBC QN AUTO: 26.5 PG (ref 24–34)
MCHC RBC AUTO-ENTMCNC: 31 G/DL (ref 31–37)
MCV RBC AUTO: 85.7 FL (ref 74–97)
MONOCYTES # BLD: 0.5 K/UL (ref 0.05–1.2)
MONOCYTES NFR BLD: 8 % (ref 3–10)
NEUTS SEG # BLD: 4.2 K/UL (ref 1.8–8)
NEUTS SEG NFR BLD: 68 % (ref 40–73)
O2/TOTAL GAS SETTING VFR VENT: 28 %
PCO2 BLD: 46.3 MMHG (ref 35–45)
PEEP RESPIRATORY: 5 CMH2O
PH BLD: 7.41 [PH] (ref 7.35–7.45)
PHOSPHATE SERPL-MCNC: 3.7 MG/DL (ref 2.5–4.9)
PLATELET # BLD AUTO: 446 K/UL (ref 135–420)
PMV BLD AUTO: 8.9 FL (ref 9.2–11.8)
PO2 BLD: 96 MMHG (ref 80–100)
POTASSIUM SERPL-SCNC: 3.5 MMOL/L (ref 3.5–5.5)
PRESSURE SUPPORT SETTING VENT: 8 CMH2O
RBC # BLD AUTO: 2.79 M/UL (ref 4.2–5.3)
SAO2 % BLD: 97 % (ref 92–97)
SERVICE CMNT-IMP: ABNORMAL
SODIUM SERPL-SCNC: 136 MMOL/L (ref 136–145)
SPECIMEN EXP DATE BLD: NORMAL
SPECIMEN TYPE: ABNORMAL
STATUS OF UNIT,%ST: NORMAL
STATUS OF UNIT,%ST: NORMAL
TOTAL RESP. RATE, ITRR: 21
UNIT DIVISION, %UDIV: 0
UNIT DIVISION, %UDIV: 0
VENTILATION MODE VENT: ABNORMAL
VOLUME CONTROL PLUS IVLCP: YES
VT SETTING VENT: 500 ML
WBC # BLD AUTO: 6.2 K/UL (ref 4.6–13.2)

## 2021-01-10 PROCEDURE — 65620000000 HC RM CCU GENERAL

## 2021-01-10 PROCEDURE — 36600 WITHDRAWAL OF ARTERIAL BLOOD: CPT

## 2021-01-10 PROCEDURE — 85018 HEMOGLOBIN: CPT

## 2021-01-10 PROCEDURE — 74011000258 HC RX REV CODE- 258: Performed by: INTERNAL MEDICINE

## 2021-01-10 PROCEDURE — 74011250637 HC RX REV CODE- 250/637: Performed by: INTERNAL MEDICINE

## 2021-01-10 PROCEDURE — 74011636637 HC RX REV CODE- 636/637: Performed by: INTERNAL MEDICINE

## 2021-01-10 PROCEDURE — 74011250636 HC RX REV CODE- 250/636: Performed by: NURSE PRACTITIONER

## 2021-01-10 PROCEDURE — 99233 SBSQ HOSP IP/OBS HIGH 50: CPT | Performed by: INTERNAL MEDICINE

## 2021-01-10 PROCEDURE — 94003 VENT MGMT INPAT SUBQ DAY: CPT

## 2021-01-10 PROCEDURE — 85730 THROMBOPLASTIN TIME PARTIAL: CPT

## 2021-01-10 PROCEDURE — 71045 X-RAY EXAM CHEST 1 VIEW: CPT

## 2021-01-10 PROCEDURE — 82962 GLUCOSE BLOOD TEST: CPT

## 2021-01-10 PROCEDURE — 77030038269 HC DRN EXT URIN PURWCK BARD -A

## 2021-01-10 PROCEDURE — 74011250637 HC RX REV CODE- 250/637: Performed by: RADIOLOGY

## 2021-01-10 PROCEDURE — 74011250636 HC RX REV CODE- 250/636: Performed by: INTERNAL MEDICINE

## 2021-01-10 PROCEDURE — 82803 BLOOD GASES ANY COMBINATION: CPT

## 2021-01-10 PROCEDURE — 80048 BASIC METABOLIC PNL TOTAL CA: CPT

## 2021-01-10 PROCEDURE — 99291 CRITICAL CARE FIRST HOUR: CPT | Performed by: INTERNAL MEDICINE

## 2021-01-10 PROCEDURE — 85025 COMPLETE CBC W/AUTO DIFF WBC: CPT

## 2021-01-10 PROCEDURE — 83735 ASSAY OF MAGNESIUM: CPT

## 2021-01-10 PROCEDURE — 2709999900 HC NON-CHARGEABLE SUPPLY

## 2021-01-10 PROCEDURE — 74011000250 HC RX REV CODE- 250: Performed by: PHYSICIAN ASSISTANT

## 2021-01-10 PROCEDURE — 84100 ASSAY OF PHOSPHORUS: CPT

## 2021-01-10 PROCEDURE — 74011250637 HC RX REV CODE- 250/637: Performed by: NURSE PRACTITIONER

## 2021-01-10 RX ORDER — MAGNESIUM SULFATE HEPTAHYDRATE 40 MG/ML
2 INJECTION, SOLUTION INTRAVENOUS ONCE
Status: DISCONTINUED | OUTPATIENT
Start: 2021-01-10 | End: 2021-01-10

## 2021-01-10 RX ORDER — DIAZEPAM 5 MG/1
20 TABLET ORAL 2 TIMES DAILY
Status: DISCONTINUED | OUTPATIENT
Start: 2021-01-10 | End: 2021-01-11

## 2021-01-10 RX ORDER — HEPARIN SODIUM 5000 [USP'U]/ML
5000 INJECTION, SOLUTION INTRAVENOUS; SUBCUTANEOUS EVERY 8 HOURS
Status: DISCONTINUED | OUTPATIENT
Start: 2021-01-10 | End: 2021-01-13

## 2021-01-10 RX ORDER — MAGNESIUM SULFATE HEPTAHYDRATE 40 MG/ML
2 INJECTION, SOLUTION INTRAVENOUS ONCE
Status: COMPLETED | OUTPATIENT
Start: 2021-01-10 | End: 2021-01-10

## 2021-01-10 RX ADMIN — SODIUM CHLORIDE 10 ML: 9 INJECTION, SOLUTION INTRAMUSCULAR; INTRAVENOUS; SUBCUTANEOUS at 13:20

## 2021-01-10 RX ADMIN — DOCUSATE SODIUM 100 MG: 50 LIQUID ORAL at 08:26

## 2021-01-10 RX ADMIN — NYSTATIN 500000 UNITS: 100000 SUSPENSION ORAL at 08:27

## 2021-01-10 RX ADMIN — MAGNESIUM SULFATE HEPTAHYDRATE 2 G: 40 INJECTION, SOLUTION INTRAVENOUS at 13:18

## 2021-01-10 RX ADMIN — CHLORHEXIDINE GLUCONATE 0.12% ORAL RINSE 10 ML: 1.2 LIQUID ORAL at 08:26

## 2021-01-10 RX ADMIN — OXYCODONE HYDROCHLORIDE 20 MG: 5 SOLUTION ORAL at 13:18

## 2021-01-10 RX ADMIN — INSULIN LISPRO 3 UNITS: 100 INJECTION, SOLUTION INTRAVENOUS; SUBCUTANEOUS at 13:23

## 2021-01-10 RX ADMIN — INSULIN LISPRO 3 UNITS: 100 INJECTION, SOLUTION INTRAVENOUS; SUBCUTANEOUS at 18:02

## 2021-01-10 RX ADMIN — FUROSEMIDE 20 MG: 20 TABLET ORAL at 08:27

## 2021-01-10 RX ADMIN — NYSTATIN 500000 UNITS: 100000 SUSPENSION ORAL at 13:18

## 2021-01-10 RX ADMIN — NALOXEGOL OXALATE 25 MG: 25 TABLET, FILM COATED ORAL at 08:27

## 2021-01-10 RX ADMIN — SODIUM CHLORIDE 10 ML: 9 INJECTION, SOLUTION INTRAMUSCULAR; INTRAVENOUS; SUBCUTANEOUS at 21:20

## 2021-01-10 RX ADMIN — OXYCODONE HYDROCHLORIDE 20 MG: 5 SOLUTION ORAL at 21:19

## 2021-01-10 RX ADMIN — CLOPIDOGREL BISULFATE 75 MG: 75 TABLET ORAL at 08:27

## 2021-01-10 RX ADMIN — NYSTATIN 500000 UNITS: 100000 SUSPENSION ORAL at 18:03

## 2021-01-10 RX ADMIN — INSULIN GLARGINE 20 UNITS: 100 INJECTION, SOLUTION SUBCUTANEOUS at 21:19

## 2021-01-10 RX ADMIN — HEPARIN SODIUM 5000 UNITS: 5000 INJECTION INTRAVENOUS; SUBCUTANEOUS at 18:03

## 2021-01-10 RX ADMIN — ATORVASTATIN CALCIUM 40 MG: 40 TABLET, FILM COATED ORAL at 21:18

## 2021-01-10 RX ADMIN — CARVEDILOL 6.25 MG: 6.25 TABLET, FILM COATED ORAL at 21:18

## 2021-01-10 RX ADMIN — DEXTROSE MONOHYDRATE AND SODIUM CHLORIDE 25 ML/HR: 5; .45 INJECTION, SOLUTION INTRAVENOUS at 08:28

## 2021-01-10 RX ADMIN — Medication 30 ML: at 08:26

## 2021-01-10 RX ADMIN — SODIUM CHLORIDE 10 ML: 9 INJECTION, SOLUTION INTRAMUSCULAR; INTRAVENOUS; SUBCUTANEOUS at 06:32

## 2021-01-10 RX ADMIN — ASPIRIN 81 MG: 81 TABLET, CHEWABLE ORAL at 08:27

## 2021-01-10 RX ADMIN — NYSTATIN 500000 UNITS: 100000 SUSPENSION ORAL at 21:19

## 2021-01-10 RX ADMIN — INSULIN LISPRO 3 UNITS: 100 INJECTION, SOLUTION INTRAVENOUS; SUBCUTANEOUS at 06:30

## 2021-01-10 RX ADMIN — POTASSIUM BICARBONATE 40 MEQ: 782 TABLET, EFFERVESCENT ORAL at 13:18

## 2021-01-10 RX ADMIN — CARVEDILOL 6.25 MG: 6.25 TABLET, FILM COATED ORAL at 08:27

## 2021-01-10 RX ADMIN — LANSOPRAZOLE 30 MG: KIT at 08:26

## 2021-01-10 RX ADMIN — OXYCODONE HYDROCHLORIDE 20 MG: 5 SOLUTION ORAL at 06:32

## 2021-01-10 RX ADMIN — HEPARIN SODIUM AND DEXTROSE 17 UNITS/KG/HR: 10000; 5 INJECTION INTRAVENOUS at 08:28

## 2021-01-10 RX ADMIN — CHLORHEXIDINE GLUCONATE 0.12% ORAL RINSE 10 ML: 1.2 LIQUID ORAL at 21:19

## 2021-01-10 RX ADMIN — DIAZEPAM 20 MG: 5 TABLET ORAL at 18:03

## 2021-01-10 NOTE — PROGRESS NOTES
[facility]  ICU Progress Note      Name: Rajendra Gutierrez   : 1955   MRN: [id number]   Date: 1/10/2021 9:14 AM     [x]I have reviewed the flowsheet and previous days notes. Events overnight reviewed and discussed with nursing staff. Vital signs and records reviewed. Interval HPI    Patient is a 72 y. o. female with a past medical history of COPD, CHF, HTN, DM, dementia, admitted for [facility] with acute on chronic systolic heart failure, acute MI with anterolateral STEMI and Q waves s/p ptca/stent to proximal/mid LAD on 20. Pt was intubated and extubated on 20, transferred to . On 20 patient was intubated and transferred to ICU for respiratory failure and cardiogenic +/- septic shock. Respiratory cultures on  with ESBL on Merrem. Dr. Nigel Pace and IR have been consulted for trach/PEG. She is S/p trach  and s/p PEG .     Subjective 21  LOS: 31    Overnight events: No significant events noted overnight  Mentation/Activity: opens eyes spontaneously, withdraws to pain  Respiratory/ Secretions: stable  Hemodynamics: H/H stable  Need for procedures:No   - remains more on the sedate side- decreasing valium dosing  - scant, light bloody secretions around trach- holding heparin drip, Plavix restarted yesterday  - Hb slightly down- rechecking in 8 hours  - PICC line working fine  - clear secretions from trach, moderate oral secretions   - Bottom teeth in poor condition and loose  - stopping IVF- keep TKO at 10 mls/hr  - Replacing potassium and Mag - adding replacement K with Lasix. - TF: Glucerna- change from TID to Q 8 bolus feeds- tolerating thus far              ROS:Review of systems not obtained due to patient factors.     Vital Signs:    Visit Vitals  /71   Pulse 77   Temp 99.9 °F (37.7 °C)   Resp 13   Ht 5' 7\" (1.702 m)   Wt 68.7 kg (151 lb 7.3 oz)   SpO2 100%   Breastfeeding No   BMI 23.72 kg/m²       O2 Device: Ventilator, Tracheostomy   O2 Flow Rate (L/min): 40 l/min   Temp (24hrs), Av.6 °F (37.6 °C), Min:99 °F (37.2 °C), Max:99.9 °F (37.7 °C)       Intake/Output:   Last shift:      01/10 0701 - 01/10 1900  In: 1020   Out: -   Last 3 shifts: 1901 - 01/10 07  In: 3414.9 [I.V.:1404.9]  Out: 1060 [Urine:800; Drains:160]    Intake/Output Summary (Last 24 hours) at 1/10/2021 1125  Last data filed at 1/10/2021 1033  Gross per 24 hour   Intake 3328.12 ml   Output 360 ml   Net 2968. 12 ml       Ventilator Settings:  Mode Rate Tidal Volume Pressure FiO2 PEEP   SIMV   500 ml  8 cm H2O 28 % 5 cm H20     Peak airway pressure: 24 cm H2O    Minute ventilation: 5.7 l/min      ARDS network Guidelines:   Lung protective strategy and Plateau  Pressure goal < 30 cm H2O goals  Oxygenation Goals PaO2 55-80 mm Hg or SaO2 88-95%  PH goal 7.30-7.45    VAP bundle:  Reviewed. Giovanna tube to suction at 20-30 cm Hg. Maintain Iredell tube with 5-10ml air every 4 hours  Routine oral care every 4 hours  Elevation of head > 45 degree  Daily sedation holiday and SBT evaluation starting at 6.00am.    Physical Exam:               General:sedated; NAD, acyanotic - remains on vent  HEENT:  Anicteric sclerae; pink palpebral conjunctivae; mucosa moist, Trach in situ (CDI)- mild pink tinge blood around dressing, Poor dentition- especially bottom teeth- loose with potential to fall out  Resp: (+) coarse rhonchi b/l. Symmetrical chest expansion; good airway entry;   CV:  S1, S2 present; regular rate and rhythm   GI:  Abdomen soft, non-tender; (+) active bowel sounds. PEG in SITU- dressing clean- no blood oozing  Extremities:  +2 pulses on all extremities  Skin:  Warm; no rashes/ lesions noted, normal turgor/cap refill.    Neurologic:   Opening eyes spontaneously, No command following, moving extremities spontaneously   Devices:  Trach, Purewick, PICC RUE, PEG, FMS      DATA:     Current Facility-Administered Medications   Medication Dose Route Frequency    diazePAM (VALIUM) tablet 20 mg 20 mg Oral BID    naloxegoL (MOVANTIK) tablet 25 mg  25 mg Oral DAILY PRN    potassium bicarb-citric acid (EFFER-K) tablet 40 mEq  40 mEq Oral DAILY    magnesium sulfate 2 g/50 ml IVPB (premix or compounded)  2 g IntraVENous ONCE    nystatin (MYCOSTATIN) 100,000 unit/mL oral suspension 500,000 Units  500,000 Units Oral QID    carvediloL (COREG) tablet 6.25 mg  6.25 mg Oral Q12H    aspirin chewable tablet 81 mg  81 mg Oral DAILY    clopidogreL (PLAVIX) tablet 75 mg  75 mg Oral DAILY    dextrose 5 % - 0.45% NaCl infusion  10 mL/hr IntraVENous CONTINUOUS    insulin glargine (LANTUS) injection 20 Units  20 Units SubCUTAneous QHS    0.9% sodium chloride infusion 250 mL  250 mL IntraVENous PRN    oxyCODONE (ROXICODONE) 5 mg/5 mL oral solution 20 mg  20 mg Oral Q8H    [Held by provider] docusate (COLACE) 50 mg/5 mL oral liquid 100 mg  100 mg Oral DAILY    furosemide (LASIX) tablet 20 mg  20 mg Oral DAILY    lansoprazole (PREVACID) 3 mg/mL oral suspension 30 mg  30 mg Per G Tube ACB    heparin 25,000 units in D5W 250 ml infusion  12-25 Units/kg/hr IntraVENous TITRATE    polyethylene glycol (MIRALAX) packet 17 g  17 g Per NG tube PRN    chlorhexidine (PERIDEX) 0.12 % mouthwash 10 mL  10 mL Oral Q12H    ELECTROLYTE REPLACEMENT PROTOCOL - Potassium Standard Dosing   1 Each Other PRN    ELECTROLYTE REPLACEMENT PROTOCOL - Magnesium   1 Each Other PRN    ELECTROLYTE REPLACEMENT PROTOCOL - Phosphorus  Standard Dosing  1 Each Other PRN    ELECTROLYTE REPLACEMENT PROTOCOL - Calcium   1 Each Other PRN    [Held by provider] lisinopriL (PRINIVIL, ZESTRIL) tablet 5 mg  5 mg Oral DAILY    [Held by provider] spironolactone (ALDACTONE) tablet 25 mg  25 mg Oral DAILY    multivit-folic acid-herbal 670 (WELLESSE PLUS) oral liquid 30 mL  30 mL Per NG tube DAILY    insulin lispro (HUMALOG) injection   SubCUTAneous Q6H    albuterol-ipratropium (DUO-NEB) 2.5 MG-0.5 MG/3 ML  3 mL Nebulization Q4H PRN    sodium chloride (NS) flush 5-40 mL  5-40 mL IntraVENous Q8H    sodium chloride (NS) flush 5-40 mL  5-40 mL IntraVENous PRN    acetaminophen (TYLENOL) tablet 650 mg  650 mg Oral Q6H PRN    Or    acetaminophen (TYLENOL) suppository 650 mg  650 mg Rectal Q6H PRN    promethazine (PHENERGAN) tablet 12.5 mg  12.5 mg Oral Q6H PRN    Or    ondansetron (ZOFRAN) injection 4 mg  4 mg IntraVENous Q6H PRN    glucose chewable tablet 16 g  4 Tab Oral PRN    glucagon (GLUCAGEN) injection 1 mg  1 mg IntraMUSCular PRN    dextrose (D50W) injection syrg 12.5-25 g  25-50 mL IntraVENous PRN    atorvastatin (LIPITOR) tablet 40 mg  40 mg Oral QHS         Labs: Results:       Chemistry Recent Labs     01/10/21  0620 01/09/21  0545 01/08/21  1542   * 122* 120*    137 138   K 3.5 4.1 3.9    103 104   CO2 31 29 29   BUN 13 11 16   CREA 0.33* 0.30* 0.31*   CA 9.0 9.4 9.4   AGAP 5 5 5   BUCR 39* 37* 52*      CBC w/Diff Recent Labs     01/10/21  0620 01/09/21  0545 01/08/21  0450   WBC 6.2 6.6 6.3   RBC 2.79* 3.15* 2.89*   HGB 7.4* 8.3* 7.8*   HCT 23.9* 26.9* 25.2*   * 508* 476*   GRANS 68 79* 75*   LYMPH 18* 12* 13*   EOS 6* 1 4      Coagulation Recent Labs     01/10/21  0620 01/09/21  1730   APTT 89.2* 94.5*       Liver Enzymes No results for input(s): TP, ALB, TBIL, AP in the last 72 hours. No lab exists for component: SGOT, GPT, DBIL   ABG Lab Results   Component Value Date/Time    PHI 7.41 01/10/2021 03:48 AM    PCO2I 46.3 (H) 01/10/2021 03:48 AM    PO2I 96 01/10/2021 03:48 AM    HCO3I 29.6 (H) 01/10/2021 03:48 AM    FIO2I 28 01/10/2021 03:48 AM      Microbiology No results for input(s): CULT in the last 72 hours. Telemetry: [x]Sinus []A-flutter []Paced    []A-fib []Multiple PVCs                  Imaging:    CXR Results  (Last 48 hours)               01/10/21 0447  XR CHEST PORT Final result    Impression:  IMPRESSION:        1. Interval removal of ET tube with tracheostomy placed.  Repositioned   right-sided PICC line with the tip from proximal SVC to the right jugular vein,   tip not imaged. No pneumothorax. 2.  Persistent extensive pulmonary infiltrations. 3.  Gas-filled nondilated splenic flexure shows moderate bowel wall thickening. Recommend clinical correlation and abdomen 2 views or CT. Thank you for your referral.       Narrative:  Chest AP single view       HISTORY: Postintubation follow-up        COMPARISON: 1/4/21       FINDINGS: There is status post ET tube removal with tracheostomy tube placement. The right-sided PICC line has being interval reposition from proximal SVC to the   right jugular vein, tip not imaged. The NG tube appears unchanged. No   pneumothorax. The cardiac silhouette appears unremarkable. The lungs show   extensive pulmonary opacities, more pronounced in the periphery. Mild interval   progression noted since the prior study. Incidental noted is gas-filled splenic   flexure with moderate bowel wall thickening. IMPRESSION:   · Acute on Chronic Hypoxic and Hypercapnic Respiratory Failure - Requiring mechanical ventilation, Extubated on 12/17 and reintubated on 12/22, Likely aspiration event. Trach placed 1/4/21, Dr. Solange Anguiano   · Wide Complex Arrhythmia (12/25) -noted on bedside cardiac monitor. Lasted roughly 5-10 mins with Spontaneous resolution. E-lyte derangement vs Drug effect (pt was on 55mcg/hr Oliverio-Synephrine at the time) vs cardiac insult.    · Acute on Chronic Systolic Heart Failure - superimposed on severe ILD, Echo 12/10/20 EF 35-40%  · Combination septic/cardiogenic shock- currently resolved  · Aspiration PNA. Sputum Cx(12/22) (+) heavy E.Coli-ESBL. Recent Hx of E.coli ESBL.   · Acute Encephalopathy - likely toxic/metabolic vs infectious vs hepatic in nature, ammonia wnl.   · Severe pulmonary fibrosis - with extensive honeycombing and bronchiectasis as seen on CTA chest 12/16/20 - appears to be a new diagnosis but possibly UIP  · UTI (+)E.  Coli- ESBL  · Acute MI with anterolateral STEMI and Q waves - s/p ptca/stent to proximal/mid LAD using ARELY on DAPT on 12/11/20- Remains on Brilinta  · Severe dysphagia - failed MBS s/p PEG, 1/8/2021  · Critical illness myopathy/polyneuropathy  ·  Hep C: + RNA viral load 4/4/2020  · Multiple liver masses - noted on CT scan 11/25/20 - etiology unknown at this time; concerning for neoplasm  · COPD - on home O2 4L NC  · DM  · Dementia - unknown baseline, on aricept   · S/P Trach 1/4/21   RECOMMENDATIONS:   Resp:   -Titrate FiO2/ supp O2 for SpO2 >90%  -Aspiration precautions   -S/p trach 1/4/21, Dr. Becky Guy  -Efren Reading - monitor oozing at site- heparin stopped   -Aggressive pulmonary hygiene   -Please continue daily SBT   -Currently tolerating SIMV  -Delta on ETCO2 and ABG is around 15   I/D:   -Aleukocyotosis  -Per ID,s/p Merrem  for Ecoli (ESBL)   -Negative for COVID19 on 1/1/2021  - monitor low grade temp  Hem/Onc:   -Daily CBC; - recheck Hb  -Will transfuse unit of blood to maintain Hgb >7   CVS:   -Continue DAPT   -EF 35-40 % on 12/10/20, the day before stent placement  -Proximal LAD angioplasty and stenting on 12/11/2020 for 95% stenotic lesion.  -Hemodynamics stable off pressors  -Tolerating statin   -continue lasix  -BUE and BLE PVL's negative   -Heparin drip stopped  Metabolic:   -Daily BMP; monitor e-lytes; replace PRN  replace Mg  Renal:    -Trend Renal indices, Purewick   Endocrine:   -Continue lantus QHS with SSI   GI:   -SUP  -Restart Feeds via PEG 24 hours post placement   -Bowel regimen: Miralax PRN, D/C colace- monitor liquid stool output with plan to remove FMS in near future but currently still needs  -PEG tube insertion today with IR    -CT Abd from 1/3/21: gastric antrum contiguous with anterior peritoneum, enlarging hepatic masses consistent with likely malignancy or mets  Musc/Skin:   -No acute issues, wound care  Neuro:   -Off IV sedation  -Change valium from TID to BID- wean as tolerated  -Transition to enteral meds; fiona and valium. Can consider interval decrease in sedation and analgesia as patient tolerates   PPX  -SUP ppx  -Plan to restart DVT-chemoprophylaxis later today or tomorrow- pending oozing from trach and repeat Hb    Will need to work with case management for dispositon. Will need LTACH     Discussed in Interdisciplinary Rounds        The patient is: [x] acutely ill Risk of deterioration: [] moderate    [] critically ill  [] high     [x]See my orders for details    My assessment/plan was discussed with:  [x]Nursing []PT/OT    [x]Respiratory therapy []    []Family [x] [address] [place] [address] Time:  The services I provided to this patient were to treat and/or prevent clinically significant deterioration that could result in the failure of one or more body systems and/or organ systems due to respiratory distress, hypoxia, cardiac dysrhythmia. Services included the following:  -reviewing nursing notes and old charts  -vital sign assessments   -direct patient care  -medication orders and management  -interpreting and reviewing diagnostic studies/labs  -re-evaluations  -documentation time        Aggregate critical care time was 40   minutes, which includes only time during which I was engaged in work directly related to the patient's care as described above. During this entire length of time I was immediately available to the patient. The reason for providing this level of medical care for this critically ill patient was due a critical illness that impaired one or more vital organ systems such that there was a high probability of imminent or life threatening deterioration in the patients condition.  This care involved high complexity decision making to assess, manipulate, and support vital system functions, to treat this degreee vital organ system failure and to prevent further life threatening deterioration of the patients condition      Complex decision making was made in the evaluation and management plans during this consultation. More than 50% of time was spent in counseling and coordination of care including review of data and discussion with other team members.     Rose Marie Castillo DO, [facility]    [address] [facility]  Pulmonary, Critical Care, and Sleep Medicine

## 2021-01-10 NOTE — PROGRESS NOTES
1915-Received report/assumed care of pt.    2000-Pt assessment done. 2140-Scheduled medications given. Bolus tube feeding started, 1800 feeding was not given. 0000-Pt bathed, purewick with tubing and canister changed. 0025-Resp therapist at bedside. 0630-Pericare given. Purewick changed.

## 2021-01-10 NOTE — PROGRESS NOTES
[facility], [address] [facility], [place] [address] [facility]  Fax: [phone]      CARDIOLOGY PROGRESS NOTE  RECS:  1. Acute respiratory failure- requiring mechanical ventilation- extubated  12/17/20. re intubated 12/22/20. S/p tracheostomy 1/4/20 Continue supportive care  2. Sepsis- Sputum Cx(12/22) (+) heavy E.Coli-ESBL. on antibiotics. ID following   3. Wide complex Arrhythmia on 12/25/20- No recurrence will continue to monitor. Monitor electrolytes    4. Hypertension - Carvedilol 6.25 mg bid. 5. Subacute MI- 12/10/20 -s/p [facility] S/p ptca/stent to proximal/ mid LAD using ARELY.  Moderate to severe LV dysfunction.  ekg no new ischemic changes suggestive of reinfarction or stent thrombosis. S/p PEG tube. Continue aspirin and Clopidogrel. OK to hold Heparin. Heparin SQ.   6. Anemia- H&H Stable.  S/p transfusion. 7. Acute Systolic Congestive heart Failure-recent echo with EF% 35%-40. Compensated at present. Lasix apo daily.   8. COPD,   9. Dementia - failed swallow eval. Gastrostomy tube placement planned for 1/8/21  by IR.  10. Hypomagnesemia - Magnesium 2 gram IV X 1. Total critical care management time: 10 minutes.               Cardiac cath 12/11/20  Patient presented to [address] [place] [address] with late presentation anterior wall MI.  EF 35-40%. Patient was initially managed medically-- however developed acute pulmonary edema requiring intubation. Also troponin level increasing consistent with ongoing ischemia. S/p ptca/stent to proximal/ mid LAD using ARELY. LVEDP 8 mmHg. Normal PASP pressure with normal PCWP. Moderate to severe LV dysfunction.     Echo 12/10/20  · LV: Estimated LVEF is 35 - 40%. Visually measured ejection fraction. Normal cavity size and wall thickness. Moderately and segmentally reduced systolic function. Inconclusive left ventricular diastolic function.   AO: Mild aortic root dilatation; diameter is 3.8 cm.    ASSESSMENT:  Hospital Problems  Date Reviewed: 2/7/2018          Codes Class Noted POA    Goals of care, counseling/discussion ICD-10-CM: Z71.89  ICD-9-CM: V65.49  Unknown Unknown        Dementia without behavioral disturbance ([facility] ICD-10-CM: F03.90  ICD-9-CM: 294.20  Unknown Unknown        Pulmonary fibrosis ([facility] ICD-10-CM: J84.10  ICD-9-CM: 259  Unknown Unknown        Severe protein-calorie malnutrition ([facility] ICD-10-CM: E43  ICD-9-CM: 120  12/16/2020 Clinically Undetermined        Acute on chronic systolic congestive heart failure ([facility] ICD-10-CM: I50.23  ICD-9-CM: 428.23, 428.0  12/15/2020 Unknown        * (Principal) STEMI (ST elevation myocardial infarction) ([facility] ICD-10-CM: I21.3  ICD-9-CM: 410.90  12/10/2020 Unknown                SUBJECTIVE:  Intubated. Sedated. FiO2 28%. PEEP 5. Heparin held due to bleeding around tracheostomy. OBJECTIVE:    VS:   Visit Vitals  /64   Pulse 78   Temp 99.9 °F (37.7 °C)   Resp 17   Ht 5' 7\" (1.702 m)   Wt 68.7 kg (151 lb 7.3 oz)   SpO2 100%   Breastfeeding No   BMI 23.72 kg/m²         Intake/Output Summary (Last 24 hours) at 1/10/2021 1506  Last data filed at 1/10/2021 1300  Gross per 24 hour   Intake 2874.96 ml   Output 290 ml   Net 2584.96 ml     TELE: normal sinus rhythm    General: No acute distress  HENT: Normocephalic, atraumatic. Neck :  Supple  Cardiac:  Normal S1/S2  Chest/Lungs:Course anterior.    Abdomen: Soft  Extremities:  No edema      Labs: Results:       Chemistry Recent Labs     01/10/21  0620 01/09/21  0545 01/08/21  1542 01/08/21  0450   * 122* 120* 134*    137 138 138   K 3.5 4.1 3.9 4.4    103 104 105   CO2 31 29 29 31   BUN 13 11 16 17   CREA 0.33* 0.30* 0.31* 0.36*   CA 9.0 9.4 9.4 9.1   MG 1.7 1.8 1.9 2.1   PHOS 3.7 4.3  --  3.6   AGAP 5 5 5 2*   BUCR 39* 37* 52* 47*      CBC w/Diff Recent Labs     01/10/21  1310 01/10/21  0620 01/09/21  0545 01/08/21  0450   WBC  --  6.2 6.6 6.3   RBC  --  2.79* 3.15* 2.89*   HGB 7. 4* 7.4* 8.3* 7.8*   HCT 24.1* 23.9* 26.9* 25.2*   PLT  --  446* 508* 476*   GRANS  --  68 79* 75*   LYMPH  --  18* 12* 13*   EOS  --  6* 1 4      Cardiac Enzymes No results for input(s): CPK, CKND1, NATHANIEL in the last 72 hours. No lab exists for component: CKRMB, TROIP   Coagulation Recent Labs     01/10/21  0620 01/09/21  1730   APTT 89.2* 94.5*       Lipid Panel No results found for: CHOL, CHOLPOCT, CHOLX, CHLST, CHOLV, 788547, HDL, HDLP, LDL, LDLC, DLDLP, 765063, VLDLC, VLDL, TGLX, TRIGL, TRIGP, TGLPOCT, CHHD, CHHDX   BNP No results for input(s): BNPP in the last 72 hours. Liver Enzymes No results for input(s): TP, ALB, TBIL, AP in the last 72 hours.     No lab exists for component: SGOT, GPT, DBIL   Digoxin    Thyroid Studies Lab Results   Component Value Date/Time    TSH 2.92 12/10/2020 11:07 AM              Sada Vargas MD   Pager # [phone]

## 2021-01-10 NOTE — PROGRESS NOTES
07:15  Received pt from [facility]. Pt. Resting in bed. Heparin infusing at 17 units/kg/hr. D5 1/2 NS infusing at 25 ml/hr. 08:30  Trache care performed. Shay Casselberry blood noted at trache site mixed with thin, clear exudate (sputum). No bleeding noted to G tube. Dressing CDI. Residual to G-tube 10 ml light green liquid. 10:30  Dr. Stephanie Gamez at bedside. Order received to stop heparin due to bleeding at trache site. 12:45  Dr. Brandy Bowen notified of discontinuation of heparin. 16:00  Spoke with Dr. Brandy Bowen as duplicate order for magnesium 2 gm. D/Cd. 19:00  Bedside and Verbal shift change report given to Balwinder Jerome RN (oncoming nurse) by Mat Soto RN (offgoing nurse). Report included the following information SBAR, Kardex, Procedure Summary, Intake/Output, MAR, Recent Results and Cardiac Rhythm NSR.

## 2021-01-10 NOTE — PROGRESS NOTES
[facility] Hospitalist Group  Progress Note    Patient: Raghav Cardoso Age: 72 y.o. : 1955 MR#: [id number] SSN: [id number]  Date/Time: 1/10/2021     Subjective:     Patient seen and evaluated, lying in bed is critically ill on mechanical ventilation. No acute overnight chagnes per discussion with nursing. Mag replaced. Plavix on Hold  Some bleeding noted around trach  Remains on Heparin drip    Discussed plan of care with Dr. Shivani Bonilla who was also at bedside. Interval HPI  Patient is a 72 y. o. female with a past medical history of COPD, CHF, HTN, DM, dementia, presented to [address] [place] [address] with acute on chronic systolic heart failure, acute MI with anterolateral STEMI and Q waves s/p ptca/stent to proximal/mid LAD on 20. Pt was intubated and extubated on 20, transferred to . On 20 patient was intubated and transferred to ICU for respiratory failure and cardiogenic +/- septic shock. Respiratory cultures on  with ESBL on Merrem. Dr. Steffi Sandhoff and IR have been consulted for trach/PEG. She is S/p trach 21 and planning for PEG placement by IR on 21. Assessment/Plan:     · Acute on Chronic Hypoxic and Hypercapnic Respiratory Failure - Requiring mechanical ventilation, Extubated on  and reintubated on , Likely aspiration event. Trach placed 21, Dr. Marilia Valentine on board  · Wide Complex Arrhythmia () -noted on bedside cardiac monitor. Lasted roughly 5-10 mins with Spontaneous resolution. E-lyte derangement vs Drug effect (pt was on 55mcg/hr Oliverio-Synephrine at the time) vs cardiac insult.    · Acute on Chronic Systolic Heart Failure - superimposed on severe ILD, Echo 12/10/20 EF 35-40%  · Combination septic/cardiogenic shock, improving   · Aspiration PNA. Sputum Cx() (+) heavy E.Coli-ESBL.  Recent Hx of E.coli ESBL.   · Acute Encephalopathy - likely toxic/metabolic vs infectious vs hepatic in nature, ammonia wnl.   · Severe pulmonary fibrosis - with extensive honeycombing and bronchiectasis as seen on CTA chest 20 - appears to be a new diagnosis but possibly UIP  · Acute aspiration pneumonia with severe sepsis  · UTI (+)E. coli  · Acute MI with anterolateral STEMI and Q waves - s/p ptca/stent to proximal/mid LAD using ARELY on DAPT on 20- Remains on Brilinta  · Severe dysphagia - failed MBS. Will place PEG tube today with IR.   · Critical illness myopathy/polyneuropathy  · Hx of Hep C: + RNA viral load 2020  · Multiple liver masses - noted on CT scan 20  · COPD - on home O2 4L NC  · DM  · Dementia - unknown baseline, on aricept   · Prognosis guarded  · Full code          Case discussed with:  []Patient  []Family  [x]Nursing  []Case Management  DVT Prophylaxis:  []Lovenox  []Hep SQ  [x]SCDs  []Coumadin   [x]On Heparin gtt    Objective:   VS:   Visit Vitals  /71   Pulse 77   Temp 99.9 °F (37.7 °C)   Resp 13   Ht 5' 7\" (1.702 m)   Wt 68.7 kg (151 lb 7.3 oz)   SpO2 100%   Breastfeeding No   BMI 23.72 kg/m²      Tmax/24hrs: Temp (24hrs), Av.5 °F (37.5 °C), Min:99 °F (37.2 °C), Max:99.9 °F (37.7 °C)  IOBRIEF    Intake/Output Summary (Last 24 hours) at 1/10/2021 1200  Last data filed at 1/10/2021 1033  Gross per 24 hour   Intake 3293.2 ml   Output 310 ml   Net 2983.2 ml     Generalintubated/sedated, NAD  Pulmonary: Decreased breath sounds bilaterally; + trach  Cardiovascular: Regular rate and Rhythm. GI:  Soft, Non distended, Non tender. + Bowel sounds.+ PEG + rectal tube  Extremities:  No edema, cyanosis, clubbing. No calf tenderness.    Neurologic: Opens eyes spontaneously, does not follow commands    Medications:   Current Facility-Administered Medications   Medication Dose Route Frequency    diazePAM (VALIUM) tablet 20 mg  20 mg Oral BID    naloxegoL (MOVANTIK) tablet 25 mg  25 mg Oral DAILY PRN    potassium bicarb-citric acid (EFFER-K) tablet 40 mEq  40 mEq Oral DAILY    magnesium sulfate 2 g/50 ml IVPB (premix or compounded)  2 g IntraVENous ONCE    nystatin (MYCOSTATIN) 100,000 unit/mL oral suspension 500,000 Units  500,000 Units Oral QID    carvediloL (COREG) tablet 6.25 mg  6.25 mg Oral Q12H    aspirin chewable tablet 81 mg  81 mg Oral DAILY    clopidogreL (PLAVIX) tablet 75 mg  75 mg Oral DAILY    dextrose 5 % - 0.45% NaCl infusion  10 mL/hr IntraVENous CONTINUOUS    insulin glargine (LANTUS) injection 20 Units  20 Units SubCUTAneous QHS    0.9% sodium chloride infusion 250 mL  250 mL IntraVENous PRN    oxyCODONE (ROXICODONE) 5 mg/5 mL oral solution 20 mg  20 mg Oral Q8H    [Held by provider] docusate (COLACE) 50 mg/5 mL oral liquid 100 mg  100 mg Oral DAILY    furosemide (LASIX) tablet 20 mg  20 mg Oral DAILY    lansoprazole (PREVACID) 3 mg/mL oral suspension 30 mg  30 mg Per G Tube ACB    heparin 25,000 units in D5W 250 ml infusion  12-25 Units/kg/hr IntraVENous TITRATE    polyethylene glycol (MIRALAX) packet 17 g  17 g Per NG tube PRN    chlorhexidine (PERIDEX) 0.12 % mouthwash 10 mL  10 mL Oral Q12H    ELECTROLYTE REPLACEMENT PROTOCOL - Potassium Standard Dosing   1 Each Other PRN    ELECTROLYTE REPLACEMENT PROTOCOL - Magnesium   1 Each Other PRN    ELECTROLYTE REPLACEMENT PROTOCOL - Phosphorus  Standard Dosing  1 Each Other PRN    ELECTROLYTE REPLACEMENT PROTOCOL - Calcium   1 Each Other PRN    [Held by provider] lisinopriL (PRINIVIL, ZESTRIL) tablet 5 mg  5 mg Oral DAILY    [Held by provider] spironolactone (ALDACTONE) tablet 25 mg  25 mg Oral DAILY    multivit-folic acid-herbal 608 (WELLESSE PLUS) oral liquid 30 mL  30 mL Per NG tube DAILY    insulin lispro (HUMALOG) injection   SubCUTAneous Q6H    albuterol-ipratropium (DUO-NEB) 2.5 MG-0.5 MG/3 ML  3 mL Nebulization Q4H PRN    sodium chloride (NS) flush 5-40 mL  5-40 mL IntraVENous Q8H    sodium chloride (NS) flush 5-40 mL  5-40 mL IntraVENous PRN    acetaminophen (TYLENOL) tablet 650 mg  650 mg Oral Q6H PRN    Or    acetaminophen (TYLENOL) suppository 650 mg  650 mg Rectal Q6H PRN    promethazine (PHENERGAN) tablet 12.5 mg  12.5 mg Oral Q6H PRN    Or    ondansetron (ZOFRAN) injection 4 mg  4 mg IntraVENous Q6H PRN    glucose chewable tablet 16 g  4 Tab Oral PRN    glucagon (GLUCAGEN) injection 1 mg  1 mg IntraMUSCular PRN    dextrose (D50W) injection syrg 12.5-25 g  25-50 mL IntraVENous PRN    atorvastatin (LIPITOR) tablet 40 mg  40 mg Oral QHS       Imaging:   XR Results (most recent):  Results from Hospital Encounter encounter on 12/10/20   XR CHEST PORT    Narrative Chest AP single view    HISTORY: Postintubation follow-up     COMPARISON: 1/4/21    FINDINGS: There is status post ET tube removal with tracheostomy tube placement. The right-sided PICC line has being interval reposition from proximal SVC to the  right jugular vein, tip not imaged. The NG tube appears unchanged. No  pneumothorax. The cardiac silhouette appears unremarkable. The lungs show  extensive pulmonary opacities, more pronounced in the periphery. Mild interval  progression noted since the prior study. Incidental noted is gas-filled splenic  flexure with moderate bowel wall thickening. Impression IMPRESSION:     1. Interval removal of ET tube with tracheostomy placed. Repositioned  right-sided PICC line with the tip from proximal SVC to the right jugular vein,  tip not imaged. No pneumothorax. 2.  Persistent extensive pulmonary infiltrations. 3.  Gas-filled nondilated splenic flexure shows moderate bowel wall thickening. Recommend clinical correlation and abdomen 2 views or CT. Thank you for your referral.        CT Results (most recent):  Results from [place] encounter on 12/10/20   CT ABD WO CONT    Narrative CT ABDOMEN, NONCONTRAST    CPT CODE: 80091    INDICATION: Pretreatment planning for possible gastrostomy tube. COMPARISON: The partially visualized portion of the upper abdomen on pulmonary  CTA 12/16/2020.  Prior abdominal CT 2/4/2018. Report is noted in the electronic  medical record (care everywhere) of contrast enhanced abdominal pelvic CT  performed elsewhere 11/25/2020, images from which are not available at this  time. Reference right upper quadrant ultrasound 5/29/2020. TECHNIQUE: Without administration of intravenous or oral contrast, serial axial  CT images through the abdomen were helically acquired. Additional coronal and  sagittal reformation images were also performed. All CT scans at this facility are performed using dose optimization technique as  appropriate to the performed exam, to include automated exposure control,  adjustment of the mA and/or kV according to patient's size (Including  appropriate matching for site-specific examinations), or use of iterative  reconstruction technique. FINDINGS:    Assessment of the solid and hollow viscera and vascular structures of the  abdomen is limited by lack of contrast.    The visualized portions of lung bases demonstrate bibasilar consolidations in  the lower lobes, right greater than left with air bronchograms in the right  lower lobe consolidation, compatible with multisegmental right lower lobe  atelectasis which could be with or without superimposed airspace disease. There  is bronchiectasis in the lower lungs bilaterally and extensive fibrotic changes  again seen. Esophagogastric tube tip extends to the level of the mid stomach. The gastric  antrum demonstrates contiguity with the anterior abdominal peritoneum deep to  the left rectus muscle, likely would be amenable to percutaneous gastrostomy  tube placement, interventional radiology consultation might also be helpful in  this regard. Multiple calcific gallstones in the gallbladder consistent with cholelithiasis. Multiple splenic calcifications, likely granulomata.     Large heterogeneous in attenuation mass centered laterally in the right hepatic  lobe, contours poorly defined for accurate measurement due to lack of IV  contrast and streak artifacts from the patient's arms down at sides but  approximately 11 cm, further increased in size compared to prior studies,  consistent with malignancy which could be primary or metastatic. Additional  hypoattenuating mass farther inferiorly in the right hepatic lobe centered  medially measures approximately 2 cm. A third hepatic mass described in the left  hepatic lobe on prior contrast-enhanced CT is somewhat less well defined on the  current study which is limited by lack of IV contrast.    No evidence of hydronephrosis/hydroureter. No evidence of nephrolithiasis or  calculi in the upper abdominal portions of ureters. Assessment of the renal  parenchyma otherwise is limited by lack of IV contrast without large contour  deforming mass identified. Within the limitation of non-contrast technique, the unenhanced pancreas and  adrenal glands appear grossly unremarkable. Visualized portions of bowel are non-dilated without evidence of bowel  obstruction. Arterial calcifications noted in the abdominal aorta and coronary arteries. No  evidence of abdominal aortic aneurysm. No definite evidence of pathologic lymph node enlargement is seen. No definite fluid collection/abscess identified. Pelvic viscera and pelvic portions of abdominal viscera are inferior to the  field of view on abdominal CT. On review of bone windows, no acute fractures or destructive bone lesions are  seen. Degenerative changes and osteopenia noted. Impression Impression:     The gastric antrum demonstrates contiguity with the anterior peritoneum just  deep to the left rectus muscle as above. Known enlarging multiple hepatic masses again seen, assessment limited by lack  of IV contrast, consistent with malignancy, differential considerations could  include multifocal primary malignancy or metastases as noted on prior studies.     Right lower lobe consolidation contains air bronchograms consistent with  multisegmental atelectasis which could be with or without superimposed airspace  disease. Otherwise limited noncontrast CT scan of the upper abdomen with other nonacute  findings as above. 12/10/20   ECHO ADULT COMPLETE 12/10/2020 12/10/2020    Narrative · LV: Estimated LVEF is 35 - 40%. Visually measured ejection fraction. Normal cavity size and wall thickness. Moderately and segmentally reduced   systolic function. Inconclusive left ventricular diastolic function. · AO: Mild aortic root dilatation; diameter is 3.8 cm.         Signed by: Talya Quintero MD        Labs:    Recent Results (from the past 50 hour(s))   METABOLIC PANEL, BASIC    Collection Time: 01/08/21  3:42 PM   Result Value Ref Range    Sodium 138 136 - 145 mmol/L    Potassium 3.9 3.5 - 5.5 mmol/L    Chloride 104 100 - 111 mmol/L    CO2 29 21 - 32 mmol/L    Anion gap 5 3.0 - 18 mmol/L    Glucose 120 (H) 74 - 99 mg/dL    BUN 16 7.0 - 18 MG/DL    Creatinine 0.31 (L) 0.6 - 1.3 MG/DL    BUN/Creatinine ratio 52 (H) 12 - 20      GFR est AA >60 >60 ml/min/1.73m2    GFR est non-AA >60 >60 ml/min/1.73m2    Calcium 9.4 8.5 - 10.1 MG/DL   MAGNESIUM    Collection Time: 01/08/21  3:42 PM   Result Value Ref Range    Magnesium 1.9 1.6 - 2.6 mg/dL   GLUCOSE, POC    Collection Time: 01/08/21 11:11 PM   Result Value Ref Range    Glucose (POC) 121 (H) 70 - 110 mg/dL   PTT    Collection Time: 01/09/21 12:20 AM   Result Value Ref Range    aPTT 41.0 (H) 23.0 - 36.4 SEC   POC G3    Collection Time: 01/09/21  4:33 AM   Result Value Ref Range    Device: VENT      FIO2 (POC) 28 %    pH (POC) 7.46 (H) 7.35 - 7.45      pCO2 (POC) 39.2 35.0 - 45.0 MMHG    pO2 (POC) 92 80 - 100 MMHG    HCO3 (POC) 27.7 (H) 22 - 26 MMOL/L    sO2 (POC) 98 (H) 92 - 97 %    Base excess (POC) 4 mmol/L    Mode SIMV      Tidal volume 500 ml    Set Rate 10 bpm    PEEP/CPAP (POC) 5 cmH2O    Pressure support 8 cmH2O    Allens test (POC) YES Inspiratory Time 0.90 sec    Total resp. rate 24      Site RIGHT RADIAL      Specimen type (POC) ARTERIAL      Performed by Kenisha Fields     Volume control plus YES     CBC WITH AUTOMATED DIFF    Collection Time: 01/09/21  5:45 AM   Result Value Ref Range    WBC 6.6 4.6 - 13.2 K/uL    RBC 3.15 (L) 4.20 - 5.30 M/uL    HGB 8.3 (L) 12.0 - 16.0 g/dL    HCT 26.9 (L) 35.0 - 45.0 %    MCV 85.4 74.0 - 97.0 FL    MCH 26.3 24.0 - 34.0 PG    MCHC 30.9 (L) 31.0 - 37.0 g/dL    RDW 15.2 (H) 11.6 - 14.5 %    PLATELET 805 (H) 465 - 420 K/uL    MPV 9.2 9.2 - 11.8 FL    NEUTROPHILS 79 (H) 40 - 73 %    LYMPHOCYTES 12 (L) 21 - 52 %    MONOCYTES 8 3 - 10 %    EOSINOPHILS 1 0 - 5 %    BASOPHILS 0 0 - 2 %    ABS. NEUTROPHILS 5.2 1.8 - 8.0 K/UL    ABS. LYMPHOCYTES 0.8 (L) 0.9 - 3.6 K/UL    ABS. MONOCYTES 0.5 0.05 - 1.2 K/UL    ABS. EOSINOPHILS 0.1 0.0 - 0.4 K/UL    ABS.  BASOPHILS 0.0 0.0 - 0.1 K/UL    DF AUTOMATED     PHOSPHORUS    Collection Time: 01/09/21  5:45 AM   Result Value Ref Range    Phosphorus 4.3 2.5 - 4.9 MG/DL   MAGNESIUM    Collection Time: 01/09/21  5:45 AM   Result Value Ref Range    Magnesium 1.8 1.6 - 2.6 mg/dL   METABOLIC PANEL, BASIC    Collection Time: 01/09/21  5:45 AM   Result Value Ref Range    Sodium 137 136 - 145 mmol/L    Potassium 4.1 3.5 - 5.5 mmol/L    Chloride 103 100 - 111 mmol/L    CO2 29 21 - 32 mmol/L    Anion gap 5 3.0 - 18 mmol/L    Glucose 122 (H) 74 - 99 mg/dL    BUN 11 7.0 - 18 MG/DL    Creatinine 0.30 (L) 0.6 - 1.3 MG/DL    BUN/Creatinine ratio 37 (H) 12 - 20      GFR est AA >60 >60 ml/min/1.73m2    GFR est non-AA >60 >60 ml/min/1.73m2    Calcium 9.4 8.5 - 10.1 MG/DL   GLUCOSE, POC    Collection Time: 01/09/21  6:31 AM   Result Value Ref Range    Glucose (POC) 148 (H) 70 - 110 mg/dL   PTT    Collection Time: 01/09/21  8:55 AM   Result Value Ref Range    aPTT 152.6 (HH) 23.0 - 36.4 SEC   GLUCOSE, POC    Collection Time: 01/09/21 11:54 AM   Result Value Ref Range    Glucose (POC) 161 (H) 70 - 110 mg/dL   PTT    Collection Time: 01/09/21  5:30 PM   Result Value Ref Range    aPTT 94.5 (H) 23.0 - 36.4 SEC   GLUCOSE, POC    Collection Time: 01/09/21  5:30 PM   Result Value Ref Range    Glucose (POC) 184 (H) 70 - 110 mg/dL   GLUCOSE, POC    Collection Time: 01/09/21 11:30 PM   Result Value Ref Range    Glucose (POC) 181 (H) 70 - 110 mg/dL   POC G3    Collection Time: 01/10/21  3:48 AM   Result Value Ref Range    Device: VENT      FIO2 (POC) 28 %    pH (POC) 7.41 7.35 - 7.45      pCO2 (POC) 46.3 (H) 35.0 - 45.0 MMHG    pO2 (POC) 96 80 - 100 MMHG    HCO3 (POC) 29.6 (H) 22 - 26 MMOL/L    sO2 (POC) 97 92 - 97 %    Base excess (POC) 5 mmol/L    Mode SIMV      Tidal volume 500 ml    Set Rate 10 bpm    PEEP/CPAP (POC) 5 cmH2O    Pressure support 8 cmH2O    Allens test (POC) YES      Inspiratory Time 0.90 sec    Total resp. rate 21      Site RIGHT RADIAL      Specimen type (POC) ARTERIAL      Performed by Stephanie Babcock     Volume control plus YES     CBC WITH AUTOMATED DIFF    Collection Time: 01/10/21  6:20 AM   Result Value Ref Range    WBC 6.2 4.6 - 13.2 K/uL    RBC 2.79 (L) 4.20 - 5.30 M/uL    HGB 7.4 (L) 12.0 - 16.0 g/dL    HCT 23.9 (L) 35.0 - 45.0 %    MCV 85.7 74.0 - 97.0 FL    MCH 26.5 24.0 - 34.0 PG    MCHC 31.0 31.0 - 37.0 g/dL    RDW 15.0 (H) 11.6 - 14.5 %    PLATELET 391 (H) 754 - 420 K/uL    MPV 8.9 (L) 9.2 - 11.8 FL    NEUTROPHILS 68 40 - 73 %    LYMPHOCYTES 18 (L) 21 - 52 %    MONOCYTES 8 3 - 10 %    EOSINOPHILS 6 (H) 0 - 5 %    BASOPHILS 0 0 - 2 %    ABS. NEUTROPHILS 4.2 1.8 - 8.0 K/UL    ABS. LYMPHOCYTES 1.1 0.9 - 3.6 K/UL    ABS. MONOCYTES 0.5 0.05 - 1.2 K/UL    ABS. EOSINOPHILS 0.4 0.0 - 0.4 K/UL    ABS.  BASOPHILS 0.0 0.0 - 0.1 K/UL    DF AUTOMATED     MAGNESIUM    Collection Time: 01/10/21  6:20 AM   Result Value Ref Range    Magnesium 1.7 1.6 - 2.6 mg/dL   PHOSPHORUS    Collection Time: 01/10/21  6:20 AM   Result Value Ref Range    Phosphorus 3.7 2.5 - 4.9 MG/DL   METABOLIC PANEL, BASIC    Collection Time: 01/10/21  6:20 AM   Result Value Ref Range    Sodium 136 136 - 145 mmol/L    Potassium 3.5 3.5 - 5.5 mmol/L    Chloride 100 100 - 111 mmol/L    CO2 31 21 - 32 mmol/L    Anion gap 5 3.0 - 18 mmol/L    Glucose 148 (H) 74 - 99 mg/dL    BUN 13 7.0 - 18 MG/DL    Creatinine 0.33 (L) 0.6 - 1.3 MG/DL    BUN/Creatinine ratio 39 (H) 12 - 20      GFR est AA >60 >60 ml/min/1.73m2    GFR est non-AA >60 >60 ml/min/1.73m2    Calcium 9.0 8.5 - 10.1 MG/DL   PTT    Collection Time: 01/10/21  6:20 AM   Result Value Ref Range    aPTT 89.2 (H) 23.0 - 36.4 SEC   GLUCOSE, POC    Collection Time: 01/10/21  6:22 AM   Result Value Ref Range    Glucose (POC) 156 (H) 70 - 110 mg/dL       Signed By: Ashish Hoang MD     January 10, 2021      I spent 35 minutes with the patient in face-to-face consultation, of which greater than 50% was spent in counseling and coordination of care as described above    Disclaimer: Sections of this note are dictated using utilizing voice recognition software. Minor typographical errors may be present. If questions arise, please do not hesitate to contact me or call our department.

## 2021-01-11 ENCOUNTER — APPOINTMENT (OUTPATIENT)
Dept: GENERAL RADIOLOGY | Age: 66
DRG: 003 | End: 2021-01-11
Attending: PHYSICIAN ASSISTANT
Payer: MEDICARE

## 2021-01-11 LAB
ANION GAP SERPL CALC-SCNC: 4 MMOL/L (ref 3–18)
ARTERIAL PATENCY WRIST A: YES
BASE EXCESS BLD CALC-SCNC: 9 MMOL/L
BASOPHILS # BLD: 0 K/UL (ref 0–0.1)
BASOPHILS NFR BLD: 0 % (ref 0–2)
BDY SITE: ABNORMAL
BUN SERPL-MCNC: 12 MG/DL (ref 7–18)
BUN/CREAT SERPL: 38 (ref 12–20)
CALCIUM SERPL-MCNC: 9.3 MG/DL (ref 8.5–10.1)
CHLORIDE SERPL-SCNC: 99 MMOL/L (ref 100–111)
CO2 SERPL-SCNC: 32 MMOL/L (ref 21–32)
CREAT SERPL-MCNC: 0.32 MG/DL (ref 0.6–1.3)
DIFFERENTIAL METHOD BLD: ABNORMAL
EOSINOPHIL # BLD: 0.4 K/UL (ref 0–0.4)
EOSINOPHIL NFR BLD: 7 % (ref 0–5)
ERYTHROCYTE [DISTWIDTH] IN BLOOD BY AUTOMATED COUNT: 15.1 % (ref 11.6–14.5)
GAS FLOW.O2 O2 DELIVERY SYS: ABNORMAL L/MIN
GAS FLOW.O2 SETTING OXYMISER: 10 BPM
GLUCOSE BLD STRIP.AUTO-MCNC: 153 MG/DL (ref 70–110)
GLUCOSE BLD STRIP.AUTO-MCNC: 155 MG/DL (ref 70–110)
GLUCOSE BLD STRIP.AUTO-MCNC: 170 MG/DL (ref 70–110)
GLUCOSE BLD STRIP.AUTO-MCNC: 187 MG/DL (ref 70–110)
GLUCOSE BLD STRIP.AUTO-MCNC: 194 MG/DL (ref 70–110)
GLUCOSE SERPL-MCNC: 130 MG/DL (ref 74–99)
HCO3 BLD-SCNC: 32.9 MMOL/L (ref 22–26)
HCT VFR BLD AUTO: 24.7 % (ref 35–45)
HGB BLD-MCNC: 7.5 G/DL (ref 12–16)
INSPIRATION.DURATION SETTING TIME VENT: 1 SEC
LYMPHOCYTES # BLD: 0.9 K/UL (ref 0.9–3.6)
LYMPHOCYTES NFR BLD: 16 % (ref 21–52)
MAGNESIUM SERPL-MCNC: 2 MG/DL (ref 1.6–2.6)
MCH RBC QN AUTO: 26 PG (ref 24–34)
MCHC RBC AUTO-ENTMCNC: 30.4 G/DL (ref 31–37)
MCV RBC AUTO: 85.5 FL (ref 74–97)
MONOCYTES # BLD: 0.6 K/UL (ref 0.05–1.2)
MONOCYTES NFR BLD: 10 % (ref 3–10)
NEUTS SEG # BLD: 3.8 K/UL (ref 1.8–8)
NEUTS SEG NFR BLD: 67 % (ref 40–73)
O2/TOTAL GAS SETTING VFR VENT: 28 %
PCO2 BLD: 48.2 MMHG (ref 35–45)
PEEP RESPIRATORY: 5 CMH2O
PH BLD: 7.44 [PH] (ref 7.35–7.45)
PHOSPHATE SERPL-MCNC: 3.7 MG/DL (ref 2.5–4.9)
PLATELET # BLD AUTO: 470 K/UL (ref 135–420)
PMV BLD AUTO: 8.9 FL (ref 9.2–11.8)
PO2 BLD: 94 MMHG (ref 80–100)
POTASSIUM SERPL-SCNC: 4.5 MMOL/L (ref 3.5–5.5)
PRESSURE SUPPORT SETTING VENT: 8 CMH2O
RBC # BLD AUTO: 2.89 M/UL (ref 4.2–5.3)
SAO2 % BLD: 97 % (ref 92–97)
SERVICE CMNT-IMP: ABNORMAL
SODIUM SERPL-SCNC: 135 MMOL/L (ref 136–145)
SPECIMEN TYPE: ABNORMAL
TOTAL RESP. RATE, ITRR: 17
VENTILATION MODE VENT: ABNORMAL
VOLUME CONTROL PLUS IVLCP: YES
VT SETTING VENT: 500 ML
WBC # BLD AUTO: 5.7 K/UL (ref 4.6–13.2)

## 2021-01-11 PROCEDURE — 94003 VENT MGMT INPAT SUBQ DAY: CPT

## 2021-01-11 PROCEDURE — 82962 GLUCOSE BLOOD TEST: CPT

## 2021-01-11 PROCEDURE — 74011250637 HC RX REV CODE- 250/637: Performed by: INTERNAL MEDICINE

## 2021-01-11 PROCEDURE — 83735 ASSAY OF MAGNESIUM: CPT

## 2021-01-11 PROCEDURE — 99232 SBSQ HOSP IP/OBS MODERATE 35: CPT | Performed by: INTERNAL MEDICINE

## 2021-01-11 PROCEDURE — 77030038269 HC DRN EXT URIN PURWCK BARD -A

## 2021-01-11 PROCEDURE — 74011250636 HC RX REV CODE- 250/636: Performed by: INTERNAL MEDICINE

## 2021-01-11 PROCEDURE — 99291 CRITICAL CARE FIRST HOUR: CPT | Performed by: INTERNAL MEDICINE

## 2021-01-11 PROCEDURE — 80048 BASIC METABOLIC PNL TOTAL CA: CPT

## 2021-01-11 PROCEDURE — 74011636637 HC RX REV CODE- 636/637: Performed by: INTERNAL MEDICINE

## 2021-01-11 PROCEDURE — 36600 WITHDRAWAL OF ARTERIAL BLOOD: CPT

## 2021-01-11 PROCEDURE — 74011000250 HC RX REV CODE- 250: Performed by: PHYSICIAN ASSISTANT

## 2021-01-11 PROCEDURE — 82803 BLOOD GASES ANY COMBINATION: CPT

## 2021-01-11 PROCEDURE — 99233 SBSQ HOSP IP/OBS HIGH 50: CPT | Performed by: INTERNAL MEDICINE

## 2021-01-11 PROCEDURE — 65620000000 HC RM CCU GENERAL

## 2021-01-11 PROCEDURE — 74011250637 HC RX REV CODE- 250/637: Performed by: RADIOLOGY

## 2021-01-11 PROCEDURE — 84100 ASSAY OF PHOSPHORUS: CPT

## 2021-01-11 PROCEDURE — 85025 COMPLETE CBC W/AUTO DIFF WBC: CPT

## 2021-01-11 PROCEDURE — 74011250637 HC RX REV CODE- 250/637: Performed by: NURSE PRACTITIONER

## 2021-01-11 RX ORDER — DIAZEPAM 5 MG/1
15 TABLET ORAL 2 TIMES DAILY
Status: DISCONTINUED | OUTPATIENT
Start: 2021-01-11 | End: 2021-01-13

## 2021-01-11 RX ADMIN — POTASSIUM BICARBONATE 40 MEQ: 782 TABLET, EFFERVESCENT ORAL at 08:51

## 2021-01-11 RX ADMIN — FUROSEMIDE 20 MG: 20 TABLET ORAL at 08:52

## 2021-01-11 RX ADMIN — HEPARIN SODIUM 5000 UNITS: 5000 INJECTION INTRAVENOUS; SUBCUTANEOUS at 23:46

## 2021-01-11 RX ADMIN — CHLORHEXIDINE GLUCONATE 0.12% ORAL RINSE 15 ML: 1.2 LIQUID ORAL at 21:17

## 2021-01-11 RX ADMIN — INSULIN LISPRO 3 UNITS: 100 INJECTION, SOLUTION INTRAVENOUS; SUBCUTANEOUS at 12:17

## 2021-01-11 RX ADMIN — CHLORHEXIDINE GLUCONATE 0.12% ORAL RINSE 10 ML: 1.2 LIQUID ORAL at 08:48

## 2021-01-11 RX ADMIN — OXYCODONE HYDROCHLORIDE 20 MG: 5 SOLUTION ORAL at 21:17

## 2021-01-11 RX ADMIN — NYSTATIN 500000 UNITS: 100000 SUSPENSION ORAL at 21:18

## 2021-01-11 RX ADMIN — INSULIN LISPRO 3 UNITS: 100 INJECTION, SOLUTION INTRAVENOUS; SUBCUTANEOUS at 06:05

## 2021-01-11 RX ADMIN — NYSTATIN 500000 UNITS: 100000 SUSPENSION ORAL at 18:05

## 2021-01-11 RX ADMIN — SODIUM CHLORIDE 10 ML: 9 INJECTION, SOLUTION INTRAMUSCULAR; INTRAVENOUS; SUBCUTANEOUS at 05:56

## 2021-01-11 RX ADMIN — ATORVASTATIN CALCIUM 40 MG: 40 TABLET, FILM COATED ORAL at 21:17

## 2021-01-11 RX ADMIN — NYSTATIN 500000 UNITS: 100000 SUSPENSION ORAL at 08:49

## 2021-01-11 RX ADMIN — Medication 30 ML: at 08:50

## 2021-01-11 RX ADMIN — INSULIN LISPRO 3 UNITS: 100 INJECTION, SOLUTION INTRAVENOUS; SUBCUTANEOUS at 00:20

## 2021-01-11 RX ADMIN — CARVEDILOL 6.25 MG: 6.25 TABLET, FILM COATED ORAL at 08:51

## 2021-01-11 RX ADMIN — DIAZEPAM 20 MG: 5 TABLET ORAL at 08:51

## 2021-01-11 RX ADMIN — HEPARIN SODIUM 5000 UNITS: 5000 INJECTION INTRAVENOUS; SUBCUTANEOUS at 08:50

## 2021-01-11 RX ADMIN — INSULIN GLARGINE 20 UNITS: 100 INJECTION, SOLUTION SUBCUTANEOUS at 21:18

## 2021-01-11 RX ADMIN — CLOPIDOGREL BISULFATE 75 MG: 75 TABLET ORAL at 08:51

## 2021-01-11 RX ADMIN — SODIUM CHLORIDE 10 ML: 9 INJECTION, SOLUTION INTRAMUSCULAR; INTRAVENOUS; SUBCUTANEOUS at 15:10

## 2021-01-11 RX ADMIN — HEPARIN SODIUM 5000 UNITS: 5000 INJECTION INTRAVENOUS; SUBCUTANEOUS at 00:23

## 2021-01-11 RX ADMIN — OXYCODONE HYDROCHLORIDE 20 MG: 5 SOLUTION ORAL at 06:05

## 2021-01-11 RX ADMIN — SODIUM CHLORIDE 10 ML: 9 INJECTION, SOLUTION INTRAMUSCULAR; INTRAVENOUS; SUBCUTANEOUS at 21:18

## 2021-01-11 RX ADMIN — LANSOPRAZOLE 30 MG: KIT at 08:48

## 2021-01-11 RX ADMIN — INSULIN LISPRO 3 UNITS: 100 INJECTION, SOLUTION INTRAVENOUS; SUBCUTANEOUS at 18:00

## 2021-01-11 RX ADMIN — DIAZEPAM 15 MG: 5 TABLET ORAL at 18:05

## 2021-01-11 RX ADMIN — HEPARIN SODIUM 5000 UNITS: 5000 INJECTION INTRAVENOUS; SUBCUTANEOUS at 15:16

## 2021-01-11 RX ADMIN — NYSTATIN 500000 UNITS: 100000 SUSPENSION ORAL at 15:07

## 2021-01-11 RX ADMIN — CARVEDILOL 6.25 MG: 6.25 TABLET, FILM COATED ORAL at 21:17

## 2021-01-11 RX ADMIN — INSULIN LISPRO 3 UNITS: 100 INJECTION, SOLUTION INTRAVENOUS; SUBCUTANEOUS at 23:46

## 2021-01-11 RX ADMIN — OXYCODONE HYDROCHLORIDE 20 MG: 5 SOLUTION ORAL at 15:07

## 2021-01-11 RX ADMIN — ASPIRIN 81 MG: 81 TABLET, CHEWABLE ORAL at 08:51

## 2021-01-11 NOTE — WOUND CARE
Physical Exam 
Musculoskeletal:  
     Legs: 
 
 
  
Friction injury to right buttock, 5x2x 0.1. Red, flat, fragile epidermis, peeling and unroofing. Scant sanguinous drainage noted Silicone dressing applied. Friction injury, sacral. Pink, clean and dry. No drainage noted. May apply barrier cream. 
Friction injury, both upper inner thighs. Hyperpigmented, linear lesions in multiple areas. May apply barrier cream. 
Bilat heels intact. Topical treatment protocol in place. Par room barrier cream to inner thighs and lower buttocks twice each shift and prn soilage. Turn [id number], limit sacral positioning. Two heel prevention boots while patient in bed. Silicone dressing to right buttock and sacrum for treatment and prevention. Change every 3 days and prn soilage. Care discussed with primary nurse, Blanka ORELLANA. Care turned over to nursing staff at this time. Anayeli Pretty RN, BSN, [address] [place] [address],[address]

## 2021-01-11 NOTE — PROGRESS NOTES
[facility]  ICU Progress Note      Name: Nicanor Smith   : 1955   MRN: [id number]   Date: 2021 9:14 AM     [x]I have reviewed the flowsheet and previous days notes. Events overnight reviewed and discussed with nursing staff. Vital signs and records reviewed. Interval HPI    Patient is a 72 y. o. female with a past medical history of COPD, CHF, HTN, DM, dementia, admitted for [facility] with acute on chronic systolic heart failure, acute MI with anterolateral STEMI and Q waves s/p ptca/stent to proximal/mid LAD on 20. Pt was intubated and extubated on 20, transferred to . On 20 patient was intubated and transferred to ICU for respiratory failure and cardiogenic +/- septic shock. Respiratory cultures on  with ESBL on Merrem. Dr. Arely Mendosa and IR have been consulted for trach/PEG. She is S/p trach  and s/p PEG .     Subjective 21  LOS: 32    Overnight events: Low grade fever noted. Mentation/Activity: opens eyes spontaneously, withdraws to pain  Respiratory/ Secretions: stable  Hemodynamics: H/H stable  Need for procedures:No    - remains more on the sedate side- decreasing valium dosing    - TF: Glucerna- change from TID to Q 8 bolus feeds- tolerating thus far              ROS:Review of systems not obtained due to patient factors.     Vital Signs:    Visit Vitals  /62 (BP 1 Location: Left arm, BP Patient Position: At rest)   Pulse 82   Temp 99.6 °F (37.6 °C)   Resp 15   Ht 5' 7\" (1.702 m)   Wt 68.7 kg (151 lb 7.3 oz)   SpO2 99%   Breastfeeding No   BMI 23.72 kg/m²       O2 Device: Tracheostomy, Ventilator   O2 Flow Rate (L/min): 40 l/min   Temp (24hrs), Av.7 °F (37.6 °C), Min:99.1 °F (37.3 °C), Max:100.6 °F (38.1 °C)       Intake/Output:   Last shift:       07 -  1900  In: 890   Out: 560 [Urine:500; Drains:60]  Last 3 shifts: 1901 -  0700  In: 3915.4 [I.V.:775.4]  Out: 690 [Urine:650; Drains:40]    Intake/Output Summary (Last 24 hours) at 1/11/2021 1319  Last data filed at 1/11/2021 1200  Gross per 24 hour   Intake 2360 ml   Output 960 ml   Net 1400 ml       Ventilator Settings:  Mode Rate Tidal Volume Pressure FiO2 PEEP   SIMV, VC+, Pressure support   500 ml  8 cm H2O 28 % 5 cm H20     Peak airway pressure: 22 cm H2O    Minute ventilation: 6.02 l/min      ARDS network Guidelines:   Lung protective strategy and Plateau  Pressure goal < 30 cm H2O goals  Oxygenation Goals PaO2 55-80 mm Hg or SaO2 88-95%  PH goal 7.30-7.45    VAP bundle:  Reviewed. Giovanna tube to suction at 20-30 cm Hg. Maintain [place] tube with 5-10ml air every 4 hours  Routine oral care every 4 hours  Elevation of head > 45 degree    Physical Exam:               General:sedated; NAD, acyanotic - remains on vent  HEENT:  Anicteric sclerae; pink palpebral conjunctivae; mucosa moist, Trach in situ (CDI)- mild pink tinge blood around dressing, Poor dentition- especially bottom teeth- loose with potential to fall out  Resp: (+) coarse rhonchi b/l. Symmetrical chest expansion; good airway entry;   CV:  S1, S2 present; regular rate and rhythm   GI:  Abdomen soft, non-tender; (+) active bowel sounds. PEG in SITU- dressing clean- no blood oozing  Extremities:  +2 pulses on all extremities  Skin:  Warm; no rashes/ lesions noted, normal turgor/cap refill.    Neurologic:   Opening eyes spontaneously, No command following, moving extremities spontaneously   Devices:  Trach, Purewick, PICC RUE, PEG, FMS      DATA:     Current Facility-Administered Medications   Medication Dose Route Frequency    diazePAM (VALIUM) tablet 15 mg  15 mg Oral BID    naloxegoL (MOVANTIK) tablet 25 mg  25 mg Oral DAILY PRN    potassium bicarb-citric acid (EFFER-K) tablet 40 mEq  40 mEq Oral DAILY    heparin (porcine) injection 5,000 Units  5,000 Units SubCUTAneous Q8H    nystatin (MYCOSTATIN) 100,000 unit/mL oral suspension 500,000 Units  500,000 Units Oral QID    carvediloL (COREG) tablet 6.25 mg  6.25 mg Oral Q12H    aspirin chewable tablet 81 mg  81 mg Oral DAILY    clopidogreL (PLAVIX) tablet 75 mg  75 mg Oral DAILY    dextrose 5 % - 0.45% NaCl infusion  10 mL/hr IntraVENous CONTINUOUS    insulin glargine (LANTUS) injection 20 Units  20 Units SubCUTAneous QHS    0.9% sodium chloride infusion 250 mL  250 mL IntraVENous PRN    oxyCODONE (ROXICODONE) 5 mg/5 mL oral solution 20 mg  20 mg Oral Q8H    [Held by provider] docusate (COLACE) 50 mg/5 mL oral liquid 100 mg  100 mg Oral DAILY    furosemide (LASIX) tablet 20 mg  20 mg Oral DAILY    lansoprazole (PREVACID) 3 mg/mL oral suspension 30 mg  30 mg Per G Tube ACB    polyethylene glycol (MIRALAX) packet 17 g  17 g Per NG tube PRN    chlorhexidine (PERIDEX) 0.12 % mouthwash 10 mL  10 mL Oral Q12H    ELECTROLYTE REPLACEMENT PROTOCOL - Potassium Standard Dosing   1 Each Other PRN    ELECTROLYTE REPLACEMENT PROTOCOL - Magnesium   1 Each Other PRN    ELECTROLYTE REPLACEMENT PROTOCOL - Phosphorus  Standard Dosing  1 Each Other PRN    ELECTROLYTE REPLACEMENT PROTOCOL - Calcium   1 Each Other PRN    [Held by provider] lisinopriL (PRINIVIL, ZESTRIL) tablet 5 mg  5 mg Oral DAILY    [Held by provider] spironolactone (ALDACTONE) tablet 25 mg  25 mg Oral DAILY    multivit-folic acid-herbal 054 (WELLESSE PLUS) oral liquid 30 mL  30 mL Per NG tube DAILY    insulin lispro (HUMALOG) injection   SubCUTAneous Q6H    albuterol-ipratropium (DUO-NEB) 2.5 MG-0.5 MG/3 ML  3 mL Nebulization Q4H PRN    sodium chloride (NS) flush 5-40 mL  5-40 mL IntraVENous Q8H    sodium chloride (NS) flush 5-40 mL  5-40 mL IntraVENous PRN    acetaminophen (TYLENOL) tablet 650 mg  650 mg Oral Q6H PRN    Or    acetaminophen (TYLENOL) suppository 650 mg  650 mg Rectal Q6H PRN    promethazine (PHENERGAN) tablet 12.5 mg  12.5 mg Oral Q6H PRN    Or    ondansetron (ZOFRAN) injection 4 mg  4 mg IntraVENous Q6H PRN    glucose chewable tablet 16 g  4 Tab Oral PRN    glucagon (GLUCAGEN) injection 1 mg  1 mg IntraMUSCular PRN    dextrose (D50W) injection syrg 12.5-25 g  25-50 mL IntraVENous PRN    atorvastatin (LIPITOR) tablet 40 mg  40 mg Oral QHS         Labs: Results:       Chemistry Recent Labs     01/11/21  0550 01/10/21  0620 01/09/21  0545   * 148* 122*   * 136 137   K 4.5 3.5 4.1   CL 99* 100 103   CO2 32 31 29   BUN 12 13 11   CREA 0.32* 0.33* 0.30*   CA 9.3 9.0 9.4   AGAP 4 5 5   BUCR 38* 39* 37*      CBC w/Diff Recent Labs     01/11/21  0550 01/10/21  1310 01/10/21  0620 01/09/21  0545   WBC 5.7  --  6.2 6.6   RBC 2.89*  --  2.79* 3.15*   HGB 7.5* 7.4* 7.4* 8.3*   HCT 24.7* 24.1* 23.9* 26.9*   *  --  446* 508*   GRANS 67  --  68 79*   LYMPH 16*  --  18* 12*   EOS 7*  --  6* 1      Coagulation Recent Labs     01/10/21  0620 01/09/21  1730   APTT 89.2* 94.5*       Liver Enzymes No results for input(s): TP, ALB, TBIL, AP in the last 72 hours. No lab exists for component: SGOT, GPT, DBIL   ABG Lab Results   Component Value Date/Time    PHI 7.44 01/11/2021 03:59 AM    PCO2I 48.2 (H) 01/11/2021 03:59 AM    PO2I 94 01/11/2021 03:59 AM    HCO3I 32.9 (H) 01/11/2021 03:59 AM    FIO2I 28 01/11/2021 03:59 AM      Microbiology No results for input(s): CULT in the last 72 hours. Telemetry: [x]Sinus []A-flutter []Paced    []A-fib []Multiple PVCs                  Imaging:    CXR Results  (Last 48 hours)               01/10/21 0447  XR CHEST PORT Final result    Impression:  IMPRESSION:        1. Interval removal of ET tube with tracheostomy placed. Repositioned   right-sided PICC line with the tip from proximal SVC to the right jugular vein,   tip not imaged. No pneumothorax. 2.  Persistent extensive pulmonary infiltrations. 3.  Gas-filled nondilated splenic flexure shows moderate bowel wall thickening. Recommend clinical correlation and abdomen 2 views or CT.        Thank you for your referral.       Narrative:  Chest AP single view       HISTORY: Postintubation follow-up        COMPARISON: 1/4/21       FINDINGS: There is status post ET tube removal with tracheostomy tube placement. The right-sided PICC line has being interval reposition from proximal SVC to the   right jugular vein, tip not imaged. The NG tube appears unchanged. No   pneumothorax. The cardiac silhouette appears unremarkable. The lungs show   extensive pulmonary opacities, more pronounced in the periphery. Mild interval   progression noted since the prior study. Incidental noted is gas-filled splenic   flexure with moderate bowel wall thickening. IMPRESSION:   · Acute on Chronic Hypoxic and Hypercapnic Respiratory Failure - Requiring mechanical ventilation, Extubated on 12/17 and reintubated on 12/22, Likely aspiration event. Trach placed 1/4/21, Dr. Gibson Pump   · Wide Complex Arrhythmia (12/25) -noted on bedside cardiac monitor. Lasted roughly 5-10 mins with Spontaneous resolution. E-lyte derangement vs Drug effect (pt was on 55mcg/hr Oliverio-Synephrine at the time) vs cardiac insult.    · Acute on Chronic Systolic Heart Failure - superimposed on severe ILD, Echo 12/10/20 EF 35-40%  · Combination septic/cardiogenic shock- currently resolved  · Aspiration PNA. Sputum Cx(12/22) (+) heavy E.Coli-ESBL. Recent Hx of E.coli ESBL.   · Acute Encephalopathy - likely toxic/metabolic vs infectious vs hepatic in nature, ammonia wnl.   · Severe pulmonary fibrosis - with extensive honeycombing and bronchiectasis as seen on CTA chest 12/16/20 - appears to be a new diagnosis but possibly UIP  · UTI (+)E.  Coli- ESBL  · Acute MI with anterolateral STEMI and Q waves - s/p ptca/stent to proximal/mid LAD using ARELY on DAPT on 12/11/20- Remains on Brilinta  · Severe dysphagia - failed MBS s/p PEG, 1/8/2021  · Critical illness myopathy/polyneuropathy  ·  Hep C: + RNA viral load 4/4/2020  · Multiple liver masses - noted on CT scan 11/25/20 - etiology unknown at this time; concerning for neoplasm  · COPD - on home O2 4L NC  · DM  · Dementia - unknown baseline, on aricept   · S/P Trach 1/4/21   RECOMMENDATIONS:   Resp:   -Titrate FiO2/ supp O2 for SpO2 >90%  -Aspiration precautions   -S/p trach 1/4/21, Dr. Jennifer Daniel - monitor oozing at site- heparin stopped   -Aggressive pulmonary hygiene   -Currently tolerating SIMV  I/D:   -Aleukocyotosis  -Per ID,s/p Merrem  for Ecoli (ESBL)   -Negative for COVID19 on 1/1/2021  - monitor low grade temp  Hem/Onc:   -Daily CBC; - recheck Hb  -Will transfuse unit of blood to maintain Hgb >7   CVS:   -Continue DAPT   -EF 35-40 % on 12/10/20, the day before stent placement  -Proximal LAD angioplasty and stenting on 12/11/2020 for 95% stenotic lesion.  -Hemodynamics stable off pressors  -Tolerating statin   -continue lasix  -BUE and BLE PVL's negative   -Heparin drip stopped  Metabolic:   -Daily BMP; monitor e-lytes; replace PRN  replace Mg  Renal:    -Trend Renal indices, Purewick   Endocrine:   -Continue lantus QHS with SSI   GI:   -SUP  -Restart Feeds via PEG 24 hours post placement   -Bowel regimen: Miralax PRN, D/C colace- monitor liquid stool output with plan to remove FMS in near future but currently still needs  -PEG tube insertion today with IR    -CT Abd from 1/3/21: gastric antrum contiguous with anterior peritoneum, enlarging hepatic masses consistent with likely malignancy or mets  Musc/Skin:   -No acute issues, wound care  Neuro:   -Off IV sedation  -Continue to decrease valium to 15mg BID  -Transition to enteral meds; fiona and valium. Can consider interval decrease in sedation and analgesia as patient tolerates   PPX  -SUP ppx  -Plan to restart DVT-chemoprophylaxis later today or tomorrow- pending oozing from trach and repeat Hb    Pt is stable for LTAC transfer.         The patient is: [x] acutely ill Risk of deterioration: [] moderate    [] critically ill  [] high     [x]See my orders for details    My assessment/plan was discussed with:  [x]Nursing []PT/OT   [x]Respiratory therapy []    []Family [x]Dr. Schilling     Critical Care Time:  The services I provided to this patient were to treat and/or prevent clinically significant deterioration that could result in the failure of one or more body systems and/or organ systems due to respiratory distress, hypoxia, cardiac dysrhythmia.     Services included the following:  -reviewing nursing notes and old charts  -vital sign assessments   -direct patient care  -medication orders and management  -interpreting and reviewing diagnostic studies/labs  -re-evaluations  -documentation time        Aggregate critical care time was 35 minutes, which includes only time during which I was engaged in work directly related to the patient's care as described above.    During this entire length of time I was immediately available to the patient. The reason for providing this level of medical care for this critically ill patient was due a critical illness that impaired one or more vital organ systems such that there was a high probability of imminent or life threatening deterioration in the patients condition. This care involved high complexity decision making to assess, manipulate, and support vital system functions, to treat this degreee vital organ system failure and to prevent further life threatening deterioration of the patient’s condition      Complex decision making was made in the evaluation and management plans during this consultation.  More than 50% of time was spent in counseling and coordination of care including review of data and discussion with other team members.    Marques Montanez MD  Critical Care Medicine

## 2021-01-11 NOTE — PROGRESS NOTES
MIGUEL ANGEL called and spoke to Wagner Child from [facility]. They are going to reach out to the family and check on bed availability. They have accepted pending these and an updated COVID. MIGUEL ANGEL discussed with Dr Franchesca Gaitan.  She will order a send out [address] [id number] RN BSN  Care Manager  [phone]

## 2021-01-11 NOTE — CONSULTS
Nutrition Assessment     Type and Reason for Visit: Reassess, Wound, Consult    Nutrition Recommendations/Plan:   - Modify bolus tube feeding regimen- provide 300 mL bolus of Glucerna 1.5 over one hour using pump 4 times daily with 100 mL free water flush after each bolus; continue multivitamin and prosource once daily. Bolus schedule: 06:00, 10:00, 14:00, and 18:00. Nutrition Assessment:  Receiving bolus feeds via PEG with residual of 150 mL today and FMS placed for loose stool (40 mL output x last 24 hours)- colace held and movantik daily prn started yesterday; hyponatremia on lasix and started on schedule K replacement yesterday (40 mEq K bicarb)- K up to 4.5 today. Malnutrition Assessment:  Malnutrition Status: Severe malnutrition     Estimated Daily Nutrient Needs:  Energy (kcal):  3643-7914  Protein (g):         Fluid (ml/day):  6594-6164    Nutrition Related Findings:  Loose stool via FMS 1/11 (40 mL output)      Additional Caloric Sources: D5 1/2NS at 10 mL/hr (12 gm dextrose, 41 kcal per day)- KVO     Current Nutrition Therapies:  DIET NUTRITIONAL SUPPLEMENTS Breakfast; Prosource  DIET NPO  DIET TUBE FEEDING Please provide 300 mL bolus over one hour using pump 4 times daily plus 100 mL water flush after each bolus. Bolus schedule: 06:00, 10:00, 14:00, and 18:00. Current Tube Feeding (TF) Orders:   · Feeding Route: PEG  · Formula: Glucerna 1.5  · Schedule: Bolus    · Regimen: 400 mL every 8 hours  · Additives/Modulars: Protein(prosource once daily, multivitamin)  · Water Flushes: 100 mL before and after each bolus (600 mL total)  · Current TF Orders Provides: 1860 kcal, 114 gm protein, 910 mL free water, 100% RDIs (goal)     Anthropometric Measures:  · Height:  5' 7\" (170.2 cm)  · Current Body Wt:  68.7 kg (151 lb 7.3 oz)  · BMI: 23.7    Nutrition Diagnosis:   · Inadequate oral intake related to impaired respiratory function, swallowing difficulty as evidenced by NPO or clear liquid status due to medical condition(s/p trach on ventilator)      Nutrition Intervention:  Food and/or Nutrient Delivery: Modify tube feeding, Vitamin supplement  Nutrition Education and Counseling: Education not indicated  Coordination of Nutrition Care: Continue to monitor while inpatient, Coordination of community care(patient discussed with nursing)    Goals:  Nutritional needs will be met through adequate oral intake or nutrition support within the next 7 days.        Nutrition Monitoring and Evaluation:   Behavioral-Environmental Outcomes: None identified  Food/Nutrient Intake Outcomes: Enteral nutrition intake/tolerance, Vitamin/mineral intake  Physical Signs/Symptoms Outcomes: Biochemical data, Diarrhea, GI status, Fluid status or edema, Hemodynamic status, Nutrition focused physical findings    Discharge Planning:    Enteral nutrition     Electronically signed by Inetta Gaucher, RD, [facility] on 1/11/2021 at 3:50 PM    Contact Number: [phone]

## 2021-01-11 NOTE — DIABETES MGMT
Glycemic Control Plan of Care    T2DM with A1c of 8.3% (12/10/2020)  12/11: Patient intub and sedated. Noted patient came from [facility] with history of dementia. Home diabetes medications: Unverified  Lantus insulin 22 units daily    Patient was admitted to [facility] from [facility] on 12/10 with report of shortness of breath and altered mental state. Noted the following assessment:  Acute MI with antolateral STEMI  Status post PTCA/stent on 12/11  Acute respiratory failure, vent support  Status post tracheostomy  Status post PEG TF placement, 01/08/2021  Acute flash pulmonary edema  Acute systolic heart failure exacerbation  Severe pulmonary fibrosis  Septic shock  Multiple liver masses  Poor prognosis    Noted per palliative care team: full code with full interventions    01/11: Patient seen in CVT ICU. Noted DCP: SNF at [facility]     Glycemic recommendation(s): basal lantus insulin daily at bedtime and sliding scale lispro as ordered. Glycemic assessment:    NPO on TF Glucerna 1.5  IVF order: D5 1/2 NS cont infusion 10 ml/hr    POC BG 01/10: 196  POC BG 01/11 at time of review: 155, 153  Lab FBG 01/10: 130        Current A1C: 8.3% (12/10/2020) which is equivalent to estimated average blood glucose of 192 mg/dL during the past 2-3 months    Current hospital diabetes medications:  Basal lantus insulin 20 units daily at bedtime  Correctional lispro insulin every 6 hours. Very resistant dose    Total daily dose insulin requirement previous day: 01/10:  Lantus: 20 units  Lispro: 9 units  TDD insulin: 29 units    Diet: NPO.  Status post PEG TF    Goals:  Blood glucose will be within target range of  mg/dL by 01/14/2021    Education:   ___  Refer to Diabetes Education Record   _X__  Education not indicated at this time    Danuta Farmer RN [facility]  Pager: [phone]

## 2021-01-11 NOTE — PROGRESS NOTES
[facility] Progress Note       CARDIOLOGY PROGRESS NOTE  RECS:    1. Acute respiratory failure- requiring mechanical ventilation- extubated  12/17/20. re intubated 12/22/20. S/p tracheostomy 1/4/20 Continue supportive care  2. Sepsis- Sputum Cx(12/22) (+) heavy E.Coli-ESBL. on antibiotics. ID following   3. Wide complex Arrhythmia on 12/25/20- No recurrence will continue to monitor. Monitor electrolytes    4. Hypertension - low intermittent BP Carvedilol 6.25 mg bid. 5. Subacute MI- 12/10/20 -s/p [facility] S/p ptca/stent to proximal/ mid LAD using ARELY.  Moderate to severe LV dysfunction.  ekg no new ischemic changes suggestive of reinfarction or stent thrombosis. S/p PEG tube. Continue aspirin and Clopidogrel. 6. Anemia- H&H Stable.  S/p transfusion. 7. Acute Systolic Congestive heart Failure-recent echo with EF% 35%-40. Compensated at present. Lasix apo daily.  Try low-dose lisinopril. 8. COPD,   9. Dementia - failed swallow eval. S/p Gastrostomy tube placement  1/8/21  by IR.  10. Hypomagnesemia -resolved. Stable hemodynamics post trach and PEG tube. Status post urgent PCI for LAD STEMI. Prognosis is poor. Continue supportive care      Cardiac cath 12/11/20  Patient presented to [address] [place] [address] with late presentation anterior wall MI.  EF 35-40%. Patient was initially managed medically-- however developed acute pulmonary edema requiring intubation. Also troponin level increasing consistent with ongoing ischemia. S/p ptca/stent to proximal/ mid LAD using ARELY. LVEDP 8 mmHg. Normal PASP pressure with normal PCWP. Moderate to severe LV dysfunction.     Echo 12/10/20  · LV: Estimated LVEF is 35 - 40%. Visually measured ejection fraction. Normal cavity size and wall thickness. Moderately and segmentally reduced systolic function. Inconclusive left ventricular diastolic function. AO: Mild aortic root dilatation; diameter is 3.8 cm.     ASSESSMENT:  Hospital Problems  Date Reviewed: 2/7/2018          Codes Class Noted POA    Goals of care, counseling/discussion ICD-10-CM: Z71.89  ICD-9-CM: V65.49  Unknown Unknown        Dementia without behavioral disturbance ([facility] ICD-10-CM: F03.90  ICD-9-CM: 294.20  Unknown Unknown        Pulmonary fibrosis ([facility] ICD-10-CM: J84.10  ICD-9-CM: 380  Unknown Unknown        Severe protein-calorie malnutrition ([facility] ICD-10-CM: E43  ICD-9-CM: 049  12/16/2020 Clinically Undetermined        Acute on chronic systolic congestive heart failure ([facility] ICD-10-CM: I50.23  ICD-9-CM: 428.23, 428.0  12/15/2020 Unknown        * (Principal) STEMI (ST elevation myocardial infarction) ([facility] ICD-10-CM: I21.3  ICD-9-CM: 410.90  12/10/2020 Unknown                SUBJECTIVE:  Intubated. Sedated. FiO2 28%. PEEP 5. Heparin held due to bleeding around tracheostomy. OBJECTIVE:    VS:   Visit Vitals  BP (!) 144/84   Pulse 84   Temp 99.6 °F (37.6 °C)   Resp 15   Ht 5' 7\" (1.702 m)   Wt 68.7 kg (151 lb 7.3 oz)   SpO2 98%   Breastfeeding No   BMI 23.72 kg/m²         Intake/Output Summary (Last 24 hours) at 1/11/2021 1540  Last data filed at 1/11/2021 1200  Gross per 24 hour   Intake 2340 ml   Output 960 ml   Net 1380 ml     TELE: normal sinus rhythm    General: on trach collar/ventilator  HENT: Normocephalic, atraumatic.   Neck :  Supple  Cardiac:  Normal S1/S2  Chest/Lungs:b/l air entry with mild coarse rales   Abdomen: Soft  Extremities:  No edema      Labs: Results:       Chemistry Recent Labs     01/11/21  0550 01/10/21  0620 01/09/21  0545   * 148* 122*   * 136 137   K 4.5 3.5 4.1   CL 99* 100 103   CO2 32 31 29   BUN 12 13 11   CREA 0.32* 0.33* 0.30*   CA 9.3 9.0 9.4   MG 2.0 1.7 1.8   PHOS 3.7 3.7 4.3   AGAP 4 5 5   BUCR 38* 39* 37*      CBC w/Diff Recent Labs     01/11/21  0550 01/10/21  1310 01/10/21  0620 01/09/21  0545   WBC 5.7  --  6.2 6.6   RBC 2.89*  --  2.79* 3.15*   HGB 7.5* 7.4* 7.4* 8.3*   HCT 24.7* 24.1* 23.9* 26.9*   *  --  446* 508* GRANS 67  --  68 79*   LYMPH 16*  --  18* 12*   EOS 7*  --  6* 1      Cardiac Enzymes No results for input(s): CPK, CKND1, NATHANIEL in the last 72 hours. No lab exists for component: CKRMB, TROIP   Coagulation Recent Labs     01/10/21  0620 01/09/21  1730   APTT 89.2* 94.5*       Lipid Panel No results found for: CHOL, CHOLPOCT, CHOLX, CHLST, CHOLV, 893405, HDL, HDLP, LDL, LDLC, DLDLP, 206684, VLDLC, VLDL, TGLX, TRIGL, TRIGP, TGLPOCT, CHHD, CHHDX   BNP No results for input(s): BNPP in the last 72 hours. Liver Enzymes No results for input(s): TP, ALB, TBIL, AP in the last 72 hours.     No lab exists for component: SGOT, GPT, DBIL   Digoxin    Thyroid Studies Lab Results   Component Value Date/Time    TSH 2.92 12/10/2020 11:07 AM              Hilda Holloway MD

## 2021-01-11 NOTE — PROGRESS NOTES
[facility]  Progress Note    Patient: Zeina Standard Age: 72 y.o. : 1955 MR#: [id number] SSN: [id number]  Date/Time: 2021     Subjective:     Patient seen and evaluated, lying in bed is critically ill on mechanical ventilation. No acute overnight chagnes per discussion with nursing. PurWick remains in place. Continue with tube feeds- increase free water flushes    Discussed plan of care with Dr. Ayala López who was also at bedside. Interval HPI  Patient is a 72 y. o. female with a past medical history of COPD, CHF, HTN, DM, dementia, presented to [facility] with acute on chronic systolic heart failure, acute MI with anterolateral STEMI and Q waves s/p ptca/stent to proximal/mid LAD on 20. Pt was intubated and extubated on 20, transferred to . On 20 patient was intubated and transferred to ICU for respiratory failure and cardiogenic +/- septic shock. Respiratory cultures on  with ESBL on Merrem. Dr. Becky Guy and IR have been consulted for trach/PEG. She is S/p trach 21 and planning for PEG placement by IR on 21. Assessment/Plan:     · Acute on Chronic Hypoxic and Hypercapnic Respiratory Failure - Requiring mechanical ventilation, Extubated on  and reintubated on , Likely aspiration event. Trach placed 21, Dr. Mendel Palin on board; [facility]es long term vent management. · Wide Complex Arrhythmia () -noted on bedside cardiac monitor. Lasted roughly 5-10 mins with Spontaneous resolution. E-lyte derangement vs Drug effect (pt was on 55mcg/hr Oliverio-Synephrine at the time) vs cardiac insult.    · Acute on Chronic Systolic Heart Failure - superimposed on severe ILD, Echo 12/10/20 EF 35-40%  · Combination septic/cardiogenic shock, improving   · Aspiration PNA. Sputum Cx() (+) heavy E.Coli-ESBL.  Recent Hx of E.coli ESBL.   · Acute Encephalopathy - likely toxic/metabolic vs infectious vs hepatic in nature, ammonia wnl.   · Severe pulmonary fibrosis - with extensive honeycombing and bronchiectasis as seen on CTA chest 20 - appears to be a new diagnosis but possibly UIP  · Acute aspiration pneumonia with severe sepsis  · UTI (+)E. coli  · Acute MI with anterolateral STEMI and Q waves - s/p ptca/stent to proximal/mid LAD using ARELY on DAPT on 20- Remains on Brilinta  · Severe dysphagia - failed MBS. Will place PEG tube today with IR.   · Critical illness myopathy/polyneuropathy  · Hx of Hep C: + RNA viral load 2020  · Multiple liver masses - noted on CT scan 20  · COPD - on home O2 4L NC  · DM  · Dementia - unknown baseline, on aricept   · Prognosis guarded  · Full code      Plan:   Decreased valium to bid  Change to meds per PEG. Send out COVID testing in preparation for transfer to [facility]. [facility]. CBC, BMP  Replete electrolytes. Hold lisinopril and aldactone for now. Continue Coreg 6.25 bid, ASA, Plavix. Case discussed with:  [x]Patient  []Family  [x]Nursing  [x]Case Management  DVT Prophylaxis:  []Lovenox  []Hep SQ  [x]SCDs  []Coumadin   [x]On Heparin gtt    Objective:   VS:   Visit Vitals  BP (!) 144/84   Pulse 84   Temp 99.6 °F (37.6 °C)   Resp 15   Ht 5' 7\" (1.702 m)   Wt 68.7 kg (151 lb 7.3 oz)   SpO2 98%   Breastfeeding No   BMI 23.72 kg/m²      Tmax/24hrs: Temp (24hrs), Av.7 °F (37.6 °C), Min:99.1 °F (37.3 °C), Max:100.6 °F (38.1 °C)  IOBRIEF    Intake/Output Summary (Last 24 hours) at 2021 1519  Last data filed at 2021 1200  Gross per 24 hour   Intake 2340 ml   Output 960 ml   Net 1380 ml     Generalintubated/sedated, NAD  Pulmonary: Decreased breath sounds bilaterally; + trach  Cardiovascular: Regular rate and Rhythm. GI:  Soft, Non distended, Non tender. + Bowel sounds.+ PEG + rectal tube  Extremities:  No edema, cyanosis, clubbing. No calf tenderness.    Neurologic: Opens eyes spontaneously, does not follow commands    Medications:   Current Facility-Administered Medications   Medication Dose Route Frequency    diazePAM (VALIUM) tablet 15 mg  15 mg Oral BID    naloxegoL (MOVANTIK) tablet 25 mg  25 mg Oral DAILY PRN    potassium bicarb-citric acid (EFFER-K) tablet 40 mEq  40 mEq Oral DAILY    heparin (porcine) injection 5,000 Units  5,000 Units SubCUTAneous Q8H    nystatin (MYCOSTATIN) 100,000 unit/mL oral suspension 500,000 Units  500,000 Units Oral QID    carvediloL (COREG) tablet 6.25 mg  6.25 mg Oral Q12H    aspirin chewable tablet 81 mg  81 mg Oral DAILY    clopidogreL (PLAVIX) tablet 75 mg  75 mg Oral DAILY    dextrose 5 % - 0.45% NaCl infusion  10 mL/hr IntraVENous CONTINUOUS    insulin glargine (LANTUS) injection 20 Units  20 Units SubCUTAneous QHS    0.9% sodium chloride infusion 250 mL  250 mL IntraVENous PRN    oxyCODONE (ROXICODONE) 5 mg/5 mL oral solution 20 mg  20 mg Oral Q8H    [Held by provider] docusate (COLACE) 50 mg/5 mL oral liquid 100 mg  100 mg Oral DAILY    furosemide (LASIX) tablet 20 mg  20 mg Oral DAILY    lansoprazole (PREVACID) 3 mg/mL oral suspension 30 mg  30 mg Per G Tube ACB    polyethylene glycol (MIRALAX) packet 17 g  17 g Per NG tube PRN    chlorhexidine (PERIDEX) 0.12 % mouthwash 10 mL  10 mL Oral Q12H    ELECTROLYTE REPLACEMENT PROTOCOL - Potassium Standard Dosing   1 Each Other PRN    ELECTROLYTE REPLACEMENT PROTOCOL - Magnesium   1 Each Other PRN    ELECTROLYTE REPLACEMENT PROTOCOL - Phosphorus  Standard Dosing  1 Each Other PRN    ELECTROLYTE REPLACEMENT PROTOCOL - Calcium   1 Each Other PRN    [Held by provider] lisinopriL (PRINIVIL, ZESTRIL) tablet 5 mg  5 mg Oral DAILY    [Held by provider] spironolactone (ALDACTONE) tablet 25 mg  25 mg Oral DAILY    multivit-folic acid-herbal 084 (WELLESSE PLUS) oral liquid 30 mL  30 mL Per NG tube DAILY    insulin lispro (HUMALOG) injection   SubCUTAneous Q6H    albuterol-ipratropium (DUO-NEB) 2.5 MG-0.5 MG/3 ML  3 mL Nebulization Q4H PRN    sodium chloride (NS) flush 5-40 mL  5-40 mL IntraVENous Q8H    sodium chloride (NS) flush 5-40 mL  5-40 mL IntraVENous PRN    acetaminophen (TYLENOL) tablet 650 mg  650 mg Oral Q6H PRN    Or    acetaminophen (TYLENOL) suppository 650 mg  650 mg Rectal Q6H PRN    promethazine (PHENERGAN) tablet 12.5 mg  12.5 mg Oral Q6H PRN    Or    ondansetron (ZOFRAN) injection 4 mg  4 mg IntraVENous Q6H PRN    glucose chewable tablet 16 g  4 Tab Oral PRN    glucagon (GLUCAGEN) injection 1 mg  1 mg IntraMUSCular PRN    dextrose (D50W) injection syrg 12.5-25 g  25-50 mL IntraVENous PRN    atorvastatin (LIPITOR) tablet 40 mg  40 mg Oral QHS       Imaging:   XR Results (most recent):  Results from Hospital Encounter encounter on 12/10/20   XR CHEST PORT    Narrative Chest AP single view    HISTORY: Postintubation follow-up     COMPARISON: 1/4/21    FINDINGS: There is status post ET tube removal with tracheostomy tube placement. The right-sided PICC line has being interval reposition from proximal SVC to the  right jugular vein, tip not imaged. The NG tube appears unchanged. No  pneumothorax. The cardiac silhouette appears unremarkable. The lungs show  extensive pulmonary opacities, more pronounced in the periphery. Mild interval  progression noted since the prior study. Incidental noted is gas-filled splenic  flexure with moderate bowel wall thickening. Impression IMPRESSION:     1. Interval removal of ET tube with tracheostomy placed. Repositioned  right-sided PICC line with the tip from proximal SVC to the right jugular vein,  tip not imaged. No pneumothorax. 2.  Persistent extensive pulmonary infiltrations. 3.  Gas-filled nondilated splenic flexure shows moderate bowel wall thickening. Recommend clinical correlation and abdomen 2 views or CT.     Thank you for your referral.        CT Results (most recent):  Results from [place] encounter on 12/10/20   CT ABD WO CONT Narrative CT ABDOMEN, NONCONTRAST    CPT CODE: 71752    INDICATION: Pretreatment planning for possible gastrostomy tube. COMPARISON: The partially visualized portion of the upper abdomen on pulmonary  CTA 12/16/2020. Prior abdominal CT 2/4/2018. Report is noted in the electronic  medical record (care everywhere) of contrast enhanced abdominal pelvic CT  performed elsewhere 11/25/2020, images from which are not available at this  time. Reference right upper quadrant ultrasound 5/29/2020. TECHNIQUE: Without administration of intravenous or oral contrast, serial axial  CT images through the abdomen were helically acquired. Additional coronal and  sagittal reformation images were also performed. All CT scans at this facility are performed using dose optimization technique as  appropriate to the performed exam, to include automated exposure control,  adjustment of the mA and/or kV according to patient's size (Including  appropriate matching for site-specific examinations), or use of iterative  reconstruction technique. FINDINGS:    Assessment of the solid and hollow viscera and vascular structures of the  abdomen is limited by lack of contrast.    The visualized portions of lung bases demonstrate bibasilar consolidations in  the lower lobes, right greater than left with air bronchograms in the right  lower lobe consolidation, compatible with multisegmental right lower lobe  atelectasis which could be with or without superimposed airspace disease. There  is bronchiectasis in the lower lungs bilaterally and extensive fibrotic changes  again seen. Esophagogastric tube tip extends to the level of the mid stomach. The gastric  antrum demonstrates contiguity with the anterior abdominal peritoneum deep to  the left rectus muscle, likely would be amenable to percutaneous gastrostomy  tube placement, interventional radiology consultation might also be helpful in  this regard.     Multiple calcific gallstones in the gallbladder consistent with cholelithiasis. Multiple splenic calcifications, likely granulomata. Large heterogeneous in attenuation mass centered laterally in the right hepatic  lobe, contours poorly defined for accurate measurement due to lack of IV  contrast and streak artifacts from the patient's arms down at sides but  approximately 11 cm, further increased in size compared to prior studies,  consistent with malignancy which could be primary or metastatic. Additional  hypoattenuating mass farther inferiorly in the right hepatic lobe centered  medially measures approximately 2 cm. A third hepatic mass described in the left  hepatic lobe on prior contrast-enhanced CT is somewhat less well defined on the  current study which is limited by lack of IV contrast.    No evidence of hydronephrosis/hydroureter. No evidence of nephrolithiasis or  calculi in the upper abdominal portions of ureters. Assessment of the renal  parenchyma otherwise is limited by lack of IV contrast without large contour  deforming mass identified. Within the limitation of non-contrast technique, the unenhanced pancreas and  adrenal glands appear grossly unremarkable. Visualized portions of bowel are non-dilated without evidence of bowel  obstruction. Arterial calcifications noted in the abdominal aorta and coronary arteries. No  evidence of abdominal aortic aneurysm. No definite evidence of pathologic lymph node enlargement is seen. No definite fluid collection/abscess identified. Pelvic viscera and pelvic portions of abdominal viscera are inferior to the  field of view on abdominal CT. On review of bone windows, no acute fractures or destructive bone lesions are  seen. Degenerative changes and osteopenia noted. Impression Impression:     The gastric antrum demonstrates contiguity with the anterior peritoneum just  deep to the left rectus muscle as above.     Known enlarging multiple hepatic masses again seen, assessment limited by lack  of IV contrast, consistent with malignancy, differential considerations could  include multifocal primary malignancy or metastases as noted on prior studies. Right lower lobe consolidation contains air bronchograms consistent with  multisegmental atelectasis which could be with or without superimposed airspace  disease. Otherwise limited noncontrast CT scan of the upper abdomen with other nonacute  findings as above. 12/10/20   ECHO ADULT COMPLETE 12/10/2020 12/10/2020    Narrative · LV: Estimated LVEF is 35 - 40%. Visually measured ejection fraction. Normal cavity size and wall thickness. Moderately and segmentally reduced   systolic function. Inconclusive left ventricular diastolic function. · AO: Mild aortic root dilatation; diameter is 3.8 cm. Signed by: Felix Sanchez MD        Labs:    Recent Results (from the past 48 hour(s))   PTT    Collection Time: 01/09/21  5:30 PM   Result Value Ref Range    aPTT 94.5 (H) 23.0 - 36.4 SEC   GLUCOSE, POC    Collection Time: 01/09/21  5:30 PM   Result Value Ref Range    Glucose (POC) 184 (H) 70 - 110 mg/dL   GLUCOSE, POC    Collection Time: 01/09/21 11:30 PM   Result Value Ref Range    Glucose (POC) 181 (H) 70 - 110 mg/dL   POC G3    Collection Time: 01/10/21  3:48 AM   Result Value Ref Range    Device: VENT      FIO2 (POC) 28 %    pH (POC) 7.41 7.35 - 7.45      pCO2 (POC) 46.3 (H) 35.0 - 45.0 MMHG    pO2 (POC) 96 80 - 100 MMHG    HCO3 (POC) 29.6 (H) 22 - 26 MMOL/L    sO2 (POC) 97 92 - 97 %    Base excess (POC) 5 mmol/L    Mode SIMV      Tidal volume 500 ml    Set Rate 10 bpm    PEEP/CPAP (POC) 5 cmH2O    Pressure support 8 cmH2O    Allens test (POC) YES      Inspiratory Time 0.90 sec    Total resp.  rate 21      Site RIGHT RADIAL      Specimen type (POC) ARTERIAL      Performed by [facility]     Volume control plus YES     CBC WITH AUTOMATED DIFF    Collection Time: 01/10/21  6:20 AM   Result Value Ref Range    WBC 6.2 4.6 - 13.2 K/uL    RBC 2.79 (L) 4.20 - 5.30 M/uL    HGB 7.4 (L) 12.0 - 16.0 g/dL    HCT 23.9 (L) 35.0 - 45.0 %    MCV 85.7 74.0 - 97.0 FL    MCH 26.5 24.0 - 34.0 PG    MCHC 31.0 31.0 - 37.0 g/dL    RDW 15.0 (H) 11.6 - 14.5 %    PLATELET 087 (H) 841 - 420 K/uL    MPV 8.9 (L) 9.2 - 11.8 FL    NEUTROPHILS 68 40 - 73 %    LYMPHOCYTES 18 (L) 21 - 52 %    MONOCYTES 8 3 - 10 %    EOSINOPHILS 6 (H) 0 - 5 %    BASOPHILS 0 0 - 2 %    ABS. NEUTROPHILS 4.2 1.8 - 8.0 K/UL    ABS. LYMPHOCYTES 1.1 0.9 - 3.6 K/UL    ABS. MONOCYTES 0.5 0.05 - 1.2 K/UL    ABS. EOSINOPHILS 0.4 0.0 - 0.4 K/UL    ABS.  BASOPHILS 0.0 0.0 - 0.1 K/UL    DF AUTOMATED     MAGNESIUM    Collection Time: 01/10/21  6:20 AM   Result Value Ref Range    Magnesium 1.7 1.6 - 2.6 mg/dL   PHOSPHORUS    Collection Time: 01/10/21  6:20 AM   Result Value Ref Range    Phosphorus 3.7 2.5 - 4.9 MG/DL   METABOLIC PANEL, BASIC    Collection Time: 01/10/21  6:20 AM   Result Value Ref Range    Sodium 136 136 - 145 mmol/L    Potassium 3.5 3.5 - 5.5 mmol/L    Chloride 100 100 - 111 mmol/L    CO2 31 21 - 32 mmol/L    Anion gap 5 3.0 - 18 mmol/L    Glucose 148 (H) 74 - 99 mg/dL    BUN 13 7.0 - 18 MG/DL    Creatinine 0.33 (L) 0.6 - 1.3 MG/DL    BUN/Creatinine ratio 39 (H) 12 - 20      GFR est AA >60 >60 ml/min/1.73m2    GFR est non-AA >60 >60 ml/min/1.73m2    Calcium 9.0 8.5 - 10.1 MG/DL   PTT    Collection Time: 01/10/21  6:20 AM   Result Value Ref Range    aPTT 89.2 (H) 23.0 - 36.4 SEC   GLUCOSE, POC    Collection Time: 01/10/21  6:22 AM   Result Value Ref Range    Glucose (POC) 156 (H) 70 - 110 mg/dL   HGB & HCT    Collection Time: 01/10/21  1:10 PM   Result Value Ref Range    HGB 7.4 (L) 12.0 - 16.0 g/dL    HCT 24.1 (L) 35.0 - 45.0 %   GLUCOSE, POC    Collection Time: 01/10/21  1:16 PM   Result Value Ref Range    Glucose (POC) 160 (H) 70 - 110 mg/dL   GLUCOSE, POC    Collection Time: 01/10/21  5:59 PM   Result Value Ref Range    Glucose (POC) 196 (H) 70 - 110 mg/dL   GLUCOSE, POC    Collection Time: 01/11/21 12:03 AM   Result Value Ref Range    Glucose (POC) 155 (H) 70 - 110 mg/dL   POC G3    Collection Time: 01/11/21  3:59 AM   Result Value Ref Range    Device: VENT      FIO2 (POC) 28 %    pH (POC) 7.44 7.35 - 7.45      pCO2 (POC) 48.2 (H) 35.0 - 45.0 MMHG    pO2 (POC) 94 80 - 100 MMHG    HCO3 (POC) 32.9 (H) 22 - 26 MMOL/L    sO2 (POC) 97 92 - 97 %    Base excess (POC) 9 mmol/L    Mode SIMV      Tidal volume 500 ml    Set Rate 10 bpm    PEEP/CPAP (POC) 5 cmH2O    Pressure support 8 cmH2O    Allens test (POC) YES      Inspiratory Time 1.00 sec    Total resp. rate 17      Site RIGHT RADIAL      Specimen type (POC) ARTERIAL      Performed by [facility] Gonzalez     Volume control plus YES     CBC WITH AUTOMATED DIFF    Collection Time: 01/11/21  5:50 AM   Result Value Ref Range    WBC 5.7 4.6 - 13.2 K/uL    RBC 2.89 (L) 4.20 - 5.30 M/uL    HGB 7.5 (L) 12.0 - 16.0 g/dL    HCT 24.7 (L) 35.0 - 45.0 %    MCV 85.5 74.0 - 97.0 FL    MCH 26.0 24.0 - 34.0 PG    MCHC 30.4 (L) 31.0 - 37.0 g/dL    RDW 15.1 (H) 11.6 - 14.5 %    PLATELET 715 (H) 710 - 420 K/uL    MPV 8.9 (L) 9.2 - 11.8 FL    NEUTROPHILS 67 40 - 73 %    LYMPHOCYTES 16 (L) 21 - 52 %    MONOCYTES 10 3 - 10 %    EOSINOPHILS 7 (H) 0 - 5 %    BASOPHILS 0 0 - 2 %    ABS. NEUTROPHILS 3.8 1.8 - 8.0 K/UL    ABS. LYMPHOCYTES 0.9 0.9 - 3.6 K/UL    ABS. MONOCYTES 0.6 0.05 - 1.2 K/UL    ABS. EOSINOPHILS 0.4 0.0 - 0.4 K/UL    ABS.  BASOPHILS 0.0 0.0 - 0.1 K/UL    DF AUTOMATED     MAGNESIUM    Collection Time: 01/11/21  5:50 AM   Result Value Ref Range    Magnesium 2.0 1.6 - 2.6 mg/dL   PHOSPHORUS    Collection Time: 01/11/21  5:50 AM   Result Value Ref Range    Phosphorus 3.7 2.5 - 4.9 MG/DL   METABOLIC PANEL, BASIC    Collection Time: 01/11/21  5:50 AM   Result Value Ref Range    Sodium 135 (L) 136 - 145 mmol/L    Potassium 4.5 3.5 - 5.5 mmol/L    Chloride 99 (L) 100 - 111 mmol/L    CO2 32 21 - 32 mmol/L    Anion gap 4 3.0 - 18 mmol/L Glucose 130 (H) 74 - 99 mg/dL    BUN 12 7.0 - 18 MG/DL    Creatinine 0.32 (L) 0.6 - 1.3 MG/DL    BUN/Creatinine ratio 38 (H) 12 - 20      GFR est AA >60 >60 ml/min/1.73m2    GFR est non-AA >60 >60 ml/min/1.73m2    Calcium 9.3 8.5 - 10.1 MG/DL   GLUCOSE, POC    Collection Time: 01/11/21  5:51 AM   Result Value Ref Range    Glucose (POC) 153 (H) 70 - 110 mg/dL   GLUCOSE, POC    Collection Time: 01/11/21 12:00 PM   Result Value Ref Range    Glucose (POC) 194 (H) 70 - 110 mg/dL       Signed By: Michelle Steele MD     January 11, 2021      I spent 35 minutes with the patient in face-to-face consultation, of which greater than 50% was spent in counseling and coordination of care as described above    Disclaimer: Sections of this note are dictated using utilizing voice recognition software. Minor typographical errors may be present. If questions arise, please do not hesitate to contact me or call our department.

## 2021-01-11 NOTE — PROGRESS NOTES
1915-Received report/assumed care of pt.    2000-Pt assessment done. 2130-Scheduled medications given. Bolus feed started. 0030-Scheduled medications given. 0100-CHG complete bath given. Purewick and canister changed.

## 2021-01-12 ENCOUNTER — APPOINTMENT (OUTPATIENT)
Dept: GENERAL RADIOLOGY | Age: 66
DRG: 003 | End: 2021-01-12
Attending: PHYSICIAN ASSISTANT
Payer: MEDICARE

## 2021-01-12 ENCOUNTER — APPOINTMENT (OUTPATIENT)
Dept: GENERAL RADIOLOGY | Age: 66
DRG: 003 | End: 2021-01-12
Attending: INTERNAL MEDICINE
Payer: MEDICARE

## 2021-01-12 LAB
AMORPH CRY URNS QL MICRO: ABNORMAL
ANION GAP SERPL CALC-SCNC: 5 MMOL/L (ref 3–18)
APPEARANCE UR: CLEAR
ARTERIAL PATENCY WRIST A: YES
BACTERIA URNS QL MICRO: ABNORMAL /HPF
BASE EXCESS BLD CALC-SCNC: 9 MMOL/L
BASOPHILS # BLD: 0 K/UL (ref 0–0.1)
BASOPHILS NFR BLD: 0 % (ref 0–2)
BDY SITE: ABNORMAL
BILIRUB UR QL: ABNORMAL
BUN SERPL-MCNC: 14 MG/DL (ref 7–18)
BUN/CREAT SERPL: 34 (ref 12–20)
CALCIUM SERPL-MCNC: 9.4 MG/DL (ref 8.5–10.1)
CAOX CRY URNS QL MICRO: ABNORMAL
CHLORIDE SERPL-SCNC: 99 MMOL/L (ref 100–111)
CO2 SERPL-SCNC: 31 MMOL/L (ref 21–32)
COLOR UR: ABNORMAL
CREAT SERPL-MCNC: 0.41 MG/DL (ref 0.6–1.3)
DIFFERENTIAL METHOD BLD: ABNORMAL
EOSINOPHIL # BLD: 0.4 K/UL (ref 0–0.4)
EOSINOPHIL NFR BLD: 7 % (ref 0–5)
EPITH CASTS URNS QL MICRO: ABNORMAL /LPF (ref 0–5)
ERYTHROCYTE [DISTWIDTH] IN BLOOD BY AUTOMATED COUNT: 15.2 % (ref 11.6–14.5)
GAS FLOW.O2 O2 DELIVERY SYS: ABNORMAL L/MIN
GAS FLOW.O2 SETTING OXYMISER: 10 BPM
GLUCOSE BLD STRIP.AUTO-MCNC: 161 MG/DL (ref 70–110)
GLUCOSE BLD STRIP.AUTO-MCNC: 165 MG/DL (ref 70–110)
GLUCOSE BLD STRIP.AUTO-MCNC: 235 MG/DL (ref 70–110)
GLUCOSE SERPL-MCNC: 153 MG/DL (ref 74–99)
GLUCOSE UR STRIP.AUTO-MCNC: NEGATIVE MG/DL
HCO3 BLD-SCNC: 32.6 MMOL/L (ref 22–26)
HCT VFR BLD AUTO: 25.9 % (ref 35–45)
HGB BLD-MCNC: 7.8 G/DL (ref 12–16)
HGB UR QL STRIP: NEGATIVE
INSPIRATION.DURATION SETTING TIME VENT: 1 SEC
KETONES UR QL STRIP.AUTO: NEGATIVE MG/DL
LEUKOCYTE ESTERASE UR QL STRIP.AUTO: ABNORMAL
LYMPHOCYTES # BLD: 0.9 K/UL (ref 0.9–3.6)
LYMPHOCYTES NFR BLD: 16 % (ref 21–52)
MAGNESIUM SERPL-MCNC: 2.1 MG/DL (ref 1.6–2.6)
MCH RBC QN AUTO: 25.5 PG (ref 24–34)
MCHC RBC AUTO-ENTMCNC: 30.1 G/DL (ref 31–37)
MCV RBC AUTO: 84.6 FL (ref 74–97)
MONOCYTES # BLD: 0.7 K/UL (ref 0.05–1.2)
MONOCYTES NFR BLD: 13 % (ref 3–10)
NEUTS SEG # BLD: 3.7 K/UL (ref 1.8–8)
NEUTS SEG NFR BLD: 64 % (ref 40–73)
NITRITE UR QL STRIP.AUTO: NEGATIVE
O2/TOTAL GAS SETTING VFR VENT: 28 %
PCO2 BLD: 46.7 MMHG (ref 35–45)
PEEP RESPIRATORY: 5 CMH2O
PH BLD: 7.45 [PH] (ref 7.35–7.45)
PH UR STRIP: >8.5 [PH] (ref 5–8)
PHOSPHATE SERPL-MCNC: 5.3 MG/DL (ref 2.5–4.9)
PLATELET # BLD AUTO: 477 K/UL (ref 135–420)
PMV BLD AUTO: 9.4 FL (ref 9.2–11.8)
PO2 BLD: 97 MMHG (ref 80–100)
POTASSIUM SERPL-SCNC: 4.1 MMOL/L (ref 3.5–5.5)
PRESSURE SUPPORT SETTING VENT: 8 CMH2O
PROT UR STRIP-MCNC: NEGATIVE MG/DL
RBC # BLD AUTO: 3.06 M/UL (ref 4.2–5.3)
RBC #/AREA URNS HPF: ABNORMAL /HPF (ref 0–5)
SAO2 % BLD: 98 % (ref 92–97)
SERVICE CMNT-IMP: ABNORMAL
SODIUM SERPL-SCNC: 135 MMOL/L (ref 136–145)
SP GR UR REFRACTOMETRY: 1.02 (ref 1–1.03)
SPECIMEN TYPE: ABNORMAL
TOTAL RESP. RATE, ITRR: 18
UROBILINOGEN UR QL STRIP.AUTO: 2 EU/DL (ref 0.2–1)
VENTILATION MODE VENT: ABNORMAL
VOLUME CONTROL PLUS IVLCP: YES
VT SETTING VENT: 500 ML
WBC # BLD AUTO: 5.7 K/UL (ref 4.6–13.2)
WBC URNS QL MICRO: ABNORMAL /HPF (ref 0–4)

## 2021-01-12 PROCEDURE — 36415 COLL VENOUS BLD VENIPUNCTURE: CPT

## 2021-01-12 PROCEDURE — 80048 BASIC METABOLIC PNL TOTAL CA: CPT

## 2021-01-12 PROCEDURE — 81001 URINALYSIS AUTO W/SCOPE: CPT

## 2021-01-12 PROCEDURE — 74011250637 HC RX REV CODE- 250/637: Performed by: INTERNAL MEDICINE

## 2021-01-12 PROCEDURE — 74011250637 HC RX REV CODE- 250/637: Performed by: RADIOLOGY

## 2021-01-12 PROCEDURE — 36600 WITHDRAWAL OF ARTERIAL BLOOD: CPT

## 2021-01-12 PROCEDURE — 82962 GLUCOSE BLOOD TEST: CPT

## 2021-01-12 PROCEDURE — 74011636637 HC RX REV CODE- 636/637: Performed by: INTERNAL MEDICINE

## 2021-01-12 PROCEDURE — 74011250636 HC RX REV CODE- 250/636: Performed by: INTERNAL MEDICINE

## 2021-01-12 PROCEDURE — 74011250637 HC RX REV CODE- 250/637: Performed by: NURSE PRACTITIONER

## 2021-01-12 PROCEDURE — 94003 VENT MGMT INPAT SUBQ DAY: CPT

## 2021-01-12 PROCEDURE — 99232 SBSQ HOSP IP/OBS MODERATE 35: CPT | Performed by: INTERNAL MEDICINE

## 2021-01-12 PROCEDURE — 82803 BLOOD GASES ANY COMBINATION: CPT

## 2021-01-12 PROCEDURE — 83735 ASSAY OF MAGNESIUM: CPT

## 2021-01-12 PROCEDURE — 74011000250 HC RX REV CODE- 250: Performed by: PHYSICIAN ASSISTANT

## 2021-01-12 PROCEDURE — 99233 SBSQ HOSP IP/OBS HIGH 50: CPT | Performed by: INTERNAL MEDICINE

## 2021-01-12 PROCEDURE — 87635 SARS-COV-2 COVID-19 AMP PRB: CPT

## 2021-01-12 PROCEDURE — 85025 COMPLETE CBC W/AUTO DIFF WBC: CPT

## 2021-01-12 PROCEDURE — 87040 BLOOD CULTURE FOR BACTERIA: CPT

## 2021-01-12 PROCEDURE — 99291 CRITICAL CARE FIRST HOUR: CPT | Performed by: INTERNAL MEDICINE

## 2021-01-12 PROCEDURE — 71045 X-RAY EXAM CHEST 1 VIEW: CPT

## 2021-01-12 PROCEDURE — 65620000000 HC RM CCU GENERAL

## 2021-01-12 PROCEDURE — 84100 ASSAY OF PHOSPHORUS: CPT

## 2021-01-12 RX ORDER — LISINOPRIL 2.5 MG/1
2.5 TABLET ORAL DAILY
Status: DISCONTINUED | OUTPATIENT
Start: 2021-01-12 | End: 2021-01-14 | Stop reason: HOSPADM

## 2021-01-12 RX ORDER — OXYCODONE HCL 5 MG/5 ML
10 SOLUTION, ORAL ORAL EVERY 8 HOURS
Status: DISCONTINUED | OUTPATIENT
Start: 2021-01-12 | End: 2021-01-13

## 2021-01-12 RX ADMIN — SODIUM CHLORIDE 10 ML: 9 INJECTION, SOLUTION INTRAMUSCULAR; INTRAVENOUS; SUBCUTANEOUS at 06:00

## 2021-01-12 RX ADMIN — INSULIN LISPRO 3 UNITS: 100 INJECTION, SOLUTION INTRAVENOUS; SUBCUTANEOUS at 06:00

## 2021-01-12 RX ADMIN — DIAZEPAM 15 MG: 5 TABLET ORAL at 18:18

## 2021-01-12 RX ADMIN — FUROSEMIDE 20 MG: 20 TABLET ORAL at 08:02

## 2021-01-12 RX ADMIN — ACETAMINOPHEN 650 MG: 325 TABLET ORAL at 15:15

## 2021-01-12 RX ADMIN — SODIUM CHLORIDE 10 ML: 9 INJECTION, SOLUTION INTRAMUSCULAR; INTRAVENOUS; SUBCUTANEOUS at 22:00

## 2021-01-12 RX ADMIN — ASPIRIN 81 MG: 81 TABLET, CHEWABLE ORAL at 08:02

## 2021-01-12 RX ADMIN — SODIUM CHLORIDE 10 ML: 9 INJECTION, SOLUTION INTRAMUSCULAR; INTRAVENOUS; SUBCUTANEOUS at 13:30

## 2021-01-12 RX ADMIN — CARVEDILOL 6.25 MG: 6.25 TABLET, FILM COATED ORAL at 21:50

## 2021-01-12 RX ADMIN — OXYCODONE HYDROCHLORIDE 10 MG: 5 SOLUTION ORAL at 21:50

## 2021-01-12 RX ADMIN — NYSTATIN 500000 UNITS: 100000 SUSPENSION ORAL at 21:50

## 2021-01-12 RX ADMIN — INSULIN LISPRO 6 UNITS: 100 INJECTION, SOLUTION INTRAVENOUS; SUBCUTANEOUS at 18:16

## 2021-01-12 RX ADMIN — OXYCODONE HYDROCHLORIDE 10 MG: 5 SOLUTION ORAL at 13:27

## 2021-01-12 RX ADMIN — NYSTATIN 500000 UNITS: 100000 SUSPENSION ORAL at 13:27

## 2021-01-12 RX ADMIN — INSULIN GLARGINE 20 UNITS: 100 INJECTION, SOLUTION SUBCUTANEOUS at 21:50

## 2021-01-12 RX ADMIN — OXYCODONE HYDROCHLORIDE 20 MG: 5 SOLUTION ORAL at 05:31

## 2021-01-12 RX ADMIN — NYSTATIN 500000 UNITS: 100000 SUSPENSION ORAL at 18:18

## 2021-01-12 RX ADMIN — HEPARIN SODIUM 5000 UNITS: 5000 INJECTION INTRAVENOUS; SUBCUTANEOUS at 08:03

## 2021-01-12 RX ADMIN — Medication 30 ML: at 08:04

## 2021-01-12 RX ADMIN — DIAZEPAM 15 MG: 5 TABLET ORAL at 08:02

## 2021-01-12 RX ADMIN — LISINOPRIL 2.5 MG: 2.5 TABLET ORAL at 11:47

## 2021-01-12 RX ADMIN — ATORVASTATIN CALCIUM 40 MG: 40 TABLET, FILM COATED ORAL at 21:50

## 2021-01-12 RX ADMIN — INSULIN LISPRO 3 UNITS: 100 INJECTION, SOLUTION INTRAVENOUS; SUBCUTANEOUS at 11:47

## 2021-01-12 RX ADMIN — HEPARIN SODIUM 5000 UNITS: 5000 INJECTION INTRAVENOUS; SUBCUTANEOUS at 15:15

## 2021-01-12 RX ADMIN — CLOPIDOGREL BISULFATE 75 MG: 75 TABLET ORAL at 08:02

## 2021-01-12 RX ADMIN — NYSTATIN 500000 UNITS: 100000 SUSPENSION ORAL at 08:03

## 2021-01-12 RX ADMIN — LANSOPRAZOLE 30 MG: KIT at 08:03

## 2021-01-12 RX ADMIN — CHLORHEXIDINE GLUCONATE 0.12% ORAL RINSE 10 ML: 1.2 LIQUID ORAL at 08:03

## 2021-01-12 RX ADMIN — CHLORHEXIDINE GLUCONATE 0.12% ORAL RINSE 15 ML: 1.2 LIQUID ORAL at 21:50

## 2021-01-12 NOTE — PROGRESS NOTES
[facility] Hospitalist Group  Progress Note    Patient: Nathanel Dakins Age: 72 y.o. : 1955 MR#: [id number] SSN: [id number]  Date/Time: 2021     Subjective:     Patient seen and evaluated, lying in bed is critically ill on mechanical ventilation. No acute overnight chagnes per discussion with nursing. PurWick remains in place. Continue with tube feeds  Fever this afternoon      Interval HPI  Patient is a 72 y. o. female with a past medical history of COPD, CHF, HTN, DM, dementia, presented to [facility] with acute on chronic systolic heart failure, acute MI with anterolateral STEMI and Q waves s/p ptca/stent to proximal/mid LAD on 20. Pt was intubated and extubated on 20, transferred to . On 20 patient was intubated and transferred to ICU for respiratory failure and cardiogenic +/- septic shock. Respiratory cultures on  with ESBL on Merrem. Dr. Yordan Mendosa and IR have been consulted for trach/PEG. She is S/p trach 21 and planning for PEG placement by IR on 21. Assessment/Plan:     · Acute on Chronic Hypoxic and Hypercapnic Respiratory Failure - Requiring mechanical ventilation, Extubated on  and reintubated on , Likely aspiration event. Trach placed 21, Dr. Alejandra Watters on board; On [facility]- will nees long term vent management. · Wide Complex Arrhythmia () -noted on bedside cardiac monitor. Lasted roughly 5-10 mins with Spontaneous resolution. E-lyte derangement vs Drug effect (pt was on 55mcg/hr Oliverio-Synephrine at the time) vs cardiac insult.    · Acute on Chronic Systolic Heart Failure - superimposed on severe ILD, Echo 12/10/20 EF 35-40%  · Combination septic/cardiogenic shock, improving   · Aspiration PNA. Sputum Cx() (+) heavy E.Coli-ESBL.  Recent Hx of E.coli ESBL.   · Acute Encephalopathy - likely toxic/metabolic vs infectious vs hepatic in nature, ammonia wnl.   · Severe pulmonary fibrosis - with extensive honeycombing and bronchiectasis as seen on CTA chest 20 - appears to be a new diagnosis but possibly UIP  · Acute aspiration pneumonia with severe sepsis  · UTI (+)E. coli  · Acute MI with anterolateral STEMI and Q waves - s/p ptca/stent to proximal/mid LAD using ARELY on DAPT on 20- Remains on Brilinta  · Severe dysphagia - failed MBS. Will place PEG tube today with IR.   · Critical illness myopathy/polyneuropathy  · Hx of Hep C: + RNA viral load 2020  · Multiple liver masses - noted on CT scan 20  · COPD - on home O2 4L NC  · DM  · Dementia - unknown baseline, on aricept   · Prognosis guarded  · Full code      Plan:   Decreased valium to bid- continue to wean  Change to meds per PEG. Send out COVID testing in preparation for transfer to [facility]. Patient cleared by [facility] to transition to [facility]. CBC, BMP  Replete electrolytes. Hold lisinopril and aldactone for now. Continue Coreg 6.25 bid, ASA, Plavix. UA, blood cx, CXR given new fevers. Aspiration precautions      Case discussed with:  [x]Patient  []Family  [x]Nursing  [x]Case Management  DVT Prophylaxis:  []Lovenox  []Hep SQ  [x]SCDs  []Coumadin   [x]On Heparin gtt    Objective:   VS:   Visit Vitals  BP (!) 171/82   Pulse (!) 114   Temp (!) 101.9 °F (38.8 °C)   Resp 16   Ht 5' 7\" (1.702 m)   Wt 68.7 kg (151 lb 7.3 oz)   SpO2 94%   Breastfeeding No   BMI 23.72 kg/m²      Tmax/24hrs: Temp (24hrs), Av.6 °F (37.6 °C), Min:98.1 °F (36.7 °C), Max:101.9 °F (38.8 °C)  IOBRIEF    Intake/Output Summary (Last 24 hours) at 2021 1642  Last data filed at 2021 1500  Gross per 24 hour   Intake 1370 ml   Output 940 ml   Net 430 ml     Generalintubated/sedated, NAD  Pulmonary: Decreased breath sounds bilaterally; + trach  Cardiovascular: Regular rate and Rhythm. GI:  Soft, Non distended, Non tender. + Bowel sounds.+ PEG + rectal tube  Extremities:  No edema, cyanosis, clubbing. No calf tenderness.    Neurologic: Opens eyes spontaneously, does not follow commands    Medications:   Current Facility-Administered Medications   Medication Dose Route Frequency    oxyCODONE (ROXICODONE) 5 mg/5 mL oral solution 10 mg  10 mg Oral Q8H    lisinopriL (PRINIVIL, ZESTRIL) tablet 2.5 mg  2.5 mg Oral DAILY    diazePAM (VALIUM) tablet 15 mg  15 mg Oral BID    naloxegoL (MOVANTIK) tablet 25 mg  25 mg Oral DAILY PRN    [Held by provider] potassium bicarb-citric acid (EFFER-K) tablet 40 mEq  40 mEq Oral DAILY    heparin (porcine) injection 5,000 Units  5,000 Units SubCUTAneous Q8H    nystatin (MYCOSTATIN) 100,000 unit/mL oral suspension 500,000 Units  500,000 Units Oral QID    carvediloL (COREG) tablet 6.25 mg  6.25 mg Oral Q12H    aspirin chewable tablet 81 mg  81 mg Oral DAILY    clopidogreL (PLAVIX) tablet 75 mg  75 mg Oral DAILY    dextrose 5 % - 0.45% NaCl infusion  10 mL/hr IntraVENous CONTINUOUS    insulin glargine (LANTUS) injection 20 Units  20 Units SubCUTAneous QHS    0.9% sodium chloride infusion 250 mL  250 mL IntraVENous PRN    [Held by provider] docusate (COLACE) 50 mg/5 mL oral liquid 100 mg  100 mg Oral DAILY    furosemide (LASIX) tablet 20 mg  20 mg Oral DAILY    lansoprazole (PREVACID) 3 mg/mL oral suspension 30 mg  30 mg Per G Tube ACB    polyethylene glycol (MIRALAX) packet 17 g  17 g Per NG tube PRN    chlorhexidine (PERIDEX) 0.12 % mouthwash 10 mL  10 mL Oral Q12H    ELECTROLYTE REPLACEMENT PROTOCOL - Potassium Standard Dosing   1 Each Other PRN    ELECTROLYTE REPLACEMENT PROTOCOL - Magnesium   1 Each Other PRN    ELECTROLYTE REPLACEMENT PROTOCOL - Phosphorus  Standard Dosing  1 Each Other PRN    ELECTROLYTE REPLACEMENT PROTOCOL - Calcium   1 Each Other PRN    [Held by provider] spironolactone (ALDACTONE) tablet 25 mg  25 mg Oral DAILY    multivit-folic acid-herbal 527 (WELLESSE PLUS) oral liquid 30 mL  30 mL Per NG tube DAILY    insulin lispro (HUMALOG) injection   SubCUTAneous Q6H    albuterol-ipratropium (DUO-NEB) 2.5 MG-0.5 MG/3 ML  3 mL Nebulization Q4H PRN    sodium chloride (NS) flush 5-40 mL  5-40 mL IntraVENous Q8H    sodium chloride (NS) flush 5-40 mL  5-40 mL IntraVENous PRN    acetaminophen (TYLENOL) tablet 650 mg  650 mg Oral Q6H PRN    Or    acetaminophen (TYLENOL) suppository 650 mg  650 mg Rectal Q6H PRN    promethazine (PHENERGAN) tablet 12.5 mg  12.5 mg Oral Q6H PRN    Or    ondansetron (ZOFRAN) injection 4 mg  4 mg IntraVENous Q6H PRN    glucose chewable tablet 16 g  4 Tab Oral PRN    glucagon (GLUCAGEN) injection 1 mg  1 mg IntraMUSCular PRN    dextrose (D50W) injection syrg 12.5-25 g  25-50 mL IntraVENous PRN    atorvastatin (LIPITOR) tablet 40 mg  40 mg Oral QHS       Imaging:   XR Results (most recent):  Results from Hospital Encounter encounter on 12/10/20   XR CHEST PORT    Narrative Chest AP single view    HISTORY: Postintubation follow-up     COMPARISON: 1/4/21    FINDINGS: There is status post ET tube removal with tracheostomy tube placement. The right-sided PICC line has being interval reposition from proximal SVC to the  right jugular vein, tip not imaged. The NG tube appears unchanged. No  pneumothorax. The cardiac silhouette appears unremarkable. The lungs show  extensive pulmonary opacities, more pronounced in the periphery. Mild interval  progression noted since the prior study. Incidental noted is gas-filled splenic  flexure with moderate bowel wall thickening. Impression IMPRESSION:     1. Interval removal of ET tube with tracheostomy placed. Repositioned  right-sided PICC line with the tip from proximal SVC to the right jugular vein,  tip not imaged. No pneumothorax. 2.  Persistent extensive pulmonary infiltrations. 3.  Gas-filled nondilated splenic flexure shows moderate bowel wall thickening. Recommend clinical correlation and abdomen 2 views or CT.     Thank you for your referral.        CT Results (most recent):  Results from Hospital Encounter encounter on 12/10/20   CT ABD WO CONT    Narrative CT ABDOMEN, NONCONTRAST    CPT CODE: 42567    INDICATION: Pretreatment planning for possible gastrostomy tube.    COMPARISON: The partially visualized portion of the upper abdomen on pulmonary  CTA 12/16/2020. Prior abdominal CT 2/4/2018. Report is noted in the electronic  medical record (care everywhere) of contrast enhanced abdominal pelvic CT  performed elsewhere 11/25/2020, images from which are not available at this  time. Reference right upper quadrant ultrasound 5/29/2020.    TECHNIQUE: Without administration of intravenous or oral contrast, serial axial  CT images through the abdomen were helically acquired.  Additional coronal and  sagittal reformation images were also performed.    All CT scans at this facility are performed using dose optimization technique as  appropriate to the performed exam, to include automated exposure control,  adjustment of the mA and/or kV according to patient's size (Including  appropriate matching for site-specific examinations), or use of iterative  reconstruction technique.    FINDINGS:    Assessment of the solid and hollow viscera and vascular structures of the  abdomen is limited by lack of contrast.    The visualized portions of lung bases demonstrate bibasilar consolidations in  the lower lobes, right greater than left with air bronchograms in the right  lower lobe consolidation, compatible with multisegmental right lower lobe  atelectasis which could be with or without superimposed airspace disease. There  is bronchiectasis in the lower lungs bilaterally and extensive fibrotic changes  again seen.    Esophagogastric tube tip extends to the level of the mid stomach. The gastric  antrum demonstrates contiguity with the anterior abdominal peritoneum deep to  the left rectus muscle, likely would be amenable to percutaneous gastrostomy  tube placement, interventional radiology consultation might also be helpful in  this regard.    Multiple calcific  gallstones in the gallbladder consistent with cholelithiasis. Multiple splenic calcifications, likely granulomata. Large heterogeneous in attenuation mass centered laterally in the right hepatic  lobe, contours poorly defined for accurate measurement due to lack of IV  contrast and streak artifacts from the patient's arms down at sides but  approximately 11 cm, further increased in size compared to prior studies,  consistent with malignancy which could be primary or metastatic. Additional  hypoattenuating mass farther inferiorly in the right hepatic lobe centered  medially measures approximately 2 cm. A third hepatic mass described in the left  hepatic lobe on prior contrast-enhanced CT is somewhat less well defined on the  current study which is limited by lack of IV contrast.    No evidence of hydronephrosis/hydroureter. No evidence of nephrolithiasis or  calculi in the upper abdominal portions of ureters. Assessment of the renal  parenchyma otherwise is limited by lack of IV contrast without large contour  deforming mass identified. Within the limitation of non-contrast technique, the unenhanced pancreas and  adrenal glands appear grossly unremarkable. Visualized portions of bowel are non-dilated without evidence of bowel  obstruction. Arterial calcifications noted in the abdominal aorta and coronary arteries. No  evidence of abdominal aortic aneurysm. No definite evidence of pathologic lymph node enlargement is seen. No definite fluid collection/abscess identified. Pelvic viscera and pelvic portions of abdominal viscera are inferior to the  field of view on abdominal CT. On review of bone windows, no acute fractures or destructive bone lesions are  seen. Degenerative changes and osteopenia noted. Impression Impression:     The gastric antrum demonstrates contiguity with the anterior peritoneum just  deep to the left rectus muscle as above.     Known enlarging multiple hepatic masses again seen, assessment limited by lack  of IV contrast, consistent with malignancy, differential considerations could  include multifocal primary malignancy or metastases as noted on prior studies. Right lower lobe consolidation contains air bronchograms consistent with  multisegmental atelectasis which could be with or without superimposed airspace  disease. Otherwise limited noncontrast CT scan of the upper abdomen with other nonacute  findings as above. 12/10/20   ECHO ADULT COMPLETE 12/10/2020 12/10/2020    Narrative · LV: Estimated LVEF is 35 - 40%. Visually measured ejection fraction. Normal cavity size and wall thickness. Moderately and segmentally reduced   systolic function. Inconclusive left ventricular diastolic function. · AO: Mild aortic root dilatation; diameter is 3.8 cm. Signed by: Ivania Barry MD        Labs:    Recent Results (from the past 48 hour(s))   GLUCOSE, POC    Collection Time: 01/10/21  5:59 PM   Result Value Ref Range    Glucose (POC) 196 (H) 70 - 110 mg/dL   GLUCOSE, POC    Collection Time: 01/11/21 12:03 AM   Result Value Ref Range    Glucose (POC) 155 (H) 70 - 110 mg/dL   POC G3    Collection Time: 01/11/21  3:59 AM   Result Value Ref Range    Device: VENT      FIO2 (POC) 28 %    pH (POC) 7.44 7.35 - 7.45      pCO2 (POC) 48.2 (H) 35.0 - 45.0 MMHG    pO2 (POC) 94 80 - 100 MMHG    HCO3 (POC) 32.9 (H) 22 - 26 MMOL/L    sO2 (POC) 97 92 - 97 %    Base excess (POC) 9 mmol/L    Mode SIMV      Tidal volume 500 ml    Set Rate 10 bpm    PEEP/CPAP (POC) 5 cmH2O    Pressure support 8 cmH2O    Allens test (POC) YES      Inspiratory Time 1.00 sec    Total resp.  rate 17      Site RIGHT RADIAL      Specimen type (POC) ARTERIAL      Performed by Pearl Srivastava     Volume control plus YES     CBC WITH AUTOMATED DIFF    Collection Time: 01/11/21  5:50 AM   Result Value Ref Range    WBC 5.7 4.6 - 13.2 K/uL    RBC 2.89 (L) 4.20 - 5.30 M/uL    HGB 7.5 (L) 12.0 - 16.0 g/dL    HCT 24.7 (L) 35.0 - 45.0 %    MCV 85.5 74.0 - 97.0 FL    MCH 26.0 24.0 - 34.0 PG    MCHC 30.4 (L) 31.0 - 37.0 g/dL    RDW 15.1 (H) 11.6 - 14.5 %    PLATELET 996 (H) 948 - 420 K/uL    MPV 8.9 (L) 9.2 - 11.8 FL    NEUTROPHILS 67 40 - 73 %    LYMPHOCYTES 16 (L) 21 - 52 %    MONOCYTES 10 3 - 10 %    EOSINOPHILS 7 (H) 0 - 5 %    BASOPHILS 0 0 - 2 %    ABS. NEUTROPHILS 3.8 1.8 - 8.0 K/UL    ABS. LYMPHOCYTES 0.9 0.9 - 3.6 K/UL    ABS. MONOCYTES 0.6 0.05 - 1.2 K/UL    ABS. EOSINOPHILS 0.4 0.0 - 0.4 K/UL    ABS.  BASOPHILS 0.0 0.0 - 0.1 K/UL    DF AUTOMATED     MAGNESIUM    Collection Time: 01/11/21  5:50 AM   Result Value Ref Range    Magnesium 2.0 1.6 - 2.6 mg/dL   PHOSPHORUS    Collection Time: 01/11/21  5:50 AM   Result Value Ref Range    Phosphorus 3.7 2.5 - 4.9 MG/DL   METABOLIC PANEL, BASIC    Collection Time: 01/11/21  5:50 AM   Result Value Ref Range    Sodium 135 (L) 136 - 145 mmol/L    Potassium 4.5 3.5 - 5.5 mmol/L    Chloride 99 (L) 100 - 111 mmol/L    CO2 32 21 - 32 mmol/L    Anion gap 4 3.0 - 18 mmol/L    Glucose 130 (H) 74 - 99 mg/dL    BUN 12 7.0 - 18 MG/DL    Creatinine 0.32 (L) 0.6 - 1.3 MG/DL    BUN/Creatinine ratio 38 (H) 12 - 20      GFR est AA >60 >60 ml/min/1.73m2    GFR est non-AA >60 >60 ml/min/1.73m2    Calcium 9.3 8.5 - 10.1 MG/DL   GLUCOSE, POC    Collection Time: 01/11/21  5:51 AM   Result Value Ref Range    Glucose (POC) 153 (H) 70 - 110 mg/dL   GLUCOSE, POC    Collection Time: 01/11/21 12:00 PM   Result Value Ref Range    Glucose (POC) 194 (H) 70 - 110 mg/dL   GLUCOSE, POC    Collection Time: 01/11/21  6:01 PM   Result Value Ref Range    Glucose (POC) 187 (H) 70 - 110 mg/dL   GLUCOSE, POC    Collection Time: 01/11/21 11:43 PM   Result Value Ref Range    Glucose (POC) 170 (H) 70 - 110 mg/dL   POC G3    Collection Time: 01/12/21  4:30 AM   Result Value Ref Range    Device: VENT      FIO2 (POC) 28 %    pH (POC) 7.45 7.35 - 7.45      pCO2 (POC) 46.7 (H) 35.0 - 45.0 MMHG pO2 (POC) 97 80 - 100 MMHG    HCO3 (POC) 32.6 (H) 22 - 26 MMOL/L    sO2 (POC) 98 (H) 92 - 97 %    Base excess (POC) 9 mmol/L    Mode SIMV      Tidal volume 500 ml    Set Rate 10 bpm    PEEP/CPAP (POC) 5 cmH2O    Pressure support 8 cmH2O    Allens test (POC) YES      Inspiratory Time 1.00 sec    Total resp. rate 18      Site RIGHT RADIAL      Specimen type (POC) ARTERIAL      Performed by Aurelia Simms     Volume control plus YES     GLUCOSE, POC    Collection Time: 01/12/21  5:39 AM   Result Value Ref Range    Glucose (POC) 161 (H) 70 - 110 mg/dL   CBC WITH AUTOMATED DIFF    Collection Time: 01/12/21  5:40 AM   Result Value Ref Range    WBC 5.7 4.6 - 13.2 K/uL    RBC 3.06 (L) 4.20 - 5.30 M/uL    HGB 7.8 (L) 12.0 - 16.0 g/dL    HCT 25.9 (L) 35.0 - 45.0 %    MCV 84.6 74.0 - 97.0 FL    MCH 25.5 24.0 - 34.0 PG    MCHC 30.1 (L) 31.0 - 37.0 g/dL    RDW 15.2 (H) 11.6 - 14.5 %    PLATELET 075 (H) 674 - 420 K/uL    MPV 9.4 9.2 - 11.8 FL    NEUTROPHILS 64 40 - 73 %    LYMPHOCYTES 16 (L) 21 - 52 %    MONOCYTES 13 (H) 3 - 10 %    EOSINOPHILS 7 (H) 0 - 5 %    BASOPHILS 0 0 - 2 %    ABS. NEUTROPHILS 3.7 1.8 - 8.0 K/UL    ABS. LYMPHOCYTES 0.9 0.9 - 3.6 K/UL    ABS. MONOCYTES 0.7 0.05 - 1.2 K/UL    ABS. EOSINOPHILS 0.4 0.0 - 0.4 K/UL    ABS.  BASOPHILS 0.0 0.0 - 0.1 K/UL    DF AUTOMATED     MAGNESIUM    Collection Time: 01/12/21  5:40 AM   Result Value Ref Range    Magnesium 2.1 1.6 - 2.6 mg/dL   PHOSPHORUS    Collection Time: 01/12/21  5:40 AM   Result Value Ref Range    Phosphorus 5.3 (H) 2.5 - 4.9 MG/DL   METABOLIC PANEL, BASIC    Collection Time: 01/12/21  5:40 AM   Result Value Ref Range    Sodium 135 (L) 136 - 145 mmol/L    Potassium 4.1 3.5 - 5.5 mmol/L    Chloride 99 (L) 100 - 111 mmol/L    CO2 31 21 - 32 mmol/L    Anion gap 5 3.0 - 18 mmol/L    Glucose 153 (H) 74 - 99 mg/dL    BUN 14 7.0 - 18 MG/DL    Creatinine 0.41 (L) 0.6 - 1.3 MG/DL    BUN/Creatinine ratio 34 (H) 12 - 20      GFR est AA >60 >60 ml/min/1.73m2    GFR est non-AA >60 >60 ml/min/1.73m2    Calcium 9.4 8.5 - 10.1 MG/DL   SARS-COV-2    Collection Time: 01/12/21  8:33 AM   Result Value Ref Range    SARS-CoV-2 PENDING     Specimen source Nasopharyngeal      Specimen type NP Swab     GLUCOSE, POC    Collection Time: 01/12/21 11:30 AM   Result Value Ref Range    Glucose (POC) 165 (H) 70 - 110 mg/dL       Signed By: Nicanor Ramsey MD     January 12, 2021      I spent 35 minutes with the patient in face-to-face consultation, of which greater than 50% was spent in counseling and coordination of care as described above    Disclaimer: Sections of this note are dictated using utilizing voice recognition software. Minor typographical errors may be present. If questions arise, please do not hesitate to contact me or call our department.

## 2021-01-12 NOTE — PROGRESS NOTES
0715: Bedside and Verbal shift change report given to writer by Gustavo Glez RN. Report included the following information OR Summary, Procedure Summary, Intake/Output, MAR, Accordion, Recent Results, Med Rec Status, Cardiac Rhythm ., Alarm Parameters , Procedure Verification, Quality Measures and Dual Neuro Assessment.   1930: Bedside and Verbal shift change report given to Torres De Los Santos RN by writer.Report included the following information ED Summary, OR Summary, Procedure Summary, Intake/Output, MAR, Accordion, Recent Results, Med Rec Status, Cardiac Rhythm ., Alarm Parameters , Procedure Verification, Quality Measures and Dual Neuro Assessment.

## 2021-01-12 NOTE — PROGRESS NOTES
0700- Report received from Jami Schlichter, [place]. Assumed care of the patient. 1500- Pt found to have a fever of 100.9. Provider made aware. Pt given tylenol. Will continue to monitor. 1800- Blood culture and urine sent to lab.     1900- [facility]

## 2021-01-12 NOTE — PROGRESS NOTES
[address] [place] [address] [facility]  ICU Progress Note      Name: Malik Bautista   : 1955   MRN: [id number]   Date: 2021 9:14 AM     [x]I have reviewed the flowsheet and previous days notes. Events overnight reviewed and discussed with nursing staff. Vital signs and records reviewed. Interval HPI    Patient is a 72 y. o. female with a past medical history of COPD, CHF, HTN, DM, dementia, admitted for [facility] with acute on chronic systolic heart failure, acute MI with anterolateral STEMI and Q waves s/p ptca/stent to proximal/mid LAD on 20. Pt was intubated and extubated on 20, transferred to . On 20 patient was intubated and transferred to ICU for respiratory failure and cardiogenic +/- septic shock. Respiratory cultures on  with ESBL on Merrem. Dr. Cheri Hudson and IR have been consulted for trach/PEG. She is S/p trach  and s/p PEG .     Subjective 21  LOS: 33    Overnight events: No acute events. Mentation/Activity: opens eyes spontaneously, withdraws to pain  Respiratory/ Secretions: stable  Hemodynamics: H/H stable  Need for procedures:No    - remains more on the sedate side- decreasing valium dosing    - TF: Glucerna- change from TID to Q 8 bolus feeds- tolerating thus far              ROS:Review of systems not obtained due to patient factors.     Vital Signs:    Visit Vitals  BP (!) 171/82   Pulse (!) 114   Temp (!) 101.9 °F (38.8 °C)   Resp 16   Ht 5' 7\" (1.702 m)   Wt 68.7 kg (151 lb 7.3 oz)   SpO2 94%   Breastfeeding No   BMI 23.72 kg/m²       O2 Device: Ventilator, Tracheostomy   O2 Flow Rate (L/min): 40 l/min   Temp (24hrs), Av.6 °F (37.6 °C), Min:98.1 °F (36.7 °C), Max:101.9 °F (38.8 °C)       Intake/Output:   Last shift:       0701 -  1900  In: 9779 [I.V.:330]  Out: 840 [Urine:840]  Last 3 shifts: 01/10 1901 -  0700  In: 7029 [I.V.:110]  Out: 1350 [Urine:1250; Drains:100]    Intake/Output Summary (Last 24 hours) at 2021 1652  Last data filed at 1/12/2021 1500  Gross per 24 hour   Intake 1370 ml   Output 940 ml   Net 430 ml       Ventilator Settings:  Mode Rate Tidal Volume Pressure FiO2 PEEP   SIMV, VC+, Pressure support   500 ml  8 cm H2O 25 % 5 cm H20     Peak airway pressure: 27 cm H2O    Minute ventilation: 9.02 l/min      ARDS network Guidelines:   Lung protective strategy and Plateau  Pressure goal < 30 cm H2O goals  Oxygenation Goals PaO2 55-80 mm Hg or SaO2 88-95%  PH goal 7.30-7.45    VAP bundle:  Reviewed. [place] tube to suction at 20-30 cm Hg. Maintain [place] tube with 5-10ml air every 4 hours  Routine oral care every 4 hours  Elevation of head > 45 degree    Physical Exam:               General:sedated; NAD, acyanotic - remains on vent  HEENT:  Anicteric sclerae; pink palpebral conjunctivae; mucosa moist, Trach in situ (CDI)- mild pink tinge blood around dressing, Poor dentition- especially bottom teeth- loose with potential to fall out  Resp: (+) coarse rhonchi b/l. Symmetrical chest expansion; good airway entry;   CV:  S1, S2 present; regular rate and rhythm   GI:  Abdomen soft, non-tender; (+) active bowel sounds. PEG in SITU- dressing clean- no blood oozing  Extremities:  +2 pulses on all extremities  Skin:  Warm; no rashes/ lesions noted, normal turgor/cap refill.    Neurologic:   Opening eyes spontaneously, No command following, moving extremities spontaneously   Devices:  Trach, Purewick, PICC RUE, PEG, FMS      DATA:     Current Facility-Administered Medications   Medication Dose Route Frequency    oxyCODONE (ROXICODONE) 5 mg/5 mL oral solution 10 mg  10 mg Oral Q8H    lisinopriL (PRINIVIL, ZESTRIL) tablet 2.5 mg  2.5 mg Oral DAILY    diazePAM (VALIUM) tablet 15 mg  15 mg Oral BID    naloxegoL (MOVANTIK) tablet 25 mg  25 mg Oral DAILY PRN    [Held by provider] potassium bicarb-citric acid (EFFER-K) tablet 40 mEq  40 mEq Oral DAILY    heparin (porcine) injection 5,000 Units  5,000 Units SubCUTAneous Q8H    nystatin (MYCOSTATIN) 100,000 unit/mL oral suspension 500,000 Units  500,000 Units Oral QID    carvediloL (COREG) tablet 6.25 mg  6.25 mg Oral Q12H    aspirin chewable tablet 81 mg  81 mg Oral DAILY    clopidogreL (PLAVIX) tablet 75 mg  75 mg Oral DAILY    dextrose 5 % - 0.45% NaCl infusion  10 mL/hr IntraVENous CONTINUOUS    insulin glargine (LANTUS) injection 20 Units  20 Units SubCUTAneous QHS    0.9% sodium chloride infusion 250 mL  250 mL IntraVENous PRN    [Held by provider] docusate (COLACE) 50 mg/5 mL oral liquid 100 mg  100 mg Oral DAILY    furosemide (LASIX) tablet 20 mg  20 mg Oral DAILY    lansoprazole (PREVACID) 3 mg/mL oral suspension 30 mg  30 mg Per G Tube ACB    polyethylene glycol (MIRALAX) packet 17 g  17 g Per NG tube PRN    chlorhexidine (PERIDEX) 0.12 % mouthwash 10 mL  10 mL Oral Q12H    ELECTROLYTE REPLACEMENT PROTOCOL - Potassium Standard Dosing   1 Each Other PRN    ELECTROLYTE REPLACEMENT PROTOCOL - Magnesium   1 Each Other PRN    ELECTROLYTE REPLACEMENT PROTOCOL - Phosphorus  Standard Dosing  1 Each Other PRN    ELECTROLYTE REPLACEMENT PROTOCOL - Calcium   1 Each Other PRN    [Held by provider] spironolactone (ALDACTONE) tablet 25 mg  25 mg Oral DAILY    multivit-folic acid-herbal 400 (WELLESSE PLUS) oral liquid 30 mL  30 mL Per NG tube DAILY    insulin lispro (HUMALOG) injection   SubCUTAneous Q6H    albuterol-ipratropium (DUO-NEB) 2.5 MG-0.5 MG/3 ML  3 mL Nebulization Q4H PRN    sodium chloride (NS) flush 5-40 mL  5-40 mL IntraVENous Q8H    sodium chloride (NS) flush 5-40 mL  5-40 mL IntraVENous PRN    acetaminophen (TYLENOL) tablet 650 mg  650 mg Oral Q6H PRN    Or    acetaminophen (TYLENOL) suppository 650 mg  650 mg Rectal Q6H PRN    promethazine (PHENERGAN) tablet 12.5 mg  12.5 mg Oral Q6H PRN    Or    ondansetron (ZOFRAN) injection 4 mg  4 mg IntraVENous Q6H PRN    glucose chewable tablet 16 g  4 Tab Oral PRN    glucagon (GLUCAGEN) injection 1 mg  1 mg IntraMUSCular PRN    dextrose (D50W) injection syrg 12.5-25 g  25-50 mL IntraVENous PRN    atorvastatin (LIPITOR) tablet 40 mg  40 mg Oral QHS         Labs: Results:       Chemistry Recent Labs     01/12/21  0540 01/11/21  0550 01/10/21  0620   * 130* 148*   * 135* 136   K 4.1 4.5 3.5   CL 99* 99* 100   CO2 31 32 31   BUN 14 12 13   CREA 0.41* 0.32* 0.33*   CA 9.4 9.3 9.0   AGAP 5 4 5   BUCR 34* 38* 39*      CBC w/Diff Recent Labs     01/12/21  0540 01/11/21  0550 01/10/21  1310 01/10/21  0620   WBC 5.7 5.7  --  6.2   RBC 3.06* 2.89*  --  2.79*   HGB 7.8* 7.5* 7.4* 7.4*   HCT 25.9* 24.7* 24.1* 23.9*   * 470*  --  446*   GRANS 64 67  --  68   LYMPH 16* 16*  --  18*   EOS 7* 7*  --  6*      Coagulation Recent Labs     01/10/21  0620 01/09/21  1730   APTT 89.2* 94.5*       Liver Enzymes No results for input(s): TP, ALB, TBIL, AP in the last 72 hours. No lab exists for component: SGOT, GPT, DBIL   ABG Lab Results   Component Value Date/Time    PHI 7.45 01/12/2021 04:30 AM    PCO2I 46.7 (H) 01/12/2021 04:30 AM    PO2I 97 01/12/2021 04:30 AM    HCO3I 32.6 (H) 01/12/2021 04:30 AM    FIO2I 28 01/12/2021 04:30 AM      Microbiology No results for input(s): CULT in the last 72 hours. Telemetry: [x]Sinus []A-flutter []Paced    []A-fib []Multiple PVCs                  Imaging:    CXR Results  (Last 48 hours)    None          IMPRESSION:   · Acute on Chronic Hypoxic and Hypercapnic Respiratory Failure - Requiring mechanical ventilation, Extubated on 12/17 and reintubated on 12/22, Likely aspiration event. Trach placed 1/4/21, Dr. Manning Ready   · Wide Complex Arrhythmia (12/25) -noted on bedside cardiac monitor. Lasted roughly 5-10 mins with Spontaneous resolution.  E-lyte derangement vs Drug effect (pt was on 55mcg/hr Oliverio-Synephrine at the time) vs cardiac insult.    · Acute on Chronic Systolic Heart Failure - superimposed on severe ILD, Echo 12/10/20 EF 35-40%  · Combination septic/cardiogenic shock- currently resolved  · Aspiration PNA. Sputum Cx(12/22) (+) heavy E.Coli-ESBL. Recent Hx of E.coli ESBL.   · Acute Encephalopathy - likely toxic/metabolic vs infectious vs hepatic in nature, ammonia wnl.   · Severe pulmonary fibrosis - with extensive honeycombing and bronchiectasis as seen on CTA chest 12/16/20 - appears to be a new diagnosis but possibly UIP  · UTI (+)E.  Coli- ESBL  · Acute MI with anterolateral STEMI and Q waves - s/p ptca/stent to proximal/mid LAD using ARELY on DAPT on 12/11/20- Remains on Brilinta  · Severe dysphagia - failed MBS s/p PEG, 1/8/2021  · Critical illness myopathy/polyneuropathy  ·  Hep C: + RNA viral load 4/4/2020  · Multiple liver masses - noted on CT scan 11/25/20 - etiology unknown at this time; concerning for neoplasm  · COPD - on home O2 4L NC  · DM  · Dementia - unknown baseline, on aricept   · S/P Trach 1/4/21   RECOMMENDATIONS:   Resp:   -Titrate FiO2/ supp O2 for SpO2 >90%  -Aspiration precautions   -S/p trach 1/4/21, Dr. Solo Delgado  -Raizlabs [place] - monitor oozing at site- heparin stopped   -Aggressive pulmonary hygiene   -Currently tolerating SIMV  I/D:   -Aleukocyotosis  -Per ID,s/p Merrem  for Ecoli (ESBL)   -Negative for COVID19 on 1/1/2021  - monitor low grade temp  Hem/Onc:   -Daily CBC; - recheck Hb  -Will transfuse unit of blood to maintain Hgb >7   CVS:   -Continue DAPT   -EF 35-40 % on 12/10/20, the day before stent placement  -Proximal LAD angioplasty and stenting on 12/11/2020 for 95% stenotic lesion.  -Hemodynamics stable off pressors  -Tolerating statin   -continue lasix  -BUE and BLE PVL's negative   -Heparin drip stopped  Metabolic:   -Daily BMP; monitor e-lytes; replace PRN  replace Mg  Renal:    -Trend Renal indices, Purewick   Endocrine:   -Continue lantus QHS with SSI   GI:   -SUP  -Restart Feeds via PEG 24 hours post placement   -Bowel regimen: Miralax PRN, D/C colace- monitor liquid stool output with plan to remove FMS in near future but currently still needs  -PEG tube insertion today with IR    -CT Abd from 1/3/21: gastric antrum contiguous with anterior peritoneum, enlarging hepatic masses consistent with likely malignancy or mets  Musc/Skin:   -No acute issues, wound care  Neuro:   -Off IV sedation  - valium to 15mg BID  - Reduce oxycodone.   -Transition to enteral meds; fiona and valium. Can consider interval decrease in sedation and analgesia as patient tolerates   PPX  -SUP ppx  -Plan to restart DVT-chemoprophylaxis later today or tomorrow- pending oozing from trach and repeat Hb    Pt is stable for LTAC transfer. The patient is: [x] acutely ill Risk of deterioration: [] moderate    [] critically ill  [] high     [x]See my orders for details    My assessment/plan was discussed with:  [x]Nursing []PT/OT    [x]Respiratory therapy []    []Family [x]Dr. Owens [facility] Time:  The services I provided to this patient were to treat and/or prevent clinically significant deterioration that could result in the failure of one or more body systems and/or organ systems due to respiratory distress, hypoxia, cardiac dysrhythmia. Services included the following:  -reviewing nursing notes and old charts  -vital sign assessments   -direct patient care  -medication orders and management  -interpreting and reviewing diagnostic studies/labs  -re-evaluations  -documentation time        Aggregate critical care time was 35 minutes, which includes only time during which I was engaged in work directly related to the patient's care as described above. During this entire length of time I was immediately available to the patient. The reason for providing this level of medical care for this critically ill patient was due a critical illness that impaired one or more vital organ systems such that there was a high probability of imminent or life threatening deterioration in the patients condition.  This care involved high complexity decision making to assess, manipulate, and support vital system functions, to treat this degreee vital organ system failure and to prevent further life threatening deterioration of the patients condition      Complex decision making was made in the evaluation and management plans during this consultation. More than 50% of time was spent in counseling and coordination of care including review of data and discussion with other team members.     Jace Rubin MD  Critical Care Medicine

## 2021-01-12 NOTE — DIABETES MGMT
Glycemic Control Plan of Care    T2DM with A1c of 8.3% (12/10/2020)  12/11: Patient intub and sedated. Noted patient came from [facility] with history of dementia. Home diabetes medications: Unverified  Lantus insulin 22 units daily    Patient was admitted to [facility] from [facility] on 12/10 with report of shortness of breath and altered mental state. Noted the following assessment:  Acute MI with antolateral STEMI  Status post PTCA/stent on 12/11  Acute respiratory failure, vent support  Status post tracheostomy  Status post PEG TF placement, 01/08/2021  Acute flash pulmonary edema  Acute systolic heart failure exacerbation  Severe pulmonary fibrosis  Septic shock  Multiple liver masses  Poor prognosis    Noted per palliative care team: full code with full interventions    01/12: Patient seen in CVT ICU this morning. Noted DCP: SNF at [facility] when bed available. Glycemic recommendation(s): basal lantus insulin daily at bedtime and sliding scale lispro as ordered. Glycemic assessment:    NPO on TF Glucerna 1.5 bolus 300 ml 4 times daily (6 AM, 10 AM, 2 PM, 6 PM)  IVF order: D5 1/2 NS cont infusion 10 ml/hr    POC BG 01/11: 194, 184  POC BG 01/12 at time of review: 161, 165  Lab FBG 01/12: 153        Current A1C: 8.3% (12/10/2020) which is equivalent to estimated average blood glucose of 192 mg/dL during the past 2-3 months    Current hospital diabetes medications:  Basal lantus insulin 20 units daily at bedtime  Correctional lispro insulin every 6 hours. Very resistant dose    Total daily dose insulin requirement previous day: 01/11:  Lantus: 20 units  Lispro: 15 units  TDD insulin: 35 units    Diet: NPO.  Bolus TF via PEG    Goals:  Blood glucose will be within target range of  mg/dL by 01/15/2021    Education:   ___  Refer to Diabetes Education Record   _X__  Education not indicated at this time    Nae Cadena RN [facility]  Pager: [phone]

## 2021-01-13 LAB
ANION GAP SERPL CALC-SCNC: 7 MMOL/L (ref 3–18)
ARTERIAL PATENCY WRIST A: YES
ATRIAL RATE: 90 BPM
BASE EXCESS BLD CALC-SCNC: 9 MMOL/L
BASOPHILS # BLD: 0 K/UL (ref 0–0.1)
BASOPHILS NFR BLD: 0 % (ref 0–2)
BDY SITE: ABNORMAL
BUN SERPL-MCNC: 15 MG/DL (ref 7–18)
BUN/CREAT SERPL: 44 (ref 12–20)
CALCIUM SERPL-MCNC: 8.8 MG/DL (ref 8.5–10.1)
CALCULATED P AXIS, ECG09: 43 DEGREES
CALCULATED R AXIS, ECG10: -28 DEGREES
CALCULATED T AXIS, ECG11: -6 DEGREES
CHLORIDE SERPL-SCNC: 99 MMOL/L (ref 100–111)
CO2 SERPL-SCNC: 30 MMOL/L (ref 21–32)
CREAT SERPL-MCNC: 0.34 MG/DL (ref 0.6–1.3)
DIAGNOSIS, 93000: NORMAL
DIFFERENTIAL METHOD BLD: ABNORMAL
EOSINOPHIL # BLD: 0.4 K/UL (ref 0–0.4)
EOSINOPHIL NFR BLD: 4 % (ref 0–5)
ERYTHROCYTE [DISTWIDTH] IN BLOOD BY AUTOMATED COUNT: 15.3 % (ref 11.6–14.5)
GAS FLOW.O2 O2 DELIVERY SYS: ABNORMAL L/MIN
GAS FLOW.O2 SETTING OXYMISER: 10 BPM
GENTAMICIN SERPL-MCNC: 10.2 UG/ML (ref 0.5–10)
GLUCOSE BLD STRIP.AUTO-MCNC: 138 MG/DL (ref 70–110)
GLUCOSE BLD STRIP.AUTO-MCNC: 168 MG/DL (ref 70–110)
GLUCOSE BLD STRIP.AUTO-MCNC: 183 MG/DL (ref 70–110)
GLUCOSE BLD STRIP.AUTO-MCNC: 240 MG/DL (ref 70–110)
GLUCOSE SERPL-MCNC: 122 MG/DL (ref 74–99)
HCO3 BLD-SCNC: 32.3 MMOL/L (ref 22–26)
HCT VFR BLD AUTO: 24.2 % (ref 35–45)
HGB BLD-MCNC: 7.5 G/DL (ref 12–16)
INSPIRATION.DURATION SETTING TIME VENT: 1 SEC
LYMPHOCYTES # BLD: 1.2 K/UL (ref 0.9–3.6)
LYMPHOCYTES NFR BLD: 13 % (ref 21–52)
MAGNESIUM SERPL-MCNC: 1.9 MG/DL (ref 1.6–2.6)
MCH RBC QN AUTO: 26 PG (ref 24–34)
MCHC RBC AUTO-ENTMCNC: 31 G/DL (ref 31–37)
MCV RBC AUTO: 84 FL (ref 74–97)
MONOCYTES # BLD: 0.8 K/UL (ref 0.05–1.2)
MONOCYTES NFR BLD: 8 % (ref 3–10)
NEUTS SEG # BLD: 7.2 K/UL (ref 1.8–8)
NEUTS SEG NFR BLD: 75 % (ref 40–73)
O2/TOTAL GAS SETTING VFR VENT: 28 %
P-R INTERVAL, ECG05: 160 MS
PCO2 BLD: 43.9 MMHG (ref 35–45)
PEEP RESPIRATORY: 5 CMH2O
PH BLD: 7.48 [PH] (ref 7.35–7.45)
PHOSPHATE SERPL-MCNC: 5.1 MG/DL (ref 2.5–4.9)
PLATELET # BLD AUTO: 507 K/UL (ref 135–420)
PMV BLD AUTO: 8.9 FL (ref 9.2–11.8)
PO2 BLD: 61 MMHG (ref 80–100)
POTASSIUM SERPL-SCNC: 4.4 MMOL/L (ref 3.5–5.5)
PRESSURE SUPPORT SETTING VENT: 8 CMH2O
Q-T INTERVAL, ECG07: 332 MS
QRS DURATION, ECG06: 88 MS
QTC CALCULATION (BEZET), ECG08: 406 MS
RBC # BLD AUTO: 2.88 M/UL (ref 4.2–5.3)
SAO2 % BLD: 92 % (ref 92–97)
SARS-COV-2, COV2NT: NOT DETECTED
SERVICE CMNT-IMP: ABNORMAL
SODIUM SERPL-SCNC: 136 MMOL/L (ref 136–145)
SOURCE, COVRS: NORMAL
SPECIMEN TYPE, XMCV1T: NORMAL
SPECIMEN TYPE: ABNORMAL
TOTAL RESP. RATE, ITRR: 13
VENTILATION MODE VENT: ABNORMAL
VENTRICULAR RATE, ECG03: 90 BPM
VOLUME CONTROL PLUS IVLCP: YES
VT SETTING VENT: 500 ML
WBC # BLD AUTO: 9.6 K/UL (ref 4.6–13.2)

## 2021-01-13 PROCEDURE — 36600 WITHDRAWAL OF ARTERIAL BLOOD: CPT

## 2021-01-13 PROCEDURE — 83735 ASSAY OF MAGNESIUM: CPT

## 2021-01-13 PROCEDURE — 80048 BASIC METABOLIC PNL TOTAL CA: CPT

## 2021-01-13 PROCEDURE — 74011250637 HC RX REV CODE- 250/637: Performed by: INTERNAL MEDICINE

## 2021-01-13 PROCEDURE — 84100 ASSAY OF PHOSPHORUS: CPT

## 2021-01-13 PROCEDURE — 82962 GLUCOSE BLOOD TEST: CPT

## 2021-01-13 PROCEDURE — 74011636637 HC RX REV CODE- 636/637: Performed by: INTERNAL MEDICINE

## 2021-01-13 PROCEDURE — 74011000258 HC RX REV CODE- 258: Performed by: INTERNAL MEDICINE

## 2021-01-13 PROCEDURE — 99233 SBSQ HOSP IP/OBS HIGH 50: CPT | Performed by: INTERNAL MEDICINE

## 2021-01-13 PROCEDURE — 74011250637 HC RX REV CODE- 250/637: Performed by: NURSE PRACTITIONER

## 2021-01-13 PROCEDURE — 93005 ELECTROCARDIOGRAM TRACING: CPT

## 2021-01-13 PROCEDURE — 74011000250 HC RX REV CODE- 250: Performed by: PHYSICIAN ASSISTANT

## 2021-01-13 PROCEDURE — 65620000000 HC RM CCU GENERAL

## 2021-01-13 PROCEDURE — 80170 ASSAY OF GENTAMICIN: CPT

## 2021-01-13 PROCEDURE — 74011250636 HC RX REV CODE- 250/636: Performed by: INTERNAL MEDICINE

## 2021-01-13 PROCEDURE — 99291 CRITICAL CARE FIRST HOUR: CPT | Performed by: INTERNAL MEDICINE

## 2021-01-13 PROCEDURE — 85025 COMPLETE CBC W/AUTO DIFF WBC: CPT

## 2021-01-13 PROCEDURE — 82803 BLOOD GASES ANY COMBINATION: CPT

## 2021-01-13 PROCEDURE — 74011250637 HC RX REV CODE- 250/637: Performed by: RADIOLOGY

## 2021-01-13 PROCEDURE — 94003 VENT MGMT INPAT SUBQ DAY: CPT

## 2021-01-13 RX ORDER — OXYCODONE HCL 5 MG/5 ML
5 SOLUTION, ORAL ORAL EVERY 8 HOURS
Status: DISCONTINUED | OUTPATIENT
Start: 2021-01-13 | End: 2021-01-14 | Stop reason: HOSPADM

## 2021-01-13 RX ORDER — DIAZEPAM 5 MG/1
5 TABLET ORAL 2 TIMES DAILY
Status: DISCONTINUED | OUTPATIENT
Start: 2021-01-13 | End: 2021-01-14 | Stop reason: HOSPADM

## 2021-01-13 RX ORDER — DIAZEPAM 5 MG/1
10 TABLET ORAL 2 TIMES DAILY
Status: DISCONTINUED | OUTPATIENT
Start: 2021-01-13 | End: 2021-01-13

## 2021-01-13 RX ADMIN — CLOPIDOGREL BISULFATE 75 MG: 75 TABLET ORAL at 10:09

## 2021-01-13 RX ADMIN — CHLORHEXIDINE GLUCONATE 0.12% ORAL RINSE 15 ML: 1.2 LIQUID ORAL at 21:09

## 2021-01-13 RX ADMIN — LANSOPRAZOLE 30 MG: KIT at 10:59

## 2021-01-13 RX ADMIN — NYSTATIN 500000 UNITS: 100000 SUSPENSION ORAL at 12:33

## 2021-01-13 RX ADMIN — NYSTATIN 500000 UNITS: 100000 SUSPENSION ORAL at 21:09

## 2021-01-13 RX ADMIN — CARVEDILOL 6.25 MG: 6.25 TABLET, FILM COATED ORAL at 21:09

## 2021-01-13 RX ADMIN — OXYCODONE HYDROCHLORIDE 10 MG: 5 SOLUTION ORAL at 06:24

## 2021-01-13 RX ADMIN — HEPARIN SODIUM 5000 UNITS: 5000 INJECTION INTRAVENOUS; SUBCUTANEOUS at 10:08

## 2021-01-13 RX ADMIN — Medication 30 ML: at 10:09

## 2021-01-13 RX ADMIN — CARVEDILOL 6.25 MG: 6.25 TABLET, FILM COATED ORAL at 10:08

## 2021-01-13 RX ADMIN — INSULIN LISPRO 3 UNITS: 100 INJECTION, SOLUTION INTRAVENOUS; SUBCUTANEOUS at 00:18

## 2021-01-13 RX ADMIN — LISINOPRIL 2.5 MG: 2.5 TABLET ORAL at 10:08

## 2021-01-13 RX ADMIN — DIAZEPAM 5 MG: 5 TABLET ORAL at 18:27

## 2021-01-13 RX ADMIN — HEPARIN SODIUM 5000 UNITS: 5000 INJECTION INTRAVENOUS; SUBCUTANEOUS at 00:18

## 2021-01-13 RX ADMIN — SODIUM CHLORIDE 10 ML: 9 INJECTION, SOLUTION INTRAMUSCULAR; INTRAVENOUS; SUBCUTANEOUS at 21:09

## 2021-01-13 RX ADMIN — NYSTATIN 500000 UNITS: 100000 SUSPENSION ORAL at 10:08

## 2021-01-13 RX ADMIN — INSULIN LISPRO 6 UNITS: 100 INJECTION, SOLUTION INTRAVENOUS; SUBCUTANEOUS at 12:31

## 2021-01-13 RX ADMIN — CHLORHEXIDINE GLUCONATE 0.12% ORAL RINSE 10 ML: 1.2 LIQUID ORAL at 10:09

## 2021-01-13 RX ADMIN — NYSTATIN 500000 UNITS: 100000 SUSPENSION ORAL at 18:27

## 2021-01-13 RX ADMIN — INSULIN GLARGINE 20 UNITS: 100 INJECTION, SOLUTION SUBCUTANEOUS at 21:09

## 2021-01-13 RX ADMIN — HEPARIN SODIUM 5000 UNITS: 5000 INJECTION INTRAVENOUS; SUBCUTANEOUS at 15:08

## 2021-01-13 RX ADMIN — INSULIN LISPRO 3 UNITS: 100 INJECTION, SOLUTION INTRAVENOUS; SUBCUTANEOUS at 18:27

## 2021-01-13 RX ADMIN — ASPIRIN 81 MG: 81 TABLET, CHEWABLE ORAL at 10:09

## 2021-01-13 RX ADMIN — GENTAMICIN SULFATE 160 MG: 40 INJECTION, SOLUTION INTRAMUSCULAR; INTRAVENOUS at 18:35

## 2021-01-13 RX ADMIN — FUROSEMIDE 20 MG: 20 TABLET ORAL at 10:09

## 2021-01-13 RX ADMIN — OXYCODONE HYDROCHLORIDE 5 MG: 5 SOLUTION ORAL at 15:07

## 2021-01-13 RX ADMIN — OXYCODONE HYDROCHLORIDE 5 MG: 5 SOLUTION ORAL at 21:09

## 2021-01-13 RX ADMIN — ATORVASTATIN CALCIUM 40 MG: 40 TABLET, FILM COATED ORAL at 21:09

## 2021-01-13 RX ADMIN — SODIUM CHLORIDE 10 ML: 9 INJECTION, SOLUTION INTRAMUSCULAR; INTRAVENOUS; SUBCUTANEOUS at 15:05

## 2021-01-13 RX ADMIN — SODIUM CHLORIDE 10 ML: 9 INJECTION, SOLUTION INTRAMUSCULAR; INTRAVENOUS; SUBCUTANEOUS at 06:25

## 2021-01-13 NOTE — PROGRESS NOTES
CM spoke to Kiera from [facility], [facility]. and provided update. Pt's send out [place] is still pending and pt developed a fever yesterday. Per Kiera pt will need to be fever free for 24 hours and have send out COVID results w/in 72 hours of admission.     Ariadna Owens RN BSN  Care Manager  [phone]

## 2021-01-13 NOTE — PROGRESS NOTES
[facility] Hospitalist Group  Progress Note    Patient: Jacki Christianson Age: 72 y.o. : 1955 MR#: [id number] SSN: [id number]  Date/Time: 2021     Subjective:     Patient seen and evaluated  No acute overnight chagnes per discussion with nursing. PurWick remains in place. Continue with tube feeds  No fevers this afternoon  Blood cultures and urine from  remain negative      Interval HPI  Patient is a 72 y. o. female with a past medical history of COPD, CHF, HTN, DM, dementia, presented to [facility] with acute on chronic systolic heart failure, acute MI with anterolateral STEMI and Q waves s/p ptca/stent to proximal/mid LAD on 20. Pt was intubated and extubated on 20, transferred to . On 20 patient was intubated and transferred to ICU for respiratory failure and cardiogenic +/- septic shock. Respiratory cultures on  with ESBL on Merrem. Dr. Ela Gardiner and IR have been consulted for trach/PEG. She is S/p trach 21 and planning for PEG placement by IR on 21. Assessment/Plan:     · Acute on Chronic Hypoxic and Hypercapnic Respiratory Failure - Requiring mechanical ventilation, Extubated on  and reintubated on , Likely aspiration event. Trach placed 21, Dr. eMme Nieves on board; On [facility]- will nees long term vent management. · Wide Complex Arrhythmia () -noted on bedside cardiac monitor. Lasted roughly 5-10 mins with Spontaneous resolution. E-lyte derangement vs Drug effect (pt was on 55mcg/hr Oliverio-Synephrine at the time) vs cardiac insult.    · Acute on Chronic Systolic Heart Failure - superimposed on severe ILD, Echo 12/10/20 EF 35-40%  · Combination septic/cardiogenic shock, improving   · Aspiration PNA. Sputum Cx() (+) heavy E.Coli-ESBL.  Recent Hx of E.coli ESBL.   · Acute Encephalopathy - likely toxic/metabolic vs infectious vs hepatic in nature, ammonia wnl.   · Severe pulmonary fibrosis - with extensive honeycombing and bronchiectasis as seen on CTA chest 20 - appears to be a new diagnosis but possibly UIP  · Acute aspiration pneumonia with severe sepsis  · UTI (+)E. coli  · Acute MI with anterolateral STEMI and Q waves - s/p ptca/stent to proximal/mid LAD using ARELY on DAPT on 20- Remains on Brilinta  · Severe dysphagia - failed MBS. Will place PEG tube today with IR.   · Critical illness myopathy/polyneuropathy  · Hx of Hep C: + RNA viral load 2020  · Multiple liver masses - noted on CT scan 20  · COPD - on home O2 4L NC  · DM  · Dementia - unknown baseline, on aricept   · Prognosis guarded  · Full code      Plan:   Decreased valium to bid- continue to wean over time     COVID negative    Patient cleared by PCCM to transition to [facility]. CBC, BMP  Continue Coreg 6.25 bid, ASA, Plavix. Aspiration precautions  Change heparin SQ to SCDs per cardiology reccomendations    Will start Gent x 5 days for possible persistent PNA (recently completed meropenem)    Dispo: plan for transfer to [facility] when transportation arranged     Sister Sakina Barber updated on plan of care and plan to transition to [facility] soon.  She states that she went to sign paperwork today    Case discussed with:  [x]Patient  []Family  [x]Nursing  [x]Case Management  DVT Prophylaxis:  []Lovenox  []Hep SQ  [x]SCDs  []Coumadin   [x]On Heparin gtt    Objective:   VS:   Visit Vitals  /72 (BP 1 Location: Left arm, BP Patient Position: At rest)   Pulse 78   Temp 98.8 °F (37.1 °C)   Resp 19   Ht 5' 7\" (1.702 m)   Wt 72.8 kg (160 lb 7.9 oz)   SpO2 100%   Breastfeeding No   BMI 25.14 kg/m²      Tmax/24hrs: Temp (24hrs), Av.3 °F (37.9 °C), Min:98.8 °F (37.1 °C), Max:102.6 °F (39.2 °C)  IOBRIEF    Intake/Output Summary (Last 24 hours) at 2021 4982  Last data filed at 2021 1400  Gross per 24 hour   Intake 1260 ml   Output 350 ml   Net 910 ml     Generalintubated/sedated, NAD  Pulmonary: Decreased breath sounds bilaterally; + trach  Cardiovascular: Regular rate and Rhythm. GI:  Soft, Non distended, Non tender. + Bowel sounds.+ PEG + rectal tube  Extremities:  No edema, cyanosis, clubbing. No calf tenderness.    Neurologic: Opens eyes spontaneously, does not follow commands    Medications:   Current Facility-Administered Medications   Medication Dose Route Frequency    oxyCODONE (ROXICODONE) 5 mg/5 mL oral solution 5 mg  5 mg Oral Q8H    diazePAM (VALIUM) tablet 10 mg  10 mg Oral BID    lisinopriL (PRINIVIL, ZESTRIL) tablet 2.5 mg  2.5 mg Oral DAILY    naloxegoL (MOVANTIK) tablet 25 mg  25 mg Oral DAILY PRN    [Held by provider] potassium bicarb-citric acid (EFFER-K) tablet 40 mEq  40 mEq Oral DAILY    heparin (porcine) injection 5,000 Units  5,000 Units SubCUTAneous Q8H    nystatin (MYCOSTATIN) 100,000 unit/mL oral suspension 500,000 Units  500,000 Units Oral QID    carvediloL (COREG) tablet 6.25 mg  6.25 mg Oral Q12H    aspirin chewable tablet 81 mg  81 mg Oral DAILY    clopidogreL (PLAVIX) tablet 75 mg  75 mg Oral DAILY    dextrose 5 % - 0.45% NaCl infusion  10 mL/hr IntraVENous CONTINUOUS    insulin glargine (LANTUS) injection 20 Units  20 Units SubCUTAneous QHS    0.9% sodium chloride infusion 250 mL  250 mL IntraVENous PRN    [Held by provider] docusate (COLACE) 50 mg/5 mL oral liquid 100 mg  100 mg Oral DAILY    furosemide (LASIX) tablet 20 mg  20 mg Oral DAILY    lansoprazole (PREVACID) 3 mg/mL oral suspension 30 mg  30 mg Per G Tube ACB    polyethylene glycol (MIRALAX) packet 17 g  17 g Per NG tube PRN    chlorhexidine (PERIDEX) 0.12 % mouthwash 10 mL  10 mL Oral Q12H    ELECTROLYTE REPLACEMENT PROTOCOL - Potassium Standard Dosing   1 Each Other PRN    ELECTROLYTE REPLACEMENT PROTOCOL - Magnesium   1 Each Other PRN    ELECTROLYTE REPLACEMENT PROTOCOL - Phosphorus  Standard Dosing  1 Each Other PRN    ELECTROLYTE REPLACEMENT PROTOCOL - Calcium   1 Each Other PRN    [Held by provider] spironolactone (ALDACTONE) tablet 25 mg  25 mg Oral DAILY    multivit-folic acid-herbal 182 (WELLESSE PLUS) oral liquid 30 mL  30 mL Per NG tube DAILY    insulin lispro (HUMALOG) injection   SubCUTAneous Q6H    albuterol-ipratropium (DUO-NEB) 2.5 MG-0.5 MG/3 ML  3 mL Nebulization Q4H PRN    sodium chloride (NS) flush 5-40 mL  5-40 mL IntraVENous Q8H    sodium chloride (NS) flush 5-40 mL  5-40 mL IntraVENous PRN    acetaminophen (TYLENOL) tablet 650 mg  650 mg Oral Q6H PRN    Or    acetaminophen (TYLENOL) suppository 650 mg  650 mg Rectal Q6H PRN    promethazine (PHENERGAN) tablet 12.5 mg  12.5 mg Oral Q6H PRN    Or    ondansetron (ZOFRAN) injection 4 mg  4 mg IntraVENous Q6H PRN    glucose chewable tablet 16 g  4 Tab Oral PRN    glucagon (GLUCAGEN) injection 1 mg  1 mg IntraMUSCular PRN    dextrose (D50W) injection syrg 12.5-25 g  25-50 mL IntraVENous PRN    atorvastatin (LIPITOR) tablet 40 mg  40 mg Oral QHS       Imaging:   XR Results (most recent):  Results from Hospital Encounter encounter on 12/10/20   XR CHEST PORT    Narrative EXAM: Chest X-Ray    INDICATION:  new fevers. TECHNIQUE: AP view of the chest    COMPARISON: 1/10/2021, 1/4/2021, 1/3/2021 and 1/2/2021    FINDINGS:   Tubes and Lines: The right-sided PICC line is again noted with the tip extending  towards the head. Recommend repositioning. Tracheostomy is unchanged. Pleura: No pneumothorax appreciated. No effusions appreciated. Lungs: There is loss of the left hemidiaphragm with opacities in the left lower  lung field which are progressed since prior imaging. Subtle opacity in the right  lung base. Cardiac/Mediastinum/Aorta: It is unremarkable    Pulmonary Vascularity: The pulmonary vasculature is unremarkable. Chest Wall: No acute finding    Osseous Structures: Unremarkable    Upper Abdomen: No acute findings appreciated. Impression IMPRESSION:  1.   Right-sided PICC line extending into the right IJ with the tip above the  field-of-view. Recommend repositioning. 2.  Progressive opacities in the lung bases concerning for infiltrate. Follow-up  to resolution is recommended. Findings discussed with the patient's nurse. CT Results (most recent):  Results from Hospital Encounter encounter on 12/10/20   CT ABD WO CONT    Narrative CT ABDOMEN, NONCONTRAST    CPT CODE: 84166    INDICATION: Pretreatment planning for possible gastrostomy tube. COMPARISON: The partially visualized portion of the upper abdomen on pulmonary  CTA 12/16/2020. Prior abdominal CT 2/4/2018. Report is noted in the electronic  medical record (care everywhere) of contrast enhanced abdominal pelvic CT  performed elsewhere 11/25/2020, images from which are not available at this  time. Reference right upper quadrant ultrasound 5/29/2020. TECHNIQUE: Without administration of intravenous or oral contrast, serial axial  CT images through the abdomen were helically acquired. Additional coronal and  sagittal reformation images were also performed. All CT scans at this facility are performed using dose optimization technique as  appropriate to the performed exam, to include automated exposure control,  adjustment of the mA and/or kV according to patient's size (Including  appropriate matching for site-specific examinations), or use of iterative  reconstruction technique. FINDINGS:    Assessment of the solid and hollow viscera and vascular structures of the  abdomen is limited by lack of contrast.    The visualized portions of lung bases demonstrate bibasilar consolidations in  the lower lobes, right greater than left with air bronchograms in the right  lower lobe consolidation, compatible with multisegmental right lower lobe  atelectasis which could be with or without superimposed airspace disease. There  is bronchiectasis in the lower lungs bilaterally and extensive fibrotic changes  again seen.     Esophagogastric tube tip extends to the level of the mid stomach. The gastric  antrum demonstrates contiguity with the anterior abdominal peritoneum deep to  the left rectus muscle, likely would be amenable to percutaneous gastrostomy  tube placement, interventional radiology consultation might also be helpful in  this regard. Multiple calcific gallstones in the gallbladder consistent with cholelithiasis. Multiple splenic calcifications, likely granulomata. Large heterogeneous in attenuation mass centered laterally in the right hepatic  lobe, contours poorly defined for accurate measurement due to lack of IV  contrast and streak artifacts from the patient's arms down at sides but  approximately 11 cm, further increased in size compared to prior studies,  consistent with malignancy which could be primary or metastatic. Additional  hypoattenuating mass farther inferiorly in the right hepatic lobe centered  medially measures approximately 2 cm. A third hepatic mass described in the left  hepatic lobe on prior contrast-enhanced CT is somewhat less well defined on the  current study which is limited by lack of IV contrast.    No evidence of hydronephrosis/hydroureter. No evidence of nephrolithiasis or  calculi in the upper abdominal portions of ureters. Assessment of the renal  parenchyma otherwise is limited by lack of IV contrast without large contour  deforming mass identified. Within the limitation of non-contrast technique, the unenhanced pancreas and  adrenal glands appear grossly unremarkable. Visualized portions of bowel are non-dilated without evidence of bowel  obstruction. Arterial calcifications noted in the abdominal aorta and coronary arteries. No  evidence of abdominal aortic aneurysm. No definite evidence of pathologic lymph node enlargement is seen. No definite fluid collection/abscess identified. Pelvic viscera and pelvic portions of abdominal viscera are inferior to the  field of view on abdominal CT. On review of bone windows, no acute fractures or destructive bone lesions are  seen. Degenerative changes and osteopenia noted. Impression Impression:     The gastric antrum demonstrates contiguity with the anterior peritoneum just  deep to the left rectus muscle as above. Known enlarging multiple hepatic masses again seen, assessment limited by lack  of IV contrast, consistent with malignancy, differential considerations could  include multifocal primary malignancy or metastases as noted on prior studies. Right lower lobe consolidation contains air bronchograms consistent with  multisegmental atelectasis which could be with or without superimposed airspace  disease. Otherwise limited noncontrast CT scan of the upper abdomen with other nonacute  findings as above. 12/10/20   ECHO ADULT COMPLETE 12/10/2020 12/10/2020    Narrative · LV: Estimated LVEF is 35 - 40%. Visually measured ejection fraction. Normal cavity size and wall thickness. Moderately and segmentally reduced   systolic function. Inconclusive left ventricular diastolic function. · AO: Mild aortic root dilatation; diameter is 3.8 cm.         Signed by: Ivania Barry MD        Labs:    Recent Results (from the past 48 hour(s))   GLUCOSE, POC    Collection Time: 01/11/21  6:01 PM   Result Value Ref Range    Glucose (POC) 187 (H) 70 - 110 mg/dL   GLUCOSE, POC    Collection Time: 01/11/21 11:43 PM   Result Value Ref Range    Glucose (POC) 170 (H) 70 - 110 mg/dL   POC G3    Collection Time: 01/12/21  4:30 AM   Result Value Ref Range    Device: VENT      FIO2 (POC) 28 %    pH (POC) 7.45 7.35 - 7.45      pCO2 (POC) 46.7 (H) 35.0 - 45.0 MMHG    pO2 (POC) 97 80 - 100 MMHG    HCO3 (POC) 32.6 (H) 22 - 26 MMOL/L    sO2 (POC) 98 (H) 92 - 97 %    Base excess (POC) 9 mmol/L    Mode SIMV      Tidal volume 500 ml    Set Rate 10 bpm    PEEP/CPAP (POC) 5 cmH2O    Pressure support 8 cmH2O    Allens test (POC) YES      Inspiratory Time 1.00 sec    Total resp. rate 18      Site RIGHT RADIAL      Specimen type (POC) ARTERIAL      Performed by Guanako Pore     Volume control plus YES     GLUCOSE, POC    Collection Time: 01/12/21  5:39 AM   Result Value Ref Range    Glucose (POC) 161 (H) 70 - 110 mg/dL   CBC WITH AUTOMATED DIFF    Collection Time: 01/12/21  5:40 AM   Result Value Ref Range    WBC 5.7 4.6 - 13.2 K/uL    RBC 3.06 (L) 4.20 - 5.30 M/uL    HGB 7.8 (L) 12.0 - 16.0 g/dL    HCT 25.9 (L) 35.0 - 45.0 %    MCV 84.6 74.0 - 97.0 FL    MCH 25.5 24.0 - 34.0 PG    MCHC 30.1 (L) 31.0 - 37.0 g/dL    RDW 15.2 (H) 11.6 - 14.5 %    PLATELET 832 (H) 738 - 420 K/uL    MPV 9.4 9.2 - 11.8 FL    NEUTROPHILS 64 40 - 73 %    LYMPHOCYTES 16 (L) 21 - 52 %    MONOCYTES 13 (H) 3 - 10 %    EOSINOPHILS 7 (H) 0 - 5 %    BASOPHILS 0 0 - 2 %    ABS. NEUTROPHILS 3.7 1.8 - 8.0 K/UL    ABS. LYMPHOCYTES 0.9 0.9 - 3.6 K/UL    ABS. MONOCYTES 0.7 0.05 - 1.2 K/UL    ABS. EOSINOPHILS 0.4 0.0 - 0.4 K/UL    ABS.  BASOPHILS 0.0 0.0 - 0.1 K/UL    DF AUTOMATED     MAGNESIUM    Collection Time: 01/12/21  5:40 AM   Result Value Ref Range    Magnesium 2.1 1.6 - 2.6 mg/dL   PHOSPHORUS    Collection Time: 01/12/21  5:40 AM   Result Value Ref Range    Phosphorus 5.3 (H) 2.5 - 4.9 MG/DL   METABOLIC PANEL, BASIC    Collection Time: 01/12/21  5:40 AM   Result Value Ref Range    Sodium 135 (L) 136 - 145 mmol/L    Potassium 4.1 3.5 - 5.5 mmol/L    Chloride 99 (L) 100 - 111 mmol/L    CO2 31 21 - 32 mmol/L    Anion gap 5 3.0 - 18 mmol/L    Glucose 153 (H) 74 - 99 mg/dL    BUN 14 7.0 - 18 MG/DL    Creatinine 0.41 (L) 0.6 - 1.3 MG/DL    BUN/Creatinine ratio 34 (H) 12 - 20      GFR est AA >60 >60 ml/min/1.73m2    GFR est non-AA >60 >60 ml/min/1.73m2    Calcium 9.4 8.5 - 10.1 MG/DL   SARS-COV-2    Collection Time: 01/12/21  8:33 AM   Result Value Ref Range    SARS-CoV-2 Not Detected Not Detected      Specimen source Nasopharyngeal      Specimen type NP Swab     GLUCOSE, POC    Collection Time: 01/12/21 11:30 AM   Result Value Ref Range    Glucose (POC) 165 (H) 70 - 110 mg/dL   CULTURE, BLOOD    Collection Time: 01/12/21  6:00 PM    Specimen: Blood   Result Value Ref Range    Special Requests: NO SPECIAL REQUESTS      Culture result: NO GROWTH AFTER 10 HOURS     GLUCOSE, POC    Collection Time: 01/12/21  6:05 PM   Result Value Ref Range    Glucose (POC) 235 (H) 70 - 110 mg/dL   URINALYSIS W/MICROSCOPIC    Collection Time: 01/12/21  6:20 PM   Result Value Ref Range    Color DARK YELLOW      Appearance CLEAR      Specific gravity 1.019 1.005 - 1.030      pH (UA) >8.5 (H) 5.0 - 8.0    Protein Negative NEG mg/dL    Glucose Negative NEG mg/dL    Ketone Negative NEG mg/dL    Bilirubin SMALL (A) NEG      Blood Negative NEG      Urobilinogen 2.0 (H) 0.2 - 1.0 EU/dL    Nitrites Negative NEG      Leukocyte Esterase TRACE (A) NEG      WBC 0 to 3 0 - 4 /hpf    RBC 0 to 3 0 - 5 /hpf    Epithelial cells 2+ 0 - 5 /lpf    Bacteria FEW (A) NEG /hpf    Amorphous Crystals 1+ (A) NEG    CA Oxalate crystals 1+ (A) NEG   CULTURE, BLOOD    Collection Time: 01/12/21  7:10 PM    Specimen: Blood   Result Value Ref Range    Special Requests: NO SPECIAL REQUESTS      Culture result: NO GROWTH AFTER 10 HOURS     GLUCOSE, POC    Collection Time: 01/13/21 12:10 AM   Result Value Ref Range    Glucose (POC) 168 (H) 70 - 110 mg/dL   POC G3    Collection Time: 01/13/21  4:39 AM   Result Value Ref Range    Device: VENT      FIO2 (POC) 28 %    pH (POC) 7.48 (H) 7.35 - 7.45      pCO2 (POC) 43.9 35.0 - 45.0 MMHG    pO2 (POC) 61 (L) 80 - 100 MMHG    HCO3 (POC) 32.3 (H) 22 - 26 MMOL/L    sO2 (POC) 92 92 - 97 %    Base excess (POC) 9 mmol/L    Mode SIMV      Tidal volume 500 ml    Set Rate 10 bpm    PEEP/CPAP (POC) 5 cmH2O    Pressure support 8 cmH2O    Allens test (POC) YES      Inspiratory Time 1.00 sec    Total resp.  rate 13      Site RIGHT RADIAL      Specimen type (POC) ARTERIAL      Performed by Carmen Cheek     Volume control plus YES     GLUCOSE, POC    Collection Time: 01/13/21  6:18 AM   Result Value Ref Range    Glucose (POC) 138 (H) 70 - 110 mg/dL   CBC WITH AUTOMATED DIFF    Collection Time: 01/13/21  6:20 AM   Result Value Ref Range    WBC 9.6 4.6 - 13.2 K/uL    RBC 2.88 (L) 4.20 - 5.30 M/uL    HGB 7.5 (L) 12.0 - 16.0 g/dL    HCT 24.2 (L) 35.0 - 45.0 %    MCV 84.0 74.0 - 97.0 FL    MCH 26.0 24.0 - 34.0 PG    MCHC 31.0 31.0 - 37.0 g/dL    RDW 15.3 (H) 11.6 - 14.5 %    PLATELET 369 (H) 306 - 420 K/uL    MPV 8.9 (L) 9.2 - 11.8 FL    NEUTROPHILS 75 (H) 40 - 73 %    LYMPHOCYTES 13 (L) 21 - 52 %    MONOCYTES 8 3 - 10 %    EOSINOPHILS 4 0 - 5 %    BASOPHILS 0 0 - 2 %    ABS. NEUTROPHILS 7.2 1.8 - 8.0 K/UL    ABS. LYMPHOCYTES 1.2 0.9 - 3.6 K/UL    ABS. MONOCYTES 0.8 0.05 - 1.2 K/UL    ABS. EOSINOPHILS 0.4 0.0 - 0.4 K/UL    ABS.  BASOPHILS 0.0 0.0 - 0.1 K/UL    DF AUTOMATED     MAGNESIUM    Collection Time: 01/13/21  6:20 AM   Result Value Ref Range    Magnesium 1.9 1.6 - 2.6 mg/dL   PHOSPHORUS    Collection Time: 01/13/21  6:20 AM   Result Value Ref Range    Phosphorus 5.1 (H) 2.5 - 4.9 MG/DL   METABOLIC PANEL, BASIC    Collection Time: 01/13/21  6:20 AM   Result Value Ref Range    Sodium 136 136 - 145 mmol/L    Potassium 4.4 3.5 - 5.5 mmol/L    Chloride 99 (L) 100 - 111 mmol/L    CO2 30 21 - 32 mmol/L    Anion gap 7 3.0 - 18 mmol/L    Glucose 122 (H) 74 - 99 mg/dL    BUN 15 7.0 - 18 MG/DL    Creatinine 0.34 (L) 0.6 - 1.3 MG/DL    BUN/Creatinine ratio 44 (H) 12 - 20      GFR est AA >60 >60 ml/min/1.73m2    GFR est non-AA >60 >60 ml/min/1.73m2    Calcium 8.8 8.5 - 10.1 MG/DL   EKG, 12 LEAD, SUBSEQUENT    Collection Time: 01/13/21  8:29 AM   Result Value Ref Range    Ventricular Rate 90 BPM    Atrial Rate 90 BPM    P-R Interval 160 ms    QRS Duration 88 ms    Q-T Interval 332 ms    QTC Calculation (Bezet) 406 ms    Calculated P Axis 43 degrees    Calculated R Axis -28 degrees    Calculated T Axis -6 degrees    Diagnosis       Sinus rhythm with marked sinus arrhythmia  Inferior infarct (cited on or before 07-FEB-2018)  T wave abnormality, consider anterolateral ischemia  Abnormal ECG  When compared with ECG of 26-DEC-2020 10:44,  QT has shortened     GLUCOSE, POC    Collection Time: 01/13/21 12:26 PM   Result Value Ref Range    Glucose (POC) 240 (H) 70 - 110 mg/dL       Signed By: eBnjamin Hernandez MD     January 13, 2021      I spent 35 minutes with the patient in face-to-face consultation, of which greater than 50% was spent in counseling and coordination of care as described above    Disclaimer: Sections of this note are dictated using utilizing voice recognition software. Minor typographical errors may be present. If questions arise, please do not hesitate to contact me or call our department.

## 2021-01-13 NOTE — PROGRESS NOTES
[facility] Progress Note       CARDIOLOGY PROGRESS NOTE  RECS:    1. Acute respiratory failure- requiring mechanical ventilation- extubated  12/17/20. re intubated 12/22/20. S/p tracheostomy 1/4/20 Continue supportive care  2. Sepsis- Sputum Cx(12/22) (+) heavy E.Coli-ESBL. on antibiotics. ID following   3. Wide complex Arrhythmia on 12/25/20- No recurrence will continue to monitor. Monitor electrolytes    4. Hypertension - low intermittent BP Carvedilol 6.25 mg bid. 5. Subacute MI- 12/10/20 -s/p [facility] S/p ptca/stent to proximal/ mid LAD using ARELY.  Moderate to severe LV dysfunction.  ekg no new ischemic changes suggestive of reinfarction or stent thrombosis. S/p PEG tube. Continue aspirin and Clopidogrel. 6. Anemia- H&H Stable.  S/p transfusion. 7. Acute Systolic Congestive heart Failure-recent echo with EF% 35%-40. Compensated at present. Lasix apo daily.  Tolerated low-dose lisinopril which should be continued. 8. COPD,   9. Dementia - failed swallow eval. S/p Gastrostomy tube placement  1/8/21  by IR.  10. Hypomagnesemia -resolved. Would recommend to discontinue heparin as patient is getting aspirin and Plavix now. May consider using mechanical SCDs for DVT prophylaxis. Stable hemodynamics post trach and PEG tube. Status post urgent PCI for LAD STEMI. Prognosis is poor. Continue supportive care      Cardiac cath 12/11/20  Patient presented to [address] [place] [address] with late presentation anterior wall MI.  EF 35-40%. Patient was initially managed medically-- however developed acute pulmonary edema requiring intubation. Also troponin level increasing consistent with ongoing ischemia. S/p ptca/stent to proximal/ mid LAD using ARELY. LVEDP 8 mmHg. Normal PASP pressure with normal PCWP. Moderate to severe LV dysfunction.     Echo 12/10/20  · LV: Estimated LVEF is 35 - 40%. Visually measured ejection fraction. Normal cavity size and wall thickness.  Moderately and segmentally reduced systolic function. Inconclusive left ventricular diastolic function. AO: Mild aortic root dilatation; diameter is 3.8 cm. ASSESSMENT:  Hospital Problems  Date Reviewed: 2/7/2018          Codes Class Noted POA    Goals of care, counseling/discussion ICD-10-CM: Z71.89  ICD-9-CM: V65.49  Unknown Unknown        Dementia without behavioral disturbance ([facility] ICD-10-CM: F03.90  ICD-9-CM: 294.20  Unknown Unknown        Pulmonary fibrosis ([facility] ICD-10-CM: J84.10  ICD-9-CM: 910  Unknown Unknown        Severe protein-calorie malnutrition ([facility] ICD-10-CM: E43  ICD-9-CM: 591  12/16/2020 Clinically Undetermined        Acute on chronic systolic congestive heart failure ([facility] ICD-10-CM: I50.23  ICD-9-CM: 428.23, 428.0  12/15/2020 Unknown        * (Principal) STEMI (ST elevation myocardial infarction) ([facility] ICD-10-CM: I21.3  ICD-9-CM: 410.90  12/10/2020 Unknown                SUBJECTIVE:  Intubated. Sedated. FiO2 28%. PEEP 5. Heparin held due to bleeding around tracheostomy. OBJECTIVE:    VS:   Visit Vitals  BP (!) 110/58   Pulse 86   Temp 99.2 °F (37.3 °C)   Resp 14   Ht 5' 7\" (1.702 m)   Wt 72.8 kg (160 lb 7.9 oz)   SpO2 98%   Breastfeeding No   BMI 25.14 kg/m²         Intake/Output Summary (Last 24 hours) at 1/13/2021 1046  Last data filed at 1/13/2021 0800  Gross per 24 hour   Intake 1770 ml   Output 550 ml   Net 1220 ml     TELE: normal sinus rhythm    General: on trach collar/ventilator  HENT: Normocephalic, atraumatic.   Neck :  Supple  Cardiac:  Normal S1/S2  Chest/Lungs:b/l good air entry with mild coarse rales   Abdomen: Soft  Extremities:  No edema      Labs: Results:       Chemistry Recent Labs     01/13/21  0620 01/12/21  0540 01/11/21  0550   * 153* 130*    135* 135*   K 4.4 4.1 4.5   CL 99* 99* 99*   CO2 30 31 32   BUN 15 14 12   CREA 0.34* 0.41* 0.32*   CA 8.8 9.4 9.3   MG 1.9 2.1 2.0   PHOS 5.1* 5.3* 3.7   AGAP 7 5 4   BUCR 44* 34* 38*      CBC w/Diff Recent Labs     01/13/21  0620 01/12/21  0540 01/11/21  0550   WBC 9.6 5.7 5.7   RBC 2.88* 3.06* 2.89*   HGB 7.5* 7.8* 7.5*   HCT 24.2* 25.9* 24.7*   * 477* 470*   GRANS 75* 64 67   LYMPH 13* 16* 16*   EOS 4 7* 7*      Cardiac Enzymes No results for input(s): CPK, CKND1, NATHANIEL in the last 72 hours. No lab exists for component: CKRMB, TROIP   Coagulation No results for input(s): PTP, INR, APTT, INREXT, INREXT in the last 72 hours. Lipid Panel No results found for: CHOL, CHOLPOCT, CHOLX, CHLST, CHOLV, 033987, HDL, HDLP, LDL, LDLC, DLDLP, 364325, VLDLC, VLDL, TGLX, TRIGL, TRIGP, TGLPOCT, CHHD, CHHDX   BNP No results for input(s): BNPP in the last 72 hours. Liver Enzymes No results for input(s): TP, ALB, TBIL, AP in the last 72 hours.     No lab exists for component: SGOT, GPT, DBIL   Digoxin    Thyroid Studies Lab Results   Component Value Date/Time    TSH 2.92 12/10/2020 11:07 AM              Hilda Holloway MD

## 2021-01-13 NOTE — PROGRESS NOTES
[facility]  ICU Progress Note      Name: Rajendra Gutierrez   : 1955   MRN: [id number]   Date: 2021 9:14 AM     [x]I have reviewed the flowsheet and previous days notes. Events overnight reviewed and discussed with nursing staff. Vital signs and records reviewed. Interval HPI    Patient is a 72 y. o. female with a past medical history of COPD, CHF, HTN, DM, dementia, admitted for  [facility] with acute on chronic systolic heart failure, acute MI with anterolateral STEMI and Q waves s/p ptca/stent to proximal/mid LAD on 20. Pt was intubated and extubated on 20, transferred to . On 20 patient was intubated and transferred to ICU for respiratory failure and cardiogenic +/- septic shock. Respiratory cultures on  with ESBL on Merrem. Dr. Nigel Pace and IR have been consulted for trach/PEG. She is S/p trach  and s/p PEG .     Subjective 21  LOS: 34    Overnight events: Fever of 102 noted, Cx sent   Mentation/Activity: opens eyes spontaneously, minimal response to pain. Respiratory/ Secretions: thick secretions per RT  Hemodynamics: H/H stable  Need for procedures:No    - remains more on the sedate side- decreasing valium dosing    - TF: Glucerna- change from TID to Q 8 bolus feeds- tolerating thus far              ROS:Review of systems not obtained due to patient factors.     Vital Signs:    Visit Vitals  BP (!) 110/58   Pulse 86   Temp 99.2 °F (37.3 °C)   Resp 14   Ht 5' 7\" (1.702 m)   Wt 72.8 kg (160 lb 7.9 oz)   SpO2 98%   Breastfeeding No   BMI 25.14 kg/m²       O2 Device: Ventilator, Tracheostomy   O2 Flow Rate (L/min): 40 l/min   Temp (24hrs), Av.5 °F (38.1 °C), Min:99.1 °F (37.3 °C), Max:102.6 °F (39.2 °C)       Intake/Output:   Last shift:       07 -  1900  In: 140 [I.V.:140]  Out: 50 [Urine:50]  Last 3 shifts:  1901 -  0700  In: 7994 [I.V.:360]  Out: 4776 [Urine:1040]    Intake/Output Summary (Last 24 hours) at 2021 [address] filed at 1/13/2021 0800  Gross per 24 hour   Intake 1770 ml   Output 550 ml   Net 1220 ml       Ventilator Settings:  Mode Rate Tidal Volume Pressure FiO2 PEEP   SIMV, VC+, Pressure support   500 ml  8 cm H2O 28 % 5 cm H20     Peak airway pressure: 22 cm H2O    Minute ventilation: 6.32 l/min      ARDS network Guidelines:   Lung protective strategy and Plateau  Pressure goal < 30 cm H2O goals  Oxygenation Goals PaO2 55-80 mm Hg or SaO2 88-95%  PH goal 7.30-7.45    VAP bundle:  Reviewed. McGrady tube to suction at 20-30 cm Hg. Maintain McGrady tube with 5-10ml air every 4 hours  Routine oral care every 4 hours  Elevation of head > 45 degree    Physical Exam:               General:sedated; NAD, acyanotic - remains on vent  HEENT:  Anicteric sclerae; pink palpebral conjunctivae; mucosa moist, Trach in situ (CDI)- mild pink tinge blood around dressing, Poor dentition- especially bottom teeth- loose with potential to fall out  Resp: (+) coarse rhonchi b/l. Symmetrical chest expansion; good airway entry;   CV:  S1, S2 present; regular rate and rhythm   GI:  Abdomen soft, non-tender; (+) active bowel sounds. PEG in SITU- dressing clean- no blood oozing  Extremities:  +2 pulses on all extremities  Skin:  Warm; no rashes/ lesions noted, normal turgor/cap refill. Neurologic:   Opening eyes spontaneously, No command following, Not really responding to pain.    Magali Donohue, PICC RUE, PEG, FMS      DATA:     Current Facility-Administered Medications   Medication Dose Route Frequency    oxyCODONE (ROXICODONE) 5 mg/5 mL oral solution 5 mg  5 mg Oral Q8H    diazePAM (VALIUM) tablet 10 mg  10 mg Oral BID    lisinopriL (PRINIVIL, ZESTRIL) tablet 2.5 mg  2.5 mg Oral DAILY    naloxegoL (MOVANTIK) tablet 25 mg  25 mg Oral DAILY PRN    [Held by provider] potassium bicarb-citric acid (EFFER-K) tablet 40 mEq  40 mEq Oral DAILY    heparin (porcine) injection 5,000 Units  5,000 Units SubCUTAneous Q8H    nystatin (MYCOSTATIN) 100,000 unit/mL oral suspension 500,000 Units  500,000 Units Oral QID    carvediloL (COREG) tablet 6.25 mg  6.25 mg Oral Q12H    aspirin chewable tablet 81 mg  81 mg Oral DAILY    clopidogreL (PLAVIX) tablet 75 mg  75 mg Oral DAILY    dextrose 5 % - 0.45% NaCl infusion  10 mL/hr IntraVENous CONTINUOUS    insulin glargine (LANTUS) injection 20 Units  20 Units SubCUTAneous QHS    0.9% sodium chloride infusion 250 mL  250 mL IntraVENous PRN    [Held by provider] docusate (COLACE) 50 mg/5 mL oral liquid 100 mg  100 mg Oral DAILY    furosemide (LASIX) tablet 20 mg  20 mg Oral DAILY    lansoprazole (PREVACID) 3 mg/mL oral suspension 30 mg  30 mg Per G Tube ACB    polyethylene glycol (MIRALAX) packet 17 g  17 g Per NG tube PRN    chlorhexidine (PERIDEX) 0.12 % mouthwash 10 mL  10 mL Oral Q12H    ELECTROLYTE REPLACEMENT PROTOCOL - Potassium Standard Dosing   1 Each Other PRN    ELECTROLYTE REPLACEMENT PROTOCOL - Magnesium   1 Each Other PRN    ELECTROLYTE REPLACEMENT PROTOCOL - Phosphorus  Standard Dosing  1 Each Other PRN    ELECTROLYTE REPLACEMENT PROTOCOL - Calcium   1 Each Other PRN    [Held by provider] spironolactone (ALDACTONE) tablet 25 mg  25 mg Oral DAILY    multivit-folic acid-herbal 507 (WELLESSE PLUS) oral liquid 30 mL  30 mL Per NG tube DAILY    insulin lispro (HUMALOG) injection   SubCUTAneous Q6H    albuterol-ipratropium (DUO-NEB) 2.5 MG-0.5 MG/3 ML  3 mL Nebulization Q4H PRN    sodium chloride (NS) flush 5-40 mL  5-40 mL IntraVENous Q8H    sodium chloride (NS) flush 5-40 mL  5-40 mL IntraVENous PRN    acetaminophen (TYLENOL) tablet 650 mg  650 mg Oral Q6H PRN    Or    acetaminophen (TYLENOL) suppository 650 mg  650 mg Rectal Q6H PRN    promethazine (PHENERGAN) tablet 12.5 mg  12.5 mg Oral Q6H PRN    Or    ondansetron (ZOFRAN) injection 4 mg  4 mg IntraVENous Q6H PRN    glucose chewable tablet 16 g  4 Tab Oral PRN    glucagon (GLUCAGEN) injection 1 mg  1 mg IntraMUSCular PRN    dextrose (D50W) injection syrg 12.5-25 g  25-50 mL IntraVENous PRN    atorvastatin (LIPITOR) tablet 40 mg  40 mg Oral QHS         Labs: Results:       Chemistry Recent Labs     01/13/21  0620 01/12/21  0540 01/11/21  0550   * 153* 130*    135* 135*   K 4.4 4.1 4.5   CL 99* 99* 99*   CO2 30 31 32   BUN 15 14 12   CREA 0.34* 0.41* 0.32*   CA 8.8 9.4 9.3   AGAP 7 5 4   BUCR 44* 34* 38*      CBC w/Diff Recent Labs     01/13/21  0620 01/12/21  0540 01/11/21  0550   WBC 9.6 5.7 5.7   RBC 2.88* 3.06* 2.89*   HGB 7.5* 7.8* 7.5*   HCT 24.2* 25.9* 24.7*   * 477* 470*   GRANS 75* 64 67   LYMPH 13* 16* 16*   EOS 4 7* 7*      Coagulation No results for input(s): PTP, INR, APTT, INREXT, INREXT in the last 72 hours. Liver Enzymes No results for input(s): TP, ALB, TBIL, AP in the last 72 hours. No lab exists for component: SGOT, GPT, DBIL   ABG Lab Results   Component Value Date/Time    PHI 7.48 (H) 01/13/2021 04:39 AM    PCO2I 43.9 01/13/2021 04:39 AM    PO2I 61 (L) 01/13/2021 04:39 AM    HCO3I 32.3 (H) 01/13/2021 04:39 AM    FIO2I 28 01/13/2021 04:39 AM      Microbiology Recent Labs     01/12/21  1910 01/12/21  1800   CULT NO GROWTH AFTER 10 HOURS NO GROWTH AFTER 10 HOURS          Telemetry: [x]Sinus []A-flutter []Paced    []A-fib []Multiple PVCs                  Imaging:    CXR Results  (Last 48 hours)    None          IMPRESSION:   · Acute on Chronic Hypoxic and Hypercapnic Respiratory Failure - Requiring mechanical ventilation, Extubated on 12/17 and reintubated on 12/22, Likely aspiration event. Trach placed 1/4/21, Dr. Dina Estrada   · Wide Complex Arrhythmia (12/25) -noted on bedside cardiac monitor. Lasted roughly 5-10 mins with Spontaneous resolution.  E-lyte derangement vs Drug effect (pt was on 55mcg/hr Oliverio-Synephrine at the time) vs cardiac insult.    · Acute on Chronic Systolic Heart Failure - superimposed on severe ILD, Echo 12/10/20 EF 35-40%  · Combination septic/cardiogenic shock- currently resolved  · Aspiration PNA. Sputum Cx(12/22) (+) heavy E.Coli-ESBL. Recent Hx of E.coli ESBL.   · Acute Encephalopathy - likely toxic/metabolic vs infectious vs hepatic in nature, ammonia wnl.   · Severe pulmonary fibrosis - with extensive honeycombing and bronchiectasis as seen on CTA chest 12/16/20 - appears to be a new diagnosis but possibly UIP  · UTI (+)E.  Coli- ESBL  · Acute MI with anterolateral STEMI and Q waves - s/p ptca/stent to proximal/mid LAD using ARELY on DAPT on 12/11/20- Remains on Brilinta  · Severe dysphagia - failed MBS s/p PEG, 1/8/2021  · Critical illness myopathy/polyneuropathy  ·  Hep C: + RNA viral load 4/4/2020  · Multiple liver masses - noted on CT scan 11/25/20 - etiology unknown at this time; concerning for neoplasm  · COPD - on home O2 4L NC  · DM  · Dementia - unknown baseline, on aricept   · S/P Trach 1/4/21   RECOMMENDATIONS:   Resp:   -Titrate FiO2/ supp O2 for SpO2 >90%  -Aspiration precautions   -S/p trach 1/4/21, Dr. Urban Coad  -Halliday  - monitor oozing at site- heparin stopped   -Aggressive pulmonary hygiene   -Currently tolerating SIMV  I/D:   -Aleukocyotosis  -Per ID,s/p Merrem  for Ecoli (ESBL)   -Negative for COVID19 on 1/1/2021  - Defer ABX to primary service  - CX pending  Hem/Onc:   -Daily CBC; - recheck Hb  -Will transfuse unit of blood to maintain Hgb >7   CVS:   -Continue DAPT   -EF 35-40 % on 12/10/20, the day before stent placement  -Proximal LAD angioplasty and stenting on 12/11/2020 for 95% stenotic lesion.  -Hemodynamics stable off pressors  -Tolerating statin   -continue lasix  -BUE and BLE PVL's negative   -Heparin drip stopped  Metabolic:   -Daily BMP; monitor e-lytes; replace PRN  replace Mg  Renal:    -Trend Renal indices, Purewick   Endocrine:   -Continue lantus QHS with SSI   GI:   -SUP  -Restart Feeds via PEG 24 hours post placement   -Bowel regimen: Miralax PRN, D/C colace- monitor liquid stool output with plan to remove FMS in near future but currently still needs  -PEG tube insertion today with IR    -CT Abd from 1/3/21: gastric antrum contiguous with anterior peritoneum, enlarging hepatic masses consistent with likely malignancy or mets  Musc/Skin:   -No acute issues, wound care  Neuro:   - Seems more lethargic and not w/d to pain, will reduce sedatives  - valium to 10mg BID  - Reduce oxycodone.   -Transition to enteral meds; fiona and valium. Can consider interval decrease in sedation and analgesia as patient tolerates   PPX  -SUP ppx  -Plan to restart DVT-chemoprophylaxis later today or tomorrow- pending oozing from trach and repeat Hb    Pt is stable for LTAC transfer. The patient is: [x] acutely ill Risk of deterioration: [] moderate    [] critically ill  [] high     [x]See my orders for details    My assessment/plan was discussed with:  [x]Nursing []PT/OT    [x]Respiratory therapy []    []Family [x][facility]. [facility] Time:  The services I provided to this patient were to treat and/or prevent clinically significant deterioration that could result in the failure of one or more body systems and/or organ systems due to respiratory distress, hypoxia, cardiac dysrhythmia. Services included the following:  -reviewing nursing notes and old charts  -vital sign assessments   -direct patient care  -medication orders and management  -interpreting and reviewing diagnostic studies/labs  -re-evaluations  -documentation time        Aggregate critical care time was 35 minutes, which includes only time during which I was engaged in work directly related to the patient's care as described above. During this entire length of time I was immediately available to the patient.  The reason for providing this level of medical care for this critically ill patient was due a critical illness that impaired one or more vital organ systems such that there was a high probability of imminent or life threatening deterioration in the patients condition. This care involved high complexity decision making to assess, manipulate, and support vital system functions, to treat this degreee vital organ system failure and to prevent further life threatening deterioration of the patients condition      Complex decision making was made in the evaluation and management plans during this consultation. More than 50% of time was spent in counseling and coordination of care including review of data and discussion with other team members.     Nannette Espinoza MD  Critical Care Medicine

## 2021-01-13 NOTE — DIABETES MGMT
Glycemic Control Plan of Care    T2DM with A1c of 8.3% (12/10/2020)  12/11: Patient intub and sedated. Noted patient came from [facility] with history of dementia. Home diabetes medications: Unverified  Lantus insulin 22 units daily    Patient was admitted to [facility] from [facility] on 12/10 with report of shortness of breath and altered mental state. Noted the following assessment:  Acute MI with antolateral STEMI  Status post PTCA/stent on 12/11  Acute respiratory failure, vent support  Status post tracheostomy  Status post PEG TF placement, 01/08/2021  Acute flash pulmonary edema  Acute systolic heart failure exacerbation  Severe pulmonary fibrosis  Septic shock  Multiple liver masses  Poor prognosis    Noted per palliative care team: full code with full interventions    01/14: Noted discharge order today, transfer to [facility]    Glycemic recommendation(s): cont current orders: basal lantus insulin daily at bedtime and sliding scale lispro as ordered. Glycemic assessment:    NPO on TF Glucerna 1.5 bolus 300 ml 4 times daily (6 AM, 10 AM, 2 PM, 6 PM)  IVF order: D5 1/2 NS cont infusion 10 ml/hr    POC BG 01/13: 242, 196, 202  POC BG 01/14 at time of review: 140, 136  Lab FBG 01/14: 135        Current A1C: 8.3% (12/10/2020) which is equivalent to estimated average blood glucose of 192 mg/dL during the past 2-3 months    Current hospital diabetes medications:  Basal lantus insulin 20 units daily at bedtime  Correctional lispro insulin every 6 hours. Very resistant dose    Total daily dose insulin requirement previous day: 01/12:  Lantus: 20 units  Lispro: 12 units  TDD insulin: 32 units    Diet: NPO.  Bolus TF via PEG    Goals:  Blood glucose will be within target range of  mg/dL by 01/16/2021    Education:   ___  Refer to Diabetes Education Record   _X__  Education not indicated at this time    Gilbert Gan RN [facility]  Pager: [phone]

## 2021-01-14 VITALS
HEART RATE: 81 BPM | BODY MASS INDEX: 27.68 KG/M2 | OXYGEN SATURATION: 100 % | WEIGHT: 176.37 LBS | TEMPERATURE: 98.6 F | DIASTOLIC BLOOD PRESSURE: 71 MMHG | HEIGHT: 67 IN | RESPIRATION RATE: 18 BRPM | SYSTOLIC BLOOD PRESSURE: 133 MMHG

## 2021-01-14 LAB
ANION GAP SERPL CALC-SCNC: 6 MMOL/L (ref 3–18)
BASOPHILS # BLD: 0 K/UL (ref 0–0.1)
BASOPHILS NFR BLD: 0 % (ref 0–2)
BUN SERPL-MCNC: 15 MG/DL (ref 7–18)
BUN/CREAT SERPL: 43 (ref 12–20)
CALCIUM SERPL-MCNC: 9.2 MG/DL (ref 8.5–10.1)
CHLORIDE SERPL-SCNC: 100 MMOL/L (ref 100–111)
CO2 SERPL-SCNC: 32 MMOL/L (ref 21–32)
CREAT SERPL-MCNC: 0.35 MG/DL (ref 0.6–1.3)
DIFFERENTIAL METHOD BLD: ABNORMAL
EOSINOPHIL # BLD: 0.4 K/UL (ref 0–0.4)
EOSINOPHIL NFR BLD: 4 % (ref 0–5)
ERYTHROCYTE [DISTWIDTH] IN BLOOD BY AUTOMATED COUNT: 15.2 % (ref 11.6–14.5)
GENTAMICIN SERPL-MCNC: 1.8 UG/ML (ref 0.5–10)
GLUCOSE BLD STRIP.AUTO-MCNC: 136 MG/DL (ref 70–110)
GLUCOSE BLD STRIP.AUTO-MCNC: 140 MG/DL (ref 70–110)
GLUCOSE BLD STRIP.AUTO-MCNC: 202 MG/DL (ref 70–110)
GLUCOSE SERPL-MCNC: 135 MG/DL (ref 74–99)
HCT VFR BLD AUTO: 24.6 % (ref 35–45)
HGB BLD-MCNC: 7.6 G/DL (ref 12–16)
LYMPHOCYTES # BLD: 1.2 K/UL (ref 0.9–3.6)
LYMPHOCYTES NFR BLD: 14 % (ref 21–52)
MAGNESIUM SERPL-MCNC: 1.7 MG/DL (ref 1.6–2.6)
MCH RBC QN AUTO: 26 PG (ref 24–34)
MCHC RBC AUTO-ENTMCNC: 30.9 G/DL (ref 31–37)
MCV RBC AUTO: 84.2 FL (ref 74–97)
MONOCYTES # BLD: 0.7 K/UL (ref 0.05–1.2)
MONOCYTES NFR BLD: 9 % (ref 3–10)
NEUTS SEG # BLD: 6.1 K/UL (ref 1.8–8)
NEUTS SEG NFR BLD: 73 % (ref 40–73)
PHOSPHATE SERPL-MCNC: 4 MG/DL (ref 2.5–4.9)
PLATELET # BLD AUTO: 483 K/UL (ref 135–420)
PMV BLD AUTO: 8.9 FL (ref 9.2–11.8)
POTASSIUM SERPL-SCNC: 3.8 MMOL/L (ref 3.5–5.5)
RBC # BLD AUTO: 2.92 M/UL (ref 4.2–5.3)
SODIUM SERPL-SCNC: 138 MMOL/L (ref 136–145)
WBC # BLD AUTO: 8.4 K/UL (ref 4.6–13.2)

## 2021-01-14 PROCEDURE — 74011250637 HC RX REV CODE- 250/637: Performed by: INTERNAL MEDICINE

## 2021-01-14 PROCEDURE — 99239 HOSP IP/OBS DSCHRG MGMT >30: CPT | Performed by: INTERNAL MEDICINE

## 2021-01-14 PROCEDURE — 99232 SBSQ HOSP IP/OBS MODERATE 35: CPT | Performed by: INTERNAL MEDICINE

## 2021-01-14 PROCEDURE — 99291 CRITICAL CARE FIRST HOUR: CPT | Performed by: INTERNAL MEDICINE

## 2021-01-14 PROCEDURE — 80170 ASSAY OF GENTAMICIN: CPT

## 2021-01-14 PROCEDURE — 74011250637 HC RX REV CODE- 250/637: Performed by: NURSE PRACTITIONER

## 2021-01-14 PROCEDURE — 84100 ASSAY OF PHOSPHORUS: CPT

## 2021-01-14 PROCEDURE — 82962 GLUCOSE BLOOD TEST: CPT

## 2021-01-14 PROCEDURE — 94762 N-INVAS EAR/PLS OXIMTRY CONT: CPT

## 2021-01-14 PROCEDURE — 74011636637 HC RX REV CODE- 636/637: Performed by: INTERNAL MEDICINE

## 2021-01-14 PROCEDURE — 74011250637 HC RX REV CODE- 250/637: Performed by: RADIOLOGY

## 2021-01-14 PROCEDURE — 74011250636 HC RX REV CODE- 250/636: Performed by: INTERNAL MEDICINE

## 2021-01-14 PROCEDURE — 2709999900 HC NON-CHARGEABLE SUPPLY

## 2021-01-14 PROCEDURE — 94003 VENT MGMT INPAT SUBQ DAY: CPT

## 2021-01-14 PROCEDURE — 83735 ASSAY OF MAGNESIUM: CPT

## 2021-01-14 PROCEDURE — 74011000250 HC RX REV CODE- 250: Performed by: PHYSICIAN ASSISTANT

## 2021-01-14 PROCEDURE — 85025 COMPLETE CBC W/AUTO DIFF WBC: CPT

## 2021-01-14 PROCEDURE — 80048 BASIC METABOLIC PNL TOTAL CA: CPT

## 2021-01-14 RX ORDER — ACETAMINOPHEN 325 MG/1
650 TABLET ORAL
Qty: 30 TAB | Refills: 0 | Status: SHIPPED
Start: 2021-01-14

## 2021-01-14 RX ORDER — OXYCODONE HCL 5 MG/5 ML
5 SOLUTION, ORAL ORAL EVERY 8 HOURS
Qty: 1 BOTTLE | Refills: 0 | Status: SHIPPED | OUTPATIENT
Start: 2021-01-14 | End: 2021-01-17

## 2021-01-14 RX ORDER — CLOPIDOGREL BISULFATE 75 MG/1
75 TABLET ORAL DAILY
Qty: 30 TAB | Refills: 0 | Status: SHIPPED
Start: 2021-01-14

## 2021-01-14 RX ORDER — CARVEDILOL 6.25 MG/1
6.25 TABLET ORAL EVERY 12 HOURS
Qty: 60 TAB | Refills: 0 | Status: SHIPPED
Start: 2021-01-14

## 2021-01-14 RX ORDER — DIAZEPAM 5 MG/1
5 TABLET ORAL 2 TIMES DAILY
Qty: 20 TAB | Refills: 0 | Status: SHIPPED | OUTPATIENT
Start: 2021-01-14

## 2021-01-14 RX ORDER — CHLORHEXIDINE GLUCONATE 1.2 MG/ML
10 RINSE ORAL EVERY 12 HOURS
Qty: 280 ML | Refills: 0 | Status: SHIPPED
Start: 2021-01-14 | End: 2021-01-28

## 2021-01-14 RX ORDER — LANOLIN ALCOHOL/MO/W.PET/CERES
400 CREAM (GRAM) TOPICAL 2 TIMES DAILY
Qty: 60 TAB | Refills: 1 | Status: SHIPPED
Start: 2021-01-14

## 2021-01-14 RX ORDER — GENTAMICIN SULFATE 100 MG/100ML
100 INJECTION, SOLUTION INTRAVENOUS EVERY 12 HOURS
Status: DISCONTINUED | OUTPATIENT
Start: 2021-01-14 | End: 2021-01-14 | Stop reason: HOSPADM

## 2021-01-14 RX ORDER — POTASSIUM CHLORIDE 14.9 MG/ML
10 INJECTION INTRAVENOUS
Status: COMPLETED | OUTPATIENT
Start: 2021-01-14 | End: 2021-01-14

## 2021-01-14 RX ORDER — LISINOPRIL 2.5 MG/1
2.5 TABLET ORAL DAILY
Qty: 30 TAB | Refills: 0 | Status: SHIPPED
Start: 2021-01-14

## 2021-01-14 RX ORDER — ATORVASTATIN CALCIUM 40 MG/1
40 TABLET, FILM COATED ORAL
Qty: 30 TAB | Refills: 0 | Status: SHIPPED
Start: 2021-01-14

## 2021-01-14 RX ORDER — IPRATROPIUM BROMIDE AND ALBUTEROL SULFATE 2.5; .5 MG/3ML; MG/3ML
3 SOLUTION RESPIRATORY (INHALATION)
Qty: 30 NEBULE | Refills: 0 | Status: SHIPPED
Start: 2021-01-14

## 2021-01-14 RX ORDER — MAGNESIUM SULFATE 1 G/100ML
1 INJECTION INTRAVENOUS ONCE
Status: COMPLETED | OUTPATIENT
Start: 2021-01-14 | End: 2021-01-14

## 2021-01-14 RX ORDER — FUROSEMIDE 20 MG/1
20 TABLET ORAL DAILY
Qty: 15 TAB | Refills: 0 | Status: SHIPPED
Start: 2021-01-14

## 2021-01-14 RX ADMIN — DIAZEPAM 5 MG: 5 TABLET ORAL at 09:00

## 2021-01-14 RX ADMIN — NYSTATIN 500000 UNITS: 100000 SUSPENSION ORAL at 10:20

## 2021-01-14 RX ADMIN — GENTAMICIN SULFATE 100 MG: 100 INJECTION, SOLUTION INTRAVENOUS at 12:00

## 2021-01-14 RX ADMIN — CHLORHEXIDINE GLUCONATE 0.12% ORAL RINSE 10 ML: 1.2 LIQUID ORAL at 10:16

## 2021-01-14 RX ADMIN — INSULIN LISPRO 6 UNITS: 100 INJECTION, SOLUTION INTRAVENOUS; SUBCUTANEOUS at 13:25

## 2021-01-14 RX ADMIN — Medication 30 ML: at 10:20

## 2021-01-14 RX ADMIN — FUROSEMIDE 20 MG: 20 TABLET ORAL at 10:17

## 2021-01-14 RX ADMIN — MAGNESIUM SULFATE 1 G: 1 INJECTION INTRAVENOUS at 10:16

## 2021-01-14 RX ADMIN — CLOPIDOGREL BISULFATE 75 MG: 75 TABLET ORAL at 10:17

## 2021-01-14 RX ADMIN — CARVEDILOL 6.25 MG: 6.25 TABLET, FILM COATED ORAL at 10:20

## 2021-01-14 RX ADMIN — OXYCODONE HYDROCHLORIDE 5 MG: 5 SOLUTION ORAL at 05:32

## 2021-01-14 RX ADMIN — SODIUM CHLORIDE 10 ML: 9 INJECTION, SOLUTION INTRAMUSCULAR; INTRAVENOUS; SUBCUTANEOUS at 05:33

## 2021-01-14 RX ADMIN — POTASSIUM CHLORIDE 10 MEQ: 14.9 INJECTION, SOLUTION INTRAVENOUS at 10:18

## 2021-01-14 RX ADMIN — POTASSIUM CHLORIDE 10 MEQ: 14.9 INJECTION, SOLUTION INTRAVENOUS at 09:00

## 2021-01-14 RX ADMIN — ASPIRIN 81 MG: 81 TABLET, CHEWABLE ORAL at 10:16

## 2021-01-14 RX ADMIN — LISINOPRIL 2.5 MG: 2.5 TABLET ORAL at 10:16

## 2021-01-14 RX ADMIN — NYSTATIN 500000 UNITS: 100000 SUSPENSION ORAL at 13:26

## 2021-01-14 RX ADMIN — LANSOPRAZOLE 30 MG: KIT at 10:17

## 2021-01-14 NOTE — PROGRESS NOTES
[facility]  ICU Progress Note      Name: Aurelia Mark   : 1955   MRN: [id number]   Date: 2021 9:14 AM     [x]I have reviewed the flowsheet and previous day’s notes.  Events overnight reviewed and discussed with nursing staff. Vital signs and records reviewed.    Interval HPI    Patient is a 65 y.o. female with a past medical history of COPD, CHF, HTN, DM, dementia, admitted for [facility] with acute on chronic systolic heart failure, acute MI with anterolateral STEMI and Q waves s/p ptca/stent to proximal/mid LAD on 20. Pt was intubated and extubated on 20, transferred to . On 20 patient was intubated and transferred to ICU for respiratory failure and cardiogenic +/- septic shock. Respiratory cultures on  with ESBL on Merrem. Dr. Pino and IR have been consulted for trach/PEG. She is S/p trach  and s/p PEG .     Subjective 21  LOS: 35    Overnight events: No fevers, remains stable.   Mentation/Activity: opens eyes spontaneously, minimal response to pain.   Respiratory/ Secretions: thick secretions per RT  Hemodynamics: H/H stable  Need for procedures:No    - remains more on the sedate side- decreasing valium dosing    - TF: Glucerna- change from TID to Q 8 bolus feeds- tolerating thus far              ROS:Review of systems not obtained due to patient factors.    Vital Signs:    Visit Vitals  /71   Pulse 81   Temp 98.6 °F (37 °C)   Resp 18   Ht 5' 7\" (1.702 m)   Wt 80 kg (176 lb 5.9 oz)   SpO2 100%   Breastfeeding No   BMI 27.62 kg/m²       O2 Device: Ventilator, Tracheostomy   O2 Flow Rate (L/min): 40 l/min   Temp (24hrs), Av.7 °F (37.1 °C), Min:98.1 °F (36.7 °C), Max:99 °F (37.2 °C)       Intake/Output:   Last shift:       07 -  1900  In: -   Out: 325 [Urine:100; Drains:75]  Last 3 shifts: 1901 -  0700  In: 1160 [I.V.:240]  Out: 500 [Urine:500]    Intake/Output Summary (Last 24 hours) at 2021 1446  Last data  filed at 1/14/2021 1300  Gross per 24 hour   Intake 500 ml   Output 675 ml   Net -175 ml       Ventilator Settings:  Mode Rate Tidal Volume Pressure FiO2 PEEP   VC+, SIMV, Pressure support   500 ml  8 cm H2O 28 % 5 cm H20     Peak airway pressure: 21 cm H2O    Minute ventilation: 6.4 l/min      ARDS network Guidelines:   Lung protective strategy and Plateau  Pressure goal < 30 cm H2O goals  Oxygenation Goals PaO2 55-80 mm Hg or SaO2 88-95%  PH goal 7.30-7.45    VAP bundle:  Reviewed. [place] tube to suction at 20-30 cm Hg. Maintain Giovanna tube with 5-10ml air every 4 hours  Routine oral care every 4 hours  Elevation of head > 45 degree    Physical Exam:               General:sedated; NAD, acyanotic - remains on vent  HEENT:  Anicteric sclerae; pink palpebral conjunctivae; mucosa moist, Trach in situ (CDI)- mild pink tinge blood around dressing, Poor dentition- especially bottom teeth- loose with potential to fall out  Resp: (+) coarse rhonchi b/l. Symmetrical chest expansion; good airway entry;   CV:  S1, S2 present; regular rate and rhythm   GI:  Abdomen soft, non-tender; (+) active bowel sounds. PEG in SITU- dressing clean- no blood oozing  Extremities:  +2 pulses on all extremities  Skin:  Warm; no rashes/ lesions noted, normal turgor/cap refill. Neurologic:   Opening eyes spontaneously, No command following, Not really responding to pain.    Wil Livingston, PICC RUE, PEG, FMS      DATA:     Current Facility-Administered Medications   Medication Dose Route Frequency    gentamicin in saline (iso-osm) (GARAMYCIN) 100 mg/100 mL IVPB premix 100 mg  100 mg IntraVENous Q12H    [Held by provider] oxyCODONE (ROXICODONE) 5 mg/5 mL oral solution 5 mg  5 mg Oral Q8H    [Held by provider] diazePAM (VALIUM) tablet 5 mg  5 mg Oral BID    lisinopriL (PRINIVIL, ZESTRIL) tablet 2.5 mg  2.5 mg Oral DAILY    naloxegoL (MOVANTIK) tablet 25 mg  25 mg Oral DAILY PRN    [Held by provider] potassium bicarb-citric acid (EFFER-K) tablet 40 mEq  40 mEq Oral DAILY    nystatin (MYCOSTATIN) 100,000 unit/mL oral suspension 500,000 Units  500,000 Units Oral QID    carvediloL (COREG) tablet 6.25 mg  6.25 mg Oral Q12H    aspirin chewable tablet 81 mg  81 mg Oral DAILY    clopidogreL (PLAVIX) tablet 75 mg  75 mg Oral DAILY    dextrose 5 % - 0.45% NaCl infusion  10 mL/hr IntraVENous CONTINUOUS    insulin glargine (LANTUS) injection 20 Units  20 Units SubCUTAneous QHS    [Held by provider] docusate (COLACE) 50 mg/5 mL oral liquid 100 mg  100 mg Oral DAILY    furosemide (LASIX) tablet 20 mg  20 mg Oral DAILY    lansoprazole (PREVACID) 3 mg/mL oral suspension 30 mg  30 mg Per G Tube ACB    polyethylene glycol (MIRALAX) packet 17 g  17 g Per NG tube PRN    chlorhexidine (PERIDEX) 0.12 % mouthwash 10 mL  10 mL Oral Q12H    ELECTROLYTE REPLACEMENT PROTOCOL - Potassium Standard Dosing   1 Each Other PRN    ELECTROLYTE REPLACEMENT PROTOCOL - Magnesium   1 Each Other PRN    ELECTROLYTE REPLACEMENT PROTOCOL - Phosphorus  Standard Dosing  1 Each Other PRN    ELECTROLYTE REPLACEMENT PROTOCOL - Calcium   1 Each Other PRN    [Held by provider] spironolactone (ALDACTONE) tablet 25 mg  25 mg Oral DAILY    multivit-folic acid-herbal 818 (WELLESSE PLUS) oral liquid 30 mL  30 mL Per NG tube DAILY    insulin lispro (HUMALOG) injection   SubCUTAneous Q6H    albuterol-ipratropium (DUO-NEB) 2.5 MG-0.5 MG/3 ML  3 mL Nebulization Q4H PRN    sodium chloride (NS) flush 5-40 mL  5-40 mL IntraVENous Q8H    sodium chloride (NS) flush 5-40 mL  5-40 mL IntraVENous PRN    acetaminophen (TYLENOL) tablet 650 mg  650 mg Oral Q6H PRN    Or    acetaminophen (TYLENOL) suppository 650 mg  650 mg Rectal Q6H PRN    promethazine (PHENERGAN) tablet 12.5 mg  12.5 mg Oral Q6H PRN    Or    ondansetron (ZOFRAN) injection 4 mg  4 mg IntraVENous Q6H PRN    glucose chewable tablet 16 g  4 Tab Oral PRN    glucagon (GLUCAGEN) injection 1 mg  1 mg IntraMUSCular PRN  dextrose (D50W) injection syrg 12.5-25 g  25-50 mL IntraVENous PRN    atorvastatin (LIPITOR) tablet 40 mg  40 mg Oral QHS     Current Outpatient Medications   Medication Sig    potassium bicarb-citric acid (EFFER-K) 20 mEq tablet Take 1 Tab by mouth daily. Indications: low amount of potassium in the blood, prevention of low potassium in the blood, give via G-tube    oxyCODONE (ROXICODONE) 5 mg/5 mL solution Take 5 mL by mouth every eight (8) hours for 3 days. Max Daily Amount: 15 mg.  clopidogreL (PLAVIX) 75 mg tab Take 1 Tab by mouth daily.  chlorhexidine (PERIDEX) 0.12 % solution Take 10 mL by mouth every twelve (12) hours for 14 days.  carvediloL (COREG) 6.25 mg tablet Take 1 Tab by mouth every twelve (12) hours.  atorvastatin (LIPITOR) 40 mg tablet Take 1 Tab by mouth nightly.  acetaminophen (TYLENOL) 325 mg tablet Take 2 Tabs by mouth every six (6) hours as needed for Pain or Fever.  albuterol-ipratropium (DUO-NEB) 2.5 mg-0.5 mg/3 ml nebu 3 mL by Nebulization route every four (4) hours as needed for Wheezing or Shortness of Breath.  diazePAM (VALIUM) 5 mg tablet Take 1 Tab by mouth two (2) times a day. Max Daily Amount: 10 mg.    lansoprazole (PREVACID) 3 mg/mL oral suspension 10 mL by Per G Tube route Daily (before breakfast).  lisinopriL (PRINIVIL, ZESTRIL) 2.5 mg tablet Take 1 Tab by mouth daily.  furosemide (LASIX) 20 mg tablet Take 1 Tab by mouth daily.  multivit-folic acid-herbal 312 (WELLESSE PLUS) 400 mcg-200 mg/30 mL liqd oral liquid 30 mL by Per G Tube route daily.  cyanocobalamin (Vitamin B-12) 1,500 mcg TbER 1 Tab by Per G Tube route daily.  magnesium oxide (MAG-OX) 400 mg tablet 1 Tab by Per G Tube route two (2) times a day.  insulin glargine (LANTUS) 100 unit/mL injection lantus 22 units subq daily    aspirin (ASPIRIN) 325 mg tablet Take 325 mg by mouth daily.            Labs: Results:       Chemistry Recent Labs     01/14/21  0315 01/13/21  1832 01/12/21  0540   * 122* 153*    136 135*   K 3.8 4.4 4.1    99* 99*   CO2 32 30 31   BUN 15 15 14   CREA 0.35* 0.34* 0.41*   CA 9.2 8.8 9.4   AGAP 6 7 5   BUCR 43* 44* 34*      CBC w/Diff Recent Labs     01/14/21  0315 01/13/21  0620 01/12/21  0540   WBC 8.4 9.6 5.7   RBC 2.92* 2.88* 3.06*   HGB 7.6* 7.5* 7.8*   HCT 24.6* 24.2* 25.9*   * 507* 477*   GRANS 73 75* 64   LYMPH 14* 13* 16*   EOS 4 4 7*      Coagulation No results for input(s): PTP, INR, APTT, INREXT, INREXT in the last 72 hours. Liver Enzymes No results for input(s): TP, ALB, TBIL, AP in the last 72 hours. No lab exists for component: SGOT, GPT, DBIL   ABG No results found for: PH, PHI, PCO2, PCO2I, PO2, PO2I, HCO3, HCO3I, FIO2, FIO2I   Microbiology Recent Labs     01/12/21  1910 01/12/21  1800   CULT NO GROWTH 2 DAYS NO GROWTH 2 DAYS          Telemetry: [x]Sinus []A-flutter []Paced    []A-fib []Multiple PVCs                  Imaging:    CXR Results  (Last 48 hours)               01/12/21 1756  XR CHEST PORT Final result    Impression:  IMPRESSION:   1. Right-sided PICC line extending into the right IJ with the tip above the   field-of-view. Recommend repositioning. 2.  Progressive opacities in the lung bases concerning for infiltrate. Follow-up   to resolution is recommended. Findings discussed with the patient's nurse. Narrative:  EXAM: Chest X-Ray       INDICATION:  new fevers. TECHNIQUE: AP view of the chest       COMPARISON: 1/10/2021, 1/4/2021, 1/3/2021 and 1/2/2021       FINDINGS:    Tubes and Lines: The right-sided PICC line is again noted with the tip extending   towards the head. Recommend repositioning. Tracheostomy is unchanged. Pleura: No pneumothorax appreciated. No effusions appreciated. Lungs: There is loss of the left hemidiaphragm with opacities in the left lower   lung field which are progressed since prior imaging. Subtle opacity in the right   lung base. Cardiac/Mediastinum/Aorta: It is unremarkable       Pulmonary Vascularity: The pulmonary vasculature is unremarkable. Chest Wall: No acute finding       Osseous Structures: Unremarkable       Upper Abdomen: No acute findings appreciated. IMPRESSION:   · Acute on Chronic Hypoxic and Hypercapnic Respiratory Failure - Requiring mechanical ventilation, Extubated on 12/17 and reintubated on 12/22, Likely aspiration event. Trach placed 1/4/21, Dr. Deysi Montenegro   · Wide Complex Arrhythmia (12/25) -noted on bedside cardiac monitor. Lasted roughly 5-10 mins with Spontaneous resolution. E-lyte derangement vs Drug effect (pt was on 55mcg/hr Oliverio-Synephrine at the time) vs cardiac insult.    · Acute on Chronic Systolic Heart Failure - superimposed on severe ILD, Echo 12/10/20 EF 35-40%  · Combination septic/cardiogenic shock- currently resolved  · Aspiration PNA. Sputum Cx(12/22) (+) heavy E.Coli-ESBL. Recent Hx of E.coli ESBL.   · Acute Encephalopathy - likely toxic/metabolic vs infectious vs hepatic in nature, ammonia wnl.   · Severe pulmonary fibrosis - with extensive honeycombing and bronchiectasis as seen on CTA chest 12/16/20 - appears to be a new diagnosis but possibly UIP  · UTI (+)E.  Coli- ESBL  · Acute MI with anterolateral STEMI and Q waves - s/p ptca/stent to proximal/mid LAD using ARELY on DAPT on 12/11/20- Remains on Brilinta  · Severe dysphagia - failed MBS s/p PEG, 1/8/2021  · Critical illness myopathy/polyneuropathy  ·  Hep C: + RNA viral load 4/4/2020  · Multiple liver masses - noted on CT scan 11/25/20 - etiology unknown at this time; concerning for neoplasm  · COPD - on home O2 4L NC  · DM  · Dementia - unknown baseline, on aricept   · S/P Trach 1/4/21   RECOMMENDATIONS:   Resp:   -Titrate FiO2/ supp O2 for SpO2 >90%  -Aspiration precautions   -S/p trach 1/4/21, Dr. Mirlande Donovan - monitor oozing at site- heparin stopped   -Aggressive pulmonary hygiene   -Currently tolerating SIMV  I/D: -Aleukocyotosis  -Per ID,s/p Merrem  for Ecoli (ESBL)   -Negative for COVID19 on 1/1/2021  - Defer ABX to primary service  - CX negative  Hem/Onc:   -Daily CBC; - recheck Hb  -Will transfuse unit of blood to maintain Hgb >7   CVS:   -Continue DAPT   -EF 35-40 % on 12/10/20, the day before stent placement  -Proximal LAD angioplasty and stenting on 12/11/2020 for 95% stenotic lesion.  -Hemodynamics stable off pressors  -Tolerating statin   -continue lasix  -BUE and BLE PVL's negative   -Heparin drip stopped  Metabolic:   -Daily BMP; monitor e-lytes; replace PRN  replace Mg  Renal:    -Trend Renal indices, Purewick   Endocrine:   -Continue lantus QHS with SSI   GI:   -SUP  -Restart Feeds via PEG 24 hours post placement   -Bowel regimen: Miralax PRN, D/C colace- monitor liquid stool output with plan to remove FMS in near future but currently still needs  -PEG tube insertion today with IR    -CT Abd from 1/3/21: gastric antrum contiguous with anterior peritoneum, enlarging hepatic masses consistent with likely malignancy or mets  Musc/Skin:   -No acute issues, wound care  Neuro:   - D/C sedatives. -Transition to enteral meds; fiona and valium. Can consider interval decrease in sedation and analgesia as patient tolerates   PPX  -SUP ppx  -Plan to restart DVT-chemoprophylaxis later today or tomorrow- pending oozing from trach and repeat Hb    Pt is stable for LTAC transfer. The patient is: [x] acutely ill Risk of deterioration: [] moderate    [] critically ill  [] high     [x]See my orders for details    My assessment/plan was discussed with:  [x]Nursing []PT/OT    [x]Respiratory therapy []    []Family [x][facility]. [facility] Time:  The services I provided to this patient were to treat and/or prevent clinically significant deterioration that could result in the failure of one or more body systems and/or organ systems due to respiratory distress, hypoxia, cardiac dysrhythmia.      Services included the following:  -reviewing nursing notes and old charts  -vital sign assessments   -direct patient care  -medication orders and management  -interpreting and reviewing diagnostic studies/labs  -re-evaluations  -documentation time        Aggregate critical care time was 35 minutes, which includes only time during which I was engaged in work directly related to the patient's care as described above. During this entire length of time I was immediately available to the patient. The reason for providing this level of medical care for this critically ill patient was due a critical illness that impaired one or more vital organ systems such that there was a high probability of imminent or life threatening deterioration in the patients condition. This care involved high complexity decision making to assess, manipulate, and support vital system functions, to treat this degreee vital organ system failure and to prevent further life threatening deterioration of the patients condition      Complex decision making was made in the evaluation and management plans during this consultation. More than 50% of time was spent in counseling and coordination of care including review of data and discussion with other team members.     Ángela Amezcua MD  Critical Care Medicine

## 2021-01-14 NOTE — PROGRESS NOTES
[facility] Progress Note       CARDIOLOGY PROGRESS NOTE  RECS:    1. Acute respiratory failure- requiring mechanical ventilation- extubated  12/17/20. re intubated 12/22/20. S/p tracheostomy 1/4/20 Continue supportive care  2. Sepsis- Sputum Cx(12/22) (+) heavy E.Coli-ESBL. on antibiotics. ID following   3. Wide complex Arrhythmia on 12/25/20- No recurrence will continue to monitor. Monitor electrolytes    4. Hypertension - low intermittent BP Carvedilol 6.25 mg bid. 5. Subacute MI- 12/10/20 -s/p [facility] S/p ptca/stent to proximal/ mid LAD using ARELY.  Moderate to severe LV dysfunction.  ekg no new ischemic changes suggestive of reinfarction or stent thrombosis. S/p PEG tube. Continue aspirin and Clopidogrel. 6. Anemia- H&H Stable.  S/p transfusion. 7. Acute Systolic Congestive heart Failure-recent echo with EF% 35%-40. Compensated at present. Lasix apo daily.  Tolerated low-dose lisinopril which should be continued. 8. COPD,   9. Dementia - failed swallow eval. S/p Gastrostomy tube placement  1/8/21  by IR.  10. Hypomagnesemia -resolved. Stable hemodynamics- post trach and PEG tube. Continue DAPT/ coreg/ lisinopril and statin. Prognosis poor       Cardiac cath 12/11/20  Patient presented to [address] [place] [address] with late presentation anterior wall MI.  EF 35-40%. Patient was initially managed medically-- however developed acute pulmonary edema requiring intubation. Also troponin level increasing consistent with ongoing ischemia. S/p ptca/stent to proximal/ mid LAD using ARELY. LVEDP 8 mmHg. Normal PASP pressure with normal PCWP. Moderate to severe LV dysfunction.     Echo 12/10/20  · LV: Estimated LVEF is 35 - 40%. Visually measured ejection fraction. Normal cavity size and wall thickness. Moderately and segmentally reduced systolic function. Inconclusive left ventricular diastolic function. AO: Mild aortic root dilatation; diameter is 3.8 cm.     ASSESSMENT:  Hospital Problems Date Reviewed: 2/7/2018          Codes Class Noted POA    Goals of care, counseling/discussion ICD-10-CM: Z71.89  ICD-9-CM: V65.49  Unknown Unknown        Dementia without behavioral disturbance (HCC) ICD-10-CM: F03.90  ICD-9-CM: 294.20  Unknown Unknown        Pulmonary fibrosis ([place] [address].) ICD-10-CM: J84.10  ICD-9-CM: 372  Unknown Unknown        Severe protein-calorie malnutrition ([facility] ICD-10-CM: E43  ICD-9-CM: 482  12/16/2020 Clinically Undetermined        Acute on chronic systolic congestive heart failure ([facility] ICD-10-CM: I50.23  ICD-9-CM: 428.23, 428.0  12/15/2020 Unknown        * (Principal) STEMI (ST elevation myocardial infarction) ([place] [address].) ICD-10-CM: I21.3  ICD-9-CM: 410.90  12/10/2020 Unknown                SUBJECTIVE:  Non responsive s/p trach on ventilatory support     OBJECTIVE:    VS:   Visit Vitals  /74   Pulse 81   Temp 98.1 °F (36.7 °C)   Resp 17   Ht 5' 7\" (1.702 m)   Wt 80 kg (176 lb 5.9 oz)   SpO2 98%   Breastfeeding No   BMI 27.62 kg/m²         Intake/Output Summary (Last 24 hours) at 1/14/2021 1038  Last data filed at 1/14/2021 0500  Gross per 24 hour   Intake 600 ml   Output 450 ml   Net 150 ml     TELE: normal sinus rhythm    General: on trach collar/ventilator  HENT: Normocephalic, atraumatic.   Neck :  Supple  Cardiac:  Normal S1/S2  Chest/Lungs:b/l good air entry with mild coarse rales   Abdomen: Soft  Extremities:  No edema      Labs: Results:       Chemistry Recent Labs     01/14/21  0315 01/13/21  0620 01/12/21  0540   * 122* 153*    136 135*   K 3.8 4.4 4.1    99* 99*   CO2 32 30 31   BUN 15 15 14   CREA 0.35* 0.34* 0.41*   CA 9.2 8.8 9.4   MG 1.7 1.9 2.1   PHOS 4.0 5.1* 5.3*   AGAP 6 7 5   BUCR 43* 44* 34*      CBC w/Diff Recent Labs     01/14/21  0315 01/13/21  0620 01/12/21  0540   WBC 8.4 9.6 5.7   RBC 2.92* 2.88* 3.06*   HGB 7.6* 7.5* 7.8*   HCT 24.6* 24.2* 25.9*   * 507* 477*   GRANS 73 75* 64   LYMPH 14* 13* 16*   EOS 4 4 7*      Cardiac Enzymes No results for input(s): CPK, CKND1, NATHANIEL in the last 72 hours. No lab exists for component: CKRMB, TROIP   Coagulation No results for input(s): PTP, INR, APTT, INREXT, INREXT in the last 72 hours. Lipid Panel No results found for: CHOL, CHOLPOCT, CHOLX, CHLST, CHOLV, 655578, HDL, HDLP, LDL, LDLC, DLDLP, 014148, VLDLC, VLDL, TGLX, TRIGL, TRIGP, TGLPOCT, CHHD, CHHDX   BNP No results for input(s): BNPP in the last 72 hours. Liver Enzymes No results for input(s): TP, ALB, TBIL, AP in the last 72 hours. No lab exists for component: SGOT, GPT, DBIL   Digoxin    Thyroid Studies Lab Results   Component Value Date/Time    TSH 2.92 12/10/2020 11:07 AM              NABILA Woodard supervised    I have independently evaluated and examined the patient. All relevant labs and testing data's are reviewed. Care plan discussed and updated after review.     Trixie Campbell MD

## 2021-01-14 NOTE — PROGRESS NOTES
CM left a message with Anne Moreno from [facility] asking if pt is able to be admitted today. Awaiting return call.      Dina Ramos RN BSN  Care Manager  [phone]

## 2021-01-14 NOTE — PROGRESS NOTES
Transition of Care Plan to SNF/Rehab    SNF/Rehab Transition:  Patient has been accepted to [facility] and meets criteria for admission. Patient will be transported by [facility] and expected to leave at 1pm. Medicaid confirmation # [id number]    Communication to Patient/Family:  Met with patient and called pt's sister Radha Campos (identified care giver) and they are agreeable to the transition plan. Communication to SNF/Rehab:  Bedside RN, [facility], has been notified of the transition plan to the facility and to call report (phone number [phone] extension [phone]). MIGUEL ANGEL provided Tanesha Jesus at [facility] the number for Respiratory therapist here at [facility] to get RT report. Discharge information has been updated on the AVS. And communicated to facility via [facility], or CC link. SNF/Rehab Transition:    PCP/Specialist: [address] Please include all hard scripts for controlled substances, med rec and dc summary, and AVS in packet.      Reviewed and confirmed with facility representative, Tanesha Jesus at [facility] they can manage the patient care needs for the following:     Luz Smiley with (X) only those applicable:    Medication:  [x]  Medications will be available at the facility  []  IV Antibiotics   [x]  Controlled Substance - hard copy to be sent with patient   []  Weekly Labs    Documents:  [x] Hard RX  Number sent -2 (roxicodone and vallium)  [] MAR  [] Kardex  [x] AVS  [] Wound care note  [x]Transfer Summary/Discharge Summary    Equipment:  []  CPAP/BiPAP  []  Wound Vacuum  []  Pro or Urinary Device  []  PICC/Central Line  []  Nebulizer  [x]  Ventilator    Treatment:  [x]Isolation (for MRSA, VRE, etc.)- ESBL sputum  []Surgical Drain Management  [x]Tracheostomy Care  []Dressing Changes  []Dialysis with transportation and chair time  Center Mode of tranpsort   [x]PEG Care  [x]Oxygen  []Daily Weights for Heart Failure    Dietary:  []Any diet limitations  [x]Tube Feedings   []Total Parenteral Management (TPN) Eligible for Medicaid Long Term Services and Supports  Yes:  [] Eligible for medical assistance or will become eligible within 180 days and LTSS completed. [] Provider/Patient and/or support system has requested screening. [x] LTSS copy provided to patient or responsible party, uploaded in chart and hard copy placed in transport packet. [] LTSS unavailable at discharge will send once processed to SNF provider.  [] LTSS  unavailable at discharged mailed to patient  [] LTSS performed by outside agency  on  with tracking number   No:   [] Private pay and is not financially eligible for Medicaid within the next 180 days. [] Reside out-of-state. [] A residents of a state owned/operated facility that is licensed  by [address] [facility]  [] Enrollment in [facility] hospice services  [] [address] [place] [address] [facility]  [] Patient /Family declines to have screening completed or provide financial information for screening          Financial Resources:  Medicaid    [] Initiated and application pending   [x] Full coverage      Advanced Care Plan:  []Surrogate Decision Maker of Care  []POA  [x]Communicated Code Status/ sent FULL CODE     Other  Important Message from Medicare\" reviewed and explained to the sister Elvira. Original signed document placed in patient's chart.        Sebastian Leigh RN BSN  Care Manager  [phone]

## 2021-01-14 NOTE — PROGRESS NOTES
12:11  Carlyn ORELLANA from [address] [place] [address] facility ([phone])  Given report including meds, trache size, PEG and tube feeds. FMS. Contact precautions for ESBL sputum, urine. Current labs, hospital course, current vital signs. All questions answered. 13:55  Transport here to transfer pt to [facility], [facility].. Report given to transport team. 
14:10  Carlyn ORELLANA from [facility]. notified of pt enroute. 14:15  Left for [address] [place] [address].

## 2021-01-14 NOTE — PROGRESS NOTES
12:11  Carlyn RN from [facility] facility ([phone])  Given report including meds, trache size, PEG and tube feeds. FMS. Contact precautions for ESBL sputum, urine. Current labs, hospital course, current vital signs. All questions answered. 13:55  Transport here to transfer pt to [facility], [facility].. Report given to transport team. 
14:10  Carlyn ORELLANA from [facility]. notified of pt enroute. 14:15  Pt. Off unit

## 2021-01-14 NOTE — PROGRESS NOTES
Copious, yellow, thick secretions. 01/14/21 0601   Patient Observations   Pulse (Heart Rate) 77   Resp Rate 23   O2 Sat (%) 99 %   ETCO2 (mmHg) 24 mmHg   Airway - Tracheostomy Tube 01/04/21 Portex   Placement Date: 01/04/21   Inserted By: DR Nori Obregon  Placement Verified: Auscultation  Airway Types: Tracheostomy  Trach Tube Type: Portex  Cuff Type: Air  Airway Tube Size: 8 mm   Cuff Pressure   (mlt)   Site Assessment Clean; Intact  (pt drools)   Trach Dressing Changed Yes   Trach Cleaned With Normal saline   Trach Tie Changed No   Trach Cannula Changed No   Inner Cannula #6 Shiley   Spare Trach at Bedside Yes   Spare Trach Tube One Size Smaller at Bedside Yes   Ambu Bag With Mask at Bedside Yes   Respiratory   Respiratory (WDL) X   Respiratory Pattern Regular   Chest/Tracheal Assessment Chest expansion, symmetrical   Breath Sounds Bilateral Clear   Airway Clearance   Suction ET Tube;Oral   Suction Device Inline suction catheter;[employer]   Suction Catheter Size 14 Fr   Sputum Method Obtained Endotracheal   Sputum Amount Copious   Sputum Color/Odor Yellow   Sputum Consistency Thick   Vent Settings   FIO2 (%) 28 %   SpO2/FIO2 Ratio 353.57   SIMV Rate Set 10   Back-Up Rate 16   Vt Set (ml) 500 ml   Pressure Support (cm H2O) 8 cm H2O   PEEP/VENT (cm H2O) 5 cm H20   Insp Time (sec) 1 sec   Insp Rise Time % 50 %   Flow Trigger 3   Expiratory Sensitivity 25 %   Ventilator Measurements   Resp Rate Observed 23   Vt Spont (ml) 448 ml   Ve Observed (l/min) 5.9 l/min   PIP Observed (cm H2O) 25 cm H2O   Plateau Pressure (cm H2O) 18 cm H2O   MAP (cm H2O) 8.9   I:E Ratio Actual 1:6.1   Static Compliance (ml/cm H20) 37 ml/cm H20   Safety & Alarms   Circuit Temperature 98.1 °F (36.7 °C)   Backup Mode Checked/Apnea Yes   Pressure Max 40 cm H2O   Pressure Min 90 cm H2O   Ve Min 2   Ve Max 20   Vt Min 200 ml   Vt Max 800 ml   RR Max 40   Ambu Bag Yes   Ambu Mask Yes   ETCO2/TCM Min 20 mmHg   ETCO2/TCM Max 50 mmHg   Age Specific Ventilator Associated Pneumonia Bundle   Patient Age Group Adult    Ventilator Associated Pneumonia Bundle   Elevation of Head to 30-45 Degrees (Unless Contraindicated) Yes   Vent Method/Mode   Ventilation Method Conventional   Ventilator Mode VC+; Assist control

## 2021-01-14 NOTE — DISCHARGE SUMMARY
Discharge Summary    Patient: Darci Bojorquez MRN: [id number]  CSN: [id number]    YOB: 1955  Age: 72 y.o.   Sex: female    DOA: 12/10/2020 LOS:  LOS: 35 days   Discharge Date:      Admission Diagnoses: STEMI (ST elevation myocardial infarction) ([facility] [I21.3]    Discharge Diagnoses:    Problem List as of 1/14/2021 Date Reviewed: 2/7/2018          Codes Class Noted - Resolved    Gram-negative bacteremia ICD-10-CM: R78.81  ICD-9-CM: 790.7, 041.85  2/5/2015 - Present        Sepsis secondary to UTI [facility]) ICD-10-CM: A41.9, N39.0  ICD-9-CM: 038.9, 995.91, 599.0  2/3/2015 - Present        DM hyperosmolarity type II, uncontrolled ([place] [address] ICD-10-CM: E11.00, E11.65  ICD-9-CM: 250.22  2/3/2015 - Present        HTN (hypertension) ICD-10-CM: I10  ICD-9-CM: 401.9  2/3/2015 - Present        Goals of care, counseling/discussion ICD-10-CM: Z71.89  ICD-9-CM: V65.49  Unknown - Present        Dementia without behavioral disturbance (HCC) ICD-10-CM: F03.90  ICD-9-CM: 294.20  Unknown - Present        Pulmonary fibrosis ([facility] ICD-10-CM: J84.10  ICD-9-CM: 515  Unknown - Present        Severe protein-calorie malnutrition ([facility] ICD-10-CM: E43  ICD-9-CM: 262  12/16/2020 - Present        Acute on chronic systolic congestive heart failure ([facility] ICD-10-CM: I50.23  ICD-9-CM: 428.23, 428.0  12/15/2020 - Present        * (Principal) STEMI (ST elevation myocardial infarction) ([facility] ICD-10-CM: I21.3  ICD-9-CM: 410.90  12/10/2020 - Present        Acidosis ICD-10-CM: E87.2  ICD-9-CM: 276.2  2/7/2018 - Present        Respiratory failure ([facility] ICD-10-CM: J96.90  ICD-9-CM: 518.81  2/7/2018 - Present        Shock ([facility] ICD-10-CM: R57.9  ICD-9-CM: 785.50  2/7/2018 - Present        Hyperosmolar (nonketotic) coma ([facility] ICD-10-CM: E11.01  ICD-9-CM: 250.20  2/7/2018 - Present        Acute UTI ICD-10-CM: N39.0  ICD-9-CM: 599.0  2/7/2018 - Present        Macrocytic anemia ICD-10-CM: D53.9  ICD-9-CM: 281.9  2/7/2018 - Present        Diabetic neuropathy (HCC) ICD-10-CM: E11.40  ICD-9-CM: 250.60, 357.2  Unknown - Present        Osteoarthritis of both knees ICD-10-CM: M17.0  ICD-9-CM: 715.96  Unknown - Present        Sepsis (HCC) ICD-10-CM: A41.9  ICD-9-CM: 038.9, 995.91  2/3/2015 - Present        Febrile illness, acute ICD-10-CM: R50.9  ICD-9-CM: 780.60  2/3/2015 - Present        Diverticula of colon ICD-10-CM: K57.30  ICD-9-CM: 562.10  9/21/2012 - Present              Discharge Condition: Stable    PHYSICAL EXAM  Visit Vitals  /77   Pulse 79   Temp 98.9 °F (37.2 °C)   Resp 16   Ht 5' 7\" (1.702 m)   Wt 80 kg (176 lb 5.9 oz)   SpO2 98%   Breastfeeding No   BMI 27.62 kg/m²       General–intubated/sedated, NAD  Pulmonary: Decreased breath sounds bilaterally; + trach  Cardiovascular: Regular rate and Rhythm.  GI:  Soft, Non distended, Non tender. + Bowel sounds.+ PEG + rectal tube  Extremities:  No edema, cyanosis, clubbing. No calf tenderness.   Neurologic: Opens eyes spontaneously, does not follow commands    Hospital Course:   Patient is a 65 y.o. female with a past medical history of COPD and ILD/pulmonary fibrosis, CHF, HTN, DM, dementia,who presented to [facility] as a transfer from [facility] with acute on chronic systolic heart failure, acute MI with anterolateral STEMI and Q waves. She underwent ptca/stent to proximal/mid LAD on 12/11/20. Pt was intubated and extubated on 12/17/20 due to cardiopulomoary arrest. On 12/22/20 patient was re-intubated and transferred to ICU for respiratory failure and cardiogenic +/- septic shock. Respiratory cultures on 12/22 with ESBL on Mer[place]. Dr. Pino and IR were consulted for trach/PEG. She is S/p trach 1/4/21 and  PEG placement by IR on 1/8/21.    · Acute on Chronic Hypoxic and Hypercapnic Respiratory Failure - Requiring mechanical ventilation, Extubated on 12/17 and reintubated on 12/22, Likely aspiration event. Trach placed 1/4/21, [facility]; [facility]  management. · Wide Complex Arrhythmia (12/25) -noted on bedside cardiac monitor. Lasted roughly 5-10 mins with Spontaneous resolution. E-lyte derangement vs Drug effect (pt was on 55mcg/hr Oliverio-Synephrine at the time) vs cardiac insult.    · Acute on Chronic Systolic Heart Failure - superimposed on severe ILD, Echo 12/10/20 EF 35-40%  · Combination septic/cardiogenic shock, improving   · Aspiration PNA. Sputum Cx(12/22) (+) heavy E.Coli-ESBL. Recent Hx of E.coli ESBL.   · Acute Encephalopathy - likely toxic/metabolic vs infectious vs hepatic in nature, ammonia wnl.   · Severe pulmonary fibrosis - with extensive honeycombing and bronchiectasis as seen on CTA chest 12/16/20 - appears to be a new diagnosis but possibly UIP  · Acute aspiration pneumonia with severe sepsis  · UTI (+)E. coli  · Acute MI with anterolateral STEMI and Q waves - s/p ptca/stent to proximal/mid LAD using ARELY on DAPT on 12/11/20- Remains on Brilinta  · Severe dysphagia - failed MBS. Will place PEG tube today with IR.   · Critical illness myopathy/polyneuropathy  · Hx of Hep C: + RNA viral load 4/4/2020  · Multiple liver masses - noted on CT scan 11/25/20  · COPD - on home O2 4L NC  · DM  · Dementia - unknown baseline, on aricept     FULL CODE    Consults:   Cardiology:  Amanda Hayes  PCCM: Dr. Rose Marie Castillo, Dr. Ashley Baca, Dr. Luz Kelley  GI: Dr. Rocío Burks  IR:  Dr. Evert Chaparro  ID:  Dr. Graceann Dubin. Ori  ENT:  Dr. Killian Boudreaux    Significant Diagnostic Studies: Ir Insert Gastrostomy Tube Perc    Result Date: 1/8/2021   IMPRESSION: 1. Successful uncomplicated percutaneous fluoroscopically and ultrasonographically guided gastrostomy tube placement. PLAN: Gastrostomy tube should be left to dependent drainage for 24 hours. After 24 hours trial with saline and crushed medications should be performed. Gastrostomy tube should be flushed prior and after each use in the antegrade fashion with normal saline. This is a paramount to maintain tube patency.  If patient tolerates advance per feeding protocol. Gastrostomy tube exchange should be done on routine basis every 3 months. Xr Chest Port    Result Date: 1/13/2021  IMPRESSION: 1. Right-sided PICC line extending into the right IJ with the tip above the field-of-view. Recommend repositioning. 2.  Progressive opacities in the lung bases concerning for infiltrate. Follow-up to resolution is recommended. Findings discussed with the patient's nurse. Xr Chest Port    Result Date: 1/10/2021  IMPRESSION: 1. Interval removal of ET tube with tracheostomy placed. Repositioned right-sided PICC line with the tip from proximal SVC to the right jugular vein, tip not imaged. No pneumothorax. 2.  Persistent extensive pulmonary infiltrations. 3.  Gas-filled nondilated splenic flexure shows moderate bowel wall thickening. Recommend clinical correlation and abdomen 2 views or CT.  Thank you for your referral.    Results     Procedure Component Value Units Date/Time    CULTURE, BLOOD [[id number]] Collected: 01/12/21 1910    Order Status: Completed Specimen: Blood Updated: 01/14/21 0655     Special Requests: NO SPECIAL REQUESTS        Culture result: NO GROWTH 2 DAYS       CULTURE, BLOOD [[id number]] Collected: 01/12/21 1800    Order Status: Completed Specimen: Blood Updated: 01/14/21 0655     Special Requests: NO SPECIAL REQUESTS        Culture result: NO GROWTH 2 DAYS       CULTURE, BLOOD [[id number]] Collected: 12/31/20 2204    Order Status: Completed Specimen: Blood Updated: 01/08/21 1200     Special Requests: NO SPECIAL REQUESTS        Culture result: NO GROWTH 7 DAYS       CULTURE, BLOOD [[id number]] Collected: 12/31/20 1430    Order Status: Completed Specimen: Blood Updated: 01/06/21 0805     Special Requests: NO SPECIAL REQUESTS        Culture result: NO GROWTH 6 DAYS       CULTURE, URINE [[id number]] Collected: 12/31/20 1200    Order Status: Completed Specimen: Cath Urine Updated: 01/02/21 1121     Special Requests: NO SPECIAL REQUESTS        Culture result: No growth (<1,000 CFU/ML)       CULTURE, BLOOD [[id number]] Collected: 12/31/20 1100    Order Status: Completed Specimen: Blood Updated: 01/06/21 0805     Special Requests: L FEM CVL     Culture result: NO GROWTH 6 DAYS       CULTURE, RESPIRATORY/SPUTUM/BRONCH W GRAM STAIN [[id number]]  (Abnormal)  (Susceptibility) Collected: 12/31/20 1100    Order Status: Completed Specimen: Sputum,ET Suction Updated: 01/04/21 0956     Special Requests: NO SPECIAL REQUESTS        GRAM STAIN FEW WBCS SEEN         NO EPITHELIAL CELLS SEEN         NO ORGANISMS SEEN        Culture result:       LIGHT NORMAL RESPIRATORY AILEEN                  RARE ESCHERICHIA COLI ** (EXTENDED SPECTRUM BETA LACTAMASE ) **          Susceptibility      Escherichia coli     MULU     Amikacin ($) Susceptible     Ampicillin ($) Resistant     Ampicillin/sulbactam ($) Intermediate     Cefazolin ($) Resistant     Cefepime ($$) Resistant     Cefoxitin Intermediate     Ceftazidime ($) Resistant     Ceftriaxone ($) Resistant     Ciprofloxacin ($) Resistant     Gentamicin ($) Susceptible     Levofloxacin ($) Resistant     Meropenem ($$) Susceptible     Piperacillin/Tazobac ($) Susceptible     Tobramycin ($) Susceptible     Trimeth/Sulfa Resistant                            CBC WITH AUTOMATED DIFF    Collection Time: 01/14/21  3:15 AM   Result Value Ref Range    WBC 8.4 4.6 - 13.2 K/uL    RBC 2.92 (L) 4.20 - 5.30 M/uL    HGB 7.6 (L) 12.0 - 16.0 g/dL    HCT 24.6 (L) 35.0 - 45.0 %    MCV 84.2 74.0 - 97.0 FL    MCH 26.0 24.0 - 34.0 PG    MCHC 30.9 (L) 31.0 - 37.0 g/dL    RDW 15.2 (H) 11.6 - 14.5 %    PLATELET 639 (H) 745 - 420 K/uL    MPV 8.9 (L) 9.2 - 11.8 FL    NEUTROPHILS 73 40 - 73 %    LYMPHOCYTES 14 (L) 21 - 52 %    MONOCYTES 9 3 - 10 %    EOSINOPHILS 4 0 - 5 %    BASOPHILS 0 0 - 2 %    ABS. NEUTROPHILS 6.1 1.8 - 8.0 K/UL    ABS. LYMPHOCYTES 1.2 0.9 - 3.6 K/UL    ABS. MONOCYTES 0.7 0.05 - 1.2 K/UL    ABS.  EOSINOPHILS 0.4 0.0 - 0.4 K/UL    ABS. BASOPHILS 0.0 0.0 - 0.1 K/UL    DF AUTOMATED       BMP:   Lab Results   Component Value Date/Time     01/14/2021 03:15 AM    K 3.8 01/14/2021 03:15 AM     01/14/2021 03:15 AM    CO2 32 01/14/2021 03:15 AM    AGAP 6 01/14/2021 03:15 AM     (H) 01/14/2021 03:15 AM    BUN 15 01/14/2021 03:15 AM    CREA 0.35 (L) 01/14/2021 03:15 AM    GFRAA >60 01/14/2021 03:15 AM    GFRNA >60 01/14/2021 03:15 AM          Discharge Medications:     Current Discharge Medication List      START taking these medications    Details   potassium bicarb-citric acid (EFFER-K) 20 mEq tablet Take 1 Tab by mouth daily. Indications: low amount of potassium in the blood, prevention of low potassium in the blood, give via G-tube  Qty: 30 Each, Refills: 0      oxyCODONE (ROXICODONE) 5 mg/5 mL solution Take 5 mL by mouth every eight (8) hours for 3 days. Max Daily Amount: 15 mg.  Qty: 1 Bottle, Refills: 0    Associated Diagnoses: ILD (interstitial lung disease) ([facility]); ST elevation myocardial infarction involving left anterior descending (LAD) coronary artery ([facility]); Ventilator dependence ([facility]); Dementia with behavioral disturbance, unspecified dementia type ([facility]); Tracheostomy in place ([facility])      clopidogreL (PLAVIX) 75 mg tab Take 1 Tab by mouth daily.  Qty: 30 Tab, Refills: 0      chlorhexidine (PERIDEX) 0.12 % solution Take 10 mL by mouth every twelve (12) hours for 14 days.  Qty: 280 mL, Refills: 0      carvediloL (COREG) 6.25 mg tablet Take 1 Tab by mouth every twelve (12) hours.  Qty: 60 Tab, Refills: 0      atorvastatin (LIPITOR) 40 mg tablet Take 1 Tab by mouth nightly.  Qty: 30 Tab, Refills: 0      acetaminophen (TYLENOL) 325 mg tablet Take 2 Tabs by mouth every six (6) hours as needed for Pain or Fever.  Qty: 30 Tab, Refills: 0      albuterol-ipratropium (DUO-NEB) 2.5 mg-0.5 mg/3 ml nebu 3 mL by Nebulization route every four (4) hours as needed for Wheezing or Shortness of Breath.  Qty: 30 Nebule, Refills: 0  diazePAM (VALIUM) 5 mg tablet Take 1 Tab by mouth two (2) times a day. Max Daily Amount: 10 mg.  Qty: 20 Tab, Refills: 0    Associated Diagnoses: ILD (interstitial lung disease) ([facility] ST elevation myocardial infarction involving left anterior descending (LAD) coronary artery ([facility] Ventilator dependence ([facility] Dementia with behavioral disturbance, unspecified dementia type ([facility] Tracheostomy in place ([facility]      lansoprazole (PREVACID) 3 mg/mL oral suspension 10 mL by Per G Tube route Daily (before breakfast). Qty: 1 Kit, Refills: 0      lisinopriL (PRINIVIL, ZESTRIL) 2.5 mg tablet Take 1 Tab by mouth daily. Qty: 30 Tab, Refills: 0      furosemide (LASIX) 20 mg tablet Take 1 Tab by mouth daily. Qty: 15 Tab, Refills: 0      multivit-folic acid-herbal 324 (WELLESSE PLUS) 400 mcg-200 mg/30 mL liqd oral liquid 30 mL by Per G Tube route daily. Qty: 1 Bottle, Refills: 0         CONTINUE these medications which have CHANGED    Details   cyanocobalamin (Vitamin B-12) 1,500 mcg TbER 1 Tab by Per G Tube route daily. Qty: 30 Tab, Refills: 0      magnesium oxide (MAG-OX) 400 mg tablet 1 Tab by Per G Tube route two (2) times a day. Qty: 60 Tab, Refills: 1         CONTINUE these medications which have NOT CHANGED    Details   insulin glargine (LANTUS) 100 unit/mL injection lantus 22 units subq daily  Qty: 1 Vial, Refills: 1      aspirin (ASPIRIN) 325 mg tablet Take 325 mg by mouth daily.            STOP taking these medications       folic acid (FOLVITE) 1 mg tablet Comments:   Reason for Stopping:         tamsulosin (FLOMAX) 0.4 mg capsule Comments:   Reason for Stopping:         amLODIPine (NORVASC) 5 mg tablet Comments:   Reason for Stopping:         VITAMIN E (FORMULA E 400 PO) Comments:   Reason for Stopping:         losartan (COZAAR) 50 mg tablet Comments:   Reason for Stopping:         Melatonin 300 mcg Tab Comments:   Reason for Stopping:               Activity: elevated head of bed for aspiration precautions, PT/OT to direct further activity. Diet: Via PEG: Prosource- 300mg bolus over 1 hours with pump 4 times daily PLUS 100 ml water flush after each bolus.     - current bolus schedule: 06:00, 10:00, 14:00 and 18:00   - crushed meds through PEG  Wound Care:    - PEG care   - routine Trach care    - fecal management system- removed as indicated   - heel suspension boot   - routine oral care every 6 hours   - barrier cream to inner thighs and buttocks twice per shift   - turn and re-position every 2-3 hours    Current vent settings: #6 inner trach cannula (placed 1/4/21 by Dr. Eugene Jimenez)   - FIO2 28%, SIMV 10 back up rate 16, Vt 500ml, PS 8, PEEP 5    Follow-up: with PCP, Claire Casillas MD in 7-10days    Minutes spent on discharge: >30 minutes spent coordinating this discharge (review instructions/follow-up, prescriptions, preparing report for sign off)    Dispo: [address] of contact/defacto MPOA: Serjio Galvan (sister): [phone]

## 2021-01-18 LAB
BACTERIA SPEC CULT: NORMAL
BACTERIA SPEC CULT: NORMAL
SERVICE CMNT-IMP: NORMAL
SERVICE CMNT-IMP: NORMAL

## 2021-01-26 ENCOUNTER — TRANSCRIBE ORDER (OUTPATIENT)
Dept: SCHEDULING | Age: 66
End: 2021-01-26

## 2021-01-26 DIAGNOSIS — R16.0 LIVER MASS: Primary | ICD-10-CM

## 2021-07-28 ENCOUNTER — OFFICE VISIT (OUTPATIENT)
Dept: CARDIOLOGY CLINIC | Age: 66
End: 2021-07-28
Payer: MEDICARE

## 2021-07-28 VITALS
BODY MASS INDEX: 22.13 KG/M2 | OXYGEN SATURATION: 98 % | HEART RATE: 70 BPM | DIASTOLIC BLOOD PRESSURE: 67 MMHG | SYSTOLIC BLOOD PRESSURE: 116 MMHG | WEIGHT: 141 LBS | HEIGHT: 67 IN

## 2021-07-28 DIAGNOSIS — F03.90 DEMENTIA WITHOUT BEHAVIORAL DISTURBANCE, UNSPECIFIED DEMENTIA TYPE: ICD-10-CM

## 2021-07-28 DIAGNOSIS — I50.23 ACUTE ON CHRONIC SYSTOLIC CONGESTIVE HEART FAILURE (HCC): Primary | ICD-10-CM

## 2021-07-28 DIAGNOSIS — I10 ESSENTIAL HYPERTENSION: ICD-10-CM

## 2021-07-28 DIAGNOSIS — I25.5 ISCHEMIC CARDIOMYOPATHY: ICD-10-CM

## 2021-07-28 DIAGNOSIS — I21.02 ST ELEVATION MYOCARDIAL INFARCTION INVOLVING LEFT ANTERIOR DESCENDING (LAD) CORONARY ARTERY (HCC): ICD-10-CM

## 2021-07-28 DIAGNOSIS — Z95.5 HISTORY OF CORONARY ARTERY STENT PLACEMENT: ICD-10-CM

## 2021-07-28 DIAGNOSIS — J84.10 PULMONARY FIBROSIS (HCC): ICD-10-CM

## 2021-07-28 DIAGNOSIS — E13.49 OTHER DIABETIC NEUROLOGICAL COMPLICATION ASSOCIATED WITH OTHER SPECIFIED DIABETES MELLITUS (HCC): ICD-10-CM

## 2021-07-28 PROCEDURE — G8752 SYS BP LESS 140: HCPCS | Performed by: INTERNAL MEDICINE

## 2021-07-28 PROCEDURE — 3017F COLORECTAL CA SCREEN DOC REV: CPT | Performed by: INTERNAL MEDICINE

## 2021-07-28 PROCEDURE — 99214 OFFICE O/P EST MOD 30 MIN: CPT | Performed by: INTERNAL MEDICINE

## 2021-07-28 PROCEDURE — G8427 DOCREV CUR MEDS BY ELIG CLIN: HCPCS | Performed by: INTERNAL MEDICINE

## 2021-07-28 PROCEDURE — G8536 NO DOC ELDER MAL SCRN: HCPCS | Performed by: INTERNAL MEDICINE

## 2021-07-28 PROCEDURE — 3046F HEMOGLOBIN A1C LEVEL >9.0%: CPT | Performed by: INTERNAL MEDICINE

## 2021-07-28 PROCEDURE — G8510 SCR DEP NEG, NO PLAN REQD: HCPCS | Performed by: INTERNAL MEDICINE

## 2021-07-28 PROCEDURE — G8420 CALC BMI NORM PARAMETERS: HCPCS | Performed by: INTERNAL MEDICINE

## 2021-07-28 PROCEDURE — G8400 PT W/DXA NO RESULTS DOC: HCPCS | Performed by: INTERNAL MEDICINE

## 2021-07-28 PROCEDURE — 1101F PT FALLS ASSESS-DOCD LE1/YR: CPT | Performed by: INTERNAL MEDICINE

## 2021-07-28 PROCEDURE — 1090F PRES/ABSN URINE INCON ASSESS: CPT | Performed by: INTERNAL MEDICINE

## 2021-07-28 PROCEDURE — G8754 DIAS BP LESS 90: HCPCS | Performed by: INTERNAL MEDICINE

## 2021-07-28 PROCEDURE — 2022F DILAT RTA XM EVC RTNOPTHY: CPT | Performed by: INTERNAL MEDICINE

## 2021-07-28 NOTE — PROGRESS NOTES
Aurelia Richardson presents today for   Chief Complaint   Patient presents with    New Patient     abn echo        Aurelia Richardson preferred language for health care discussion is english/other. Is someone accompanying this pt? no    Is the patient using any DME equipment during [address]? no    Depression Screening:  3 most recent PHQ Screens 7/28/2021   PHQ Not Done -   Little interest or pleasure in doing things Not at all   Feeling down, depressed, irritable, or hopeless Not at all   Total Score PHQ 2 0       Learning Assessment:  Learning Assessment 7/28/2021   PRIMARY LEARNER Patient   PRIMARY LANGUAGE ENGLISH   LEARNER PREFERENCE PRIMARY DEMONSTRATION   ANSWERED BY Patient   RELATIONSHIP SELF       Abuse Screening:  Abuse Screening Questionnaire 7/28/2021   Do you ever feel afraid of your partner? N   Are you in a relationship with someone who physically or mentally threatens you? N   Is it safe for you to go home? Y       Fall Risk  Fall Risk Assessment, last 12 mths 7/28/2021   Able to walk? Yes   Fall in past 12 months? 0   Do you feel unsteady? 0   Are you worried about falling 0       Pt currently taking Anticoagulant therapy? ASA 325mg every day & plavix     Coordination of Care:  1. Have you been to the ER, urgent care clinic since your last visit? Hospitalized since your last visit? 12/10/20 - 1/14/21 for NSTEMI     2. Have you seen or consulted any other health care providers outside of the [address] [place] [address] since your last visit? Include any pap smears or colon screening.  No

## 2021-08-07 PROBLEM — I25.5 ISCHEMIC CARDIOMYOPATHY: Status: ACTIVE | Noted: 2021-08-07

## 2021-08-07 PROBLEM — Z95.5 HISTORY OF CORONARY ARTERY STENT PLACEMENT: Status: ACTIVE | Noted: 2021-08-07

## 2021-08-07 NOTE — PROGRESS NOTES
Subjective: Aurelia is in the office today for cardiac evaluation. She is a 41-year-old diabetic hypertensive woman that was hospitalized in December with an anterolateral wall STEMI. At that time the patient was in cardiogenic shock. She subsequently had stenting of an her proximal to mid LAD with a drug-eluting stent. (12/10/2020). She has a history of dementia. Her present medications to include Plavix 75 mg daily and aspirin 325 mg daily. She is also on carvedilol 6.25 twice a day lisinopril 2.5 mg daily and Lasix 20 mg daily. She reports that she is living at [facility] at the present time. She says she feels \"pretty good \". She does move around some at the place where she is staying. She does report  mild shortness of breath. She does not have a history consistent with PND or orthopnea. She believes she is gradually gaining her strength.            Patient Active Problem List    Diagnosis Date Noted    Gram-negative bacteremia 02/05/2015    Sepsis secondary to UTI ([address] 02/03/2015    DM hyperosmolarity type II, uncontrolled ([address] 02/03/2015    HTN (hypertension) 02/03/2015    History of coronary artery stent placement 08/07/2021    Ischemic cardiomyopathy 08/07/2021    Goals of care, counseling/discussion     Dementia without behavioral disturbance ([address]     Pulmonary fibrosis ([address]     Severe protein-calorie malnutrition ([address] 12/16/2020    Acute on chronic systolic congestive heart failure ([address] 12/15/2020    STEMI (ST elevation myocardial infarction) ([address] 12/10/2020    Acidosis 02/07/2018    Respiratory failure ([address] 02/07/2018    Shock ([address] 02/07/2018    Hyperosmolar (nonketotic) coma ([address] 02/07/2018    Acute UTI 02/07/2018    Macrocytic anemia 02/07/2018    Diabetic neuropathy (HCC)     Osteoarthritis of both knees     Sepsis ([address] 02/03/2015    Febrile illness, acute 02/03/2015    Diverticula of colon 09/21/2012     Current Outpatient Medications Medication Sig Dispense Refill    potassium bicarb-citric acid (EFFER-K) 20 mEq tablet Take 1 Tab by mouth daily. Indications: low amount of potassium in the blood, prevention of low potassium in the blood, give via G-tube 30 Each 0    clopidogreL (PLAVIX) 75 mg tab Take 1 Tab by mouth daily. 30 Tab 0    carvediloL (COREG) 6.25 mg tablet Take 1 Tab by mouth every twelve (12) hours. 60 Tab 0    atorvastatin (LIPITOR) 40 mg tablet Take 1 Tab by mouth nightly. 30 Tab 0    acetaminophen (TYLENOL) 325 mg tablet Take 2 Tabs by mouth every six (6) hours as needed for Pain or Fever. 30 Tab 0    albuterol-ipratropium (DUO-NEB) 2.5 mg-0.5 mg/3 ml nebu 3 mL by Nebulization route every four (4) hours as needed for Wheezing or Shortness of Breath. 30 Nebule 0    diazePAM (VALIUM) 5 mg tablet Take 1 Tab by mouth two (2) times a day. Max Daily Amount: 10 mg. 20 Tab 0    lansoprazole (PREVACID) 3 mg/mL oral suspension 10 mL by Per G Tube route Daily (before breakfast). 1 Kit 0    lisinopriL (PRINIVIL, ZESTRIL) 2.5 mg tablet Take 1 Tab by mouth daily. 30 Tab 0    furosemide (LASIX) 20 mg tablet Take 1 Tab by mouth daily. 15 Tab 0    multivit-folic acid-herbal 563 (WELLESSE PLUS) 400 mcg-200 mg/30 mL liqd oral liquid 30 mL by Per G Tube route daily. 1 Bottle 0    cyanocobalamin (Vitamin B-12) 1,500 mcg TbER 1 Tab by Per G Tube route daily. 30 Tab 0    magnesium oxide (MAG-OX) 400 mg tablet 1 Tab by Per G Tube route two (2) times a day. 60 Tab 1    insulin glargine (LANTUS) 100 unit/mL injection lantus 22 units subq daily 1 Vial 1    aspirin (ASPIRIN) 325 mg tablet Take 325 mg by mouth daily.          No Known Allergies  Past Medical History:   Diagnosis Date    Arthritis     Asthma     Chronic pain     BACK PAIN    Diabetes (HCC)     Diabetic neuropathy (HCC)     Emphysema     Hepatitis C     Hypertension     Nervousness     Osteoarthritis of both knees      Past Surgical History:   Procedure Laterality Date  HX CARPAL TUNNEL RELEASE Right 09    Dr. Chace Al     E [facility] IR INSERT GASTROSTOMY TUBE PERC  2021     Family History   Problem Relation Age of Onset    Diabetes Mother     Hypertension Mother     Obesity Mother     Alcohol abuse Father     High Cholesterol Father     Lung Disease Father     Stroke Father    Arti Noe Sister     Depression Sister     Diabetes Sister     Hypertension Sister     Obesity Sister     Arthritis-osteo Brother     Diabetes Brother     Hypertension Brother     Obesity Brother      Social History     Tobacco Use   Smoking Status Current Every Day Smoker    Packs/day: 1.00          Review of Systems, additional:  Constitutional: negative  Eyes: negative  Respiratory: positive for dyspnea on exertion  Cardiovascular: positive for dyspnea on exertion  Gastrointestinal: negative  Musculoskeletal:negative  Neurological: negative  Behvioral/Psych: negative  Endocrine: negative  ENT: negative    Objective:     Visit Vitals  /67 (BP 1 Location: Left upper arm, BP Patient Position: Sitting, BP Cuff Size: Adult)   Pulse 70   Ht 5' 7\" (1.702 m)   Wt 64 kg (141 lb)   SpO2 98%   BMI 22.08 kg/m²     General:  alert, cooperative, no distress   Chest Wall: inspection normal - no chest wall deformities or tenderness, respiratory effort normal   Lung: clear to auscultation bilaterally   Heart:  normal rate and regular rhythm, S1 and S2 normal, no murmurs noted, no gallops noted, no JVD   Abdomen: soft, non-tender. Bowel sounds normal. No masses,  no organomegaly   Extremities: extremities normal, atraumatic, no cyanosis or edema Skin: no rashes   Neuro: alert, oriented, normal speech, no focal findings or movement disorder noted     [id number]. Sinus rhythm. Inferior infarct. Consider anterolateral ischemia. Assessment/Plan:       ICD-10-CM ICD-9-CM    1.  Acute on chronic systolic congestive heart failure ([address] 12/10/20 [facility] in setting of anterolateral STEMI. Stable. Return 12/21 I50.23 428.23      428.0    2. Essential hypertension , controlled in the office today. I10 401.9    3. ST elevation myocardial infarction involving left anterior descending (LAD) coronary artery ([facility] , 12/10/2020 I21.02 410.10    4. Other diabetic neurological complication associated with other specified diabetes mellitus ([place] [address]  E13.49 249.60    5. Pulmonary fibrosis ([place] [address]  J84.10 515    6. Dementia without behavioral disturbance, unspecified dementia type ([place] [address]  F03.90 294.20    7. History of coronary artery stent placement  Z95.5 V45.82    8. Ischemic cardiomyopathy , EF 35 to 40%. Echocardiogram 12/10/2020.  I25.5 414.8

## 2023-07-26 NOTE — PROGRESS NOTES
Patient stable at this time post extubation patient had copious sputum that was NT suctioned out and patient stable at this time MD and RN aware. bilateral UE Active ROM was WFL  (within functional limits)

## 2024-10-13 NOTE — PROCEDURES
[employer] [facility]  Arterial  Line Procedure Note    Indication: Hypotension    Emergent procedure. Line Bundle:   Full sterile barrier precautions used. 7-Step Sterility Protocol followed. (cap, mask sterile gown, sterile gloves, large sterile sheet, hand hygiene, 2% chlorhexidine for cutaneous antisepsis)  5 mL 1% Lidocaine placed at insertion site. Left femoral artery cannulated x 1 attempt(s) utilizing the modified Seldinger technique. Good blood return. Catheter secured & Biopatch applied. Sterile Tegaderm placed. Hiro Syed PA-C  12/22/20  Pulmonary, Critical Care Medicine  [facility]     11:21 AM    ICU Staff  Agree with above .  I was present at time of line placement    Latrice Salgado DO, [id number] [facility]  Pulmonary, Critical Care, and Sleep Medicine EMT/paramedic

## (undated) DEVICE — MEDI-VAC NON-CONDUCTIVE SUCTION TUBING: Brand: CARDINAL HEALTH

## (undated) DEVICE — CATHETER ANGIO 5FR L100CM GRY S STL NYL JR4 3 SEG BRAID L

## (undated) DEVICE — SPONGE DRAIN NONWOVEN 4X4IN -- 2/PK

## (undated) DEVICE — INTRODUCER SHTH 6FR CANN L11CM DIL TIP 35MM GRN TUNGSTEN

## (undated) DEVICE — SUT SLK 2-0SH 30IN BLK --

## (undated) DEVICE — NEEDLE HYPO 25GA L1.5IN BLU POLYPR HUB S STL REG BVL STR

## (undated) DEVICE — SPONGE DISSECT PNUT SM 3/8IN -- 5/PK

## (undated) DEVICE — Device: Brand: PORTEX

## (undated) DEVICE — FLEX ADVANTAGE 3000CC: Brand: FLEX ADVANTAGE

## (undated) DEVICE — BSHR MAJOR BASIN PACK-LF: Brand: MEDLINE INDUSTRIES, INC.

## (undated) DEVICE — 6FR THERMODILUTION BALLOON CATHETER SWAN (ARROW)

## (undated) DEVICE — BIPOLAR FORCEPS CORD: Brand: VALLEYLAB

## (undated) DEVICE — STERILE POLYISOPRENE POWDER-FREE SURGICAL GLOVES: Brand: PROTEXIS

## (undated) DEVICE — GOWN,AURORA,NONRNF,XL,30/CS: Brand: MEDLINE

## (undated) DEVICE — TRAY PREP DRY W/ PREM GLV 2 APPL 6 SPNG 2 UNDPD 1 OVERWRAP

## (undated) DEVICE — SYR 10ML LUER LOK 1/5ML GRAD --

## (undated) DEVICE — CATHETER ANGIO JL4 0.045 INX5 FRX100 CM THRULUMEN EXPO

## (undated) DEVICE — PROCEDURE KIT FLUID MGMT 10 FR CUST MAINFOLD

## (undated) DEVICE — CATHETER ANGIO NTR 0.045 INX5 FRX100 CM THRULUMEN EXPO

## (undated) DEVICE — INTRODUCER SHTH 5FR CANN L11CM DIL TIP 25MM GRY TUNGSTEN

## (undated) DEVICE — FLEXIBLE YANKAUER,MEDIUM TIP, NO VACUUM CONTROL: Brand: ARGYLE

## (undated) DEVICE — SHEAR HARMONIC FOCUS OEM 9CM --

## (undated) DEVICE — PRESSURE MONITORING SET: Brand: TRUWAVE

## (undated) DEVICE — DRAPE,TOP,102X53,STERILE: Brand: MEDLINE

## (undated) DEVICE — Device

## (undated) DEVICE — INSULATED BLADE ELECTRODE: Brand: EDGE

## (undated) DEVICE — CATHETER GUID 6FR L100CM GRN PTFE XBLAD3.5 TRUELUMEN HYBRID

## (undated) DEVICE — SPONGE GZ W4XL4IN COT 12 PLY TYP VII WVN C FLD DSGN

## (undated) DEVICE — PACK PROCEDURE SURG VASC CATH 161 MMC LF

## (undated) DEVICE — 3M™ UNIVERSAL ELECTROSURGICAL PLATE, SPLIT, UNCORDED 9160: Brand: 3M™

## (undated) DEVICE — NC TREK CORONARY DILATATION CATHETER 3.25 MM X 12 MM / RAPID-EXCHANGE: Brand: NC TREK

## (undated) DEVICE — TREK CORONARY DILATATION CATHETER 2.50 MM X 15 MM / RAPID-EXCHANGE: Brand: TREK

## (undated) DEVICE — INTENDED FOR TISSUE SEPARATION, AND OTHER PROCEDURES THAT REQUIRE A SHARP SURGICAL BLADE TO PUNCTURE OR CUT.: Brand: BARD-PARKER SAFETY BLADES SIZE 15, STERILE

## (undated) DEVICE — SYR 50ML LR LCK 1ML GRAD NSAF --

## (undated) DEVICE — RUNTHROUGH NS EXTRA FLOPPY PTCA GUIDEWIRE: Brand: RUNTHROUGH

## (undated) DEVICE — AGENT HEMSTAT W2XL3IN OXIDIZED REGENERATED CELOS ABSRB

## (undated) DEVICE — LUB SURG MEDC STRL 2OZ TUBE MC -- MEDICHOICE

## (undated) DEVICE — SET FLD ADMIN 3 W STPCOCK FIX FEM L BOR 1IN

## (undated) DEVICE — SPONGE HEMOSTAT CELLULS 4X8IN -- SURGICEL

## (undated) DEVICE — SHEET, DRAPE, SPLIT, STERILE: Brand: MEDLINE